# Patient Record
Sex: FEMALE | Race: WHITE | NOT HISPANIC OR LATINO | Employment: OTHER | URBAN - METROPOLITAN AREA
[De-identification: names, ages, dates, MRNs, and addresses within clinical notes are randomized per-mention and may not be internally consistent; named-entity substitution may affect disease eponyms.]

---

## 2017-01-12 DIAGNOSIS — R20.2 PARESTHESIA OF SKIN: ICD-10-CM

## 2017-01-12 DIAGNOSIS — Z12.31 ENCOUNTER FOR SCREENING MAMMOGRAM FOR MALIGNANT NEOPLASM OF BREAST: ICD-10-CM

## 2017-02-07 ENCOUNTER — LAB CONVERSION - ENCOUNTER (OUTPATIENT)
Dept: OTHER | Facility: OTHER | Age: 72
End: 2017-02-07

## 2017-02-07 LAB
HBA1C MFR BLD HPLC: 6.4 % OF TOTAL HGB
HCT VFR BLD AUTO: 40 % (ref 35–45)
HGB BLD-MCNC: 13.4 G/DL (ref 11.7–15.5)

## 2017-02-08 ENCOUNTER — ALLSCRIPTS OFFICE VISIT (OUTPATIENT)
Dept: OTHER | Facility: OTHER | Age: 72
End: 2017-02-08

## 2017-06-13 ENCOUNTER — ALLSCRIPTS OFFICE VISIT (OUTPATIENT)
Dept: OTHER | Facility: OTHER | Age: 72
End: 2017-06-13

## 2017-10-19 ENCOUNTER — APPOINTMENT (OUTPATIENT)
Dept: LAB | Facility: CLINIC | Age: 72
End: 2017-10-19
Payer: COMMERCIAL

## 2017-10-19 ENCOUNTER — TRANSCRIBE ORDERS (OUTPATIENT)
Dept: LAB | Facility: CLINIC | Age: 72
End: 2017-10-19

## 2017-10-19 DIAGNOSIS — D50.9 NORMOCYTIC HYPOCHROMIC ANEMIA: Primary | ICD-10-CM

## 2017-10-19 DIAGNOSIS — D50.9 NORMOCYTIC HYPOCHROMIC ANEMIA: ICD-10-CM

## 2017-10-19 LAB
ANION GAP SERPL CALCULATED.3IONS-SCNC: 8 MMOL/L (ref 4–13)
BUN SERPL-MCNC: 19 MG/DL (ref 5–25)
CALCIUM SERPL-MCNC: 9.6 MG/DL (ref 8.3–10.1)
CHLORIDE SERPL-SCNC: 101 MMOL/L (ref 100–108)
CO2 SERPL-SCNC: 26 MMOL/L (ref 21–32)
CREAT SERPL-MCNC: 0.66 MG/DL (ref 0.6–1.3)
EST. AVERAGE GLUCOSE BLD GHB EST-MCNC: 134 MG/DL
GFR SERPL CREATININE-BSD FRML MDRD: 90 ML/MIN/1.73SQ M
GLUCOSE SERPL-MCNC: 102 MG/DL (ref 65–140)
HBA1C MFR BLD: 6.3 % (ref 4.2–6.3)
HGB BLD-MCNC: 13.1 G/DL (ref 11.5–15.4)
POTASSIUM SERPL-SCNC: 3.8 MMOL/L (ref 3.5–5.3)
SODIUM SERPL-SCNC: 135 MMOL/L (ref 136–145)

## 2017-10-19 PROCEDURE — 85018 HEMOGLOBIN: CPT

## 2017-10-19 PROCEDURE — 83036 HEMOGLOBIN GLYCOSYLATED A1C: CPT

## 2017-10-19 PROCEDURE — 36415 COLL VENOUS BLD VENIPUNCTURE: CPT

## 2017-10-19 PROCEDURE — 80048 BASIC METABOLIC PNL TOTAL CA: CPT

## 2017-10-23 ENCOUNTER — ALLSCRIPTS OFFICE VISIT (OUTPATIENT)
Dept: OTHER | Facility: OTHER | Age: 72
End: 2017-10-23

## 2017-11-10 ENCOUNTER — GENERIC CONVERSION - ENCOUNTER (OUTPATIENT)
Dept: OTHER | Facility: OTHER | Age: 72
End: 2017-11-10

## 2018-01-12 NOTE — RESULT NOTES
Verified Results  (1) TISSUE EXAM [id number] 12:00AM Ej Quinteros     Test Name Result Flag Reference   LAB AP CASE REPORT (Report)     Surgical Pathology Report             Case: [id number]                   Authorizing Provider: Annabel Fuentes,  Collected:      07/26/2016                       DO                                       Pathologist:      Ingrid Mcconnell MD        Received:      07/27/2016 1520        Specimen:  Skin, Other, Right upper thigh   LAB AP FINAL DIAGNOSIS (Report)     A  Skin, Right upper thigh, shave biopsy:  - Basal cell carcinoma, nodular type; see note  Note: On section planes studied, lesional cells are at one lateral tissue   edge  Interpretation performed at [facility], [address] [place] [address]      Electronically signed by Ingrid Mcconnell MD on 8/1/2016 at 7:00 PM   LAB AP SURGICAL ADDITIONAL INFORMATION (Report)     These tests were developed and their performance characteristics   determined by [facility] or [facility]  They may not be cleared or approved by the [place]  Food and   Drug Administration  The FDA has determined that such clearance or   approval is not necessary  These tests are used for clinical purposes  They should not be regarded as investigational or for research  This   laboratory has been approved by [place] 88, designated as a high-complexity   laboratory and is qualified to perform these tests  LAB AP GROSS DESCRIPTION (Report)     A  The specimen is received in formalin, labeled with the patient's name   and hospital number, and is designated right upper thigh lesion shave  The specimen consists of one tan nonhairbearing shave of skin measuring   1 5 by 1 2 by less than 0 1 cm  The surface exhibits a partially   bosselated and keratotic plaque which measures 1 1 by 1 0 x 0 2 cm, and   comes within less than 0 1 cm of nearest peripheral margin   The margin of   resection is inked blue, and the surface tips are inked red  Entirely   submitted  Two cassettes  1: Shaved ends of resection  2: Center serially sectioned and sequentially submitted    Collection time not given; fixation time not greater than 60 5 hours    MAS

## 2018-01-13 VITALS
SYSTOLIC BLOOD PRESSURE: 130 MMHG | WEIGHT: 150 LBS | BODY MASS INDEX: 30.24 KG/M2 | HEIGHT: 59 IN | TEMPERATURE: 98 F | HEART RATE: 60 BPM | DIASTOLIC BLOOD PRESSURE: 84 MMHG

## 2018-01-13 VITALS
SYSTOLIC BLOOD PRESSURE: 120 MMHG | HEART RATE: 76 BPM | BODY MASS INDEX: 29.89 KG/M2 | WEIGHT: 148.25 LBS | RESPIRATION RATE: 16 BRPM | DIASTOLIC BLOOD PRESSURE: 80 MMHG | HEIGHT: 59 IN | TEMPERATURE: 95.8 F

## 2018-01-13 VITALS
BODY MASS INDEX: 29.13 KG/M2 | WEIGHT: 144.5 LBS | RESPIRATION RATE: 14 BRPM | HEIGHT: 59 IN | TEMPERATURE: 96.5 F | DIASTOLIC BLOOD PRESSURE: 80 MMHG | HEART RATE: 70 BPM | SYSTOLIC BLOOD PRESSURE: 132 MMHG

## 2018-02-26 ENCOUNTER — OFFICE VISIT (OUTPATIENT)
Dept: FAMILY MEDICINE CLINIC | Facility: CLINIC | Age: 73
End: 2018-02-26
Payer: COMMERCIAL

## 2018-02-26 VITALS
BODY MASS INDEX: 30.86 KG/M2 | RESPIRATION RATE: 14 BRPM | DIASTOLIC BLOOD PRESSURE: 80 MMHG | WEIGHT: 147 LBS | SYSTOLIC BLOOD PRESSURE: 130 MMHG | HEART RATE: 74 BPM | TEMPERATURE: 97.4 F | HEIGHT: 58 IN

## 2018-02-26 DIAGNOSIS — E13.9 DIABETES 1.5, MANAGED AS TYPE 2 (HCC): Primary | ICD-10-CM

## 2018-02-26 DIAGNOSIS — I10 ESSENTIAL HYPERTENSION: ICD-10-CM

## 2018-02-26 DIAGNOSIS — D50.8 IRON DEFICIENCY ANEMIA SECONDARY TO INADEQUATE DIETARY IRON INTAKE: ICD-10-CM

## 2018-02-26 PROCEDURE — 99213 OFFICE O/P EST LOW 20 MIN: CPT | Performed by: FAMILY MEDICINE

## 2018-02-26 PROCEDURE — 1101F PT FALLS ASSESS-DOCD LE1/YR: CPT | Performed by: FAMILY MEDICINE

## 2018-02-26 PROCEDURE — 3725F SCREEN DEPRESSION PERFORMED: CPT | Performed by: FAMILY MEDICINE

## 2018-02-26 RX ORDER — VALSARTAN AND HYDROCHLOROTHIAZIDE 160; 25 MG/1; MG/1
TABLET ORAL
Refills: 0 | COMMUNITY
Start: 2018-01-20 | End: 2018-11-20 | Stop reason: SDUPTHER

## 2018-02-26 RX ORDER — BESIFLOXACIN 6 MG/ML
SUSPENSION OPHTHALMIC
Refills: 0 | COMMUNITY
Start: 2017-11-29 | End: 2018-04-07

## 2018-02-26 RX ORDER — NEOMYCIN SULFATE, POLYMYXIN B SULFATE, AND DEXAMETHASONE 3.5; 10000; 1 MG/G; [USP'U]/G; MG/G
OINTMENT OPHTHALMIC
Refills: 0 | COMMUNITY
Start: 2017-11-22 | End: 2018-04-07

## 2018-02-26 RX ORDER — BROMFENAC SODIUM 0.7 MG/ML
SOLUTION/ DROPS OPHTHALMIC
Refills: 0 | COMMUNITY
Start: 2017-12-01 | End: 2018-04-07

## 2018-02-26 RX ORDER — ERGOCALCIFEROL (VITAMIN D2) 10 MCG
1 TABLET ORAL DAILY
COMMUNITY
End: 2018-04-07

## 2018-02-26 RX ORDER — TRIPROLIDINE/PSEUDOEPHEDRINE 2.5MG-60MG
TABLET ORAL
Refills: 0 | COMMUNITY
Start: 2017-12-01 | End: 2018-04-07

## 2018-02-26 NOTE — PROGRESS NOTES
Patient ID: Vinnie Apley is a 71 y o  female  HPI: 71 y  o female presents for followup of NIDDM, anemia (iron deficiency) and HTN  All are well controlled at this time  , however pt forgot to get updated labs, has a slip to do so and will go this week  SUBJECTIVE    Family History   Problem Relation Age of Onset    Hypertension Mother     Lung cancer Father     Hypertension Sister     Lymphoma Brother 39    Hypertension Brother     Schizophrenia Brother     Depression Son     Schizophrenia Son     Hypertension Family     Heart disease Other      Social History     Social History    Marital status: /Civil Union     Spouse name: N/A    Number of children: N/A    Years of education: N/A     Occupational History    Not on file       Social History Main Topics    Smoking status: Never Smoker    Smokeless tobacco: Not on file    Alcohol use No    Drug use: Unknown    Sexual activity: Not on file     Other Topics Concern    Not on file     Social History Narrative    Daily coffee consumption (___cups/day)    Daily cola consumption (____cans/day)    Daily tea consumption (____cups/day)    Uses safety equipment-seatbelts     Past Medical History:   Diagnosis Date    Hypertension     Shortness of breath     Skin neoplasm     last assessed: 6/13/2017     Past Surgical History:   Procedure Laterality Date    TONSILLECTOMY       No Known Allergies    Current Outpatient Prescriptions:     Multiple Vitamin (DAILY VALUE MULTIVITAMIN) TABS, Take 1 tablet by mouth daily, Disp: , Rfl:     valsartan-hydrochlorothiazide (DIOVAN-HCT) 160-25 MG per tablet, , Disp: , Rfl: 0    BESIVANCE 0 6 % SUSP, instill 1 drop into right eye twice a day, Disp: , Rfl: 0    DUREZOL 0 05 % EMUL, , Disp: , Rfl: 0    neomycin-polymyxin-dexamethasone (MAXITROL) 0 35%-10,000 units/g-0 1%, apply into both eyes three times a day, Disp: , Rfl: 0    PROLENSA 0 07 % SOLN, , Disp: , Rfl: 0    Review of Systems  Constitutional:     Denies fever, chills ,fatigue ,weakness ,weight loss, weight gain     ENT: Denies earache ,loss of hearing ,nosebleed, nasal discharge,nasal congestion ,sore throat ,hoarseness  Pulmonary: Denies shortness of breath ,cough  ,dyspnea on exertion, orthopnea  ,PND   Cardiovascular:  Denies bradycardia , tachycardia  ,palpations, lower extremity edema leg, claudication  Breast:  Denies new or changing breast lumps ,nipple discharge ,nipple changes  Abdomen:  Denies abdominal pain , anorexia , indigestion, nausea, vomiting, constipation, diarrhea  Musculoskeletal: Denies myalgias, arthralgias, joint swelling, joint stiffness , limb pain, limb swelling  Gu: denies dysuria, polyuria  Skin: Denies skin rash, skin lesion, skin wound, itching, dry skin  Neuro: Denies headache, numbness, tingling, confusion, loss of consciousness, dizziness, vertigo  Psychiatric: Denies feelings of depression, suicidal ideation, anxiety, sleep disturbances    OBJECTIVE    Constitutional:   NAD, well appearing and well nourished      ENT:   Conjunctiva and lids: no injection, edema, or discharge     Pupils and iris: JB bilaterally    External inspection of ears and nose: normal without deformities or discharge  Otoscopic exam: Canals patent without erythema  Nasal mucosa, septum and turbinates: Normal or edema or discharge         Oropharynx:  Moist mucosa, normal tongue and tonsils without lesions  No erythema        Pulmonary:Respiratory effort normal rate and rhythm, no increased work of breathing   Auscultation of lungs:  Clear bilaterally with no adventitious breath sounds       Cardiovascular: regular rate and rhythm, S1 and S2, no murmur, no edema and/or varicosities of LE      Abdomen: Soft and non-distended     Positive bowel sounds      No heptomegaly or splenomegaly      Gu: no suprapubic tenderness or CVA tenderness, no urethral discharge  Lymphatic:  No anterior or posterior cervical lymphadenopathy         Musculoskeletal:  Gait and station: Normal gait      Digits and nails normal without clubbing or cyanosis       Inspection/palpation of joints, bones, and muscles:  No joint tenderness, swelling, full active and passive range of motion       Skin: Normal skin turgor and no rashes      Neuro:    Normal reflexes      Psych:   alert and oriented to person, place and time     normal mood and affect  Patient's shoes and socks removed  Right Foot/Ankle   Right Foot Inspection  Skin Exam: skin normal and skin intact no dry skin, no warmth, no callus, no erythema, no maceration, no abnormal color, no pre-ulcer, no ulcer and no callus                          Toe Exam: ROM and strength within normal limits  Sensory   Vibration: intact  Proprioception: intact   Monofilament testing: intact  Vascular  Capillary refills: < 3 seconds  The right DP pulse is 2+  The right PT pulse is 2+  Left Foot/Ankle  Left Foot Inspection  Skin Exam: skin normal and skin intactno dry skin, no warmth, no erythema, no maceration, normal color, no pre-ulcer, no ulcer and no callus                         Toe Exam: ROM and strength within normal limits                   Sensory   Vibration: intact  Proprioception: intact  Monofilament: intact  Vascular  Capillary refills: < 3 seconds  The left DP pulse is 2+  The left PT pulse is 2+  Assign Risk Category:  No deformity present;  No loss of protective sensation;        Risk: 0      Assessment/Plan:Diagnoses and all orders for this visit:    Diabetes 1 5, managed as type 2 ([facility] [address] )    Essential hypertension    Iron deficiency anemia secondary to inadequate dietary iron intake    Other orders  -     Multiple Vitamin (DAILY VALUE MULTIVITAMIN) TABS; Take 1 tablet by mouth daily  -     BESIVANCE 0 6 % SUSP; instill 1 drop into right eye twice a day  -     PROLENSA 0 07 % SOLN;   -     DUREZOL 0 05 % EMUL;   -     neomycin-polymyxin-dexamethasone (MAXITROL) 0 35%-10,000 units/g-0 1%; apply into both eyes three times a day  -     valsartan-hydrochlorothiazide (DIOVAN-HCT) 160-25 MG per tablet;           I will see patient back in 4 mos or sooner prn

## 2018-04-07 ENCOUNTER — APPOINTMENT (EMERGENCY)
Dept: RADIOLOGY | Facility: HOSPITAL | Age: 73
End: 2018-04-07
Payer: COMMERCIAL

## 2018-04-07 ENCOUNTER — HOSPITAL ENCOUNTER (OUTPATIENT)
Facility: HOSPITAL | Age: 73
Setting detail: OBSERVATION
Discharge: HOME | End: 2018-04-09
Attending: EMERGENCY MEDICINE | Admitting: INTERNAL MEDICINE
Payer: COMMERCIAL

## 2018-04-07 DIAGNOSIS — R55 SYNCOPE AND COLLAPSE: Primary | ICD-10-CM

## 2018-04-07 PROBLEM — I10 HYPERTENSION: Status: ACTIVE | Noted: 2018-04-07

## 2018-04-07 LAB
ABO GROUP BLD: NORMAL
ALBUMIN SERPL BCP-MCNC: 3.2 G/DL (ref 3.5–5)
ALP SERPL-CCNC: 89 U/L (ref 46–116)
ALT SERPL W P-5'-P-CCNC: 17 U/L (ref 12–78)
ANION GAP BLD CALC-SCNC: 18 MMOL/L (ref 4–13)
ANION GAP SERPL CALCULATED.3IONS-SCNC: 7 MMOL/L (ref 4–13)
AST SERPL W P-5'-P-CCNC: 11 U/L (ref 5–45)
ATRIAL RATE: 90 BPM
BASOPHILS # BLD AUTO: 0.03 THOUSANDS/ΜL (ref 0–0.1)
BASOPHILS NFR BLD AUTO: 0 % (ref 0–1)
BILIRUB SERPL-MCNC: 0.29 MG/DL (ref 0.2–1)
BLD GP AB SCN SERPL QL: NEGATIVE
BUN BLD-MCNC: 20 MG/DL (ref 5–25)
BUN SERPL-MCNC: 19 MG/DL (ref 5–25)
CA-I BLD-SCNC: 1.16 MMOL/L (ref 1.12–1.32)
CALCIUM SERPL-MCNC: 8.5 MG/DL (ref 8.3–10.1)
CHLORIDE BLD-SCNC: 100 MMOL/L (ref 100–108)
CHLORIDE SERPL-SCNC: 106 MMOL/L (ref 100–108)
CO2 SERPL-SCNC: 26 MMOL/L (ref 21–32)
CREAT BLD-MCNC: 0.9 MG/DL (ref 0.6–1.3)
CREAT SERPL-MCNC: 0.96 MG/DL (ref 0.6–1.3)
EOSINOPHIL # BLD AUTO: 0.05 THOUSAND/ΜL (ref 0–0.61)
EOSINOPHIL NFR BLD AUTO: 0 % (ref 0–6)
ERYTHROCYTE [DISTWIDTH] IN BLOOD BY AUTOMATED COUNT: 13.7 % (ref 11.6–15.1)
GFR SERPL CREATININE-BSD FRML MDRD: 60 ML/MIN/1.73SQ M
GFR SERPL CREATININE-BSD FRML MDRD: 65 ML/MIN/1.73SQ M
GLUCOSE SERPL-MCNC: 137 MG/DL (ref 65–140)
GLUCOSE SERPL-MCNC: 148 MG/DL (ref 65–140)
HCT VFR BLD AUTO: 39.3 % (ref 34.8–46.1)
HCT VFR BLD CALC: 39 % (ref 34.8–46.1)
HGB BLD-MCNC: 12.8 G/DL (ref 11.5–15.4)
HGB BLDA-MCNC: 13.3 G/DL (ref 11.5–15.4)
LYMPHOCYTES # BLD AUTO: 2.25 THOUSANDS/ΜL (ref 0.6–4.47)
LYMPHOCYTES NFR BLD AUTO: 20 % (ref 14–44)
MCH RBC QN AUTO: 29.4 PG (ref 26.8–34.3)
MCHC RBC AUTO-ENTMCNC: 32.6 G/DL (ref 31.4–37.4)
MCV RBC AUTO: 90 FL (ref 82–98)
MONOCYTES # BLD AUTO: 0.65 THOUSAND/ΜL (ref 0.17–1.22)
MONOCYTES NFR BLD AUTO: 6 % (ref 4–12)
NEUTROPHILS # BLD AUTO: 8.48 THOUSANDS/ΜL (ref 1.85–7.62)
NEUTS SEG NFR BLD AUTO: 74 % (ref 43–75)
NRBC BLD AUTO-RTO: 0 /100 WBCS
P AXIS: 60 DEGREES
PCO2 BLD: 25 MMOL/L (ref 21–32)
PLATELET # BLD AUTO: 402 THOUSANDS/UL (ref 149–390)
PMV BLD AUTO: 8.3 FL (ref 8.9–12.7)
POTASSIUM BLD-SCNC: 3.5 MMOL/L (ref 3.5–5.3)
POTASSIUM SERPL-SCNC: 3.5 MMOL/L (ref 3.5–5.3)
PR INTERVAL: 194 MS
PROT SERPL-MCNC: 7.4 G/DL (ref 6.4–8.2)
QRS AXIS: 21 DEGREES
QRSD INTERVAL: 96 MS
QT INTERVAL: 374 MS
QTC INTERVAL: 457 MS
RBC # BLD AUTO: 4.36 MILLION/UL (ref 3.81–5.12)
RH BLD: POSITIVE
SODIUM BLD-SCNC: 139 MMOL/L (ref 136–145)
SODIUM SERPL-SCNC: 139 MMOL/L (ref 136–145)
SPECIMEN EXPIRATION DATE: NORMAL
SPECIMEN SOURCE: ABNORMAL
T WAVE AXIS: -31 DEGREES
TROPONIN I SERPL-MCNC: <0.02 NG/ML
VENTRICULAR RATE: 90 BPM
WBC # BLD AUTO: 11.5 THOUSAND/UL (ref 4.31–10.16)

## 2018-04-07 PROCEDURE — 86901 BLOOD TYPING SEROLOGIC RH(D): CPT | Performed by: EMERGENCY MEDICINE

## 2018-04-07 PROCEDURE — 84484 ASSAY OF TROPONIN QUANT: CPT | Performed by: EMERGENCY MEDICINE

## 2018-04-07 PROCEDURE — 85014 HEMATOCRIT: CPT

## 2018-04-07 PROCEDURE — 86900 BLOOD TYPING SEROLOGIC ABO: CPT | Performed by: EMERGENCY MEDICINE

## 2018-04-07 PROCEDURE — 80053 COMPREHEN METABOLIC PANEL: CPT | Performed by: EMERGENCY MEDICINE

## 2018-04-07 PROCEDURE — 36415 COLL VENOUS BLD VENIPUNCTURE: CPT | Performed by: EMERGENCY MEDICINE

## 2018-04-07 PROCEDURE — 96360 HYDRATION IV INFUSION INIT: CPT

## 2018-04-07 PROCEDURE — 99220 PR INITIAL OBSERVATION CARE/DAY 70 MINUTES: CPT | Performed by: INTERNAL MEDICINE

## 2018-04-07 PROCEDURE — 93005 ELECTROCARDIOGRAM TRACING: CPT

## 2018-04-07 PROCEDURE — 85025 COMPLETE CBC W/AUTO DIFF WBC: CPT | Performed by: EMERGENCY MEDICINE

## 2018-04-07 PROCEDURE — 86850 RBC ANTIBODY SCREEN: CPT | Performed by: EMERGENCY MEDICINE

## 2018-04-07 PROCEDURE — 80047 BASIC METABLC PNL IONIZED CA: CPT

## 2018-04-07 PROCEDURE — 71046 X-RAY EXAM CHEST 2 VIEWS: CPT

## 2018-04-07 RX ADMIN — SODIUM CHLORIDE 1000 ML: 0.9 INJECTION, SOLUTION INTRAVENOUS at 20:50

## 2018-04-08 ENCOUNTER — APPOINTMENT (OUTPATIENT)
Dept: RADIOLOGY | Facility: HOSPITAL | Age: 73
End: 2018-04-08
Payer: COMMERCIAL

## 2018-04-08 LAB
ANION GAP SERPL CALCULATED.3IONS-SCNC: 5 MMOL/L (ref 4–13)
BUN SERPL-MCNC: 19 MG/DL (ref 5–25)
CALCIUM SERPL-MCNC: 8.2 MG/DL (ref 8.3–10.1)
CHLORIDE SERPL-SCNC: 107 MMOL/L (ref 100–108)
CO2 SERPL-SCNC: 27 MMOL/L (ref 21–32)
CREAT SERPL-MCNC: 0.72 MG/DL (ref 0.6–1.3)
ERYTHROCYTE [DISTWIDTH] IN BLOOD BY AUTOMATED COUNT: 14 % (ref 11.6–15.1)
GFR SERPL CREATININE-BSD FRML MDRD: 85 ML/MIN/1.73SQ M
GLUCOSE SERPL-MCNC: 112 MG/DL (ref 65–140)
HCT VFR BLD AUTO: 33.6 % (ref 34.8–46.1)
HGB BLD-MCNC: 11.4 G/DL (ref 11.5–15.4)
MCH RBC QN AUTO: 30 PG (ref 26.8–34.3)
MCHC RBC AUTO-ENTMCNC: 33.9 G/DL (ref 31.4–37.4)
MCV RBC AUTO: 88 FL (ref 82–98)
PLATELET # BLD AUTO: 375 THOUSANDS/UL (ref 149–390)
PMV BLD AUTO: 8.6 FL (ref 8.9–12.7)
POTASSIUM SERPL-SCNC: 4.1 MMOL/L (ref 3.5–5.3)
RBC # BLD AUTO: 3.8 MILLION/UL (ref 3.81–5.12)
SODIUM SERPL-SCNC: 139 MMOL/L (ref 136–145)
TROPONIN I SERPL-MCNC: <0.02 NG/ML
WBC # BLD AUTO: 12.61 THOUSAND/UL (ref 4.31–10.16)

## 2018-04-08 PROCEDURE — 36415 COLL VENOUS BLD VENIPUNCTURE: CPT | Performed by: INTERNAL MEDICINE

## 2018-04-08 PROCEDURE — 99225 PR SBSQ OBSERVATION CARE/DAY 25 MINUTES: CPT | Performed by: NURSE PRACTITIONER

## 2018-04-08 PROCEDURE — G8978 MOBILITY CURRENT STATUS: HCPCS

## 2018-04-08 PROCEDURE — 97162 PT EVAL MOD COMPLEX 30 MIN: CPT

## 2018-04-08 PROCEDURE — 80048 BASIC METABOLIC PNL TOTAL CA: CPT | Performed by: INTERNAL MEDICINE

## 2018-04-08 PROCEDURE — 70450 CT HEAD/BRAIN W/O DYE: CPT

## 2018-04-08 PROCEDURE — G8980 MOBILITY D/C STATUS: HCPCS

## 2018-04-08 PROCEDURE — 85027 COMPLETE CBC AUTOMATED: CPT | Performed by: INTERNAL MEDICINE

## 2018-04-08 PROCEDURE — 84484 ASSAY OF TROPONIN QUANT: CPT | Performed by: NURSE PRACTITIONER

## 2018-04-08 PROCEDURE — 99285 EMERGENCY DEPT VISIT HI MDM: CPT

## 2018-04-08 PROCEDURE — G8979 MOBILITY GOAL STATUS: HCPCS

## 2018-04-08 RX ORDER — VALSARTAN AND HYDROCHLOROTHIAZIDE 160; 25 MG/1; MG/1
1 TABLET ORAL DAILY
Refills: 0 | Status: CANCELLED
Start: 2018-04-10

## 2018-04-08 RX ORDER — SODIUM CHLORIDE 9 MG/ML
50 INJECTION, SOLUTION INTRAVENOUS CONTINUOUS
Status: DISCONTINUED | OUTPATIENT
Start: 2018-04-08 | End: 2018-04-09 | Stop reason: HOSPADM

## 2018-04-08 RX ADMIN — ENOXAPARIN SODIUM 40 MG: 40 INJECTION SUBCUTANEOUS at 09:19

## 2018-04-08 RX ADMIN — SODIUM CHLORIDE 50 ML/HR: 0.9 INJECTION, SOLUTION INTRAVENOUS at 17:17

## 2018-04-08 NOTE — PLAN OF CARE
Problem: Potential for Falls  Goal: Patient will remain free of falls  INTERVENTIONS:  - Assess patient frequently for physical needs  -  Identify cognitive and physical deficits and behaviors that affect risk of falls  -  [place] fall precautions as indicated by assessment   - Educate patient/family on patient safety including physical limitations  - Instruct patient to call for assistance with activity based on assessment  - Modify environment to reduce risk of injury  - Consider OT/PT consult to assist with strengthening/mobility   Outcome: Progressing      Problem: CARDIOVASCULAR - ADULT  Goal: Maintains optimal cardiac output and hemodynamic stability  INTERVENTIONS:  - Monitor I/O, vital signs and rhythm  - Monitor for S/S and trends of decreased cardiac output i e  bleeding, hypotension  - Administer and titrate ordered vasoactive medications to optimize hemodynamic stability  - Assess quality of pulses, skin color and temperature  - Assess for signs of decreased coronary artery perfusion - ex   Angina  - Instruct patient to report change in severity of symptoms   Outcome: Progressing    Goal: Absence of cardiac dysrhythmias or at baseline rhythm  INTERVENTIONS:  - Continuous cardiac monitoring, monitor vital signs, obtain 12 lead EKG if indicated  - Administer antiarrhythmic and heart rate control medications as ordered  - Monitor electrolytes and administer replacement therapy as ordered   Outcome: Progressing      Problem: METABOLIC, FLUID AND ELECTROLYTES - ADULT  Goal: Electrolytes maintained within normal limits  INTERVENTIONS:  - Monitor labs and assess patient for signs and symptoms of electrolyte imbalances  - Administer electrolyte replacement as ordered  - Monitor response to electrolyte replacements, including repeat lab results as appropriate  - Instruct patient on fluid and nutrition as appropriate   Outcome: Progressing      Problem: HEMATOLOGIC - ADULT  Goal: Maintains hematologic stability  INTERVENTIONS  - Assess for signs and symptoms of bleeding or hemorrhage  - Monitor labs  - Administer supportive blood products/factors as ordered and appropriate   Outcome: Progressing

## 2018-04-08 NOTE — PROGRESS NOTES
Ct head returned with abnormal findings: 1   Left frontal extra-axial mildly hyperdense 2 6 cm mass likely to represent a meningioma   Aneurysm or less likely though not entirely excluded   Recommend MRI of the brain with gadolinium     2   No evidence of acute intracranial hemorrhage or extra-axial collection  3   Left frontal scalp hematoma   No acute calvarial fracture  Discussed findings with pt will start ivf now and have pt have cta of head r/o aneurysm

## 2018-04-08 NOTE — PLAN OF CARE
CARDIOVASCULAR - ADULT     Maintains optimal cardiac output and hemodynamic stability Progressing     Absence of cardiac dysrhythmias or at baseline rhythm Progressing        HEMATOLOGIC - ADULT     Maintains hematologic stability Progressing        METABOLIC, FLUID AND ELECTROLYTES - ADULT     Electrolytes maintained within normal limits Progressing        Potential for Falls     Patient will remain free of falls Progressing

## 2018-04-08 NOTE — PROGRESS NOTES
Cardiology tech aware that pt needs echo pending discharge stated she will come in afternoon to do echo

## 2018-04-08 NOTE — ED ATTENDING ATTESTATION
Radha Driscoll MD, saw and evaluated the patient  All available labs and X-rays were ordered by me or the resident and have been reviewed by myself  I discussed the patient with the resident / non-physician and agree with the resident's / non-physician practitioner's findings and plan as documented in the resident's / non-physician practicitioner's note, except where noted  At this point, I agree with the current assessment done in the ED  Chief Complaint   Patient presents with    Syncope     Pt was at dinner and felt she had to go to the bathroom and felt dizzy and was helped to the ground by   Per EMS initial systolic pressures was in the 60s  This is a 71year old female presenting for evaluation of syncopal episode  Today she was at dinner and all of a sudden felt she had to have a bowel movement  She at the same time felt dizzy [facility]) and couldn't get up  Her  lowered her to the ground  She appeared sweaty/diaphoretic  She was nauseous without vomiting  EMS called  They found that she was diaphoretic, pale at the time  12 lead okay  They checked a pressure, found it was 59/30, 61/30  Started fluids  Patient responded  Upon arrival, much less symptomatic  No f/ch/cp/sob  +urinate/stool on self  No seizures  Awake, alert during the entire time  No numbness/tingling distally  No headache  No belly pain, back pain  PMH:  - HTN  - SOB  - SKin CA  PSH:  - Tonsillectomy  No smoking, drinking, drugs  PE:  Vitals:    04/09/18 0404 04/09/18 0855 04/09/18 1113 04/09/18 1458   BP: 125/64 170/71 141/70 159/96   BP Location: Right arm Left arm Right arm Right arm   Pulse: 68 72 69 78   Resp: 18 20 18 20   Temp: 97 5 °F (36 4 °C) 98 1 °F (36 7 °C) 98 1 °F (36 7 °C) 98 6 °F (37 °C)   TempSrc: Oral Oral Oral Oral   SpO2: 95% 94% 95% 96%   Weight:       Height:       General: VSS, NAD, awake, alert  Well-nourished, well-developed  Appears stated age     Head: Normocephalic, atraumatic, nontender  Eyes: PERRL, EOM-I  No diplopia  No hyphema  No subconjunctival hemorrhages  Symmetrical lids  ENT: canals  Atraumatic external nose and ears  Dry MM  No malocclusion  No stridor  Normal phonation  No drooling  Normal swallowing  Neck: Symmetric, trachea midline  No JVD  CV: RRR  +S1/S2  No murmurs or gallops  Peripheral pulses +2 throughout  No chest wall tenderness  Lungs:   Unlabored No retractions  CTAB, lungs sounds equal bilateral    No crepitus  No tachypnea  No paradoxical motion  Abd: +BS, soft, NT/ND  No pulsatile mass   MSK:   FROM   No lower extremity edema  2+ pulses bilaterally  Back:   No CVAT  Skin: Dry, intact  Neuro: AAOx3, GCS 15, CN II-XII grossly intact  Motor grossly intact  Sensory grossly intact  Psychiatric/Behavioral: Appropriate mood and affect   Exam: deferred  U/S bedside: negative FAST, normal aorta  A:  - Diaphoresis  - LH  - syncope  P:  - Syncope workup  - Admit given age, fails CHESS Criteria  - 13 point ROS was performed and all are normal unless stated in the history above  - Nursing note reviewed  Vitals reviewed  - Orders placed by myself and/or advanced practitioner / resident     - Previous chart was reviewed  - No language barrier    - History obtained from EMS patient  - There are no limitations to the history obtained  - Critical care time: Not applicable for this patient  Final Diagnosis:  1  Syncope and collapse      ED Course as of Apr 13 2250   Sat Apr 07, 2018   2100 WBC: (!) 11 50   2130 Troponin I: <0 02   2130 This EKG was interpreted by me   The EKG demonstrates Normal sinus rhythm, normal intervals and axis, normal QRS, non specific acute ST-T changes  HR 90  No STEMI      Medications   sodium chloride 0 9 % bolus 1,000 mL (0 mL Intravenous Stopped 4/7/18 2150)    EMS REPLENISHMENT MED ( Does not apply Given to EMS 4/7/18 2053)   iohexol (OMNIPAQUE) 350 MG/ML injection (MULTI-DOSE) 85 mL (85 mL Intravenous Given 4/9/18 1305)     CTA head w wo contrast   Final Result      Stable 27 mm anterior left parasagittal frontal meningioma  Avascular mass effect with the several small cortical vessels identified from the SUJATHA  Workstation performed: [id number]         CT head wo contrast   Final Result         1  Left frontal extra-axial mildly hyperdense 2 6 cm mass likely to represent a meningioma  Aneurysm or less likely though not entirely excluded  Recommend MRI of the brain with gadolinium  2   No evidence of acute intracranial hemorrhage or extra-axial collection  3   Left frontal scalp hematoma  No acute calvarial fracture  Workstation performed: [id number]         XR chest 2 views   ED Interpretation   Abnormal   The CXR was ordered by me and interpreted by me independently  On my read, it appears:   - EXTREMELY large hiatal hernia in chest   - no pna      Final Result      No acute cardiopulmonary disease  Workstation performed: [id number]           Orders Placed This Encounter   Procedures    XR chest 2 views    CT head wo contrast    CTA head w wo contrast    CBC and differential    Comprehensive metabolic panel    Troponin I    Basic metabolic panel    CBC (With Platelets)    Troponin I    Discharge Diet    Continuous cardiac monitoring    Nursing communcation Continue IV as ordered      Activity as tolerated    ECG 12 lead    ECG 12 lead    Echo complete with contrast if indicated    Type and screen    Place in Observation (expected length of stay for this patient is less than two midnights)     Labs Reviewed   CBC AND DIFFERENTIAL - Abnormal        Result Value Ref Range Status    WBC 11 50 (*) 4 31 - 10 16 Thousand/uL Final    RBC 4 36  3 81 - 5 12 Million/uL Final    Hemoglobin 12 8  11 5 - 15 4 g/dL Final    Hematocrit 39 3  34 8 - 46 1 % Final    MCV 90  82 - 98 fL Final    MCH 29 4  26 8 - 34 3 pg Final    MCHC 32 6  31 4 - 37 4 g/dL Final RDW 13 7  11 6 - 15 1 % Final    MPV 8 3 (*) 8 9 - 12 7 fL Final    Platelets 564 (*) 649 - 390 Thousands/uL Final    nRBC 0  /100 WBCs Final    Neutrophils Relative 74  43 - 75 % Final    Lymphocytes Relative 20  14 - 44 % Final    Monocytes Relative 6  4 - 12 % Final    Eosinophils Relative 0  0 - 6 % Final    Basophils Relative 0  0 - 1 % Final    Neutrophils Absolute 8 48 (*) 1 85 - 7 62 Thousands/µL Final    Lymphocytes Absolute 2 25  0 60 - 4 47 Thousands/µL Final    Monocytes Absolute 0 65  0 17 - 1 22 Thousand/µL Final    Eosinophils Absolute 0 05  0 00 - 0 61 Thousand/µL Final    Basophils Absolute 0 03  0 00 - 0 10 Thousands/µL Final   COMPREHENSIVE METABOLIC PANEL - Abnormal     Sodium 139  136 - 145 mmol/L Final    Potassium 3 5  3 5 - 5 3 mmol/L Final    Chloride 106  100 - 108 mmol/L Final    CO2 26  21 - 32 mmol/L Final    Anion Gap 7  4 - 13 mmol/L Final    BUN 19  5 - 25 mg/dL Final    Creatinine 0 96  0 60 - 1 30 mg/dL Final    Comment: Standardized to IDMS reference method    Glucose 137  65 - 140 mg/dL Final    Comment:   If the patient is fasting, the ADA then defines impaired fasting glucose as > 100 mg/dL and diabetes as > or equal to 123 mg/dL  Specimen collection should occur prior to Sulfasalazine administration due to the potential for falsely depressed results  Specimen collection should occur prior to Sulfapyridine administration due to the potential for falsely elevated results  Calcium 8 5  8 3 - 10 1 mg/dL Final    AST 11  5 - 45 U/L Final    Comment:   Specimen collection should occur prior to Sulfasalazine administration due to the potential for falsely depressed results  ALT 17  12 - 78 U/L Final    Comment:   Specimen collection should occur prior to Sulfasalazine and/or Sulfapyridine administration due to the potential for falsely depressed results       Alkaline Phosphatase 89  46 - 116 U/L Final    Total Protein 7 4  6 4 - 8 2 g/dL Final    Albumin 3 2 (*) 3 5 - 5 0 g/dL Final    Total Bilirubin 0 29  0 20 - 1 00 mg/dL Final    eGFR 60  ml/min/1 73sq m Final    Narrative:     National Kidney Disease Education Program recommendations are as follows:  GFR calculation is accurate only with a steady state creatinine  Chronic Kidney disease less than 60 ml/min/1 73 sq  meters  Kidney failure less than 15 ml/min/1 73 sq  meters  BASIC METABOLIC PANEL - Abnormal     Sodium 139  136 - 145 mmol/L Final    Potassium 4 1  3 5 - 5 3 mmol/L Final    Chloride 107  100 - 108 mmol/L Final    CO2 27  21 - 32 mmol/L Final    Anion Gap 5  4 - 13 mmol/L Final    BUN 19  5 - 25 mg/dL Final    Creatinine 0 72  0 60 - 1 30 mg/dL Final    Comment: Standardized to IDMS reference method    Glucose 112  65 - 140 mg/dL Final    Comment:   If the patient is fasting, the ADA then defines impaired fasting glucose as > 100 mg/dL and diabetes as > or equal to 123 mg/dL  Specimen collection should occur prior to Sulfasalazine administration due to the potential for falsely depressed results  Specimen collection should occur prior to Sulfapyridine administration due to the potential for falsely elevated results  Calcium 8 2 (*) 8 3 - 10 1 mg/dL Final    eGFR 85  ml/min/1 73sq m Final    Narrative:     National Kidney Disease Education Program recommendations are as follows:  GFR calculation is accurate only with a steady state creatinine  Chronic Kidney disease less than 60 ml/min/1 73 sq  meters  Kidney failure less than 15 ml/min/1 73 sq  meters     CBC AND PLATELET - Abnormal     WBC 12 61 (*) 4 31 - 10 16 Thousand/uL Final    RBC 3 80 (*) 3 81 - 5 12 Million/uL Final    Hemoglobin 11 4 (*) 11 5 - 15 4 g/dL Final    Hematocrit 33 6 (*) 34 8 - 46 1 % Final    MCV 88  82 - 98 fL Final    MCH 30 0  26 8 - 34 3 pg Final    MCHC 33 9  31 4 - 37 4 g/dL Final    RDW 14 0  11 6 - 15 1 % Final    Platelets 075  194 - 390 Thousands/uL Final    MPV 8 6 (*) 8 9 - 12 7 fL Final   POCT CHEM 8+ - Abnormal     SODIUM, I-STAT 139  136 - 145 mmol/l Final    Potassium, i-STAT 3 5  3 5 - 5 3 mmol/L Final    Chloride, istat 100  100 - 108 mmol/L Final    CO2, i-STAT 25  21 - 32 mmol/L Final    Anion Gap, Istat 18 (*) 4 - 13 mmol/L Final    Calcium, Ionized i-STAT 1 16  1 12 - 1 32 mmol/L Final    BUN, I-STAT 20  5 - 25 mg/dl Final    Creatinine, i-STAT 0 9  0 6 - 1 3 mg/dl Final    eGFR 65  ml/min/1 73sq m Final    Glucose, i-STAT 148 (*) 65 - 140 mg/dl Final    Hct, i-STAT 39  34 8 - 46 1 % Final    Hgb, i-STAT 13 3  11 5 - 15 4 g/dl Final    Specimen Type VENOUS   Final   TROPONIN I - Normal    Troponin I <0 02  <=0 04 ng/mL Final    Narrative:     Siemens Chemistry analyzer 99% cutoff is > 0 04 ng/mL in network labs    o cTnI 99% cutoff is useful only when applied to patients in the clinical setting of myocardial ischemia  o cTnI 99% cutoff should be interpreted in the context of clinical history, ECG findings and possibly cardiac imaging to establish correct diagnosis  o cTnI 99% cutoff may be suggestive but clearly not indicative of a coronary event without the clinical setting of myocardial ischemia  TYPE AND SCREEN    ABO Grouping A   Final    Rh Factor Positive   Final    Antibody Screen Negative   Final    Specimen Expiration Date [id number]   Final     Time reflects when diagnosis was documented in both MDM as applicable and the Disposition within this note     Time User Action Codes Description Comment    4/7/2018 10:07 PM Sean Ceron 62 Add [R55] Syncope     4/7/2018 10:07 PM Luis Ceron Remove [R55] Syncope     4/7/2018 10:07 PM Luis Ceron Add [R55] Syncope and collapse       ED Disposition     ED Disposition Condition Comment    Admit  Case was discussed with david and the patient's admission status was agreed to be Admission Status: observation status to the service of Dr Carli Preston           Follow-up Information     Follow up With Specialties Details Why [facility] Follow up call to schedule follow up within the next week  [address] [facility] [address] [facility]      Ivonne Reddy MD Cardiology, Multidisciplinary Follow up please call to schedule follow up within the next week  [address] [facility] [address]  [address] [facility] Neurosurgery Call call for a new patient  [address] [address] [phone]        Discharge Medication List as of 4/9/2018  3:22 PM      CONTINUE these medications which have NOT CHANGED    Details   valsartan-hydrochlorothiazide (DIOVAN-HCT) 160-25 MG per tablet Starting Sat 1/20/2018, Historical Med             Outpatient Discharge Orders  Discharge Diet     Activity as tolerated       Prior to Admission Medications   Prescriptions Last Dose Informant Patient Reported? Taking?   valsartan-hydrochlorothiazide (DIOVAN-HCT) 160-25 MG per tablet  Self Yes Yes      Facility-Administered Medications: None       Portions of the record may have been created with voice recognition software  Occasional wrong word or "sound a like" substitutions may have occurred due to the inherent limitations of voice recognition software  Read the chart carefully and recognize, using context, where substitutions have occurred      Electronically signed by:  Davina Shah

## 2018-04-08 NOTE — ED PROVIDER NOTES
History  Chief Complaint   Patient presents with    Syncope     Pt was at dinner and felt she had to go to the bathroom and felt dizzy and was helped to the ground by   Per EMS initial systolic pressures was in the 60s  HPI  This is a 70 yo F who presents today with syncope  Patient was at a dinner party when she felt dizzy and syncopized  Patient was slowly laid down on the floor  She was out for about 1 minute  She does not recall the event  Afterwards patient was hypotensive and pale  When paramedics got there patient's blood pressure systolic was in the 35K  She received a L of fluid prior to arrival   Patient at that time was complaining of abdominal pain and she had diffuse diarrhea  States prior to this incident she was feeling fine no complaints  She denies any chest pain currently no shortness of breath no abdominal pain or back pain  Denies any headaches  On arrival patient appears in her normal state of health  Click bedside ultrasound was performed with normal aorta fast was negative  Will do syncope workup and admit patient   Prior to Admission Medications   Prescriptions Last Dose Informant Patient Reported? Taking?   valsartan-hydrochlorothiazide (DIOVAN-HCT) 160-25 MG per tablet  Self Yes Yes      Facility-Administered Medications: None       Past Medical History:   Diagnosis Date    Hypertension     Shortness of breath     Skin neoplasm     last assessed: 6/13/2017       Past Surgical History:   Procedure Laterality Date    TONSILLECTOMY         Family History   Problem Relation Age of Onset    Hypertension Mother     Lung cancer Father     Hypertension Sister     Lymphoma Brother 39    Hypertension Brother     Schizophrenia Brother     Depression Son     Schizophrenia Son     Hypertension Family     Heart disease Other      I have reviewed and agree with the history as documented      Social History   Substance Use Topics    Smoking status: Never Smoker    Smokeless tobacco: Never Used    Alcohol use No        Review of Systems   Constitutional: Negative for diaphoresis and fever  HENT: Negative  Respiratory: Negative  Negative for cough, shortness of breath and wheezing  Cardiovascular: Negative  Negative for chest pain, palpitations and leg swelling  Gastrointestinal: Positive for diarrhea  Negative for abdominal distention, abdominal pain, nausea and vomiting  Genitourinary: Negative  Musculoskeletal: Negative  Skin: Negative  Neurological: Positive for syncope and weakness  Psychiatric/Behavioral: Negative  All other systems reviewed and are negative  Physical Exam  ED Triage Vitals   Temperature Pulse Respirations Blood Pressure SpO2   04/07/18 2044 04/07/18 2040 04/07/18 2040 04/07/18 2040 04/07/18 2040   97 7 °F (36 5 °C) 81 18 170/75 96 %      Temp Source Heart Rate Source Patient Position - Orthostatic VS BP Location FiO2 (%)   04/07/18 2044 04/07/18 2040 04/07/18 2040 04/07/18 2040 --   Oral Monitor Lying Right arm       Pain Score       04/07/18 2040       No Pain           Orthostatic Vital Signs  Vitals:    04/08/18 0028 04/08/18 0215 04/08/18 0601 04/08/18 0700   BP: 125/68 144/70 142/76 140/81   Pulse: 82 76 71 79   Patient Position - Orthostatic VS: Lying Lying Lying Lying       Physical Exam   Constitutional: She is oriented to person, place, and time  She appears well-developed  HENT:   Head: Normocephalic and atraumatic  Mouth/Throat: Oropharynx is clear and moist    Eyes: EOM are normal  Pupils are equal, round, and reactive to light  Neck: Normal range of motion  Neck supple  Cardiovascular: Normal rate, regular rhythm and normal heart sounds  No murmur heard  Pulmonary/Chest: Effort normal and breath sounds normal    Abdominal: Soft  Bowel sounds are normal  She exhibits no distension  There is no tenderness  There is no rebound and no guarding  Musculoskeletal: Normal range of motion   She exhibits no edema, tenderness or deformity  Neurological: She is alert and oriented to person, place, and time  Skin: Skin is warm and dry  Psychiatric: She has a normal mood and affect  ED Medications  Medications   enoxaparin (LOVENOX) subcutaneous injection 40 mg (40 mg Subcutaneous Given 4/8/18 0919)   sodium chloride 0 9 % bolus 1,000 mL (0 mL Intravenous Stopped 4/7/18 2150)    EMS REPLENISHMENT MED ( Does not apply Given to EMS 4/7/18 2053)       Diagnostic Studies  Results Reviewed     Procedure Component Value Units Date/Time    CBC (With Platelets) [[id number]]  (Abnormal) Collected:  04/08/18 0457    Lab Status:  Final result Specimen:  Blood from Arm, Right Updated:  04/08/18 0529     WBC 12 61 (H) Thousand/uL      RBC 3 80 (L) Million/uL      Hemoglobin 11 4 (L) g/dL      Hematocrit 33 6 (L) %      MCV 88 fL      MCH 30 0 pg      MCHC 33 9 g/dL      RDW 14 0 %      Platelets 731 Thousands/uL      MPV 8 6 (L) fL     Basic metabolic panel [[id number]]  (Abnormal) Collected:  04/08/18 0457    Lab Status:  Final result Specimen:  Blood from Arm, Right Updated:  04/08/18 0524     Sodium 139 mmol/L      Potassium 4 1 mmol/L      Chloride 107 mmol/L      CO2 27 mmol/L      Anion Gap 5 mmol/L      BUN 19 mg/dL      Creatinine 0 72 mg/dL      Glucose 112 mg/dL      Calcium 8 2 (L) mg/dL      eGFR 85 ml/min/1 73sq m     Narrative:         National Kidney Disease Education Program recommendations are as follows:  GFR calculation is accurate only with a steady state creatinine  Chronic Kidney disease less than 60 ml/min/1 73 sq  meters  Kidney failure less than 15 ml/min/1 73 sq  meters      Comprehensive metabolic panel [[id number]]  (Abnormal) Collected:  04/07/18 2047    Lab Status:  Final result Specimen:  Blood from Arm, Right Updated:  04/07/18 2113     Sodium 139 mmol/L      Potassium 3 5 mmol/L      Chloride 106 mmol/L      CO2 26 mmol/L      Anion Gap 7 mmol/L      BUN 19 mg/dL      Creatinine 0 96 mg/dL Glucose 137 mg/dL      Calcium 8 5 mg/dL      AST 11 U/L      ALT 17 U/L      Alkaline Phosphatase 89 U/L      Total Protein 7 4 g/dL      Albumin 3 2 (L) g/dL      Total Bilirubin 0 29 mg/dL      eGFR 60 ml/min/1 73sq m     Narrative:         National Kidney Disease Education Program recommendations are as follows:  GFR calculation is accurate only with a steady state creatinine  Chronic Kidney disease less than 60 ml/min/1 73 sq  meters  Kidney failure less than 15 ml/min/1 73 sq  meters  Troponin I [[id number]]  (Normal) Collected:  04/07/18 2047    Lab Status:  Final result Specimen:  Blood from Arm, Right Updated:  04/07/18 2113     Troponin I <0 02 ng/mL     Narrative:         [employer] Chemistry analyzer 99% cutoff is > 0 04 ng/mL in network labs    o cTnI 99% cutoff is useful only when applied to patients in the clinical setting of myocardial ischemia  o cTnI 99% cutoff should be interpreted in the context of clinical history, ECG findings and possibly cardiac imaging to establish correct diagnosis  o cTnI 99% cutoff may be suggestive but clearly not indicative of a coronary event without the clinical setting of myocardial ischemia      CBC and differential [[id number]]  (Abnormal) Collected:  04/07/18 2047    Lab Status:  Final result Specimen:  Blood from Arm, Right Updated:  04/07/18 2100     WBC 11 50 (H) Thousand/uL      RBC 4 36 Million/uL      Hemoglobin 12 8 g/dL      Hematocrit 39 3 %      MCV 90 fL      MCH 29 4 pg      MCHC 32 6 g/dL      RDW 13 7 %      MPV 8 3 (L) fL      Platelets 446 (H) Thousands/uL      nRBC 0 /100 WBCs      Neutrophils Relative 74 %      Lymphocytes Relative 20 %      Monocytes Relative 6 %      Eosinophils Relative 0 %      Basophils Relative 0 %      Neutrophils Absolute 8 48 (H) Thousands/µL      Lymphocytes Absolute 2 25 Thousands/µL      Monocytes Absolute 0 65 Thousand/µL      Eosinophils Absolute 0 05 Thousand/µL      Basophils Absolute 0 03 Thousands/µL     POCT Chem 8+ [[id number]]  (Abnormal) Collected:  04/07/18 2047    Lab Status:  Final result Updated:  04/07/18 2051     SODIUM, I-STAT 139 mmol/l      Potassium, i-STAT 3 5 mmol/L      Chloride, istat 100 mmol/L      CO2, i-STAT 25 mmol/L      Anion Gap, Istat 18 (H) mmol/L      Calcium, Ionized i-STAT 1 16 mmol/L      BUN, I-STAT 20 mg/dl      Creatinine, i-STAT 0 9 mg/dl      eGFR 65 ml/min/1 73sq m      Glucose, i-STAT 148 (H) mg/dl      Hct, i-STAT 39 %      Hgb, i-STAT 13 3 g/dl      Specimen Type VENOUS    POCT urinalysis dipstick [[id number]]     Lab Status:  No result Specimen:  Urine                  XR chest 2 views   ED Interpretation by Sharonda Avila MD (04/07 2202)   Abnormal   The CXR was ordered by me and interpreted by me independently  On my read, it appears:   - EXTREMELY large hiatal hernia in chest   - no pna      Final Result by Garrett Mendes MD (04/08 2690)      No acute cardiopulmonary disease  Workstation performed: [id number]         CT head wo contrast    (Results Pending)         Procedures  Procedures      Phone Consults  ED Phone Contact    ED Course  ED Course                                MDM  CritCare Time    Disposition  Final diagnoses:   Syncope and collapse     Time reflects when diagnosis was documented in both MDM as applicable and the Disposition within this note     Time User Action Codes Description Comment    4/7/2018 10:07 PM Sean Laboy 62 Add [R55] Syncope     4/7/2018 10:07 PM Luis Laboy Remove [R55] Syncope     4/7/2018 10:07 PM Luis Laboy Add [R55] Syncope and collapse       ED Disposition     ED Disposition Condition Comment    Admit  Case was discussed with david and the patient's admission status was agreed to be Admission Status: observation status to the service of Dr Celeste Queen           Follow-up Information     Follow up With Specialties Details Why [facility] Follow up call to schedule follow up within the next week  [address]  [phone]          Current Discharge Medication List      CONTINUE these medications which have NOT CHANGED    Details   valsartan-hydrochlorothiazide (DIOVAN-HCT) 160-25 MG per tablet Refills: 0           No discharge procedures on file  ED Provider  Attending physically available and evaluated Glo Brush I managed the patient along with the ED Attending      Electronically Signed by         Gay Monet MD  04/08/18 7020

## 2018-04-08 NOTE — ASSESSMENT & PLAN NOTE
- possible vasovagal vs cardiac   BPs low per EMS on arrival  Low likelihood of seizure  - will get ECHO  - monitor on telemetry  - PT eval  - orthostatics  - s/p 2L NS

## 2018-04-08 NOTE — DISCHARGE SUMMARY
[facility]    Patient Information: Gil Vallecillo 71 y o  female MRN: [id number]  Unit/Bed#: [facility] 342-21 Encounter: [id number]    Discharging Physician / Practitioner: JOSE MANUEL Freeman  PCP: Christy Rich DO  Admission Date: 4/7/2018  Discharge Date: 04/08/18    Disposition:     Home    Reason for Admission: syncope     Discharge Diagnoses:      Principal Problem:    Syncope  Active Problems:    Hypertension  Resolved Problems:    * No resolved hospital problems  *      Consultations During Hospital Stay:  · None     Procedures Performed:     ·   · Chest x-ray with no acute cardiopulmonary disease  · CT head:  · Troponins x2 less than 0 02  · WBC count 12 61 present on admission  · EKG:Normal sinus rhythm  Nonspecific T wave abnormality  Abnormal ECG  No previous ECGs available    Significant Findings / Test Results:     · See above    Incidental Findings:   · None    Test Results Pending at Discharge (will require follow up): · None     Outpatient Tests Requested:  · Follow-up with PCP in 1 week  · Call to schedule follow-up with Cardiology    Complications:  None    Hospital Course:     Gil Vallecillo is a 71 y o  female patient who originally presented to the hospital on 4/7/2018 due to syncope  Patient has a history of hypertension  She reports that she had poor oral intake the whole entire day  She reports that she had 2 cups of coffee in the morning  Later that evening her and her  went to aid in a while awaiting for dinner she ordered 2 "fuzzy navels", she also ate a couple of mm's  Patient reports that she has not had alcohol in many years  She states she was ready to eat dinner when she states her  reported to her that she fell sideways onto her  and then fell to the floor  From the admission summary it was not obvious that patient apparently did hit her head    Today on examination patient does have ecchymotic/small hematoma to the left side of her occipital area  Patient has no focal neurological deficits at this time  However CT of head was obtained with results:   Patient also takes combination blood pressure medication with HCTZ diuretic for which she had taken just prior to going to dinner  EMT arrived and patient was noted to have blood pressure in the 74Q systolic we  She was administered 1 L of normal saline  Patient does not remember the event but just remembers waking up on the floor  Per admitting notation for patient did not have any incontinence or tongue biting episodes  She did not have any witnessed shaking/jerking movements  She did have ever have an incontinent episode of diarrhea with the EMS softer she woke up  Patient reports that since that episode she has had 2 small diarrhea episodes overnight but not any further  Today patient is status post 1 L normal saline in addition to EMS L  she is feeling good, reports no dizziness lightheadedness no visual deficits  She denies any chest pain shortness of breath or palpitations  Pt is noted to have a murmur and would recommend that the patient follow up as an outpt for echo  And follow up with the pcp  Condition at Discharge: fair     Discharge Day Visit / Exam:     Subjective:  Pt is feeling much better  Denies any dizziness lightheadedness  Denies any chest pain shortness of breath or palpitations  Vitals: Blood Pressure: 140/81 (04/08/18 0700)  Pulse: 79 (04/08/18 0700)  Temperature: 97 8 °F (36 6 °C) (04/08/18 0601)  Temp Source: Oral (04/08/18 0601)  Respirations: 18 (04/08/18 0700)  Height: 4' 9" (144 8 cm) (04/08/18 0601)  Weight - Scale: 67 8 kg (149 lb 7 6 oz) (04/08/18 0601)  SpO2: 95 % (04/08/18 0700)  Exam:   Physical Exam   Constitutional: She is oriented to person, place, and time  She appears well-developed and well-nourished  No distress  HENT:   Mouth/Throat: No oropharyngeal exudate     Left occipital head with ecchymotic/slightly raised possible hematoma  No open areas wounds or lacerations  Patient has extreme thinning hair ? Alopecia   Eyes: Conjunctivae are normal  Right eye exhibits no discharge  Left eye exhibits no discharge  No scleral icterus  Neck: Normal range of motion  No JVD present  No tracheal deviation present  No thyromegaly present  Cardiovascular: Normal rate  Exam reveals no gallop and no friction rub  Murmur heard  Pulmonary/Chest: No stridor  No respiratory distress  She has no wheezes  She has no rales  She exhibits no tenderness  Abdominal: She exhibits no distension and no mass  There is no tenderness  There is no rebound and no guarding  Musculoskeletal: She exhibits no edema, tenderness or deformity  Lymphadenopathy:     She has no cervical adenopathy  Neurological: She is oriented to person, place, and time  Skin: No rash noted  She is not diaphoretic  No erythema  No pallor  Psychiatric: She has a normal mood and affect  Discussion with Family: none at bedside     Discharge instructions/Information to patient and family:   See after visit summary for information provided to patient and family  Provisions for Follow-Up Care:  See after visit summary for information related to follow-up care and any pertinent home health orders  Planned Readmission:  No plan     Discharge Statement:  I spent 50 minutes discharging the patient  This time was spent on the day of discharge  I had direct contact with the patient on the day of discharge  Greater than 50% of the total time was spent examining patient, answering all patient questions, arranging and discussing plan of care with patient as well as directly providing post-discharge instructions  Additional time then spent on discharge activities  Discharge Medications:  See after visit summary for reconciled discharge medications provided to patient and family        ** Please Note: This note has been constructed using a voice recognition system **

## 2018-04-08 NOTE — SOCIAL WORK
Pt states that she lives with lives with  Jade Cee in 2 fl house; 2 ELIN  Pt was independent in ADLs, driving not working PTA  Pt does not use DME; no hx VNA or inpatient rehab  Pt states  will drive home @ d/c      CM reviewed d/c planning process including the following: identifying help at home, patient preference for d/c planning needs, Discharge Lounge, [facility] Meds to Bed program, availability of treatment team to discuss questions or concerns patient and/or family may have regarding understanding medications and recognizing signs and symptoms once discharged  CM also encouraged patient to follow up with all recommended appointments after discharge  Patient advised of importance for patient and family to participate in managing patients medical well being

## 2018-04-08 NOTE — PHYSICAL THERAPY NOTE
Physical Therapy Evaluation    Patient's Name: Anahy Melendez    Admitting Diagnosis  Syncope and collapse [R55]  Syncope [R55]    Problem List  Patient Active Problem List   Diagnosis    Syncope    Hypertension       Past Medical History  Past Medical History:   Diagnosis Date    Hypertension     Shortness of breath     Skin neoplasm     last assessed: 6/13/2017       Past Surgical History  Past Surgical History:   Procedure Laterality Date    TONSILLECTOMY            04/08/18 0800   Note Type   Note type Eval only   Pain Assessment   Pain Assessment 0-10   Pain Score No Pain   Home Living   Type of Home House   Additional Comments Resides w/  in home  Fully indep, ambulates w/ out device    Both pt and  drive   Prior Function   Level of [place] Independent with ADLs and functional mobility   Falls in the last 6 months 1 to 4   Restrictions/Precautions   Weight Bearing Precautions Per Order No   General   Family/Caregiver Present No   Cognition   Overall Cognitive Status WFL   Arousal/Participation Alert   Orientation Level Oriented X4   Memory Within functional limits   Following Commands Follows all commands and directions without difficulty   RLE Assessment   RLE Assessment (strength 4/5)   LLE Assessment   LLE Assessment (strength 4/5)   Coordination   Movements are Fluid and Coordinated 1   Bed Mobility   Supine to Sit 7  Independent   Additional items Assist x 1   Transfers   Sit to Stand 5  Supervision   Additional items Assist x 1   Stand to Sit 5  Supervision   Additional items Assist x 1   Ambulation/Elevation   Gait pattern (no LOB, mild sway, wide MICH)   Gait Assistance 5  Supervision   Additional items Assist x 1   Assistive Device None   Distance 150'   Balance   Static Sitting Normal   Dynamic Sitting Good   Static Standing Good   Dynamic Standing Good   Ambulatory Good   Endurance Deficit   Endurance Deficit No   Activity Tolerance   Activity Tolerance Patient tolerated treatment well   Nurse Made Aware yes   Assessment   Prognosis Good   Assessment Pt seen for moderate complexity physical therapy evaluation  Pt is a 72 y/o female w/ history/comorbidities of DM, HTN, iron deficiency anemia, who is now admitted w/ syncopal episode w/ LOC/fall p bout of diarrhea  Undergoing w/u  Due to unclear cause of syncope w/ fall risk, note evolving clinical picture  PT consutled to assess mobility, d/c needs  At present time, despite these issues, note pt to be close to or at mobility baseline  will sign off    May mobilize w/ CW2 nursing while here, and may d/c home when stable   Plan   PT Frequency (d/c PT)   Recommendation   Recommendation D/C PT   PT - OK to Discharge Yes   Modified Haugen Scale   Modified Haugen Scale 1   Barthel Index   Feeding 10   Bathing 5   Grooming Score 5   Dressing Score 5   Bladder Score 10   Bowels Score 10   Toilet Use Score 10   Transfers (Bed/Chair) Score 15   Mobility (Level Surface) Score 10   Stairs Score 5   Barthel Index Score 85         Gilbert Mari PT, DPT, CSRS

## 2018-04-08 NOTE — CASE MANAGEMENT
Initial Clinical Review    Admission: Date/Time/Statement: 04/07/18 @ 2208 -- OBS     Orders Placed This Encounter   Procedures    Place in Observation (expected length of stay for this patient is less than two midnights)     Standing Status:   Standing     Number of Occurrences:   1     Order Specific Question:   Admitting Physician     Answer:   Brooks Mathur [[id number]]     Order Specific Question:   Level of Care     Answer:   Med Surg [16]         ED: Date/Time/Mode of Arrival:   ED Arrival Information     Expected Arrival Acuity Means of Arrival Escorted By Service Admission Type    - 4/7/2018 20:37 [facility] [address] [place] [address] Emergency    Arrival Complaint    Syncope          Chief Complaint:   Chief Complaint   Patient presents with    Syncope     Pt was at dinner and felt she had to go to the bathroom and felt dizzy and was helped to the ground by   Per EMS initial systolic pressures was in the 60s  History of Illness:  71 y o  female with a medical history significant for HTN who presents with syncope  Patient reports that she has had poor PO intake and today went to a dinner party  After finishing dinner and while still seated, she felt like she needed to go to the bathroom for diarrhea and suddenly felt hot and diaphoretic  She subsequently lost consciousness and fell over to her , who says she was out for about 15 mins  No head trauma  She did not have incontinence or tongue biting during this episode  No shaking/jerking movements  EMS arrived and she had BPs of 60s and was given 1L  She did have incontinent diarrhea with EMS after she had woken  No brbpr or melena  She denies having chest pain, sob, abdominal pain, weakness/numbness, fevers       ED Vital Signs:   ED Triage Vitals   Temperature Pulse Respirations Blood Pressure SpO2   04/07/18 2044 04/07/18 2040 04/07/18 2040 04/07/18 2040 04/07/18 2040   97 7 °F (36 5 °C) 81 18 170/75 96 %      Temp Source Heart Rate Source Patient Position - Orthostatic VS BP Location FiO2 (%)   18 --   Oral Monitor Lying Right arm       Pain Score       18       No Pain        Wt Readings from Last 1 Encounters:   18 67 8 kg (149 lb 7 6 oz)       Vital Signs (abnormal): WNL    Abnormal Labs/Diagnostic Test Results: WBC 11 50, , Glucose 148    Imaging:  CXR: RML area with air-fluid level on lateral view, ?stomach herniation into chest cavity  EKbpm, sinus, poor baseline, no acute st changes, no prior for comparison       ED Treatment:   Medication Administration from 2018 to 2018 2958       Date/Time Order Dose Route Action Action by Comments                2018 sodium chloride 0 9 % bolus 1,000 mL 1,000 mL Intravenous Mayo Roberson RN        Past Medical/Surgical History:   Past Medical History:   Diagnosis Date    Hypertension     Shortness of breath     Skin neoplasm        Admitting Diagnosis: Syncope and collapse [R55]  Syncope [R55]    Age/Sex: 71 y o  female    Assessment/Plan:   71 y o  female with a medical history significant for diabetes, HTN, iron deficiency anemia who presents with syncope      # Syncope  - possible vasovagal vs cardiac   BPs low per EMS on arrival  Low likelihood of seizure  - will get ECHO  - monitor on telemetry  - PT eval  - orthostatics  - s/p 2L NS     # HTN - holding valsartan-HCTZ given reported low BPs, presently BP stable     FEN: regular  VTE Prophylaxis: Enoxaparin (Lovenox)   Code: Full, as discussed at bedside on admission      Admission Orders:  M/S/Tele unit  Telem  Serial troponins  Echo  Orthostatic bp's  IS q1h  SCD's  regular diet  PT eval    Scheduled Meds:   Current Facility-Administered Medications:  enoxaparin 40 mg Subcutaneous Daily Regi Calix MD     Continuous Infusions:

## 2018-04-08 NOTE — H&P
H&P- Salima Reeder 1945, 71 y o  female MRN: [id number]    Unit/Bed#: ED 05 Encounter: [id number]    Primary Care Provider: Garcia Rincon DO   Date and time admitted to hospital: 4/7/2018  8:37 PM      Assessment/Plan:  71 y o  female with a medical history significant for diabetes, HTN, iron deficiency anemia who presents with syncope  # Syncope  - possible vasovagal vs cardiac  BPs low per EMS on arrival  Low likelihood of seizure  - will get ECHO  - monitor on telemetry  - PT eval  - orthostatics  - s/p 2L NS    # HTN - holding valsartan-HCTZ given reported low BPs, presently BP stable    FEN: regular  VTE Prophylaxis: Enoxaparin (Lovenox)   Code: Full, as discussed at bedside on admission    I have discussed the plan of care with: patient,     Anticipated Length of Stay:  Patient will be admitted on an Observation basis with an anticipated length of stay of less than 2 midnights  Justification for Hospital Stay: syncope workup    Total Time for Visit, including Counseling / Coordination of Care: 1 hour  Greater than 50% of this total time spent on direct patient counseling and coordination of care  Chief Complaint: loss of consciousness    History of Present Illness:  Salima Reeder is a 71 y o  female with a medical history significant for HTN who presents with syncope  Patient reports that she has had poor PO intake and today went to a dinner party  After finishing dinner and while still seated, she felt like she needed to go to the bathroom for diarrhea and suddenly felt hot and diaphoretic  She subsequently lost consciousness and fell over to her , who says she was out for about 15 mins  No head trauma  She did not have incontinence or tongue biting during this episode  No shaking/jerking movements  EMS arrived and she had BPs of 60s and was given 1L  She did have incontinent diarrhea with EMS after she had woken  No brbpr or melena   She denies having chest pain, sob, abdominal pain, weakness/numbness, fevers  In the ER, she was given 1L NS  Review of Systems:  All other review of systems reviewed and are negative except for as above in HPI  Past Medical History:     Past Medical History:   Diagnosis Date    Hypertension     Shortness of breath     Skin neoplasm     last assessed: 6/13/2017       Past Surgical History:    Past Surgical History:   Procedure Laterality Date    TONSILLECTOMY         Home Medications:    Prior to Admission medications    Medication Sig Start Date End Date Taking? Authorizing Provider   valsartan-hydrochlorothiazide (DIOVAN-HCT) 160-25 MG per tablet 1 tab daily 1/20/18  Yes Historical Provider, MD     Home meds reviewed and reconciled with patient      Allergies:  No Known Allergies    Social History:     Marital Status: /Civil Union   Substance Use History:   History   Alcohol Use No     History   Smoking Status    Never Smoker   Smokeless Tobacco    Never Used     History   Drug Use No       Family History:  Family History   Problem Relation Age of Onset    Hypertension Mother     Lung cancer Father     Hypertension Sister     Lymphoma Brother 39    Hypertension Brother     Schizophrenia Brother     Depression Son     Schizophrenia Son     Hypertension Family     Heart disease Other        Physical Exam:     Vitals:   Blood Pressure: 141/72 (04/07/18 2115)  Pulse: 86 (04/07/18 2115)  Temperature: 97 7 °F (36 5 °C) (04/07/18 2044)  Temp Source: Oral (04/07/18 2044)  Respirations: 18 (04/07/18 2115)  Height: 4' 9" (144 8 cm) (04/07/18 2040)  Weight - Scale: 67 6 kg (149 lb) (04/07/18 2044)  SpO2: 99 % (04/07/18 2115)    General: Awake, alert, no acute distress  HEENT: NC/AT, EOMI, mmm  Neck: supple, no LAD  Lungs: CTA bl, no w/c/r  Heart: regular, nl I4/J0, 2/6 systolic murmur in RUSB  Abdomen: +BS, soft, NT/ND  Back: no spinal tenderness  Ext: no LE edema  Skin: no rash  Neuro: 4/5 strength throughout, nl sensation    Additional Data:     Lab Results: I have personally reviewed pertinent reports  and I have personally reviewed pertinent films in PACS      Results from last 7 days  Lab Units 18  2047   WBC Thousand/uL 11 50*   HEMOGLOBIN g/dL 12 8   I STAT HEMOGLOBIN g/dl 13 3   HEMATOCRIT % 39 3   PLATELETS Thousands/uL 402*   NEUTROS PCT % 74   LYMPHS PCT % 20   MONOS PCT % 6   EOS PCT % 0       Results from last 7 days  Lab Units 18  2047   SODIUM mmol/L 139   POTASSIUM mmol/L 3 5   CHLORIDE mmol/L 106   CO2 mmol/L 26   BUN mg/dL 19   CREATININE mg/dL 0 96   CALCIUM mg/dL 8 5   TOTAL PROTEIN g/dL 7 4   BILIRUBIN TOTAL mg/dL 0 29   ALK PHOS U/L 89   ALT U/L 17   AST U/L 11   GLUCOSE RANDOM mg/dL 137   GLUCOSE, ISTAT mg/dl 148*           Imaging: I have personally reviewed pertinent reports  and I have personally reviewed pertinent films in PACS  CXR: RML area with air-fluid level on lateral view, ?stomach herniation into chest cavity    EKG, Pathology, and Other Studies Reviewed on Admission:   EKbpm, sinus, poor baseline, no acute st changes, no prior for comparison    Allscripts / Epic Records Reviewed:  Yes

## 2018-04-09 ENCOUNTER — APPOINTMENT (OUTPATIENT)
Dept: NON INVASIVE DIAGNOSTICS | Facility: HOSPITAL | Age: 73
End: 2018-04-09
Payer: COMMERCIAL

## 2018-04-09 ENCOUNTER — APPOINTMENT (OUTPATIENT)
Dept: RADIOLOGY | Facility: HOSPITAL | Age: 73
End: 2018-04-09
Payer: COMMERCIAL

## 2018-04-09 VITALS
RESPIRATION RATE: 20 BRPM | HEIGHT: 57 IN | HEART RATE: 78 BPM | OXYGEN SATURATION: 96 % | SYSTOLIC BLOOD PRESSURE: 159 MMHG | TEMPERATURE: 98.6 F | WEIGHT: 149.47 LBS | DIASTOLIC BLOOD PRESSURE: 96 MMHG | BODY MASS INDEX: 32.25 KG/M2

## 2018-04-09 PROCEDURE — 93306 TTE W/DOPPLER COMPLETE: CPT | Performed by: INTERNAL MEDICINE

## 2018-04-09 PROCEDURE — 93306 TTE W/DOPPLER COMPLETE: CPT

## 2018-04-09 PROCEDURE — 99217 PR OBSERVATION CARE DISCHARGE MANAGEMENT: CPT | Performed by: NURSE PRACTITIONER

## 2018-04-09 PROCEDURE — 70496 CT ANGIOGRAPHY HEAD: CPT

## 2018-04-09 RX ORDER — VALSARTAN AND HYDROCHLOROTHIAZIDE 160; 25 MG/1; MG/1
1 TABLET ORAL DAILY
Refills: 0 | Status: CANCELLED
Start: 2018-04-11

## 2018-04-09 RX ADMIN — IOHEXOL 85 ML: 350 INJECTION, SOLUTION INTRAVENOUS at 13:05

## 2018-04-09 RX ADMIN — ENOXAPARIN SODIUM 40 MG: 40 INJECTION SUBCUTANEOUS at 08:46

## 2018-04-09 NOTE — CASE MANAGEMENT
Nneka Perry, RN Registered Nurse Signed   Case Management Date of Service: 4/8/2018 10:04 AM         []Hide copied text  Initial Clinical Review     Admission: Date/Time/Statement: 04/07/18 @ 2208 -- OBS            Orders Placed This Encounter   Procedures    Place in Observation (expected length of stay for this patient is less than two midnights)       Standing Status:   Standing       Number of Occurrences:   1       Order Specific Question:   Admitting Physician       Answer:   Tomás Reynolds       Order Specific Question:   Level of Care       Answer:   Med Surg [16]            ED: Date/Time/Mode of Arrival:             ED Arrival Information      Expected Arrival Acuity Means of Arrival Escorted By Service Admission Type     - 4/7/2018 20:37 [facility] [place] [facility] General Medicine Emergency     Arrival Complaint     Syncope             Chief Complaint:        Chief Complaint   Patient presents with    Syncope       Pt was at dinner and felt she had to go to the bathroom and felt dizzy and was helped to the ground by   Per EMS initial systolic pressures was in the 60s        History of Illness:  77 y  o  female with a medical history significant for HTN who presents with syncope  Patient reports that she has had poor PO intake and today went to a dinner party  After finishing dinner and while still seated, she felt like she needed to go to the bathroom for diarrhea and suddenly felt hot and diaphoretic  She subsequently lost consciousness and fell over to her , who says she was out for about 15 mins  No head trauma  She did not have incontinence or tongue biting during this episode  No shaking/jerking movements  EMS arrived and she had BPs of 60s and was given 1L  She did have incontinent diarrhea with EMS after she had woken  No brbpr or melena   She denies having chest pain, sob, abdominal pain, weakness/numbness, fevers       ED Vital Signs:            ED Triage Vitals   Temperature Pulse Respirations Blood Pressure SpO2   18   97 7 °F (36 5 °C) 81 18 170/75 96 %       Temp Source Heart Rate Source Patient Position - Orthostatic VS BP Location FiO2 (%)   18 --   Oral Monitor Lying Right arm         Pain Score           18           No Pain            Wt Readings from Last 1 Encounters:   18 67 8 kg (149 lb 7 6 oz)         Vital Signs (abnormal): WNL     Abnormal Labs/Diagnostic Test Results: WBC 11 50, , Glucose 148     Imaging:  CXR: RML area with air-fluid level on lateral view, ?stomach herniation into chest cavity  EKbpm, sinus, poor baseline, no acute st changes, no prior for comparison        ED Treatment:              Medication Administration from 2018 to 2018 0557        Date/Time Order Dose Route Action Action by Comments                           2018 sodium chloride 0 9 % bolus 1,000 mL 1,000 mL Intravenous New Bag Patrcie Krueger RN           Past Medical/Surgical History:        Past Medical History:   Diagnosis Date    Hypertension      Shortness of breath      Skin neoplasm           Admitting Diagnosis: Syncope and collapse [R55]  Syncope [R55]     Age/Sex: 71 y o  female     Assessment/Plan:   71 y o  female with a medical history significant for diabetes, HTN, iron deficiency anemia who presents with syncope      # Syncope  - possible vasovagal vs cardiac   BPs low per EMS on arrival  Low likelihood of seizure  - will get ECHO  - monitor on telemetry  - PT eval  - orthostatics  - s/p 2L NS     # HTN - holding valsartan-HCTZ given reported low BPs, presently BP stable     FEN: regular  VTE Prophylaxis: Enoxaparin (Lovenox)   Code: Full, as discussed at bedside on admission        Admission Orders:  M/S/Tele unit  Telem  Serial troponins  Echo  Orthostatic bp's  IS q1h  SCD's  regular diet  PT eval     Scheduled Meds:   Current Facility-Administered Medications:  enoxaparin 40 mg Subcutaneous Daily Qing eDe MD      Continuous Infusions:                   Thank you,  [address] [facility] by Kali Egan for 2017  Network Utilization Review Department  Phone: [phone]; Fax [phone]  ATTENTION: The Network Utilization Review Department is now centralized for our 7 Facilities  Make a note that we have a new phone and fax numbers for our Department  Please call with any questions or concerns to [phone] and carefully follow the prompts so that you are directed to the right person  All voicemails are confidential  Fax any determinations, approvals, denials, and requests for initial or continue stay review clinical to [phone]  Due to HIGH CALL volume, it would be easier if you could please send faxed requests to expedite your requests and in part, help us provide discharge notifications faster

## 2018-04-09 NOTE — DISCHARGE INSTRUCTIONS
Call to schedule your mri at [phone] then make your appointment for after your mri    Syncope   WHAT YOU NEED TO KNOW:   Syncope is also called fainting or passing out  Syncope is a sudden, temporary loss of consciousness, followed by a fall from a standing or sitting position  Syncope ranges from not serious to a sign of a more serious condition that needs to be treated  You can control some health conditions that cause syncope  Your healthcare providers can help you create a plan to manage syncope and prevent episodes  DISCHARGE INSTRUCTIONS:   Seek care immediately if:   · You are bleeding because you hit your head when you fainted  · You suddenly have double vision, difficulty speaking, numbness, and cannot move your arms or legs  · You have chest pain and trouble breathing  · You vomit blood or material that looks like coffee grounds  · You see blood in your bowel movement  Contact your healthcare provider if:   · You have new or worsening symptoms  · You have another syncope episode  · You have a headache, fast heartbeat, or feel too dizzy to stand up  · You have questions or concerns about your condition or care  Follow up with your healthcare provider as directed:  Write down your questions so you remember to ask them during your visits  Manage syncope:   · Keep a record of your syncope episodes  Include your symptoms and your activity before and after the episode  The record can help your healthcare provider find the cause of your syncope and help you manage episodes  · Sit or lie down when needed  This includes when you feel dizzy, your throat is getting tight, and your vision changes  Raise your legs above the level of your heart  · Take slow, deep breaths if you start to breathe faster with anxiety or fear  This can help decrease dizziness and the feeling that you might faint  · Check your blood pressure often    This is important if you take medicine to lower your blood pressure  Check your blood pressure when you are lying down and when you are standing  Ask how often to check during the day  Keep a record of your blood pressure numbers  Your healthcare provider may use the record to help plan your treatment  Prevent a syncope episode:   · Move slowly and let yourself get used to one position before you move to another position  This is very important when you change from a lying or sitting position to a standing position  Take some deep breaths before you stand up from a lying position  Stand up slowly  Sudden movements may cause a fainting spell  Sit on the side of the bed or couch for a few minutes before you stand up  If you are on bedrest, try to be upright for about 2 hours each day, or as directed  Do not lock your legs if you are standing for a long period of time  Move your legs and bend your knees to keep blood flowing  · Follow your healthcare provider's recommendations  Your provider may  recommend that you drink more liquids to prevent dehydration  You may also need to have more salt to keep your blood pressure from dropping too low and causing syncope  Your provider will tell you how much liquid and sodium to have each day  · Watch for signs of low blood sugar  These include hunger, nervousness, sweating, and fast or fluttery heartbeats  Talk with your healthcare provider about ways to keep your blood sugar level steady  · Do not strain if you are constipated  You may faint if you strain to have a bowel movement  Walking is the best way to get your bowels moving  Eat foods high in fiber to make it easier to have a bowel movement  Good examples are high-fiber cereals, beans, vegetables, and whole-grain breads  Prune juice may help make bowel movements softer  · Be careful in hot weather  Heat can cause a syncope episode  Limit activity done outside on hot days  Physical activity in hot weather can lead to dehydration   This can cause an episode  © 2017 [facility] Information is for End User's use only and may not be sold, redistributed or otherwise used for commercial purposes  All illustrations and images included in CareNotes® are the copyrighted property of A D A M , Inc  or [employer]ali [employer]  The above information is an  only  It is not intended as medical advice for individual conditions or treatments  Talk to your doctor, nurse or pharmacist before following any medical regimen to see if it is safe and effective for you  Meningioma   WHAT YOU NEED TO KNOW:   A meningioma is a tumor that starts in the meninges of the brain and spinal cord  The meninges are the tissues that cover the brain and spinal cord  They prevent germs and other substances from entering the brain and spinal cord  Most meningiomas are slow-growing and benign (not cancer)  DISCHARGE INSTRUCTIONS:   Medicines:   · Medicines  may be given to kill the tumor cells and decrease the size of the meningioma  · Take your medicine as directed  Contact your healthcare provider if you think your medicine is not helping or if you have side effects  Tell him or her if you are allergic to any medicine  Keep a list of the medicines, vitamins, and herbs you take  Include the amounts, and when and why you take them  Bring the list or the pill bottles to follow-up visits  Carry your medicine list with you in case of an emergency  Follow up with your healthcare provider or surgeon as directed:  Write down your questions so you remember to ask them during your visits  Self-care:   · Drink liquids as directed  Ask how much liquid to drink each day and which liquids are best for you  If you have nausea or diarrhea from treatment, extra liquids may help decrease your risk for dehydration  · Eat healthy foods  Healthy foods include fruits, vegetables, whole-grain breads, low-fat dairy products, beans, lean meats, and fish   This may help you feel better during treatment and decrease side effects  You may need to change what you eat during treatment  Do not eat foods or drink liquids that cause gas, such as cabbage, beans, onions, or soft drinks  A nutritionist may help to plan the best meals and snacks for you  · Exercise  Ask about the best exercise plan for you  Exercise may improve your energy levels and appetite  Contact your healthcare provider if:   · You have a fever  · You vomit repeatedly, and cannot keep any food or liquids down  · You have a severe headache, or you feel dizzy  · You have questions or concerns about your condition or care  Seek care immediately or call 911 if:   · You have any of the following signs of a stroke:      ¨ Numbness or drooping on one side of your face     ¨ Weakness in an arm or leg    ¨ Confusion or difficulty speaking    ¨ Dizziness, a severe headache, or vision loss    © 2017 [facility] Information is for End User's use only and may not be sold, redistributed or otherwise used for commercial purposes  All illustrations and images included in CareNotes® are the copyrighted property of [employer]  or Kali Egan  The above information is an  only  It is not intended as medical advice for individual conditions or treatments  Talk to your doctor, nurse or pharmacist before following any medical regimen to see if it is safe and effective for you

## 2018-04-09 NOTE — CASE MANAGEMENT
Thank you,  [facility] in the [facility] by Kali Egan for 2017  Network Utilization Review Department  Phone: [phone]; Fax [phone]  ATTENTION: The Network Utilization Review Department is now centralized for our 7 Facilities  Make a note that we have a new phone and fax numbers for our Department  Please call with any questions or concerns to [phone] and carefully follow the prompts so that you are directed to the right person  All voicemails are confidential  Fax any determinations, approvals, denials, and requests for initial or continue stay review clinical to [phone]  Due to HIGH CALL volume, it would be easier if you could please send faxed requests to expedite your requests and in part, help us provide discharge notifications faster  ---------------------------------------------------------------------------------------------------------  04/07/18 2208  Place in Observation (expected length of stay for this patient is less than two midnights) (ED Bridging Orders Panel) Once     Transfer Service: General Medicine       Question Answer Comment   Admitting Physician Andriy Hill    Level of Care Med Surg            Continued Stay Review    Date: 04/09/2018    Vital Signs: /71 (BP Location: Left arm)   Pulse 72   Temp 98 1 °F (36 7 °C) (Oral)   Resp 20   Ht 4' 9" (1 448 m)   Wt 67 8 kg (149 lb 7 6 oz)   SpO2 94%   BMI 32 35 kg/m²     GCS 15    Medications:   Scheduled Meds:   Current Facility-Administered Medications:  enoxaparin 40 mg Subcutaneous Daily    sodium chloride 50 mL/hr Intravenous Continuous Last Rate: 50 mL/hr (04/08/18 1717)     Continuous Infusions:   sodium chloride 50 mL/hr Last Rate: 50 mL/hr (04/08/18 1717)     Abnormal Labs/Diagnostic Results:   CT head: 1   Left frontal extra-axial mildly hyperdense 2 6 cm mass likely to represent a meningioma   Aneurysm or less likely though not entirely excluded   Recommend MRI of the brain with gadolinium     2   No evidence of acute intracranial hemorrhage or extra-axial collection  3   Left frontal scalp hematoma   No acute calvarial fracture      Age/Sex: 71 y o  female     Assessment/Plan: 9121,    Discharge Plan:   04/09/18 1510  Discharge patient Once     Discharge Disposition: Home/Self Care    Expected Discharge Time: Evening    Expected Discharge Date: 04/08/18

## 2018-04-10 ENCOUNTER — TELEPHONE (OUTPATIENT)
Dept: FAMILY MEDICINE CLINIC | Facility: CLINIC | Age: 73
End: 2018-04-10

## 2018-04-10 ENCOUNTER — TRANSITIONAL CARE MANAGEMENT (OUTPATIENT)
Dept: FAMILY MEDICINE CLINIC | Facility: CLINIC | Age: 73
End: 2018-04-10

## 2018-04-10 NOTE — TELEPHONE ENCOUNTER
PATIENT WAS ADMITTED Saturday CT SCAN SHOWED TUMOR ON BRAIN THEY SUGGESTED SENDING HER FOR AN MRI AFTER BEING RELEASED ALSO NEEDS A JOE APPT

## 2018-04-11 NOTE — DISCHARGE SUMMARY
Discharge Summary - [address] [facility] Medicine    Patient Information: Ya Harper 71 y o  female MRN: [id number]  Unit/Bed#: [facility] 238-64 Encounter: [id number]    Discharging Physician / Practitioner: JOSE MANUEL Forbes  PCP: Russel Nunez DO  Admission Date: 4/7/2018  Discharge Date: 04/10/18    Disposition:     Home    Reason for Admission: syncope    Discharge Diagnoses:     Principal Problem:    Syncope  Active Problems:    Hypertension    Brain mass  Resolved Problems:    * No resolved hospital problems  *      Consultations During Hospital Stay:  · None     Procedures Performed:     · Echo with   · Ekg: Normal sinus rhythm  Nonspecific T wave abnormality  Abnormal ECG  · Chest xray: no acute findings   ·     Significant Findings / Test Results:     · See above    Incidental Findings:   · Ct head:1   Left frontal extra-axial mildly hyperdense 2 6 cm mass likely to represent a meningioma   Aneurysm or less likely though not entirely excluded   Recommend MRI of the brain with gadolinium     2   No evidence of acute intracranial hemorrhage or extra-axial collection  3   Left frontal scalp hematoma   No acute calvarial fracture  · CTA head: Stable 27 mm anterior left parasagittal frontal meningioma   Avascular mass effect with the several small cortical vessels identified from the SUJATHA  Test Results Pending at Discharge (will require follow up): · None      Outpatient Tests Requested:  Call neurosurgery to schedule outpt appointment     Complications:  None     Hospital Course:     Ya Harper is a 71 y o  female patient who originally presented to the hospital on 4/7/2018 due to syncope  According to admitting note pt has hx fo htn  Pt reported that she had two cups of coffee and never ate all day when her and her  went to dinner  Pt states that she was sitting there and then had two "fuzzy navels" not normal for her to drink etoh   Pt states she was waiting for her foot and then fell over to her left side onto her  after which she doesn't remember anything  Her  stated some people lowered her down to the ground  He was sure she never fell and did not hit her head  Pt was admitted for evaluaton  Upon my visit with the patient I noted a +/- 8cm light bruise and slightly raised pt reports "it hurts I must have hit my head" pt  feels certain she did not hit her head  However, A head ct was ordered to r/o bleed  Noted were the above findings  These findings prompted need to rule out an aneurysm  cTA was ordered that demonstrated a Stable 27 mm anterior left parasagittal frontal meningioma   Avascular mass effect with the several small cortical vessels identified from the SUJATHA  After review by neurosurgery it was determined pt could follow up as an outpt  Pt should however, obtain an mri for further evaluation  Pt understands she will need to go for her appointment after her mri has been obtained  Pt is medically stable     Condition at Discharge: fair     Discharge Day Visit / Exam:     Subjective:  Pt is alert oriented x 3 , pleasant  Has no complaints today no headache no dizziness or lightheadedness no nausea or vomiting  Vitals: Blood Pressure: 159/96 (04/09/18 1458)  Pulse: 78 (04/09/18 1458)  Temperature: 98 6 °F (37 °C) (04/09/18 1458)  Temp Source: Oral (04/09/18 1458)  Respirations: 20 (04/09/18 1458)  Height: 4' 9" (144 8 cm) (04/08/18 0601)  Weight - Scale: 67 8 kg (149 lb 7 6 oz) (04/08/18 0601)  SpO2: 96 % (04/09/18 1458)  Exam:   Physical Exam   Constitutional: She is oriented to person, place, and time  She appears well-developed and well-nourished  No distress  HENT:   Sparse hair   Left occiptal head with light bruise    Eyes: Conjunctivae are normal  Pupils are equal, round, and reactive to light  Right eye exhibits no discharge  Left eye exhibits no discharge  No scleral icterus  Neck: No JVD present  No tracheal deviation present   No thyromegaly present  Cardiovascular: Normal rate and regular rhythm  Exam reveals no gallop and no friction rub  Murmur heard  Pulmonary/Chest: No stridor  No respiratory distress  She has no wheezes  She has no rales  Abdominal: Soft  Bowel sounds are normal  She exhibits no distension and no mass  There is no tenderness  There is no rebound and no guarding  Musculoskeletal: She exhibits no edema or deformity  Lymphadenopathy:     She has no cervical adenopathy  Neurological: She is oriented to person, place, and time  Skin: Skin is warm  No rash noted  She is not diaphoretic  No erythema  No pallor  Psychiatric: She has a normal mood and affect  Discussion with Family:  at the bedside     Discharge instructions/Information to patient and family:   See after visit summary for information provided to patient and family  Provisions for Follow-Up Care:  See after visit summary for information related to follow-up care and any pertinent home health orders  Planned Readmission: no plan for readmission     Discharge Statement:  I spent 60minutes discharging the patient  This time was spent on the day of discharge  I had direct contact with the patient on the day of discharge  Greater than 50% of the total time was spent examining patient, answering all patient questions, arranging and discussing plan of care with patient as well as directly providing post-discharge instructions  Additional time then spent on discharge activities  Discharge Medications:  See after visit summary for reconciled discharge medications provided to patient and family        ** Please Note: This note has been constructed using a voice recognition system **

## 2018-04-17 ENCOUNTER — OFFICE VISIT (OUTPATIENT)
Dept: FAMILY MEDICINE CLINIC | Facility: CLINIC | Age: 73
End: 2018-04-17
Payer: COMMERCIAL

## 2018-04-17 VITALS
HEART RATE: 76 BPM | SYSTOLIC BLOOD PRESSURE: 122 MMHG | WEIGHT: 145.4 LBS | BODY MASS INDEX: 31.37 KG/M2 | HEIGHT: 57 IN | DIASTOLIC BLOOD PRESSURE: 80 MMHG | TEMPERATURE: 97.2 F

## 2018-04-17 DIAGNOSIS — D32.9 MENINGIOMA (HCC): Primary | ICD-10-CM

## 2018-04-17 DIAGNOSIS — I10 ESSENTIAL HYPERTENSION: ICD-10-CM

## 2018-04-17 DIAGNOSIS — R55 SYNCOPE, UNSPECIFIED SYNCOPE TYPE: ICD-10-CM

## 2018-04-17 DIAGNOSIS — H61.23 BILATERAL HEARING LOSS DUE TO CERUMEN IMPACTION: ICD-10-CM

## 2018-04-17 PROCEDURE — 69210 REMOVE IMPACTED EAR WAX UNI: CPT | Performed by: FAMILY MEDICINE

## 2018-04-17 PROCEDURE — 99495 TRANSJ CARE MGMT MOD F2F 14D: CPT | Performed by: FAMILY MEDICINE

## 2018-04-17 NOTE — PROGRESS NOTES
Patient ID: Joaquim Disla is a 71 y o  female  HPI: 71 y  o female presents for a followup  To a hospitalization for a syncopal episode secondary to acute dehydration  She only had a cup of coffee all day and went to an anniversary celebration and had 2 alcoholic drinks  During the hospital stay, she was found to have a temporal hematoma for which she needs a follow up MRI,  She complains of some intermittent dizziness  SUBJECTIVE    Family History   Problem Relation Age of Onset    Hypertension Mother     Lung cancer Father     Hypertension Sister     Lymphoma Brother 39    Hypertension Brother     Schizophrenia Brother     Depression Son     Schizophrenia Son     Hypertension Family     Heart disease Other      Social History     Social History    Marital status: /Civil Union     Spouse name: N/A    Number of children: N/A    Years of education: N/A     Occupational History    Not on file       Social History Main Topics    Smoking status: Never Smoker    Smokeless tobacco: Never Used    Alcohol use No    Drug use: No    Sexual activity: Not on file     Other Topics Concern    Not on file     Social History Narrative    Daily coffee consumption (___cups/day)    Daily cola consumption (____cans/day)    Daily tea consumption (____cups/day)    Uses safety equipment-seatbelts     Past Medical History:   Diagnosis Date    Hypertension     Shortness of breath     Skin neoplasm     last assessed: 6/13/2017     Past Surgical History:   Procedure Laterality Date    TONSILLECTOMY       No Known Allergies    Current Outpatient Prescriptions:     valsartan-hydrochlorothiazide (DIOVAN-HCT) 160-25 MG per tablet, , Disp: , Rfl: 0    Review of Systems  Constitutional:     Denies fever, chills ,fatigue ,weakness ,weight loss, weight gain     ENT: Denies earache ,loss of hearing ,nosebleed, nasal discharge,nasal congestion ,sore throat ,hoarseness  Pulmonary: Denies shortness of breath ,cough  ,dyspnea on exertion, orthopnea  ,PND   Cardiovascular:  Denies bradycardia , tachycardia  ,palpations, lower extremity edema leg, claudication  Breast:  Denies new or changing breast lumps ,nipple discharge ,nipple changes  Abdomen:  Denies abdominal pain , anorexia , indigestion, nausea, vomiting, constipation, diarrhea  Musculoskeletal: Denies myalgias, arthralgias, joint swelling, joint stiffness , limb pain, limb swelling  Gu: denies dysuria, polyuria  Skin: Denies skin rash, skin lesion, skin wound, itching, dry skin  Neuro: Denies headache, numbness, tingling, confusion, loss of consciousness, +dizziness,   Psychiatric: Denies feelings of depression, suicidal ideation, anxiety, sleep disturbances    OBJECTIVE    Constitutional:   NAD, well appearing and well nourished      ENT:   Conjunctiva and lids: no injection, edema, or discharge     Pupils and iris: JB bilaterally    External inspection of ears and nose: normal without deformities or discharge  Otoscopic exam: Canals patent ; bilateral cerumen impaction removed with a currette bilaterally until tm fully visualized and intact  Nasal mucosa, septum and turbinates: Normal or edema or discharge         Oropharynx:  Moist mucosa, normal tongue and tonsils without lesions  No erythema        Pulmonary:Respiratory effort normal rate and rhythm, no increased work of breathing   Auscultation of lungs:  Clear bilaterally with no adventitious breath sounds       Cardiovascular: regular rate and rhythm, S1 and S2, no murmur, no edema and/or varicosities of LE      Abdomen: Soft and non-distended     Positive bowel sounds      No heptomegaly or splenomegaly      Gu: no suprapubic tenderness or CVA tenderness, no urethral discharge  Lymphatic:  No anterior or posterior cervical lymphadenopathy         Musculoskeletal:  Gait and station: Normal gait      Digits and nails normal without clubbing or cyanosis       Inspection/palpation of joints, bones, and muscles:  No joint tenderness, swelling, full active and passive range of motion       Skin: Normal skin turgor and no rashes      Neuro:    Normal reflexes     Psych:   alert and oriented to person, place and time     normal mood and affect   Ear cerumen removal  Date/Time: 4/17/2018 4:58 PM  Performed by: Tania Fatima by: Lavell Moscoso, [address] [place] [address]     Patient location:  Clinic  Consent:     Consent obtained:  Verbal    Consent given by:  Patient    Risks discussed:  Bleeding and dizziness  Procedure details:     Location:  L ear and R ear    Procedure type: curette      Approach:  External  Post-procedure details:     Complication:  None    Hearing quality:  Normal    Patient tolerance of procedure: Tolerated well, no immediate complications        Assessment/Plan:Diagnoses and all orders for this visit:    Meningioma ([address] )  -     MRI brain wo contrast; Future    Syncope, unspecified syncope type    Essential hypertension    Bilateral hearing loss due to cerumen impaction        Will await MRI resutls and make further recommendations after reviewing findings

## 2018-05-01 ENCOUNTER — HOSPITAL ENCOUNTER (OUTPATIENT)
Dept: RADIOLOGY | Facility: HOSPITAL | Age: 73
Discharge: HOME | End: 2018-05-01
Payer: COMMERCIAL

## 2018-05-01 DIAGNOSIS — D32.9 MENINGIOMA (HCC): ICD-10-CM

## 2018-05-01 PROCEDURE — 70551 MRI BRAIN STEM W/O DYE: CPT

## 2018-05-03 ENCOUNTER — TRANSCRIBE ORDERS (OUTPATIENT)
Dept: NEUROSURGERY | Facility: CLINIC | Age: 73
End: 2018-05-03

## 2018-05-03 DIAGNOSIS — D32.9 MENINGIOMA (HCC): Primary | ICD-10-CM

## 2018-05-07 NOTE — PLAN OF CARE
Problem: PHYSICAL THERAPY ADULT  Goal: Performs mobility at highest level of function for planned discharge setting  See evaluation for individualized goals  See flowsheet documentation for full assessment, interventions and recommendations  Prognosis: Good     Assessment: Pt seen for moderate complexity physical therapy evaluation  Pt is a 70 y/o female w/ history/comorbidities of DM, HTN, iron deficiency anemia, who is now admitted w/ syncopal episode w/ LOC/fall p bout of diarrhea  Undergoing w/u  Due to unclear cause of syncope w/ fall risk, note evolving clinical picture  PT consutled to assess mobility, d/c needs  At present time, despite these issues, note pt to be close to or at mobility baseline  will sign off  May mobilize w/ [facility] nursing while here, and may d/c home when stable        Recommendation: D/C PT     PT - OK to Discharge: Yes    See flowsheet documentation for full assessment  SUBJECTIVE:  Patient presents self to clinic for checkup of ulceration distal left great toe. He is being treated nursing home daily by nurses and they are applying dressings after washing and applying a sulcus pad. Patient is neuropathic.    OBJECTIVE:  Patient presents sitting comfortably in chair no acute distress. He presents with keratotic lesion over ulcer site distal right great toe slightly medially. Medial nail lip is somewhat swollen with signs of dried blood. Nail is markedly and deeply incurvated. It has grown forward into the end of the nail groove and formed an abscess and there is paronychia along medial nail groove itself. Upon reduction there is a 3 x 4 mm ulcer underneath abscess in nail groove. Previous ulcer measures 3 x 4 mm also, however it is almost completely re-epithelialized. The depth is minimal at this point. The sulcus pad may have been holding the great toe up adequately, however the sulcus pad is been applied to tight around second toe causing some damage to proximal end of second toe. No infection or ulcer present but some inflammation.    ASSESSMENT:  Acute paronychia ingrown nail and abscess distal medial border of left great toe. Partial-thickness ulcer distal left great toe with very good percent improvement especially in depth.    PLAN:  Ulcer debrided with scalpel and tissue nippers removing avascular devitalized tissue down to partial thickness. Medial border of nail avulsed with metal spatula back to base and cut sharply back with nail clippers and removed. Incision and drainage of abscess performed and area debrided with tissue nippers. Sterile dressing applied to both paronychia abscess and distal ulcer. It will be reapplied daily and return to see me in 2 months. They will not use sulcus pad again so I apply small metatarsal pad just underneath great toe to elevate it.

## 2018-06-11 ENCOUNTER — OFFICE VISIT (OUTPATIENT)
Dept: NEUROSURGERY | Facility: CLINIC | Age: 73
End: 2018-06-11
Payer: COMMERCIAL

## 2018-06-11 ENCOUNTER — TRANSCRIBE ORDERS (OUTPATIENT)
Dept: NEUROSURGERY | Facility: CLINIC | Age: 73
End: 2018-06-11

## 2018-06-11 VITALS
HEIGHT: 59 IN | HEART RATE: 84 BPM | RESPIRATION RATE: 16 BRPM | SYSTOLIC BLOOD PRESSURE: 126 MMHG | WEIGHT: 145 LBS | TEMPERATURE: 97.5 F | DIASTOLIC BLOOD PRESSURE: 80 MMHG | BODY MASS INDEX: 29.23 KG/M2

## 2018-06-11 DIAGNOSIS — Z01.818 PRE-PROCEDURAL EXAMINATION: Primary | ICD-10-CM

## 2018-06-11 DIAGNOSIS — D32.9 MENINGIOMA (HCC): ICD-10-CM

## 2018-06-11 DIAGNOSIS — D33.0 BENIGN NEOPLASM OF SUPRATENTORIAL REGION OF BRAIN (HCC): ICD-10-CM

## 2018-06-11 DIAGNOSIS — R90.89 ABNORMAL FINDING ON MRI OF BRAIN: Primary | ICD-10-CM

## 2018-06-11 PROCEDURE — 99204 OFFICE O/P NEW MOD 45 MIN: CPT | Performed by: PHYSICIAN ASSISTANT

## 2018-06-11 NOTE — PROGRESS NOTES
Assessment/Plan:    Very pleasant 67-year-old female, presents to review MRI of the brain, newly diagnosed meningioma  She had a hospital admission for a syncopal episode, was seen in the emergency room and as part of the evaluation underwent CT scan of her head  The final diagnosis relative to her syncopal episode was acute dehydration  The study is carefully reviewed in detail by Dr Joanna Che, and the meningioma benefited on the CT scan is also visible on MRI of her brain 5/1/18  This lesion measures 2 4 x 2 7 x 2 57 hers  There is no evidence of edema in the adjacent frontal lobe  And no mass effect is appreciated  Clinically there are no focal neurologic deficits appreciated on examination, there is no pronator drift, tandem gait is normal, Romberg is negative, motor examination of the upper and lower extremities is 5 x 5, there is no gait or balance disturbance, no bowel or bladder incontinence  there is no reported difficulty with memory or mentation  She has no history of prior imaging of her brain  She has a family history, mother with meningioma age 36 and age 79, she has a history of a female sibling with a history of acoustic neuroma  At this point options for management were reviewed with the patient, consider surgical resection versus observation  Observation was suggested, as this lesion has no mass effect, no evidence of edema of the adjacent frontal lobe, and clinically she remains asymptomatic of this lesion  The patient has chosen to observe with imaged in 6 months, and clinical visit with Neurosurgery  Further follow-up is planned in 6 months with MRI brain with and without contrast and clinical visit to follow  These findings, impressions and recommendations are reviewed in great detail with the patient, she expressed understanding and agreement, her questions were answered completely and to her satisfaction  Follow up has been scheduled             Diagnoses and all orders for this visit:    Abnormal finding on MRI of brain  -     MRI brain with and without contrast; Future    Meningioma [facility])  -     Ambulatory referral to Neurosurgery  -     MRI brain with and without contrast; Future    Benign neoplasm of supratentorial region of brain [facility])  -     MRI brain with and without contrast; Future          Return in about 6 months (around 12/11/2018) for MRI brain with without contrast prior to follow-up visit  Subjective:      Patient ID: Leonard Collins is a 71 y o  female  Very pleasant 81-year-old female, presents to review MRI of the brain, newly diagnosed meningioma  She has a history of hypertension for which she takes Diovan HCT  She reports no known medication allergies  Her past surgical history includes tonsil and adenoid resection in childhood  She has known history of cardiac murmur, and has had echocardiogram in the past         The following portions of the patient's history were reviewed and updated as appropriate: allergies, current medications, past family history, past medical history, past social history and past surgical history  Review of Systems   Constitutional: Negative  HENT: Negative  Eyes: Negative  Cardiovascular: Negative  Gastrointestinal: Negative  Endocrine: Negative  Genitourinary: Negative  Musculoskeletal: Negative  Allergic/Immunologic: Negative  Neurological: Negative  Hematological: Negative  Psychiatric/Behavioral: Negative  Objective:    Physical Exam   Constitutional: She is oriented to person, place, and time  She appears well-developed and well-nourished  HENT:   Head: Normocephalic and atraumatic  Eyes: Pupils are equal, round, and reactive to light  Neck: Normal range of motion  Cardiovascular: Normal rate and regular rhythm  Grade 2/6 systolic ejection murmur heard best at the left sternal border     Pulmonary/Chest: Effort normal and breath sounds normal  Musculoskeletal: Normal range of motion  Neurological: She is alert and oriented to person, place, and time  She has a normal Finger-Nose-Finger Test, a normal Heel to [employer], a normal Romberg Test and a normal Tandem Gait Test  Gait normal    Reflex Scores:       Achilles reflexes are 2+ on the right side and 2+ on the left side  Skin: Skin is warm and dry  Psychiatric: She has a normal mood and affect  Neurologic Exam     Mental Status   Oriented to person, place, and time  Level of consciousness: alert    Cranial Nerves   Cranial nerves II through XII intact  CN III, IV, VI   Pupils are equal, round, and reactive to light  Motor Exam   Overall muscle tone: normal  Right arm tone: normal  Right arm pronator drift: absent  Left arm pronator drift: absent    Strength   Right deltoid: 5/5  Left deltoid: 5/5  Right biceps: 5/5  Left biceps: 5/5  Right triceps: 5/5  Left triceps: 5/5  Right interossei: 5/5  Left interossei: 5/5  Right quadriceps: 5/5  Left quadriceps: 5/5    Sensory Exam   Light touch normal      Gait, Coordination, and Reflexes     Gait  Gait: normal    Coordination   Romberg: negative  Finger to nose coordination: normal  Heel to shin coordination: normal  Tandem walking coordination: normal    Reflexes   Right achilles: 2+  Left achilles: 2+     MRI BRAIN WITHOUT CONTRAST   5/1/18     INDICATION:  D32 9: Benign neoplasm of meninges, unspecified     COMPARISON:   CT and CT angiography of the brain obtained approximately 3 weeks ago      TECHNIQUE:  Sagittal T1, axial T2, axial FLAIR, axial T1, axial Gradient and axial diffusion imaging      IMAGE QUALITY:  Diagnostic      FINDINGS:     BRAIN PARENCHYMA:  There is a homogeneous extra-axial mass identified within the anterior left parafalcine region measuring 2 4 x 2 7 x 2 5 cm, series 6 image 16  This displaces the left frontal lobe laterally    There is no adjacent marrow edema   identified      Mild age-appropriate cerebral volume loss and periventricular white matter hyperintensities on T2 and FLAIR imaging scattered within the cerebral hemispheres consistent with chronic microangiopathy  No evidence of acute ischemia    No hemorrhage        VENTRICLES:  Within normal limits given patient's age      SELLA AND PITUITARY GLAND:  Normal      ORBITS:  Normal      PARANASAL SINUSES:  There is a retention cyst within the inferior aspect of the right maxillary sinus      VASCULATURE:  Evaluation of the major intracranial vasculature demonstrates appropriate flow voids      CALVARIUM AND SKULL BASE:  Normal      EXTRACRANIAL SOFT TISSUES:  Normal      IMPRESSION:     Left parafalcine meningioma anteriorly measuring 2 4 x 2 7 x 2 5 cm without edema in the adjacent frontal lobe      Scattered white matter lesions consistent with chronic microangiopathy

## 2018-06-11 NOTE — LETTER
June 11, 2018     [facility], [address]  [address] [facility]    Patient: Daina Parry   YOB: 1945   Date of Visit: 6/11/2018       Dear Dr Swathi Agosto: Thank you for referring Eduardo Jamil to me for evaluation  Below are my notes for this consultation  If you have questions, please do not hesitate to call me  I look forward to following your patient along with you  Sincerely,        Maxine Ingram MD        CC: No Recipients  Luz Elena Baltazar PA-C  6/11/2018  2:59 PM  Sign at close encounter  Assessment/Plan:    Very pleasant 78-year-old female, presents to review MRI of the brain, newly diagnosed meningioma  She had a hospital admission for a syncopal episode, was seen in the emergency room and as part of the evaluation underwent CT scan of her head  The final diagnosis relative to her syncopal episode was acute dehydration  The study is carefully reviewed in detail by Dr Lawrence Gibbs, and the meningioma benefited on the CT scan is also visible on MRI of her brain 5/1/18  This lesion measures 2 4 x 2 7 x 2 57 hers  There is no evidence of edema in the adjacent frontal lobe  And no mass effect is appreciated  Clinically there are no focal neurologic deficits appreciated on examination, there is no pronator drift, tandem gait is normal, Romberg is negative, motor examination of the upper and lower extremities is 5 x 5, there is no gait or balance disturbance, no bowel or bladder incontinence  there is no reported difficulty with memory or mentation  She has no history of prior imaging of her brain  She has a family history, mother with meningioma age 36 and age 79, she has a history of a female sibling with a history of acoustic neuroma  At this point options for management were reviewed with the patient, consider surgical resection versus observation    Observation was suggested, as this lesion has no mass effect, no evidence of edema of the adjacent frontal lobe, and clinically she remains asymptomatic of this lesion  The patient has chosen to observe with imaged in 6 months, and clinical visit with Neurosurgery  Further follow-up is planned in 6 months with MRI brain with and without contrast and clinical visit to follow  These findings, impressions and recommendations are reviewed in great detail with the patient, she expressed understanding and agreement, her questions were answered completely and to her satisfaction  Follow up has been scheduled  Diagnoses and all orders for this visit:    Abnormal finding on MRI of brain  -     MRI brain with and without contrast; Future    Meningioma [facility])  -     Ambulatory referral to Neurosurgery  -     MRI brain with and without contrast; Future    Benign neoplasm of supratentorial region of brain [facility])  -     MRI brain with and without contrast; Future          Return in about 6 months (around 12/11/2018) for MRI brain with without contrast prior to follow-up visit  Subjective:      Patient ID: Joaquim Disla is a 71 y o  female  Very pleasant 67-year-old female, presents to review MRI of the brain, newly diagnosed meningioma  She has a history of hypertension for which she takes Diovan HCT  She reports no known medication allergies  Her past surgical history includes tonsil and adenoid resection in childhood  She has known history of cardiac murmur, and has had echocardiogram in the past         The following portions of the patient's history were reviewed and updated as appropriate: allergies, current medications, past family history, past medical history, past social history and past surgical history  Review of Systems   Constitutional: Negative  HENT: Negative  Eyes: Negative  Cardiovascular: Negative  Gastrointestinal: Negative  Endocrine: Negative  Genitourinary: Negative  Musculoskeletal: Negative      Allergic/Immunologic: Negative  Neurological: Negative  Hematological: Negative  Psychiatric/Behavioral: Negative  Objective:    Physical Exam   Constitutional: She is oriented to person, place, and time  She appears well-developed and well-nourished  HENT:   Head: Normocephalic and atraumatic  Eyes: Pupils are equal, round, and reactive to light  Neck: Normal range of motion  Cardiovascular: Normal rate and regular rhythm  Grade 2/6 systolic ejection murmur heard best at the left sternal border  Pulmonary/Chest: Effort normal and breath sounds normal    Musculoskeletal: Normal range of motion  Neurological: She is alert and oriented to person, place, and time  She has a normal Finger-Nose-Finger Test, a normal Heel to [employer], a normal Romberg Test and a normal Tandem Gait Test  Gait normal    Reflex Scores:       Achilles reflexes are 2+ on the right side and 2+ on the left side  Skin: Skin is warm and dry  Psychiatric: She has a normal mood and affect  Neurologic Exam     Mental Status   Oriented to person, place, and time  Level of consciousness: alert    Cranial Nerves   Cranial nerves II through XII intact  CN III, IV, VI   Pupils are equal, round, and reactive to light      Motor Exam   Overall muscle tone: normal  Right arm tone: normal  Right arm pronator drift: absent  Left arm pronator drift: absent    Strength   Right deltoid: 5/5  Left deltoid: 5/5  Right biceps: 5/5  Left biceps: 5/5  Right triceps: 5/5  Left triceps: 5/5  Right interossei: 5/5  Left interossei: 5/5  Right quadriceps: 5/5  Left quadriceps: 5/5    Sensory Exam   Light touch normal      Gait, Coordination, and Reflexes     Gait  Gait: normal    Coordination   Romberg: negative  Finger to nose coordination: normal  Heel to shin coordination: normal  Tandem walking coordination: normal    Reflexes   Right achilles: 2+  Left achilles: 2+     MRI BRAIN WITHOUT CONTRAST   5/1/18     INDICATION:  D32 9: Benign neoplasm of meninges, unspecified     COMPARISON:   CT and CT angiography of the brain obtained approximately 3 weeks ago      TECHNIQUE:  Sagittal T1, axial T2, axial FLAIR, axial T1, axial Gradient and axial diffusion imaging      IMAGE QUALITY:  Diagnostic      FINDINGS:     BRAIN PARENCHYMA:  There is a homogeneous extra-axial mass identified within the anterior left parafalcine region measuring 2 4 x 2 7 x 2 5 cm, series 6 image 16  This displaces the left frontal lobe laterally  There is no adjacent marrow edema   identified      Mild age-appropriate cerebral volume loss and periventricular white matter hyperintensities on T2 and FLAIR imaging scattered within the cerebral hemispheres consistent with chronic microangiopathy  No evidence of acute ischemia    No hemorrhage        VENTRICLES:  Within normal limits given patient's age      SELLA AND PITUITARY GLAND:  Normal      ORBITS:  Normal      PARANASAL SINUSES:  There is a retention cyst within the inferior aspect of the right maxillary sinus      VASCULATURE:  Evaluation of the major intracranial vasculature demonstrates appropriate flow voids      CALVARIUM AND SKULL BASE:  Normal      EXTRACRANIAL SOFT TISSUES:  Normal      IMPRESSION:     Left parafalcine meningioma anteriorly measuring 2 4 x 2 7 x 2 5 cm without edema in the adjacent frontal lobe      Scattered white matter lesions consistent with chronic microangiopathy

## 2018-06-11 NOTE — PATIENT INSTRUCTIONS
Continue with all usual activities  Follow-up is planned in approximately 6 months with MRI of the brain with and without contrast, and clinical visit, Dr Nichelle Novak  She does understand should she have any new symptoms, seizure, headache, gait or balance disturbance, bowel or bladder incontinence issues or other neurologic changes she is to return sooner for reassessment

## 2018-06-12 ENCOUNTER — TRANSCRIBE ORDERS (OUTPATIENT)
Dept: LAB | Facility: CLINIC | Age: 73
End: 2018-06-12

## 2018-06-12 ENCOUNTER — APPOINTMENT (OUTPATIENT)
Dept: LAB | Facility: CLINIC | Age: 73
End: 2018-06-12
Payer: COMMERCIAL

## 2018-06-12 DIAGNOSIS — R82.90 URINE ABNORMALITY: ICD-10-CM

## 2018-06-12 DIAGNOSIS — I10 ESSENTIAL HYPERTENSION, MALIGNANT: ICD-10-CM

## 2018-06-12 DIAGNOSIS — E11.40 TYPE 2 DIABETES MELLITUS WITH DIABETIC NEUROPATHY, UNSPECIFIED WHETHER LONG TERM INSULIN USE (HCC): Primary | ICD-10-CM

## 2018-06-12 DIAGNOSIS — E11.40 TYPE 2 DIABETES MELLITUS WITH DIABETIC NEUROPATHY, UNSPECIFIED WHETHER LONG TERM INSULIN USE (HCC): ICD-10-CM

## 2018-06-12 LAB
ALBUMIN SERPL BCP-MCNC: 3.5 G/DL (ref 3.5–5)
ALP SERPL-CCNC: 92 U/L (ref 46–116)
ALT SERPL W P-5'-P-CCNC: 18 U/L (ref 12–78)
ANION GAP SERPL CALCULATED.3IONS-SCNC: 6 MMOL/L (ref 4–13)
AST SERPL W P-5'-P-CCNC: 12 U/L (ref 5–45)
BILIRUB SERPL-MCNC: 0.44 MG/DL (ref 0.2–1)
BUN SERPL-MCNC: 19 MG/DL (ref 5–25)
CALCIUM SERPL-MCNC: 8.8 MG/DL (ref 8.3–10.1)
CHLORIDE SERPL-SCNC: 104 MMOL/L (ref 100–108)
CO2 SERPL-SCNC: 27 MMOL/L (ref 21–32)
CREAT SERPL-MCNC: 0.66 MG/DL (ref 0.6–1.3)
EST. AVERAGE GLUCOSE BLD GHB EST-MCNC: 140 MG/DL
GFR SERPL CREATININE-BSD FRML MDRD: 89 ML/MIN/1.73SQ M
GLUCOSE P FAST SERPL-MCNC: 94 MG/DL (ref 65–99)
HBA1C MFR BLD: 6.5 % (ref 4.2–6.3)
HGB BLD-MCNC: 12.8 G/DL (ref 11.5–15.4)
POTASSIUM SERPL-SCNC: 4.5 MMOL/L (ref 3.5–5.3)
PROT SERPL-MCNC: 7.8 G/DL (ref 6.4–8.2)
SODIUM SERPL-SCNC: 137 MMOL/L (ref 136–145)

## 2018-06-12 PROCEDURE — 36415 COLL VENOUS BLD VENIPUNCTURE: CPT

## 2018-06-12 PROCEDURE — 83036 HEMOGLOBIN GLYCOSYLATED A1C: CPT

## 2018-06-12 PROCEDURE — 80053 COMPREHEN METABOLIC PANEL: CPT

## 2018-06-12 PROCEDURE — 85018 HEMOGLOBIN: CPT

## 2018-06-13 ENCOUNTER — APPOINTMENT (OUTPATIENT)
Dept: LAB | Facility: CLINIC | Age: 73
End: 2018-06-13
Payer: COMMERCIAL

## 2018-06-13 LAB
CREAT UR-MCNC: 33.8 MG/DL
MICROALBUMIN UR-MCNC: 14.1 MG/L (ref 0–20)
MICROALBUMIN/CREAT 24H UR: 42 MG/G CREATININE (ref 0–30)

## 2018-06-13 PROCEDURE — 3060F POS MICROALBUMINURIA REV: CPT | Performed by: FAMILY MEDICINE

## 2018-06-13 PROCEDURE — 82043 UR ALBUMIN QUANTITATIVE: CPT

## 2018-06-13 PROCEDURE — 82570 ASSAY OF URINE CREATININE: CPT

## 2018-06-27 ENCOUNTER — OFFICE VISIT (OUTPATIENT)
Dept: FAMILY MEDICINE CLINIC | Facility: CLINIC | Age: 73
End: 2018-06-27
Payer: COMMERCIAL

## 2018-06-27 VITALS
SYSTOLIC BLOOD PRESSURE: 124 MMHG | BODY MASS INDEX: 29.31 KG/M2 | WEIGHT: 145.4 LBS | HEIGHT: 59 IN | HEART RATE: 82 BPM | RESPIRATION RATE: 14 BRPM | TEMPERATURE: 97.3 F | DIASTOLIC BLOOD PRESSURE: 62 MMHG

## 2018-06-27 DIAGNOSIS — E13.9 DIABETES 1.5, MANAGED AS TYPE 2 (HCC): ICD-10-CM

## 2018-06-27 DIAGNOSIS — Z12.11 SCREENING FOR COLON CANCER: ICD-10-CM

## 2018-06-27 DIAGNOSIS — Z12.39 SCREENING FOR BREAST CANCER: Primary | ICD-10-CM

## 2018-06-27 DIAGNOSIS — E78.00 HYPERCHOLESTEROLEMIA: ICD-10-CM

## 2018-06-27 DIAGNOSIS — I10 ESSENTIAL HYPERTENSION: ICD-10-CM

## 2018-06-27 PROCEDURE — 99214 OFFICE O/P EST MOD 30 MIN: CPT | Performed by: FAMILY MEDICINE

## 2018-06-27 NOTE — PROGRESS NOTES
Patient ID: Jorge Devlin is a 71 y o  female  HPI: 71 y  o female presents for followup of diabetes, htn, and hypercholesterolemia  Her anemia is stable at this time  SUBJECTIVE    Family History   Problem Relation Age of Onset    Hypertension Mother     Lung cancer Father     Hypertension Sister     Lymphoma Brother 39    Hypertension Brother     Schizophrenia Brother     Depression Son     Schizophrenia Son     Hypertension Family     Heart disease Other      Social History     Social History    Marital status: /Civil Union     Spouse name: N/A    Number of children: N/A    Years of education: N/A     Occupational History    Not on file       Social History Main Topics    Smoking status: Never Smoker    Smokeless tobacco: Never Used    Alcohol use No    Drug use: No    Sexual activity: Not on file     Other Topics Concern    Not on file     Social History Narrative    Daily coffee consumption (___cups/day)    Daily cola consumption (____cans/day)    Daily tea consumption (____cups/day)    Uses safety equipment-seatbelts     Past Medical History:   Diagnosis Date    Hypertension     Shortness of breath     Skin neoplasm     last assessed: 6/13/2017     Past Surgical History:   Procedure Laterality Date    TONSILLECTOMY       No Known Allergies    Current Outpatient Prescriptions:     valsartan-hydrochlorothiazide (DIOVAN-HCT) 160-25 MG per tablet, , Disp: , Rfl: 0    Review of Systems  Constitutional:     Denies fever, chills ,fatigue ,weakness ,weight loss, weight gain     ENT: Denies earache ,loss of hearing ,nosebleed, nasal discharge,nasal congestion ,sore throat ,hoarseness  Pulmonary: Denies shortness of breath ,cough  ,dyspnea on exertion, orthopnea  ,PND   Cardiovascular:  Denies bradycardia , tachycardia  ,palpations, lower extremity edema leg, claudication  Breast:  Denies new or changing breast lumps ,nipple discharge ,nipple changes  Abdomen:  Denies abdominal pain , anorexia , indigestion, nausea, vomiting, constipation, diarrhea  Musculoskeletal: Denies myalgias, arthralgias, joint swelling, joint stiffness , limb pain, limb swelling  Gu: denies dysuria, polyuria  Skin: Denies skin rash, skin lesion, skin wound, itching, dry skin  Neuro: Denies headache, numbness, tingling, confusion, loss of consciousness, dizziness, vertigo  Psychiatric: Denies feelings of depression, suicidal ideation, anxiety, sleep disturbances    OBJECTIVE  /62   Pulse 82   Temp (!) 97 3 °F (36 3 °C)   Resp 14   Ht 4' 11" (1 499 m)   Wt 66 kg (145 lb 6 4 oz)   BMI 29 37 kg/m²   Constitutional:   NAD, well appearing and well nourished      ENT:   Conjunctiva and lids: no injection, edema, or discharge     Pupils and iris: JB bilaterally    External inspection of ears and nose: normal without deformities or discharge  Otoscopic exam: Canals patent without erythema  Nasal mucosa, septum and turbinates: Normal or edema or discharge         Oropharynx:  Moist mucosa, normal tongue and tonsils without lesions  No erythema        Pulmonary:Respiratory effort normal rate and rhythm, no increased work of breathing   Auscultation of lungs:  Clear bilaterally with no adventitious breath sounds       Cardiovascular: regular rate and rhythm, S1 and S2, no murmur, no edema and/or varicosities of LE      Abdomen: Soft and non-distended     Positive bowel sounds      No heptomegaly or splenomegaly      Gu: no suprapubic tenderness or CVA tenderness, no urethral discharge  Lymphatic:  No anterior or posterior cervical lymphadenopathy         Musculoskeletal:  Gait and station: Normal gait      Digits and nails normal without clubbing or cyanosis       Inspection/palpation of joints, bones, and muscles:  No joint tenderness, swelling, full active and passive range of motion       Skin: Normal skin turgor and no rashes      Neuro:    Normal reflexes     Psych:   alert and oriented to person, place and time     normal mood and affect   Patient's shoes and socks removed  Right Foot/Ankle   Right Foot Inspection  Skin Exam: skin normal and skin intact no dry skin, no warmth, no callus, no erythema, no maceration, no abnormal color, no pre-ulcer, no ulcer and no callus                          Toe Exam: ROM and strength within normal limitsno swelling, no tenderness, erythema and  no right toe deformity  Sensory   Vibration: intact  Proprioception: intact   Monofilament testing: intact  Vascular  Capillary refills: < 3 seconds  The right DP pulse is 2+  The right PT pulse is 2+  Left Foot/Ankle  Left Foot Inspection  Skin Exam: skin normal and skin intactno dry skin, no warmth, no erythema, no maceration, normal color, no pre-ulcer, no ulcer and no callus                         Toe Exam: ROM and strength within normal limitsno swelling, no tenderness, no erythema and no left toe deformity                   Sensory   Vibration: intact  Proprioception: intact  Monofilament: intact  Vascular  Capillary refills: < 3 seconds  The left DP pulse is 2+  The left PT pulse is 2+  Assign Risk Category:  No deformity present; No loss of protective sensation; No weak pulses       Risk: 0      Assessment/Plan:Diagnoses and all orders for this visit:    Screening for breast cancer  -     Mammo screening bilateral w cad; Future    Screening for colon cancer  -     Occult Bloood,Fecal Immunochemical; Future    Diabetes 1 5, managed as type 2 (HCC)  -     HEMOGLOBIN A1C W/ EAG ESTIMATION; Future    Essential hypertension  -     Comprehensive metabolic panel; Future    Hypercholesterolemia  -     Lipid Panel with Direct LDL reflex; Future    Other orders  -     Cancel: Ambulatory referral to Colorectal Surgery; Future    I will see patient back in 4 mos or sooner prn

## 2018-10-29 ENCOUNTER — OFFICE VISIT (OUTPATIENT)
Dept: FAMILY MEDICINE CLINIC | Facility: CLINIC | Age: 73
End: 2018-10-29
Payer: COMMERCIAL

## 2018-10-29 ENCOUNTER — APPOINTMENT (OUTPATIENT)
Dept: LAB | Facility: CLINIC | Age: 73
End: 2018-10-29
Payer: COMMERCIAL

## 2018-10-29 VITALS
HEART RATE: 62 BPM | TEMPERATURE: 97.9 F | HEIGHT: 59 IN | WEIGHT: 143.8 LBS | SYSTOLIC BLOOD PRESSURE: 132 MMHG | BODY MASS INDEX: 28.99 KG/M2 | DIASTOLIC BLOOD PRESSURE: 88 MMHG

## 2018-10-29 DIAGNOSIS — D50.9 IRON DEFICIENCY ANEMIA, UNSPECIFIED IRON DEFICIENCY ANEMIA TYPE: ICD-10-CM

## 2018-10-29 DIAGNOSIS — Z13.820 OSTEOPOROSIS SCREENING: ICD-10-CM

## 2018-10-29 DIAGNOSIS — Z00.00 MEDICARE ANNUAL WELLNESS VISIT, SUBSEQUENT: Primary | ICD-10-CM

## 2018-10-29 DIAGNOSIS — E13.9 DIABETES 1.5, MANAGED AS TYPE 2 (HCC): ICD-10-CM

## 2018-10-29 DIAGNOSIS — I10 ESSENTIAL HYPERTENSION: ICD-10-CM

## 2018-10-29 DIAGNOSIS — E78.00 HYPERCHOLESTEROLEMIA: ICD-10-CM

## 2018-10-29 DIAGNOSIS — H61.22 HEARING LOSS DUE TO CERUMEN IMPACTION, LEFT: ICD-10-CM

## 2018-10-29 DIAGNOSIS — Z23 NEED FOR VACCINATION: ICD-10-CM

## 2018-10-29 DIAGNOSIS — G93.89 BRAIN MASS: ICD-10-CM

## 2018-10-29 DIAGNOSIS — Z01.818 PRE-PROCEDURAL EXAMINATION: ICD-10-CM

## 2018-10-29 LAB
ALBUMIN SERPL BCP-MCNC: 3.7 G/DL (ref 3.5–5)
ALP SERPL-CCNC: 97 U/L (ref 46–116)
ALT SERPL W P-5'-P-CCNC: 18 U/L (ref 12–78)
ANION GAP SERPL CALCULATED.3IONS-SCNC: 7 MMOL/L (ref 4–13)
AST SERPL W P-5'-P-CCNC: 12 U/L (ref 5–45)
BASOPHILS # BLD AUTO: 0.06 THOUSANDS/ΜL (ref 0–0.1)
BASOPHILS NFR BLD AUTO: 1 % (ref 0–1)
BILIRUB SERPL-MCNC: 0.53 MG/DL (ref 0.2–1)
BUN SERPL-MCNC: 23 MG/DL (ref 5–25)
CALCIUM SERPL-MCNC: 9.3 MG/DL (ref 8.3–10.1)
CHLORIDE SERPL-SCNC: 102 MMOL/L (ref 100–108)
CHOLEST SERPL-MCNC: 159 MG/DL (ref 50–200)
CO2 SERPL-SCNC: 27 MMOL/L (ref 21–32)
CREAT SERPL-MCNC: 0.79 MG/DL (ref 0.6–1.3)
EOSINOPHIL # BLD AUTO: 0.03 THOUSAND/ΜL (ref 0–0.61)
EOSINOPHIL NFR BLD AUTO: 0 % (ref 0–6)
ERYTHROCYTE [DISTWIDTH] IN BLOOD BY AUTOMATED COUNT: 13.3 % (ref 11.6–15.1)
EST. AVERAGE GLUCOSE BLD GHB EST-MCNC: 126 MG/DL
FERRITIN SERPL-MCNC: 53 NG/ML (ref 8–388)
GFR SERPL CREATININE-BSD FRML MDRD: 76 ML/MIN/1.73SQ M
GLUCOSE P FAST SERPL-MCNC: 104 MG/DL (ref 65–99)
HBA1C MFR BLD: 6 % (ref 4.2–6.3)
HCT VFR BLD AUTO: 41 % (ref 34.8–46.1)
HDLC SERPL-MCNC: 42 MG/DL (ref 40–60)
HGB BLD-MCNC: 13.3 G/DL (ref 11.5–15.4)
IMM GRANULOCYTES # BLD AUTO: 0.03 THOUSAND/UL (ref 0–0.2)
IMM GRANULOCYTES NFR BLD AUTO: 0 % (ref 0–2)
LDLC SERPL CALC-MCNC: 98 MG/DL (ref 0–100)
LYMPHOCYTES # BLD AUTO: 1.32 THOUSANDS/ΜL (ref 0.6–4.47)
LYMPHOCYTES NFR BLD AUTO: 14 % (ref 14–44)
MCH RBC QN AUTO: 29.8 PG (ref 26.8–34.3)
MCHC RBC AUTO-ENTMCNC: 32.4 G/DL (ref 31.4–37.4)
MCV RBC AUTO: 92 FL (ref 82–98)
MONOCYTES # BLD AUTO: 0.51 THOUSAND/ΜL (ref 0.17–1.22)
MONOCYTES NFR BLD AUTO: 5 % (ref 4–12)
NEUTROPHILS # BLD AUTO: 7.47 THOUSANDS/ΜL (ref 1.85–7.62)
NEUTS SEG NFR BLD AUTO: 80 % (ref 43–75)
NRBC BLD AUTO-RTO: 0 /100 WBCS
PLATELET # BLD AUTO: 498 THOUSANDS/UL (ref 149–390)
PMV BLD AUTO: 9 FL (ref 8.9–12.7)
POTASSIUM SERPL-SCNC: 3.8 MMOL/L (ref 3.5–5.3)
PROT SERPL-MCNC: 8.2 G/DL (ref 6.4–8.2)
RBC # BLD AUTO: 4.46 MILLION/UL (ref 3.81–5.12)
SODIUM SERPL-SCNC: 136 MMOL/L (ref 136–145)
TRIGL SERPL-MCNC: 95 MG/DL
WBC # BLD AUTO: 9.42 THOUSAND/UL (ref 4.31–10.16)

## 2018-10-29 PROCEDURE — 90662 IIV NO PRSV INCREASED AG IM: CPT | Performed by: FAMILY MEDICINE

## 2018-10-29 PROCEDURE — 3079F DIAST BP 80-89 MM HG: CPT | Performed by: FAMILY MEDICINE

## 2018-10-29 PROCEDURE — G0008 ADMIN INFLUENZA VIRUS VAC: HCPCS | Performed by: FAMILY MEDICINE

## 2018-10-29 PROCEDURE — 3075F SYST BP GE 130 - 139MM HG: CPT | Performed by: FAMILY MEDICINE

## 2018-10-29 PROCEDURE — 3008F BODY MASS INDEX DOCD: CPT | Performed by: FAMILY MEDICINE

## 2018-10-29 PROCEDURE — 82728 ASSAY OF FERRITIN: CPT

## 2018-10-29 PROCEDURE — 1125F AMNT PAIN NOTED PAIN PRSNT: CPT | Performed by: FAMILY MEDICINE

## 2018-10-29 PROCEDURE — 83036 HEMOGLOBIN GLYCOSYLATED A1C: CPT

## 2018-10-29 PROCEDURE — 80061 LIPID PANEL: CPT

## 2018-10-29 PROCEDURE — 36415 COLL VENOUS BLD VENIPUNCTURE: CPT

## 2018-10-29 PROCEDURE — 99214 OFFICE O/P EST MOD 30 MIN: CPT | Performed by: FAMILY MEDICINE

## 2018-10-29 PROCEDURE — 1160F RVW MEDS BY RX/DR IN RCRD: CPT | Performed by: FAMILY MEDICINE

## 2018-10-29 PROCEDURE — 85025 COMPLETE CBC W/AUTO DIFF WBC: CPT

## 2018-10-29 PROCEDURE — 80053 COMPREHEN METABOLIC PANEL: CPT

## 2018-10-29 PROCEDURE — 1170F FXNL STATUS ASSESSED: CPT | Performed by: FAMILY MEDICINE

## 2018-10-29 PROCEDURE — G0439 PPPS, SUBSEQ VISIT: HCPCS | Performed by: FAMILY MEDICINE

## 2018-10-30 PROCEDURE — 69210 REMOVE IMPACTED EAR WAX UNI: CPT | Performed by: FAMILY MEDICINE

## 2018-10-30 NOTE — PROGRESS NOTES
Patient ID: Ross Jaquez is a 71 y o  female  HPI: 71 y  o female presents for follow up of hypertension, iron deficiency anemia and complains of problems hearing biaterally  She is following with neurosurgery for a meningioma  She denies any dizziness, memory issue, balance issues, etc     SUBJECTIVE    Family History   Problem Relation Age of Onset    Hypertension Mother     Lung cancer Father     Hypertension Sister     Lymphoma Brother 39    Hypertension Brother     Schizophrenia Brother     Depression Son     Schizophrenia Son     Hypertension Family     Heart disease Other      Social History     Social History    Marital status: /Civil Union     Spouse name: N/A    Number of children: N/A    Years of education: N/A     Occupational History    Not on file       Social History Main Topics    Smoking status: Never Smoker    Smokeless tobacco: Never Used    Alcohol use No    Drug use: No    Sexual activity: Not on file     Other Topics Concern    Not on file     Social History Narrative    Daily coffee consumption (___cups/day)    Daily cola consumption (____cans/day)    Daily tea consumption (____cups/day)    Uses safety equipment-seatbelts     Past Medical History:   Diagnosis Date    Hypertension     Shortness of breath     Skin neoplasm     last assessed: 6/13/2017     Past Surgical History:   Procedure Laterality Date    TONSILLECTOMY       No Known Allergies    Current Outpatient Prescriptions:     valsartan-hydrochlorothiazide (DIOVAN-HCT) 160-25 MG per tablet, , Disp: , Rfl: 0    Review of Systems  Constitutional:     Denies fever, chills ,fatigue ,weakness ,weight loss, weight gain     ENT: Denies earache ,loss of hearing ,nosebleed, nasal discharge,nasal congestion ,sore throat ,hoarseness  Pulmonary: Denies shortness of breath ,cough  ,dyspnea on exertion, orthopnea  ,PND   Cardiovascular:  Denies bradycardia , tachycardia  ,palpations, lower extremity edema leg, claudication  Breast:  Denies new or changing breast lumps ,nipple discharge ,nipple changes  Abdomen:  Denies abdominal pain , anorexia , indigestion, nausea, vomiting, constipation, diarrhea  Musculoskeletal: Denies myalgias, arthralgias, joint swelling, joint stiffness , limb pain, limb swelling  Gu: denies dysuria, polyuria  Skin: Denies skin rash, skin lesion, skin wound, itching, dry skin  Neuro: Denies headache, numbness, tingling, confusion, loss of consciousness, dizziness, vertigo  Psychiatric: Denies feelings of depression, suicidal ideation, anxiety, sleep disturbances    OBJECTIVE  /88   Pulse 62   Temp 97 9 °F (36 6 °C)   Ht 4' 11" (1 499 m)   Wt 65 2 kg (143 lb 12 8 oz)   BMI 29 04 kg/m²   Constitutional:   NAD, well appearing and well nourished      ENT:   Conjunctiva and lids: no injection, edema, or discharge     Pupils and iris: JB bilaterally    External inspection of ears and nose: normal without deformities or discharge  Otoscopic exam: Canals patent without erythema  Nasal mucosa, septum and turbinates: Normal or edema or discharge         Oropharynx:  Moist mucosa, normal tongue and tonsils without lesions  No erythema        Pulmonary:Respiratory effort normal rate and rhythm, no increased work of breathing   Auscultation of lungs:  Clear bilaterally with no adventitious breath sounds       Cardiovascular: regular rate and rhythm, S1 and S2, no murmur, no edema and/or varicosities of LE      Abdomen: Soft and non-distended     Positive bowel sounds      No heptomegaly or splenomegaly      Gu: no suprapubic tenderness or CVA tenderness, no urethral discharge  Lymphatic:  No anterior or posterior cervical lymphadenopathy         Musculoskeletal:  Gait and station: Normal gait      Digits and nails normal without clubbing or cyanosis       Inspection/palpation of joints, bones, and muscles:  No joint tenderness, swelling, full active and passive range of motion Skin: Normal skin turgor and no rashes      Neuro:    Normal  CN 2-12     Normal reflexes     Normal sensation    Psych:   alert and oriented to person, place and time     normal mood and affect   Ear cerumen removal  Date/Time: 10/30/2018 8:40 AM  Performed by: Daylin Abraham  Authorized by: Renu Dixon, [address] [place] [address]     Patient location:  Clinic  Consent:     Consent obtained:  Verbal    Consent given by:  Patient    Risks discussed:  Dizziness and bleeding    Alternatives discussed:  No treatment  Procedure details:     Local anesthetic:  None    Location:  L ear and R ear    Procedure type: curette    Post-procedure details:     Complication:  None    Hearing quality:  Improved    Patient tolerance of procedure: Tolerated well, no immediate complications          Assessment/Plan:Diagnoses and all orders for this visit:    Medicare annual wellness visit, subsequent    Essential hypertension    Iron deficiency anemia, unspecified iron deficiency anemia type  -     CBC and differential; Future  -     Ferritin; Future    Hearing loss due to cerumen impaction, left    Need for vaccination  -     influenza vaccine, 5627-5607, high-dose, PF 0 5 mL, for patients 65 yr+ (FLUZONE HIGH-DOSE)    Osteoporosis screening  -     DXA bone density spine hip and pelvis; Future    Brain mass  Comments:  Pt being followed by neurosurgery for a left front meningioma  Next scan is in 6 mos  Other orders  -     Ear cerumen removal        I will see patient back in 4 mos or sooner prn

## 2018-11-19 DIAGNOSIS — I10 ESSENTIAL HYPERTENSION: Primary | ICD-10-CM

## 2018-11-20 ENCOUNTER — TELEPHONE (OUTPATIENT)
Dept: FAMILY MEDICINE CLINIC | Facility: CLINIC | Age: 73
End: 2018-11-20

## 2018-11-20 RX ORDER — VALSARTAN AND HYDROCHLOROTHIAZIDE 160; 25 MG/1; MG/1
TABLET ORAL
Qty: 90 TABLET | Refills: 2 | Status: CANCELLED | OUTPATIENT
Start: 2018-11-20

## 2018-11-20 RX ORDER — IRBESARTAN AND HYDROCHLOROTHIAZIDE 300; 12.5 MG/1; MG/1
1 TABLET, FILM COATED ORAL DAILY
Qty: 90 TABLET | Refills: 3 | Status: SHIPPED | OUTPATIENT
Start: 2018-11-20 | End: 2019-02-12

## 2018-12-11 ENCOUNTER — HOSPITAL ENCOUNTER (OUTPATIENT)
Dept: RADIOLOGY | Facility: HOSPITAL | Age: 73
Discharge: HOME | End: 2018-12-11
Payer: COMMERCIAL

## 2018-12-11 DIAGNOSIS — R90.89 ABNORMAL FINDING ON MRI OF BRAIN: ICD-10-CM

## 2018-12-11 DIAGNOSIS — D33.0 BENIGN NEOPLASM OF SUPRATENTORIAL REGION OF BRAIN (HCC): ICD-10-CM

## 2018-12-11 DIAGNOSIS — D32.9 MENINGIOMA (HCC): ICD-10-CM

## 2018-12-11 PROCEDURE — 70553 MRI BRAIN STEM W/O & W/DYE: CPT

## 2018-12-11 PROCEDURE — A9585 GADOBUTROL INJECTION: HCPCS | Performed by: PHYSICIAN ASSISTANT

## 2018-12-11 RX ADMIN — GADOBUTROL 6 ML: 604.72 INJECTION INTRAVENOUS at 13:49

## 2018-12-14 ENCOUNTER — OFFICE VISIT (OUTPATIENT)
Dept: NEUROSURGERY | Facility: CLINIC | Age: 73
End: 2018-12-14
Payer: COMMERCIAL

## 2018-12-14 ENCOUNTER — TRANSCRIBE ORDERS (OUTPATIENT)
Dept: NEUROSURGERY | Facility: CLINIC | Age: 73
End: 2018-12-14

## 2018-12-14 VITALS
HEART RATE: 88 BPM | WEIGHT: 142 LBS | DIASTOLIC BLOOD PRESSURE: 80 MMHG | SYSTOLIC BLOOD PRESSURE: 140 MMHG | HEIGHT: 59 IN | TEMPERATURE: 98.7 F | BODY MASS INDEX: 28.63 KG/M2 | RESPIRATION RATE: 18 BRPM

## 2018-12-14 DIAGNOSIS — D32.9 MENINGIOMA (HCC): ICD-10-CM

## 2018-12-14 DIAGNOSIS — Z01.818 PRE-PROCEDURAL EXAMINATION: Primary | ICD-10-CM

## 2018-12-14 DIAGNOSIS — R93.0 ABNORMAL MRI OF HEAD: Primary | ICD-10-CM

## 2018-12-14 DIAGNOSIS — D33.0 BENIGN NEOPLASM OF SUPRATENTORIAL REGION OF BRAIN (HCC): ICD-10-CM

## 2018-12-14 PROCEDURE — 4040F PNEUMOC VAC/ADMIN/RCVD: CPT | Performed by: PHYSICIAN ASSISTANT

## 2018-12-14 PROCEDURE — 99213 OFFICE O/P EST LOW 20 MIN: CPT | Performed by: PHYSICIAN ASSISTANT

## 2018-12-14 NOTE — LETTER
December 14, 2018     Kristopher Jean-Baptiste, [address] [facility]\"" [address] [place] [address]    Patient: Khadra Ruibo   YOB: 1945   Date of Visit: 12/14/2018       Dear Dr Deana Linton: Thank you for referring [place] Francois to me for evaluation  Below are my notes for this consultation  If you have questions, please do not hesitate to call me  I look forward to following your patient along with you  Sincerely,        Laurie Casiano PA-C        CC: No Recipients  Laurie Casiano PA-C  12/14/2018  1:14 PM  Sign at close encounter  Assessment/Plan:    Very pleasant 46-year-old female, returns for six-month follow-up, with updated MRI Brain 12/11/18  She offers no new complaints today, and reports as noted in the past initial study was for an episode of syncope  She reports no interval changes in her medical or surgical history since her last visit  She does continue on antihypertensive medication  She does have history of cataracts with planned extraction at some point  MRI brain 12/11/18 was carefully reviewed in detail by Dr Langley President, and compared with prior study, MRI brain 5/1/18, and CT head 4/8/18 overall the medial left frontal parafalcine meningioma remains unchanged in size when compared to prior studies  Clinically the patient remains asymptomatic of this lesion, there are no focal neurologic deficits appreciated on exam, there is no pronator drift, finger-nose is intact, rapid alternating motions are intact, Romberg is negative, motor examination of the upper and lower extremities is 5 x 5 for power, reflexes are also grossly intact  She denies bowel or bladder incontinence fine motor or sensory difficulties in the upper lower extremities, gait or balance disturbance, difficulty with memory or mentation, difficulty with executive function      Management options are again reviewed with the patient, surgical excision, radiosurgery, or continued serial follow-up  The patient again elects to continue with serial follow-ups and as per NCCN guidelines a repeat study is advised in approximately 6-12 months, and the patient has elected a 6 month interval     MRI brain with without contrast is requested, and clinical visit post imaging is planned, with Dr Constanza Vega  These findings, impressions and recommendations are reviewed in great detail with the patient, she expressed understanding and agreement, her questions were answered completely and to her satisfaction  Follow up has been scheduled  Diagnoses and all orders for this visit:    Abnormal MRI of head  -     MRI brain with and without contrast; Future    Benign neoplasm of supratentorial region of brain [facility])  -     MRI brain with and without contrast; Future    Meningioma ([facility] [address] )  -     MRI brain with and without contrast; Future          Return in about 6 months (around 6/14/2019) for Review MRI brain with without contrast     Subjective:      Patient ID: Simone Man is a 71 y o  female  HPI    The following portions of the patient's history were reviewed and updated as appropriate: allergies, current medications, past family history, past medical history, past social history and past surgical history  Review of Systems   Constitutional: Negative  HENT: Negative  Eyes: Negative  Respiratory: Negative  Cardiovascular: Negative  Gastrointestinal: Negative  Endocrine: Negative  Genitourinary: Negative  Musculoskeletal: Negative  Skin: Negative  Allergic/Immunologic: Negative  Neurological: Negative  Hematological: Negative  Psychiatric/Behavioral: Negative  Objective:    Physical Exam   Constitutional: She is oriented to person, place, and time  She appears well-developed and well-nourished  HENT:   Head: Normocephalic and atraumatic  Eyes: Pupils are equal, round, and reactive to light  EOM are normal    Neck: Normal range of motion  Cardiovascular: Normal rate, regular rhythm and normal heart sounds  Pulmonary/Chest: Effort normal and breath sounds normal    Musculoskeletal: Normal range of motion  Neurological: She is alert and oriented to person, place, and time  She has a normal Finger-Nose-Finger Test, a normal Romberg Test and a normal Tandem Gait Test  Gait normal    Reflex Scores:       Bicep reflexes are 2+ on the right side and 2+ on the left side  Patellar reflexes are 2+ on the right side and 2+ on the left side  Skin: Skin is warm and dry  Psychiatric: She has a normal mood and affect  Neurologic Exam     Mental Status   Oriented to person, place, and time  Level of consciousness: alert    Cranial Nerves   Cranial nerves II through XII intact  CN III, IV, VI   Pupils are equal, round, and reactive to light  Extraocular motions are normal      Motor Exam   Muscle bulk: normal  Overall muscle tone: normal  Right arm pronator drift: absent  Left arm pronator drift: absent    Strength   Right deltoid: 5/5  Left deltoid: 5/5  Right biceps: 5/5  Left biceps: 5/5  Right interossei: 5/5  Left interossei: 5/5  Right quadriceps: 5/5  Left quadriceps: 5/5  Right hamstrin/5  Left hamstrin/5    Sensory Exam   Light touch normal      Gait, Coordination, and Reflexes     Gait  Gait: normal    Coordination   Romberg: negative  Finger to nose coordination: normal  Tandem walking coordination: normal    Reflexes   Right biceps: 2+  Left biceps: 2+  Right patellar: 2+  Left patellar: 2+       MRI BRAIN WITH AND WITHOUT CONTRAST   18     INDICATION: 81-year-old female, follow-up meningioma     COMPARISON:  2018 MRI     TECHNIQUE:  Sagittal T1, axial T2, axial FLAIR, axial T1, axial [place], axial diffusion    Sagittal, axial and coronal T1 postcontrast   Axial BRAVO post contrast        IV Contrast:  6 mL of gadobutrol injection (MULTI-DOSE)      IMAGE QUALITY:   Diagnostic      FINDINGS:     BRAIN PARENCHYMA: Homogeneously enhancing mass consistent with meningioma measuring approximately 2 4 cm x 2 5 cm x 2 7 cm is present within the anterior inferior left parafalcine frontal region  A small nodule of the tumor projects across the midline  There is mild   mass effect on the adjacent left frontal lobe  No evidence of vasogenic edema  Findings are stable      Mild chronic microangiopathic ischemic changes involve supratentorial white matter  No acute ischemic disease  Similar to previous      Brainstem and cerebellum demonstrate normal signal  There is no intracranial hemorrhage    There is no evidence of acute infarction and diffusion imaging is unremarkable        VENTRICLES:  Normal      SELLA AND PITUITARY GLAND:  Normal      ORBITS:  Normal      PARANASAL SINUSES:  Normal      VASCULATURE:  Evaluation of the major intracranial vasculature demonstrates appropriate flow voids      CALVARIUM AND SKULL BASE:  Normal      EXTRACRANIAL SOFT TISSUES:  Normal      IMPRESSION:  Medial left frontal parafalcine meningioma measuring approximately 2 4 cm x 2 5 cm x 2 7 cm     Mild chronic microangiopathic ischemic changes involving supratentorial white matter     No acute ischemic disease     Similar to previous MRI study

## 2018-12-14 NOTE — PATIENT INSTRUCTIONS
Proceed for follow-up MRI brain with without contrast in approximately 6 months      Follow-up with Neurosurgery in about 6 months, Dr Shubham Hernandez)

## 2018-12-14 NOTE — PROGRESS NOTES
Assessment/Plan:    Very pleasant 15-year-old female, returns for six-month follow-up, with updated MRI Brain 12/11/18  She offers no new complaints today, and reports as noted in the past initial study was for an episode of syncope  She reports no interval changes in her medical or surgical history since her last visit  She does continue on antihypertensive medication  She does have history of cataracts with planned extraction at some point  MRI brain 12/11/18 was carefully reviewed in detail by Dr Liss Mann, and compared with prior study, MRI brain 5/1/18, and CT head 4/8/18 overall the medial left frontal parafalcine meningioma remains unchanged in size when compared to prior studies  Clinically the patient remains asymptomatic of this lesion, there are no focal neurologic deficits appreciated on exam, there is no pronator drift, finger-nose is intact, rapid alternating motions are intact, Romberg is negative, motor examination of the upper and lower extremities is 5 x 5 for power, reflexes are also grossly intact  She denies bowel or bladder incontinence fine motor or sensory difficulties in the upper lower extremities, gait or balance disturbance, difficulty with memory or mentation, difficulty with executive function  Management options are again reviewed with the patient, surgical excision, radiosurgery, or continued serial follow-up  The patient again elects to continue with serial follow-ups and as per NCCN guidelines a repeat study is advised in approximately 6-12 months, and the patient has elected a 6 month interval     MRI brain with without contrast is requested, and clinical visit post imaging is planned, with Dr Rose Blevins  These findings, impressions and recommendations are reviewed in great detail with the patient, she expressed understanding and agreement, her questions were answered completely and to her satisfaction  Follow up has been scheduled         Diagnoses and all orders for this visit:    Abnormal MRI of head  -     MRI brain with and without contrast; Future    Benign neoplasm of supratentorial region of brain [facility])  -     MRI brain with and without contrast; Future    Meningioma ([facility] [address] )  -     MRI brain with and without contrast; Future          Return in about 6 months (around 6/14/2019) for Review MRI brain with without contrast     Subjective:      Patient ID: Olga Cesar is a 71 y o  female  HPI    The following portions of the patient's history were reviewed and updated as appropriate: allergies, current medications, past family history, past medical history, past social history and past surgical history  Review of Systems   Constitutional: Negative  HENT: Negative  Eyes: Negative  Respiratory: Negative  Cardiovascular: Negative  Gastrointestinal: Negative  Endocrine: Negative  Genitourinary: Negative  Musculoskeletal: Negative  Skin: Negative  Allergic/Immunologic: Negative  Neurological: Negative  Hematological: Negative  Psychiatric/Behavioral: Negative  Objective:    Physical Exam   Constitutional: She is oriented to person, place, and time  She appears well-developed and well-nourished  HENT:   Head: Normocephalic and atraumatic  Eyes: Pupils are equal, round, and reactive to light  EOM are normal    Neck: Normal range of motion  Cardiovascular: Normal rate, regular rhythm and normal heart sounds  Pulmonary/Chest: Effort normal and breath sounds normal    Musculoskeletal: Normal range of motion  Neurological: She is alert and oriented to person, place, and time  She has a normal Finger-Nose-Finger Test, a normal Romberg Test and a normal Tandem Gait Test  Gait normal    Reflex Scores:       Bicep reflexes are 2+ on the right side and 2+ on the left side  Patellar reflexes are 2+ on the right side and 2+ on the left side  Skin: Skin is warm and dry     Psychiatric: She has a normal mood and affect  Neurologic Exam     Mental Status   Oriented to person, place, and time  Level of consciousness: alert    Cranial Nerves   Cranial nerves II through XII intact  CN III, IV, VI   Pupils are equal, round, and reactive to light  Extraocular motions are normal      Motor Exam   Muscle bulk: normal  Overall muscle tone: normal  Right arm pronator drift: absent  Left arm pronator drift: absent    Strength   Right deltoid: 5/5  Left deltoid: 5/5  Right biceps: 5/5  Left biceps: 5/5  Right interossei: 5/5  Left interossei: 5/5  Right quadriceps: 5/5  Left quadriceps: 5/5  Right hamstrin/5  Left hamstrin/5    Sensory Exam   Light touch normal      Gait, Coordination, and Reflexes     Gait  Gait: normal    Coordination   Romberg: negative  Finger to nose coordination: normal  Tandem walking coordination: normal    Reflexes   Right biceps: 2+  Left biceps: 2+  Right patellar: 2+  Left patellar: 2+       MRI BRAIN WITH AND WITHOUT CONTRAST   18     INDICATION: 50-year-old female, follow-up meningioma     COMPARISON:  2018 MRI     TECHNIQUE:  Sagittal T1, axial T2, axial FLAIR, axial T1, axial [place], axial diffusion  Sagittal, axial and coronal T1 postcontrast   Axial BRAVO post contrast        IV Contrast:  6 mL of gadobutrol injection (MULTI-DOSE)      IMAGE QUALITY:   Diagnostic      FINDINGS:     BRAIN PARENCHYMA:   Homogeneously enhancing mass consistent with meningioma measuring approximately 2 4 cm x 2 5 cm x 2 7 cm is present within the anterior inferior left parafalcine frontal region  A small nodule of the tumor projects across the midline  There is mild   mass effect on the adjacent left frontal lobe  No evidence of vasogenic edema  Findings are stable      Mild chronic microangiopathic ischemic changes involve supratentorial white matter  No acute ischemic disease    Similar to previous      Brainstem and cerebellum demonstrate normal signal  There is no intracranial hemorrhage    There is no evidence of acute infarction and diffusion imaging is unremarkable        VENTRICLES:  Normal      SELLA AND PITUITARY GLAND:  Normal      ORBITS:  Normal      PARANASAL SINUSES:  Normal      VASCULATURE:  Evaluation of the major intracranial vasculature demonstrates appropriate flow voids      CALVARIUM AND SKULL BASE:  Normal      EXTRACRANIAL SOFT TISSUES:  Normal      IMPRESSION:  Medial left frontal parafalcine meningioma measuring approximately 2 4 cm x 2 5 cm x 2 7 cm     Mild chronic microangiopathic ischemic changes involving supratentorial white matter     No acute ischemic disease     Similar to previous MRI study

## 2018-12-19 ENCOUNTER — OFFICE VISIT (OUTPATIENT)
Dept: FAMILY MEDICINE CLINIC | Facility: CLINIC | Age: 73
End: 2018-12-19
Payer: COMMERCIAL

## 2018-12-19 VITALS
SYSTOLIC BLOOD PRESSURE: 140 MMHG | DIASTOLIC BLOOD PRESSURE: 92 MMHG | HEART RATE: 80 BPM | WEIGHT: 142 LBS | BODY MASS INDEX: 28.63 KG/M2 | HEIGHT: 59 IN | TEMPERATURE: 98.2 F

## 2018-12-19 DIAGNOSIS — J01.00 ACUTE NON-RECURRENT MAXILLARY SINUSITIS: Primary | ICD-10-CM

## 2018-12-19 PROCEDURE — 99213 OFFICE O/P EST LOW 20 MIN: CPT | Performed by: NURSE PRACTITIONER

## 2018-12-19 PROCEDURE — 1160F RVW MEDS BY RX/DR IN RCRD: CPT | Performed by: NURSE PRACTITIONER

## 2018-12-19 PROCEDURE — 3008F BODY MASS INDEX DOCD: CPT | Performed by: NURSE PRACTITIONER

## 2018-12-19 RX ORDER — AMOXICILLIN AND CLAVULANATE POTASSIUM 875; 125 MG/1; MG/1
1 TABLET, FILM COATED ORAL EVERY 12 HOURS SCHEDULED
Qty: 20 TABLET | Refills: 0 | Status: SHIPPED | OUTPATIENT
Start: 2018-12-19 | End: 2018-12-29

## 2018-12-19 NOTE — PROGRESS NOTES
Assessment/Plan:     Diagnoses and all orders for this visit:    Acute non-recurrent maxillary sinusitis  -     amoxicillin-clavulanate (AUGMENTIN) 875-125 mg per tablet; Take 1 tablet by mouth every 12 (twelve) hours for 10 days    Discussed with patient plan to treat with Augmentin for 10 days  Patient to call if not better in 72 hours or symptoms worsen    Subjective:      Patient ID: Oh Greco is a 71 y o  female  71year old female presenting with nasal congestion, post nasal drip, sore throat and a non-productive cough for three days  She denies fever, chills, body aches or fatigue being associated with above symptoms  She as not used any type of OTC medications to treat symptoms  Family History   Problem Relation Age of Onset    Hypertension Mother     Lung cancer Father     Hypertension Sister     Lymphoma Brother 39    Hypertension Brother     Schizophrenia Brother     Depression Son     Schizophrenia Son     Hypertension Family     Heart disease Other      Social History     Social History    Marital status: /Civil Union     Spouse name: N/A    Number of children: N/A    Years of education: N/A     Occupational History    Not on file       Social History Main Topics    Smoking status: Never Smoker    Smokeless tobacco: Never Used    Alcohol use No    Drug use: No    Sexual activity: Not on file     Other Topics Concern    Not on file     Social History Narrative    Daily coffee consumption (___cups/day)    Daily cola consumption (____cans/day)    Daily tea consumption (____cups/day)    Uses safety equipment-seatbelts     Past Medical History:   Diagnosis Date    Hypertension     Shortness of breath     Skin neoplasm     last assessed: 6/13/2017     Past Surgical History:   Procedure Laterality Date    TONSILLECTOMY       No Known Allergies    Current Outpatient Prescriptions:     irbesartan-hydrochlorothiazide (AVALIDE) 300-12 5 MG per tablet, Take 1 tablet by mouth daily, Disp: 90 tablet, Rfl: 3    amoxicillin-clavulanate (AUGMENTIN) 875-125 mg per tablet, Take 1 tablet by mouth every 12 (twelve) hours for 10 days, Disp: 20 tablet, Rfl: 0      Review of Systems   Constitutional: Negative  HENT: Positive for congestion, postnasal drip, rhinorrhea and sore throat  Eyes: Negative  Respiratory: Positive for cough  Cardiovascular: Negative  Gastrointestinal: Negative  Musculoskeletal: Negative  Neurological: Negative  Psychiatric/Behavioral: Negative  Objective:    /92   Pulse 80   Temp 98 2 °F (36 8 °C)   Ht 4' 11" (1 499 m)   Wt 64 4 kg (142 lb)   BMI 28 68 kg/m² (Reviewed)     Physical Exam   Constitutional: She is oriented to person, place, and time  Vital signs are normal  She appears well-developed and well-nourished  HENT:   Head: Normocephalic and atraumatic  Right Ear: External ear normal    Left Ear: External ear normal    Nose: Mucosal edema and rhinorrhea present  Right sinus exhibits maxillary sinus tenderness  Mouth/Throat: Uvula is midline and mucous membranes are normal  Oropharyngeal exudate present  Eyes: Pupils are equal, round, and reactive to light  Conjunctivae, EOM and lids are normal    Neck: Trachea normal and normal range of motion  Neck supple  Cardiovascular: Normal rate, regular rhythm and normal heart sounds  Pulmonary/Chest: Effort normal and breath sounds normal    Neurological: She is alert and oriented to person, place, and time  Skin: Skin is warm and dry  Psychiatric: She has a normal mood and affect   Her behavior is normal

## 2019-02-01 ENCOUNTER — TELEPHONE (OUTPATIENT)
Dept: FAMILY MEDICINE CLINIC | Facility: CLINIC | Age: 74
End: 2019-02-01

## 2019-02-01 DIAGNOSIS — I10 ESSENTIAL HYPERTENSION: Primary | ICD-10-CM

## 2019-02-01 RX ORDER — LISINOPRIL AND HYDROCHLOROTHIAZIDE 25; 20 MG/1; MG/1
1 TABLET ORAL DAILY
Qty: 30 TABLET | Refills: 3 | Status: SHIPPED | OUTPATIENT
Start: 2019-02-01 | End: 2019-05-13 | Stop reason: SDUPTHER

## 2019-02-01 NOTE — TELEPHONE ENCOUNTER
She called back   She already has a appoint on 02/28/19   Can she wait until then? Or should she make a nurse visit sooner?

## 2019-02-04 NOTE — TELEPHONE ENCOUNTER
Patient called back stating she read the letter wrong  A lot of patients irbesatan was recalled but hers was not  Could she stay on the irbesatan or should she still switch to lisinopril?

## 2019-02-10 ENCOUNTER — APPOINTMENT (EMERGENCY)
Dept: RADIOLOGY | Facility: HOSPITAL | Age: 74
End: 2019-02-10
Payer: COMMERCIAL

## 2019-02-10 ENCOUNTER — HOSPITAL ENCOUNTER (EMERGENCY)
Facility: HOSPITAL | Age: 74
Discharge: HOME | End: 2019-02-11
Attending: EMERGENCY MEDICINE | Admitting: EMERGENCY MEDICINE
Payer: COMMERCIAL

## 2019-02-10 DIAGNOSIS — R11.0 NAUSEA: ICD-10-CM

## 2019-02-10 DIAGNOSIS — R10.9 ABDOMINAL PAIN: Primary | ICD-10-CM

## 2019-02-10 DIAGNOSIS — N39.0 UTI (URINARY TRACT INFECTION): ICD-10-CM

## 2019-02-10 DIAGNOSIS — N83.209 OVARIAN CYST: ICD-10-CM

## 2019-02-10 DIAGNOSIS — K44.9 HIATAL HERNIA: ICD-10-CM

## 2019-02-10 LAB
ALBUMIN SERPL BCP-MCNC: 3.8 G/DL (ref 3.5–5)
ALP SERPL-CCNC: 103 U/L (ref 46–116)
ALT SERPL W P-5'-P-CCNC: 19 U/L (ref 12–78)
ANION GAP SERPL CALCULATED.3IONS-SCNC: 10 MMOL/L (ref 4–13)
AST SERPL W P-5'-P-CCNC: 14 U/L (ref 5–45)
BACTERIA UR QL AUTO: ABNORMAL /HPF
BASOPHILS # BLD MANUAL: 0 THOUSAND/UL (ref 0–0.1)
BASOPHILS NFR MAR MANUAL: 0 % (ref 0–1)
BILIRUB SERPL-MCNC: 0.47 MG/DL (ref 0.2–1)
BILIRUB UR QL STRIP: NEGATIVE
BUN SERPL-MCNC: 21 MG/DL (ref 5–25)
CALCIUM SERPL-MCNC: 9.6 MG/DL (ref 8.3–10.1)
CHLORIDE SERPL-SCNC: 97 MMOL/L (ref 100–108)
CLARITY UR: CLEAR
CO2 SERPL-SCNC: 25 MMOL/L (ref 21–32)
COLOR UR: YELLOW
COLOR, POC: YELLOW
CREAT SERPL-MCNC: 0.7 MG/DL (ref 0.6–1.3)
EOSINOPHIL # BLD MANUAL: 0 THOUSAND/UL (ref 0–0.4)
EOSINOPHIL NFR BLD MANUAL: 0 % (ref 0–6)
ERYTHROCYTE [DISTWIDTH] IN BLOOD BY AUTOMATED COUNT: 13.4 % (ref 11.6–15.1)
GFR SERPL CREATININE-BSD FRML MDRD: 87 ML/MIN/1.73SQ M
GLUCOSE SERPL-MCNC: 149 MG/DL (ref 65–140)
GLUCOSE UR STRIP-MCNC: NEGATIVE MG/DL
HCT VFR BLD AUTO: 38.4 % (ref 34.8–46.1)
HGB BLD-MCNC: 12.7 G/DL (ref 11.5–15.4)
HGB UR QL STRIP.AUTO: ABNORMAL
HYALINE CASTS #/AREA URNS LPF: ABNORMAL /LPF
KETONES UR STRIP-MCNC: NEGATIVE MG/DL
LEUKOCYTE ESTERASE UR QL STRIP: ABNORMAL
LYMPHOCYTES # BLD AUTO: 0.82 THOUSAND/UL (ref 0.6–4.47)
LYMPHOCYTES # BLD AUTO: 4 % (ref 14–44)
MCH RBC QN AUTO: 29.2 PG (ref 26.8–34.3)
MCHC RBC AUTO-ENTMCNC: 33.1 G/DL (ref 31.4–37.4)
MCV RBC AUTO: 88 FL (ref 82–98)
MONOCYTES # BLD AUTO: 0.82 THOUSAND/UL (ref 0–1.22)
MONOCYTES NFR BLD: 4 % (ref 4–12)
NEUTROPHILS # BLD MANUAL: 18.91 THOUSAND/UL (ref 1.85–7.62)
NEUTS SEG NFR BLD AUTO: 92 % (ref 43–75)
NITRITE UR QL STRIP: NEGATIVE
NON-SQ EPI CELLS URNS QL MICRO: ABNORMAL /HPF
NRBC BLD AUTO-RTO: 0 /100 WBCS
PH UR STRIP.AUTO: 5.5 [PH] (ref 4.5–8)
PLATELET # BLD AUTO: 493 THOUSANDS/UL (ref 149–390)
PLATELET BLD QL SMEAR: ADEQUATE
PMV BLD AUTO: 8.3 FL (ref 8.9–12.7)
POTASSIUM SERPL-SCNC: 3.7 MMOL/L (ref 3.5–5.3)
PROT SERPL-MCNC: 8.4 G/DL (ref 6.4–8.2)
PROT UR STRIP-MCNC: ABNORMAL MG/DL
RBC # BLD AUTO: 4.35 MILLION/UL (ref 3.81–5.12)
RBC #/AREA URNS AUTO: ABNORMAL /HPF
SODIUM SERPL-SCNC: 132 MMOL/L (ref 136–145)
SP GR UR STRIP.AUTO: >=1.03 (ref 1–1.03)
TROPONIN I SERPL-MCNC: <0.02 NG/ML
UROBILINOGEN UR QL STRIP.AUTO: 0.2 E.U./DL
WBC # BLD AUTO: 20.55 THOUSAND/UL (ref 4.31–10.16)
WBC #/AREA URNS AUTO: ABNORMAL /HPF

## 2019-02-10 PROCEDURE — 87186 SC STD MICRODIL/AGAR DIL: CPT

## 2019-02-10 PROCEDURE — 84484 ASSAY OF TROPONIN QUANT: CPT | Performed by: EMERGENCY MEDICINE

## 2019-02-10 PROCEDURE — 74177 CT ABD & PELVIS W/CONTRAST: CPT

## 2019-02-10 PROCEDURE — 85027 COMPLETE CBC AUTOMATED: CPT | Performed by: EMERGENCY MEDICINE

## 2019-02-10 PROCEDURE — 81003 URINALYSIS AUTO W/O SCOPE: CPT

## 2019-02-10 PROCEDURE — 87077 CULTURE AEROBIC IDENTIFY: CPT

## 2019-02-10 PROCEDURE — 87086 URINE CULTURE/COLONY COUNT: CPT

## 2019-02-10 PROCEDURE — 85007 BL SMEAR W/DIFF WBC COUNT: CPT | Performed by: EMERGENCY MEDICINE

## 2019-02-10 PROCEDURE — 81001 URINALYSIS AUTO W/SCOPE: CPT

## 2019-02-10 PROCEDURE — 96365 THER/PROPH/DIAG IV INF INIT: CPT

## 2019-02-10 PROCEDURE — 99284 EMERGENCY DEPT VISIT MOD MDM: CPT

## 2019-02-10 PROCEDURE — 36415 COLL VENOUS BLD VENIPUNCTURE: CPT | Performed by: EMERGENCY MEDICINE

## 2019-02-10 PROCEDURE — 80053 COMPREHEN METABOLIC PANEL: CPT | Performed by: EMERGENCY MEDICINE

## 2019-02-10 RX ADMIN — Medication 1000 MG: at 23:19

## 2019-02-10 RX ADMIN — IOHEXOL 100 ML: 350 INJECTION, SOLUTION INTRAVENOUS at 21:35

## 2019-02-11 VITALS
RESPIRATION RATE: 16 BRPM | WEIGHT: 142 LBS | TEMPERATURE: 97.8 F | BODY MASS INDEX: 28.68 KG/M2 | SYSTOLIC BLOOD PRESSURE: 165 MMHG | HEART RATE: 70 BPM | OXYGEN SATURATION: 97 % | DIASTOLIC BLOOD PRESSURE: 74 MMHG

## 2019-02-11 PROCEDURE — 99283 EMERGENCY DEPT VISIT LOW MDM: CPT | Performed by: SURGERY

## 2019-02-11 RX ORDER — CEPHALEXIN 250 MG/1
500 CAPSULE ORAL 2 TIMES DAILY
Qty: 28 CAPSULE | Refills: 0 | Status: SHIPPED | OUTPATIENT
Start: 2019-02-11 | End: 2019-02-18

## 2019-02-11 NOTE — DISCHARGE INSTRUCTIONS
Your abdominal pain workup found that you of a hiatal hernia, as well as a ovarian cyst   It is imperative that he follow up with the gynecologist regarding the ovarian cyst   He can also call the general surgeons regarding your hiatal hernia  Their phone numbers been provided  Call the gynecologist later today  If anything changes or worsens, please come back to the emergency department

## 2019-02-11 NOTE — CONSULTS
Consultation - General Surgery   Tari Hale 68 y o  female MRN: [id number]  Unit/Bed#: ED 08 Encounter: [id number]    Assessment/Plan     Assessment:  73F with type 4 paraesophageal hernia incidentally found on CT scan; it does not appear obstructed on imaging, she has no acute symptoms of obstruction or strangulation of this hernia at this time, however she has been symptomatic for the past several years with dysphagia, frequent regurgitation, reflux and heartburn  The symptoms are likely due to her hiatal hernia  I have recommended repair, however this may be done on elective basis and will require further workup  This may be performed as an outpatient  Patient initially presented with suprapubic abdominal pain, this apparently has resolved  UA demonstrated acute UTI as well as right ovarian cyst   I believe these pathologies are more likely the cause of her pain and not her hiatal hernia  Plan:  Recommend repair  Follow-up as an outpatient for further workup and evaluation  I have discussed this with the patient as well as risks of incarceration and strangulation, obstruction which may result in needing emergent repair if these develop    History of Present Illness     HPI:  Tari Hale is a 68 y o  female who presents with right lower quadrant and suprapubic abdominal discomfort for 1 day  Patient states that she noted the symptoms earlier today  She denies any acute nausea or vomiting, however states that she has had frequent nausea and regurgitation after eating for the past several years  She has had feelings of postprandial chest fullness as well as dysphagia  She has been passing gas and moving her bowels  She denies fevers  She presented to the ED where she was found to have a type 4 paraesophageal hernia on CT scan  This does not appear obstructed per imaging  She was also noted to have a large right sided ovarian cyst as well as a urinary tract infection    At the time of evaluation, patient denies any abdominal or chest discomfort  She denies any active nausea  She denies any bloating or distention  Consults    Review of Systems  A complete 12 point review of systems was performed, all findings are negative unless otherwise noted by the HPI    Historical Information   Past Medical History:   Diagnosis Date    Hypertension     Shortness of breath     Skin neoplasm     last assessed: 6/13/2017     Past Surgical History:   Procedure Laterality Date    TONSILLECTOMY       Social History   Social History     Substance and Sexual Activity   Alcohol Use No     Social History     Substance and Sexual Activity   Drug Use No     Social History     Tobacco Use   Smoking Status Never Smoker   Smokeless Tobacco Never Used     Family History: non-contributory    Meds/Allergies   all current active meds have been reviewed, current meds:   No current facility-administered medications for this encounter  and PTA meds:   Prior to Admission Medications   Prescriptions Last Dose Informant Patient Reported?  Taking?   irbesartan-hydrochlorothiazide (AVALIDE) 300-12 5 MG per tablet  Self No No   Sig: Take 1 tablet by mouth daily   lisinopril-hydrochlorothiazide (PRINZIDE,ZESTORETIC) 20-25 MG per tablet   No No   Sig: Take 1 tablet by mouth daily for 30 days      Facility-Administered Medications: None     No Known Allergies    Objective   First Vitals:   Blood Pressure: (!) 201/102 (02/10/19 1721)  Pulse: (!) 116 (02/10/19 1721)  Temperature: 97 8 °F (36 6 °C) (02/10/19 1721)  Temp Source: Tympanic (02/10/19 1721)  Respirations: 18 (02/10/19 1721)  Weight - Scale: 64 4 kg (142 lb) (02/10/19 1721)  SpO2: 98 % (02/10/19 1721)    Current Vitals:   Blood Pressure: 165/74 (02/11/19 0030)  Pulse: 70 (02/11/19 0030)  Temperature: 97 8 °F (36 6 °C) (02/10/19 1721)  Temp Source: Tympanic (02/10/19 1721)  Respirations: 16 (02/11/19 0030)  Weight - Scale: 64 4 kg (142 lb) (02/10/19 1721)  SpO2: 97 % (02/11/19 0030)      Intake/Output Summary (Last 24 hours) at 2/11/2019 0129  Last data filed at 2/10/2019 2349  Gross per 24 hour   Intake 50 ml   Output 80 ml   Net -30 ml       Invasive Devices     Peripheral Intravenous Line            Peripheral IV 02/10/19 Left Antecubital less than 1 day                Physical Exam   Constitutional: She is oriented to person, place, and time  She appears well-developed  No distress  HENT:   Head: Normocephalic  Neck: Normal range of motion  Cardiovascular: Normal rate and regular rhythm  Pulmonary/Chest: Effort normal  No respiratory distress  Abdominal: Soft  She exhibits no distension  There is no tenderness  Musculoskeletal: Normal range of motion  Neurological: She is alert and oriented to person, place, and time  Skin: Skin is warm and dry  Lab Results:   I have personally reviewed pertinent lab results  , CBC:   Lab Results   Component Value Date    WBC 20 55 (H) 02/10/2019    HGB 12 7 02/10/2019    HCT 38 4 02/10/2019    MCV 88 02/10/2019     (H) 02/10/2019    MCH 29 2 02/10/2019    MCHC 33 1 02/10/2019    RDW 13 4 02/10/2019    MPV 8 3 (L) 02/10/2019    NRBC 0 02/10/2019   , CMP:   Lab Results   Component Value Date    SODIUM 132 (L) 02/10/2019    K 3 7 02/10/2019    CL 97 (L) 02/10/2019    CO2 25 02/10/2019    BUN 21 02/10/2019    CREATININE 0 70 02/10/2019    CALCIUM 9 6 02/10/2019    AST 14 02/10/2019    ALT 19 02/10/2019    ALKPHOS 103 02/10/2019    EGFR 87 02/10/2019   , Coagulation: No results found for: PT, INR, APTT  Imaging: I have personally reviewed pertinent reports  EKG, Pathology, and Other Studies: I have personally reviewed pertinent reports     and I have personally reviewed pertinent films in PACS

## 2019-02-11 NOTE — ED ATTENDING ATTESTATION
I, Stephen Bernstein DO, saw and evaluated the patient  I have discussed the patient with the resident/non-physician practitioner and agree with the resident's/non-physician practitioner's findings, Plan of Care, and MDM as documented in the resident's/non-physician practitioner's note, except where noted  All available labs and Radiology studies were reviewed  At this point I agree with the current assessment done in the Emergency Department  I have conducted an independent evaluation of this patient a history and physical is as follows:      Critical Care Time  Procedures     73 yr fem to the ED with lower abd pain since this am that is now in the RLQ  No prior hx of the same  + nausea  Denies modifiers  Exm; tender in RLQ wo guarding or rebound  BS normal  ? 4" mass in lower abd  Pln: lab with abd CT

## 2019-02-11 NOTE — CONSULTS
Consult Note - Gynecology Oncology  Ashley Sandoval 68 y o  MRN: [id number]  Unit/Bed#: ED 08 Encounter: [id number]      ASSESSMENT:  68 y o  yo female with RLQ pain, R ovarian cyst, and UTI  PLAN:  R ovarian cyst:    f/u as outpt for:    CA-125    endometrial biopsy    plan for surgical explroation with at least RSO with intraoperative frozen    UTI: per ED          Chief complaint:   Chief Complaint   Patient presents with    Abdominal Pain     Patient states lower right abdominal pain since this am  -n/v  no bowel movement  SUBJECTIVE:    HPI:  HPI:  Ashley Sandoval is a 68 y o  female who presents with sudden onset RLQ pain at 0700 this am  She relates how she has not been to a Gynecologist since the late 1970s but saw a GI doctor in 2003 in [place] who performed a CT scan that demonstrated a (non-specified laterality) ovarian cyst  No follow-up since       Site: diffuse RLQ   Intensity: 1-2/10    Quality: dull    Onset:    Start: 0700    Frequency: continuous    Course: improving   Radiation: denies   Aggravators: denies   Alleviators: denies   Associated sx: constipation   Past episodes: never    RoS:    (general)  Pain: 1-2/10  Tolerating Oral Intake: is tolerating PO liquids and solids  Voiding: yes - spontaneously  Flatus: yes  Bowel Movement: yes - last was 2/9/19  Ambulating: yes  Chest Pain: no  Shortness of Breath: no  Leg Pain/Discomfort: no  Vaginal Bleeding: denies    (UTI sx)   Fever: no   Chills: no   Nausea: no   Vomiting: no   Back pain: no   Dysuria: no   Hematuria: no   Urinary retention: no   Urinary frequency: no   Urinary urgency: no    (ovarian Ca sx)  Gastrointestinal:    Early satiety: yes - for the trailing 2 years   Bloating: yes - for the trailing 2 years   Change in bowel habits: no   Fluid in the abdomen: no   Indigestion: no   Nausea: no   Abdominal fullness: equivocal   Lump in the abdomen: yes  Constitutional:    Fatigue: no   Loss of appetite: no   Weight loss: 8 lbs in the trailing 12 mos      OBJECTIVE    Historical Information     Past Medical History:   Diagnosis Date    Hypertension     Shortness of breath     Skin neoplasm     last assessed: 6/13/2017       Past Surgical History:   Procedure Laterality Date    TONSILLECTOMY         OB History   No data available       Family History   Problem Relation Age of Onset    Hypertension Mother     Lung cancer Father     Hypertension Sister     Lymphoma Brother 39    Hypertension Brother     Schizophrenia Brother     Depression Son     Schizophrenia Son     Hypertension Family     Heart disease Other        Social History     Socioeconomic History    Marital status: /Civil Union     Spouse name: Not on file    Number of children: Not on file    Years of education: Not on file    Highest education level: Not on file   Occupational History    Not on file   Social Needs    Financial resource strain: Not on file    Food insecurity:     Worry: Not on file     Inability: Not on file    Transportation needs:     Medical: Not on file     Non-medical: Not on file   Tobacco Use    Smoking status: Never Smoker    Smokeless tobacco: Never Used   Substance and Sexual Activity    Alcohol use: No    Drug use: No    Sexual activity: Not on file   Lifestyle    Physical activity:     Days per week: Not on file     Minutes per session: Not on file    Stress: Not on file   Relationships    Social connections:     Talks on phone: Not on file     Gets together: Not on file     Attends Judaism service: Not on file     Active member of club or organization: Not on file     Attends meetings of clubs or organizations: Not on file     Relationship status: Not on file    Intimate partner violence:     Fear of current or ex partner: Not on file     Emotionally abused: Not on file     Physically abused: Not on file     Forced sexual activity: Not on file   Other Topics Concern    Not on file   Social History Narrative    Daily coffee consumption (___cups/day)    Daily cola consumption (____cans/day)    Daily tea consumption (____cups/day)    Uses safety equipment-seatbelts       Social History     Substance and Sexual Activity   Alcohol Use No     Social History     Substance and Sexual Activity   Drug Use No     Social History     Tobacco Use   Smoking Status Never Smoker   Smokeless Tobacco Never Used         (Not in a hospital admission)  No current facility-administered medications on file prior to encounter        Current Outpatient Medications on File Prior to Encounter   Medication Sig Dispense Refill    irbesartan-hydrochlorothiazide (AVALIDE) 300-12 5 MG per tablet Take 1 tablet by mouth daily 90 tablet 3    lisinopril-hydrochlorothiazide (PRINZIDE,ZESTORETIC) 20-25 MG per tablet Take 1 tablet by mouth daily for 30 days 30 tablet 3       No Known Allergies    Patient Vitals for the past 24 hrs:   BP Temp Temp src Pulse Resp SpO2 Weight   02/10/19 2300 170/88 -- -- 86 16 95 % --   02/10/19 2207 141/86 -- -- 87 16 97 % --   02/10/19 2012 168/93 -- -- 102 16 97 % --   02/10/19 2011 168/93 -- -- 97 18 95 % --   02/10/19 1721 (!) 201/102 97 8 °F (36 6 °C) Tympanic (!) 116 18 98 % 64 4 kg (142 lb)     Oxygen Therapy  SpO2: 95 %  I/O       02/09 0701 - 02/10 0700 02/10 0701 - 02/11 0700    Urine (mL/kg/hr)  80    Total Output  80    Net  -80                Intake/Output Summary (Last 24 hours) at 2/10/2019 2308  Last data filed at 2/10/2019 2114  Gross per 24 hour   Intake --   Output 80 ml   Net -80 ml       Physical Exam:   Cardiovascular: regular rate, regular rhythm, no murmurs, rubs, or gallops   Lungs: clear to auscultation bilaterally, no wheezing, rhonchi, or rales   Abdomen: non-tender, no rebound, no guarding, bowel sounds: regular, the mass can be felt with gentle palpation of the abdomen   Pelvic exam: deferred   Lower Extremities: negative J Carlos's sign bilaterally   Eyes: Pupils are equal, round, and reactive to light  Extraocular movements are intact  Scleral icterus is absent  Back: no L/R CVA tenderness      Laboratory Studies:    Results from last 7 days   Lab Units 02/10/19  2019   WBC Thousand/uL 20 55*   HEMOGLOBIN g/dL 12 7   MCV fL 88   PLATELETS Thousands/uL 493*     Results from last 7 days   Lab Units 02/10/19  2019   MONO PCT % 4   EOS PCT % 0       Results from last 7 days   Lab Units 02/10/19  2019   POTASSIUM mmol/L 3 7   CHLORIDE mmol/L 97*   CO2 mmol/L 25   BUN mg/dL 21   CREATININE mg/dL 0 70   EGFR ml/min/1 73sq m 87     Results from last 7 days   Lab Units 02/10/19  2019   AST U/L 14   ALT U/L 19   ALK PHOS U/L 103     Results from last 7 days   Lab Units 02/10/19  2019   ALBUMIN g/dL 3 8         Results from last 7 days   Lab Units 02/10/19  2109   CLARITY UA  Clear   SPEC GRAV UA  >=1 030   PH UA  5 5   LEUKOCYTES UA  Moderate*   NITRITE UA  Negative   GLUCOSE UA mg/dl Negative   KETONES UA mg/dl Negative   UROBILINOGEN UA E U /dl 0 2   BILIRUBIN UA  Negative   BLOOD UA  Moderate*     Results from last 7 days   Lab Units 02/10/19  2109   RBC UA /hpf 4-10*   WBC UA /hpf Innumerable*   EPITHELIAL CELLS WET PREP /hpf None Seen   BACTERIA UA /hpf Innumerable*               Results from last 7 days   Lab Units 02/10/19  2019   TROPONIN I ng/mL <0 02             ABO Grouping   Date Value Ref Range Status   04/07/2018 A  Final     Rh Factor   Date Value Ref Range Status   04/07/2018 Positive  Final     No results found for: ANTIBODYSCR  Specimen Expiration Date   Date Value Ref Range Status   04/07/2018 [id number]  Final             Continuous Infusions:       Scheduled Meds:    Current Facility-Administered Medications:  cefTRIAXone 1,000 mg Intravenous Once Molly Vallejo MD       PRN Meds:      Invasive  Devices:   Invasive Devices     Peripheral Intravenous Line            Peripheral IV 02/10/19 Left Antecubital less than 1 day                Additional Vitals:    Temp  Min: 97 8 °F (36 6 °C) Max: 97 8 °F (36 6 °C)    IBW: -92 5 kg          CT Abdomen/Pelvis:   3 9 cm cyst in the left adnexa  There is a complex cystic lesion in the region of the right adnexa which measures approximately 12 2 x 10 7 x 14 0 cm in size  The internal wall is irregular and there are multiple thick septations  There is a small amount of surrounding fluid which tracks along the right paracolic gutter and peritoneal reflection    Thickening and hyperenhancement of the endometrial stripe is noted which measures approximately 1 2 cm in size, above the upper   limits of normal for a postmenopausal female    Invasive/non-invasive ventilation settings:  Respiratory    Lab Data (Last 4 hours)    None         O2/Vent Data (Last 4 hours)    None              Code Status: Prior  Advance Directive and Living Will:      Power of :    POLST:

## 2019-02-11 NOTE — ED PROVIDER NOTES
History  Chief Complaint   Patient presents with    Abdominal Pain     Patient states lower right abdominal pain since this am  -n/v  no bowel movement  HPI  76-year-old woman presents for evaluation of right lower quadrant abdominal pain  Patient states she woke up this morning with right lower quadrant abdominal pain  She felt like she had to defecate but could not have a bowel movement  Denies any history of constipation, she has had previous colonoscopies in the past which she only had polyps removed  Denies any dysuria, vaginal discharge denies any surgeries on her abdomen  Patient denies any fevers chills  Denies chest pain shortness of breath  Endorses nausea without vomiting  Prior to Admission Medications   Prescriptions Last Dose Informant Patient Reported? Taking?   irbesartan-hydrochlorothiazide (AVALIDE) 300-12 5 MG per tablet  Self No No   Sig: Take 1 tablet by mouth daily   lisinopril-hydrochlorothiazide (PRINZIDE,ZESTORETIC) 20-25 MG per tablet   No No   Sig: Take 1 tablet by mouth daily for 30 days      Facility-Administered Medications: None       Past Medical History:   Diagnosis Date    Hypertension     Shortness of breath     Skin neoplasm     last assessed: 6/13/2017       Past Surgical History:   Procedure Laterality Date    TONSILLECTOMY         Family History   Problem Relation Age of Onset    Hypertension Mother     Lung cancer Father     Hypertension Sister     Lymphoma Brother 39    Hypertension Brother     Schizophrenia Brother     Depression Son     Schizophrenia Son     Hypertension Family     Heart disease Other      I have reviewed and agree with the history as documented  Social History     Tobacco Use    Smoking status: Never Smoker    Smokeless tobacco: Never Used   Substance Use Topics    Alcohol use: No    Drug use: No        Review of Systems   Constitutional: Negative  Negative for chills and fever  HENT: Negative    Negative for congestion and sore throat  Eyes: Negative  Negative for discharge and redness  Respiratory: Negative  Negative for chest tightness and shortness of breath  Cardiovascular: Negative  Negative for chest pain and palpitations  Gastrointestinal: Positive for abdominal pain and nausea  Negative for vomiting  Endocrine: Negative  Negative for cold intolerance and polyphagia  Genitourinary: Negative  Negative for difficulty urinating and dysuria  Musculoskeletal: Negative  Negative for arthralgias and back pain  Skin: Negative  Negative for color change and wound  Allergic/Immunologic: Negative  Negative for environmental allergies  Neurological: Negative  Negative for dizziness, weakness and headaches  Hematological: Negative  Psychiatric/Behavioral: Negative  Negative for behavioral problems  The patient is not nervous/anxious  All other systems reviewed and are negative  Physical Exam  ED Triage Vitals [02/10/19 1721]   Temperature Pulse Respirations Blood Pressure SpO2   97 8 °F (36 6 °C) (!) 116 18 (!) 201/102 98 %      Temp Source Heart Rate Source Patient Position - Orthostatic VS BP Location FiO2 (%)   Tympanic Monitor Sitting Right arm --      Pain Score       --           Orthostatic Vital Signs  Vitals:    02/10/19 2300 02/10/19 2330 02/11/19 0000 02/11/19 0030   BP: 170/88 145/84 146/84 165/74   Pulse: 86 88 80 70   Patient Position - Orthostatic VS:           Physical Exam   Constitutional: She is oriented to person, place, and time  She appears well-developed and well-nourished  No distress  HENT:   Head: Normocephalic and atraumatic  Right Ear: External ear normal    Left Ear: External ear normal    Mouth/Throat: Oropharynx is clear and moist    Eyes: Pupils are equal, round, and reactive to light  Conjunctivae and EOM are normal  Right eye exhibits no discharge  Left eye exhibits no discharge  No scleral icterus  Neck: Normal range of motion  Neck supple  No tracheal deviation present  No thyromegaly present  Cardiovascular: Normal rate, regular rhythm and intact distal pulses  Exam reveals no gallop and no friction rub  No murmur heard  Pulmonary/Chest: Effort normal and breath sounds normal  No stridor  No respiratory distress  She has no wheezes  She has no rales  Abdominal: Soft  Bowel sounds are normal  She exhibits mass (Adjacent to umbilicus)  She exhibits no distension  There is no tenderness  There is no rigidity, no rebound, no guarding, no CVA tenderness and negative Multani's sign  Musculoskeletal: Normal range of motion  She exhibits no edema or deformity  Neurological: She is alert and oriented to person, place, and time  No cranial nerve deficit  Skin: Skin is warm and dry  Capillary refill takes less than 2 seconds  No rash noted  She is not diaphoretic  No erythema  Psychiatric: She has a normal mood and affect  Her behavior is normal  Thought content normal    Nursing note and vitals reviewed  ED Medications  Medications   iohexol (OMNIPAQUE) 350 MG/ML injection (MULTI-DOSE) 100 mL (100 mL Intravenous Given 2/10/19 2135)   ceftriaxone (ROCEPHIN) 1 g/50 mL in dextrose IVPB (0 mg Intravenous Stopped 2/10/19 2349)       Diagnostic Studies  Results Reviewed     Procedure Component Value Units Date/Time    Urine Microscopic [[id number]]  (Abnormal) Collected:  02/10/19 2109    Lab Status:  Final result Specimen:  Urine, Clean Catch Updated:  02/10/19 2145     RBC, UA 4-10 /hpf      WBC, UA Innumerable /hpf      Epithelial Cells None Seen /hpf      Bacteria, UA Innumerable /hpf      Hyaline Casts, UA 5-10 /lpf     Urine culture [[id number]] Collected:  02/10/19 2109    Lab Status:   In process Specimen:  Urine, Clean Catch Updated:  02/10/19 2145    POCT urinalysis dipstick [[id number]]  (Normal) Resulted:  02/10/19 2108    Lab Status:  Final result Specimen:  Urine Updated:  02/10/19 2108     Color, UA yellow    ED Urine Macroscopic [[id number]]  (Abnormal) Collected:  02/10/19 2109    Lab Status:  Final result Specimen:  Urine Updated:  02/10/19 2106     Color, UA Yellow     Clarity, UA Clear     pH, UA 5 5     Leukocytes, UA Moderate     Nitrite, UA Negative     Protein,  (2+) mg/dl      Glucose, UA Negative mg/dl      Ketones, UA Negative mg/dl      Urobilinogen, UA 0 2 E U /dl      Bilirubin, UA Negative     Blood, UA Moderate     Specific Gravity, UA >=1 030    Narrative:       CLINITEK RESULT    CBC and differential [[id number]]  (Abnormal) Collected:  02/10/19 2019    Lab Status:  Final result Specimen:  Blood from Arm, Left Updated:  02/10/19 2103     WBC 20 55 Thousand/uL      RBC 4 35 Million/uL      Hemoglobin 12 7 g/dL      Hematocrit 38 4 %      MCV 88 fL      MCH 29 2 pg      MCHC 33 1 g/dL      RDW 13 4 %      MPV 8 3 fL      Platelets 117 Thousands/uL      nRBC 0 /100 WBCs     Narrative: This is an appended report  These results have been appended to a previously verified report  Comprehensive metabolic panel [[id number]]  (Abnormal) Collected:  02/10/19 2019    Lab Status:  Final result Specimen:  Blood from Arm, Left Updated:  02/10/19 2058     Sodium 132 mmol/L      Potassium 3 7 mmol/L      Chloride 97 mmol/L      CO2 25 mmol/L      ANION GAP 10 mmol/L      BUN 21 mg/dL      Creatinine 0 70 mg/dL      Glucose 149 mg/dL      Calcium 9 6 mg/dL      AST 14 U/L      ALT 19 U/L      Alkaline Phosphatase 103 U/L      Total Protein 8 4 g/dL      Albumin 3 8 g/dL      Total Bilirubin 0 47 mg/dL      eGFR 87 ml/min/1 73sq m     Narrative:       National Kidney Disease Education Program recommendations are as follows:  GFR calculation is accurate only with a steady state creatinine  Chronic Kidney disease less than 60 ml/min/1 73 sq  meters  Kidney failure less than 15 ml/min/1 73 sq  meters      Troponin I [[id number]]  (Normal) Collected:  02/10/19 2019    Lab Status:  Final result Specimen:  Blood from Arm, Left Updated: 02/10/19 2058     Troponin I <0 02 ng/mL                  CT abdomen pelvis with contrast   Final Result by Ayaka Glez DO (02/10 2241)      Complex cystic lesion in the region of the right adnexa measuring approximately 12 2 x 10 7 x 14 0 cm in size with irregularity of the internal wall and multiple thick septations  There is a small amount of surrounding fluid which tracks along the right    paracolic gutter and peritoneal reflection  Ovarian neoplasm is the diagnosis of exclusion  Given the size of the lesion, a superimposed ovarian torsion could be considered in the appropriate clinical setting  Thickening and hyperenhancement of the endometrial stripe which measures approximately 1 2 cm in size, above the upper limits of normal for a postmenopausal female  Differential considerations include hyperplasia, polyp, or neoplasm  Partially distended bladder  Mild circumferential bladder wall thickening is noted, probably exaggerated by underdistention  A small bubble of air is seen in the nondependent portion of the bladder, a superimposed cystitis could be considered in the    appropriate clinical setting  Large hernia which contains the entire stomach, the proximal small bowel, and several normal-appearing loops of colon; no evidence of bowel obstruction  Left adnexal cyst and other findings as above  Correlation with the patient's symptoms, laboratory values, and urinalysis recommended  Dedicated pelvic ultrasound could be considered for further evaluation at the discretion of the referring caregiver  Workstation performed: [id number]               Procedures  Procedures      Phone Consults  ED Phone Contact    ED Course  ED Course as of Feb 11 0136   Rosita Morton Feb 10, 2019   2306 OBGYN resident paged and is now bedside      [phone] Senior OBGYN resident spoke with attending gynecology oncologist   Per attending, patient will be evaluated tomorrow    Phone number has been provided discharge paperwork  Both the office will reach out to the patient tomorrow, I will tell the patient to call tomorrow  Please see consult note from OBGYN  I will give her strict return precautions  I will treat her UTI  1 g of ceftriaxone right now and I will discharge her on Keflex  Return precautions were discussed with the patient who verbalized understanding patient was discharged  Mon Feb 11, 2019   9987 Case discussed with general surgeon based on the results of the hiatal hernia  Will have them evaluated by General surgery prior to discharge  [address] came down evaluated the patient  She does need surgery at some point but does not need surgery tonight  She felt as an outpatient  Discharge the patient  I highlighted the numbers to call for the gynecologist oncologist office  And told the patient it was imperative that she call later today  I told her that if she started to have any fevers chills sweats worsening symptoms worsening abdominal pain or any other concerns to come back to the emergency department  The patient and her  verbalized understanding and patient was discharged patient was given a prescription for keflex  Identification of Seniors at Risk      Most Recent Value   (ISAR) Identification of Seniors at Risk   Before the illness or injury that brought you to the Emergency, did you need someone to help you on a regular basis? 0 Filed at: 02/10/2019 1722   In the last 24 hours, have you needed more help than usual?  0 Filed at: 02/10/2019 1722   Have you been hospitalized for one or more nights during the past 6 months? 0 Filed at: 02/10/2019 1722   In general, do you see well?  0 Filed at: 02/10/2019 1722   In general, do you have serious problems with your memory? 0 Filed at: 02/10/2019 1722   Do you take more than three different medications every day?   1 Filed at: 02/10/2019 1722   ISAR Score  1 Filed at: 02/10/2019 1722 MDM  Number of Diagnoses or Management Options  Abdominal pain:   Hiatal hernia:   Nausea:   Ovarian cyst:   UTI (urinary tract infection):   Diagnosis management comments: 71-year-old woman presents with right lower quadrant abdominal pain  Will get ability being workup  Will do CT abdomen pelvis as well  Will get a urine  Disposition will be pending results workup  Disposition  Final diagnoses:   Ovarian cyst   UTI (urinary tract infection)   Abdominal pain   Nausea   Hiatal hernia     Time reflects when diagnosis was documented in both MDM as applicable and the Disposition within this note     Time User Action Codes Description Comment    2/10/2019 10:52 PM Zohreh Abdi [T78 538] Ovarian cyst     2/11/2019 12:11 AM Eddye Hurry Add [N39 0] UTI (urinary tract infection)     2/11/2019 12:11 AM Eddye Hurry Add [R10 9] Abdominal pain     2/11/2019 12:11 AM Eddye Hurry Add [R11 0] Nausea     2/11/2019 12:11 AM Eddye Hurry Modify [X20 001] Ovarian cyst     2/11/2019 12:11 AM Eddye Hurry Modify [R10 9] Abdominal pain     2/11/2019 12:45 AM Eddye Hurry Add [K44 9] Hiatal hernia       ED Disposition     ED Disposition Condition Date/Time Comment    Discharge Stable Mon Feb 11, 2019  1:30 AM Ashley Sandoval discharge to home/self care  Follow-up Information     Follow up With Specialties Details Why Contact Info Additional Information    Andres Higgins MD Gynecologic Oncology Schedule an appointment as soon as possible for a visit in 1 day  [address], [address] [place] [address] Schedule an appointment as soon as possible for a visit in 1 week Follow-up being seen in the emergency department [address] [facility]    [address] [place] [address],First Floor Emergency Department Emergency Medicine Go to  As needed, If symptoms worsen [address] [facility] [address]  [phone]  ED, [address] [place] [address], [phone]    Clark Lainez MD General Surgery Call in 3 days To follow up being seen in the emergency department [facility] [address] [phone]             Patient's Medications   Discharge Prescriptions    CEPHALEXIN (KEFLEX) 250 MG CAPSULE    Take 2 capsules (500 mg total) by mouth 2 (two) times a day for 7 days       Start Date: 2/11/2019 End Date: 2/18/2019       Order Dose: 500 mg       Quantity: 28 capsule    Refills: 0     No discharge procedures on file  ED Provider  Attending physically available and evaluated Sancho Moreland I managed the patient along with the ED Attending      Electronically Signed by         Gracy Montes MD  02/11/19 8639

## 2019-02-12 ENCOUNTER — APPOINTMENT (EMERGENCY)
Dept: RADIOLOGY | Facility: HOSPITAL | Age: 74
End: 2019-02-12
Payer: COMMERCIAL

## 2019-02-12 ENCOUNTER — HOSPITAL ENCOUNTER (OUTPATIENT)
Facility: HOSPITAL | Age: 74
Setting detail: OBSERVATION
Discharge: HOME | End: 2019-02-15
Attending: EMERGENCY MEDICINE | Admitting: OBSTETRICS & GYNECOLOGY
Payer: COMMERCIAL

## 2019-02-12 DIAGNOSIS — Z90.722 S/P TOTAL HYSTERECTOMY AND BILATERAL SALPINGO-OOPHORECTOMY: Primary | ICD-10-CM

## 2019-02-12 DIAGNOSIS — I10 ESSENTIAL HYPERTENSION: ICD-10-CM

## 2019-02-12 DIAGNOSIS — Z90.79 S/P TOTAL HYSTERECTOMY AND BILATERAL SALPINGO-OOPHORECTOMY: Primary | ICD-10-CM

## 2019-02-12 DIAGNOSIS — Z90.710 S/P TOTAL HYSTERECTOMY AND BILATERAL SALPINGO-OOPHORECTOMY: Primary | ICD-10-CM

## 2019-02-12 DIAGNOSIS — N83.8 OVARIAN MASS: ICD-10-CM

## 2019-02-12 DIAGNOSIS — R01.1 SYSTOLIC MURMUR: ICD-10-CM

## 2019-02-12 DIAGNOSIS — N83.209 OVARIAN CYST: ICD-10-CM

## 2019-02-12 DIAGNOSIS — K44.9 HIATAL HERNIA: ICD-10-CM

## 2019-02-12 DIAGNOSIS — R10.9 ABDOMINAL PAIN, UNSPECIFIED ABDOMINAL LOCATION: ICD-10-CM

## 2019-02-12 LAB
ALBUMIN SERPL BCP-MCNC: 3.3 G/DL (ref 3.5–5)
ALP SERPL-CCNC: 96 U/L (ref 46–116)
ALT SERPL W P-5'-P-CCNC: 13 U/L (ref 12–78)
ANION GAP SERPL CALCULATED.3IONS-SCNC: 7 MMOL/L (ref 4–13)
AST SERPL W P-5'-P-CCNC: 8 U/L (ref 5–45)
ATRIAL RATE: 99 BPM
BASOPHILS # BLD AUTO: 0.03 THOUSANDS/ΜL (ref 0–0.1)
BASOPHILS NFR BLD AUTO: 0 % (ref 0–1)
BILIRUB SERPL-MCNC: 0.83 MG/DL (ref 0.2–1)
BUN SERPL-MCNC: 23 MG/DL (ref 5–25)
CALCIUM SERPL-MCNC: 9 MG/DL (ref 8.3–10.1)
CHLORIDE SERPL-SCNC: 98 MMOL/L (ref 100–108)
CO2 SERPL-SCNC: 27 MMOL/L (ref 21–32)
CREAT SERPL-MCNC: 0.89 MG/DL (ref 0.6–1.3)
EOSINOPHIL # BLD AUTO: 0.01 THOUSAND/ΜL (ref 0–0.61)
EOSINOPHIL NFR BLD AUTO: 0 % (ref 0–6)
ERYTHROCYTE [DISTWIDTH] IN BLOOD BY AUTOMATED COUNT: 13.9 % (ref 11.6–15.1)
GFR SERPL CREATININE-BSD FRML MDRD: 65 ML/MIN/1.73SQ M
GLUCOSE SERPL-MCNC: 149 MG/DL (ref 65–140)
HCT VFR BLD AUTO: 34.3 % (ref 34.8–46.1)
HGB BLD-MCNC: 11.3 G/DL (ref 11.5–15.4)
IMM GRANULOCYTES # BLD AUTO: 0.16 THOUSAND/UL (ref 0–0.2)
IMM GRANULOCYTES NFR BLD AUTO: 1 % (ref 0–2)
LIPASE SERPL-CCNC: 122 U/L (ref 73–393)
LYMPHOCYTES # BLD AUTO: 0.61 THOUSANDS/ΜL (ref 0.6–4.47)
LYMPHOCYTES NFR BLD AUTO: 3 % (ref 14–44)
MCH RBC QN AUTO: 29.5 PG (ref 26.8–34.3)
MCHC RBC AUTO-ENTMCNC: 32.9 G/DL (ref 31.4–37.4)
MCV RBC AUTO: 90 FL (ref 82–98)
MONOCYTES # BLD AUTO: 1.13 THOUSAND/ΜL (ref 0.17–1.22)
MONOCYTES NFR BLD AUTO: 6 % (ref 4–12)
NEUTROPHILS # BLD AUTO: 17.87 THOUSANDS/ΜL (ref 1.85–7.62)
NEUTS SEG NFR BLD AUTO: 90 % (ref 43–75)
NRBC BLD AUTO-RTO: 0 /100 WBCS
P AXIS: 49 DEGREES
PLATELET # BLD AUTO: 401 THOUSANDS/UL (ref 149–390)
PMV BLD AUTO: 8.6 FL (ref 8.9–12.7)
POTASSIUM SERPL-SCNC: 3.4 MMOL/L (ref 3.5–5.3)
PR INTERVAL: 158 MS
PROT SERPL-MCNC: 7.8 G/DL (ref 6.4–8.2)
QRS AXIS: 1 DEGREES
QRSD INTERVAL: 92 MS
QT INTERVAL: 336 MS
QTC INTERVAL: 431 MS
RBC # BLD AUTO: 3.83 MILLION/UL (ref 3.81–5.12)
SODIUM SERPL-SCNC: 132 MMOL/L (ref 136–145)
T WAVE AXIS: 64 DEGREES
VENTRICULAR RATE: 99 BPM
WBC # BLD AUTO: 19.81 THOUSAND/UL (ref 4.31–10.16)

## 2019-02-12 PROCEDURE — 86304 IMMUNOASSAY TUMOR CA 125: CPT | Performed by: OBSTETRICS & GYNECOLOGY

## 2019-02-12 PROCEDURE — 85025 COMPLETE CBC W/AUTO DIFF WBC: CPT | Performed by: EMERGENCY MEDICINE

## 2019-02-12 PROCEDURE — 36415 COLL VENOUS BLD VENIPUNCTURE: CPT | Performed by: EMERGENCY MEDICINE

## 2019-02-12 PROCEDURE — 99218 PR INITIAL OBSERVATION CARE/DAY 30 MINUTES: CPT | Performed by: OBSTETRICS & GYNECOLOGY

## 2019-02-12 PROCEDURE — 93005 ELECTROCARDIOGRAM TRACING: CPT

## 2019-02-12 PROCEDURE — 80053 COMPREHEN METABOLIC PANEL: CPT | Performed by: EMERGENCY MEDICINE

## 2019-02-12 PROCEDURE — 83690 ASSAY OF LIPASE: CPT | Performed by: EMERGENCY MEDICINE

## 2019-02-12 PROCEDURE — 93010 ELECTROCARDIOGRAM REPORT: CPT | Performed by: INTERNAL MEDICINE

## 2019-02-12 PROCEDURE — 96360 HYDRATION IV INFUSION INIT: CPT

## 2019-02-12 PROCEDURE — 74177 CT ABD & PELVIS W/CONTRAST: CPT

## 2019-02-12 PROCEDURE — 99285 EMERGENCY DEPT VISIT HI MDM: CPT

## 2019-02-12 PROCEDURE — 71045 X-RAY EXAM CHEST 1 VIEW: CPT

## 2019-02-12 RX ORDER — HYDROMORPHONE HCL/PF 1 MG/ML
0.5 SYRINGE (ML) INJECTION
Status: DISCONTINUED | OUTPATIENT
Start: 2019-02-12 | End: 2019-02-14

## 2019-02-12 RX ORDER — ONDANSETRON 2 MG/ML
4 INJECTION INTRAMUSCULAR; INTRAVENOUS EVERY 6 HOURS PRN
Status: DISCONTINUED | OUTPATIENT
Start: 2019-02-12 | End: 2019-02-14

## 2019-02-12 RX ORDER — OXYCODONE HYDROCHLORIDE AND ACETAMINOPHEN 5; 325 MG/1; MG/1
1 TABLET ORAL EVERY 4 HOURS PRN
Status: DISCONTINUED | OUTPATIENT
Start: 2019-02-12 | End: 2019-02-14

## 2019-02-12 RX ORDER — DOCUSATE SODIUM 100 MG/1
100 CAPSULE, LIQUID FILLED ORAL 2 TIMES DAILY
Status: DISCONTINUED | OUTPATIENT
Start: 2019-02-12 | End: 2019-02-14

## 2019-02-12 RX ORDER — CEPHALEXIN 500 MG/1
500 CAPSULE ORAL 2 TIMES DAILY
Status: DISCONTINUED | OUTPATIENT
Start: 2019-02-12 | End: 2019-02-14

## 2019-02-12 RX ORDER — IBUPROFEN 600 MG/1
600 TABLET ORAL EVERY 6 HOURS PRN
Status: DISCONTINUED | OUTPATIENT
Start: 2019-02-12 | End: 2019-02-14

## 2019-02-12 RX ORDER — OXYCODONE HYDROCHLORIDE 10 MG/1
10 TABLET ORAL EVERY 4 HOURS PRN
Status: DISCONTINUED | OUTPATIENT
Start: 2019-02-12 | End: 2019-02-14

## 2019-02-12 RX ADMIN — IOHEXOL 70 ML: 350 INJECTION, SOLUTION INTRAVENOUS at 19:47

## 2019-02-12 RX ADMIN — DOCUSATE SODIUM 100 MG: 100 CAPSULE, LIQUID FILLED ORAL at 22:21

## 2019-02-12 RX ADMIN — SODIUM CHLORIDE 500 ML: 0.9 INJECTION, SOLUTION INTRAVENOUS at 19:14

## 2019-02-12 RX ADMIN — CEPHALEXIN 500 MG: 500 CAPSULE ORAL at 21:53

## 2019-02-12 NOTE — ED ATTENDING ATTESTATION
Huy Luu DO, saw and evaluated the patient  I have discussed the patient with the resident/non-physician practitioner and agree with the resident's/non-physician practitioner's findings, Plan of Care, and MDM as documented in the resident's/non-physician practitioner's note, except where noted  All available labs and Radiology studies were reviewed  At this point I agree with the current assessment done in the Emergency Department  I have conducted an independent evaluation of this patient a history and physical is as follows:    68 yof with lower abd pain, ongoing for two days and was evaluated here two days ago with discovery of UTI, large hiatal hernia, ovarian cyst suspicious for malignancy  Pain is now diffuse  Past Medical History:   Diagnosis Date    Hypertension     Shortness of breath     Skin neoplasm     last assessed: 6/13/2017       /71 (BP Location: Right arm)   Pulse (!) 112   Temp (!) 97 3 °F (36 3 °C) (Oral)   Resp 20   Ht 4' 11" (1 499 m)   Wt 64 4 kg (141 lb 15 6 oz)   SpO2 96%   BMI 28 68 kg/m²   NAD, uncomfortable  CTA  RRR  Ab soft, diffuse TTP and guarding  Ext w/o edema    Repeat abd eval: labs, CXR, CT AP, admit vs surgical consult           Critical Care Time  Procedures

## 2019-02-13 ENCOUNTER — APPOINTMENT (OUTPATIENT)
Dept: NON INVASIVE DIAGNOSTICS | Facility: HOSPITAL | Age: 74
End: 2019-02-13
Payer: COMMERCIAL

## 2019-02-13 LAB
ABO GROUP BLD: NORMAL
BACTERIA UR CULT: ABNORMAL
BLD GP AB SCN SERPL QL: NEGATIVE
CANCER AG125 SERPL-ACNC: 23.6 U/ML (ref 0–30)
EST. AVERAGE GLUCOSE BLD GHB EST-MCNC: 134 MG/DL
HBA1C MFR BLD: 6.3 % (ref 4.2–6.3)
RH BLD: POSITIVE
SPECIMEN EXPIRATION DATE: NORMAL

## 2019-02-13 PROCEDURE — 93306 TTE W/DOPPLER COMPLETE: CPT | Performed by: INTERNAL MEDICINE

## 2019-02-13 PROCEDURE — 93306 TTE W/DOPPLER COMPLETE: CPT

## 2019-02-13 PROCEDURE — 86900 BLOOD TYPING SEROLOGIC ABO: CPT | Performed by: OBSTETRICS & GYNECOLOGY

## 2019-02-13 PROCEDURE — 86850 RBC ANTIBODY SCREEN: CPT | Performed by: OBSTETRICS & GYNECOLOGY

## 2019-02-13 PROCEDURE — 83036 HEMOGLOBIN GLYCOSYLATED A1C: CPT | Performed by: OBSTETRICS & GYNECOLOGY

## 2019-02-13 PROCEDURE — 99205 OFFICE O/P NEW HI 60 MIN: CPT | Performed by: INTERNAL MEDICINE

## 2019-02-13 PROCEDURE — 86901 BLOOD TYPING SEROLOGIC RH(D): CPT | Performed by: OBSTETRICS & GYNECOLOGY

## 2019-02-13 RX ORDER — POTASSIUM CHLORIDE 20 MEQ/1
20 TABLET, EXTENDED RELEASE ORAL ONCE
Status: CANCELLED | OUTPATIENT
Start: 2019-02-13

## 2019-02-13 RX ORDER — POTASSIUM CHLORIDE 20 MEQ/1
20 TABLET, EXTENDED RELEASE ORAL ONCE
Status: COMPLETED | OUTPATIENT
Start: 2019-02-13 | End: 2019-02-13

## 2019-02-13 RX ORDER — POLYETHYLENE GLYCOL 3350 17 G/17G
17 POWDER, FOR SOLUTION ORAL DAILY
Status: DISCONTINUED | OUTPATIENT
Start: 2019-02-13 | End: 2019-02-14

## 2019-02-13 RX ADMIN — POTASSIUM CHLORIDE 20 MEQ: 1500 TABLET, EXTENDED RELEASE ORAL at 17:43

## 2019-02-13 RX ADMIN — OXYCODONE HYDROCHLORIDE AND ACETAMINOPHEN 1 TABLET: 5; 325 TABLET ORAL at 06:48

## 2019-02-13 RX ADMIN — DOCUSATE SODIUM 100 MG: 100 CAPSULE, LIQUID FILLED ORAL at 10:05

## 2019-02-13 RX ADMIN — CEPHALEXIN 500 MG: 500 CAPSULE ORAL at 10:05

## 2019-02-13 RX ADMIN — CEPHALEXIN 500 MG: 500 CAPSULE ORAL at 17:44

## 2019-02-13 RX ADMIN — ENOXAPARIN SODIUM 40 MG: 40 INJECTION SUBCUTANEOUS at 17:44

## 2019-02-13 RX ADMIN — DOCUSATE SODIUM 100 MG: 100 CAPSULE, LIQUID FILLED ORAL at 17:43

## 2019-02-13 RX ADMIN — POLYETHYLENE GLYCOL 3350 17 G: 17 POWDER, FOR SOLUTION ORAL at 17:43

## 2019-02-13 RX ADMIN — OXYCODONE HYDROCHLORIDE 10 MG: 10 TABLET ORAL at 20:36

## 2019-02-13 RX ADMIN — LISINOPRIL: 20 TABLET ORAL at 10:05

## 2019-02-13 NOTE — PLAN OF CARE
Problem: Potential for Falls  Goal: Patient will remain free of falls  Description  INTERVENTIONS:  - Assess patient frequently for physical needs  -  Identify cognitive and physical deficits and behaviors that affect risk of falls  -  [place] fall precautions as indicated by assessment   - Educate patient/family on patient safety including physical limitations  - Instruct patient to call for assistance with activity based on assessment  - Modify environment to reduce risk of injury  - Consider OT/PT consult to assist with strengthening/mobility  Outcome: Progressing     Problem: Nutrition/Hydration-ADULT  Goal: Nutrient/Hydration intake appropriate for improving, restoring or maintaining nutritional needs  Description  Monitor and assess patient's nutrition/hydration status for malnutrition (ex- brittle hair, bruises, dry skin, pale skin and conjunctiva, muscle wasting, smooth red tongue, and disorientation)  Collaborate with interdisciplinary team and initiate plan and interventions as ordered  Monitor patient's weight and dietary intake as ordered or per policy  Utilize nutrition screening tool and intervene per policy  Determine patient's food preferences and provide high-protein, high-caloric foods as appropriate       INTERVENTIONS:  - Monitor oral intake, urinary output, labs, and treatment plans  - Assess nutrition and hydration status and recommend course of action  - Evaluate amount of meals eaten  - Assist patient with eating if necessary   - Allow adequate time for meals  - Recommend/ encourage appropriate diets, oral nutritional supplements, and vitamin/mineral supplements  - Order, calculate, and assess calorie counts as needed  - Recommend, monitor, and adjust tube feedings and TPN/PPN based on assessed needs  - Assess need for intravenous fluids  - Provide specific nutrition/hydration education as appropriate  - Include patient/family/caregiver in decisions related to nutrition  Outcome: Progressing     Problem: PAIN - ADULT  Goal: Verbalizes/displays adequate comfort level or baseline comfort level  Description  Interventions:  - Encourage patient to monitor pain and request assistance  - Assess pain using appropriate pain scale  - Administer analgesics based on type and severity of pain and evaluate response  - Implement non-pharmacological measures as appropriate and evaluate response  - Consider cultural and social influences on pain and pain management  - Notify physician/advanced practitioner if interventions unsuccessful or patient reports new pain  Outcome: Progressing     Problem: INFECTION - ADULT  Goal: Absence or prevention of progression during hospitalization  Description  INTERVENTIONS:  - Assess and monitor for signs and symptoms of infection  - Monitor lab/diagnostic results  - Monitor all insertion sites, i e  indwelling lines, tubes, and drains  - Monitor endotracheal (as able) and nasal secretions for changes in amount and color  - [place] appropriate cooling/warming therapies per order  - Administer medications as ordered  - Instruct and encourage patient and family to use good hand hygiene technique  - Identify and instruct in appropriate isolation precautions for identified infection/condition  Outcome: Progressing  Goal: Absence of fever/infection during neutropenic period  Description  INTERVENTIONS:  - Monitor WBC  - Implement neutropenic guidelines  Outcome: Progressing     Problem: SAFETY ADULT  Goal: Maintain or return to baseline ADL function  Description  INTERVENTIONS:  -  Assess patient's ability to carry out ADLs; assess patient's baseline for ADL function and identify physical deficits which impact ability to perform ADLs (bathing, care of mouth/teeth, toileting, grooming, dressing, etc )  - Assess/evaluate cause of self-care deficits   - Assess range of motion  - Assess patient's mobility; develop plan if impaired  - Assess patient's need for assistive devices and provide as appropriate  - Encourage maximum independence but intervene and supervise when necessary  ¯ Involve family in performance of ADLs  ¯ Assess for home care needs following discharge   ¯ Request OT consult to assist with ADL evaluation and planning for discharge  ¯ Provide patient education as appropriate  Outcome: Progressing  Goal: Maintain or return mobility status to optimal level  Description  INTERVENTIONS:  - Assess patient's baseline mobility status (ambulation, transfers, stairs, etc )    - Identify cognitive and physical deficits and behaviors that affect mobility  - Identify mobility aids required to assist with transfers and/or ambulation (gait belt, sit-to-stand, lift, walker, cane, etc )  - [place] fall precautions as indicated by assessment  - Record patient progress and toleration of activity level on Mobility SBAR; progress patient to next Phase/Stage  - Instruct patient to call for assistance with activity based on assessment  - Request Rehabilitation consult to assist with strengthening/weightbearing, etc   Outcome: Progressing     Problem: DISCHARGE PLANNING  Goal: Discharge to home or other facility with appropriate resources  Description  INTERVENTIONS:  - Identify barriers to discharge w/patient and caregiver  - Arrange for needed discharge resources and transportation as appropriate  - Identify discharge learning needs (meds, wound care, etc )  - Arrange for interpretive services to assist at discharge as needed  - Refer to Case Management Department for coordinating discharge planning if the patient needs post-hospital services based on physician/advanced practitioner order or complex needs related to functional status, cognitive ability, or social support system  Outcome: Progressing     Problem: Knowledge Deficit  Goal: Patient/family/caregiver demonstrates understanding of disease process, treatment plan, medications, and discharge instructions  Description  Complete learning assessment and assess knowledge base    Interventions:  - Provide teaching at level of understanding  - Provide teaching via preferred learning methods  Outcome: Progressing

## 2019-02-13 NOTE — CONSULTS
Consultation - Cardiology Team One  Aldair Alicea 68 y o  female MRN: [id number]  Unit/Bed#: 2 217-06 Encounter: [id number]    Inpatient consult to Cardiology  Consult performed by: JOSE MANUEL Cook  Consult ordered by: Preethi Lopez MD      Physician Requesting Consult: Maud Carrel, MD  Reason for Consult / Principal Problem: Pre-operative clearance, systolic murmur    Assessment/ Plan    Pre-operative risk: Pt is an acceptable risk for proposed diagnostic laparoscopy with resection of pelvic mass, hysterectomy, and b/l salping-oophorectomy  She denies chest pain, SOB, palpitations, and syncope  She is at her baseline functional status and is able to walk at least 2 blocks without dyspnea, chest pain, or other symptoms  Systolic murmur  No significant valvular disease on prior echocardiogram 04/2018 except mild tricuspid regurgitation  Repeat echocardiogram being done during exam   Pt denies symptoms of SOB, chest pain, pre-syncope/syncope   Euvolemic on exam    HTN- stable, average since admission 131/68  Continue home regimen- lisinopril-hctz 20-25 mg daily    Bilateral complex ovarian lesions  Concerning for ovarian neoplasm, gyn onc following  Surgical management planned for this hospital stay  History of Present Illness   HPI: Aldair Alicea is a 68y o  year old female who has a history of hypertension, iron deficiency anemia, and meningioma being monitored every 6 months by neurosurgery  She was recently in the ED (2/10) with complaints of abdominal pain where it was found that she had a large hiatal hernia, a UTI being treated with cephalexin, and bilateral ovarian cysts  She was discharged with outpatient OB-GYN follow up but had return of abdominal pain and presented to the ED today for further evaluation  Gynecology oncology is planning for surgical intervention  of ovarian cysts during this hospitalzation as they are concerning for neoplasm      A systolic murmur was heard on exam and cardiology has been consulted for pre-operative evaluation/clearance  EKG reviewed personally: NSR    Telemetry reviewed personally: Patient not on telemetry monitor  Echo 4/9/18: LVEF 58%, no RWMA, mild concentric hypertrophy, mildly dilated LA, mild TR with estimated peak PA pressure 40 mmHg  Review of Systems   Constitution: Negative for malaise/fatigue and weight gain  Cardiovascular: Negative for chest pain, dyspnea on exertion, irregular heartbeat, leg swelling, orthopnea, palpitations, paroxysmal nocturnal dyspnea and syncope  Respiratory: Negative for cough, shortness of breath and sputum production  Gastrointestinal: Positive for bloating and abdominal pain  Negative for nausea and vomiting  Genitourinary: Negative  Neurological: Negative for dizziness, light-headedness and weakness  All other systems reviewed and are negative      Historical Information   Past Medical History:   Diagnosis Date    Anemia     Cataracts, bilateral     Heart murmur 04/2018    History of transfusion 2003    had 4 units    Hypertension     [facility] [address] ) 04/2018    MRI every 6months    Ovarian cyst 2006    Shortness of breath     Skin neoplasm     last assessed: 6/13/2017     Past Surgical History:   Procedure Laterality Date    TONSILLECTOMY       Social History     Substance and Sexual Activity   Alcohol Use Never    Frequency: Never     Social History     Substance and Sexual Activity   Drug Use No     Social History     Tobacco Use   Smoking Status Never Smoker   Smokeless Tobacco Never Used     Family History:   Family History   Problem Relation Age of Onset    Hypertension Mother     Lung cancer Father     Hypertension Sister     Lymphoma Brother 39    Hypertension Brother     Schizophrenia Brother     Depression Son     Schizophrenia Son     Hypertension Family     Heart disease Other        Meds/Allergies   all current active meds have been reviewed and current meds:   Current Facility-Administered Medications   Medication Dose Route Frequency    cephalexin (KEFLEX) capsule 500 mg  500 mg Oral BID    docusate sodium (COLACE) capsule 100 mg  100 mg Oral BID    enoxaparin (LOVENOX) subcutaneous injection 40 mg  40 mg Subcutaneous Q24H JOSTIN    HYDROmorphone (DILAUDID) injection 0 5 mg  0 5 mg Intravenous Q3H PRN    ibuprofen (MOTRIN) tablet 600 mg  600 mg Oral Q6H PRN    lisinopril-hydrochlorothiazide (PRINZIDE /) combo dose   Oral Daily    ondansetron (ZOFRAN) injection 4 mg  4 mg Intravenous Q6H PRN    oxyCODONE (ROXICODONE) immediate release tablet 10 mg  10 mg Oral Q4H PRN    oxyCODONE-acetaminophen (PERCOCET) 5-325 mg per tablet 1 tablet  1 tablet Oral Q4H PRN    polyethylene glycol (MIRALAX) packet 17 g  17 g Oral Daily    potassium chloride (K-DUR,KLOR-CON) CR tablet 20 mEq  20 mEq Oral Once        No Known Allergies    Objective   Vitals: Blood pressure 114/63, pulse 80, temperature 98 °F (36 7 °C), temperature source Oral, resp  rate 18, height 4' 11" (1 499 m), weight 64 kg (141 lb), SpO2 95 %  ,     Body mass index is 28 48 kg/m²  ,     Systolic (19KSF), AOZ:813 , Min:114 , KDH:773     Diastolic (89AEI), WQ, Min:57, Max:78    Intake/Output Summary (Last 24 hours) at 2019 1407  Last data filed at 2019 1335  Gross per 24 hour   Intake 650 ml   Output --   Net 650 ml     Weight (last 2 days)     Date/Time   Weight    19 0620   64 (141)    19 1757   64 4 (141 98)            Invasive Devices     Peripheral Intravenous Line            Peripheral IV 19 Right Antecubital less than 1 day              Physical Exam   Constitutional: She is oriented to person, place, and time  She appears well-developed  No distress  Neck: Neck supple  No JVD present  Cardiovascular: Normal rate, regular rhythm and intact distal pulses  Exam reveals no gallop and no friction rub  Murmur heard    2/6 systolic murmur   Pulmonary/Chest: Effort normal  No respiratory distress  She has no rales  Abdominal: Soft  Bowel sounds are normal  She exhibits distension  There is tenderness  Musculoskeletal: She exhibits no edema  Neurological: She is alert and oriented to person, place, and time  Skin: Skin is warm and dry  Psychiatric: She has a normal mood and affect       LABORATORY RESULTS:  Results from last 7 days   Lab Units 02/10/19  2019   TROPONIN I ng/mL <0 02     CBC with diff:   Results from last 7 days   Lab Units 02/12/19  1852 02/10/19  2019   WBC Thousand/uL 19 81* 20 55*   HEMOGLOBIN g/dL 11 3* 12 7   HEMATOCRIT % 34 3* 38 4   MCV fL 90 88   PLATELETS Thousands/uL 401* 493*   MCH pg 29 5 29 2   MCHC g/dL 32 9 33 1   RDW % 13 9 13 4   MPV fL 8 6* 8 3*   NRBC AUTO /100 WBCs 0 0     CMP:  Results from last 7 days   Lab Units 02/12/19  1852 02/10/19  2019   POTASSIUM mmol/L 3 4* 3 7   CHLORIDE mmol/L 98* 97*   CO2 mmol/L 27 25   BUN mg/dL 23 21   CREATININE mg/dL 0 89 0 70   CALCIUM mg/dL 9 0 9 6   AST U/L 8 14   ALT U/L 13 19   ALK PHOS U/L 96 103   EGFR ml/min/1 73sq m 65 87     BMP:  Results from last 7 days   Lab Units 02/12/19  1852 02/10/19  2019   POTASSIUM mmol/L 3 4* 3 7   CHLORIDE mmol/L 98* 97*   CO2 mmol/L 27 25   BUN mg/dL 23 21   CREATININE mg/dL 0 89 0 70   CALCIUM mg/dL 9 0 9 6         Lipid Profile:   Lab Results   Component Value Date    CHOL 159 02/06/2017     Lab Results   Component Value Date    HDL 42 10/29/2018    HDL 45 (L) 02/06/2017     Lab Results   Component Value Date    LDLCALC 98 10/29/2018     Lab Results   Component Value Date    TRIG 95 10/29/2018    TRIG 116 02/06/2017     Cardiac testing:   Results for orders placed during the hospital encounter of 04/07/18   Echo complete with contrast if indicated    Hamzah Combs  [address]  [phone]    Transthoracic Echocardiogram  2D, M-mode, Doppler, and Color Doppler    Study date: 2018    Patient: Carmela Stubbs  MR number: [id number]  Account number: [de-identified]  : 1945  Age: 71 years  Gender: Female  Status: Outpatient  Location: Bedside  Height: 57 in  Weight: 148 9 lb  BP: 125/ 64 mmHg    Indications: Syncope    Diagnoses: R55  - Syncope and collapse    Sonographer:  AGAPITO Ortiz  Primary Physician:  Cristian Schrader DO  Group:  [id number]  Cardiology Fellow:  Tone Botello MD  Interpreting Physician:  Snehal Patterson MD    SUMMARY    LEFT VENTRICLE:  Systolic function was normal  Ejection fraction was estimated to be 58 %  There were no regional wall motion abnormalities  There was mild concentric hypertrophy  LEFT ATRIUM:  The atrium was mildly dilated  TRICUSPID VALVE:  There was mild regurgitation  Pulmonary artery systolic pressure was mildly increased  Estimated peak PA pressure was 40 mmHg  HISTORY: PRIOR HISTORY: SOB, Hypertension, Syncope    PROCEDURE: The procedure was performed at the bedside  This was a routine study  The transthoracic approach was used  The study included complete 2D imaging, M-mode, complete spectral Doppler, and color Doppler  The heart rate was 69 bpm,  at the start of the study  Images were obtained from the parasternal, apical, subcostal, and suprasternal notch acoustic windows  Image quality was adequate  LEFT VENTRICLE: Size was normal  Systolic function was normal  Ejection fraction was estimated to be 58 %  There were no regional wall motion abnormalities  Wall thickness was mildly increased  There was mild concentric hypertrophy  DOPPLER: Left ventricular diastolic function parameters were normal for the patient's age  RIGHT VENTRICLE: The size was normal  Systolic function was normal  Wall thickness was normal     LEFT ATRIUM: The atrium was mildly dilated  RIGHT ATRIUM: Size was normal     MITRAL VALVE: Valve structure was normal  There was normal leaflet separation  DOPPLER: The transmitral velocity was within the normal range  There was no evidence for stenosis  There was no regurgitation  AORTIC VALVE: The valve was trileaflet  Leaflets exhibited normal thickness and normal cuspal separation  DOPPLER: Transaortic velocity was within the normal range  There was no evidence for stenosis  There was no regurgitation  TRICUSPID VALVE: The valve structure was normal  There was normal leaflet separation  DOPPLER: The transtricuspid velocity was within the normal range  There was no evidence for stenosis  There was mild regurgitation  Pulmonary artery  systolic pressure was mildly increased  Estimated peak PA pressure was 40 mmHg  PULMONIC VALVE: Leaflets exhibited normal thickness, no calcification, and normal cuspal separation  DOPPLER: The transpulmonic velocity was within the normal range  There was trace regurgitation  PERICARDIUM: There was no pericardial effusion  The pericardium was normal in appearance  AORTA: The root exhibited normal size  SYSTEMIC VEINS: IVC: The inferior vena cava was normal in size and course  Respirophasic changes were normal     SYSTEM MEASUREMENT TABLES    2D mode  AV Diam: 2 8 cm  AoR Diam; Mean (2D): 2 8 cm  LA Diam (2D): 2 3 cm  LA Dimension; Mean (2D): 2 3 cm  LA/Ao (2D): 0 82  EDV (2D-Cubed): 47 cm3  EF (2D-Cubed): 67 4 %  ESV (2D-Cubed): 15 3 cm3  FS (2D-Cubed): 31 3 %  FS (2D-Teich): 31 3 %  IVS/LVPW (2D): 1 22  IVSd (2D): 1 53 cm  IVSd; Mean chosen (2D): 1 53 cm  LVIDd (2D): 3 61 cm  LVIDd; Mean (2D): 3 61 cm  LVIDs (2D): 2 48 cm  LVIDs; Mean (2D): 2 48 cm  LVPWd (2D): 1 25 cm  LVPWd; Mean (2D): 1 25 cm  Left Ventricular Ejection Fraction; Teichholz; 2D mode;: 60 %  Left Ventricular End Diastolic Volume; Teichholz; 2D mode;: 54 8 cm3  Left Ventricular End Systolic Volume; Teichholz; 2D mode;: 21 9 cm3  SV (2D-Cubed): 31 7 cm3  Stroke Volume;  Teichholz; 2D mode;: 32 9 cm3  RVIDd (2D): 3 21 cm  RVIDd; Mean (2D): 3 21 cm  Right and Left Ventricular End Diastolic Diameter Ratio; 2D mode;: 0 89    Apical four chamber  LV MOD Diam; Recent value; End Diastole (A4C): 1 54 cm  LV MOD Diam; Recent value; End Diastole (A4C): 2 39 cm  LV MOD Diam; Recent value; End Diastole (A4C): 2 08 cm  LV MOD Diam; Recent value; End Diastole (A4C): 2 61 cm  LV MOD Diam; Recent value; End Diastole (A4C): 3 1 cm  LV MOD Diam; Recent value; End Diastole (A4C): 3 35 cm  LV MOD Diam; Recent value; End Diastole (A4C): 3 3 cm  LV MOD Diam; Recent value; End Diastole (A4C): 2 67 cm  LV MOD Diam; Recent value; End Diastole (A4C): 3 93 cm  LV MOD Diam; Recent value; End Diastole (A4C): 4 81 cm  LV MOD Diam; Recent value; End Diastole (A4C): 4 86 cm  LV MOD Diam; Recent value; End Diastole (A4C): 4 81 cm  LV MOD Diam; Recent value; End Diastole (A4C): 4 67 cm  LV MOD Diam; Recent value; End Diastole (A4C): 4 35 cm  LV MOD Diam; Recent value; End Diastole (A4C): 3 79 cm  LV MOD Diam; Recent value; End Diastole (A4C): 3 43 cm  LV MOD Diam; Recent value; End Diastole (A4C): 3 35 cm  LV MOD Diam; Recent value; End Diastole (A4C): 3 32 cm  LV MOD Diam; Recent value; End Diastole (A4C): 3 3 cm  LV MOD Diam; Recent value; End Diastole (A4C): 2 84 cm  LV MOD Diam; Recent value; End Systole (A4C): 0 76 cm  LV MOD Diam; Recent value; End Systole (A4C): 1 18 cm  LV MOD Diam; Recent value; End Systole (A4C): 1 62 cm  LV MOD Diam; Recent value; End Systole (A4C): 2 28 cm  LV MOD Diam; Recent value; End Systole (A4C): 2 77 cm  LV MOD Diam; Recent value; End Systole (A4C): 3 27 cm  LV MOD Diam; Recent value; End Systole (A4C): 3 16 cm  LV MOD Diam; Recent value; End Systole (A4C): 2 87 cm  LV MOD Diam; Recent value; End Systole (A4C): 2 69 cm  LV MOD Diam; Recent value; End Systole (A4C): 2 61 cm  LV MOD Diam; Recent value; End Systole (A4C): 2 61 cm  LV MOD Diam; Recent value; End Systole (A4C): 2 67 cm  LV MOD Diam; Recent value;  End Systole (A4C): 2 74 cm  LV MOD Diam; Recent value; End Systole (A4C): 2 88 cm  LV MOD Diam; Recent value; End Systole (A4C): 2 04 cm  LV MOD Diam; Recent value; End Systole (A4C): 3 55 cm  LV MOD Diam; Recent value; End Systole (A4C): 3 4 cm  LV MOD Diam; Recent value; End Systole (A4C): 2 59 cm  LV MOD Diam; Recent value; End Systole (A4C): 1 94 cm  LV MOD Diam; Recent value; End Systole (A4C): 1 41 cm  LVEF MOD A4C: 57 5 %  Left Ventricle diastolic major axis; Most recent value chosen; Method of Disks, Single Plane; 2D mode; Apical four chamber;: 6 79 cm  Left Ventricle systolic major axis; Most recent value chosen; Method of Disks, Single Plane; 2D mode; Apical four chamber;: 5 54 cm  Left Ventricular Diastolic Area; Most recent value chosen; Method of Disks, Single Plane; 2D mode; Apical four chamber;: 2390 mm2  Left Ventricular End Diastolic Volume; Most recent value chosen; Method of Disks, Single Plane; 2D mode; Apical four chamber;: 67 cm3  Left Ventricular End Systolic Volume; Most recent value chosen; Method of Disks, Single Plane; 2D mode; Apical four chamber;: 28 5 cm3  Left Ventricular Systolic Area; Most recent value chosen; Method of Disks, Single Plane; 2D mode; Apical four chamber;: 1360 mm2  SV MOD A4C: 38 6 cm3    M mode  Tricuspid Annular Plane Systolic Excursion; Mean; Mean value chosen; Tricuspid Annulus; M mode;: 2 4 cm  Tricuspid Annular Plane Systolic Excursion; Tricuspid Annulus; M mode;: 2 4 cm    Tissue Doppler Imaging  LV Peak Early Cosme Tissue Harvey; Medial MA (TDI): 71 8 mm/s  Left Ventricular Peak Early Diastolic Tissue Velocity; Mean; Mean value chosen; Medial Mitral Annulus; Tissue Doppler Imaging;: 71 8 mm/s    Unspecified Scan Mode  MV Peak Harvey/LV Peak Tissue Harvey E-Wave; Medial MA: 14 6  DT; Antegrade Flow: 187 ms  DT; Mean; Antegrade Flow: 187 ms  MV A Harvey: 975 mm/s  MV E Harvey: 1050 mm/s  MV E/A Ratio: 1 1  MV Peak A Harvey: 975 mm/s  MV Peak E Harvey; Mean;  Antegrade Flow: 1050 mm/s  Peak Grad; Mean; Regurgitant Flow: 33 mm[Hg]  Vmax; Mean; Regurgitant Flow: 2860 mm/s  Vmax; Regurgitant Flow: 2780 mm/s  Vmax; Regurgitant Flow: 2850 mm/s  Vmax; Regurgitant Flow: 2960 mm/s    [employer] Accredited Echocardiography Laboratory    Prepared and electronically signed by    Tobias Barnett MD  Signed 2018 11:09:14       Imaging: I have personally reviewed pertinent reports  Xr Chest 1 View Portable    Result Date: 2019  Narrative: CHEST INDICATION:   hernia  COMPARISON:  Chest x-ray dated 2018  EXAM PERFORMED/VIEWS:  XR CHEST PORTABLE FINDINGS: Cardiomediastinal silhouette appears unremarkable  There is a large hiatal hernia  The lungs are clear  No pneumothorax or pleural effusion  Osseous structures appear within normal limits for patient age  Impression: No acute cardiopulmonary disease  Large hiatal hernia  Workstation performed: [id number]     Ct Abdomen Pelvis With Contrast    Result Date: 2019  Narrative: CT ABDOMEN AND PELVIS WITH IV CONTRAST INDICATION: Worsening abdominal pain  COMPARISON:  CT abdomen pelvis 2/10/2019  TECHNIQUE:  CT examination of the abdomen and pelvis was performed  Axial, sagittal, and coronal 2D reformatted images were created from the source data and submitted for interpretation  Radiation dose length product (DLP) for this visit:  341 99 mGy-cm   This examination, like all CT scans performed in the [facility], was performed utilizing techniques to minimize radiation dose exposure, including the use of iterative  reconstruction and automated exposure control  IV Contrast:  70 mL of iohexol (OMNIPAQUE) Enteric Contrast:  Enteric contrast was not administered  FINDINGS: ABDOMEN LOWER CHEST:  Some mild atelectasis is seen in the visualized lungs which otherwise appear grossly clear  Hiatal hernia containing the entire stomach and portions of the transverse colon  LIVER/BILIARY TREE:  Unremarkable  GALLBLADDER:  Collapsed SPLEEN:  Unremarkable   PANCREAS: Unremarkable  ADRENAL GLANDS:  Unremarkable  KIDNEYS/URETERS:  Unremarkable  No hydronephrosis  STOMACH AND BOWEL:  Stable colonic diverticulosis  No evidence of acute diverticulitis  No evidence of bowel obstruction  APPENDIX:  No findings to suggest appendicitis  ABDOMINOPELVIC CAVITY:  No ascites or free intraperitoneal air  No lymphadenopathy  VESSELS:  Mild atherosclerosis; no aortic aneurysm PELVIS REPRODUCTIVE ORGANS:  Stable bilateral ovarian cystic masses  The right-sided mass is now more toward the midline contains thick septations  Thickening and hyperenhancement of the endometrial stripe is again noted URINARY BLADDER:  Unremarkable  ABDOMINAL WALL/INGUINAL REGIONS:  Unremarkable  OSSEOUS STRUCTURES:  No acute fracture or destructive osseous lesion  Multilevel degenerative changes are noted  There is bilateral L5 spondylolysis with intervertebral disc space narrowing, vacuum disc phenomenon, and approximately 9 mm of anterolisthesis of L5 on S1  Impression: Stable bilateral complex ovarian lesions concerning for ovarian neoplasms  No inflammatory changes or surrounding free fluid to suggest torsion  Thickening and hyperenhancement of the endometrial stripe unchanged  Large hiatal hernia containing the entire stomach and a portion of the transverse colon  No obstruction  Stable colonic diverticulosis  Workstation performed: [id number]     Ct Abdomen Pelvis With Contrast    Result Date: 2/10/2019  Narrative: CT ABDOMEN AND PELVIS WITH IV CONTRAST INDICATION:   abdominal pain in RLQ  COMPARISON:  None  TECHNIQUE:  CT examination of the abdomen and pelvis was performed  Axial, sagittal, and coronal 2D reformatted images were created from the source data and submitted for interpretation  Radiation dose length product (DLP) for this visit:  288 mGy-cm     This examination, like all CT scans performed in the [place], was performed utilizing techniques to minimize radiation dose exposure, including the use of iterative reconstruction and automated exposure control  IV Contrast:  100 mL of iohexol (OMNIPAQUE) Enteric Contrast:  Enteric contrast was not administered  FINDINGS: ABDOMEN LOWER CHEST:  Some mild atelectasis is seen in the visualized lungs which otherwise appear grossly clear  The entire stomach as well as some proximal small bowel and several normal-appearing loops of colon have herniated into the lower chest  LIVER/BILIARY TREE:  Unremarkable  GALLBLADDER:  Collapsed SPLEEN:  Unremarkable  PANCREAS:  Unremarkable  ADRENAL GLANDS:  Unremarkable  KIDNEYS/URETERS:  Unremarkable  No hydronephrosis  STOMACH AND BOWEL:  Scattered colonic diverticulosis is noted, most prominent in the sigmoid colon  No evidence of acute diverticulitis  No evidence of bowel obstruction  APPENDIX:  No findings to suggest appendicitis  ABDOMINOPELVIC CAVITY:  No ascites or free intraperitoneal air  No lymphadenopathy  VESSELS:  Mild atherosclerosis; no aortic aneurysm PELVIS REPRODUCTIVE ORGANS:  3 9 cm cyst in the left adnexa  There is a complex cystic lesion in the region of the right adnexa which measures approximately 12 2 x 10 7 x 14 0 cm in size  The internal wall is irregular and there are multiple thick septations  There is a small amount of surrounding fluid which tracks along the right paracolic gutter and peritoneal reflection  Thickening and hyperenhancement of the endometrial stripe is noted which measures approximately 1 2 cm in size, above the upper limits of normal for a postmenopausal female  URINARY BLADDER:  Bladder is partially distended  Some mild circumferential bladder wall thickening is noted, probably exaggerated by underdistention  A small bubble of air is seen in the nondependent portion of the bladder  ABDOMINAL WALL/INGUINAL REGIONS:  Unremarkable  OSSEOUS STRUCTURES:  No acute fracture or destructive osseous lesion  Multilevel degenerative changes are noted  There is bilateral L5 spondylolysis with intervertebral disc space narrowing, vacuum disc phenomenon, and approximately 9 mm of spondylolisthesis at L5/S1  Impression: Complex cystic lesion in the region of the right adnexa measuring approximately 12 2 x 10 7 x 14 0 cm in size with irregularity of the internal wall and multiple thick septations  There is a small amount of surrounding fluid which tracks along the right  paracolic gutter and peritoneal reflection  Ovarian neoplasm is the diagnosis of exclusion  Given the size of the lesion, a superimposed ovarian torsion could be considered in the appropriate clinical setting  Thickening and hyperenhancement of the endometrial stripe which measures approximately 1 2 cm in size, above the upper limits of normal for a postmenopausal female  Differential considerations include hyperplasia, polyp, or neoplasm  Partially distended bladder  Mild circumferential bladder wall thickening is noted, probably exaggerated by underdistention  A small bubble of air is seen in the nondependent portion of the bladder, a superimposed cystitis could be considered in the appropriate clinical setting  Large hernia which contains the entire stomach, the proximal small bowel, and several normal-appearing loops of colon; no evidence of bowel obstruction  Left adnexal cyst and other findings as above  Correlation with the patient's symptoms, laboratory values, and urinalysis recommended  Dedicated pelvic ultrasound could be considered for further evaluation at the discretion of the referring caregiver  Workstation performed: [id number]     Assessment  Principal Problem:    Ovarian mass  Active Problems:    Abdominal pain    Thank you for allowing us to participate in this patient's care  Counseling / Coordination of Care  Total floor / unit time spent today 45 minutes    Greater than 50% of total time was spent with the patient and / or family counseling and / or coordination of care  A description of the counseling / coordination of care: Review of history, current assessment, development of a plan  Code Status: Prior    ** Please Note: Dragon 360 Dictation voice to text software may have been used in the creation of this document   **

## 2019-02-13 NOTE — QUICK NOTE
Plan for surgery tomorrow at noon with Dr Jesica Olvera  Cleared by Cardiology  EF 70% on ECHO  Type & Screen ordered  CLD after midnight, NPO at 7AM    Discussed plan of care with patient and        RN aware

## 2019-02-13 NOTE — CONSULTS
Consultation - General Surgery   Queen Ramses 68 y o  female MRN: [id number]  Unit/Bed#: ED 02 Encounter: [id number]    Assessment/Plan     Assessment:  67 y/o F w/ Type IV paraesophageal hernia (containing stomach and transverse colon), large R ovarian cyst, evidence of UTI on urine cx  --Sx likely d/t mass effect of R ovarian cyst, no evidence of peritonitis or bowel obstruction    Plan:  --OB/GYN will admit pt to their service  --Pt can follow-up with General Surgery outpatient for her paraesophageal hernia  --Recommend PPI  --Will sign off, General Surgery available for any further questions or concerns    History of Present Illness     HPI:  Queen Ramses is a 68 y o  female w/ hx of HTN, Type IV paraesophageal hernia, who presents with diffuse abdominal pain  Pt presented to the ED yesterday with suprapubic and RLQ pain, and was found to have incidental Type IV paraesophageal hernia containing stomach and portion of transverse colon, as well as 12 2 x 10 7 x 14 0 cm R ovarian cyst, suspicious for ovarian neoplasm, on CTAP  Pt also had positive urine culture of >100K E  Coli  Acute Care Surgery evaluated pt yesterday and recommended outpatient follow-up for the paraesophageal hernia and to return to the ED if her sx worsened  Gynecology also evaluated pt and recommended outpatient follow-up for eventual R salpingo-oopherectomy  Today, pt describes abdominal pain as much more widespread and had slight nausea this morning  Denies any episodes of vomiting  Has lost "a few" pounds in the past month  Last bowel movement was 2 days ago  Denies any urinary sx  Inpatient consult to Acute Care Surgery  Consult performed by: Adriana Alejandra MD  Consult ordered by: Marah Hernández DO        Review of Systems   Constitutional: Negative for activity change, appetite change, chills, diaphoresis, fatigue, fever and unexpected weight change     HENT: Negative for congestion, ear discharge, ear pain, facial swelling, hearing loss, nosebleeds, postnasal drip, rhinorrhea, sinus pressure, sinus pain, sneezing, sore throat, tinnitus, trouble swallowing and voice change  Eyes: Negative for photophobia, pain, discharge, redness, itching and visual disturbance  Respiratory: Negative for apnea, cough, choking, chest tightness, shortness of breath, wheezing and stridor  Cardiovascular: Negative for chest pain, palpitations and leg swelling  Gastrointestinal: Positive for abdominal pain and nausea  Negative for abdominal distention, anal bleeding, blood in stool, constipation, diarrhea, rectal pain and vomiting  Endocrine: Negative for cold intolerance, heat intolerance, polydipsia, polyphagia and polyuria  Genitourinary: Negative for decreased urine volume, difficulty urinating, dysuria, enuresis, flank pain, frequency, genital sores, hematuria and urgency  Musculoskeletal: Negative for arthralgias, back pain, gait problem, joint swelling, myalgias, neck pain and neck stiffness  Skin: Negative for color change, pallor, rash and wound  Allergic/Immunologic: Negative for environmental allergies, food allergies and immunocompromised state  Neurological: Negative for dizziness, tremors, seizures, syncope, facial asymmetry, speech difficulty, weakness, light-headedness, numbness and headaches  Hematological: Negative for adenopathy  Does not bruise/bleed easily  Psychiatric/Behavioral: Negative for agitation, behavioral problems, confusion, decreased concentration, dysphoric mood, hallucinations, self-injury, sleep disturbance and suicidal ideas  The patient is not nervous/anxious and is not hyperactive          Historical Information   Past Medical History:   Diagnosis Date    Hypertension     Shortness of breath     Skin neoplasm     last assessed: 6/13/2017     Past Surgical History:   Procedure Laterality Date    TONSILLECTOMY       Social History   Social History     Substance and Sexual Activity Alcohol Use No     Social History     Substance and Sexual Activity   Drug Use No     Social History     Tobacco Use   Smoking Status Never Smoker   Smokeless Tobacco Never Used     Family History:   Family History   Problem Relation Age of Onset    Hypertension Mother    Paul Huerta Lung cancer Father     Hypertension Sister     Lymphoma Brother 39    Hypertension Brother     Schizophrenia Brother     Depression Son     Schizophrenia Son     Hypertension Family     Heart disease Other        Meds/Allergies   current meds:   No current facility-administered medications for this encounter  and PTA meds:   Prior to Admission Medications   Prescriptions Last Dose Informant Patient Reported? Taking?    cephalexin (KEFLEX) 250 mg capsule   No No   Sig: Take 2 capsules (500 mg total) by mouth 2 (two) times a day for 7 days   lisinopril-hydrochlorothiazide (PRINZIDE,ZESTORETIC) 20-25 MG per tablet   No No   Sig: Take 1 tablet by mouth daily for 30 days      Facility-Administered Medications: None     No Known Allergies    Objective   First Vitals:   Blood Pressure: 130/76 (02/12/19 1757)  Pulse: (!) 123 (02/12/19 1757)  Temperature: (!) 97 3 °F (36 3 °C) (02/12/19 1757)  Temp Source: Oral (02/12/19 1757)  Respirations: 22 (02/12/19 1757)  Height: 4' 11" (149 9 cm) (02/12/19 1757)  Weight - Scale: 64 4 kg (141 lb 15 6 oz) (02/12/19 1757)  SpO2: 95 % (02/12/19 1757)    Current Vitals:   Blood Pressure: 139/66 (02/12/19 1954)  Pulse: 100 (02/12/19 1954)  Temperature: (!) 97 3 °F (36 3 °C) (02/12/19 1757)  Temp Source: Oral (02/12/19 1757)  Respirations: 18 (02/12/19 1954)  Height: 4' 11" (149 9 cm) (02/12/19 1757)  Weight - Scale: 64 4 kg (141 lb 15 6 oz) (02/12/19 1757)  SpO2: 99 % (02/12/19 1954)      Intake/Output Summary (Last 24 hours) at 2/12/2019 2047  Last data filed at 2/12/2019 2018  Gross per 24 hour   Intake 500 ml   Output --   Net 500 ml       Invasive Devices     Peripheral Intravenous Line Peripheral IV 02/12/19 Right Antecubital less than 1 day                Physical Exam   Constitutional: She is oriented to person, place, and time  She appears well-developed and well-nourished  No distress  HENT:   Head: Normocephalic and atraumatic  Eyes: EOM are normal    Neck: Normal range of motion  Neck supple  Cardiovascular: Normal rate and regular rhythm  Pulmonary/Chest: Effort normal    Abdominal: Soft  She exhibits no distension and no mass  There is tenderness  There is no rebound and no guarding  A hernia is present  +diffuse abdominal tenderness  +small 6 8RZ umbilical hernia   Musculoskeletal: Normal range of motion  Neurological: She is alert and oriented to person, place, and time  Skin: Skin is warm  She is not diaphoretic  Psychiatric: She has a normal mood and affect  Her behavior is normal    Vitals reviewed      Lab Results:   CBC:   Lab Results   Component Value Date    WBC 19 81 (H) 02/12/2019    HGB 11 3 (L) 02/12/2019    HCT 34 3 (L) 02/12/2019    MCV 90 02/12/2019     (H) 02/12/2019    MCH 29 5 02/12/2019    MCHC 32 9 02/12/2019    RDW 13 9 02/12/2019    MPV 8 6 (L) 02/12/2019    NRBC 0 02/12/2019   , CMP:   Lab Results   Component Value Date    SODIUM 132 (L) 02/12/2019    K 3 4 (L) 02/12/2019    CL 98 (L) 02/12/2019    CO2 27 02/12/2019    BUN 23 02/12/2019    CREATININE 0 89 02/12/2019    CALCIUM 9 0 02/12/2019    AST 8 02/12/2019    ALT 13 02/12/2019    ALKPHOS 96 02/12/2019    EGFR 65 02/12/2019   , Coagulation: No results found for: PT, INR, APTT, Urinalysis: No results found for: Verta Peterson, SPECGRAV, PHUR, LEUKOCYTESUR, NITRITE, PROTEINUA, GLUCOSEU, KETONESU, BILIRUBINUR, BLOODU, Amylase: No results found for: AMYLASE, Lipase:   Lab Results   Component Value Date    LIPASE 122 02/12/2019     Imaging:     CT abdomen pelvis with contrast   Final Result by Abiola Archer MD (02/12 2008)      Stable bilateral complex ovarian lesions concerning for ovarian neoplasms  No inflammatory changes or surrounding free fluid to suggest torsion  Thickening and hyperenhancement of the endometrial stripe unchanged  Large hiatal hernia containing the entire stomach and a portion of the transverse colon  No obstruction  Stable colonic diverticulosis  Workstation performed: [id number]         XR chest 1 view portable   Final Result by Bruno Connor MD (02/12 2013)      No acute cardiopulmonary disease  Large hiatal hernia        Workstation performed: [id number]

## 2019-02-13 NOTE — ED PROVIDER NOTES
History  Chief Complaint   Patient presents with    Abdominal Pain     Pt states "I was here Sunday and they said I have a cyst on my ovary, a UTI and a hernia  I have an appointment with the OBGYN tomorrow but I have pain today and they said I should come back if I have pain", denies n/v/d     52-year-old female presenting to the emergency department for re-evaluation of abdominal pain  Patient was seen here 48 hours ago for lower abdominal pain had a CT scan which revealed an ovarian cyst concerning for possible carcinoma, large hiatal hernia, and a urinary tract infection  Patient was started on Keflex and discharged home since that time she had been feeling well until last night when she started having worsening abdominal pain states that it is in her upper abdomen mostly however her abdomen is diffusely tender  She has still been able to tolerate p o  She has been constipated since her last emergency department visit with no stool produced she has no urinary symptoms she has no chest pain or shortness of breath no fevers or chills at home and she feels otherwise fine  Remaining ROS is negative  History provided by:  Patient   used: No    Abdominal Pain   Pain location:  Generalized  Pain quality: sharp    Pain radiates to:  Does not radiate  Pain severity:  Moderate  Onset quality:  Sudden  Timing:  Constant  Progression:  Unchanged  Chronicity:  New  Relieved by:  Nothing  Worsened by: Movement  Ineffective treatments:  None tried  Associated symptoms: nausea    Associated symptoms: no chest pain, no chills, no constipation, no diarrhea, no dysuria, no fever, no hematuria, no shortness of breath, no sore throat and no vomiting    Risk factors: being elderly and obesity        Prior to Admission Medications   Prescriptions Last Dose Informant Patient Reported? Taking?    cephalexin (KEFLEX) 250 mg capsule   No No   Sig: Take 2 capsules (500 mg total) by mouth 2 (two) times a day for 7 days   lisinopril-hydrochlorothiazide (PRINZIDE,ZESTORETIC) 20-25 MG per tablet   No No   Sig: Take 1 tablet by mouth daily for 30 days      Facility-Administered Medications: None       Past Medical History:   Diagnosis Date    Hypertension     Shortness of breath     Skin neoplasm     last assessed: 6/13/2017       Past Surgical History:   Procedure Laterality Date    TONSILLECTOMY         Family History   Problem Relation Age of Onset    Hypertension Mother     Lung cancer Father     Hypertension Sister     Lymphoma Brother 39    Hypertension Brother     Schizophrenia Brother     Depression Son     Schizophrenia Son     Hypertension Family     Heart disease Other      I have reviewed and agree with the history as documented  Social History     Tobacco Use    Smoking status: Never Smoker    Smokeless tobacco: Never Used   Substance Use Topics    Alcohol use: No    Drug use: No        Review of Systems   Constitutional: Negative for chills and fever  HENT: Negative for sore throat  Eyes: Negative for visual disturbance  Respiratory: Negative for chest tightness, shortness of breath and wheezing  Cardiovascular: Negative for chest pain  Gastrointestinal: Positive for abdominal pain and nausea  Negative for constipation, diarrhea and vomiting  Genitourinary: Negative for dysuria and hematuria  Musculoskeletal: Negative for arthralgias and myalgias  Skin: Negative for color change  Neurological: Negative for light-headedness  Hematological: Negative for adenopathy  Psychiatric/Behavioral: Negative for agitation and behavioral problems  All other systems reviewed and are negative        Physical Exam  ED Triage Vitals [02/12/19 1757]   Temperature Pulse Respirations Blood Pressure SpO2   (!) 97 3 °F (36 3 °C) (!) 123 22 130/76 95 %      Temp Source Heart Rate Source Patient Position - Orthostatic VS BP Location FiO2 (%)   Oral Monitor Sitting Right arm --      Pain Score       5           Orthostatic Vital Signs  Vitals:    02/12/19 1815 02/12/19 1900 02/12/19 1954 02/12/19 2107   BP: 125/71 121/73 139/66 152/71   Pulse: (!) 112 100 100 90   Patient Position - Orthostatic VS: Lying Lying Lying Lying       Physical Exam   Constitutional: She is oriented to person, place, and time  She appears well-developed and well-nourished  No distress  HENT:   Head: Normocephalic and atraumatic  Eyes: Conjunctivae and EOM are normal  No scleral icterus  Neck: Normal range of motion  Neck supple  Cardiovascular: Normal rate, regular rhythm and normal heart sounds  No murmur heard  Pulmonary/Chest: Effort normal and breath sounds normal  No respiratory distress  Abdominal: Soft  Bowel sounds are normal  There is generalized tenderness  There is no CVA tenderness  Musculoskeletal: Normal range of motion  Neurological: She is alert and oriented to person, place, and time  Skin: Skin is warm and dry  Psychiatric: She has a normal mood and affect  Her behavior is normal    Nursing note and vitals reviewed        ED Medications  Medications   cephalexin (KEFLEX) capsule 500 mg (500 mg Oral Given 2/12/19 2153)   lisinopril-hydrochlorothiazide (PRINZIDE 20/25) combo dose (has no administration in time range)   docusate sodium (COLACE) capsule 100 mg (has no administration in time range)   ondansetron (ZOFRAN) injection 4 mg (has no administration in time range)   ibuprofen (MOTRIN) tablet 600 mg (has no administration in time range)   oxyCODONE-acetaminophen (PERCOCET) 5-325 mg per tablet 1 tablet (has no administration in time range)   oxyCODONE (ROXICODONE) immediate release tablet 10 mg (has no administration in time range)   HYDROmorphone (DILAUDID) injection 0 5 mg (has no administration in time range)   sodium chloride 0 9 % bolus 500 mL (0 mL Intravenous Stopped 2/12/19 2018)   iohexol (OMNIPAQUE) 350 MG/ML injection (MULTI-DOSE) 70 mL (70 mL Intravenous Given 2/12/19 1947)       Diagnostic Studies  Results Reviewed     Procedure Component Value Units Date/Time     [[id number]] Collected:  02/12/19 2107    Lab Status: In process Specimen:  Blood from Arm, Right Updated:  02/12/19 2116    CBC and differential [[id number]]  (Abnormal) Collected:  02/12/19 1852    Lab Status:  Final result Specimen:  Blood from Arm, Right Updated:  02/12/19 1929     WBC 19 81 Thousand/uL      RBC 3 83 Million/uL      Hemoglobin 11 3 g/dL      Hematocrit 34 3 %      MCV 90 fL      MCH 29 5 pg      MCHC 32 9 g/dL      RDW 13 9 %      MPV 8 6 fL      Platelets 699 Thousands/uL      nRBC 0 /100 WBCs      Neutrophils Relative 90 %      Immat GRANS % 1 %      Lymphocytes Relative 3 %      Monocytes Relative 6 %      Eosinophils Relative 0 %      Basophils Relative 0 %      Neutrophils Absolute 17 87 Thousands/µL      Immature Grans Absolute 0 16 Thousand/uL      Lymphocytes Absolute 0 61 Thousands/µL      Monocytes Absolute 1 13 Thousand/µL      Eosinophils Absolute 0 01 Thousand/µL      Basophils Absolute 0 03 Thousands/µL     Narrative: This is an appended report  These results have been appended to a previously verified report      Comprehensive metabolic panel [[id number]]  (Abnormal) Collected:  02/12/19 1852    Lab Status:  Final result Specimen:  Blood from Arm, Right Updated:  02/12/19 1920     Sodium 132 mmol/L      Potassium 3 4 mmol/L      Chloride 98 mmol/L      CO2 27 mmol/L      ANION GAP 7 mmol/L      BUN 23 mg/dL      Creatinine 0 89 mg/dL      Glucose 149 mg/dL      Calcium 9 0 mg/dL      AST 8 U/L      ALT 13 U/L      Alkaline Phosphatase 96 U/L      Total Protein 7 8 g/dL      Albumin 3 3 g/dL      Total Bilirubin 0 83 mg/dL      eGFR 65 ml/min/1 73sq m     Narrative:       National Kidney Disease Education Program recommendations are as follows:  GFR calculation is accurate only with a steady state creatinine  Chronic Kidney disease less than 60 ml/min/1 73 sq  meters  Kidney failure less than 15 ml/min/1 73 sq  meters  Lipase [[id number]]  (Normal) Collected:  02/12/19 1852    Lab Status:  Final result Specimen:  Blood from Arm, Right Updated:  02/12/19 1917     Lipase 122 u/L                  CT abdomen pelvis with contrast   Final Result by Jeremy Derw MD (02/12 2008)      Stable bilateral complex ovarian lesions concerning for ovarian neoplasms  No inflammatory changes or surrounding free fluid to suggest torsion  Thickening and hyperenhancement of the endometrial stripe unchanged  Large hiatal hernia containing the entire stomach and a portion of the transverse colon  No obstruction  Stable colonic diverticulosis  Workstation performed: [id number]         XR chest 1 view portable   Final Result by Adeola Huff MD (02/12 2013)      No acute cardiopulmonary disease  Large hiatal hernia  Workstation performed: [id number]               Procedures  Procedures      Phone Consults  ED Phone Contact    ED Course  ED Course as of Feb 12 2202   Tue Feb 12, 2019   1904 WBC(!): 19 81   1904 Hemoglobin(!): 11 3   1934 Creatinine: 0 89           Identification of Seniors at Risk      Most Recent Value   (ISAR) Identification of Seniors at Risk   Before the illness or injury that brought you to the Emergency, did you need someone to help you on a regular basis? 0 Filed at: 02/12/2019 1758   In the last 24 hours, have you needed more help than usual?  0 Filed at: 02/12/2019 1758   Have you been hospitalized for one or more nights during the past 6 months? 0 Filed at: 02/12/2019 1758   In general, do you see well? 1 Filed at: 02/12/2019 1758   In general, do you have serious problems with your memory? 0 Filed at: 02/12/2019 1758   Do you take more than three different medications every day?   1 Filed at: 02/12/2019 1758   ISAR Score  2 Filed at: 02/12/2019 1758                          MDM  Number of Diagnoses or Management Options  Hiatal hernia: established and worsening  Ovarian cyst: established and worsening  Diagnosis management comments: 73-year-old female presenting to the emergency department with complaint of severe generalized abdominal pain which is worse than 2 days ago, repeat CT scan was unremarkable for changes discussed patient with General surgery and with OBGYN who accepted the patient to their service for further workup of her cyst and pain management  Amount and/or Complexity of Data Reviewed  Clinical lab tests: ordered and reviewed  Tests in the radiology section of CPT®: ordered and reviewed  Tests in the medicine section of CPT®: ordered and reviewed  Review and summarize past medical records: yes  Discuss the patient with other providers: yes  Independent visualization of images, tracings, or specimens: yes        Disposition  Final diagnoses:   Hiatal hernia   Ovarian cyst     Time reflects when diagnosis was documented in both MDM as applicable and the Disposition within this note     Time User Action Codes Description Comment    2/12/2019  8:40 PM Charlene Simple Add [K44 9] Hiatal hernia     2/12/2019  8:40 PM Anjelica Burk, [address] [[id number]] Ovarian cyst       ED Disposition     ED Disposition Condition Date/Time Comment    Admit Stable Tu Feb 12, 2019  9:54 PM Case was discussed with GynOnc and the patient's admission status was agreed to be Admission Status: observation status to the service of Dr Munoz Section          Follow-up Information    None         Patient's Medications   Discharge Prescriptions    No medications on file     No discharge procedures on file  ED Provider  Attending physically available and evaluated Birdena Epley I managed the patient along with the ED Attending      Electronically Signed by         Santosh Carrion MD  02/12/19 5085

## 2019-02-13 NOTE — CONSULTS
Consult - Gynecology Oncology  Mahesh Velásquez 68 y o  MRN: [id number]  Unit/Bed#: ED 02 Encounter: [id number]      ASSESSMENT:  68 y o  yo female with diffuse abdominal pain, bilateral ovarian cysts  PLAN:  Diffuse abdominal pain: motrin, percocet, percocet, dilaudid  Bilateral ovarian cysts: f/u CA-125, surgical consult 2/13  HTN: home meds  UTI: cont home Keflex  FEN: regular, GI: Colace, DVT ppx: SCDs  Dispo: 23 hr Obs      Chief complaint:   Chief Complaint   Patient presents with    Abdominal Pain     Pt states "I was here Sunday and they said I have a cyst on my ovary, a UTI and a hernia  I have an appointment with the OBGYN tomorrow but I have pain today and they said I should come back if I have pain", denies n/v/d       SUBJECTIVE:    History of Present Illness    HPI:    HPI:  Mahesh Velásquez is a 68 y o  female who presents with worsening diffuse abdominal pain  She was seen in the ED on 2/10/19 for same  Pt reports c/w keflex for UTI        RoS (minimum 10):  Pain: 10/10  Tolerating Oral Intake: is tolerating PO liquids and solids  Voiding: yes - spontaneously  Flatus: yes  Bowel Movement: last was Sat 2/9/19  Ambulating: yes  Chest Pain: no  Shortness of Breath: no  Leg Pain/Discomfort: no  Vaginal Bleeding: denies        OBJECTIVE    Historical Information     Past Medical History:   Diagnosis Date    Hypertension     Shortness of breath     Skin neoplasm     last assessed: 6/13/2017       Past Surgical History:   Procedure Laterality Date    TONSILLECTOMY         OB History   No data available       Family History   Problem Relation Age of Onset    Hypertension Mother     Lung cancer Father     Hypertension Sister     Lymphoma Brother 39    Hypertension Brother     Schizophrenia Brother     Depression Son     Schizophrenia Son     Hypertension Family     Heart disease Other        Social History     Socioeconomic History    Marital status: /Civil Union     Spouse name: Not on file    Number of children: Not on file    Years of education: Not on file    Highest education level: Not on file   Occupational History    Not on file   Social Needs    Financial resource strain: Not on file    Food insecurity:     Worry: Not on file     Inability: Not on file    Transportation needs:     Medical: Not on file     Non-medical: Not on file   Tobacco Use    Smoking status: Never Smoker    Smokeless tobacco: Never Used   Substance and Sexual Activity    Alcohol use: No    Drug use: No    Sexual activity: Not on file   Lifestyle    Physical activity:     Days per week: Not on file     Minutes per session: Not on file    Stress: Not on file   Relationships    Social connections:     Talks on phone: Not on file     Gets together: Not on file     Attends Yarsanism service: Not on file     Active member of club or organization: Not on file     Attends meetings of clubs or organizations: Not on file     Relationship status: Not on file    Intimate partner violence:     Fear of current or ex partner: Not on file     Emotionally abused: Not on file     Physically abused: Not on file     Forced sexual activity: Not on file   Other Topics Concern    Not on file   Social History Narrative    Daily coffee consumption (___cups/day)    Daily cola consumption (____cans/day)    Daily tea consumption (____cups/day)    Uses safety equipment-seatbelts       Social History     Substance and Sexual Activity   Alcohol Use No     Social History     Substance and Sexual Activity   Drug Use No     Social History     Tobacco Use   Smoking Status Never Smoker   Smokeless Tobacco Never Used         (Not in a hospital admission)  No current facility-administered medications on file prior to encounter        Current Outpatient Medications on File Prior to Encounter   Medication Sig Dispense Refill    cephalexin (KEFLEX) 250 mg capsule Take 2 capsules (500 mg total) by mouth 2 (two) times a day for 7 days 28 capsule 0    lisinopril-hydrochlorothiazide (PRINZIDE,ZESTORETIC) 20-25 MG per tablet Take 1 tablet by mouth daily for 30 days 30 tablet 3    [DISCONTINUED] irbesartan-hydrochlorothiazide (AVALIDE) 300-12 5 MG per tablet Take 1 tablet by mouth daily 90 tablet 3       No Known Allergies    Patient Vitals for the past 24 hrs:   BP Temp Temp src Pulse Resp SpO2 Height Weight   02/12/19 1954 139/66 -- -- 100 18 99 % -- --   02/12/19 1900 121/73 -- -- 100 20 95 % -- --   02/12/19 1815 125/71 -- -- (!) 112 20 96 % -- --   02/12/19 1757 130/76 (!) 97 3 °F (36 3 °C) Oral (!) 123 22 95 % 4' 11" (1 499 m) 64 4 kg (141 lb 15 6 oz)     Oxygen Therapy  SpO2: 99 %  I/O       02/11 0701 - 02/12 0700 02/12 0701 - 02/13 0700    IV Piggyback  500    Total Intake(mL/kg)  500 (7 8)    Net  +500                Intake/Output Summary (Last 24 hours) at 2/12/2019 2058  Last data filed at 2/12/2019 2018  Gross per 24 hour   Intake 500 ml   Output --   Net 500 ml       Physical Exam:   Cardiovascular: regular rate, regular rhythm, rubs, gallops, systolic murmur present   Lungs: clear to auscultation bilaterally, no wheezing, rhonchi, or rales   Abdomen: diffuse tenderness to palpation, no rebound, no guarding   Lower Extremities: negative J Carlos's sign bilaterally   Eyes: Pupils are equal, round, and reactive to light  Extraocular movements are intact  Scleral icterus is absent     Back: no L/R CVA tenderness    Laboratory Studies:    Results from last 7 days   Lab Units 02/12/19  1852 02/10/19  2019   WBC Thousand/uL 19 81* 20 55*   HEMOGLOBIN g/dL 11 3* 12 7   MCV fL 90 88   PLATELETS Thousands/uL 401* 493*     Results from last 7 days   Lab Units 02/12/19  1852 02/10/19  2019   NEUTROS PCT % 90*  --    MONOS PCT % 6  --    MONO PCT %  --  4   EOS PCT % 0 0       Results from last 7 days   Lab Units 02/12/19 1852 02/10/19  2019   POTASSIUM mmol/L 3 4* 3 7   CHLORIDE mmol/L 98* 97*   CO2 mmol/L 27 25   BUN mg/dL 23 21   CREATININE mg/dL 0  89 0 70   EGFR ml/min/1 73sq m 65 87     Results from last 7 days   Lab Units 02/12/19  1852 02/10/19  2019   AST U/L 8 14   ALT U/L 13 19   ALK PHOS U/L 96 103     Results from last 7 days   Lab Units 02/12/19  1852 02/10/19  2019   ALBUMIN g/dL 3 3* 3 8       Results from last 7 days   Lab Units 02/12/19 1852   LIPASE u/L 122         Results from last 7 days   Lab Units 02/12/19  1852 02/10/19  2019   PLATELETS Thousands/uL 401* 493*       Results from last 7 days   Lab Units 02/10/19  2109   CLARITY UA  Clear   SPEC GRAV UA  >=1 030   PH UA  5 5   LEUKOCYTES UA  Moderate*   NITRITE UA  Negative   GLUCOSE UA mg/dl Negative   KETONES UA mg/dl Negative   UROBILINOGEN UA E U /dl 0 2   BILIRUBIN UA  Negative   BLOOD UA  Moderate*     Results from last 7 days   Lab Units 02/10/19  2109   RBC UA /hpf 4-10*   WBC UA /hpf Innumerable*   EPITHELIAL CELLS WET PREP /hpf None Seen   BACTERIA UA /hpf Innumerable*           No results found for: BLOODCX  Lab Results   Component Value Date    URINECX (A) 02/10/2019     >100,000 cfu/ml Gram Negative Ryan resembling Escherichia coli     Results from last 7 days   Lab Units 02/10/19  2019   TROPONIN I ng/mL <0 02       ABO Grouping   Date Value Ref Range Status   04/07/2018 A  Final     Rh Factor   Date Value Ref Range Status   04/07/2018 Positive  Final     No results found for: ANTIBODYSCR  Specimen Expiration Date   Date Value Ref Range Status   04/07/2018 [id number]  Final           Medications:  Medication Administration - last 24 hours from 02/11/2019 2058 to 02/12/2019 2058       Date/Time Order Dose Route Action Action by     02/12/2019 2018 sodium chloride 0 9 % bolus 500 mL 0 mL Intravenous Stopped Cristiano Richardson RN     02/12/2019 1914 sodium chloride 0 9 % bolus 500 mL 500 mL Intravenous New Bag Cristiano Richardson RN     02/12/2019 1947 iohexol (OMNIPAQUE) 350 MG/ML injection (MULTI-DOSE) 70 mL 70 mL Intravenous Given Elsa Fox Infusions:    No current facility-administered medications for this encounter  Scheduled Meds:      PRN Meds:      Invasive  Devices:   Invasive Devices     Peripheral Intravenous Line            Peripheral IV 02/12/19 Right Antecubital less than 1 day                Additional Vitals:    Temp  Min: 97 3 °F (36 3 °C)  Max: 97 3 °F (36 3 °C)    IBW: 43 2 kg            Invasive/non-invasive ventilation settings:  Respiratory    Lab Data (Last 4 hours)    None         O2/Vent Data (Last 4 hours)    None                Code Status: Prior  Advance Directive and Living Will:      Power of :    POLST:      Counseling / Coordination of Care

## 2019-02-13 NOTE — UTILIZATION REVIEW
Initial Clinical Review    Admission: Date/Time/Statement: Observation 2/12 @ 2101    Orders Placed This Encounter   Procedures    Place in Observation     Standing Status:   Standing     Number of Occurrences:   1     Order Specific Question:   Admitting Physician     Answer:   Marcial Lock [[id number]]     Order Specific Question:   Level of Care     Answer:   Med Surg [16]     ED: Date/Time/Mode of Arrival:   ED Arrival Information     Expected Arrival Acuity Means of Arrival Escorted By Service Admission Type    - 2/12/2019 17:52 Urgent Walk-In Family Member OB/GYN Urgent    Arrival Complaint    abd pain         Chief Complaint:   Chief Complaint   Patient presents with    Abdominal Pain     Pt states "I was here Sunday and they said I have a cyst on my ovary, a UTI and a hernia  I have an appointment with the OBGYN tomorrow but I have pain today and they said I should come back if I have pain", denies n/v/d     History of Illness: 71-year-old female presenting to the emergency department for re-evaluation of abdominal pain  ED Vital Signs:   ED Triage Vitals [02/12/19 1757]   Temperature Pulse Respirations Blood Pressure SpO2   (!) 97 3 °F (36 3 °C) (!) 123 22 130/76 95 %      Temp Source Heart Rate Source Patient Position - Orthostatic VS BP Location FiO2 (%)   Oral Monitor Sitting Right arm --      Pain Score       5        Wt Readings from Last 1 Encounters:   02/13/19 64 kg (141 lb)     Vital Signs (abnormal): HR = 112, 100    Pertinent Labs/Diagnostic Test Results:   PCXR - No acute cardiopulmonary disease  Large hiatal hernia  CT Abd/ Pelvis - Stable bilateral complex ovarian lesions concerning for ovarian neoplasms  No inflammatory changes or surrounding free fluid to suggest torsion  Thickening and hyperenhancement of the endometrial stripe unchanged  Large hiatal hernia containing the entire stomach and a portion of the transverse colon  No obstruction      Na = 132  Wbc = 20 55  H/H = 11 3/ 34 3    ED Treatment:   Medication Administration from 02/12/2019 1752 to 02/13/2019 0612       Date/Time Order Dose Route Action     02/12/2019 1914 sodium chloride 0 9 % bolus 500 mL 500 mL Intravenous New Bag     02/12/2019 1947 iohexol (OMNIPAQUE) 350 MG/ML injection (MULTI-DOSE) 70 mL 70 mL Intravenous Given     02/12/2019 2153 cephalexin (KEFLEX) capsule 500 mg 500 mg Oral Given     02/12/2019 2221 docusate sodium (COLACE) capsule 100 mg 100 mg Oral Given        Past Medical/Surgical History: Active Ambulatory Problems     Diagnosis Date Noted    Syncope 04/07/2018    Hypertension 04/07/2018    Brain mass     Alopecia 10/04/2012    Anemia 09/05/2012    Basal cell carcinoma 08/02/2016    Iron deficiency anemia 09/05/2012    Type 2 diabetes mellitus ([facility] [address] ) 12/17/2014     Resolved Ambulatory Problems     Diagnosis Date Noted    No Resolved Ambulatory Problems     Past Medical History:   Diagnosis Date    Anemia     Cataracts, bilateral     Heart murmur 04/2018    History of transfusion 2003    Hypertension     Meningioma ([facility] [address] ) 04/2018    Ovarian cyst 2006    Shortness of breath     Skin neoplasm      Admitting Diagnosis: Hiatal hernia [K44 9]  Ovarian cyst [N83 209]  Abdominal pain [R10 9]     Age/Sex: 68 y o  female     Assessment/Plan:   80y Female with worsening diffuse abdominal pain  She as seen in the ED on 2/10 for same  Pt reports c/w keflex for UTI  Pt is being admitted with abd pain, b/l ovarian cysts    Diffuse abdominal pain: motrin, percocet, percocet, dilaudid  Bilateral ovarian cysts: f/u CA-125, surgical consult 2/13    Admission Orders:  General Surgery cons    Scheduled Meds:   Current Facility-Administered Medications:  cephalexin 500 mg Oral BID   docusate sodium 100 mg Oral BID   lisinopril-hydrochlorothiazide (PRINZIDE 20/25) combo dose  Oral Daily     Continuous Infusions:    PRN Meds: HYDROmorphone    ibuprofen    ondansetron    oxyCODONE   oxyCODONE-acetaminophen

## 2019-02-13 NOTE — PROGRESS NOTES
Gyn Oncology Progress note   Jason Leyden 68 y o  female MRN: [id number]  Unit/Bed#: CW2 217-06 Encounter: [id number]      A/P: 68 y o  admitted with pain in the setting of  complex adnexal cysts, largest of which measuring 12 2x10 7x14 0, with irregular internal wall and thick septations;  EMS also thickened at 1 2cm  1) Bilateral ovarian cysts: Follow up CA-125  Surgical consultation today for diagnostic laparoscopy, robotic assisted TLH vs GREGORIA, BSO, possible staging, all other indicated procedures  Inpatient Vs outpatient to be determined  Will obtain cardiac clearance given systolic mumur  2) Abdominal Pain:  Likely secondary to #1  Mild tenderness on this morning's exam, no acute abdomen present  Continue Motrin, Percocet & Dilaudid PRN  Requesting dose of Percocet this morning on evaluation    3) UTI  Continue home Keflex    4) Hypertension  Continue home Lisinopril-HCTZ    5) Constipation  Colace, Miralax    6) Hypokalemia  K 3 4, will replete with 20mEq    7) FEN  Currently Regular  Consider NPO after midnight pending surgical plan     8) DVT ppx   SCDs  Lovenox 40mg daily    9) Disposition  Observation      Jason Leyden reports that pain is well controlled when she is resting, increased when she moves  Patient is currently voiding  She is ambulating  Patient is currently passing flatus  Last bowel movement on Sunday  She has been tolerating PO, and denies nausea or vomiting, last PO intake last evening prior to ED arrival except sips with meds  Patient denies fever, chills, chest pain, shortness of breath, or calf tenderness  Reports "few pound" weight loss in the past 2 months, not more than 10lb per patient  She denies night sweats  1 episode of vaginal bleeding 5 years ago, none since, no GYN follow-up       /61 (BP Location: Left arm)   Pulse 86   Temp (!) 97 3 °F (36 3 °C) (Oral)   Resp 18   Ht 4' 11" (1 499 m)   Wt 64 4 kg (141 lb 15 6 oz)   SpO2 94%   BMI 28 68 kg/m²     No intake/output data recorded  I/O this shift: In: 500 [IV Piggyback:500]  Out: -     Lab Results   Component Value Date    WBC 19 81 (H) 02/12/2019    HGB 11 3 (L) 02/12/2019    HCT 34 3 (L) 02/12/2019    MCV 90 02/12/2019     (H) 02/12/2019       Lab Results   Component Value Date    GLUCOSE 148 (H) 04/07/2018    CALCIUM 9 0 02/12/2019     02/06/2017    K 3 4 (L) 02/12/2019    CO2 27 02/12/2019    CL 98 (L) 02/12/2019    BUN 23 02/12/2019    CREATININE 0 89 02/12/2019       No results found for: POCGLU    Physical Exam  Gen: AAOx3, NAD, resting in bed  CVS: Z9N5+, RRR, systolic murmur present  Lungs: CTA b/l normal respiratory effort and rate  Abdomen: soft, mild tenderness to deep palpation, no rebound or guarding   Mass palpable 2cm below the umbilicus  Extremities: symmetric, nontender    Meenakshi Rumpf, MD  OBGYN PGY3  2/13/2019  6:17 AM

## 2019-02-13 NOTE — QUICK NOTE
Spoke with Cardiology regarding preoperative cardiac clearance given HTN and holosystolic murmur  Instructed to order ECHO and they will be by to see her     Patient's RN aware as well

## 2019-02-13 NOTE — SOCIAL WORK
CM introduced self to pt and explained role at the bedside  Pt lives with her  Diamond Grider [phone] who is her emergency contact; no POA available in  [place] half of a double with 10 julio and a full flight up to bedroom and full bathroom with grab bar in shower  PTA pt was independent with ADL's and ambulation; no DME needed; has access to a cane if needed  Pt is retired and able to drive herself  PCP is Dr [facility] and preferred pharmacy is [facility] in [place]  No hx of etoh/drug abuse or tx and no mental health dx  No hx VNA/HHC or IP rehab  Pt's  will provide transportation when medically stable for d/c     CM reviewed d/c planning process including the following: identifying help at home, patient preference for d/c planning needs, Discharge Lounge, [facility] Meds to Bed program, availability of treatment team to discuss questions or concerns patient and/or family may have regarding understanding medications and recognizing signs and symptoms once discharged  CM also encouraged patient to follow up with all recommended appointments after discharge  Patient advised of importance for patient and family to participate in managing patients medical well being        Marie Portillo,  [phone]

## 2019-02-13 NOTE — H&P
H&P Note - Gynecology Oncology  Markel Jon 68 y o  MRN: [id number]  Unit/Bed#: ED 02 Encounter: [id number]      ASSESSMENT:  67 yo p/w diffuse abd pain, b/l ovarian cysts  PLAN:  Diffuse abdominal pain: motrin, percocet, percocet, dilaudid  Bilateral ovarian cysts: f/u CA-125, surgical consult 2/13  HTN: home meds  UTI: cont home Keflex  FEN: regular, GI: Colace, DVT ppx: SCDs  Dispo: 23 hr Obs      Chief complaint:   Chief Complaint   Patient presents with    Abdominal Pain     Pt states "I was here Sunday and they said I have a cyst on my ovary, a UTI and a hernia  I have an appointment with the OBGYN tomorrow but I have pain today and they said I should come back if I have pain", denies n/v/d       SUBJECTIVE:    History of Present Illness    HPI:  Markel Jon is a 68 y o  female who presents with worsening diffuse abdominal pain  She was seen in the ED on 2/10/19 for same  Pt reports c/w keflex for UTI            RoS:  Pain: 10/10  Tolerating Oral Intake: is tolerating PO liquids and solids  Voiding: yes - spontaneously  Flatus: yes  Bowel Movement: no  Ambulating: yes  Chest Pain: no  Shortness of Breath: no  Leg Pain/Discomfort: no  Vaginal Bleeding: denies        OBJECTIVE    Historical Information     Past Medical History:   Diagnosis Date    Hypertension     Shortness of breath     Skin neoplasm     last assessed: 6/13/2017       Past Surgical History:   Procedure Laterality Date    TONSILLECTOMY         OB History   No data available       Family History   Problem Relation Age of Onset    Hypertension Mother     Lung cancer Father     Hypertension Sister     Lymphoma Brother 39    Hypertension Brother     Schizophrenia Brother     Depression Son     Schizophrenia Son     Hypertension Family     Heart disease Other        Social History     Socioeconomic History    Marital status: /Civil Union     Spouse name: Not on file    Number of children: Not on file    Years of education: Not on file    Highest education level: Not on file   Occupational History    Not on file   Social Needs    Financial resource strain: Not on file    Food insecurity:     Worry: Not on file     Inability: Not on file    Transportation needs:     Medical: Not on file     Non-medical: Not on file   Tobacco Use    Smoking status: Never Smoker    Smokeless tobacco: Never Used   Substance and Sexual Activity    Alcohol use: No    Drug use: No    Sexual activity: Not on file   Lifestyle    Physical activity:     Days per week: Not on file     Minutes per session: Not on file    Stress: Not on file   Relationships    Social connections:     Talks on phone: Not on file     Gets together: Not on file     Attends Protestant service: Not on file     Active member of club or organization: Not on file     Attends meetings of clubs or organizations: Not on file     Relationship status: Not on file    Intimate partner violence:     Fear of current or ex partner: Not on file     Emotionally abused: Not on file     Physically abused: Not on file     Forced sexual activity: Not on file   Other Topics Concern    Not on file   Social History Narrative    Daily coffee consumption (___cups/day)    Daily cola consumption (____cans/day)    Daily tea consumption (____cups/day)    Uses safety equipment-seatbelts       Social History     Substance and Sexual Activity   Alcohol Use No     Social History     Substance and Sexual Activity   Drug Use No     Social History     Tobacco Use   Smoking Status Never Smoker   Smokeless Tobacco Never Used         (Not in a hospital admission)  No current facility-administered medications on file prior to encounter        Current Outpatient Medications on File Prior to Encounter   Medication Sig Dispense Refill    cephalexin (KEFLEX) 250 mg capsule Take 2 capsules (500 mg total) by mouth 2 (two) times a day for 7 days 28 capsule 0    lisinopril-hydrochlorothiazide (PRINZIDE,ZESTORETIC) 20-25 MG per tablet Take 1 tablet by mouth daily for 30 days 30 tablet 3    [DISCONTINUED] irbesartan-hydrochlorothiazide (AVALIDE) 300-12 5 MG per tablet Take 1 tablet by mouth daily 90 tablet 3       No Known Allergies    Patient Vitals for the past 24 hrs:   BP Temp Temp src Pulse Resp SpO2 Height Weight   02/12/19 2107 152/71 -- -- 90 16 96 % -- --   02/12/19 1954 139/66 -- -- 100 18 99 % -- --   02/12/19 1900 121/73 -- -- 100 20 95 % -- --   02/12/19 1815 125/71 -- -- (!) 112 20 96 % -- --   02/12/19 1757 130/76 (!) 97 3 °F (36 3 °C) Oral (!) 123 22 95 % 4' 11" (1 499 m) 64 4 kg (141 lb 15 6 oz)     Oxygen Therapy  SpO2: 96 %  I/O       02/11 0701 - 02/12 0700 02/12 0701 - 02/13 0700    IV Piggyback  500    Total Intake(mL/kg)  500 (7 8)    Net  +500                Intake/Output Summary (Last 24 hours) at 2/12/2019 2108  Last data filed at 2/12/2019 2018  Gross per 24 hour   Intake 500 ml   Output --   Net 500 ml       Physical Exam:              Cardiovascular: regular rate, regular rhythm, rubs, gallops, systolic murmur present              Lungs: clear to auscultation bilaterally, no wheezing, rhonchi, or rales              Abdomen: diffuse tenderness to palpation, no rebound, no guarding              Lower Extremities: negative J Carlos's sign bilaterally              Eyes: Pupils are equal, round, and reactive to light  Extraocular movements are intact  Scleral icterus is absent                Back: no L/R CVA tenderness          Laboratory Studies:    Results from last 7 days   Lab Units 02/12/19  1852 02/10/19  2019   WBC Thousand/uL 19 81* 20 55*   HEMOGLOBIN g/dL 11 3* 12 7   MCV fL 90 88   PLATELETS Thousands/uL 401* 493*     Results from last 7 days   Lab Units 02/12/19  1852 02/10/19  2019   NEUTROS PCT % 90*  --    MONOS PCT % 6  --    MONO PCT %  --  4   EOS PCT % 0 0       Results from last 7 days   Lab Units 02/12/19  1852 02/10/19  2019   POTASSIUM mmol/L 3 4* 3 7   CHLORIDE mmol/L 98* 97*   CO2 mmol/L 27 25   BUN mg/dL 23 21   CREATININE mg/dL 0 89 0 70   EGFR ml/min/1 73sq m 65 87     Results from last 7 days   Lab Units 02/12/19  1852 02/10/19  2019   AST U/L 8 14   ALT U/L 13 19   ALK PHOS U/L 96 103     Results from last 7 days   Lab Units 02/12/19  1852 02/10/19  2019   ALBUMIN g/dL 3 3* 3 8       Results from last 7 days   Lab Units 02/12/19 1852   LIPASE u/L 122                 No results found for: Jose Messenger    Results from last 7 days   Lab Units 02/12/19  1852 02/10/19  2019   PLATELETS Thousands/uL 401* 493*       Results from last 7 days   Lab Units 02/10/19  2109   CLARITY UA  Clear   SPEC GRAV UA  >=1 030   PH UA  5 5   LEUKOCYTES UA  Moderate*   NITRITE UA  Negative   GLUCOSE UA mg/dl Negative   KETONES UA mg/dl Negative   UROBILINOGEN UA E U /dl 0 2   BILIRUBIN UA  Negative   BLOOD UA  Moderate*     Results from last 7 days   Lab Units 02/10/19  2109   RBC UA /hpf 4-10*   WBC UA /hpf Innumerable*   EPITHELIAL CELLS WET PREP /hpf None Seen   BACTERIA UA /hpf Innumerable*         No results found for: BLOODCX  Lab Results   Component Value Date    URINECX (A) 02/10/2019     >100,000 cfu/ml Gram Negative Ryan resembling Escherichia coli                 Results from last 7 days   Lab Units 02/10/19  2019   TROPONIN I ng/mL <0 02             ABO Grouping   Date Value Ref Range Status   04/07/2018 A  Final     Rh Factor   Date Value Ref Range Status   04/07/2018 Positive  Final     No results found for: ANTIBODYSCR  Specimen Expiration Date   Date Value Ref Range Status   04/07/2018 [id number]  Final         Medications:  Medication Administration - last 24 hours from 02/11/2019 2108 to 02/12/2019 2108       Date/Time Order Dose Route Action Action by     02/12/2019 2018 sodium chloride 0 9 % bolus 500 mL 0 mL Intravenous Stopped Cristiano Richardson RN     02/12/2019 1914 sodium chloride 0 9 % bolus 500 mL 500 mL Intravenous New Bag Cristiano Richardson RN     02/12/2019 1947 iohexol (OMNIPAQUE) 350 MG/ML injection (MULTI-DOSE) 70 mL 70 mL Intravenous Given Elsa Donovan          Continuous Infusions:    No current facility-administered medications for this encounter  Scheduled Meds:      PRN Meds:      Invasive  Devices:   Invasive Devices     Peripheral Intravenous Line            Peripheral IV 02/12/19 Right Antecubital less than 1 day                Additional Vitals:    Temp  Min: 97 3 °F (36 3 °C)  Max: 97 3 °F (36 3 °C)    IBW: 43 2 kg            Invasive/non-invasive ventilation settings:  Respiratory    Lab Data (Last 4 hours)    None         O2/Vent Data (Last 4 hours)    None              Code Status: Prior

## 2019-02-13 NOTE — ED NOTES
Patient care handoff occurred at this time  Report given by Sahra Harding RN  Patient resting comfortably with  at the bedside, awaiting IP bed assignment        Robby Mckenzie RN  02/13/19 6839

## 2019-02-13 NOTE — ED NOTES
Extremely overgrown, thick toenails present on both lower extremities     Eugene Vargas RN  02/12/19 3420

## 2019-02-13 NOTE — ED NOTES
Attempted to give floor 10 minute warning x 2 with no answer  Patient transported to floor with EDT and  at this time        Mina Devlin RN  02/13/19 4433

## 2019-02-14 ENCOUNTER — ANESTHESIA EVENT (OUTPATIENT)
Dept: PERIOP | Facility: HOSPITAL | Age: 74
End: 2019-02-14
Payer: COMMERCIAL

## 2019-02-14 ENCOUNTER — ANESTHESIA (OUTPATIENT)
Dept: PERIOP | Facility: HOSPITAL | Age: 74
End: 2019-02-14
Payer: COMMERCIAL

## 2019-02-14 LAB
ANION GAP SERPL CALCULATED.3IONS-SCNC: 8 MMOL/L (ref 4–13)
BASOPHILS # BLD AUTO: 0.04 THOUSANDS/ΜL (ref 0–0.1)
BASOPHILS NFR BLD AUTO: 0 % (ref 0–1)
BUN SERPL-MCNC: 27 MG/DL (ref 5–25)
CALCIUM SERPL-MCNC: 9.1 MG/DL (ref 8.3–10.1)
CHLORIDE SERPL-SCNC: 97 MMOL/L (ref 100–108)
CO2 SERPL-SCNC: 28 MMOL/L (ref 21–32)
CREAT SERPL-MCNC: 0.78 MG/DL (ref 0.6–1.3)
EOSINOPHIL # BLD AUTO: 0.04 THOUSAND/ΜL (ref 0–0.61)
EOSINOPHIL NFR BLD AUTO: 0 % (ref 0–6)
ERYTHROCYTE [DISTWIDTH] IN BLOOD BY AUTOMATED COUNT: 14.1 % (ref 11.6–15.1)
GFR SERPL CREATININE-BSD FRML MDRD: 76 ML/MIN/1.73SQ M
GLUCOSE P FAST SERPL-MCNC: 101 MG/DL (ref 65–99)
GLUCOSE SERPL-MCNC: 101 MG/DL (ref 65–140)
GLUCOSE SERPL-MCNC: 128 MG/DL (ref 65–140)
HCT VFR BLD AUTO: 32.4 % (ref 34.8–46.1)
HGB BLD-MCNC: 10.5 G/DL (ref 11.5–15.4)
IMM GRANULOCYTES # BLD AUTO: 0.13 THOUSAND/UL (ref 0–0.2)
IMM GRANULOCYTES NFR BLD AUTO: 1 % (ref 0–2)
LYMPHOCYTES # BLD AUTO: 0.78 THOUSANDS/ΜL (ref 0.6–4.47)
LYMPHOCYTES NFR BLD AUTO: 4 % (ref 14–44)
MCH RBC QN AUTO: 29.7 PG (ref 26.8–34.3)
MCHC RBC AUTO-ENTMCNC: 32.4 G/DL (ref 31.4–37.4)
MCV RBC AUTO: 92 FL (ref 82–98)
MONOCYTES # BLD AUTO: 1.15 THOUSAND/ΜL (ref 0.17–1.22)
MONOCYTES NFR BLD AUTO: 6 % (ref 4–12)
NEUTROPHILS # BLD AUTO: 15.97 THOUSANDS/ΜL (ref 1.85–7.62)
NEUTS SEG NFR BLD AUTO: 89 % (ref 43–75)
NRBC BLD AUTO-RTO: 0 /100 WBCS
PLATELET # BLD AUTO: 366 THOUSANDS/UL (ref 149–390)
PMV BLD AUTO: 8.9 FL (ref 8.9–12.7)
POTASSIUM SERPL-SCNC: 3.7 MMOL/L (ref 3.5–5.3)
RBC # BLD AUTO: 3.53 MILLION/UL (ref 3.81–5.12)
SODIUM SERPL-SCNC: 133 MMOL/L (ref 136–145)
WBC # BLD AUTO: 18.11 THOUSAND/UL (ref 4.31–10.16)

## 2019-02-14 PROCEDURE — 88112 CYTOPATH CELL ENHANCE TECH: CPT | Performed by: PATHOLOGY

## 2019-02-14 PROCEDURE — 88307 TISSUE EXAM BY PATHOLOGIST: CPT | Performed by: PATHOLOGY

## 2019-02-14 PROCEDURE — 85025 COMPLETE CBC W/AUTO DIFF WBC: CPT | Performed by: OBSTETRICS & GYNECOLOGY

## 2019-02-14 PROCEDURE — 58571 TLH W/T/O 250 G OR LESS: CPT | Performed by: OBSTETRICS & GYNECOLOGY

## 2019-02-14 PROCEDURE — 80048 BASIC METABOLIC PNL TOTAL CA: CPT | Performed by: OBSTETRICS & GYNECOLOGY

## 2019-02-14 PROCEDURE — 88305 TISSUE EXAM BY PATHOLOGIST: CPT | Performed by: PATHOLOGY

## 2019-02-14 PROCEDURE — 88332 PATH CONSLTJ SURG EA ADD BLK: CPT | Performed by: PATHOLOGY

## 2019-02-14 PROCEDURE — 82948 REAGENT STRIP/BLOOD GLUCOSE: CPT

## 2019-02-14 PROCEDURE — 88331 PATH CONSLTJ SURG 1 BLK 1SPC: CPT | Performed by: PATHOLOGY

## 2019-02-14 RX ORDER — SODIUM CHLORIDE, SODIUM LACTATE, POTASSIUM CHLORIDE, CALCIUM CHLORIDE 600; 310; 30; 20 MG/100ML; MG/100ML; MG/100ML; MG/100ML
50 INJECTION, SOLUTION INTRAVENOUS CONTINUOUS
Status: DISCONTINUED | OUTPATIENT
Start: 2019-02-14 | End: 2019-02-15

## 2019-02-14 RX ORDER — IBUPROFEN 600 MG/1
600 TABLET ORAL EVERY 6 HOURS PRN
Status: DISCONTINUED | OUTPATIENT
Start: 2019-02-14 | End: 2019-02-15 | Stop reason: HOSPADM

## 2019-02-14 RX ORDER — HYDROMORPHONE HCL/PF 1 MG/ML
SYRINGE (ML) INJECTION AS NEEDED
Status: DISCONTINUED | OUTPATIENT
Start: 2019-02-14 | End: 2019-02-14 | Stop reason: SURG

## 2019-02-14 RX ORDER — HYDROMORPHONE HCL/PF 1 MG/ML
0.5 SYRINGE (ML) INJECTION EVERY 4 HOURS PRN
Status: DISCONTINUED | OUTPATIENT
Start: 2019-02-14 | End: 2019-02-15 | Stop reason: HOSPADM

## 2019-02-14 RX ORDER — PROPOFOL 10 MG/ML
INJECTION, EMULSION INTRAVENOUS AS NEEDED
Status: DISCONTINUED | OUTPATIENT
Start: 2019-02-14 | End: 2019-02-14 | Stop reason: SURG

## 2019-02-14 RX ORDER — HYDROMORPHONE HCL/PF 1 MG/ML
0.2 SYRINGE (ML) INJECTION
Status: DISCONTINUED | OUTPATIENT
Start: 2019-02-14 | End: 2019-02-14 | Stop reason: HOSPADM

## 2019-02-14 RX ORDER — ONDANSETRON 2 MG/ML
4 INJECTION INTRAMUSCULAR; INTRAVENOUS ONCE AS NEEDED
Status: COMPLETED | OUTPATIENT
Start: 2019-02-14 | End: 2019-02-14

## 2019-02-14 RX ORDER — BUPIVACAINE HYDROCHLORIDE 5 MG/ML
INJECTION, SOLUTION EPIDURAL; INTRACAUDAL AS NEEDED
Status: DISCONTINUED | OUTPATIENT
Start: 2019-02-14 | End: 2019-02-14 | Stop reason: HOSPADM

## 2019-02-14 RX ORDER — ROCURONIUM BROMIDE 10 MG/ML
INJECTION, SOLUTION INTRAVENOUS AS NEEDED
Status: DISCONTINUED | OUTPATIENT
Start: 2019-02-14 | End: 2019-02-14 | Stop reason: SURG

## 2019-02-14 RX ORDER — SODIUM CHLORIDE, SODIUM LACTATE, POTASSIUM CHLORIDE, CALCIUM CHLORIDE 600; 310; 30; 20 MG/100ML; MG/100ML; MG/100ML; MG/100ML
INJECTION, SOLUTION INTRAVENOUS CONTINUOUS PRN
Status: DISCONTINUED | OUTPATIENT
Start: 2019-02-14 | End: 2019-02-14 | Stop reason: SURG

## 2019-02-14 RX ORDER — SODIUM CHLORIDE 9 MG/ML
125 INJECTION, SOLUTION INTRAVENOUS CONTINUOUS
Status: DISCONTINUED | OUTPATIENT
Start: 2019-02-14 | End: 2019-02-15

## 2019-02-14 RX ORDER — SODIUM CHLORIDE 9 MG/ML
INJECTION, SOLUTION INTRAVENOUS CONTINUOUS PRN
Status: DISCONTINUED | OUTPATIENT
Start: 2019-02-14 | End: 2019-02-14 | Stop reason: SURG

## 2019-02-14 RX ORDER — ONDANSETRON 2 MG/ML
4 INJECTION INTRAMUSCULAR; INTRAVENOUS EVERY 6 HOURS PRN
Status: DISCONTINUED | OUTPATIENT
Start: 2019-02-14 | End: 2019-02-15 | Stop reason: HOSPADM

## 2019-02-14 RX ORDER — MAGNESIUM HYDROXIDE 1200 MG/15ML
LIQUID ORAL AS NEEDED
Status: DISCONTINUED | OUTPATIENT
Start: 2019-02-14 | End: 2019-02-14 | Stop reason: HOSPADM

## 2019-02-14 RX ORDER — ACETAMINOPHEN 325 MG/1
975 TABLET ORAL EVERY 8 HOURS SCHEDULED
Status: DISCONTINUED | OUTPATIENT
Start: 2019-02-14 | End: 2019-02-15 | Stop reason: HOSPADM

## 2019-02-14 RX ORDER — ONDANSETRON 2 MG/ML
INJECTION INTRAMUSCULAR; INTRAVENOUS AS NEEDED
Status: DISCONTINUED | OUTPATIENT
Start: 2019-02-14 | End: 2019-02-14 | Stop reason: SURG

## 2019-02-14 RX ORDER — CALCIUM CARBONATE 200(500)MG
1000 TABLET,CHEWABLE ORAL DAILY PRN
Status: DISCONTINUED | OUTPATIENT
Start: 2019-02-14 | End: 2019-02-15 | Stop reason: HOSPADM

## 2019-02-14 RX ORDER — CEPHALEXIN 500 MG/1
500 CAPSULE ORAL 2 TIMES DAILY
Status: DISCONTINUED | OUTPATIENT
Start: 2019-02-14 | End: 2019-02-15 | Stop reason: HOSPADM

## 2019-02-14 RX ORDER — FENTANYL CITRATE 50 UG/ML
INJECTION, SOLUTION INTRAMUSCULAR; INTRAVENOUS AS NEEDED
Status: DISCONTINUED | OUTPATIENT
Start: 2019-02-14 | End: 2019-02-14 | Stop reason: SURG

## 2019-02-14 RX ORDER — MIDAZOLAM HYDROCHLORIDE 1 MG/ML
INJECTION INTRAMUSCULAR; INTRAVENOUS AS NEEDED
Status: DISCONTINUED | OUTPATIENT
Start: 2019-02-14 | End: 2019-02-14 | Stop reason: SURG

## 2019-02-14 RX ORDER — FENTANYL CITRATE/PF 50 MCG/ML
25 SYRINGE (ML) INJECTION
Status: DISCONTINUED | OUTPATIENT
Start: 2019-02-14 | End: 2019-02-14 | Stop reason: HOSPADM

## 2019-02-14 RX ORDER — SODIUM CHLORIDE, SODIUM LACTATE, POTASSIUM CHLORIDE, CALCIUM CHLORIDE 600; 310; 30; 20 MG/100ML; MG/100ML; MG/100ML; MG/100ML
20 INJECTION, SOLUTION INTRAVENOUS CONTINUOUS
Status: DISCONTINUED | OUTPATIENT
Start: 2019-02-14 | End: 2019-02-15

## 2019-02-14 RX ORDER — OXYCODONE HYDROCHLORIDE 10 MG/1
10 TABLET ORAL EVERY 4 HOURS PRN
Status: DISCONTINUED | OUTPATIENT
Start: 2019-02-14 | End: 2019-02-15 | Stop reason: HOSPADM

## 2019-02-14 RX ORDER — ONDANSETRON 2 MG/ML
4 INJECTION INTRAMUSCULAR; INTRAVENOUS ONCE AS NEEDED
Status: DISCONTINUED | OUTPATIENT
Start: 2019-02-14 | End: 2019-02-14 | Stop reason: HOSPADM

## 2019-02-14 RX ORDER — OXYCODONE HYDROCHLORIDE 5 MG/1
5 TABLET ORAL EVERY 4 HOURS PRN
Status: DISCONTINUED | OUTPATIENT
Start: 2019-02-14 | End: 2019-02-15 | Stop reason: HOSPADM

## 2019-02-14 RX ORDER — CEFAZOLIN SODIUM 1 G/3ML
INJECTION, POWDER, FOR SOLUTION INTRAMUSCULAR; INTRAVENOUS AS NEEDED
Status: DISCONTINUED | OUTPATIENT
Start: 2019-02-14 | End: 2019-02-14 | Stop reason: SURG

## 2019-02-14 RX ADMIN — FENTANYL CITRATE 50 MCG: 50 INJECTION, SOLUTION INTRAMUSCULAR; INTRAVENOUS at 17:53

## 2019-02-14 RX ADMIN — SODIUM CHLORIDE, SODIUM LACTATE, POTASSIUM CHLORIDE, AND CALCIUM CHLORIDE: .6; .31; .03; .02 INJECTION, SOLUTION INTRAVENOUS at 16:29

## 2019-02-14 RX ADMIN — HYDROMORPHONE HYDROCHLORIDE 1 MG: 1 INJECTION, SOLUTION INTRAMUSCULAR; INTRAVENOUS; SUBCUTANEOUS at 19:00

## 2019-02-14 RX ADMIN — MIDAZOLAM 2 MG: 1 INJECTION INTRAMUSCULAR; INTRAVENOUS at 16:30

## 2019-02-14 RX ADMIN — ROCURONIUM BROMIDE 50 MG: 10 INJECTION INTRAVENOUS at 16:35

## 2019-02-14 RX ADMIN — SODIUM CHLORIDE: 0.9 INJECTION, SOLUTION INTRAVENOUS at 18:39

## 2019-02-14 RX ADMIN — SUGAMMADEX 128 MG: 100 INJECTION, SOLUTION INTRAVENOUS at 19:24

## 2019-02-14 RX ADMIN — CEFAZOLIN 1000 MG: 1 INJECTION, POWDER, FOR SOLUTION INTRAVENOUS at 16:51

## 2019-02-14 RX ADMIN — PHENYLEPHRINE HYDROCHLORIDE 20 MCG/MIN: 10 INJECTION INTRAVENOUS at 17:59

## 2019-02-14 RX ADMIN — FENTANYL CITRATE 100 MCG: 50 INJECTION, SOLUTION INTRAMUSCULAR; INTRAVENOUS at 16:35

## 2019-02-14 RX ADMIN — ONDANSETRON 4 MG: 2 INJECTION INTRAMUSCULAR; INTRAVENOUS at 20:41

## 2019-02-14 RX ADMIN — CEPHALEXIN 500 MG: 500 CAPSULE ORAL at 21:47

## 2019-02-14 RX ADMIN — DEXAMETHASONE SODIUM PHOSPHATE 5 MG: 10 INJECTION INTRAMUSCULAR; INTRAVENOUS at 16:49

## 2019-02-14 RX ADMIN — ROCURONIUM BROMIDE 20 MG: 10 INJECTION INTRAVENOUS at 17:24

## 2019-02-14 RX ADMIN — ONDANSETRON 4 MG: 2 INJECTION INTRAMUSCULAR; INTRAVENOUS at 18:57

## 2019-02-14 RX ADMIN — PROPOFOL 130 MG: 10 INJECTION, EMULSION INTRAVENOUS at 16:35

## 2019-02-14 RX ADMIN — SODIUM CHLORIDE: 0.9 INJECTION, SOLUTION INTRAVENOUS at 16:43

## 2019-02-14 RX ADMIN — SODIUM CHLORIDE 125 ML/HR: 0.9 INJECTION, SOLUTION INTRAVENOUS at 21:17

## 2019-02-14 RX ADMIN — ACETAMINOPHEN 975 MG: 325 TABLET, FILM COATED ORAL at 21:47

## 2019-02-14 RX ADMIN — ENOXAPARIN SODIUM 40 MG: 40 INJECTION SUBCUTANEOUS at 10:00

## 2019-02-14 RX ADMIN — FENTANYL CITRATE 50 MCG: 50 INJECTION, SOLUTION INTRAMUSCULAR; INTRAVENOUS at 18:14

## 2019-02-14 RX ADMIN — ROCURONIUM BROMIDE 10 MG: 10 INJECTION INTRAVENOUS at 18:30

## 2019-02-14 RX ADMIN — LIDOCAINE HYDROCHLORIDE 50 MG: 20 INJECTION, SOLUTION INTRAVENOUS at 16:35

## 2019-02-14 NOTE — PROGRESS NOTES
Gyn Oncology Progress note   Pepe Boogie 68 y o  female MRN: [id number]  Unit/Bed#: CW2 217-06 Encounter: [id number]      A/P: 68 y o  admitted with pain in the setting of  complex adnexal cysts, largest of which measuring 12 2x10 7x14 0, with irregular internal wall and thick septations;  EMS also thickened at 1 2cm  1) Bilateral ovarian cysts:  CA-125 WNL: 23 6  Pt was cleared by Cardiology for surgery  ECHO demonstrated EF 70%  Pt scheduled for surgery today with Dr Malachi Golden: Diagnostic laparoscopy, robotic assisted TLH vs GREGORIA, BSO, possible staging, all other indicated procedures  2) Abdominal Pain:  Likely secondary to #1  Mild tenderness on this morning's exam, no acute abdomen present    3) UTI  home Keflex on hold until after surgery  Ends 2/18/19    4) Hypertension  home Lisinopril-HCTZ on hold until after surgery    5) Constipation  Possibly related to mass effect  Pt has not had a BM despite Miralax added to regimen yesterday  Will resume bowel regimen post-operatively  Currently on CLD, will be NPO at 0700 for surgery  6) Hypokalemia  K 3 4-->20mEq K-Dur-->f/u AM BMP    7) FEN  CLD-->NPO at 0700 for surgery this afternoon    8) DVT ppx   SCDs  Lovenox 40mg daily    9) Disposition  Possible discharge home after surgery today, depending on route performed      Pepe Boogie reports that pain is between 1-5/10  She has not had a BM despite the miralax added yesterday  Patient is currently voiding  She is ambulating  Patient is currently passing flatus  She denies nausea or vomiting  She has been on CLD since midnight  Patient denies fever, chills, chest pain, shortness of breath, or calf tenderness  /59 (BP Location: Left arm)   Pulse 77   Temp 97 7 °F (36 5 °C) (Oral)   Resp 18   Ht 4' 11" (1 499 m)   Wt 64 kg (141 lb)   SpO2 93%   BMI 28 48 kg/m²     I/O last 3 completed shifts: In: 650 [P O :150; IV Piggyback:500]  Out: -   I/O this shift:   In: 480 [P O :480]  Out: -     Lab Results   Component Value Date    WBC 19 81 (H) 02/12/2019    HGB 11 3 (L) 02/12/2019    HCT 34 3 (L) 02/12/2019    MCV 90 02/12/2019     (H) 02/12/2019       Lab Results   Component Value Date    GLUCOSE 148 (H) 04/07/2018    CALCIUM 9 0 02/12/2019     02/06/2017    K 3 4 (L) 02/12/2019    CO2 27 02/12/2019    CL 98 (L) 02/12/2019    BUN 23 02/12/2019    CREATININE 0 89 02/12/2019       No results found for: POCGLU    Physical Exam  Gen: AAOx3, NAD, resting in bed  CVS: N6I3+, RRR, systolic murmur present  Lungs: CTA b/l normal respiratory effort and rate  Abdomen: soft, mild tenderness to deep palpation, no rebound or guarding   Mass palpable 2cm below the umbilicus  Extremities: symmetric, nontender    Dwain Gomez MD  OBGYN, PGY-2  2/14/2019 6:12 AM

## 2019-02-14 NOTE — INTERVAL H&P NOTE
H&P reviewed  After examining the patient I find no changes in the patients condition since the H&P had been written  I have reviewed all appropriate bloodwork imaging consultations and admission history and physical  Briefly the patient has acute abdominal pain from the large 12 cm adnexal mass  She has a history of a cardiac murmur and has been cleared for surgery  We plan to move ahead with robotically sister edmar valenzuela copy hysterectomy bilateral so can go direct me possible staging procedureIf malignancy is identified  Open laparotomy may be required  I have personally re-consent of the patient for this procedure she is aware of all risks including bleeding quite a transfusion infection of them to local structures  We will move ahead with plan surgery later today

## 2019-02-14 NOTE — ANESTHESIA PREPROCEDURE EVALUATION
Review of Systems/Medical History  Patient summary reviewed        Cardiovascular  Hypertension ,   Comment: Transthoracic Echocardiogram  2D, M-mode, Doppler, and Color Doppler     Study date:  2018     Patient: Madonna Méndez  MR number: [id number]  Account number: [de-identified]  : 1945  Age: 71 years  Gender: Female  Status: Outpatient  Location: Bedside  Height: 57 in  Weight: 148 9 lb  BP: 125/ 64 mmHg     Indications: Syncope     Diagnoses: R55  - Syncope and collapse     Sonographer:  AGAPITO Gomez  Primary Physician:  Piter Patel DO  Group:  [facility]  Cardiology Fellow:  Dominique Storey MD  Interpreting Physician:  Derrek Slaughter MD     SUMMARY     LEFT VENTRICLE:  Systolic function was normal  Ejection fraction was estimated to be 58 %  There were no regional wall motion abnormalities  There was mild concentric hypertrophy      LEFT ATRIUM:  The atrium was mildly dilated      TRICUSPID VALVE:  There was mild regurgitation  Pulmonary artery systolic pressure was mildly increased  Estimated peak PA pressure was 40 mmHg      HISTORY: PRIOR HISTORY: SOB, Hypertension, Syncope     PROCEDURE: The procedure was performed at the bedside  This was a routine study  The transthoracic approach was used  The study included complete 2D imaging, M-mode, complete spectral Doppler, and color Doppler  The heart rate was 69 bpm,  at the start of the study  Images were obtained from the parasternal, apical, subcostal, and suprasternal notch acoustic windows  Image quality was adequate      LEFT VENTRICLE: Size was normal  Systolic function was normal  Ejection fraction was estimated to be 58 %  There were no regional wall motion abnormalities  Wall thickness was mildly increased  There was mild concentric hypertrophy    DOPPLER: Left ventricular diastolic function parameters were normal for the patient's age      RIGHT VENTRICLE: The size was normal  Systolic function was normal  Wall thickness was normal      LEFT ATRIUM: The atrium was mildly dilated      RIGHT ATRIUM: Size was normal      MITRAL VALVE: Valve structure was normal  There was normal leaflet separation  DOPPLER: The transmitral velocity was within the normal range  There was no evidence for stenosis  There was no regurgitation      AORTIC VALVE: The valve was trileaflet  Leaflets exhibited normal thickness and normal cuspal separation  DOPPLER: Transaortic velocity was within the normal range  There was no evidence for stenosis  There was no regurgitation      TRICUSPID VALVE: The valve structure was normal  There was normal leaflet separation  DOPPLER: The transtricuspid velocity was within the normal range  There was no evidence for stenosis  There was mild regurgitation  Pulmonary artery  systolic pressure was mildly increased  Estimated peak PA pressure was 40 mmHg      PULMONIC VALVE: Leaflets exhibited normal thickness, no calcification, and normal cuspal separation  DOPPLER: The transpulmonic velocity was within the normal range  There was trace regurgitation      PERICARDIUM: There was no pericardial effusion  The pericardium was normal in appearance      AORTA: The root exhibited normal size ,  Pulmonary  Shortness of breath,        GI/Hepatic  Negative GI/hepatic ROS          Negative  ROS        Endo/Other  Diabetes type 2 ,      GYN      Comment: Ovarian cyst       Hematology  Anemia ,     Musculoskeletal  Negative musculoskeletal ROS        Neurology    CNS neoplasm (meningioma) ,    Psychology           Physical Exam    Airway    Mallampati score: III  TM Distance: >3 FB  Neck ROM: full     Dental   Comment: Missing, crooked, discolored,     Cardiovascular      Pulmonary      Other Findings        Anesthesia Plan  ASA Score- 2     Anesthesia Type- general with ASA Monitors  Additional Monitors:   Airway Plan: ETT  Plan Factors-    Induction- intravenous      Postoperative Plan- Informed Consent- Anesthetic plan and risks discussed with patient  I personally reviewed this patient with the CRNA  Discussed and agreed on the Anesthesia Plan with the RANDALL Recinos

## 2019-02-14 NOTE — UTILIZATION REVIEW
Continued Stay Review    Date: 2/14    Vital Signs: /69 (BP Location: Right arm)   Pulse 85   Temp 97 9 °F (36 6 °C) (Oral)   Resp 18   Ht 4' 11" (1 499 m)   Wt 64 kg (141 lb)   SpO2 94%   BMI 28 48 kg/m²      Assessment/Plan:   p/w diffuse abd pain, b/l ovarian cysts  Plan:   NPO  OR Today - HYSTERECTOMY LAPAROSCOPIC TOTAL ([address]) W/ ROBOTICS (N/A Abdomen)  Pain control        Medications:   Scheduled Meds:   Current Facility-Administered Medications:  enoxaparin 40 mg Subcutaneous Q24H [facility] Kamini Larsen MD     Continuous Infusions:   PRN Meds:    Pertinent Labs/Diagnostic Results:   Wbc = 18 11  H/H = 10 5/32 4    Age/Sex: 68 y o  female     Discharge Plan: TBD

## 2019-02-15 VITALS
HEART RATE: 81 BPM | TEMPERATURE: 97.7 F | DIASTOLIC BLOOD PRESSURE: 56 MMHG | BODY MASS INDEX: 28.43 KG/M2 | OXYGEN SATURATION: 94 % | RESPIRATION RATE: 18 BRPM | SYSTOLIC BLOOD PRESSURE: 113 MMHG | HEIGHT: 59 IN | WEIGHT: 141 LBS

## 2019-02-15 DIAGNOSIS — R10.9 ABDOMINAL PAIN, UNSPECIFIED ABDOMINAL LOCATION: ICD-10-CM

## 2019-02-15 PROBLEM — Z90.79 S/P TOTAL HYSTERECTOMY AND BILATERAL SALPINGO-OOPHORECTOMY: Status: ACTIVE | Noted: 2019-02-15

## 2019-02-15 PROBLEM — Z90.722 S/P TOTAL HYSTERECTOMY AND BILATERAL SALPINGO-OOPHORECTOMY: Status: ACTIVE | Noted: 2019-02-15

## 2019-02-15 PROBLEM — Z90.710 S/P TOTAL HYSTERECTOMY AND BILATERAL SALPINGO-OOPHORECTOMY: Status: ACTIVE | Noted: 2019-02-15

## 2019-02-15 LAB
ANISOCYTOSIS BLD QL SMEAR: PRESENT
BASOPHILS # BLD MANUAL: 0 THOUSAND/UL (ref 0–0.1)
BASOPHILS NFR MAR MANUAL: 0 % (ref 0–1)
EOSINOPHIL # BLD MANUAL: 0 THOUSAND/UL (ref 0–0.4)
EOSINOPHIL NFR BLD MANUAL: 0 % (ref 0–6)
ERYTHROCYTE [DISTWIDTH] IN BLOOD BY AUTOMATED COUNT: 14 % (ref 11.6–15.1)
GLUCOSE SERPL-MCNC: 118 MG/DL (ref 65–140)
HCT VFR BLD AUTO: 30.9 % (ref 34.8–46.1)
HGB BLD-MCNC: 10 G/DL (ref 11.5–15.4)
LYMPHOCYTES # BLD AUTO: 0.82 THOUSAND/UL (ref 0.6–4.47)
LYMPHOCYTES # BLD AUTO: 5 % (ref 14–44)
MCH RBC QN AUTO: 29.9 PG (ref 26.8–34.3)
MCHC RBC AUTO-ENTMCNC: 32.4 G/DL (ref 31.4–37.4)
MCV RBC AUTO: 93 FL (ref 82–98)
MONOCYTES # BLD AUTO: 0.33 THOUSAND/UL (ref 0–1.22)
MONOCYTES NFR BLD: 2 % (ref 4–12)
NEUTROPHILS # BLD MANUAL: 15.25 THOUSAND/UL (ref 1.85–7.62)
NEUTS SEG NFR BLD AUTO: 93 % (ref 43–75)
NRBC BLD AUTO-RTO: 0 /100 WBCS
PLATELET # BLD AUTO: 366 THOUSANDS/UL (ref 149–390)
PLATELET BLD QL SMEAR: ADEQUATE
PMV BLD AUTO: 8.8 FL (ref 8.9–12.7)
POLYCHROMASIA BLD QL SMEAR: PRESENT
RBC # BLD AUTO: 3.34 MILLION/UL (ref 3.81–5.12)
RBC MORPH BLD: PRESENT
WBC # BLD AUTO: 16.4 THOUSAND/UL (ref 4.31–10.16)

## 2019-02-15 PROCEDURE — 85007 BL SMEAR W/DIFF WBC COUNT: CPT | Performed by: OBSTETRICS & GYNECOLOGY

## 2019-02-15 PROCEDURE — 82948 REAGENT STRIP/BLOOD GLUCOSE: CPT

## 2019-02-15 PROCEDURE — 85027 COMPLETE CBC AUTOMATED: CPT | Performed by: OBSTETRICS & GYNECOLOGY

## 2019-02-15 PROCEDURE — 99024 POSTOP FOLLOW-UP VISIT: CPT | Performed by: OBSTETRICS & GYNECOLOGY

## 2019-02-15 RX ORDER — OXYCODONE HYDROCHLORIDE 5 MG/1
5-10 TABLET ORAL EVERY 4 HOURS PRN
Qty: 15 TABLET | Refills: 0
Start: 2019-02-15 | End: 2019-02-15

## 2019-02-15 RX ORDER — CEPHALEXIN 500 MG/1
500 CAPSULE ORAL EVERY 12 HOURS SCHEDULED
Status: DISCONTINUED | OUTPATIENT
Start: 2019-02-15 | End: 2019-02-15

## 2019-02-15 RX ORDER — ACETAMINOPHEN 325 MG/1
650 TABLET ORAL EVERY 6 HOURS PRN
Qty: 30 TABLET | Refills: 0
Start: 2019-02-15 | End: 2019-07-18 | Stop reason: ALTCHOICE

## 2019-02-15 RX ORDER — OXYCODONE HYDROCHLORIDE 5 MG/1
5 TABLET ORAL EVERY 4 HOURS PRN
Qty: 15 TABLET | Refills: 0
Start: 2019-02-15 | End: 2019-02-15 | Stop reason: HOSPADM

## 2019-02-15 RX ORDER — OXYCODONE HYDROCHLORIDE 5 MG/1
5 TABLET ORAL EVERY 4 HOURS PRN
Qty: 15 TABLET | Refills: 0
Start: 2019-02-15 | End: 2019-02-15 | Stop reason: SDUPTHER

## 2019-02-15 RX ORDER — IBUPROFEN 600 MG/1
600 TABLET ORAL EVERY 6 HOURS PRN
Qty: 30 TABLET | Refills: 0
Start: 2019-02-15 | End: 2019-07-18 | Stop reason: ALTCHOICE

## 2019-02-15 RX ORDER — OXYCODONE HYDROCHLORIDE 5 MG/1
5-10 TABLET ORAL EVERY 4 HOURS PRN
Qty: 15 TABLET | Refills: 0 | Status: SHIPPED | OUTPATIENT
Start: 2019-02-15 | End: 2019-02-25

## 2019-02-15 RX ADMIN — ACETAMINOPHEN 975 MG: 325 TABLET, FILM COATED ORAL at 14:42

## 2019-02-15 RX ADMIN — CEPHALEXIN 500 MG: 500 CAPSULE ORAL at 09:18

## 2019-02-15 RX ADMIN — ACETAMINOPHEN 975 MG: 325 TABLET, FILM COATED ORAL at 06:36

## 2019-02-15 RX ADMIN — ACETAMINOPHEN 975 MG: 325 TABLET, FILM COATED ORAL at 21:03

## 2019-02-15 RX ADMIN — CEPHALEXIN 500 MG: 500 CAPSULE ORAL at 21:03

## 2019-02-15 NOTE — PROGRESS NOTES
Patient voided only 75cc, straight cathed for >200cc, sanchez left in for the indication of urinary retention  Patient to go home with sanchez x 1 week  Leg bag teaching to be completed, RN aware  Spoke to CM regarding setting up VNA  Made patient an appointment with Dr Daniel Jacobs next Thursday 2/21/19 at 1015AM for postop appointment and sanchez removal as patient lives in [place]  Discussed w/ patient and   She declines VNA at this time, CM aware and will cancel VNA request    Post surgical discharge precautions reviewed  Indwelling sanchez catheter precautions reviewed  Patient encouraged to call with any questions or concerns  Rx for Oxycodone on chart  Agrees to appointment next week       Dr Bertha Atkinson aware

## 2019-02-15 NOTE — DISCHARGE SUMMARY
Discharge Summary - GYN Oncology   Tari Ill 68 y o  female MRN: [id number]  Unit/Bed#: CW2 217-06 Encounter: [id number]    Admission Date:   Admission Orders (From admission, onward)    Ordered        02/12/19 2101  Place in Observation  Once     Order ID Start Status   [id number] 02/12/19 2101 Completed                Admitting Diagnosis:  Ovarian cyst [N83 209]  Abdominal pain [R10 9]  Hiatal hernia [K44 9]    HPI: Roderick Montejo is a 68year old female who initially presented to the Emergency Room on Lex 2/10/19 for abdominal pain, CT remarkable for bilateral anexal cysts, largest of which on the right measuring 12 2x10 7x14 8cm with internal wall irregularity and multiple thick septations  Plan was for outpatient follow up with GYN Oncology  She re-presented to the ED on Tuesday 2/12/19 with pain  CT at that time unchanged from prior  She was admitted for pain control and surgical resection  Procedures Performed:   Robotic assisted total laparoscopic hysterectomy  Bilateral salpingo-oophorectomy   Cytoscopy    Hospital Course:     #1 Bilateral Ovarian Cysts  CT as mentioned above,  23 6  Status post surgical resection as mentioned above  Frozen section negative for malignancy, follow up final pathology as outpatient    #2 Postoperative Management  Unremarkable postoperative course  On day of discharge she was tolerating a regular diet, ambulating at baseline, passing flatus, and pain was controlled with PO medications  Postoperative urinary retention was present, see A/P below  #3 Postoperative urinary retention  Patient failed voiding trial on POD#1  Sanchez catheter re-inserted  Will go home with sanchez x 1 week  Leg bag teaching provided  Patient declined VNA  Will follow up in 1 week in office for postoperative check and sanchez catheter removal, appointment made prior to discharge      #4 Systolic heart murmur, chronic  She received Cardiac clearance given her history of HTN and a systolic heart murmur  ECHO was completed which showed an EF of 70% with no valvular abnormalities  # 5Hypertension, chronic  Blood pressures well controlled while inpatient  Patient will resume home medication Lisinopril-HCTZ upon discharge  #6 UTI  Patient was continued on home Keflex, complete through 2/18/19  #7 Hypokalemia  Repleted, repeat normal    Significant Findings, Care, Treatment and Services Provided: N/A    Lab Results: I have personally reviewed pertinent lab results  Complications: None    Discharge Diagnosis:  Same as above, status post surgery as mentioned above    Resolved Problems  Date Reviewed: 2/14/2019    None          Condition at Discharge: stable     Discharge instructions/Information to patient and family:   See after visit summary for information provided to patient and family  Provisions for Follow-Up Care:  See after visit summary for information related to follow-up care and any pertinent home health orders  Disposition: Home    Planned Readmission: No    Discharge Medications:  See after visit summary for reconciled discharge medications provided to patient and family

## 2019-02-15 NOTE — SOCIAL WORK
Discussed pt during care coordination rounds  S/w gyn-onc resident whom stated pt should be cleared for discharge today after voiding trial & has no CM needs

## 2019-02-15 NOTE — SOCIAL WORK
TC from Gamal Guerra from gyn onc stating pt now going home with sanchez & will need vna  Received a second TC from Gamal Guerra with updates stating pt does not want vna now & will go home with no needs  They set up arrangements for f/u in doctor office for sanchez care & teaching tbd with nsg staff here before pt leave   Cancelled VNA referral

## 2019-02-15 NOTE — ANESTHESIA POSTPROCEDURE EVALUATION
Post-Op Assessment Note    CV Status:  Stable  Pain Score: 0    Pain management: adequate     Mental Status:  Alert and awake   Hydration Status:  Euvolemic   PONV Controlled:  Controlled   Airway Patency:  Patent   Post Op Vitals Reviewed: Yes      Staff: Anesthesiologist, CRNA           BP (P) 119/67 (02/14/19 2000)    Temp (P) 98 9 °F (37 2 °C) (02/14/19 2000)    Pulse (P) 94 (02/14/19 2000)   Resp (!) (P) 26 (02/14/19 2000)    SpO2   100

## 2019-02-15 NOTE — QUICK NOTE
James Mcmillan is a 67 yo s/p RA-TLH, BSO and cytso for a 14cm complex mass  Patient resting comfortable  Per nursing no issues  Vasquez remains in place with adequate output       Vitals:    02/14/19 2331   BP: 109/57   Pulse: 80   Resp: (!) 11   Temp: 97 5 °F (36 4 °C)   SpO2: 98%       Intake/Output Summary (Last 24 hours) at 2/15/2019 0127  Last data filed at 2/14/2019 2117  Gross per 24 hour   Intake 3700 ml   Output 1150 ml   Net 2550 ml     Clau Villagomez MD, MPH  OB/GYN, PGY3  2/15/2019, 1:28 AM

## 2019-02-15 NOTE — UTILIZATION REVIEW
Continued Stay Review    Date: 2/15    Vital Signs: /56 (BP Location: Left arm)   Pulse 81   Temp 97 7 °F (36 5 °C) (Oral)   Resp 18   Ht 4' 11" (1 499 m)   Wt 64 kg (141 lb)   SpO2 94%   BMI 28 48 kg/m²      Assessment/Plan:   POD#1 s/p Robotic TLH and BSO, cysto for bilateral ovarian cysts/ UTI  Frozen section of specimen noted to be benign  Will follow-up final pathology outpatient  CA-125 WNL: 23 6  Continue routine post-op care  Sanchez catheter in place: adequate UOP overnight with 500cc/9hrs (0 9cc/kg/hr)  Will discontinue sanchez this AM and follow-up voiding trial  Pre-Op Hgb 10 5-->10 0  Analgesia: Tylenol 975mg q8hrs  Motrin and Roxicodone PRN  Pt has not required anything other than Tylenol for pain control  Continue Keflex 500mg PO BID  Ends 2/18/19    Failed void trial - will d/c home with sanchez cath    Medications:   Scheduled Meds:   Current Facility-Administered Medications:  acetaminophen 975 mg Oral Q8H [address]   cephalexin 500 mg Oral BID     Continuous Infusions:    PRN Meds: calcium carbonate    HYDROmorphone    ibuprofen    ondansetron    oxyCODONE    Pertinent Labs/Diagnostic Results:   02/15/19 0517     WBC 4 31 - 10 16 Thousand/uL 16  40High     RBC 3 81 - 5 12 Million/uL 3 34Low     Hemoglobin 11 5 - 15 4 g/dL 10 0Low     Hematocrit 34 8 - 46 1 % 30 9Low       Age/Sex: 68 y o  female     Discharge Plan: Home with sanchez cath   Pt declined [facility] services

## 2019-02-15 NOTE — QUICK NOTE
Patient seen and evaluated at bedside  She is feeling well without complaints  Pain is well controlled  She is tolerating regular diet, just finished lunch, without nausea or vomiting  Vasquez removed ~630 this morning  Patient reports no urge to void but agrees to get up and try  Discussed w/ RN to bladder scan, get up to the restroom and then repeat bladder scan after  Awaiting call back after trial to determine further urinary interventions

## 2019-02-15 NOTE — OP NOTE
OPERATIVE REPORT  PATIENT NAME: Tobias Aponte    :  1945  MRN: [id number]  Pt Location: [facility] OR ROOM 14    SURGERY DATE: 2019    Surgeon(s) and Role:     * Alden Pereyra MD - Primary     * Brando Milan PA-C - Assisting     * Alexandra Peterson MD - Assisting     * Serafin Rocha MD - Assisting    Preop Diagnosis:  Ovarian mass [N83 9]    Post-Op Diagnosis Codes:     * Ovarian mass [N83 9]    Procedure(s) (LRB):  HYSTERECTOMY LAPAROSCOPIC TOTAL ([address]) W/ ROBOTICS bilateral salpingooophorectomy with great difficulty due to mass of 12 cm and taking 2 5 hours (N/A)  CYSTOSCOPY (N/A)    Specimen(s):  ID Type Source Tests Collected by Time Destination   1 :  Washing Pelvic Washing NON-GYNECOLOGIC CYTOLOGY Alden Pereyra MD 2019 1734    2 : Left Tube and Left Ovary Tissue Ovary, Left TISSUE EXAM Alden Pereyra MD 2019 1809    3 : Right ovary and Right Tube Tissue Ovary, Right TISSUE EXAM Alden Pereyra MD 2019 1832        Estimated Blood Loss:   50 mL    Drains:  Urethral Catheter Non-latex 16 Fr  (Active)   Number of days: 0       Anesthesia Type:   General    Operative Indications:  Ovarian mass [N83 9]  Abdominal pain    Operative Findings:  Upon opening the abdomen there was a large purple ovary on the right measuring approximately 12 x 15 cm  It was densely adherent to the sigmoid colon and appendix and pelvic structures  It was visibly torsed on its vascular pedicle twice  The left ovary had a small 3 cm meter cyst on it  The uterus was unremarkable the remainder of the abdomen was unremarkable with a exception of the dense adhesions in the pelvis which were dissected free from between the mass and the bowel  Frozen section diagnosis indicated a benign serous cystadenoma on the left  The right ovary was torus and difficult to identify any epithelial structures  No overt malignancy existed  Upon completion of the procedure cystoscopy was performed    The bilateral ureteral or feces were noted to have functioning ureteral jets  Complications:   None    Procedure and Technique:  The patient was identified as her cells and and appropriate time-out procedure was performed  The patient was placed in dorsal lithotomy position and prepped and draped in the usual sterile fashion including a 3 times vaginal Betadine prep  Attention was turned to the vagina where of Vasquez catheter was placed without difficulty  An EEA sizer was then placed into the vagina without difficulty  Attention was then turned to the abdomen where planned incision sites were marked and infiltrated with 0 5% Marcaine  The periumbilical incision was created with a knife  The Veresss needle was placed without difficulty  The abdomen was insufflated with sterile gas  The remainder of the incisions were created with a knife  The 8 mm trocar was placed into the eliud umbilical incision and a camera was placed without difficulty  Under direct visualization the 3 other DaVinci 8 mm ports were placed without difficulty  A 5 mm assistant port was placed in the right lower quadrant without difficulty  The patient was placed in steep Trendelenburg position  The robot was brought in and side docked without difficulty  Electrocautery sheers were placed in the number 1 arm, a Vessel Sealer was placed in the 3  Arm, and a Prograsp was placed in the 4  Arm  I broke scrub an approached the console  The right round ligament was taken down with the Vessel Sealer  The right pelvic sidewall was opened with cautery yolanda  The perivesical and pararectal spaces were open  The infundibular pelvic ligament and ureter on the right were  in this retroperitoneal space  The path of the ureter was identified  The infundibular pelvic ligament was then taken down with the Vessel Sealer in multiple bites  The posterior broad ligament was taken down with electrocautery yolanda    The utero ovarian pedicle which was taken down with the Vessel sealer  As the entire markedly enlarged right adnexa was now removed from its vascular pedicles sharp and blunt dissection was used to dissect the ovarian mass free from the appendix the bowel and the pelvic sidewall  The mass was then placed in the upper abdomen for continuation of the procedure  The bladder flap was begun with theEelectrocautery Alba and taken down to the upper vagina  The uterine artery pedicle was skeletonized and taken down with the Vessel Sealer  The cardinal ligament was taken down with sequential bites with the Vessel Sealer  The uterus sacral ligament was taken down with the Vessel Sealer  The same procedure was then performed on the patient's left-hand side again taking care to identify the ureter prior to taking down the infundibular pelvic ligament  No torsion was noted on this side  The vaginal cuff was then taken down with Electrocautery Alba  The specimen was retrieved through the vagina with a single tooth tenaculum  The uterus cervix left tube and ovary were brought out 1st   The right tube and ovary were too large to fit in a bag and was ruptured and fluid was suctioned from this with the suction device  The remainder of the cystic ovary was placed into a 15 mm endo catch bag which was brought through the vagina  The bag was then removed without difficulty  The vaginal cuff was closed with a running suture of 2 0 Stratafix  The abdomen was explored and no further bleeding sites were noted  The pneumoperitoneum was allowed to escape and no bleeding was noted  All instruments were removed  The trocars were removed  The incision sites were closed with 4 0 Monocryl without difficulty  It should be noted that this was a difficult procedure due to the large 12-15 cm ovarian mass which was torus 10 densely adherent to local structures  The overall procedure took between 2 and happen 3 hours      The patient tolerated procedure without complications  The sponge and instrument counts were correct prior to closure  Hemostasis was assured prior to closure  The patient was brought to the recovery room in stable condition      Willie Smith MD  Director of Cancer Survivorship  Division of [address] [place] [address] was present for the entire procedure    Patient Disposition:  PACU     SIGNATURE: Willie Smith MD  DATE: February 14, 2019  TIME: 8:01 PM

## 2019-02-15 NOTE — PROGRESS NOTES
Gyn Oncology Progress note   Vinnie Apley 68 y o  female MRN: [id number]  Unit/Bed#: CW2 217-06 Encounter: [id number]      A/P: 68 y o  POD#1 s/p Robotic TLH and BSO, cysto for bilateral ovarian cysts     1) : POD#1 s/p Robotic TLH and BSO, cysto for bilateral ovarian cysts  Frozen section of specimen noted to be benign  Will follow-up final pathology outpatient  CA-125 WNL: 23 6  Continue routine post-op care  Sanchez catheter in place: adequate UOP overnight with 500cc/9hrs (0 9cc/kg/hr)  Will discontinue sanchez this AM and follow-up voiding trial  Pre-Op Hgb 10 5-->10 0  Analgesia: Tylenol 975mg q8hrs  Motrin and Roxicodone PRN  Pt has not required anything other than Tylenol for pain control       2) UTI  Continue Keflex 500mg PO BID  Ends 2/18/19     3) Hypertension  Continue Home Losartan-HCTZ     4) Hypokalemia, resolved  K 3 4-->20mEq K-Dur-->3 7 on 2/14/19     5) FEN  Regular diet  IVFs discontinued this AM     6) DVT ppx   SCDs    7) Dispo: Stable, anticipate discharge home later today pending voiding trial    Vinnie Apley has no current complaints  She states she is feeling great after surgery with essentially "no pain at al " She has not had anything to eat given late transfer to room after surgery  She states she has been drinking water  Otherwise denies nausea and vomiting  She has not passed flatus or had a BM yet  She has not been OOB  She is voiding via sanchez catheter  Patient denies fever, chills, chest pain, shortness of breath, or calf tenderness  /57 (BP Location: Left arm)   Pulse 80   Temp 97 5 °F (36 4 °C) (Oral)   Resp (!) 11   Ht 4' 11" (1 499 m)   Wt 64 kg (141 lb)   SpO2 98%   BMI 28 48 kg/m²     I/O last 3 completed shifts: In: 1630 [P O :630; I V :1000]  Out: -   I/O this shift:  In: 2700 [I V :2700]  Out: 1300 [Urine:850;  Other:400; Blood:50]    Lab Results   Component Value Date    WBC 16 40 (H) 02/15/2019    HGB 10 0 (L) 02/15/2019    HCT 30 9 (L) 02/15/2019    MCV 93 02/15/2019     02/15/2019       Lab Results   Component Value Date    GLUCOSE 148 (H) 04/07/2018    CALCIUM 9 1 02/14/2019     02/06/2017    K 3 7 02/14/2019    CO2 28 02/14/2019    CL 97 (L) 02/14/2019    BUN 27 (H) 02/14/2019    CREATININE 0 78 02/14/2019       Lab Results   Component Value Date/Time    POCGLU 128 02/14/2019 09:13 PM       Physical Exam   Constitutional: She is oriented to person, place, and time  She appears well-developed and well-nourished  No distress  HENT:   Head: Normocephalic and atraumatic  Cardiovascular: Normal rate and regular rhythm  Exam reveals no gallop and no friction rub  Murmur (holosystolic) heard  Pulmonary/Chest: Effort normal and breath sounds normal  No stridor  No respiratory distress  She has no wheezes  She has no rales  Abdominal: Soft  She exhibits no distension and no mass  There is no tenderness  There is no rebound and no guarding  Hypoactive bowel sounds  Incisions are C/D/I with histoacryl  No erythema, drainage or fluctuance    Genitourinary:   Genitourinary Comments: Vasquez catheter in place   Musculoskeletal: Normal range of motion  She exhibits no edema, tenderness or deformity  SCDs on and on   Neurological: She is alert and oriented to person, place, and time  Skin: She is not diaphoretic  Psychiatric: She has a normal mood and affect   Her behavior is normal  Judgment and thought content normal      Sienna Fisher MD  OBGYN, PGY-2  2/15/2019 6:26 AM

## 2019-02-15 NOTE — PHYSICIAN ADVISOR
Current patient class: Observation  The patient is currently on Hospital Day: 3 at [address] [place] [address]        The patient was admitted to the hospital  on N/A at N/A for the following diagnosis:  Hiatal hernia [K44 9]  Ovarian cyst [N83 209]  Abdominal pain [R10 9]     After review of the relevant documentation, labs, vital signs and test results, the patient is most appropriate for OBSERVATION STATUS  Rationale is as follows: The patient is a 68 yrs   Female who presented to the ED at 2/12/2019  6:03 PM with a chief complaint of Abdominal Pain (Pt states "I was here Sunday and they said I have a cyst on my ovary, a UTI and a hernia  I have an appointment with the OBGYN tomorrow but I have pain today and they said I should come back if I have pain", denies n/v/d)     The patient was admitted on 2/12, the patient required clearance by cardiology prior to the surgical procedure  Medications reviewed  The patient to undergo surgical intervention on 2/14  The patient is appropriate for OBSERVATION status       The patients vitals on arrival were ED Triage Vitals [02/12/19 1757]   Temperature Pulse Respirations Blood Pressure SpO2   (!) 97 3 °F (36 3 °C) (!) 123 22 130/76 95 %      Temp Source Heart Rate Source Patient Position - Orthostatic VS BP Location FiO2 (%)   Oral Monitor Sitting Right arm --      Pain Score       5           Past Medical History:   Diagnosis Date    Anemia     Cataracts, bilateral     Heart murmur 04/2018    History of transfusion 2003    had 4 units    Hypertension     Meningioma ([address] ) 04/2018    MRI every 6months    Ovarian cyst 2006    Shortness of breath     Skin neoplasm     last assessed: 6/13/2017     Past Surgical History:   Procedure Laterality Date    TONSILLECTOMY             Consults have been placed to:   IP CONSULT TO ACUTE CARE SURGERY  IP CONSULT TO OB GYN  IP CONSULT TO CARDIOLOGY    Vitals:    02/13/19 1543 02/13/19 2317 02/14/19 0700 02/14/19 1522   BP: 117/59 100/59 123/60 125/69   BP Location: Right arm Left arm Right arm Right arm   Pulse: 76 77 74 85   Resp: 18 18 18 18   Temp: 97 5 °F (36 4 °C) 97 7 °F (36 5 °C) 97 6 °F (36 4 °C) 97 9 °F (36 6 °C)   TempSrc: Oral Oral Oral Oral   SpO2: 96% 93% 95% 94%   Weight:       Height:           Most recent labs:    Recent Labs     02/12/19  1852 02/14/19  0646   WBC 19 81* 18 11*   HGB 11 3* 10 5*   HCT 34 3* 32 4*   * 366   K 3 4* 3 7   CALCIUM 9 0 9 1   BUN 23 27*   CREATININE 0 89 0 78   LIPASE 122  --    AST 8  --    ALT 13  --    ALKPHOS 96  --        Scheduled Meds:  Current Facility-Administered Medications:  [MAR Hold] enoxaparin 40 mg Subcutaneous Q24H [facility] Kamini Hillman MD   [MAR Hold] HYDROmorphone 0 5 mg Intravenous Q3H PRN Christean Kocher, MD   [facility] Hold] ibuprofen 600 mg Oral Q6H PRN Christean Kocher, MD   [facility] Hold] ondansetron 4 mg Intravenous Q6H PRN Christean Kocher, MD   [facility] Hold] oxyCODONE 10 mg Oral Q4H PRN Christean Kocher, MD   [facility] Hold] oxyCODONE-acetaminophen 1 tablet Oral Q4H PRN Christean Kocher, MD     Facility-Administered Medications Ordered in Other Encounters:  ceFAZolin  Intravenous PRN Marisel Ohara CRNA    dexamethasone  Intravenous PRN Marisel Ohara CRNA    fentanyl citrate (PF)  Intravenous PRN Marisel Ohara CRNA    HYDROmorphone  Intravenous PRN Marilee Renteria CRNA    lactated ringers   Continuous PRN Marisel Ohara CRNA    lidocaine (cardiac)  Intravenous PRN Marisel Ohara CRNA    midazolam  Intravenous PRN Marisel Ohara CRNA    ondansetron  Intravenous PRN Marilee Renteria CRNA    phenlyephrine  Intravenous PRN Berneta Oahra, CRNA    phenylephrine (DARVIN-SYNEPHRINE) 50 mg (STANDARD CONCENTRATION) in sodium chloride 0 9% 250 mL   Continuous PRN Marilee Renteria CRNA Last Rate: 20 mcg/min (02/14/19 1759)   propofol  Intravenous PRN Marisel Ohara CRNA    rocuronium  Intravenous PRN Marilee Renteria, RANDALL    sodium chloride   Continuous PRN Marisel Ohara, CRNA Last Rate: 0 mL/hr at 02/14/19 9858     Continuous Infusions:   PRN Meds:  [MAR Hold] HYDROmorphone    [MAR Hold] ibuprofen    [MAR Hold] ondansetron    [MAR Hold] oxyCODONE    [MAR Hold] oxyCODONE-acetaminophen    Surgical procedures (if appropriate):  Procedure(s):   HYSTERECTOMY LAPAROSCOPIC TOTAL ([address]) W/ ROBOTICS

## 2019-02-15 NOTE — DISCHARGE INSTRUCTIONS
[facility]  Santos Wheeler, Cheryl 1979, and Nubia Ro  [phone]    Hysterectomy Discharge Instructions    WHAT YOU NEED TO KNOW:   A hysterectomy is surgery to remove your uterus  Your ovaries, fallopian tubes, cervix, or part of your vagina may also need to be removed  The organs and tissue that will be removed depends on your medical condition  After a hysterectomy, you will not be able to become pregnant  If your ovaries are removed, you will go through menopause if you have not already  DISCHARGE INSTRUCTIONS:   Contact your doctor at the number above if:   · You have a fever over 101o  · You have nausea or are vomiting that does not improve after a light meal    · Your pain is getting worse, even after you take medicine  · You feel pain or burning when you urinate, or you have trouble urinating  · You have pus or a foul-smelling odor coming from your vagina  · Your wound is red, swollen, or draining pus  · You see new or an increased amount of bright red blood coming from your vagina or your incisions  · You have questions or concerns about your condition or care  Seek care immediately:   · Your arm or leg feels warm, tender, and painful  It may look swollen and red  · You have increasing abdominal or pelvic pain  · You have heavy vaginal bleeding that fills 1 or more sanitary pads in 1 hour  Call 911 for any of the following:   · You feel lightheaded, short of breath, and have chest pain  · You cough up blood  Medicines: You may need any of the following:  · Prescription medicine may be given  You may receive a prescription for pain medication or be advised to use over the counter (OTC) pain medication such as acetaminophen (Tylenol) or ibuprofen (Advil, Motrin)  Ask your healthcare provider how to take this medicine safely  · NSAIDs , such as ibuprofen, help decrease swelling, pain, and fever   NSAIDs can cause stomach bleeding or kidney problems in certain people  If you take blood thinner medicine, always ask your healthcare provider if NSAIDs are safe for you  Always read the medicine label and follow directions  · Stool softeners help treat or prevent constipation  · Take your medicine as directed  Contact your healthcare provider if you think your medicine is not helping or if you have side effects  Tell him or her if you are allergic to any medicine  Keep a list of the medicines, vitamins, and herbs you take  Include the amounts, and when and why you take them  Bring the list or the pill bottles to follow-up visits  Carry your medicine list with you in case of an emergency  Activity:   · Rest as needed  Get up and move around as directed to help prevent blood clots  Start with short walks and slowly increase the distance every day  Limit the number of times you climb stairs to 2 times each day for the first week  Plan most of your daily activities on one level of your home  · Do not lift objects heavier than 10 pounds for 6 weeks  Avoid strenuous activity for 2 weeks  · Do not strain during bowel movements  High-fiber foods and extra liquids can help you prevent constipation  Examples of high-fiber foods are fruit and bran  Prune juice and water are good liquids to drink  · Do not have sex, use tampons, or douche for up to 8 weeks  You may shower as soon as the day after surgery  Tub baths can be taken starting 2 weeks after surgery  Do not go into pools or hot tubs until cleared by your doctor  · Ask when it is safe for you to drive  It is generally safe to drive after 2 weeks and when no longer taking prescription pain medication  · Ask when you may return to work and to other regular activities  Wound care: Care for your abdominal incisions as directed  Carefully wash around the wound with soap and water   If you have Hibiclens or medicated soap that you were instructed to use before surgery, you may use that to wash with for up to 2 days after surgery  If not, any mild non-scented, non-abrasive soap is safe  It is okay to let the soap and water run over your incision  Do not scrub your incision  Dry the area and put on new, clean bandages as directed  Change your bandages when they get wet or dirty  If you have strips of medical tape, let them fall off on their own  It may take 7 to 14 days for them to fall off  Check your incision every day for redness, swelling, or pus  Deep breathing: Take deep breaths and cough 10 times each hour  This will decrease your risk for a lung infection  Take a deep breath and hold it for as long as you can  Let the air out and then cough strongly  Deep breaths help open your airway  You may be given an incentive spirometer to help you take deep breaths  Put the plastic piece in your mouth and take a slow, deep breath, then let the air out and cough  Repeat these steps 10 times every hour  Get support: This surgery may be life-changing for you and your family  You will no longer be able to get pregnant  Sudden changes in the levels of your hormones may occur and cause mood swings and depression  You may feel angry, sad, or frightened, or cry frequently and unexpectedly  These feelings are normal  Talk to your healthcare provider about where you can get support  You can also ask if hormone replacement medicine is right for you  Follow up with your healthcare provider or gynecologist as directed: You may need to return to have stitches removed, and for other tests  Write down your questions so you remember to ask them during your visits  Vasquez Catheter Placement and Care   WHAT YOU NEED TO KNOW:   A Vasquez catheter is a sterile tube that is inserted into your bladder to drain urine  It is also called an indwelling urinary catheter  The tip of the catheter has a small balloon filled with solution that holds the catheter inside your bladder  DISCHARGE INSTRUCTIONS:   Return to the emergency department if:   · Your catheter comes out  · You suddenly have material that looks like sand in the tubing or drainage bag  · No urine is draining into the bag and you have checked the system  · You have pain in your hip, back, pelvis, or lower abdomen  · You are confused or cannot think clearly  Contact your healthcare provider if:   · You have a fever  · You have bladder spasms for more than 1 day after the catheter is placed  · You see blood in the tubing or drainage bag  · You have a rash or itching where the catheter tube is secured to your skin  · Urine leaks from or around the catheter, tubing, or drainage bag  · The closed drainage system has accidently come open or apart  · You see a layer of crystals inside the tubing  · You have questions or concerns about your condition or care  Care for your Vasquez catheter:   · Clean your genital area 2 times every day  Clean your catheter and the area around where it was inserted  Use soap and water  Clean your anal opening and catheter area after every bowel movement  · Secure the catheter tube  so you do not pull or move the catheter  This helps prevent pain and bladder spasms  Healthcare providers will show you how to use medical tape or a strap to secure the catheter tube to your body  · Keep a closed drainage system  Your Vasquez catheter should always be attached to the drainage bag to form a closed system  Do not disconnect any part of the closed system unless you need to change the bag  Care for your drainage bag:   · Ask if a leg bag is right for you  A leg bag can be worn under your clothes  Ask your healthcare provider for more information about a leg bag  · Keep the drainage bag below the level of your waist   This helps stop urine from moving back up the tubing and into your bladder  Do not loop or kink the tubing   This can cause urine to back up and collect in your bladder  Do not let the drainage bag touch or lie on the floor  · Empty the drainage bag when needed  The weight of a full drainage bag can be painful  Empty the drainage bag every 3 to 6 hours or when it is ? full  · Clean and change the drainage bag as directed  Ask your healthcare provider how often you should change the drainage bag and what cleaning solution to use  Wear disposable gloves when you change the bag  Do not allow the end of the catheter or tubing to touch anything  Clean the ends with an alcohol pad before you reconnect them  What to do if problems develop:   · No urine is draining into the bag:      ¨ Check for kinks in the tubing and straighten them out  ¨ Check the tape or strap used to secure the catheter tube to your skin  Make sure it is not blocking the tube  ¨ Make sure you are not sitting or lying on the tubing  ¨ Make sure the urine bag is hanging below the level of your waist     · Urine leaks from or around the catheter, tubing, or drainage bag:  Check if the closed drainage system has accidently come open or apart  Clean the catheter and tubing ends with a new alcohol pad and reconnect them  Prevent an infection:   · Wash your hands often  Wash before and after you touch your catheter, tubing, or drainage bag  Use soap and water  Wear clean disposable gloves when you care for your catheter or disconnect the drainage bag  Wash your hands before you prepare or eat food  · Drink liquids as directed  Ask your healthcare provider how much liquid to drink each day and which liquids are best for you  Liquids will help flush your kidneys and bladder to help prevent infection  Follow up with your healthcare provider as directed:  Write down your questions so you remember to ask them during your visits     © 2017 [employer] Information is for End User's use only and may not be sold, redistributed or otherwise used for commercial purposes  All illustrations and images included in CareNotes® are the copyrighted property of A D A M , Inc  or Kali Egan  The above information is an  only  It is not intended as medical advice for individual conditions or treatments  Talk to your doctor, nurse or pharmacist before following any medical regimen to see if it is safe and effective for you

## 2019-02-18 ENCOUNTER — TRANSITIONAL CARE MANAGEMENT (OUTPATIENT)
Dept: FAMILY MEDICINE CLINIC | Facility: CLINIC | Age: 74
End: 2019-02-18

## 2019-02-21 ENCOUNTER — OFFICE VISIT (OUTPATIENT)
Dept: GYNECOLOGIC ONCOLOGY | Facility: CLINIC | Age: 74
End: 2019-02-21
Payer: COMMERCIAL

## 2019-02-21 VITALS
WEIGHT: 138 LBS | RESPIRATION RATE: 18 BRPM | HEART RATE: 86 BPM | BODY MASS INDEX: 27.82 KG/M2 | HEIGHT: 59 IN | SYSTOLIC BLOOD PRESSURE: 130 MMHG | TEMPERATURE: 98.2 F | DIASTOLIC BLOOD PRESSURE: 82 MMHG

## 2019-02-21 DIAGNOSIS — Z90.722 S/P TOTAL HYSTERECTOMY AND BILATERAL SALPINGO-OOPHORECTOMY: ICD-10-CM

## 2019-02-21 DIAGNOSIS — N39.8 DYSFUNCTIONAL VOIDING OF URINE: Primary | ICD-10-CM

## 2019-02-21 DIAGNOSIS — Z90.710 S/P TOTAL HYSTERECTOMY AND BILATERAL SALPINGO-OOPHORECTOMY: ICD-10-CM

## 2019-02-21 DIAGNOSIS — Z90.79 S/P TOTAL HYSTERECTOMY AND BILATERAL SALPINGO-OOPHORECTOMY: ICD-10-CM

## 2019-02-21 PROBLEM — N83.8 OVARIAN MASS: Status: RESOLVED | Noted: 2019-02-12 | Resolved: 2019-02-21

## 2019-02-21 PROCEDURE — 1111F DSCHRG MED/CURRENT MED MERGE: CPT | Performed by: OBSTETRICS & GYNECOLOGY

## 2019-02-21 PROCEDURE — 99212 OFFICE O/P EST SF 10 MIN: CPT | Performed by: OBSTETRICS & GYNECOLOGY

## 2019-02-21 NOTE — ASSESSMENT & PLAN NOTE
Patient is recovering well from surgery  Final pathology discussed  No evidence of malignancy or atypia  She will return to the office in 3-4 weeks for vaginal cuff check    Reinforced importance of ongoing pelvic rest

## 2019-02-21 NOTE — ASSESSMENT & PLAN NOTE
After surgery patient was found to have large postvoid residual in excess of 200 mL  Today, we performed an office CMG  Patient was noted to have S1 at 170 mL  Smax at around 250 mL  Postvoid residual 0  Vasquez removed  Patient reassured  Voiding dysfunction was likely the result of acute pelvic pathology/mass  No further follow-up needed

## 2019-02-21 NOTE — PATIENT INSTRUCTIONS
Continue with strict pelvic rest   Keep follow-up appointment  Contact us if you have any new symptoms

## 2019-02-21 NOTE — LETTER
February 21, 2019     Angelfrankyevelyn Wilson [facility], [address] [place] [address]  [address] [facility]    Patient: Queen Ramses   YOB: 1945   Date of Visit: 2/21/2019       Dear Dr Cyrus Luciano: Thank you for referring Elie Joshi to me for evaluation  Below are my notes for this consultation  If you have questions, please do not hesitate to call me  I look forward to following your patient along with you  Sincerely,        Fabiola Lockett MD        CC: No Recipients  Fabiola Lockett MD  2/21/2019 11:03 AM  Sign at close encounter  Assessment/Plan:    Problem List Items Addressed This Visit        Genitourinary    Dysfunctional voiding of urine - Primary     After surgery patient was found to have large postvoid residual in excess of 200 mL  Today, we performed an office CMG  Patient was noted to have S1 at 170 mL  Smax at around 250 mL  Postvoid residual 0  Vasquez removed  Patient reassured  Voiding dysfunction was likely the result of acute pelvic pathology/mass  No further follow-up needed  Other    S/P total hysterectomy and bilateral salpingo-oophorectomy     Patient is recovering well from surgery  Final pathology discussed  No evidence of malignancy or atypia  She will return to the office in 3-4 weeks for vaginal cuff check  Reinforced importance of ongoing pelvic rest                  CHIEF COMPLAINT:     Postoperative follow-up, evaluation of postoperative urinary retention / voiding dysfunction  Patient ID: Queen Ramses is a 68 y o  female  HPI  Patient recently evaluated at the emergency department of [address] [place] [address] due to acute pelvic pain  She was found to have a complex pelvic mass  After 2 emergency room visits decision was made to proceed with surgery emergently  On February 14, 2019 she underwent robotic assisted total laparoscopic hysterectomy, bilateral salpingo-oophorectomy and cystoscopy    She was found to have an approximately 12 cm necrotic torsed adnexal mass  Frozen section now final pathology failed to demonstrate borderline elements, atypia or malignancy  Contralateral ovary with benign findings  Uterus had evidence of adenomyosis and polyp  After surgery, patient had some voiding dysfunction and noted to have postvoid residuals in excess of 200 mL  She was sent home with Vasquez catheter in place  Presents today for evaluation  She is now mostly asymptomatic  Pelvic symptoms have resolved  The following portions of the patient's history were reviewed and updated as appropriate: allergies, current medications, past family history, past medical history, past social history, past surgical history and problem list     Review of Systems  As above  Otherwise 12 point review of systems is unremarkable  Current Outpatient Medications:     acetaminophen (TYLENOL) 325 mg tablet, Take 2 tablets (650 mg total) by mouth every 6 (six) hours as needed for mild pain, headaches or fever, Disp: 30 tablet, Rfl: 0    ibuprofen (MOTRIN) 600 mg tablet, Take 1 tablet (600 mg total) by mouth every 6 (six) hours as needed for mild pain, Disp: 30 tablet, Rfl: 0    lisinopril-hydrochlorothiazide (PRINZIDE,ZESTORETIC) 20-25 MG per tablet, Take 1 tablet by mouth daily for 30 days, Disp: 30 tablet, Rfl: 3    oxyCODONE (ROXICODONE) 5 mg immediate release tablet, Take 1-2 tablets (5-10 mg total) by mouth every 4 (four) hours as needed for moderate pain or severe pain for up to 10 daysMax Daily Amount: 60 mg (Patient not taking: Reported on 2/21/2019), Disp: 15 tablet, Rfl: 0    Objective:    Blood pressure 130/82, pulse 86, temperature 98 2 °F (36 8 °C), temperature source Oral, resp  rate 18, height 4' 11" (1 499 m), weight 62 6 kg (138 lb)  Body mass index is 27 87 kg/m²  Body surface area is 1 58 meters squared  Physical Exam  Abdomen: Incisions, healing well    Clinical cystometrogram:  Through existing Vasquez catheter and using a 60 mL  syringe the bladder was retrograde filled with a total of 250 mL of sterile water  S1 at 170 mL  S max at 250 mL  Patient was able to void spontaneously without difficulties and found to have no postvoid residual (voided volume 250 mL)  Vasquez catheter removed  Case Report   Surgical Pathology Report                         Case: [id number]                                    Authorizing Provider: Mellissa Lange MD       Collected:           02/14/2019 1809               Ordering Location:     [facility]      Received:            02/14/2019 1817                                      Hospital Operating Room                                                       Pathologist:           Albaro Ramirez MD                                                         Intraop:               Albaro Tyler MD                                                         Specimens:   A) - Uterus w/Bilateral Ovaries and Fallopian Tubes, uterus, cervix, Left Tube and                   Left Ovary                                                                                           B) - Ovary, Right, Right ovary and Right Tube                                              Final Diagnosis   A  Uterus, Cervix, Left Tube and Ovary, Total Abdominal Hysterectomy and Left Salpingo-Oophorectomy:  - Inactive endometrium   - Endometrial polyp   - Adenomyosis  - Leiomyomata, partially calcified   - Cervix with endocervical polyp  - Left ovary with serous cystadenoma  - Left fallopian tube is unremarkable      B  Right Ovary and Right Tube, Salpingo-Oophorectomy:  - Benign cystic neoplasm  No viable cyst lining identified   - Ovary with extensive hemorrhagic infarction consistent with torsion   - Fallopian tube with hemorrhagic infarction consistent with torsion  Electronically signed by eF Golden MD on 2/18/2019 at  2:13 PM   Comments: This is an appended report   These results have been appended to a previously preliminary verified report  Additional Information  [facility]   All controls performed with the immunohistochemical stains reported above reacted appropriately  These tests were developed and their performance characteristics determined by [facility] or [facility]  They may not be cleared or approved by the [place]  Food and Drug Administration  The FDA has determined that such clearance or approval is not necessary  These tests are used for clinical purposes  They should not be regarded as investigational or for research  This laboratory has been approved by CLIA 88, designated as a high-complexity laboratory and is qualified to perform these tests      - Interpretation performed at [address] [place] [address] Rd, [facility], [address] [place] [address]: This is an appended report  These results have been appended to a previously preliminary verified report  Intraoperative Consultation  [facility]   AF1  Ovary, Left:  - Cystic neoplasm favor serous cyst adenoma     BF1  Ovary, Right:  - Cystic neoplasm, with extensive hemorrhage and edema, consistent with torsion   - Favor benign     Reports communicated to Dr Umer Dorsey on 02/14/2019 at 6 35 and 7 07 pm         Gross Description  [facility]   A  The specimen is received fresh for frozen section, labeled with the patient's name and hospital number, and is designated " left tube and left ovary  The specimen consists of a 62 g uterus with cervix, left ovary and fallopian tube, and without right ovary and fallopian tube (please note the specimen is received in the gross room with the ovary previously opened and the uterus inked and opened)  The posterior is inked blue and the anterior is inked black  The serosa exhibits a nodular area on the posterior portion measuring 0 9 cm in greatest dimension  The cervical os measures 0 5 cm in greatest dimension and is patent    The posterior cervix exhibits a tan purple polypoid tissue fragment measuring 2 0 cm in greatest dimension which appears limited to the cervix  The anterior endometrium exhibits a tan well-circumscribed nodule measuring 1 0 cm in greatest dimension which is located 7 0 cm from the cervical resection margin and on cut section exhibits a tan partially cystic cut surface which appears limited to the endometrium  Also identified is a tan somewhat polypoid structure measuring 0 7 cm in greatest dimension which is 6 0 cm from the cervical resection margin and appears limited to the endometrium  The posterior endometrium exhibits a tan well-circumscribed nodule measuring 1 4 cm in greatest dimension which is 3 9 cm from the cervical resection margin and on cut section exhibits a cystic cut surface and appears limited to the endometrium  Also identified is a tan polypoid fragment measuring 0 4 cm in greatest dimension which is located 4 3 cm from the cervical resection margin and appears limited to the endometrium  The endometrium measures 0 4 cm in thickness  Sectioning through the myometrium reveals 3 well-circumscribed nodules, 2 intramural and 1 previously mentioned subserosal ranging from 0 5 to 1 0 cm in greatest dimension  On cut section these nodules exhibit a tan-white homogeneous, whorled bulging cut surface with focal partially calcified areas and without grossly identifiable hemorrhage or necrosis      The attached left fallopian tube with fimbria measures 5 0 cm in length and up to 0 5 cm in diameter  It is grossly unremarkable  The intact cystic ovary has a smooth outer surface measuring 6 0 x 4 0 x 3 0 cm  The inner surface appears smooth, no papillary excrescences are identified grossly  Representative sections of the ovary are submitted for frozen section  2 photographs are taken    Representative sections are submitted in 22 cassettes      1-2:  Frozen section of left ovary  3:  Anterior cervix  4-5:  Posterior cervix with the entire polyp in cassette 5  6-8:  Anterior endomyometrium, full-thickness section with entire cystic nodule  9-10:  Anterior endomyometrium, full-thickness section with entire polyp  11:  Posterior endomyometrium, full-thickness section  12-13:  Posterior endomyometrium, full-thickness section with entire polyp  14-16:  Posterior endomyometrium with entire cystic nodule and full-thickness sections in cassettes 14-15  17-18: Intramural and submucosal nodules with cassettes 17 following a brief period of decalcification in decal 2  19: Fallopian tube, left  20-22:  Ovary, left     B  The specimen is received fresh for frozen section, labeled with the patient's name and hospital number, and is designated " right ovary and right tube  Specimen consists of a collapsed cystic , partially disrupted ovary measuring 15 0 x 12 0 x 3 0 cm which contains hemorrhagic contents  No papillary excrescences are identified grossly  A representative section is submitted for frozen section  The attached dilated and enlarged fallopian tube with fimbria measures 8 5 cm in length and up to 1 5 cm in diameter  The outer surface appears hemorrhagic  The lumen also appears hemorrhagic  Representative sections are submitted in 15 cassettes      1: Frozen section of ovary  2-4:  Fallopian tube  5-15:  Ovary with 2 sections in cassettes 14 and 15     Note: The estimated total formalin fixation time based upon information provided by the submitting clinician and the standard processing schedule is under 72 hours    Bárbara Leon MD, [address] [place] [address], [facility]  2/21/2019  11:03 AM

## 2019-02-21 NOTE — PROGRESS NOTES
Assessment/Plan:    Problem List Items Addressed This Visit        Genitourinary    Dysfunctional voiding of urine - Primary     After surgery patient was found to have large postvoid residual in excess of 200 mL  Today, we performed an office CMG  Patient was noted to have S1 at 170 mL  Smax at around 250 mL  Postvoid residual 0  Vasquez removed  Patient reassured  Voiding dysfunction was likely the result of acute pelvic pathology/mass  No further follow-up needed  Other    S/P total hysterectomy and bilateral salpingo-oophorectomy     Patient is recovering well from surgery  Final pathology discussed  No evidence of malignancy or atypia  She will return to the office in 3-4 weeks for vaginal cuff check  Reinforced importance of ongoing pelvic rest                  CHIEF COMPLAINT:     Postoperative follow-up, evaluation of postoperative urinary retention / voiding dysfunction  Patient ID: Isha Lopez is a 68 y o  female  HPI  Patient recently evaluated at the emergency department of [address] [place] [address] due to acute pelvic pain  She was found to have a complex pelvic mass  After 2 emergency room visits decision was made to proceed with surgery emergently  On February 14, 2019 she underwent robotic assisted total laparoscopic hysterectomy, bilateral salpingo-oophorectomy and cystoscopy  She was found to have an approximately 12 cm necrotic torsed adnexal mass  Frozen section now final pathology failed to demonstrate borderline elements, atypia or malignancy  Contralateral ovary with benign findings  Uterus had evidence of adenomyosis and polyp  After surgery, patient had some voiding dysfunction and noted to have postvoid residuals in excess of 200 mL  She was sent home with Vasquez catheter in place  Presents today for evaluation  She is now mostly asymptomatic  Pelvic symptoms have resolved    The following portions of the patient's history were reviewed and updated as appropriate: allergies, current medications, past family history, past medical history, past social history, past surgical history and problem list     Review of Systems  As above  Otherwise 12 point review of systems is unremarkable  Current Outpatient Medications:     acetaminophen (TYLENOL) 325 mg tablet, Take 2 tablets (650 mg total) by mouth every 6 (six) hours as needed for mild pain, headaches or fever, Disp: 30 tablet, Rfl: 0    ibuprofen (MOTRIN) 600 mg tablet, Take 1 tablet (600 mg total) by mouth every 6 (six) hours as needed for mild pain, Disp: 30 tablet, Rfl: 0    lisinopril-hydrochlorothiazide (PRINZIDE,ZESTORETIC) 20-25 MG per tablet, Take 1 tablet by mouth daily for 30 days, Disp: 30 tablet, Rfl: 3    oxyCODONE (ROXICODONE) 5 mg immediate release tablet, Take 1-2 tablets (5-10 mg total) by mouth every 4 (four) hours as needed for moderate pain or severe pain for up to 10 daysMax Daily Amount: 60 mg (Patient not taking: Reported on 2/21/2019), Disp: 15 tablet, Rfl: 0    Objective:    Blood pressure 130/82, pulse 86, temperature 98 2 °F (36 8 °C), temperature source Oral, resp  rate 18, height 4' 11" (1 499 m), weight 62 6 kg (138 lb)  Body mass index is 27 87 kg/m²  Body surface area is 1 58 meters squared  Physical Exam  Abdomen: Incisions, healing well  Clinical cystometrogram:  Through existing Vasquez catheter and using a 60 mL  syringe the bladder was retrograde filled with a total of 250 mL of sterile water  S1 at 170 mL  S max at 250 mL  Patient was able to void spontaneously without difficulties and found to have no postvoid residual (voided volume 250 mL)  Vasquez catheter removed      Case Report   Surgical Pathology Report                         Case: [id number]                                    Authorizing Provider: Jaylen Lewis MD       Collected:           02/14/2019 [id number]               Ordering Location:     [facility]      Received:            02/14/2019 [address] [place] [address] Operating Room                                                       Pathologist:           Larisa Montes MD                                                         Intraop:               Albaro Tyler MD                                                         Specimens:   A) - Uterus w/Bilateral Ovaries and Fallopian Tubes, uterus, cervix, Left Tube and                   Left Ovary                                                                                           B) - Ovary, Right, Right ovary and Right Tube                                              Final Diagnosis   A  Uterus, Cervix, Left Tube and Ovary, Total Abdominal Hysterectomy and Left Salpingo-Oophorectomy:  - Inactive endometrium   - Endometrial polyp   - Adenomyosis  - Leiomyomata, partially calcified   - Cervix with endocervical polyp  - Left ovary with serous cystadenoma  - Left fallopian tube is unremarkable      B  Right Ovary and Right Tube, Salpingo-Oophorectomy:  - Benign cystic neoplasm  No viable cyst lining identified   - Ovary with extensive hemorrhagic infarction consistent with torsion   - Fallopian tube with hemorrhagic infarction consistent with torsion  Electronically signed by Larisa Montes MD on 2/18/2019 at  2:13 PM   Comments: This is an appended report  These results have been appended to a previously preliminary verified report  Additional Information  [facility]   All controls performed with the immunohistochemical stains reported above reacted appropriately  These tests were developed and their performance characteristics determined by [facility] or [facility]  They may not be cleared or approved by the [place]  Food and Drug Administration  The FDA has determined that such clearance or approval is not necessary  These tests are used for clinical purposes   They should not be regarded as investigational or for research  This laboratory has been approved by CLIA 88, designated as a high-complexity laboratory and is qualified to perform these tests      - Interpretation performed at [address] [place] [address] Rd, [facility], [address]: This is an appended report  These results have been appended to a previously preliminary verified report  Intraoperative Consultation  [facility]   AF1  Ovary, Left:  - Cystic neoplasm favor serous cyst adenoma     BF1  Ovary, Right:  - Cystic neoplasm, with extensive hemorrhage and edema, consistent with torsion   - Favor benign     Reports communicated to Dr Josie Bustos on 02/14/2019 at 6 35 and 7 07 pm         Gross Description  [facility]   A  The specimen is received fresh for frozen section, labeled with the patient's name and hospital number, and is designated " left tube and left ovary  The specimen consists of a 62 g uterus with cervix, left ovary and fallopian tube, and without right ovary and fallopian tube (please note the specimen is received in the gross room with the ovary previously opened and the uterus inked and opened)  The posterior is inked blue and the anterior is inked black  The serosa exhibits a nodular area on the posterior portion measuring 0 9 cm in greatest dimension  The cervical os measures 0 5 cm in greatest dimension and is patent  The posterior cervix exhibits a tan purple polypoid tissue fragment measuring 2 0 cm in greatest dimension which appears limited to the cervix  The anterior endometrium exhibits a tan well-circumscribed nodule measuring 1 0 cm in greatest dimension which is located 7 0 cm from the cervical resection margin and on cut section exhibits a tan partially cystic cut surface which appears limited to the endometrium  Also identified is a tan somewhat polypoid structure measuring 0 7 cm in greatest dimension which is 6 0 cm from the cervical resection margin and appears limited to the endometrium  The posterior endometrium exhibits a tan well-circumscribed nodule measuring 1 4 cm in greatest dimension which is 3 9 cm from the cervical resection margin and on cut section exhibits a cystic cut surface and appears limited to the endometrium  Also identified is a tan polypoid fragment measuring 0 4 cm in greatest dimension which is located 4 3 cm from the cervical resection margin and appears limited to the endometrium  The endometrium measures 0 4 cm in thickness  Sectioning through the myometrium reveals 3 well-circumscribed nodules, 2 intramural and 1 previously mentioned subserosal ranging from 0 5 to 1 0 cm in greatest dimension  On cut section these nodules exhibit a tan-white homogeneous, whorled bulging cut surface with focal partially calcified areas and without grossly identifiable hemorrhage or necrosis      The attached left fallopian tube with fimbria measures 5 0 cm in length and up to 0 5 cm in diameter  It is grossly unremarkable  The intact cystic ovary has a smooth outer surface measuring 6 0 x 4 0 x 3 0 cm  The inner surface appears smooth, no papillary excrescences are identified grossly  Representative sections of the ovary are submitted for frozen section  2 photographs are taken  Representative sections are submitted in 22 cassettes      1-2:  Frozen section of left ovary  3:  Anterior cervix  4-5:  Posterior cervix with the entire polyp in cassette 5  6-8:  Anterior endomyometrium, full-thickness section with entire cystic nodule  9-10:  Anterior endomyometrium, full-thickness section with entire polyp  11:  Posterior endomyometrium, full-thickness section  12-13:  Posterior endomyometrium, full-thickness section with entire polyp  14-16:  Posterior endomyometrium with entire cystic nodule and full-thickness sections in cassettes 14-15  17-18: Intramural and submucosal nodules with cassettes 17 following a brief period of decalcification in decal 2  19:   Fallopian tube, left  20-22:  Ovary, left     B  The specimen is received fresh for frozen section, labeled with the patient's name and hospital number, and is designated " right ovary and right tube  Specimen consists of a collapsed cystic , partially disrupted ovary measuring 15 0 x 12 0 x 3 0 cm which contains hemorrhagic contents  No papillary excrescences are identified grossly  A representative section is submitted for frozen section  The attached dilated and enlarged fallopian tube with fimbria measures 8 5 cm in length and up to 1 5 cm in diameter  The outer surface appears hemorrhagic  The lumen also appears hemorrhagic  Representative sections are submitted in 15 cassettes      1: Frozen section of ovary  2-4:  Fallopian tube  5-15:  Ovary with 2 sections in cassettes 14 and 15     Note: The estimated total formalin fixation time based upon information provided by the submitting clinician and the standard processing schedule is under 72 hours    Davida Lea MD, [place], FACS  2/21/2019  11:03 AM

## 2019-03-07 PROBLEM — Z09 POSTOP CHECK: Status: ACTIVE | Noted: 2019-03-07

## 2019-03-08 ENCOUNTER — OFFICE VISIT (OUTPATIENT)
Dept: FAMILY MEDICINE CLINIC | Facility: CLINIC | Age: 74
End: 2019-03-08
Payer: COMMERCIAL

## 2019-03-08 VITALS
BODY MASS INDEX: 26.97 KG/M2 | HEIGHT: 59 IN | HEART RATE: 86 BPM | TEMPERATURE: 98.5 F | SYSTOLIC BLOOD PRESSURE: 118 MMHG | WEIGHT: 133.8 LBS | DIASTOLIC BLOOD PRESSURE: 80 MMHG

## 2019-03-08 DIAGNOSIS — D50.9 IRON DEFICIENCY ANEMIA, UNSPECIFIED IRON DEFICIENCY ANEMIA TYPE: Primary | ICD-10-CM

## 2019-03-08 DIAGNOSIS — Z90.710 S/P TOTAL HYSTERECTOMY AND BILATERAL SALPINGO-OOPHORECTOMY: ICD-10-CM

## 2019-03-08 DIAGNOSIS — Z90.79 S/P TOTAL HYSTERECTOMY AND BILATERAL SALPINGO-OOPHORECTOMY: ICD-10-CM

## 2019-03-08 DIAGNOSIS — Z90.722 S/P TOTAL HYSTERECTOMY AND BILATERAL SALPINGO-OOPHORECTOMY: ICD-10-CM

## 2019-03-08 PROCEDURE — 3008F BODY MASS INDEX DOCD: CPT | Performed by: FAMILY MEDICINE

## 2019-03-08 PROCEDURE — 1036F TOBACCO NON-USER: CPT | Performed by: FAMILY MEDICINE

## 2019-03-08 PROCEDURE — 99213 OFFICE O/P EST LOW 20 MIN: CPT | Performed by: FAMILY MEDICINE

## 2019-03-08 PROCEDURE — 1160F RVW MEDS BY RX/DR IN RCRD: CPT | Performed by: FAMILY MEDICINE

## 2019-03-08 NOTE — PROGRESS NOTES
Patient ID: Markel Jon is a 68 y o  female  HPI: 68 y  o female presents for a TOV visit s/p hospitalization for hysterectomy and bilateral salpingo oophorectomy with resection of a large mass on  Right ovary which was benign  She denies any pain, constipation or difficulty sleeping  She will need a follow up CBC in the future  TCM Call (since 2/5/2019)     Date and time call was made  2/18/2019  3:49 PM    Patient was hospitialized at  [facility]    Date of Admission  02/12/19    Date of discharge  02/15/19    Diagnosis  S/P total hysterectomy and bilateral salpingo-oophorectomy     Disposition  Home    Were the patients medications reviewed and updated  No    Current Symptoms  None      TCM Call (since 2/5/2019)     Post hospital issues  None    Scheduled for follow up? Yes    Patients specialists  Other (comment)    Other specialists names  Dr Magnus Noble  ( [address] [place] /Oncology    Did you obtain your prescribed medications  Yes    Do you need help managing your prescriptions or medications  No    Is transportation to your appointment needed  No    I have advised the patient to call PCP with any new or worsening symptoms  NIESHA Luther LPN    Living Arrangements  Spouse or Significiant other    Are you recieving any outpatient services  No    Are you recieving home care services  No    Are you using any community resources  No    Interperter language line needed  No        SUBJECTIVE    Family History   Problem Relation Age of Onset    Hypertension Mother     Lung cancer Father     Hypertension Sister     Lymphoma Brother 39    Hypertension Brother     Schizophrenia Brother     Depression Son     Schizophrenia Son     Hypertension Family     Heart disease Other      Social History     Socioeconomic History    Marital status: /Civil Union     Spouse name: Not on file    Number of children: Not on file    Years of education: Not on file    Highest education level: Not on file   Occupational History    Not on file   Social Needs    Financial resource strain: Not on file    Food insecurity:     Worry: Not on file     Inability: Not on file    Transportation needs:     Medical: Not on file     Non-medical: Not on file   Tobacco Use    Smoking status: Never Smoker    Smokeless tobacco: Never Used   Substance and Sexual Activity    Alcohol use: Never     Frequency: Never    Drug use: No    Sexual activity: Not Currently   Lifestyle    Physical activity:     Days per week: Not on file     Minutes per session: Not on file    Stress: Not on file   Relationships    Social connections:     Talks on phone: Not on file     Gets together: Not on file     Attends Yazdanism service: Not on file     Active member of club or organization: Not on file     Attends meetings of clubs or organizations: Not on file     Relationship status: Not on file    Intimate partner violence:     Fear of current or ex partner: Not on file     Emotionally abused: Not on file     Physically abused: Not on file     Forced sexual activity: Not on file   Other Topics Concern    Not on file   Social History Narrative    Daily coffee consumption (___cups/day)    Daily cola consumption (____cans/day)    Daily tea consumption (____cups/day)    Uses safety equipment-seatbelts     Past Medical History:   Diagnosis Date    Anemia     Cataracts, bilateral     Heart murmur 04/2018    History of transfusion 2003    had 4 units    Hypertension     Meningioma ([facility] [address] ) 04/2018    MRI every 6months    Ovarian cyst 2006    Shortness of breath     Skin neoplasm     last assessed: 6/13/2017     Past Surgical History:   Procedure Laterality Date    CYSTOSCOPY N/A 2/14/2019    Procedure: CYSTOSCOPY;  Surgeon: Kennedy Lim MD;  [facility];  Service: Gynecology Oncology    HYSTERECTOMY N/A 2/14/2019    Procedure: HYSTERECTOMY LAPAROSCOPIC TOTAL ([address]) W/ ROBOTICS bilateral salpingooophorectomy with great difficulty due to mass of 12 cm and taking 2 5 hours;   Surgeon: Apolinar Núñez MD;  [facility];  Service: Gynecology Oncology    TONSILLECTOMY       No Known Allergies    Current Outpatient Medications:     acetaminophen (TYLENOL) 325 mg tablet, Take 2 tablets (650 mg total) by mouth every 6 (six) hours as needed for mild pain, headaches or fever, Disp: 30 tablet, Rfl: 0    ibuprofen (MOTRIN) 600 mg tablet, Take 1 tablet (600 mg total) by mouth every 6 (six) hours as needed for mild pain, Disp: 30 tablet, Rfl: 0    lisinopril-hydrochlorothiazide (PRINZIDE,ZESTORETIC) 20-25 MG per tablet, Take 1 tablet by mouth daily for 30 days, Disp: 30 tablet, Rfl: 3    Review of Systems  Constitutional:     Denies fever, chills ,fatigue ,weakness ,weight loss, weight gain     ENT: Denies earache ,loss of hearing ,nosebleed, nasal discharge,nasal congestion ,sore throat ,hoarseness  Pulmonary: Denies shortness of breath ,cough  ,dyspnea on exertion, orthopnea  ,PND   Cardiovascular:  Denies bradycardia , tachycardia  ,palpations, lower extremity edema leg, claudication  Breast:  Denies new or changing breast lumps ,nipple discharge ,nipple changes  Abdomen:  Denies abdominal pain , anorexia , indigestion, nausea, vomiting, constipation, diarrhea  Musculoskeletal: Denies myalgias, arthralgias, joint swelling, joint stiffness , limb pain, limb swelling  Gu: denies dysuria, polyuria  Skin: Denies skin rash, skin lesion, skin wound, itching, dry skin  Neuro: Denies headache, numbness, tingling, confusion, loss of consciousness, dizziness, vertigo  Psychiatric: Denies feelings of depression, suicidal ideation, anxiety, sleep disturbances    OBJECTIVE  /80 (BP Location: Right arm, Patient Position: Sitting, Cuff Size: Standard)   Pulse 86   Temp 98 5 °F (36 9 °C)   Ht 4' 11" (1 499 m)   Wt 60 7 kg (133 lb 12 8 oz)   BMI 27 02 kg/m²   Constitutional:   NAD, well appearing and well nourished ENT:   Conjunctiva and lids: no injection, edema, or discharge     Pupils and iris: JB bilaterally    External inspection of ears and nose: normal without deformities or discharge  Otoscopic exam: Canals patent without erythema  Nasal mucosa, septum and turbinates: Normal or edema or discharge         Oropharynx:  Moist mucosa, normal tongue and tonsils without lesions  No erythema      Pulmonary:Respiratory effort normal rate and rhythm, no increased work of breathing  Auscultation of lungs:  Clear bilaterally with no adventitious breath sounds       Cardiovascular: regular rate and rhythm, S1 and S2, no murmur, no edema and/or varicosities of LE      Abdomen: Soft and non-distended     Positive bowel sounds      No heptomegaly or splenomegaly      Gu: no suprapubic tenderness or CVA tenderness, no urethral discharge  Lymphatic:  No anterior or posterior cervical lymphadenopathy         Musculoskeletal:  Gait and station: Normal gait      Digits and nails normal without clubbing or cyanosis       Inspection/palpation of joints, bones, and muscles:  No joint tenderness, swelling, full active and passive range of motion       Skin: Normal skin turgor and no rashes      Neuro:    Normal reflexes     Psych:   alert and oriented to person, place and time     normal mood and affect       Assessment/Plan:Diagnoses and all orders for this visit:    Iron deficiency anemia, unspecified iron deficiency anemia type  -     CBC and differential; Future    S/P total hysterectomy and bilateral salpingo-oophorectomy        Reviewed with patient plan to treat with above plan      Patient instructed to call in 72 hours if not feeling better or if symptoms worsen

## 2019-03-21 ENCOUNTER — OFFICE VISIT (OUTPATIENT)
Dept: GYNECOLOGIC ONCOLOGY | Facility: CLINIC | Age: 74
End: 2019-03-21

## 2019-03-21 VITALS
HEART RATE: 78 BPM | BODY MASS INDEX: 27.21 KG/M2 | SYSTOLIC BLOOD PRESSURE: 144 MMHG | DIASTOLIC BLOOD PRESSURE: 80 MMHG | WEIGHT: 135 LBS | HEIGHT: 59 IN | RESPIRATION RATE: 16 BRPM

## 2019-03-21 DIAGNOSIS — Z90.79 S/P TOTAL HYSTERECTOMY AND BILATERAL SALPINGO-OOPHORECTOMY: ICD-10-CM

## 2019-03-21 DIAGNOSIS — Z90.710 S/P TOTAL HYSTERECTOMY AND BILATERAL SALPINGO-OOPHORECTOMY: ICD-10-CM

## 2019-03-21 DIAGNOSIS — Z09 POSTOP CHECK: Primary | ICD-10-CM

## 2019-03-21 DIAGNOSIS — Z90.722 S/P TOTAL HYSTERECTOMY AND BILATERAL SALPINGO-OOPHORECTOMY: ICD-10-CM

## 2019-03-21 PROCEDURE — 99024 POSTOP FOLLOW-UP VISIT: CPT | Performed by: NURSE PRACTITIONER

## 2019-03-21 NOTE — PROGRESS NOTES
Assessment/Plan:    Problem List Items Addressed This Visit        Other    S/P total hysterectomy and bilateral salpingo-oophorectomy    Postop check - Primary     78-year-old who is status-post robotic assisted total laparoscopic hysterectomy, bilateral salpingo-oophorectomy, and cystoscopy on February 14, 2019  She was found to have an approximately 12cm necrotic torsed adnexal mass  Final pathology was without evidence of malignancy  On exam, her vaginal cuff is well-healed  Sutures still palpable on bimanual exam  Her performance status is 0  The patient has been cleared to resume normal activity  She will follow-up in 1 year with a primary gynecologist  Contact information provided for Dr Loi Khan  CHIEF COMPLAINT: Postoperative evaluation       Problem:  Cancer Staging  No matching staging information was found for the patient  Previous therapy:  1  Robotic assisted total laparoscopic hysterectomy, bilateral salpingo-oophorectomy, and cystoscopy on February 14, 2019  She was found to have an approximately 12cm necrotic torsed adnexal mass  Final pathology was without evidence of malignancy  Patient ID: Olga Cesar is a 68 y o  female     This is a 68 y o  female who presents to the office for post-operative evaluation  She is recovering uneventfully from surgery and is without acute complaints  She has been afebrile  She denies nausea or vomiting  Her appetite is appropriate  She reports normal bowel and bladder function  She denies vaginal bleeding or discharge  She is without abdominal or pelvic pain  She is ambulatory          The following portions of the patient's history were reviewed and updated as appropriate: allergies, current medications, past family history, past medical history, past social history, past surgical history and problem list     Review of Systems      Current Outpatient Medications:     acetaminophen (TYLENOL) 325 mg tablet, Take 2 tablets (650 mg total) by mouth every 6 (six) hours as needed for mild pain, headaches or fever, Disp: 30 tablet, Rfl: 0    ibuprofen (MOTRIN) 600 mg tablet, Take 1 tablet (600 mg total) by mouth every 6 (six) hours as needed for mild pain, Disp: 30 tablet, Rfl: 0    lisinopril-hydrochlorothiazide (PRINZIDE,ZESTORETIC) 20-25 MG per tablet, Take 1 tablet by mouth daily for 30 days, Disp: 30 tablet, Rfl: 3    Objective:    Blood pressure 144/80, pulse 78, resp  rate 16, height 4' 11" (1 499 m), weight 61 2 kg (135 lb)  Body mass index is 27 27 kg/m²  Body surface area is 1 56 meters squared  Physical Exam   Constitutional: She is oriented to person, place, and time  She appears well-developed and well-nourished  HENT:   Head: Normocephalic and atraumatic  Pulmonary/Chest: Effort normal  No respiratory distress  Abdominal: Soft  She exhibits no distension and no mass  There is no tenderness  There is no guarding  Incisions well-healed  Genitourinary: Vagina normal  No vaginal discharge found  Genitourinary Comments: External genitalia without abnormality  The Barth[place]'s and [place]'s glands are normal  Normal midline urethral meatus  There is no blood, discharge, lesion, or mass visualized in the vagina  The vaginal cuff is well-healed  Uterus, adnexa, and cervix are surgically absent  No masses or fullness on bimanual exam  Bladder without tenderness  Anus without fissure or lesion  Musculoskeletal: Normal range of motion  Neurological: She is alert and oriented to person, place, and time  She has normal reflexes  Skin: Skin is warm and dry  No rash noted  No erythema  No pallor  Psychiatric: She has a normal mood and affect   Her behavior is normal  Judgment and thought content normal

## 2019-03-21 NOTE — PATIENT INSTRUCTIONS
1  Resume care with primary gynecologist in 8 months to one year  2  Call the office with any new complaints or concerns

## 2019-03-21 NOTE — ASSESSMENT & PLAN NOTE
71-year-old who is status-post robotic assisted total laparoscopic hysterectomy, bilateral salpingo-oophorectomy, and cystoscopy on February 14, 2019  She was found to have an approximately 12cm necrotic torsed adnexal mass  Final pathology was without evidence of malignancy  On exam, her vaginal cuff is well-healed  Sutures still palpable on bimanual exam  Her performance status is 0  The patient has been cleared to resume normal activity  She will follow-up in 1 year with a primary gynecologist  Contact information provided for Dr Isabella Whitaker

## 2019-05-01 ENCOUNTER — APPOINTMENT (OUTPATIENT)
Dept: LAB | Facility: CLINIC | Age: 74
End: 2019-05-01
Payer: COMMERCIAL

## 2019-05-01 DIAGNOSIS — D50.9 IRON DEFICIENCY ANEMIA, UNSPECIFIED IRON DEFICIENCY ANEMIA TYPE: ICD-10-CM

## 2019-05-01 LAB
BASOPHILS # BLD AUTO: 0.06 THOUSANDS/ΜL (ref 0–0.1)
BASOPHILS NFR BLD AUTO: 1 % (ref 0–1)
EOSINOPHIL # BLD AUTO: 0.07 THOUSAND/ΜL (ref 0–0.61)
EOSINOPHIL NFR BLD AUTO: 1 % (ref 0–6)
ERYTHROCYTE [DISTWIDTH] IN BLOOD BY AUTOMATED COUNT: 13.6 % (ref 11.6–15.1)
HCT VFR BLD AUTO: 38.5 % (ref 34.8–46.1)
HGB BLD-MCNC: 12.4 G/DL (ref 11.5–15.4)
IMM GRANULOCYTES # BLD AUTO: 0.04 THOUSAND/UL (ref 0–0.2)
IMM GRANULOCYTES NFR BLD AUTO: 0 % (ref 0–2)
LYMPHOCYTES # BLD AUTO: 1.68 THOUSANDS/ΜL (ref 0.6–4.47)
LYMPHOCYTES NFR BLD AUTO: 18 % (ref 14–44)
MCH RBC QN AUTO: 30.4 PG (ref 26.8–34.3)
MCHC RBC AUTO-ENTMCNC: 32.2 G/DL (ref 31.4–37.4)
MCV RBC AUTO: 94 FL (ref 82–98)
MONOCYTES # BLD AUTO: 0.51 THOUSAND/ΜL (ref 0.17–1.22)
MONOCYTES NFR BLD AUTO: 6 % (ref 4–12)
NEUTROPHILS # BLD AUTO: 6.84 THOUSANDS/ΜL (ref 1.85–7.62)
NEUTS SEG NFR BLD AUTO: 74 % (ref 43–75)
NRBC BLD AUTO-RTO: 0 /100 WBCS
PLATELET # BLD AUTO: 404 THOUSANDS/UL (ref 149–390)
PMV BLD AUTO: 9.2 FL (ref 8.9–12.7)
RBC # BLD AUTO: 4.08 MILLION/UL (ref 3.81–5.12)
WBC # BLD AUTO: 9.2 THOUSAND/UL (ref 4.31–10.16)

## 2019-05-01 PROCEDURE — 36415 COLL VENOUS BLD VENIPUNCTURE: CPT

## 2019-05-01 PROCEDURE — 85025 COMPLETE CBC W/AUTO DIFF WBC: CPT

## 2019-05-13 DIAGNOSIS — I10 ESSENTIAL HYPERTENSION: ICD-10-CM

## 2019-05-13 RX ORDER — LISINOPRIL AND HYDROCHLOROTHIAZIDE 25; 20 MG/1; MG/1
1 TABLET ORAL DAILY
Qty: 90 TABLET | Refills: 3 | Status: SHIPPED | OUTPATIENT
Start: 2019-05-13 | End: 2020-09-10

## 2019-06-11 ENCOUNTER — HOSPITAL ENCOUNTER (OUTPATIENT)
Dept: RADIOLOGY | Facility: HOSPITAL | Age: 74
Discharge: HOME | End: 2019-06-11
Payer: COMMERCIAL

## 2019-06-11 DIAGNOSIS — D32.9 MENINGIOMA (HCC): ICD-10-CM

## 2019-06-11 DIAGNOSIS — D33.0 BENIGN NEOPLASM OF SUPRATENTORIAL REGION OF BRAIN (HCC): ICD-10-CM

## 2019-06-11 DIAGNOSIS — R93.0 ABNORMAL MRI OF HEAD: ICD-10-CM

## 2019-06-11 PROCEDURE — 70553 MRI BRAIN STEM W/O & W/DYE: CPT

## 2019-06-11 PROCEDURE — A9585 GADOBUTROL INJECTION: HCPCS | Performed by: PHYSICIAN ASSISTANT

## 2019-06-11 RX ADMIN — GADOBUTROL 6 ML: 604.72 INJECTION INTRAVENOUS at 13:58

## 2019-06-14 ENCOUNTER — OFFICE VISIT (OUTPATIENT)
Dept: NEUROSURGERY | Facility: CLINIC | Age: 74
End: 2019-06-14
Payer: COMMERCIAL

## 2019-06-14 VITALS
TEMPERATURE: 98.3 F | DIASTOLIC BLOOD PRESSURE: 85 MMHG | SYSTOLIC BLOOD PRESSURE: 140 MMHG | HEIGHT: 59 IN | WEIGHT: 133 LBS | HEART RATE: 76 BPM | BODY MASS INDEX: 26.81 KG/M2

## 2019-06-14 DIAGNOSIS — G93.89 BRAIN MASS: Primary | ICD-10-CM

## 2019-06-14 PROCEDURE — 99214 OFFICE O/P EST MOD 30 MIN: CPT | Performed by: PHYSICIAN ASSISTANT

## 2019-07-18 ENCOUNTER — OFFICE VISIT (OUTPATIENT)
Dept: FAMILY MEDICINE CLINIC | Facility: CLINIC | Age: 74
End: 2019-07-18
Payer: COMMERCIAL

## 2019-07-18 VITALS
SYSTOLIC BLOOD PRESSURE: 122 MMHG | HEIGHT: 59 IN | DIASTOLIC BLOOD PRESSURE: 78 MMHG | HEART RATE: 74 BPM | WEIGHT: 137 LBS | TEMPERATURE: 98.5 F | BODY MASS INDEX: 27.62 KG/M2

## 2019-07-18 DIAGNOSIS — E78.00 HYPERCHOLESTEROLEMIA: ICD-10-CM

## 2019-07-18 DIAGNOSIS — Z11.59 ENCOUNTER FOR HEPATITIS C SCREENING TEST FOR LOW RISK PATIENT: ICD-10-CM

## 2019-07-18 DIAGNOSIS — Z12.11 COLON CANCER SCREENING: Primary | ICD-10-CM

## 2019-07-18 DIAGNOSIS — Z12.39 BREAST CANCER SCREENING: ICD-10-CM

## 2019-07-18 DIAGNOSIS — Z23 NEED FOR VACCINATION: ICD-10-CM

## 2019-07-18 DIAGNOSIS — E11.42 TYPE 2 DIABETES MELLITUS WITH DIABETIC POLYNEUROPATHY, WITHOUT LONG-TERM CURRENT USE OF INSULIN (HCC): ICD-10-CM

## 2019-07-18 DIAGNOSIS — E55.9 VITAMIN D DEFICIENCY: ICD-10-CM

## 2019-07-18 DIAGNOSIS — I10 ESSENTIAL HYPERTENSION: ICD-10-CM

## 2019-07-18 DIAGNOSIS — D64.9 ANEMIA, UNSPECIFIED TYPE: ICD-10-CM

## 2019-07-18 LAB
CREAT UR-MCNC: 134 MG/DL
MICROALBUMIN UR-MCNC: 29.3 MG/L (ref 0–20)
MICROALBUMIN/CREAT 24H UR: 22 MG/G CREATININE (ref 0–30)
SL AMB POCT HEMOGLOBIN AIC: 6.2 (ref ?–6.5)

## 2019-07-18 PROCEDURE — 82570 ASSAY OF URINE CREATININE: CPT | Performed by: FAMILY MEDICINE

## 2019-07-18 PROCEDURE — 3074F SYST BP LT 130 MM HG: CPT | Performed by: FAMILY MEDICINE

## 2019-07-18 PROCEDURE — 1101F PT FALLS ASSESS-DOCD LE1/YR: CPT | Performed by: FAMILY MEDICINE

## 2019-07-18 PROCEDURE — 3061F NEG MICROALBUMINURIA REV: CPT | Performed by: FAMILY MEDICINE

## 2019-07-18 PROCEDURE — 82043 UR ALBUMIN QUANTITATIVE: CPT | Performed by: FAMILY MEDICINE

## 2019-07-18 PROCEDURE — 3725F SCREEN DEPRESSION PERFORMED: CPT | Performed by: FAMILY MEDICINE

## 2019-07-18 PROCEDURE — 1160F RVW MEDS BY RX/DR IN RCRD: CPT | Performed by: FAMILY MEDICINE

## 2019-07-18 PROCEDURE — 90714 TD VACC NO PRESV 7 YRS+ IM: CPT | Performed by: FAMILY MEDICINE

## 2019-07-18 PROCEDURE — 90471 IMMUNIZATION ADMIN: CPT | Performed by: FAMILY MEDICINE

## 2019-07-18 PROCEDURE — 83036 HEMOGLOBIN GLYCOSYLATED A1C: CPT | Performed by: FAMILY MEDICINE

## 2019-07-18 PROCEDURE — 99214 OFFICE O/P EST MOD 30 MIN: CPT | Performed by: FAMILY MEDICINE

## 2019-07-18 PROCEDURE — 3008F BODY MASS INDEX DOCD: CPT | Performed by: FAMILY MEDICINE

## 2019-07-18 PROCEDURE — 1036F TOBACCO NON-USER: CPT | Performed by: FAMILY MEDICINE

## 2019-07-18 PROCEDURE — 3044F HG A1C LEVEL LT 7.0%: CPT | Performed by: FAMILY MEDICINE

## 2019-07-18 NOTE — PROGRESS NOTES
BMI Counseling: Body mass index is 27 67 kg/m²  Discussed the patient's BMI with her  The BMI is above average  BMI counseling and education was provided to the patient  Exercise recommendations include exercising 3-5 times per week  Patient ID: Aida Corona is a 68 y o  female  HPI: 68 y  o female presents for follow up of niddm (hgbaic is good at 6 2), hypertension, anemia and hypercholesterolemia  She is due for a mammogram, colon screening and will be seeing eye doctor in Sept   She is due for a td booster as well        SUBJECTIVE    Family History   Problem Relation Age of Onset    Hypertension Mother     Lung cancer Father     Hypertension Sister     Lymphoma Brother 39    Hypertension Brother     Schizophrenia Brother     Depression Son     Schizophrenia Son     Hypertension Family     Heart disease Other      Social History     Socioeconomic History    Marital status: /Civil Union     Spouse name: Not on file    Number of children: Not on file    Years of education: Not on file    Highest education level: Not on file   Occupational History    Not on file   Social Needs    Financial resource strain: Not on file    Food insecurity:     Worry: Not on file     Inability: Not on file    Transportation needs:     Medical: Not on file     Non-medical: Not on file   Tobacco Use    Smoking status: Never Smoker    Smokeless tobacco: Never Used   Substance and Sexual Activity    Alcohol use: Never     Frequency: Never    Drug use: No    Sexual activity: Not Currently   Lifestyle    Physical activity:     Days per week: Not on file     Minutes per session: Not on file    Stress: Not on file   Relationships    Social connections:     Talks on phone: Not on file     Gets together: Not on file     Attends Cheondoism service: Not on file     Active member of club or organization: Not on file     Attends meetings of clubs or organizations: Not on file     Relationship status: Not on file    Intimate partner violence:     Fear of current or ex partner: Not on file     Emotionally abused: Not on file     Physically abused: Not on file     Forced sexual activity: Not on file   Other Topics Concern    Not on file   Social History Narrative    Daily coffee consumption (___cups/day)    Daily cola consumption (____cans/day)    Daily tea consumption (____cups/day)    Uses safety equipment-seatbelts     Past Medical History:   Diagnosis Date    Anemia     Cataracts, bilateral     Heart murmur 04/2018    History of transfusion 2003    had 4 units    Hypertension     Meningioma ([address] ) 04/2018    MRI every 6months    Ovarian cyst 2006    Shortness of breath     Skin neoplasm     last assessed: 6/13/2017     Past Surgical History:   Procedure Laterality Date    CYSTOSCOPY N/A 2/14/2019    Procedure: CYSTOSCOPY;  Surgeon: Maggie Raya MD;  [facility];  Service: Gynecology Oncology    HYSTERECTOMY N/A 2/14/2019    Procedure: HYSTERECTOMY LAPAROSCOPIC TOTAL ([address]) W/ ROBOTICS bilateral salpingooophorectomy with great difficulty due to mass of 12 cm and taking 2 5 hours;   Surgeon: Maggie Raya MD;  [facility];  Service: Gynecology Oncology    TONSILLECTOMY       No Known Allergies    Current Outpatient Medications:     lisinopril-hydrochlorothiazide (PRINZIDE,ZESTORETIC) 20-25 MG per tablet, Take 1 tablet by mouth daily for 48 days, Disp: 90 tablet, Rfl: 3    Review of Systems  Constitutional:     Denies fever, chills ,fatigue ,weakness ,weight loss, weight gain     ENT: Denies earache ,loss of hearing ,nosebleed, nasal discharge,nasal congestion ,sore throat ,hoarseness  Pulmonary: Denies shortness of breath ,cough  ,dyspnea on exertion, orthopnea  ,PND   Cardiovascular:  Denies bradycardia , tachycardia  ,palpations, lower extremity edema leg, claudication  Breast:  Denies new or changing breast lumps ,nipple discharge ,nipple changes  Abdomen:  Denies abdominal pain , anorexia , indigestion, nausea, vomiting, constipation, diarrhea  Musculoskeletal: Denies myalgias, arthralgias, joint swelling, joint stiffness , limb pain, limb swelling  Gu: denies dysuria, polyuria  Skin: Denies skin rash, skin lesion, skin wound, itching, dry skin  Neuro: Denies headache, numbness, tingling, confusion, loss of consciousness, dizziness, vertigo  Psychiatric: Denies feelings of depression, suicidal ideation, anxiety, sleep disturbances    OBJECTIVE  /78   Pulse 74   Temp 98 5 °F (36 9 °C)   Ht 4' 11" (1 499 m)   Wt 62 1 kg (137 lb)   BMI 27 67 kg/m²   Constitutional:   NAD, well appearing and well nourished      ENT:   Conjunctiva and lids: no injection, edema, or discharge     Pupils and iris: JB bilaterally    External inspection of ears and nose: normal without deformities or discharge  Otoscopic exam: Canals patent without erythema  Nasal mucosa, septum and turbinates: Normal or edema or discharge         Oropharynx:  Moist mucosa, normal tongue and tonsils without lesions  No erythema        Pulmonary:Respiratory effort normal rate and rhythm, no increased work of breathing   Auscultation of lungs:  Clear bilaterally with no adventitious breath sounds     Cardiovascular: regular rate and rhythm, S1 and S2, no murmur, no edema and/or varicosities of LE      Abdomen: Soft and non-distended     Positive bowel sounds      No heptomegaly or splenomegaly      Gu: no suprapubic tenderness or CVA tenderness, no urethral discharge  Lymphatic:  No anterior or posterior cervical lymphadenopathy         Musculoskeletal:  Gait and station: Normal gait      Digits and nails normal without clubbing or cyanosis       Inspection/palpation of joints, bones, and muscles:  No joint tenderness, swelling, full active and passive range of motion       Skin: Normal skin turgor and no rashes      Neuro:    Normal reflexes     Psych:   alert and oriented to person, place and time     normal mood and affect   Patient's shoes and socks removed  Right Foot/Ankle   Right Foot Inspection  Skin Exam: skin normal and skin intact no dry skin, no warmth, no callus, no erythema, no maceration, no abnormal color, no pre-ulcer, no ulcer and no callus                          Toe Exam: ROM and strength within normal limitsno swelling, no tenderness, erythema and  no right toe deformity  Sensory   Vibration: diminished  Proprioception: diminished   Monofilament testing: diminished  Vascular  Capillary refills: < 3 seconds  The right DP pulse is 2+  The right PT pulse is 2+  Left Foot/Ankle  Left Foot Inspection  Skin Exam: skin normal and skin intactno dry skin, no warmth, no erythema, no maceration, normal color, no pre-ulcer, no ulcer and no callus                         Toe Exam: ROM and strength within normal limitsno swelling, no tenderness, no erythema and no left toe deformity                   Sensory   Vibration: diminished  Proprioception: diminished  Monofilament: diminished  Vascular  Capillary refills: < 3 seconds  The left DP pulse is 2+  The left PT pulse is 2+  Assign Risk Category:  No deformity present; Loss of protective sensation; No weak pulses       Risk: 2      Assessment/Plan:Diagnoses and all orders for this visit:    Colon cancer screening  -     Cologuard; Future    Breast cancer screening  -     Mammo screening bilateral w cad; Future    Diabetes 1 5, managed as type 2 ([facility] )  -     POCT hemoglobin A1c  -     Microalbumin / creatinine urine ratio    Anemia, unspecified type  -     CBC and Platelet; Future    Hypercholesterolemia  -     Lipid Panel with Direct LDL reflex; Future    Essential hypertension  -     Comprehensive metabolic panel; Future    Need for vaccination  -     TD VACCINE GREATER THAN OR EQUAL TO 6YO PRESERVATIVE FREE IM    Encounter for hepatitis C screening test for low risk patient  -     Hepatitis C antibody;  Future    Vitamin D deficiency  -     Vitamin D 25 hydroxy; Future        Reviewed with patient plan to treat with above plan  I will see her back in 4 mos time

## 2019-09-30 LAB
LEFT EYE DIABETIC RETINOPATHY: NORMAL
RIGHT EYE DIABETIC RETINOPATHY: NORMAL
SEVERITY (EYE EXAM): NORMAL

## 2019-11-15 ENCOUNTER — OFFICE VISIT (OUTPATIENT)
Dept: FAMILY MEDICINE CLINIC | Facility: CLINIC | Age: 74
End: 2019-11-15
Payer: COMMERCIAL

## 2019-11-15 ENCOUNTER — APPOINTMENT (OUTPATIENT)
Dept: LAB | Facility: CLINIC | Age: 74
End: 2019-11-15
Payer: COMMERCIAL

## 2019-11-15 VITALS
TEMPERATURE: 98.1 F | BODY MASS INDEX: 27.38 KG/M2 | WEIGHT: 135.8 LBS | HEIGHT: 59 IN | HEART RATE: 72 BPM | SYSTOLIC BLOOD PRESSURE: 122 MMHG | DIASTOLIC BLOOD PRESSURE: 72 MMHG

## 2019-11-15 DIAGNOSIS — Z11.59 ENCOUNTER FOR HEPATITIS C SCREENING TEST FOR LOW RISK PATIENT: ICD-10-CM

## 2019-11-15 DIAGNOSIS — Z01.818 PRE-OP EXAMINATION: Primary | ICD-10-CM

## 2019-11-15 DIAGNOSIS — H25.011 CORTICAL AGE-RELATED CATARACT OF RIGHT EYE: ICD-10-CM

## 2019-11-15 DIAGNOSIS — E11.9 TYPE 2 DIABETES MELLITUS WITHOUT COMPLICATION, WITHOUT LONG-TERM CURRENT USE OF INSULIN (HCC): ICD-10-CM

## 2019-11-15 DIAGNOSIS — E78.00 HYPERCHOLESTEROLEMIA: ICD-10-CM

## 2019-11-15 DIAGNOSIS — E55.9 VITAMIN D DEFICIENCY: ICD-10-CM

## 2019-11-15 DIAGNOSIS — D64.9 ANEMIA, UNSPECIFIED TYPE: ICD-10-CM

## 2019-11-15 DIAGNOSIS — I10 ESSENTIAL HYPERTENSION: ICD-10-CM

## 2019-11-15 LAB
25(OH)D3 SERPL-MCNC: 29.3 NG/ML (ref 30–100)
ALBUMIN SERPL BCP-MCNC: 3.7 G/DL (ref 3.5–5)
ALP SERPL-CCNC: 106 U/L (ref 46–116)
ALT SERPL W P-5'-P-CCNC: 15 U/L (ref 12–78)
ANION GAP SERPL CALCULATED.3IONS-SCNC: 8 MMOL/L (ref 4–13)
AST SERPL W P-5'-P-CCNC: 11 U/L (ref 5–45)
BILIRUB SERPL-MCNC: 0.46 MG/DL (ref 0.2–1)
BUN SERPL-MCNC: 16 MG/DL (ref 5–25)
CALCIUM SERPL-MCNC: 9.5 MG/DL (ref 8.3–10.1)
CHLORIDE SERPL-SCNC: 103 MMOL/L (ref 100–108)
CHOLEST SERPL-MCNC: 169 MG/DL (ref 50–200)
CO2 SERPL-SCNC: 27 MMOL/L (ref 21–32)
CREAT SERPL-MCNC: 0.72 MG/DL (ref 0.6–1.3)
ERYTHROCYTE [DISTWIDTH] IN BLOOD BY AUTOMATED COUNT: 13.1 % (ref 11.6–15.1)
GFR SERPL CREATININE-BSD FRML MDRD: 84 ML/MIN/1.73SQ M
GLUCOSE P FAST SERPL-MCNC: 105 MG/DL (ref 65–99)
HCT VFR BLD AUTO: 40.8 % (ref 34.8–46.1)
HCV AB SER QL: NORMAL
HDLC SERPL-MCNC: 47 MG/DL
HGB BLD-MCNC: 13.4 G/DL (ref 11.5–15.4)
LDLC SERPL CALC-MCNC: 100 MG/DL (ref 0–100)
MCH RBC QN AUTO: 30.5 PG (ref 26.8–34.3)
MCHC RBC AUTO-ENTMCNC: 32.8 G/DL (ref 31.4–37.4)
MCV RBC AUTO: 93 FL (ref 82–98)
PLATELET # BLD AUTO: 441 THOUSANDS/UL (ref 149–390)
PMV BLD AUTO: 8.9 FL (ref 8.9–12.7)
POTASSIUM SERPL-SCNC: 4.4 MMOL/L (ref 3.5–5.3)
PROT SERPL-MCNC: 8 G/DL (ref 6.4–8.2)
RBC # BLD AUTO: 4.4 MILLION/UL (ref 3.81–5.12)
SL AMB POCT HEMOGLOBIN AIC: 6 (ref ?–6.5)
SODIUM SERPL-SCNC: 138 MMOL/L (ref 136–145)
TRIGL SERPL-MCNC: 109 MG/DL
WBC # BLD AUTO: 8.07 THOUSAND/UL (ref 4.31–10.16)

## 2019-11-15 PROCEDURE — 3044F HG A1C LEVEL LT 7.0%: CPT | Performed by: NURSE PRACTITIONER

## 2019-11-15 PROCEDURE — 85027 COMPLETE CBC AUTOMATED: CPT

## 2019-11-15 PROCEDURE — 83036 HEMOGLOBIN GLYCOSYLATED A1C: CPT | Performed by: NURSE PRACTITIONER

## 2019-11-15 PROCEDURE — 1036F TOBACCO NON-USER: CPT | Performed by: NURSE PRACTITIONER

## 2019-11-15 PROCEDURE — 80061 LIPID PANEL: CPT

## 2019-11-15 PROCEDURE — 82306 VITAMIN D 25 HYDROXY: CPT

## 2019-11-15 PROCEDURE — 86803 HEPATITIS C AB TEST: CPT

## 2019-11-15 PROCEDURE — 80053 COMPREHEN METABOLIC PANEL: CPT

## 2019-11-15 PROCEDURE — 99213 OFFICE O/P EST LOW 20 MIN: CPT | Performed by: NURSE PRACTITIONER

## 2019-11-15 PROCEDURE — 1160F RVW MEDS BY RX/DR IN RCRD: CPT | Performed by: NURSE PRACTITIONER

## 2019-11-15 PROCEDURE — 36415 COLL VENOUS BLD VENIPUNCTURE: CPT

## 2019-11-15 NOTE — PROGRESS NOTES
Subjective:      Patient ID: Giselle Marsh is a 68 y o  female  HPI:  Giselle Marsh is a 68 y o  female who presents to the office today for a preoperative consultation at the request of Dr Sean Aragon for right cataract excsion on 11/25/19 at [facility]      Current anti-platelet/anti-coagulation medications that the patient is prescribed include: None    Assessment of Cardiac Risk:  · Denies unstable or severe angina or MI in the last 6 weeks or history of stent placement in the last year  · Denies decompensated heart failure (e g  New onset failure, NYHA functional class IV heart failure, or worsening existing heart failure)  · Denies significant arrhythmia such as high grade AV Block, symptomatic ventricular arrhythmia, newly recognized ventricular tachycardia, supraventricular tachycardia with resting heart rate > 100 or symptomatic bradycardia  · Denies severe heart valve disease including aortic stenosis or symptomatic mitral stenosis  Prior anesthesia:     Patient as had multiple surgeries in the past with no adverse reaction to anesthesia  No report family history of adverse reactions to anesthesia, including malignant hyperthermia and pseudocholinesterase deficiency      Exercise Capacity:     Able to walk 4 blocks without symptoms? Able to climb 2 flights of stairs without symptoms? Lifestyle Factors: Tobacco Use:  Lifelong non-smoker  Alcohol Use:  Denies  Illicit Drug Use:  Denies  No Mormonism or cultural beliefs that would alteration to care        GOPI Risk Factors: no identifiable risk factors      Personal history of venous thromboembolic disease:  NO  History of Steroid use for >2 weeks within last year?    NO      Family History   Problem Relation Age of Onset    Hypertension Mother    Julieann Frankel Lung cancer Father     Hypertension Sister     Lymphoma Brother 39    Hypertension Brother     Schizophrenia Brother     Depression Son    Julieann Frankel Schizophrenia Son    Julieann Frankel Hypertension Family     Heart disease Other      Social History     Socioeconomic History    Marital status: /Civil Union     Spouse name: Not on file    Number of children: Not on file    Years of education: Not on file    Highest education level: Not on file   Occupational History    Not on file   Social Needs    Financial resource strain: Not on file    Food insecurity:     Worry: Not on file     Inability: Not on file    Transportation needs:     Medical: Not on file     Non-medical: Not on file   Tobacco Use    Smoking status: Never Smoker    Smokeless tobacco: Never Used   Substance and Sexual Activity    Alcohol use: Never     Frequency: Never    Drug use: No    Sexual activity: Not Currently   Lifestyle    Physical activity:     Days per week: Not on file     Minutes per session: Not on file    Stress: Not on file   Relationships    Social connections:     Talks on phone: Not on file     Gets together: Not on file     Attends Yarsanism service: Not on file     Active member of club or organization: Not on file     Attends meetings of clubs or organizations: Not on file     Relationship status: Not on file    Intimate partner violence:     Fear of current or ex partner: Not on file     Emotionally abused: Not on file     Physically abused: Not on file     Forced sexual activity: Not on file   Other Topics Concern    Not on file   Social History Narrative    Daily coffee consumption (___cups/day)    Daily cola consumption (____cans/day)    Daily tea consumption (____cups/day)    Uses safety equipment-seatbelts     Past Medical History:   Diagnosis Date    Anemia     Cataracts, bilateral     Heart murmur 04/2018    History of transfusion 2003    had 4 units    Hypertension     M[facility] ) 04/2018    MRI every 6months    Ovarian cyst 2006    Shortness of breath     Skin neoplasm     last assessed: 6/13/2017     Past Surgical History:   Procedure Laterality Date    CYSTOSCOPY N/A 2/14/2019    Procedure: CYSTOSCOPY;  Surgeon: Keyon Peralta MD;  [facility];  Service: Gynecology Oncology    HYSTERECTOMY N/A 2/14/2019    Procedure: HYSTERECTOMY LAPAROSCOPIC TOTAL ([address]) W/ ROBOTICS bilateral salpingooophorectomy with great difficulty due to mass of 12 cm and taking 2 5 hours; Surgeon: Keyon Peralta MD;  [facility];  Service: Gynecology Oncology    TONSILLECTOMY       No Known Allergies    Current Outpatient Medications:     lisinopril-hydrochlorothiazide (PRINZIDE,ZESTORETIC) 20-25 MG per tablet, Take 1 tablet by mouth daily for 48 days, Disp: 90 tablet, Rfl: 3      Review of Systems   Constitutional: Negative for activity change, appetite change, fatigue and unexpected weight change  HENT: Negative for congestion, postnasal drip, sinus pressure, sore throat and trouble swallowing  Eyes: Positive for visual disturbance  Respiratory: Negative for cough, chest tightness, shortness of breath and wheezing  Cardiovascular: Negative for chest pain, palpitations and leg swelling  Gastrointestinal: Negative for abdominal pain, constipation, diarrhea and nausea  Endocrine: Negative for polydipsia and polyuria  Neurological: Negative for dizziness, weakness, light-headedness, numbness and headaches  Hematological: Does not bruise/bleed easily  Objective:    /72   Pulse 72   Temp 98 1 °F (36 7 °C)   Ht 4' 11" (1 499 m)   Wt 61 6 kg (135 lb 12 8 oz)   BMI 27 43 kg/m² (Reviewed)     Physical Exam   Constitutional: She is oriented to person, place, and time  Vital signs are normal  She appears well-developed and well-nourished  No distress  HENT:   Head: Normocephalic and atraumatic  Mouth/Throat: Uvula is midline, oropharynx is clear and moist and mucous membranes are normal    Eyes: Pupils are equal, round, and reactive to light   Conjunctivae, EOM and lids are normal    Neck: Trachea normal and full passive range of motion without pain  Neck supple  Carotid bruit is not present  Cardiovascular: Normal rate, regular rhythm and intact distal pulses  Murmur heard  Systolic murmur is present with a grade of 1/6  No Le edema   Pulmonary/Chest: Effort normal and breath sounds normal    Abdominal: Soft  Bowel sounds are normal  There is no tenderness  Lymphadenopathy:     She has no cervical adenopathy  Neurological: She is alert and oriented to person, place, and time  She has normal strength  No cranial nerve deficit  Skin: Skin is warm and dry  Capillary refill takes less than 2 seconds  No cyanosis  Nails show no clubbing  Psychiatric: She has a normal mood and affect  Her speech is normal and behavior is normal  Cognition and memory are normal          DATA:  Laboratory Results: I personally reviewed pertinent laboratory results or reports  Lab Results   Component Value Date    ALT 15 11/15/2019    AST 11 11/15/2019    BUN 16 11/15/2019    CALCIUM 9 5 11/15/2019     11/15/2019    CO2 27 11/15/2019    CREATININE 0 72 11/15/2019    HCT 40 8 11/15/2019    HGB 13 4 11/15/2019    HGBA1C 6 2 07/18/2019     11/15//2019    K 4 4 11/15/2019     11/15/2019    WBC 8 07 11/15/2019     ECG: Last ECG on 02/13/19 exhibited a normal sinus rhythm     Pre-Op Evaluation Assessment:  Cardiac Risk Estimation: per the Revised Cardiac Risk Index (Circ  [id number], 1999): the patient's risk factors for cardiac complications include: hypertension, type diabetes mellitus and iron deficiency anemai, putting her in : RCI Risk Class I (3 9% 30-day risk of death, MI or cardiac death)     Danilo Urena is undergoing an elective low risk surgery  She is at low risk for major adverse cardiac event (MACE)  She may proceed with surgery as planned      Pre-op Evaluation Plan  1  Further preoperative workup as follows:  -No further preoperative work-up required     2   Medication Management/Recommendations:  -Patient has been instructed to avoid herbs or non-directed vitamins the week prior to surgery to ensure no drug interactions with perioperative surgical and anesthetic medications   -Patient has been instructed to avoid aspirin containing medications or non-steroidal anti-inflammatory drugs for the week preceding surgery          Assessment/Plan:      Diagnoses and all orders for this visit:    Pre-op examination    Cortical age-related cataract of right eye    Patient as appointment scheduled for 12/05/2019 with her PCP for her Medicare annual wellness vist

## 2019-12-05 ENCOUNTER — OFFICE VISIT (OUTPATIENT)
Dept: FAMILY MEDICINE CLINIC | Facility: CLINIC | Age: 74
End: 2019-12-05
Payer: COMMERCIAL

## 2019-12-05 VITALS
BODY MASS INDEX: 27.62 KG/M2 | DIASTOLIC BLOOD PRESSURE: 80 MMHG | WEIGHT: 137 LBS | SYSTOLIC BLOOD PRESSURE: 122 MMHG | TEMPERATURE: 98.1 F | HEIGHT: 59 IN | HEART RATE: 74 BPM

## 2019-12-05 DIAGNOSIS — Z12.31 BREAST CANCER SCREENING BY MAMMOGRAM: ICD-10-CM

## 2019-12-05 DIAGNOSIS — E11.9 TYPE 2 DIABETES MELLITUS WITHOUT COMPLICATION, WITHOUT LONG-TERM CURRENT USE OF INSULIN (HCC): ICD-10-CM

## 2019-12-05 DIAGNOSIS — I10 ESSENTIAL HYPERTENSION: ICD-10-CM

## 2019-12-05 DIAGNOSIS — Z00.00 MEDICARE ANNUAL WELLNESS VISIT, SUBSEQUENT: Primary | ICD-10-CM

## 2019-12-05 DIAGNOSIS — E55.9 VITAMIN D DEFICIENCY: ICD-10-CM

## 2019-12-05 DIAGNOSIS — E78.00 HYPERCHOLESTEROLEMIA: ICD-10-CM

## 2019-12-05 PROCEDURE — 1125F AMNT PAIN NOTED PAIN PRSNT: CPT | Performed by: FAMILY MEDICINE

## 2019-12-05 PROCEDURE — G0439 PPPS, SUBSEQ VISIT: HCPCS | Performed by: FAMILY MEDICINE

## 2019-12-05 PROCEDURE — 99214 OFFICE O/P EST MOD 30 MIN: CPT | Performed by: FAMILY MEDICINE

## 2019-12-05 PROCEDURE — 1160F RVW MEDS BY RX/DR IN RCRD: CPT | Performed by: FAMILY MEDICINE

## 2019-12-05 PROCEDURE — 3725F SCREEN DEPRESSION PERFORMED: CPT | Performed by: FAMILY MEDICINE

## 2019-12-05 PROCEDURE — 3074F SYST BP LT 130 MM HG: CPT | Performed by: FAMILY MEDICINE

## 2019-12-05 PROCEDURE — 1170F FXNL STATUS ASSESSED: CPT | Performed by: FAMILY MEDICINE

## 2019-12-05 PROCEDURE — 3008F BODY MASS INDEX DOCD: CPT | Performed by: FAMILY MEDICINE

## 2019-12-05 PROCEDURE — 3079F DIAST BP 80-89 MM HG: CPT | Performed by: FAMILY MEDICINE

## 2019-12-05 RX ORDER — SIMVASTATIN 10 MG
10 TABLET ORAL
Qty: 30 TABLET | Refills: 5 | Status: SHIPPED | OUTPATIENT
Start: 2019-12-05

## 2019-12-05 NOTE — PROGRESS NOTES
Assessment and Plan:     Problem List Items Addressed This Visit     None           Preventive health issues were discussed with patient, and age appropriate screening tests were ordered as noted in patient's After Visit Summary  Personalized health advice and appropriate referrals for health education or preventive services given if needed, as noted in patient's After Visit Summary  History of Present Illness:     Patient presents for Medicare Annual Wellness visit  She is due for a mammogram and dexa  Patient Care Team:  Sheryl Randolph DO as PCP - General     Problem List:     Patient Active Problem List   Diagnosis    Syncope    Hypertension    Brain mass    Alopecia    Anemia    Basal cell carcinoma    Iron deficiency anemia    Type 2 diabetes mellitus ([facility] )    Abdominal pain    S/P total hysterectomy and bilateral salpingo-oophorectomy    Dysfunctional voiding of urine    Postop check      Past Medical and Surgical History:     Past Medical History:   Diagnosis Date    Anemia     Cataracts, bilateral     Heart murmur 04/2018    History of transfusion 2003    had 4 units    Hypertension     Meningioma ([facility] ) 04/2018    MRI every 6months    Ovarian cyst 2006    Shortness of breath     Skin neoplasm     last assessed: 6/13/2017     Past Surgical History:   Procedure Laterality Date    CYSTOSCOPY N/A 2/14/2019    Procedure: CYSTOSCOPY;  Surgeon: Charisma Brown MD;  [facility];  Service: Gynecology Oncology    HYSTERECTOMY N/A 2/14/2019    Procedure: HYSTERECTOMY LAPAROSCOPIC TOTAL ([address] [facility] [address]) W/ ROBOTICS bilateral salpingooophorectomy with great difficulty due to mass of 12 cm and taking 2 5 hours;   Surgeon: Charisma Brown MD;  [facility];  Service: Gynecology Oncology    TONSILLECTOMY        Family History:     Family History   Problem Relation Age of Onset    Hypertension Mother     Lung cancer Father     Hypertension Sister     Lymphoma Brother 39    Hypertension Brother     Schizophrenia Brother     Depression Son     Schizophrenia Son     Hypertension Family     Heart disease Other       Social History:     Social History     Socioeconomic History    Marital status: /Civil Union     Spouse name: None    Number of children: None    Years of education: None    Highest education level: None   Occupational History    None   Social Needs    Financial resource strain: None    Food insecurity:     Worry: None     Inability: None    Transportation needs:     Medical: None     Non-medical: None   Tobacco Use    Smoking status: Never Smoker    Smokeless tobacco: Never Used   Substance and Sexual Activity    Alcohol use: Never     Frequency: Never    Drug use: No    Sexual activity: Not Currently   Lifestyle    Physical activity:     Days per week: None     Minutes per session: None    Stress: None   Relationships    Social connections:     Talks on phone: None     Gets together: None     Attends Jainism service: None     Active member of club or organization: None     Attends meetings of clubs or organizations: None     Relationship status: None    Intimate partner violence:     Fear of current or ex partner: None     Emotionally abused: None     Physically abused: None     Forced sexual activity: None   Other Topics Concern    None   Social History Narrative    Daily coffee consumption (___cups/day)    Daily cola consumption (____cans/day)    Daily tea consumption (____cups/day)    Uses safety equipment-seatbelts       Medications and Allergies:     Current Outpatient Medications   Medication Sig Dispense Refill    lisinopril-hydrochlorothiazide (PRINZIDE,ZESTORETIC) 20-25 MG per tablet Take 1 tablet by mouth daily for 48 days 90 tablet 3     No current facility-administered medications for this visit        No Known Allergies   Immunizations:     Immunization History   Administered Date(s) Administered    INFLUENZA 09/30/2014, 09/27/2015, 02/08/2017, 10/23/2017    Influenza Split High Dose Preservative Free IM 10/15/2013, 02/08/2017, 10/23/2017    Influenza TIV (IM) 12/11/2007, 09/30/2014, 09/27/2015    Influenza, high dose seasonal 0 5 mL 10/29/2018    Pneumococcal Conjugate 13-Valent 02/08/2017, 05/01/2019    Pneumococcal Polysaccharide PPV23 12/17/2014    TD (adult) Preservative Free 07/18/2019    Tdap 04/04/2000    Zoster 09/30/2014    influenza, trivalent, adjuvanted 09/04/2019      Health Maintenance:         Topic Date Due    MAMMOGRAM  1945    DXA SCAN  1945    CRC Screening: Colonoscopy  1945    Hepatitis C Screening  Completed     There are no preventive care reminders to display for this patient  Medicare Health Risk Assessment:     /80   Pulse 74   Temp 98 1 °F (36 7 °C)   Ht 4' 11" (1 499 m)   Wt 62 1 kg (137 lb)   BMI 27 67 kg/m²      Quita Rowe is here for her Subsequent Wellness visit  Last Medicare Wellness visit information reviewed, patient interviewed, no change since last AWV  Health Risk Assessment:   Patient rates overall health as excellent  Patient feels that their physical health rating is same  Eyesight was rated as same  Hearing was rated as same  Patient feels that their emotional and mental health rating is same  Pain experienced in the last 7 days has been none  Patient states that she has experienced no weight loss or gain in last 6 months  Depression Screening:   PHQ-2 Score: 0      Fall Risk Screening: In the past year, patient has experienced: no history of falling in past year      Urinary Incontinence Screening:   Patient has not leaked urine accidently in the last six months  Home Safety:  Patient does not have trouble with stairs inside or outside of their home  Patient has working smoke alarms and has working carbon monoxide detector  Home safety hazards include: none  Nutrition:   Current diet is Regular and Limited junk food  Medications:   Patient is not currently taking any over-the-counter supplements  Patient is able to manage medications  Activities of Daily Living (ADLs)/Instrumental Activities of Daily Living (IADLs):   Walk and transfer into and out of bed and chair?: Yes  Dress and groom yourself?: Yes    Bathe or shower yourself?: Yes    Feed yourself?  Yes  Do your laundry/housekeeping?: Yes  Manage your money, pay your bills and track your expenses?: Yes  Make your own meals?: Yes    Do your own shopping?: Yes    Previous Hospitalizations:   Any hospitalizations or ED visits within the last 12 months?: Yes    How many hospitalizations have you had in the last year?: 1-2    Advance Care Planning:   Living will: No    Durable POA for healthcare: No      PREVENTIVE SCREENINGS      Cardiovascular Screening:    General: Screening Not Indicated and History Lipid Disorder      Diabetes Screening:     General: Screening Not Indicated and History Diabetes      Colorectal Cancer Screening:     General: Screening Not Indicated      Breast Cancer Screening:     General: Risks and Benefits Discussed    Due for: Mammogram        Cervical Cancer Screening:    General: Screening Not Indicated      Osteoporosis Screening:    General: Risks and Benefits Discussed    Due for: DXA Appendicular      Abdominal Aortic Aneurysm (AAA) Screening:        General: Screening Not Indicated      Lung Cancer Screening:     General: Screening Not Indicated      Hepatitis C Screening:    General: Screening Current      Nemesio Blas, DO

## 2019-12-05 NOTE — PATIENT INSTRUCTIONS
Medicare Preventive Visit Patient Instructions  Thank you for completing your Welcome to Medicare Visit or Medicare Annual Wellness Visit today  Your next wellness visit will be due in one year (12/5/2020)  The screening/preventive services that you may require over the next 5-10 years are detailed below  Some tests may not apply to you based off risk factors and/or age  Screening tests ordered at today's visit but not completed yet may show as past due  Also, please note that scanned in results may not display below  Preventive Screenings:  Service Recommendations Previous Testing/Comments   Colorectal Cancer Screening  * Colonoscopy    * Fecal Occult Blood Test (FOBT)/Fecal Immunochemical Test (FIT)  * Fecal DNA/Cologuard Test  * Flexible Sigmoidoscopy Age: 54-65 years old   Colonoscopy: every 10 years (may be performed more frequently if at higher risk)  OR  FOBT/FIT: every 1 year  OR  Cologuard: every 3 years  OR  Sigmoidoscopy: every 5 years  Screening may be recommended earlier than age 48 if at higher risk for colorectal cancer  Also, an individualized decision between you and your healthcare provider will decide whether screening between the ages of 74-80 would be appropriate  Colonoscopy: Not on file  FOBT/FIT: Not on file  Cologuard: Not on file  Sigmoidoscopy: Not on file         Breast Cancer Screening Age: 36 years old  Frequency: every 1-2 years  Not required if history of left and right mastectomy Mammogram: Not on file       Cervical Cancer Screening Between the ages of 21-29, pap smear recommended once every 3 years  Between the ages of 33-67, can perform pap smear with HPV co-testing every 5 years     Recommendations may differ for women with a history of total hysterectomy, cervical cancer, or abnormal pap smears in past  Pap Smear: Not on file    Screening Not Indicated   Hepatitis C Screening Once for adults born between [facility]  More frequently in patients at high risk for Hepatitis C Hep C Antibody: 11/15/2019    Screening Current   Diabetes Screening 1-2 times per year if you're at risk for diabetes or have pre-diabetes Fasting glucose: 105 mg/dL   A1C: 6 0    Screening Not Indicated  History Diabetes   Cholesterol Screening Once every 5 years if you don't have a lipid disorder  May order more often based on risk factors  Lipid panel: 11/15/2019    Screening Not Indicated  History Lipid Disorder     Other Preventive Screenings Covered by Medicare:  1  Abdominal Aortic Aneurysm (AAA) Screening: covered once if your at risk  You're considered to be at risk if you have a family history of AAA  2  Lung Cancer Screening: covers low dose CT scan once per year if you meet all of the following conditions: (1) Age 50-69; (2) No signs or symptoms of lung cancer; (3) Current smoker or have quit smoking within the last 15 years; (4) You have a tobacco smoking history of at least 30 pack years (packs per day multiplied by number of years you smoked); (5) You get a written order from a healthcare provider  3  Glaucoma Screening: covered annually if you're considered high risk: (1) You have diabetes OR (2) Family history of glaucoma OR (3)  aged 48 and older OR (3)  American aged 72 and older  3  Osteoporosis Screening: covered every 2 years if you meet one of the following conditions: (1) You're estrogen deficient and at risk for osteoporosis based off medical history and other findings; (2) Have a vertebral abnormality; (3) On glucocorticoid therapy for more than 3 months; (4) Have primary hyperparathyroidism; (5) On osteoporosis medications and need to assess response to drug therapy  · Last bone density test (DXA Scan): Not on file  5  HIV Screening: covered annually if you're between the age of 12-76  Also covered annually if you are younger than 13 and older than 72 with risk factors for HIV infection   For pregnant patients, it is covered up to 3 times per pregnancy  Immunizations:  Immunization Recommendations   Influenza Vaccine Annual influenza vaccination during flu season is recommended for all persons aged >= 6 months who do not have contraindications   Pneumococcal Vaccine (Prevnar and Pneumovax)  * Prevnar = PCV13  * Pneumovax = PPSV23   Adults 25-60 years old: 1-3 doses may be recommended based on certain risk factors  Adults 72 years old: Prevnar (PCV13) vaccine recommended followed by Pneumovax (PPSV23) vaccine  If already received PPSV23 since turning 65, then PCV13 recommended at least one year after PPSV23 dose  Hepatitis B Vaccine 3 dose series if at intermediate or high risk (ex: diabetes, end stage renal disease, liver disease)   Tetanus (Td) Vaccine - COST NOT COVERED BY MEDICARE PART B Following completion of primary series, a booster dose should be given every 10 years to maintain immunity against tetanus  Td may also be given as tetanus wound prophylaxis  Tdap Vaccine - COST NOT COVERED BY MEDICARE PART B Recommended at least once for all adults  For pregnant patients, recommended with each pregnancy  Shingles Vaccine (Shingrix) - COST NOT COVERED BY MEDICARE PART B  2 shot series recommended in those aged 48 and above     Health Maintenance Due:      Topic Date Due    MAMMOGRAM  1945    DXA SCAN  1945    CRC Screening: Colonoscopy  1945    Hepatitis C Screening  Completed     Immunizations Due:  There are no preventive care reminders to display for this patient  Advance Directives   What are advance directives? Advance directives are legal documents that state your wishes and plans for medical care  These plans are made ahead of time in case you lose your ability to make decisions for yourself  Advance directives can apply to any medical decision, such as the treatments you want, and if you want to donate organs  What are the types of advance directives?   There are many types of advance directives, and each state has rules about how to use them  You may choose a combination of any of the following:  · Living will: This is a written record of the treatment you want  You can also choose which treatments you do not want, which to limit, and which to stop at a certain time  This includes surgery, medicine, IV fluid, and tube feedings  · Durable power of  for healthcare [facility]): This is a written record that states who you want to make healthcare choices for you when you are unable to make them for yourself  This person, called a proxy, is usually a family member or a friend  You may choose more than 1 proxy  · Do not resuscitate (DNR) order:  A DNR order is used in case your heart stops beating or you stop breathing  It is a request not to have certain forms of treatment, such as CPR  A DNR order may be included in other types of advance directives  · Medical directive: This covers the care that you want if you are in a coma, near death, or unable to make decisions for yourself  You can list the treatments you want for each condition  Treatment may include pain medicine, surgery, blood transfusions, dialysis, IV or tube feedings, and a ventilator (breathing machine)  · Values history: This document has questions about your views, beliefs, and how you feel and think about life  This information can help others choose the care that you would choose  Why are advance directives important? An advance directive helps you control your care  Although spoken wishes may be used, it is better to have your wishes written down  Spoken wishes can be misunderstood, or not followed  Treatments may be given even if you do not want them  An advance directive may make it easier for your family to make difficult choices about your care     Weight Management   Why it is important to manage your weight:  Being overweight increases your risk of health conditions such as heart disease, high blood pressure, type 2 diabetes, and certain types of cancer  It can also increase your risk for osteoarthritis, sleep apnea, and other respiratory problems  Aim for a slow, steady weight loss  Even a small amount of weight loss can lower your risk of health problems  How to lose weight safely:  A safe and healthy way to lose weight is to eat fewer calories and get regular exercise  You can lose up about 1 pound a week by decreasing the number of calories you eat by 500 calories each day  Healthy meal plan for weight management:  A healthy meal plan includes a variety of foods, contains fewer calories, and helps you stay healthy  A healthy meal plan includes the following:  · Eat whole-grain foods more often  A healthy meal plan should contain fiber  Fiber is the part of grains, fruits, and vegetables that is not broken down by your body  Whole-grain foods are healthy and provide extra fiber in your diet  Some examples of whole-grain foods are whole-wheat breads and pastas, oatmeal, brown rice, and bulgur  · Eat a variety of vegetables every day  Include dark, leafy greens such as spinach, kale, stephania greens, and mustard greens  Eat yellow and orange vegetables such as carrots, sweet potatoes, and winter squash  · Eat a variety of fruits every day  Choose fresh or canned fruit (canned in its own juice or light syrup) instead of juice  Fruit juice has very little or no fiber  · Eat low-fat dairy foods  Drink fat-free (skim) milk or 1% milk  Eat fat-free yogurt and low-fat cottage cheese  Try low-fat cheeses such as mozzarella and other reduced-fat cheeses  · Choose meat and other protein foods that are low in fat  Choose beans or other legumes such as split peas or lentils  Choose fish, skinless poultry (chicken or turkey), or lean cuts of red meat (beef or pork)  Before you cook meat or poultry, cut off any visible fat  · Use less fat and oil  Try baking foods instead of frying them   Add less fat, such as margarine, sour cream, regular salad dressing and mayonnaise to foods  Eat fewer high-fat foods  Some examples of high-fat foods include french fries, doughnuts, ice cream, and cakes  · Eat fewer sweets  Limit foods and drinks that are high in sugar  This includes candy, cookies, regular soda, and sweetened drinks  Exercise:  Exercise at least 30 minutes per day on most days of the week  Some examples of exercise include walking, biking, dancing, and swimming  You can also fit in more physical activity by taking the stairs instead of the elevator or parking farther away from stores  Ask your healthcare provider about the best exercise plan for you  © Copyright [employer] 2018 Information is for End User's use only and may not be sold, redistributed or otherwise used for commercial purposes   All illustrations and images included in CareNotes® are the copyrighted property of A D A M , Inc  or [address] [place] [address]

## 2019-12-06 NOTE — PROGRESS NOTES
Patient ID: Urbano Flannery is a 68 y o  female  HPI: 68 y  o female presenting with a chief complaint of niddm, hypertension, hypercholesterolemia and vit D deficiency;hgba1c is good at 6 0       SUBJECTIVE    Family History   Problem Relation Age of Onset    Hypertension Mother     Lung cancer Father     Hypertension Sister     Lymphoma Brother 39    Hypertension Brother     Schizophrenia Brother     Depression Son     Schizophrenia Son     Hypertension Family     Heart disease Other      Social History     Socioeconomic History    Marital status: /Civil Union     Spouse name: Not on file    Number of children: Not on file    Years of education: Not on file    Highest education level: Not on file   Occupational History    Not on file   Social Needs    Financial resource strain: Not on file    Food insecurity:     Worry: Not on file     Inability: Not on file    Transportation needs:     Medical: Not on file     Non-medical: Not on file   Tobacco Use    Smoking status: Never Smoker    Smokeless tobacco: Never Used   Substance and Sexual Activity    Alcohol use: Never     Frequency: Never    Drug use: No    Sexual activity: Not Currently   Lifestyle    Physical activity:     Days per week: Not on file     Minutes per session: Not on file    Stress: Not on file   Relationships    Social connections:     Talks on phone: Not on file     Gets together: Not on file     Attends Zoroastrianism service: Not on file     Active member of club or organization: Not on file     Attends meetings of clubs or organizations: Not on file     Relationship status: Not on file    Intimate partner violence:     Fear of current or ex partner: Not on file     Emotionally abused: Not on file     Physically abused: Not on file     Forced sexual activity: Not on file   Other Topics Concern    Not on file   Social History Narrative    Daily coffee consumption (___cups/day)    Daily cola consumption (____cans/day)    Daily tea consumption (____cups/day)    Uses safety equipment-seatbelts     Past Medical History:   Diagnosis Date    Anemia     Cataracts, bilateral     Heart murmur 04/2018    History of transfusion 2003    had 4 units    Hypertension     Meningioma ([address] ) 04/2018    MRI every 6months    Ovarian cyst 2006    Shortness of breath     Skin neoplasm     last assessed: 6/13/2017     Past Surgical History:   Procedure Laterality Date    CYSTOSCOPY N/A 2/14/2019    Procedure: [place];  Surgeon: Zi Kang MD;  [facility];  Service: Gynecology Oncology    HYSTERECTOMY N/A 2/14/2019    Procedure: HYSTERECTOMY LAPAROSCOPIC TOTAL ([address]) W/ ROBOTICS bilateral salpingooophorectomy with great difficulty due to mass of 12 cm and taking 2 5 hours;   Surgeon: Zi Kang MD;  [facility];  Service: Gynecology Oncology    TONSILLECTOMY       No Known Allergies    Current Outpatient Medications:     lisinopril-hydrochlorothiazide (PRINZIDE,ZESTORETIC) 20-25 MG per tablet, Take 1 tablet by mouth daily for 48 days, Disp: 90 tablet, Rfl: 3    simvastatin (ZOCOR) 10 mg tablet, Take 1 tablet (10 mg total) by mouth daily at bedtime, Disp: 30 tablet, Rfl: 5    Review of Systems  Constitutional:     Denies fever, chills ,fatigue ,weakness ,weight loss, weight gain     ENT: Denies earache ,loss of hearing ,nosebleed, nasal discharge,nasal congestion ,sore throat ,hoarseness  Pulmonary: Denies shortness of breath ,cough  ,dyspnea on exertion, orthopnea  ,PND   Cardiovascular:  Denies bradycardia , tachycardia  ,palpations, lower extremity edema leg, claudication  Breast:  Denies new or changing breast lumps ,nipple discharge ,nipple changes  Abdomen:  Denies abdominal pain , anorexia , indigestion, nausea, vomiting, constipation, diarrhea  Musculoskeletal: Denies myalgias, arthralgias, joint swelling, joint stiffness , limb pain, limb swelling  Gu: denies dysuria, polyuria  Skin: Denies skin rash, skin lesion, skin wound, itching, dry skin  Neuro: Denies headache, numbness, tingling, confusion, loss of consciousness, dizziness, vertigo  Psychiatric: Denies feelings of depression, suicidal ideation, anxiety, sleep disturbances    OBJECTIVE  /80   Pulse 74   Temp 98 1 °F (36 7 °C)   Ht 4' 11" (1 499 m)   Wt 62 1 kg (137 lb)   BMI 27 67 kg/m²   Constitutional:   NAD, well appearing and well nourished      ENT:   Conjunctiva and lids: no injection, edema, or discharge     Pupils and iris: JB bilaterally    External inspection of ears and nose: normal without deformities or discharge  Otoscopic exam: Canals patent without erythema  Nasal mucosa, septum and turbinates: Normal or edema or discharge         Oropharynx:  Moist mucosa, normal tongue and tonsils without lesions  No erythema        Pulmonary:Respiratory effort normal rate and rhythm, no increased work of breathing   Auscultation of lungs:  Clear bilaterally with no adventitious breath sounds       Cardiovascular: regular rate and rhythm, S1 and S2, no murmur, no edema and/or varicosities of LE      Abdomen: Soft and non-distended     Positive bowel sounds      No heptomegaly or splenomegaly      Gu: no suprapubic tenderness or CVA tenderness, no urethral discharge  Lymphatic:  No anterior or posterior cervical lymphadenopathy         Musculoskeletal:  Gait and station: Normal gait      Digits and nails normal without clubbing or cyanosis       Inspection/palpation of joints, bones, and muscles:  No joint tenderness, swelling, full active and passive range of motion       Skin: Normal skin turgor and no rashes      Neuro:   Normal reflexes     Psych:   alert and oriented to person, place and time     normal mood and affect       Assessment/Plan:Diagnoses and all orders for this visit:    Medicare annual wellness visit, subsequent    Type 2 diabetes mellitus without complication, without long-term current use of insulin (HCC)  -     HEMOGLOBIN A1C W/ EAG ESTIMATION; Future    Essential hypertension  -     Basic metabolic panel; Future    Hypercholesterolemia  -     simvastatin (ZOCOR) 10 mg tablet; Take 1 tablet (10 mg total) by mouth daily at bedtime  -     Lipid Panel with Direct LDL reflex; Future    Vitamin D deficiency  -     DXA bone density spine hip and pelvis; Future    Breast cancer screening by mammogram  -     Mammo screening bilateral w cad; Future    I will see patient back in 4 mos or sooner prn

## 2020-01-30 ENCOUNTER — OFFICE VISIT (OUTPATIENT)
Dept: FAMILY MEDICINE CLINIC | Facility: CLINIC | Age: 75
End: 2020-01-30
Payer: COMMERCIAL

## 2020-01-30 VITALS
BODY MASS INDEX: 27.69 KG/M2 | WEIGHT: 137.38 LBS | SYSTOLIC BLOOD PRESSURE: 142 MMHG | HEIGHT: 59 IN | OXYGEN SATURATION: 94 % | DIASTOLIC BLOOD PRESSURE: 78 MMHG | HEART RATE: 67 BPM | TEMPERATURE: 97.7 F

## 2020-01-30 DIAGNOSIS — Z01.818 PRE-OPERATIVE EXAMINATION: Primary | ICD-10-CM

## 2020-01-30 DIAGNOSIS — H25.013 CORTICAL AGE-RELATED CATARACT OF BOTH EYES: ICD-10-CM

## 2020-01-30 PROCEDURE — 99214 OFFICE O/P EST MOD 30 MIN: CPT | Performed by: NURSE PRACTITIONER

## 2020-01-30 PROCEDURE — 1036F TOBACCO NON-USER: CPT | Performed by: NURSE PRACTITIONER

## 2020-01-30 PROCEDURE — 3008F BODY MASS INDEX DOCD: CPT | Performed by: NURSE PRACTITIONER

## 2020-01-30 PROCEDURE — 1160F RVW MEDS BY RX/DR IN RCRD: CPT | Performed by: NURSE PRACTITIONER

## 2020-01-30 NOTE — PROGRESS NOTES
Subjective:      Patient ID: Tao Mejia is a 69 y o  female  HPI:  Tao Mejia is a 69 y o  female who presents to the office today for a preoperative consultation at the request of Dr Lara Liao for left cataract excison on 02/03/2020 at [facility]      Current anti-platelet/anti-coagulation medications that the patient is prescribed include: None    Assessment of Cardiac Risk:  · Denies unstable or severe angina or MI in the last 6 weeks or history of stent placement in the last year  · Denies decompensated heart failure (e g  New onset failure, NYHA functional class IV heart failure, or worsening existing heart failure)  · Denies significant arrhythmia such as high grade AV Block, symptomatic ventricular arrhythmia, newly recognized ventricular tachycardia, supraventricular tachycardia with resting heart rate > 100 or symptomatic bradycardia  · Denies severe heart valve disease including aortic stenosis or symptomatic mitral stenosis  Prior anesthesia:     Patient as had multiple surgeries in the past with no adverse reaction to anesthesia  No report family history of adverse reactions to anesthesia, including malignant hyperthermia and pseudocholinesterase deficiency    Exercise Capacity:     Able to walk 4 blocks without symptoms  Able to climb 2 flights of stairs without symptoms    Lifestyle Factors: Tobacco Use:  Lifelong non-smoker  Alcohol Use:  Denies use  Illicit Drug Use:  Denies use  No Congregational or cultural beliefs that would alteration to care       GOPI Risk Factors: No identifiable risk factors    Personal history of venous thromboembolic disease:  No  History of Steroid use for >2 weeks within last year?    No      Family History   Problem Relation Age of Onset    Hypertension Mother    [facility] Lung cancer Father     Hypertension Sister     Lymphoma Brother 39    Hypertension Brother     Schizophrenia Brother     Depression Son    [facility] Schizophrenia Son    [facility] Hypertension Family     Heart disease Other      Social History     Socioeconomic History    Marital status: /Civil Union     Spouse name: Not on file    Number of children: Not on file    Years of education: Not on file    Highest education level: Not on file   Occupational History    Not on file   Social Needs    Financial resource strain: Not on file    Food insecurity:     Worry: Not on file     Inability: Not on file    Transportation needs:     Medical: Not on file     Non-medical: Not on file   Tobacco Use    Smoking status: Never Smoker    Smokeless tobacco: Never Used   Substance and Sexual Activity    Alcohol use: Never     Frequency: Never    Drug use: No    Sexual activity: Not Currently   Lifestyle    Physical activity:     Days per week: Not on file     Minutes per session: Not on file    Stress: Not on file   Relationships    Social connections:     Talks on phone: Not on file     Gets together: Not on file     Attends Mormonism service: Not on file     Active member of club or organization: Not on file     Attends meetings of clubs or organizations: Not on file     Relationship status: Not on file    Intimate partner violence:     Fear of current or ex partner: Not on file     Emotionally abused: Not on file     Physically abused: Not on file     Forced sexual activity: Not on file   Other Topics Concern    Not on file   Social History Narrative    Daily coffee consumption (___cups/day)    Daily cola consumption (____cans/day)    Daily tea consumption (____cups/day)    Uses safety equipment-seatbelts     Past Medical History:   Diagnosis Date    Anemia     Cataracts, bilateral     Heart murmur 04/2018    History of transfusion 2003    had 4 units    Hypertension     Meningioma ([facility] [address] ) 04/2018    MRI every 6months    Ovarian cyst 2006    Shortness of breath     Skin neoplasm     last assessed: 6/13/2017     Past Surgical History:   Procedure Laterality Date    CYSTOSCOPY N/A 2/14/2019    Procedure: CYSTOSCOPY;  Surgeon: Toma Verdugo MD;  [facility];  Service: Gynecology Oncology    HYSTERECTOMY N/A 2/14/2019    Procedure: HYSTERECTOMY LAPAROSCOPIC TOTAL ([address]) W/ ROBOTICS bilateral salpingooophorectomy with great difficulty due to mass of 12 cm and taking 2 5 hours; Surgeon: Toma Verdugo MD;  [facility];  Service: Gynecology Oncology    TONSILLECTOMY       No Known Allergies    Current Outpatient Medications:     lisinopril-hydrochlorothiazide (PRINZIDE,ZESTORETIC) 20-25 MG per tablet, Take 1 tablet by mouth daily for 48 days, Disp: 90 tablet, Rfl: 3    simvastatin (ZOCOR) 10 mg tablet, Take 1 tablet (10 mg total) by mouth daily at bedtime, Disp: 30 tablet, Rfl: 5    Review of Systems   Constitutional: Negative for activity change, appetite change, diaphoresis and unexpected weight change  HENT: Negative for dental problem, nosebleeds, postnasal drip, tinnitus and trouble swallowing  Eyes: Positive for visual disturbance  Respiratory: Negative for cough, chest tightness, shortness of breath and wheezing  Cardiovascular: Negative for chest pain, palpitations and leg swelling  Gastrointestinal: Negative for abdominal distention, abdominal pain, constipation, diarrhea and nausea  Endocrine: Negative for polydipsia and polyuria  Musculoskeletal: Negative for arthralgias, back pain, gait problem, myalgias, neck pain and neck stiffness  Skin: Negative for color change and wound  Allergic/Immunologic: Negative for environmental allergies  Neurological: Negative for dizziness, weakness, light-headedness and headaches  Hematological: Negative  Psychiatric/Behavioral: Negative for dysphoric mood and sleep disturbance  The patient is not nervous/anxious          Objective:    /78 (BP Location: Left arm, Patient Position: Sitting, Cuff Size: Large)   Pulse 67   Temp 97 7 °F (36 5 °C) (Tympanic)   Ht 4' 11" (1 499 m) Wt 62 3 kg (137 lb 6 oz)   SpO2 94%   BMI 27 75 kg/m² (Reviewed)     Physical Exam   Constitutional: She is oriented to person, place, and time  Vital signs are normal  She appears well-developed and well-nourished  No distress  HENT:   Head: Normocephalic and atraumatic  Mouth/Throat: Uvula is midline, oropharynx is clear and moist and mucous membranes are normal    Mallampati Score: II   Eyes: Pupils are equal, round, and reactive to light  Conjunctivae, EOM and lids are normal    Neck: Trachea normal  Neck supple  Cardiovascular: Normal rate, regular rhythm and normal pulses  Murmur heard  Systolic murmur is present with a grade of 1/6  Pulses:       Dorsalis pedis pulses are 2+ on the right side, and 2+ on the left side  Posterior tibial pulses are 2+ on the right side, and 2+ on the left side  Pulmonary/Chest: Effort normal and breath sounds normal    Abdominal: Soft  Bowel sounds are normal  She exhibits no distension  There is no tenderness  Lymphadenopathy:     She has no cervical adenopathy  Neurological: She is alert and oriented to person, place, and time  She has normal strength  No cranial nerve deficit  Skin: Skin is warm and dry  Capillary refill takes less than 2 seconds  Psychiatric: She has a normal mood and affect  Her behavior is normal    Vitals reviewed          DATA:  Laboratory Results: Personally reviewed all pertinent laboratory results and reports  Lab Results   Component Value Date    ALT 15 11/15/2019    AST 11 11/15/2019    BUN 16 11/15/2019    CALCIUM 9 5 11/15/2019     11/15/2019    CHOL 169 11/15/2019    CO2 27 11/15/2019    CREATININE 0 72 11/15/2019    HDL 47 11/15/2019    HCT 40 8 11/15/2019    HGB 13 4 11/15/2019    HGBA1C 6 0 11/15/2019     (H) 11/15/2019    K 4 4 11/15/2019     11/15/2019    TRIG 109 11/15/2019    WBC 8 07 11/15/2019     ECG: Normal Sinus Rhythm (performed on 04/28/2019)     Pre-Op Evaluation Assessment:  Cardiac Risk Estimation: per the Revised Cardiac Risk Index (Circ  100:1043, 1999): the patient's risk factors for cardiac complications include: hypertension, type 2 diabetes mellitus and iron deficiency anemia, putting her in : RCI Risk Class I (3 9% 30-day risk of death, MI or cardiac death)     Maeve Gamez is undergoing an elective low risk surgery  She is at low risk for major adverse cardiac event (MACE)  She may proceed with surgery as planned      Pre-op Evaluation Plan  1  Further preoperative workup as follows:  -No further preoperative work-up required     2  Medication Management/Recommendations:  -Patient has been instructed to avoid herbs or non-directed vitamins the week prior to surgery to ensure no drug interactions with perioperative surgical and anesthetic medications   -Patient has been instructed to avoid aspirin containing medications or non-steroidal anti-inflammatory drugs for the week preceding surgery          Assessment/Plan:    Diagnoses and all orders for this visit:     Diagnoses and all orders for this visit:    Pre-operative examination    Cortical age-related cataract of both eyes    Requested paperwork and note to be faxed to surgeon's office

## 2020-02-17 ENCOUNTER — TELEPHONE (OUTPATIENT)
Dept: NEUROSURGERY | Facility: CLINIC | Age: 75
End: 2020-02-17

## 2020-02-20 ENCOUNTER — TELEPHONE (OUTPATIENT)
Dept: NEUROSURGERY | Facility: CLINIC | Age: 75
End: 2020-02-20

## 2020-02-20 NOTE — TELEPHONE ENCOUNTER
Pt reports she has rescheduled to 6/22 but questions if she can keep her MRI on 6/10   No need to change MRI and pt notified via phone message

## 2020-02-20 NOTE — TELEPHONE ENCOUNTER
I returned patient call and l/m stating that all she needs is too r/s her appt for 6/12/20 for another date when HDM is available    Do not transfer call, Please reschedule

## 2020-04-30 ENCOUNTER — TELEPHONE (OUTPATIENT)
Dept: FAMILY MEDICINE CLINIC | Facility: CLINIC | Age: 75
End: 2020-04-30

## 2020-06-09 ENCOUNTER — HOSPITAL ENCOUNTER (OUTPATIENT)
Dept: RADIOLOGY | Facility: HOSPITAL | Age: 75
Discharge: HOME | End: 2020-06-09
Payer: COMMERCIAL

## 2020-06-09 DIAGNOSIS — G93.89 BRAIN MASS: ICD-10-CM

## 2020-06-09 PROCEDURE — 70553 MRI BRAIN STEM W/O & W/DYE: CPT

## 2020-06-09 PROCEDURE — A9585 GADOBUTROL INJECTION: HCPCS | Performed by: PHYSICIAN ASSISTANT

## 2020-06-09 RX ADMIN — GADOBUTROL 5 ML: 604.72 INJECTION INTRAVENOUS at 12:42

## 2020-06-22 ENCOUNTER — OFFICE VISIT (OUTPATIENT)
Dept: NEUROSURGERY | Facility: CLINIC | Age: 75
End: 2020-06-22
Payer: COMMERCIAL

## 2020-06-22 VITALS
WEIGHT: 140 LBS | DIASTOLIC BLOOD PRESSURE: 88 MMHG | RESPIRATION RATE: 16 BRPM | SYSTOLIC BLOOD PRESSURE: 126 MMHG | BODY MASS INDEX: 28.22 KG/M2 | TEMPERATURE: 98.3 F | HEIGHT: 59 IN | HEART RATE: 82 BPM

## 2020-06-22 DIAGNOSIS — D32.9 MENINGIOMA (HCC): Primary | ICD-10-CM

## 2020-06-22 PROCEDURE — 2022F DILAT RTA XM EVC RTNOPTHY: CPT | Performed by: PHYSICIAN ASSISTANT

## 2020-06-22 PROCEDURE — 4040F PNEUMOC VAC/ADMIN/RCVD: CPT | Performed by: PHYSICIAN ASSISTANT

## 2020-06-22 PROCEDURE — 3074F SYST BP LT 130 MM HG: CPT | Performed by: PHYSICIAN ASSISTANT

## 2020-06-22 PROCEDURE — 1036F TOBACCO NON-USER: CPT | Performed by: PHYSICIAN ASSISTANT

## 2020-06-22 PROCEDURE — 3079F DIAST BP 80-89 MM HG: CPT | Performed by: PHYSICIAN ASSISTANT

## 2020-06-22 PROCEDURE — 1160F RVW MEDS BY RX/DR IN RCRD: CPT | Performed by: PHYSICIAN ASSISTANT

## 2020-06-22 PROCEDURE — 3008F BODY MASS INDEX DOCD: CPT | Performed by: PHYSICIAN ASSISTANT

## 2020-06-22 PROCEDURE — 99214 OFFICE O/P EST MOD 30 MIN: CPT | Performed by: PHYSICIAN ASSISTANT

## 2020-08-14 ENCOUNTER — APPOINTMENT (OUTPATIENT)
Dept: LAB | Facility: CLINIC | Age: 75
End: 2020-08-14
Payer: COMMERCIAL

## 2020-08-14 DIAGNOSIS — E11.9 TYPE 2 DIABETES MELLITUS WITHOUT COMPLICATION, WITHOUT LONG-TERM CURRENT USE OF INSULIN (HCC): ICD-10-CM

## 2020-08-14 DIAGNOSIS — I10 ESSENTIAL HYPERTENSION: ICD-10-CM

## 2020-08-14 DIAGNOSIS — E78.00 HYPERCHOLESTEROLEMIA: ICD-10-CM

## 2020-08-14 LAB
ANION GAP SERPL CALCULATED.3IONS-SCNC: 6 MMOL/L (ref 4–13)
BUN SERPL-MCNC: 20 MG/DL (ref 5–25)
CALCIUM SERPL-MCNC: 9.1 MG/DL (ref 8.3–10.1)
CHLORIDE SERPL-SCNC: 103 MMOL/L (ref 100–108)
CHOLEST SERPL-MCNC: 160 MG/DL (ref 50–200)
CO2 SERPL-SCNC: 28 MMOL/L (ref 21–32)
CREAT SERPL-MCNC: 0.78 MG/DL (ref 0.6–1.3)
EST. AVERAGE GLUCOSE BLD GHB EST-MCNC: 128 MG/DL
GFR SERPL CREATININE-BSD FRML MDRD: 76 ML/MIN/1.73SQ M
GLUCOSE SERPL-MCNC: 104 MG/DL (ref 65–140)
HBA1C MFR BLD: 6.1 %
HDLC SERPL-MCNC: 42 MG/DL
LDLC SERPL CALC-MCNC: 94 MG/DL (ref 0–100)
POTASSIUM SERPL-SCNC: 3.9 MMOL/L (ref 3.5–5.3)
SODIUM SERPL-SCNC: 137 MMOL/L (ref 136–145)
TRIGL SERPL-MCNC: 118 MG/DL

## 2020-08-14 PROCEDURE — 80061 LIPID PANEL: CPT

## 2020-08-14 PROCEDURE — 36415 COLL VENOUS BLD VENIPUNCTURE: CPT

## 2020-08-14 PROCEDURE — 83036 HEMOGLOBIN GLYCOSYLATED A1C: CPT

## 2020-08-14 PROCEDURE — 80048 BASIC METABOLIC PNL TOTAL CA: CPT

## 2020-08-14 PROCEDURE — 3044F HG A1C LEVEL LT 7.0%: CPT | Performed by: FAMILY MEDICINE

## 2020-08-25 ENCOUNTER — OFFICE VISIT (OUTPATIENT)
Dept: FAMILY MEDICINE CLINIC | Facility: CLINIC | Age: 75
End: 2020-08-25
Payer: COMMERCIAL

## 2020-08-25 VITALS
HEIGHT: 59 IN | BODY MASS INDEX: 27.62 KG/M2 | TEMPERATURE: 99.2 F | SYSTOLIC BLOOD PRESSURE: 122 MMHG | HEART RATE: 74 BPM | DIASTOLIC BLOOD PRESSURE: 80 MMHG | WEIGHT: 137 LBS

## 2020-08-25 DIAGNOSIS — E11.9 TYPE 2 DIABETES MELLITUS WITHOUT COMPLICATION, WITHOUT LONG-TERM CURRENT USE OF INSULIN (HCC): ICD-10-CM

## 2020-08-25 DIAGNOSIS — Z12.11 COLON CANCER SCREENING: ICD-10-CM

## 2020-08-25 DIAGNOSIS — D64.9 ANEMIA, UNSPECIFIED TYPE: ICD-10-CM

## 2020-08-25 DIAGNOSIS — E78.00 HYPERCHOLESTEROLEMIA: ICD-10-CM

## 2020-08-25 DIAGNOSIS — E11.42 TYPE 2 DIABETES MELLITUS WITH DIABETIC POLYNEUROPATHY, WITHOUT LONG-TERM CURRENT USE OF INSULIN (HCC): Primary | ICD-10-CM

## 2020-08-25 DIAGNOSIS — I10 ESSENTIAL HYPERTENSION: ICD-10-CM

## 2020-08-25 PROCEDURE — 4040F PNEUMOC VAC/ADMIN/RCVD: CPT | Performed by: FAMILY MEDICINE

## 2020-08-25 PROCEDURE — 3079F DIAST BP 80-89 MM HG: CPT | Performed by: FAMILY MEDICINE

## 2020-08-25 PROCEDURE — 3008F BODY MASS INDEX DOCD: CPT | Performed by: FAMILY MEDICINE

## 2020-08-25 PROCEDURE — 2022F DILAT RTA XM EVC RTNOPTHY: CPT | Performed by: FAMILY MEDICINE

## 2020-08-25 PROCEDURE — 3044F HG A1C LEVEL LT 7.0%: CPT | Performed by: FAMILY MEDICINE

## 2020-08-25 PROCEDURE — 3074F SYST BP LT 130 MM HG: CPT | Performed by: FAMILY MEDICINE

## 2020-08-25 PROCEDURE — 99214 OFFICE O/P EST MOD 30 MIN: CPT | Performed by: FAMILY MEDICINE

## 2020-08-25 PROCEDURE — 1160F RVW MEDS BY RX/DR IN RCRD: CPT | Performed by: FAMILY MEDICINE

## 2020-08-25 PROCEDURE — 1036F TOBACCO NON-USER: CPT | Performed by: FAMILY MEDICINE

## 2020-08-25 PROCEDURE — 3725F SCREEN DEPRESSION PERFORMED: CPT | Performed by: FAMILY MEDICINE

## 2020-08-25 NOTE — PROGRESS NOTES
BMI Counseling: Body mass index is 27 67 kg/m²  The BMI is above normal  Nutrition recommendations include reducing portion sizes and decreasing overall calorie intake  Patient ID: Kalpesh Esquivel is a 69 y o  female  HPI: 69 y  o female presents for follow up of niddm, hypertension and hypercholesterolemia  Hgba1c is  6 1 and patient's issues are well controlled  Patient deneis any dyspnea, chest pain or palpitations    Her anemia has resolved, but she will continue with iron supplementation      SUBJECTIVE    Family History   Problem Relation Age of Onset    Hypertension Mother     Lung cancer Father     Hypertension Sister     Lymphoma Brother 39    Hypertension Brother     Schizophrenia Brother     Depression Son     Schizophrenia Son     Hypertension Family     Heart disease Other      Social History     Socioeconomic History    Marital status: /Civil Union     Spouse name: Not on file    Number of children: Not on file    Years of education: Not on file    Highest education level: Not on file   Occupational History    Not on file   Social Needs    Financial resource strain: Not on file    Food insecurity     Worry: Not on file     Inability: Not on file    Transportation needs     Medical: Not on file     Non-medical: Not on file   Tobacco Use    Smoking status: Never Smoker    Smokeless tobacco: Never Used   Substance and Sexual Activity    Alcohol use: Never     Frequency: Never    Drug use: No    Sexual activity: Not Currently   Lifestyle    Physical activity     Days per week: Not on file     Minutes per session: Not on file    Stress: Not on file   Relationships    Social connections     Talks on phone: Not on file     Gets together: Not on file     Attends Hinduism service: Not on file     Active member of club or organization: Not on file     Attends meetings of clubs or organizations: Not on file     Relationship status: Not on file    Intimate partner violence Fear of current or ex partner: Not on file     Emotionally abused: Not on file     Physically abused: Not on file     Forced sexual activity: Not on file   Other Topics Concern    Not on file   Social History Narrative    Daily coffee consumption (___cups/day)    Daily cola consumption (____cans/day)    Daily tea consumption (____cups/day)    Uses safety equipment-seatbelts     Past Medical History:   Diagnosis Date    Anemia     Cataracts, bilateral     Heart murmur 04/2018    History of transfusion 2003    had 4 units    Hypertension     Meningioma ([address] ) 04/2018    MRI every 6months    Ovarian cyst 2006    Shortness of breath     Skin neoplasm     last assessed: 6/13/2017     Past Surgical History:   Procedure Laterality Date    CYSTOSCOPY N/A 2/14/2019    Procedure: [facility];  Surgeon: Ania Saravia MD;  [facility];  Service: Gynecology Oncology    HYSTERECTOMY N/A 2/14/2019    Procedure: HYSTERECTOMY LAPAROSCOPIC TOTAL ([address] [facility] [address]) W/ ROBOTICS bilateral salpingooophorectomy with great difficulty due to mass of 12 cm and taking 2 5 hours;   Surgeon: Ania Saravia MD;  [facility];  Service: Gynecology Oncology    TONSILLECTOMY       No Known Allergies    Current Outpatient Medications:     lisinopril-hydrochlorothiazide (PRINZIDE,ZESTORETIC) 20-25 MG per tablet, Take 1 tablet by mouth daily for 48 days, Disp: 90 tablet, Rfl: 3    simvastatin (ZOCOR) 10 mg tablet, Take 1 tablet (10 mg total) by mouth daily at bedtime, Disp: 30 tablet, Rfl: 5    Review of Systems  Constitutional:     Denies fever, chills ,fatigue ,weakness ,weight loss, weight gain     ENT: Denies earache ,loss of hearing ,nosebleed, nasal discharge,nasal congestion ,sore throat ,hoarseness  Pulmonary: Denies shortness of breath ,cough  ,dyspnea on exertion, orthopnea  ,PND   Cardiovascular:  Denies bradycardia , tachycardia  ,palpations, lower extremity edema leg, claudication  Breast:  Denies new or changing breast lumps ,nipple discharge ,nipple changes  Abdomen:  Denies abdominal pain , anorexia , indigestion, nausea, vomiting, constipation, diarrhea  Musculoskeletal: Denies myalgias, arthralgias, joint swelling, joint stiffness , limb pain, limb swelling  Gu: denies dysuria, polyuria  Skin: Denies skin rash, skin lesion, skin wound, itching, dry skin  Neuro: Denies headache, numbness, tingling, confusion, loss of consciousness, dizziness, vertigo  Psychiatric: Denies feelings of depression, suicidal ideation, anxiety, sleep disturbances    OBJECTIVE  /80   Pulse 74   Temp 99 2 °F (37 3 °C)   Ht 4' 11" (1 499 m)   Wt 62 1 kg (137 lb)   BMI 27 67 kg/m²   Constitutional:   NAD, well appearing and well nourished      ENT:   Conjunctiva and lids: no injection, edema, or discharge     Pupils and iris: JB bilaterally    External inspection of ears and nose: normal without deformities or discharge  Otoscopic exam: Canals patent without erythema  Nasal mucosa, septum and turbinates: Normal or edema or discharge         Oropharynx:  Moist mucosa, normal tongue and tonsils without lesions  No erythema        Pulmonary:Respiratory effort normal rate and rhythm, no increased work of breathing   Auscultation of lungs:  Clear bilaterally with no adventitious breath sounds       Cardiovascular: regular rate and rhythm, S1 and S2, no murmur, no edema and/or varicosities of LE      Abdomen: Soft and non-distended     Positive bowel sounds      No heptomegaly or splenomegaly      Gu: no suprapubic tenderness or CVA tenderness, no urethral discharge  Lymphatic:  No anterior or posterior cervical lymphadenopathy         Musculoskeletal:  Gait and station: Normal gait      Digits and nails normal without clubbing or cyanosis       Inspection/palpation of joints, bones, and muscles:  No joint tenderness, swelling, full active and passive range of motion       Skin: Normal skin turgor and no rashes      Neuro: Normal reflexes     Psych:   alert and oriented to person, place and time     normal mood and affect   Patient's shoes and socks removed  Right Foot/Ankle   Right Foot Inspection  Skin Exam: skin normal and skin intact no dry skin, no warmth, no callus, no erythema, no maceration, no abnormal color, no pre-ulcer, no ulcer and no callus                          Toe Exam: ROM and strength within normal limitsno swelling, no tenderness, erythema and  no right toe deformity  Sensory   Vibration: diminished  Proprioception: diminished   Monofilament testing: diminished  Vascular  Capillary refills: < 3 seconds  The right DP pulse is 2+  The right PT pulse is 2+  Left Foot/Ankle  Left Foot Inspection  Skin Exam: skin normal and skin intactno dry skin, no warmth, no erythema, no maceration, normal color, no pre-ulcer, no ulcer and no callus                         Toe Exam: ROM and strength within normal limitsno swelling, no tenderness, no erythema and no left toe deformity                   Sensory   Vibration: diminished  Proprioception: diminished  Monofilament: diminished  Vascular  Capillary refills: < 3 seconds  The left DP pulse is 2+  The left PT pulse is 2+  Assign Risk Category:  No deformity present; Loss of protective sensation;        Risk: 2      Assessment/Plan:Diagnoses and all orders for this visit:    Type 2 diabetes mellitus with diabetic polyneuropathy, without long-term current use of insulin ([facility])  -     HEMOGLOBIN A1C W/ EAG ESTIMATION; Future    Type 2 diabetes mellitus without complication, without long-term current use of [facility] )  -     Ambulatory referral to Podiatry; Future    Essential hypertension  -     Comprehensive metabolic panel; Future    Anemia, unspecified type  -     CBC and Platelet; Future    Hypercholesterolemia  -     Lipid Panel with Direct LDL reflex; Future    Colon cancer screening  -     Cologuard; Future        I will see patient back in 4 mos or sooner prn

## 2020-09-10 DIAGNOSIS — I10 ESSENTIAL HYPERTENSION: ICD-10-CM

## 2020-09-10 RX ORDER — LISINOPRIL AND HYDROCHLOROTHIAZIDE 25; 20 MG/1; MG/1
TABLET ORAL
Qty: 90 TABLET | Refills: 3 | Status: SHIPPED | OUTPATIENT
Start: 2020-09-10 | End: 2021-10-18

## 2020-12-16 ENCOUNTER — VBI (OUTPATIENT)
Dept: ADMINISTRATIVE | Facility: OTHER | Age: 75
End: 2020-12-16

## 2020-12-23 ENCOUNTER — VBI (OUTPATIENT)
Dept: ADMINISTRATIVE | Facility: OTHER | Age: 75
End: 2020-12-23

## 2021-04-09 ENCOUNTER — VBI (OUTPATIENT)
Dept: ADMINISTRATIVE | Facility: OTHER | Age: 76
End: 2021-04-09

## 2021-04-25 ENCOUNTER — RA CDI HCC (OUTPATIENT)
Dept: OTHER | Facility: HOSPITAL | Age: 76
End: 2021-04-25

## 2021-04-25 NOTE — PROGRESS NOTES
[facility]  coding opportunities             Chart reviewed, (number of) suggestions sent to provider: 2           Patients insurance company: [employer] ([employer] and [employer])     Visit status: Patient arrived for their scheduled appointment     Provider never responded to [facility] [employer]  coding request     [facility] [employer]  coding opportunities             Chart reviewed, (number of) suggestions sent to provider: 2   DX: [phone] Benign neoplasm of meninges, unspecified  DX: [id number] [phone] Type 2 diabetes mellitus with diabetic polyneuropathy          Patients insurance company: [employer] (Medic[employer]vantage and [employer])

## 2021-04-30 ENCOUNTER — OFFICE VISIT (OUTPATIENT)
Dept: FAMILY MEDICINE CLINIC | Facility: CLINIC | Age: 76
End: 2021-04-30
Payer: COMMERCIAL

## 2021-04-30 ENCOUNTER — APPOINTMENT (OUTPATIENT)
Dept: LAB | Facility: CLINIC | Age: 76
End: 2021-04-30
Payer: COMMERCIAL

## 2021-04-30 VITALS
BODY MASS INDEX: 26.69 KG/M2 | DIASTOLIC BLOOD PRESSURE: 82 MMHG | TEMPERATURE: 97.7 F | OXYGEN SATURATION: 95 % | WEIGHT: 132.4 LBS | HEIGHT: 59 IN | HEART RATE: 81 BPM | SYSTOLIC BLOOD PRESSURE: 112 MMHG

## 2021-04-30 DIAGNOSIS — E78.2 MIXED HYPERCHOLESTEROLEMIA AND HYPERTRIGLYCERIDEMIA: ICD-10-CM

## 2021-04-30 DIAGNOSIS — Z78.0 ASYMPTOMATIC POSTMENOPAUSAL ESTROGEN DEFICIENCY: ICD-10-CM

## 2021-04-30 DIAGNOSIS — Z12.39 ENCOUNTER FOR SCREENING FOR MALIGNANT NEOPLASM OF BREAST, UNSPECIFIED SCREENING MODALITY: ICD-10-CM

## 2021-04-30 DIAGNOSIS — Z00.00 MEDICARE ANNUAL WELLNESS VISIT, SUBSEQUENT: Primary | ICD-10-CM

## 2021-04-30 DIAGNOSIS — I10 ESSENTIAL HYPERTENSION: ICD-10-CM

## 2021-04-30 DIAGNOSIS — E11.42 TYPE 2 DIABETES MELLITUS WITH DIABETIC POLYNEUROPATHY, WITHOUT LONG-TERM CURRENT USE OF INSULIN (HCC): ICD-10-CM

## 2021-04-30 DIAGNOSIS — E11.9 TYPE 2 DIABETES MELLITUS WITHOUT COMPLICATION, WITHOUT LONG-TERM CURRENT USE OF INSULIN (HCC): ICD-10-CM

## 2021-04-30 DIAGNOSIS — D64.9 ANEMIA, UNSPECIFIED TYPE: ICD-10-CM

## 2021-04-30 DIAGNOSIS — E78.00 HYPERCHOLESTEROLEMIA: ICD-10-CM

## 2021-04-30 LAB
ALBUMIN SERPL BCP-MCNC: 3.7 G/DL (ref 3.5–5)
ALP SERPL-CCNC: 125 U/L (ref 46–116)
ALT SERPL W P-5'-P-CCNC: 24 U/L (ref 12–78)
ANION GAP SERPL CALCULATED.3IONS-SCNC: 5 MMOL/L (ref 4–13)
AST SERPL W P-5'-P-CCNC: 12 U/L (ref 5–45)
BILIRUB SERPL-MCNC: 0.55 MG/DL (ref 0.2–1)
BUN SERPL-MCNC: 20 MG/DL (ref 5–25)
CALCIUM SERPL-MCNC: 9.7 MG/DL (ref 8.3–10.1)
CHLORIDE SERPL-SCNC: 106 MMOL/L (ref 100–108)
CHOLEST SERPL-MCNC: 162 MG/DL (ref 50–200)
CO2 SERPL-SCNC: 27 MMOL/L (ref 21–32)
CREAT SERPL-MCNC: 0.77 MG/DL (ref 0.6–1.3)
ERYTHROCYTE [DISTWIDTH] IN BLOOD BY AUTOMATED COUNT: 13.2 % (ref 11.6–15.1)
EST. AVERAGE GLUCOSE BLD GHB EST-MCNC: 131 MG/DL
GFR SERPL CREATININE-BSD FRML MDRD: 76 ML/MIN/1.73SQ M
GLUCOSE P FAST SERPL-MCNC: 99 MG/DL (ref 65–99)
HBA1C MFR BLD: 6.2 %
HCT VFR BLD AUTO: 42.2 % (ref 34.8–46.1)
HDLC SERPL-MCNC: 54 MG/DL
HGB BLD-MCNC: 13.8 G/DL (ref 11.5–15.4)
LDLC SERPL CALC-MCNC: 90 MG/DL (ref 0–100)
MCH RBC QN AUTO: 30.5 PG (ref 26.8–34.3)
MCHC RBC AUTO-ENTMCNC: 32.7 G/DL (ref 31.4–37.4)
MCV RBC AUTO: 93 FL (ref 82–98)
PLATELET # BLD AUTO: 456 THOUSANDS/UL (ref 149–390)
PMV BLD AUTO: 8.8 FL (ref 8.9–12.7)
POTASSIUM SERPL-SCNC: 4.2 MMOL/L (ref 3.5–5.3)
PROT SERPL-MCNC: 8.1 G/DL (ref 6.4–8.2)
RBC # BLD AUTO: 4.52 MILLION/UL (ref 3.81–5.12)
SODIUM SERPL-SCNC: 138 MMOL/L (ref 136–145)
TRIGL SERPL-MCNC: 88 MG/DL
WBC # BLD AUTO: 11.12 THOUSAND/UL (ref 4.31–10.16)

## 2021-04-30 PROCEDURE — 99214 OFFICE O/P EST MOD 30 MIN: CPT | Performed by: FAMILY MEDICINE

## 2021-04-30 PROCEDURE — 36415 COLL VENOUS BLD VENIPUNCTURE: CPT

## 2021-04-30 PROCEDURE — 83036 HEMOGLOBIN GLYCOSYLATED A1C: CPT

## 2021-04-30 PROCEDURE — 80061 LIPID PANEL: CPT

## 2021-04-30 PROCEDURE — 80053 COMPREHEN METABOLIC PANEL: CPT

## 2021-04-30 PROCEDURE — 3288F FALL RISK ASSESSMENT DOCD: CPT | Performed by: FAMILY MEDICINE

## 2021-04-30 PROCEDURE — 85027 COMPLETE CBC AUTOMATED: CPT

## 2021-04-30 PROCEDURE — G0439 PPPS, SUBSEQ VISIT: HCPCS | Performed by: FAMILY MEDICINE

## 2021-04-30 NOTE — PATIENT INSTRUCTIONS
Medicare Preventive Visit Patient Instructions  Thank you for completing your Welcome to Medicare Visit or Medicare Annual Wellness Visit today  Your next wellness visit will be due in one year (5/1/2022)  The screening/preventive services that you may require over the next 5-10 years are detailed below  Some tests may not apply to you based off risk factors and/or age  Screening tests ordered at today's visit but not completed yet may show as past due  Also, please note that scanned in results may not display below  Preventive Screenings:  Service Recommendations Previous Testing/Comments   Colorectal Cancer Screening  * Colonoscopy    * Fecal Occult Blood Test (FOBT)/Fecal Immunochemical Test (FIT)  * Fecal DNA/Cologuard Test  * Flexible Sigmoidoscopy Age: 54-65 years old   Colonoscopy: every 10 years (may be performed more frequently if at higher risk)  OR  FOBT/FIT: every 1 year  OR  Cologuard: every 3 years  OR  Sigmoidoscopy: every 5 years  Screening may be recommended earlier than age 48 if at higher risk for colorectal cancer  Also, an individualized decision between you and your healthcare provider will decide whether screening between the ages of 74-80 would be appropriate  Colonoscopy: Not on file  FOBT/FIT: Not on file  Cologuard: Not on file  Sigmoidoscopy: Not on file          Breast Cancer Screening Age: 36 years old  Frequency: every 1-2 years  Not required if history of left and right mastectomy Mammogram: Not on file        Cervical Cancer Screening Between the ages of 21-29, pap smear recommended once every 3 years  Between the ages of 33-67, can perform pap smear with HPV co-testing every 5 years     Recommendations may differ for women with a history of total hysterectomy, cervical cancer, or abnormal pap smears in past  Pap Smear: Not on file    Screening Not Indicated   Hepatitis C Screening Once for adults born between [facility]  More frequently in patients at high risk for Hepatitis C Hep C Antibody: 11/15/2019    Screening Current   Diabetes Screening 1-2 times per year if you're at risk for diabetes or have pre-diabetes Fasting glucose: 105 mg/dL   A1C: 6 1 %    Screening Not Indicated  History Diabetes   Cholesterol Screening Once every 5 years if you don't have a lipid disorder  May order more often based on risk factors  Lipid panel: 04/30/2021    Screening Not Indicated  History Lipid Disorder     Other Preventive Screenings Covered by Medicare:  1  Abdominal Aortic Aneurysm (AAA) Screening: covered once if your at risk  You're considered to be at risk if you have a family history of AAA  2  Lung Cancer Screening: covers low dose CT scan once per year if you meet all of the following conditions: (1) Age 50-69; (2) No signs or symptoms of lung cancer; (3) Current smoker or have quit smoking within the last 15 years; (4) You have a tobacco smoking history of at least 30 pack years (packs per day multiplied by number of years you smoked); (5) You get a written order from a healthcare provider  3  Glaucoma Screening: covered annually if you're considered high risk: (1) You have diabetes OR (2) Family history of glaucoma OR (3)  aged 48 and older OR (3)  American aged 72 and older  3  Osteoporosis Screening: covered every 2 years if you meet one of the following conditions: (1) You're estrogen deficient and at risk for osteoporosis based off medical history and other findings; (2) Have a vertebral abnormality; (3) On glucocorticoid therapy for more than 3 months; (4) Have primary hyperparathyroidism; (5) On osteoporosis medications and need to assess response to drug therapy  · Last bone density test (DXA Scan): Not on file  5  HIV Screening: covered annually if you're between the age of 12-76  Also covered annually if you are younger than 13 and older than 72 with risk factors for HIV infection   For pregnant patients, it is covered up to 3 times per pregnancy  Immunizations:  Immunization Recommendations   Influenza Vaccine Annual influenza vaccination during flu season is recommended for all persons aged >= 6 months who do not have contraindications   Pneumococcal Vaccine (Prevnar and Pneumovax)  * Prevnar = PCV13  * Pneumovax = PPSV23   Adults 25-60 years old: 1-3 doses may be recommended based on certain risk factors  Adults 72 years old: Prevnar (PCV13) vaccine recommended followed by Pneumovax (PPSV23) vaccine  If already received PPSV23 since turning 65, then PCV13 recommended at least one year after PPSV23 dose  Hepatitis B Vaccine 3 dose series if at intermediate or high risk (ex: diabetes, end stage renal disease, liver disease)   Tetanus (Td) Vaccine - COST NOT COVERED BY MEDICARE PART B Following completion of primary series, a booster dose should be given every 10 years to maintain immunity against tetanus  Td may also be given as tetanus wound prophylaxis  Tdap Vaccine - COST NOT COVERED BY MEDICARE PART B Recommended at least once for all adults  For pregnant patients, recommended with each pregnancy  Shingles Vaccine (Shingrix) - COST NOT COVERED BY MEDICARE PART B  2 shot series recommended in those aged 48 and above     Health Maintenance Due:      Topic Date Due    MAMMOGRAM  Never done    DXA SCAN  Never done    Colorectal Cancer Screening  Never done    Hepatitis C Screening  Completed     Immunizations Due:      Topic Date Due    COVID-19 Vaccine (1) Never done     Advance Directives   What are advance directives? Advance directives are legal documents that state your wishes and plans for medical care  These plans are made ahead of time in case you lose your ability to make decisions for yourself  Advance directives can apply to any medical decision, such as the treatments you want, and if you want to donate organs  What are the types of advance directives?   There are many types of advance directives, and each state has rules about how to use them  You may choose a combination of any of the following:  · Living will: This is a written record of the treatment you want  You can also choose which treatments you do not want, which to limit, and which to stop at a certain time  This includes surgery, medicine, IV fluid, and tube feedings  · Durable power of  for healthcare [facility]): This is a written record that states who you want to make healthcare choices for you when you are unable to make them for yourself  This person, called a proxy, is usually a family member or a friend  You may choose more than 1 proxy  · Do not resuscitate (DNR) order:  A DNR order is used in case your heart stops beating or you stop breathing  It is a request not to have certain forms of treatment, such as CPR  A DNR order may be included in other types of advance directives  · Medical directive: This covers the care that you want if you are in a coma, near death, or unable to make decisions for yourself  You can list the treatments you want for each condition  Treatment may include pain medicine, surgery, blood transfusions, dialysis, IV or tube feedings, and a ventilator (breathing machine)  · Values history: This document has questions about your views, beliefs, and how you feel and think about life  This information can help others choose the care that you would choose  Why are advance directives important? An advance directive helps you control your care  Although spoken wishes may be used, it is better to have your wishes written down  Spoken wishes can be misunderstood, or not followed  Treatments may be given even if you do not want them  An advance directive may make it easier for your family to make difficult choices about your care     Weight Management   Why it is important to manage your weight:  Being overweight increases your risk of health conditions such as heart disease, high blood pressure, type 2 diabetes, and certain types of cancer  It can also increase your risk for osteoarthritis, sleep apnea, and other respiratory problems  Aim for a slow, steady weight loss  Even a small amount of weight loss can lower your risk of health problems  How to lose weight safely:  A safe and healthy way to lose weight is to eat fewer calories and get regular exercise  You can lose up about 1 pound a week by decreasing the number of calories you eat by 500 calories each day  Healthy meal plan for weight management:  A healthy meal plan includes a variety of foods, contains fewer calories, and helps you stay healthy  A healthy meal plan includes the following:  · Eat whole-grain foods more often  A healthy meal plan should contain fiber  Fiber is the part of grains, fruits, and vegetables that is not broken down by your body  Whole-grain foods are healthy and provide extra fiber in your diet  Some examples of whole-grain foods are whole-wheat breads and pastas, oatmeal, brown rice, and bulgur  · Eat a variety of vegetables every day  Include dark, leafy greens such as spinach, kale, stephania greens, and mustard greens  Eat yellow and orange vegetables such as carrots, sweet potatoes, and winter squash  · Eat a variety of fruits every day  Choose fresh or canned fruit (canned in its own juice or light syrup) instead of juice  Fruit juice has very little or no fiber  · Eat low-fat dairy foods  Drink fat-free (skim) milk or 1% milk  Eat fat-free yogurt and low-fat cottage cheese  Try low-fat cheeses such as mozzarella and other reduced-fat cheeses  · Choose meat and other protein foods that are low in fat  Choose beans or other legumes such as split peas or lentils  Choose fish, skinless poultry (chicken or turkey), or lean cuts of red meat (beef or pork)  Before you cook meat or poultry, cut off any visible fat  · Use less fat and oil  Try baking foods instead of frying them   Add less fat, such as margarine, sour cream, regular salad dressing and mayonnaise to foods  Eat fewer high-fat foods  Some examples of high-fat foods include french fries, doughnuts, ice cream, and cakes  · Eat fewer sweets  Limit foods and drinks that are high in sugar  This includes candy, cookies, regular soda, and sweetened drinks  Exercise:  Exercise at least 30 minutes per day on most days of the week  Some examples of exercise include walking, biking, dancing, and swimming  You can also fit in more physical activity by taking the stairs instead of the elevator or parking farther away from stores  Ask your healthcare provider about the best exercise plan for you  © Copyright [employer] 2018 Information is for End User's use only and may not be sold, redistributed or otherwise used for commercial purposes   All illustrations and images included in CareNotes® are the copyrighted property of A D [employer] M , [employer]  or [address] [place] [address] [facility]

## 2021-04-30 NOTE — PROGRESS NOTES
Assessment and Plan:     Problem List Items Addressed This Visit     None           Preventive health issues were discussed with patient, and age appropriate screening tests were ordered as noted in patient's After Visit Summary  Personalized health advice and appropriate referrals for health education or preventive services given if needed, as noted in patient's After Visit Summary  History of Present Illness:     Patient presents for Medicare Annual Wellness visit  She is due for a dexa and mammogram       Patient Care Team:  Jaleesa Hicks DO as PCP - General     Problem List:     Patient Active Problem List   Diagnosis    Syncope    Hypertension    Brain mass    Alopecia    Anemia    Basal cell carcinoma    Iron deficiency anemia    Type 2 diabetes mellitus ([facility] [address] )    Abdominal pain    S/P total hysterectomy and bilateral salpingo-oophorectomy    Dysfunctional voiding of urine    Postop check      Past Medical and Surgical History:     Past Medical History:   Diagnosis Date    Anemia     Cataracts, bilateral     Heart murmur 04/2018    History of transfusion 2003    had 4 units    Hypertension     Meningioma ([facility] [address] ) 04/2018    MRI every 6months    Ovarian cyst 2006    Shortness of breath     Skin neoplasm     last assessed: 6/13/2017     Past Surgical History:   Procedure Laterality Date    CYSTOSCOPY N/A 2/14/2019    Procedure: CYSTOSCOPY;  Surgeon: Lisbeth Cesar MD;  [facility];  Service: Gynecology Oncology    HYSTERECTOMY N/A 2/14/2019    Procedure: HYSTERECTOMY LAPAROSCOPIC TOTAL ([address]) W/ ROBOTICS bilateral salpingooophorectomy with great difficulty due to mass of 12 cm and taking 2 5 hours;   Surgeon: Lisbeth Cesar MD;  [facility];  Service: Gynecology Oncology    TONSILLECTOMY        Family History:     Family History   Problem Relation Age of Onset    Hypertension Mother     Lung cancer Father     Hypertension Sister     Lymphoma Brother 39    Hypertension Brother     Schizophrenia Brother     Depression Son     Schizophrenia Son     Hypertension Family     Heart disease Other       Social History:        Social History     Socioeconomic History    Marital status: /Civil Union     Spouse name: None    Number of children: None    Years of education: None    Highest education level: None   Occupational History    None   Social Needs    Financial resource strain: None    Food insecurity     Worry: None     Inability: None    Transportation needs     Medical: None     Non-medical: None   Tobacco Use    Smoking status: Never Smoker    Smokeless tobacco: Never Used   Substance and Sexual Activity    Alcohol use: Never     Frequency: Never    Drug use: No    Sexual activity: Not Currently   Lifestyle    Physical activity     Days per week: None     Minutes per session: None    Stress: None   Relationships    Social connections     Talks on phone: None     Gets together: None     Attends Druze service: None     Active member of club or organization: None     Attends meetings of clubs or organizations: None     Relationship status: None    Intimate partner violence     Fear of current or ex partner: None     Emotionally abused: None     Physically abused: None     Forced sexual activity: None   Other Topics Concern    None   Social History Narrative    Daily coffee consumption (___cups/day)    Daily cola consumption (____cans/day)    Daily tea consumption (____cups/day)    Uses safety equipment-seatbelts      Medications and Allergies:     Current Outpatient Medications   Medication Sig Dispense Refill    lisinopril-hydrochlorothiazide (PRINZIDE,ZESTORETIC) 20-25 MG per tablet take 1 tablet by mouth once daily 90 tablet 3    simvastatin (ZOCOR) 10 mg tablet Take 1 tablet (10 mg total) by mouth daily at bedtime 30 tablet 5     No current facility-administered medications for this visit        No Known Allergies Immunizations:     Immunization History   Administered Date(s) Administered    INFLUENZA 09/30/2014, 09/27/2015, 02/08/2017, 10/23/2017, 08/25/2020    Influenza Split High Dose Preservative Free IM 10/15/2013, 02/08/2017, 10/23/2017    Influenza, high dose seasonal 0 7 mL 10/29/2018    Influenza, seasonal, injectable 12/11/2007, 09/30/2014, 09/27/2015    Pneumococcal Conjugate 13-Valent 02/08/2017, 05/01/2019    Pneumococcal Polysaccharide PPV23 12/17/2014    TD (adult) Preservative Free 07/18/2019    Tdap 04/04/2000    Zoster 09/30/2014    influenza, trivalent, adjuvanted 09/04/2019      Health Maintenance:         Topic Date Due    MAMMOGRAM  Never done    DXA SCAN  Never done    Colorectal Cancer Screening  Never done    Hepatitis C Screening  Completed         Topic Date Due    COVID-19 Vaccine (1) Never done    Influenza Vaccine (1) 09/01/2020      Medicare Health Risk Assessment:     Ht 4' 11" (1 499 m)   BMI 27 67 kg/m²      Juliette Shepherd is here for her Subsequent Wellness visit  Last Medicare Wellness visit information reviewed, patient interviewed, no change since last AWV  Health Risk Assessment:   Patient rates overall health as good  Patient feels that their physical health rating is same  Patient is satisfied with their life  Eyesight was rated as same  Hearing was rated as same  Patient feels that their emotional and mental health rating is same  Patients states they are never, rarely angry  Patient states they are never, rarely unusually tired/fatigued  Pain experienced in the last 7 days has been none  Patient states that she has experienced no weight loss or gain in last 6 months  Fall Risk Screening: In the past year, patient has experienced: no history of falling in past year      Urinary Incontinence Screening:   Patient has not leaked urine accidently in the last six months  Home Safety:  Patient does not have trouble with stairs inside or outside of their home   Patient has working smoke alarms and has working carbon monoxide detector  Home safety hazards include: none  Nutrition:   Current diet is Regular  Medications:   Patient is currently taking over-the-counter supplements  OTC medications include: see medication list  Patient is able to manage medications  Activities of Daily Living (ADLs)/Instrumental Activities of Daily Living (IADLs):   Walk and transfer into and out of bed and chair?: Yes  Dress and groom yourself?: Yes    Bathe or shower yourself?: Yes    Feed yourself?  Yes  Do your laundry/housekeeping?: Yes  Manage your money, pay your bills and track your expenses?: Yes  Make your own meals?: Yes    Do your own shopping?: Yes    Previous Hospitalizations:   Any hospitalizations or ED visits within the last 12 months?: No      Advance Care Planning:   Living will: No    Durable POA for healthcare: No    Advanced directive: Yes    Advanced directive counseling given: Yes    Five wishes given: Yes    Patient declined ACP directive: No    End of Life Decisions reviewed with patient: Yes    Provider agrees with end of life decisions: Yes      Cognitive Screening:   Provider or family/friend/caregiver concerned regarding cognition?: No    PREVENTIVE SCREENINGS      Cardiovascular Screening:    General: Screening Not Indicated and History Lipid Disorder      Diabetes Screening:     General: Screening Not Indicated and History Diabetes      Colorectal Cancer Screening:     General: Screening Current      Breast Cancer Screening:     General: Risks and Benefits Discussed    Due for: Mammogram        Cervical Cancer Screening:    General: Screening Not Indicated      Osteoporosis Screening:    General: Risks and Benefits Discussed    Due for: DXA Axial      Abdominal Aortic Aneurysm (AAA) Screening:        General: Screening Current and Screening Not Indicated      Lung Cancer Screening:     General: Screening Not Indicated      Hepatitis C Screening:    General: Screening Current    Screening, Brief Intervention, and Referral to Treatment (SBIRT)    Screening  Typical number of drinks in a day: 0  Typical number of drinks in a week: 0  Interpretation: Low risk drinking behavior      Single Item Drug Screening:  How often have you used an illegal drug (including marijuana) or a prescription medication for non-medical reasons in the past year? never    Single Item Drug Screen Score: 0  Interpretation: Negative screen for possible drug use disorder      Felicie Job, DO

## 2021-05-03 ENCOUNTER — IMMUNIZATIONS (OUTPATIENT)
Dept: FAMILY MEDICINE CLINIC | Facility: HOSPITAL | Age: 76
End: 2021-05-03

## 2021-05-03 DIAGNOSIS — Z23 ENCOUNTER FOR IMMUNIZATION: Primary | ICD-10-CM

## 2021-05-03 PROCEDURE — 0001A SARS-COV-2 / COVID-19 MRNA VACCINE (PFIZER-BIONTECH) 30 MCG: CPT

## 2021-05-03 PROCEDURE — 91300 SARS-COV-2 / COVID-19 MRNA VACCINE (PFIZER-BIONTECH) 30 MCG: CPT

## 2021-05-04 PROBLEM — E78.2 MIXED HYPERCHOLESTEROLEMIA AND HYPERTRIGLYCERIDEMIA: Status: ACTIVE | Noted: 2021-05-04

## 2021-05-04 NOTE — PROGRESS NOTES
Patient ID: Stefanie Menard is a 77 y o  female  HPI: 77 y  o female presents for follow up of niddm, hypertension and hypercholesterolemia  Hgba1c is  6 2  and patient's issues are well controlled  Patient deneis any dyspnea, chest pain or palpitations        SUBJECTIVE    Family History   Problem Relation Age of Onset    Hypertension Mother    Wen Campos Lung cancer Father     Hypertension Sister     Lymphoma Brother 39    Hypertension Brother     Schizophrenia Brother     Depression Son     Schizophrenia Son     Hypertension Family     Heart disease Other      Social History     Socioeconomic History    Marital status: /Civil Union     Spouse name: Not on file    Number of children: Not on file    Years of education: Not on file    Highest education level: Not on file   Occupational History    Not on file   Social Needs    Financial resource strain: Not on file    Food insecurity     Worry: Not on file     Inability: Not on file   [employer] needs     Medical: Not on file     Non-medical: Not on file   Tobacco Use    Smoking status: Never Smoker    Smokeless tobacco: Never Used   Substance and Sexual Activity    Alcohol use: Never     Frequency: Never    Drug use: No    Sexual activity: Not Currently   Lifestyle    Physical activity     Days per week: Not on file     Minutes per session: Not on file    Stress: Not on file   Relationships    Social connections     Talks on phone: Not on file     Gets together: Not on file     Attends Church service: Not on file     Active member of club or organization: Not on file     Attends meetings of clubs or organizations: Not on file     Relationship status: Not on file    Intimate partner violence     Fear of current or ex partner: Not on file     Emotionally abused: Not on file     Physically abused: Not on file     Forced sexual activity: Not on file   Other Topics Concern    Not on file   Social History Narrative    Daily coffee consumption (___cups/day)    Daily cola consumption (____cans/day)    Daily tea consumption (____cups/day)    Uses safety equipment-seatbelts     Past Medical History:   Diagnosis Date    Anemia     Cataracts, bilateral     Heart murmur 04/2018    History of transfusion 2003    had 4 units    Hypertension     Meningioma ([facility] [address] ) 04/2018    MRI every 6months    Ovarian cyst 2006    Shortness of breath     Skin neoplasm     last assessed: 6/13/2017     Past Surgical History:   Procedure Laterality Date    CYSTOSCOPY N/A 2/14/2019    Procedure: Safia Moscoso;  Surgeon: Pierce Castrejon MD;  [facility];  Service: Gynecology Oncology    HYSTERECTOMY N/A 2/14/2019    Procedure: HYSTERECTOMY LAPAROSCOPIC TOTAL ([address]) W/ ROBOTICS bilateral salpingooophorectomy with great difficulty due to mass of 12 cm and taking 2 5 hours;   Surgeon: Pierce Castrejon MD;  [facility];  Service: Gynecology Oncology    TONSILLECTOMY       No Known Allergies    Current Outpatient Medications:     lisinopril-hydrochlorothiazide (PRINZIDE,ZESTORETIC) 20-25 MG per tablet, take 1 tablet by mouth once daily, Disp: 90 tablet, Rfl: 3    simvastatin (ZOCOR) 10 mg tablet, Take 1 tablet (10 mg total) by mouth daily at bedtime, Disp: 30 tablet, Rfl: 5    Review of Systems  Constitutional:     Denies fever, chills ,fatigue ,weakness ,weight loss, weight gain     ENT: Denies earache ,loss of hearing ,nosebleed, nasal discharge,nasal congestion ,sore throat ,hoarseness  Pulmonary: Denies shortness of breath ,cough  ,dyspnea on exertion, orthopnea  ,PND   Cardiovascular:  Denies bradycardia , tachycardia  ,palpations, lower extremity edema leg, claudication  Breast:  Denies new or changing breast lumps ,nipple discharge ,nipple changes  Abdomen:  Denies abdominal pain , anorexia , indigestion, nausea, vomiting, constipation, diarrhea  Musculoskeletal: Denies myalgias, arthralgias, joint swelling, joint stiffness , limb pain, limb swelling  Gu: denies dysuria, polyuria  Skin: Denies skin rash, skin lesion, skin wound, itching, dry skin  Neuro: Denies headache, numbness, tingling, confusion, loss of consciousness, dizziness, vertigo  Psychiatric: Denies feelings of depression, suicidal ideation, anxiety, sleep disturbances    OBJECTIVE  /82   Pulse 81   Temp 97 7 °F (36 5 °C)   Ht 4' 11" (1 499 m)   Wt 60 1 kg (132 lb 6 4 oz)   SpO2 95%   BMI 26 74 kg/m²   Constitutional:   NAD, well appearing and well nourished      ENT:   Conjunctiva and lids: no injection, edema, or discharge     Pupils and iris: JB bilaterally    External inspection of ears and nose: normal without deformities or discharge  Otoscopic exam: Canals patent without erythema  Nasal mucosa, septum and turbinates: Normal or edema or discharge         Oropharynx:  Moist mucosa, normal tongue and tonsils without lesions  No erythema        Pulmonary:Respiratory effort normal rate and rhythm, no increased work of breathing   Auscultation of lungs:  Clear bilaterally with no adventitious breath sounds       Cardiovascular: regular rate and rhythm, S1 and S2, no murmur, no edema and/or varicosities of LE      Abdomen: Soft and non-distended     Positive bowel sounds      No heptomegaly or splenomegaly      Gu: no suprapubic tenderness or CVA tenderness, no urethral discharge  Lymphatic:  No anterior or posterior cervical lymphadenopathy         Musculoskeletal:  Gait and station: Normal gait      Digits and nails normal without clubbing or cyanosis       Inspection/palpation of joints, bones, and muscles:  No joint tenderness, swelling, full active and passive range of motion       Skin: Normal skin turgor and no rashes      Neuro:    Normal reflexes     Psych:   alert and oriented to person, place and time   normal mood and affect       Assessment/Plan:Diagnoses and all orders for this visit:    Medicare annual wellness visit, subsequent    Type 2 diabetes mellitus without complication, without long-term current use of insulin (HCC)  Comments:  Continue diet control  Essential hypertension  Comments:  Stable on ACE therpay  Mixed hypercholesterolemia and hypertriglyceridemia  Comments:  Stable on statin therapy  Asymptomatic postmenopausal estrogen deficiency  -     DXA bone density spine hip and pelvis; Future    Encounter for screening for malignant neoplasm of breast, unspecified screening modality  -     Mammo screening bilateral w cad; Future        Reviewed with patient plan to treat with above plan      Patient instructed to call in 72 hours if not feeling better or if symptoms worsen

## 2021-05-23 ENCOUNTER — IMMUNIZATIONS (OUTPATIENT)
Dept: FAMILY MEDICINE CLINIC | Facility: HOSPITAL | Age: 76
End: 2021-05-23

## 2021-05-23 DIAGNOSIS — Z23 ENCOUNTER FOR IMMUNIZATION: Primary | ICD-10-CM

## 2021-05-23 PROCEDURE — 91300 SARS-COV-2 / COVID-19 MRNA VACCINE (PFIZER-BIONTECH) 30 MCG: CPT

## 2021-05-23 PROCEDURE — 0002A SARS-COV-2 / COVID-19 MRNA VACCINE (PFIZER-BIONTECH) 30 MCG: CPT

## 2021-06-21 ENCOUNTER — APPOINTMENT (OUTPATIENT)
Dept: LAB | Facility: CLINIC | Age: 76
End: 2021-06-21
Payer: COMMERCIAL

## 2021-06-21 DIAGNOSIS — D32.9 MENINGIOMA (HCC): ICD-10-CM

## 2021-06-21 LAB
BUN SERPL-MCNC: 15 MG/DL (ref 5–25)
CREAT SERPL-MCNC: 0.61 MG/DL (ref 0.6–1.3)
GFR SERPL CREATININE-BSD FRML MDRD: 90 ML/MIN/1.73SQ M

## 2021-06-21 PROCEDURE — 36415 COLL VENOUS BLD VENIPUNCTURE: CPT

## 2021-06-21 PROCEDURE — 82565 ASSAY OF CREATININE: CPT

## 2021-06-21 PROCEDURE — 84520 ASSAY OF UREA NITROGEN: CPT

## 2021-06-25 ENCOUNTER — HOSPITAL ENCOUNTER (OUTPATIENT)
Dept: RADIOLOGY | Facility: HOSPITAL | Age: 76
Discharge: HOME | End: 2021-06-25
Payer: COMMERCIAL

## 2021-06-25 DIAGNOSIS — D32.9 MENINGIOMA (HCC): ICD-10-CM

## 2021-06-25 PROCEDURE — A9585 GADOBUTROL INJECTION: HCPCS | Performed by: PHYSICIAN ASSISTANT

## 2021-06-25 PROCEDURE — 70553 MRI BRAIN STEM W/O & W/DYE: CPT

## 2021-06-25 RX ADMIN — GADOBUTROL 6 ML: 604.72 INJECTION INTRAVENOUS at 10:27

## 2021-07-02 ENCOUNTER — OFFICE VISIT (OUTPATIENT)
Dept: NEUROSURGERY | Facility: CLINIC | Age: 76
End: 2021-07-02
Payer: COMMERCIAL

## 2021-07-02 VITALS
SYSTOLIC BLOOD PRESSURE: 130 MMHG | HEIGHT: 59 IN | TEMPERATURE: 97.5 F | BODY MASS INDEX: 26.37 KG/M2 | DIASTOLIC BLOOD PRESSURE: 80 MMHG | WEIGHT: 130.8 LBS

## 2021-07-02 DIAGNOSIS — G93.89 BRAIN MASS: Primary | ICD-10-CM

## 2021-07-02 PROCEDURE — 1160F RVW MEDS BY RX/DR IN RCRD: CPT | Performed by: NURSE PRACTITIONER

## 2021-07-02 PROCEDURE — 3008F BODY MASS INDEX DOCD: CPT | Performed by: NURSE PRACTITIONER

## 2021-07-02 PROCEDURE — 99214 OFFICE O/P EST MOD 30 MIN: CPT | Performed by: NURSE PRACTITIONER

## 2021-07-02 PROCEDURE — 3079F DIAST BP 80-89 MM HG: CPT | Performed by: NURSE PRACTITIONER

## 2021-07-02 PROCEDURE — 1036F TOBACCO NON-USER: CPT | Performed by: NURSE PRACTITIONER

## 2021-07-02 PROCEDURE — 3075F SYST BP GE 130 - 139MM HG: CPT | Performed by: NURSE PRACTITIONER

## 2021-07-02 NOTE — ASSESSMENT & PLAN NOTE
Patient presents to the outpatient office today for 1 year follow-up for surveillance of anterior left frontal falx meningioma  · Exam non-focal   · This was an incidental finding found on CT head when patient presented to the ED in 2018 after having a syncopal episode  Imaging:    MRI brain w/wo 06/25/2021:No acute intracranial abnormality  Stable frontal parafalcine meningioma with buckling of the left more than right frontal cortex  Stable cerebral chronic microangiopathic changes  Plan:  · At this time patient remains asymptomatic from meningioma  · Reviewed imaging in detail with patient  · Reviewed the natural history of meningiomas and options for treatment of meningiomas again with patient such as radiation, surgical resection, or surveillance with serial follow-up and regular MRIs  · Recommend watchful waiting, with follow-up in 1 year with repeat MRI with and without contrast   · Per NCCN guidelines, follow up with repeat MRI brain to be completed in 1 year  · Patient is advised to call the office or go straight to the emergency department should she develop symptoms such as facial drooping, slurred speech, altered mental status, personality changes, confusion, or numbness/tingling/weakness in arms and legs  · Patient made aware to contact Neurosurgery with any further questions or concerns

## 2021-07-02 NOTE — PROGRESS NOTES
Neurosurgery Office Note  Lula Anderson 77 y o  female MRN: [id number]       Assessment/Plan     Brain mass  Patient presents to the outpatient office today for 1 year follow-up for surveillance of anterior left frontal falx meningioma  · Exam non-focal   · This was an incidental finding found on CT head when patient presented to the ED in 2018 after having a syncopal episode  Imaging:    MRI brain w/wo 06/25/2021:No acute intracranial abnormality  Stable frontal parafalcine meningioma with buckling of the left more than right frontal cortex  Stable cerebral chronic microangiopathic changes  Plan:  · At this time patient remains asymptomatic from meningioma  · Reviewed imaging in detail with patient  · Reviewed the natural history of meningiomas and options for treatment of meningiomas again with patient such as radiation, surgical resection, or surveillance with serial follow-up and regular MRIs  · Recommend watchful waiting, with follow-up in 1 year with repeat MRI with and without contrast   · Per NCCN guidelines, follow up with repeat MRI brain to be completed in 1 year  · Patient is advised to call the office or go straight to the emergency department should she develop symptoms such as facial drooping, slurred speech, altered mental status, personality changes, confusion, or numbness/tingling/weakness in arms and legs  · Patient made aware to contact Neurosurgery with any further questions or concerns  Diagnoses and all orders for this visit:    Brain mass  -     Creatinine, serum; Future  -     BUN; Future  -     MRI brain with and without contrast; Future            CHIEF COMPLAINT    Chief Complaint   Patient presents with    Follow-up     meningioma       HISTORY    History of Present Illness     77y o  year old female with past medical history significant for type 2 diabetes, hypertension, alopecia, basal cell carcinoma, anemia and status post total hysterectomy    Patient presents to outpatient office today for 1 year follow-up for surveillance of meningioma  Patient does report some neck stiffness at times in the morning which gets better throughout the day  Patient believe she needs a new pillow  She also reports ongoing left finger numbness which has been ongoing for many years  She denies any headaches, dizziness, blurry vision, chest pain, shortness of breath, abdominal pain, nausea, vomiting, diarrhea, no problems with bowel or bladder, no new weakness or numbness/tingling  She denies any recent falls or traumas or difficulty with her balance  Patient's first MRI was in May 2018 for evaluation of her meningioma making this 3 years since diagnosis  Reviewed imaging with patient  Patient will follow-up in 1 year with MRI brain or sooner symptoms worsen  Patient made aware to contact Neurosurgery with any further questions or concerns  HPI    See Discussion    REVIEW OF SYSTEMS    Review of Systems   Constitutional: Negative  HENT: Negative  Eyes: Negative  Respiratory: Negative  Cardiovascular: Negative  Gastrointestinal: Negative  Endocrine: Negative  Genitourinary: Negative  Musculoskeletal: Positive for neck stiffness  Skin: Negative  Allergic/Immunologic: Negative  Neurological: Positive for numbness (fingers)  Hematological: Negative  Psychiatric/Behavioral: Negative  ROS reviewed with patient and agree and changes were made as needed  Meds/Allergies     Current Outpatient Medications   Medication Sig Dispense Refill    lisinopril-hydrochlorothiazide (PRINZIDE,ZESTORETIC) 20-25 MG per tablet take 1 tablet by mouth once daily 90 tablet 3    simvastatin (ZOCOR) 10 mg tablet Take 1 tablet (10 mg total) by mouth daily at bedtime 30 tablet 5     No current facility-administered medications for this visit         No Known Allergies    PAST HISTORY    Past Medical History:   Diagnosis Date    Anemia     Cataracts, bilateral  Heart murmur 04/2018    History of transfusion 2003    had 4 units    Hypertension     Meningioma ([address] ) 04/2018    MRI every 6months    Ovarian cyst 2006    Shortness of breath     Skin neoplasm     last assessed: 6/13/2017       Past Surgical History:   Procedure Laterality Date    CYSTOSCOPY N/A 2/14/2019    Procedure: [place];  Surgeon: Hope uW MD;  [facility];  Service: Gynecology Oncology    HYSTERECTOMY N/A 2/14/2019    Procedure: HYSTERECTOMY [address]AROSCOPIC TOTAL ([address]) W/ ROBOTICS bilateral salpingooophorectomy with great difficulty due to mass of 12 cm and taking 2 5 hours; Surgeon: Hope Wu MD;  [facility];  Service: Gynecology Oncology    TONSILLECTOMY         Social History     Tobacco Use    Smoking status: Never Smoker    Smokeless tobacco: Never Used   Substance Use Topics    Alcohol use: Never    Drug use: No       Family History   Problem Relation Age of Onset    Hypertension Mother     Lung cancer Father     Hypertension Sister     Lymphoma Brother 39    Hypertension Brother     Schizophrenia Brother     Depression Son     Schizophrenia Son     Hypertension Family     Heart disease Other          Above history personally reviewed  EXAM    Vitals:Blood pressure 130/80, temperature 97 5 °F (36 4 °C), temperature source Temporal, height 4' 11" (1 499 m), weight 59 3 kg (130 lb 12 8 oz)  ,Body mass index is 26 42 kg/m²  Physical Exam  Vitals reviewed  Constitutional:       General: She is awake  She is not in acute distress  Appearance: Normal appearance  She is not ill-appearing  HENT:      Head: Normocephalic and atraumatic  Eyes:      Extraocular Movements: Extraocular movements intact and EOM normal       Conjunctiva/sclera: Conjunctivae normal       Pupils: Pupils are equal, round, and reactive to light  Cardiovascular:      Rate and Rhythm: Normal rate     Pulmonary:      Effort: Pulmonary effort is normal  No respiratory distress  Chest:      Chest wall: No tenderness  Abdominal:      General: There is no distension  Palpations: Abdomen is soft  Tenderness: There is no abdominal tenderness  Musculoskeletal:         General: Normal range of motion  Cervical back: Normal range of motion and neck supple  Skin:     General: Skin is warm and dry  Neurological:      Mental Status: She is alert and oriented to person, place, and time  Coordination: Finger-Nose-Finger Test normal       Gait: Gait is intact  Deep Tendon Reflexes: Strength normal       Reflex Scores:       Tricep reflexes are 2+ on the right side and 2+ on the left side  Bicep reflexes are 2+ on the right side and 2+ on the left side  Brachioradialis reflexes are 2+ on the right side and 2+ on the left side  Patellar reflexes are 2+ on the right side and 2+ on the left side  Psychiatric:         Attention and Perception: Attention and perception normal          Mood and Affect: Mood and affect normal          Speech: Speech normal          Behavior: Behavior normal  Behavior is cooperative  Thought Content: Thought content normal          Cognition and Memory: Cognition and memory normal          Judgment: Judgment normal          Neurologic Exam     Mental Status   Oriented to person, place, and time  Follows 2 step commands  Attention: normal  Concentration: normal    Speech: speech is normal   Level of consciousness: alert  Knowledge: good  Able to perform simple calculations  Able to name object  Able to repeat  Normal comprehension  Cranial Nerves     CN III, IV, VI   Pupils are equal, round, and reactive to light  Extraocular motions are normal    CN III: no CN III palsy  CN VI: no CN VI palsy  Nystagmus: none   Diplopia: none  Conjugate gaze: present    CN V   Facial sensation intact  CN VII   Facial expression full, symmetric       CN VIII   CN VIII normal    Hearing: intact    CN IX, X   CN IX normal      CN XI   CN XI normal      CN XII   CN XII normal      Motor Exam   Muscle bulk: normal  Overall muscle tone: normal  Right arm pronator drift: absent  Left arm pronator drift: absent    Strength   Strength 5/5 throughout  Sensory Exam   Light touch normal    Proprioception normal    JPS and DST intact    Subjective numbness in left fingers     Gait, Coordination, and Reflexes     Gait  Gait: normal    Coordination   Finger to nose coordination: normal    Tremor   Resting tremor: absent  Intention tremor: absent  Action tremor: absent    Reflexes   Right brachioradialis: 2+  Left brachioradialis: 2+  Right biceps: 2+  Left biceps: 2+  Right triceps: 2+  Left triceps: 2+  Right patellar: 2+  Left patellar: 2+  Right Beal: absent  Left Beal: absent  Right ankle clonus: absent  Left ankle clonus: absent        MEDICAL DECISION MAKING    Imaging Studies:     MRI brain with and without contrast    Result Date: 6/30/2021  Narrative: MRI BRAIN WITH AND WITHOUT CONTRAST INDICATION: Follow-up of meningioma  COMPARISON:  MRI of the brain from June 9, 2020, MRI of the brain from May 1, 2018  TECHNIQUE: Sagittal T1, axial T2, axial FLAIR, axial T1, axial T2*gradient, axial diffusion  Sagittal, axial T1 postcontrast   Axial bravo postcontrast with coronal reconstructions  Imaging performed on 1 5T MRI  IV Contrast:  6 mL of Gadobutrol injection (SINGLE-DOSE)  IMAGE QUALITY:   Diagnostic  FINDINGS: BRAIN PARENCHYMA:  Redemonstrated 2 9 x 2 6 x 2 6 cm (TV, CC, AP) left frontal parafalcine meningioma with a small component extending into the right parafalcine space  The lesion is stable in size and exerts stable mass effect and buckling on the left more than right parasagittal frontal cortex  There is no associated vasogenic edema  Scattered periventricular, subcortical and deep cerebral white matter FLAIR hyperintense signal suggestive of chronic microangiopathy    Minimal chronic microangiopathic changes noted in the left jocelyn  VENTRICLES:  Normal for the patient's age  SELLA AND PITUITARY GLAND:  Normal  ORBITS:  Bilateral axial myopia  PARANASAL SINUSES:  Small retention cyst in the right maxillary sinus  Trace fluid signal in the left mastoid air cells  VASCULATURE:  Evaluation of the major intracranial vasculature demonstrates appropriate flow voids  CALVARIUM AND SKULL BASE:  Normal  EXTRACRANIAL SOFT TISSUES:  Normal      Impression: No acute intracranial abnormality  Stable frontal parafalcine meningioma with buckling of the left more than right frontal cortex  Stable cerebral chronic microangiopathic changes  Workstation performed: [id number]       I have personally reviewed pertinent reports     and I have personally reviewed pertinent films in PACS

## 2021-08-25 ENCOUNTER — RA CDI HCC (OUTPATIENT)
Dept: OTHER | Facility: HOSPITAL | Age: 76
End: 2021-08-25

## 2021-08-25 NOTE — PROGRESS NOTES
[place] [address]  coding opportunities             Chart Reviewed * (Number of) Inbasket suggestions sent to Provider: 2            Number of suggestions used: 1      Number of suggestions NOT actually used: 1     Patients insurance company: [address]  (Medicare [employer]vant[employer] and [employer])     Visit status: Patient arrived for their scheduled appointment     Provider never responded to [place] [address]  coding request     [place] [address]  coding opportunities             Chart Reviewed * (Number of) Inbasket suggestions sent to Provider: 2     D32 9 Benign neoplasm of meninges, unspecified ([place] [address] )     E11 42 Type 2 diabetes mellitus with diabetic polyneuropathy ([place] [address] )     If this is correct, please document and assess at your next visit 8/31/21               Patients insurance company: [address]  (Medicare [employer]vantage and [employer])

## 2021-09-01 ENCOUNTER — OFFICE VISIT (OUTPATIENT)
Dept: FAMILY MEDICINE CLINIC | Facility: CLINIC | Age: 76
End: 2021-09-01
Payer: COMMERCIAL

## 2021-09-01 ENCOUNTER — APPOINTMENT (OUTPATIENT)
Dept: LAB | Facility: CLINIC | Age: 76
End: 2021-09-01
Payer: COMMERCIAL

## 2021-09-01 VITALS
BODY MASS INDEX: 26.41 KG/M2 | OXYGEN SATURATION: 98 % | SYSTOLIC BLOOD PRESSURE: 122 MMHG | DIASTOLIC BLOOD PRESSURE: 82 MMHG | WEIGHT: 131 LBS | HEIGHT: 59 IN | TEMPERATURE: 98.6 F | HEART RATE: 79 BPM

## 2021-09-01 DIAGNOSIS — E11.42 TYPE 2 DIABETES MELLITUS WITH DIABETIC POLYNEUROPATHY, WITHOUT LONG-TERM CURRENT USE OF INSULIN (HCC): ICD-10-CM

## 2021-09-01 DIAGNOSIS — E78.2 MIXED HYPERCHOLESTEROLEMIA AND HYPERTRIGLYCERIDEMIA: Chronic | ICD-10-CM

## 2021-09-01 DIAGNOSIS — I10 ESSENTIAL HYPERTENSION: ICD-10-CM

## 2021-09-01 DIAGNOSIS — E78.2 MIXED HYPERCHOLESTEROLEMIA AND HYPERTRIGLYCERIDEMIA: ICD-10-CM

## 2021-09-01 DIAGNOSIS — H61.23 BILATERAL IMPACTED CERUMEN: ICD-10-CM

## 2021-09-01 DIAGNOSIS — E11.9 TYPE 2 DIABETES MELLITUS WITHOUT COMPLICATION, WITHOUT LONG-TERM CURRENT USE OF INSULIN (HCC): Primary | ICD-10-CM

## 2021-09-01 LAB
ALBUMIN SERPL BCP-MCNC: 3.5 G/DL (ref 3.5–5)
ALP SERPL-CCNC: 108 U/L (ref 46–116)
ALT SERPL W P-5'-P-CCNC: 23 U/L (ref 12–78)
ANION GAP SERPL CALCULATED.3IONS-SCNC: 3 MMOL/L (ref 4–13)
AST SERPL W P-5'-P-CCNC: 12 U/L (ref 5–45)
BILIRUB SERPL-MCNC: 0.51 MG/DL (ref 0.2–1)
BUN SERPL-MCNC: 16 MG/DL (ref 5–25)
CALCIUM SERPL-MCNC: 9.3 MG/DL (ref 8.3–10.1)
CHLORIDE SERPL-SCNC: 104 MMOL/L (ref 100–108)
CHOLEST SERPL-MCNC: 181 MG/DL (ref 50–200)
CO2 SERPL-SCNC: 30 MMOL/L (ref 21–32)
CREAT SERPL-MCNC: 0.6 MG/DL (ref 0.6–1.3)
GFR SERPL CREATININE-BSD FRML MDRD: 90 ML/MIN/1.73SQ M
GLUCOSE P FAST SERPL-MCNC: 102 MG/DL (ref 65–99)
HDLC SERPL-MCNC: 54 MG/DL
LDLC SERPL CALC-MCNC: 105 MG/DL (ref 0–100)
POTASSIUM SERPL-SCNC: 4 MMOL/L (ref 3.5–5.3)
PROT SERPL-MCNC: 8 G/DL (ref 6.4–8.2)
SL AMB POCT HEMOGLOBIN AIC: 5.9 (ref ?–6.5)
SODIUM SERPL-SCNC: 137 MMOL/L (ref 136–145)
TRIGL SERPL-MCNC: 112 MG/DL

## 2021-09-01 PROCEDURE — 69210 REMOVE IMPACTED EAR WAX UNI: CPT | Performed by: FAMILY MEDICINE

## 2021-09-01 PROCEDURE — 83036 HEMOGLOBIN GLYCOSYLATED A1C: CPT | Performed by: FAMILY MEDICINE

## 2021-09-01 PROCEDURE — 3044F HG A1C LEVEL LT 7.0%: CPT | Performed by: FAMILY MEDICINE

## 2021-09-01 PROCEDURE — 80061 LIPID PANEL: CPT

## 2021-09-01 PROCEDURE — 99214 OFFICE O/P EST MOD 30 MIN: CPT | Performed by: FAMILY MEDICINE

## 2021-09-01 PROCEDURE — 36415 COLL VENOUS BLD VENIPUNCTURE: CPT

## 2021-09-01 PROCEDURE — 3725F SCREEN DEPRESSION PERFORMED: CPT | Performed by: FAMILY MEDICINE

## 2021-09-01 PROCEDURE — 3079F DIAST BP 80-89 MM HG: CPT | Performed by: FAMILY MEDICINE

## 2021-09-01 PROCEDURE — 80053 COMPREHEN METABOLIC PANEL: CPT

## 2021-09-01 PROCEDURE — 3008F BODY MASS INDEX DOCD: CPT | Performed by: FAMILY MEDICINE

## 2021-09-01 PROCEDURE — 1160F RVW MEDS BY RX/DR IN RCRD: CPT | Performed by: FAMILY MEDICINE

## 2021-09-01 PROCEDURE — 3074F SYST BP LT 130 MM HG: CPT | Performed by: FAMILY MEDICINE

## 2021-09-01 PROCEDURE — 1036F TOBACCO NON-USER: CPT | Performed by: FAMILY MEDICINE

## 2021-09-01 RX ORDER — TIMOLOL MALEATE 5 MG/ML
SOLUTION/ DROPS OPHTHALMIC
COMMUNITY
Start: 2021-07-31

## 2021-09-02 PROCEDURE — 69210 REMOVE IMPACTED EAR WAX UNI: CPT | Performed by: FAMILY MEDICINE

## 2021-09-02 NOTE — PROGRESS NOTES
Patient ID: Rebecca Schneider is a 77 y o  female  HPI: 77 y  o female presents for follow up of niddm, hypertension and hypercholesterolemia  Hgba1c is   5 9  and patient's issues are well controlled  Patient deneis any dyspnea, chest pain or palpitations  SUBJECTIVE    Family History   Problem Relation Age of Onset    Hypertension Mother    Ethan Ball Lung cancer Father     Hypertension Sister     Lymphoma Brother 39    Hypertension Brother     Schizophrenia Brother     Depression Son     Schizophrenia Son     Hypertension Family     Heart disease Other      Social History     Socioeconomic History    Marital status: /Civil Union     Spouse name: Not on file    Number of children: Not on file    Years of education: Not on file    Highest education level: Not on file   Occupational History    Not on file   Tobacco Use    Smoking status: Never Smoker    Smokeless tobacco: Never Used   Substance and Sexual Activity    Alcohol use: Never    Drug use: No    Sexual activity: Not Currently   Other Topics Concern    Not on file   Social History Narrative    Daily coffee consumption (___cups/day)    Daily cola consumption (____cans/day)    Daily tea consumption (____cups/day)    Uses safety equipment-seatbelts     Social Determinants of Health     Financial Resource Strain:     Difficulty of Paying Living Expenses:    Food Insecurity:     Worried About Running Out of Food in the Last Year:     Ran Out of Food in the Last Year:    Transportation Needs:     Lack of Transportation (Medical):      Lack of Transportation (Non-Medical):    Physical Activity:     Days of Exercise per Week:     Minutes of Exercise per Session:    Stress:     Feeling of Stress :    Social Connections:     Frequency of Communication with Friends and Family:     Frequency of Social Gatherings with Friends and Family:     Attends Gnosticism Services:     Active Member of Clubs or Organizations:     Attends Club or Organization Meetings:     Marital Status:    Intimate Partner Violence:     Fear of Current or Ex-Partner:     Emotionally Abused:     Physically Abused:     Sexually Abused:      Past Medical History:   Diagnosis Date    Anemia     Cataracts, bilateral     Heart murmur 04/2018    History of transfusion 2003    had 4 units    Hypertension     Meningioma ([facility] [address] ) 04/2018    MRI every 6months    Ovarian cyst 2006    Shortness of breath     Skin neoplasm     last assessed: 6/13/2017     Past Surgical History:   Procedure Laterality Date    CYSTOSCOPY N/A 2/14/2019    Procedure: CYSTOSCOPY;  Surgeon: Agustin Lozano MD;  [facility];  Service: Gynecology Oncology    HYSTERECTOMY N/A 2/14/2019    Procedure: HYSTERECTOMY LAPAROSCOPIC TOTAL ([address]) W/ ROBOTICS bilateral salpingooophorectomy with great difficulty due to mass of 12 cm and taking 2 5 hours;   Surgeon: Agustin Lozano MD;  [facility];  Service: Gynecology Oncology    TONSILLECTOMY       No Known Allergies    Current Outpatient Medications:     lisinopril-hydrochlorothiazide (PRINZIDE,ZESTORETIC) 20-25 MG per tablet, take 1 tablet by mouth once daily, Disp: 90 tablet, Rfl: 3    simvastatin (ZOCOR) 10 mg tablet, Take 1 tablet (10 mg total) by mouth daily at bedtime, Disp: 30 tablet, Rfl: 5    timolol (TIMOPTIC) 0 5 % ophthalmic solution, instill 1 drop into both eyes twice a day, Disp: , Rfl:     Review of Systems  Constitutional:     Denies fever, chills ,fatigue ,weakness ,weight loss, weight gain     ENT: Denies earache ,loss of hearing ,nosebleed, nasal discharge,nasal congestion ,sore throat ,hoarseness  Pulmonary: Denies shortness of breath ,cough  ,dyspnea on exertion, orthopnea  ,PND   Cardiovascular:  Denies bradycardia , tachycardia  ,palpations, lower extremity edema leg, claudication  Breast:  Denies new or changing breast lumps ,nipple discharge ,nipple changes  Abdomen:  Denies abdominal pain , anorexia , indigestion, nausea, vomiting, constipation, diarrhea  Musculoskeletal: Denies myalgias, arthralgias, joint swelling, joint stiffness , limb pain, limb swelling  Gu: denies dysuria, polyuria  Skin: Denies skin rash, skin lesion, skin wound, itching, dry skin  Neuro: Denies headache, numbness, tingling, confusion, loss of consciousness, dizziness, vertigo  Psychiatric: Denies feelings of depression, suicidal ideation, anxiety, sleep disturbances    OBJECTIVE  /82   Pulse 79   Temp 98 6 °F (37 °C)   Ht 4' 11" (1 499 m)   Wt 59 4 kg (131 lb)   SpO2 98%   BMI 26 46 kg/m²   Constitutional:   NAD, well appearing and well nourished      ENT:   Conjunctiva and lids: no injection, edema, or discharge     Pupils and iris: JB bilaterally    External inspection of ears and nose: normal without deformities or discharge  Otoscopic exam: Canals obscured by cerumen and removed with a loop until tm fully visualized and intact  Nasal mucosa, septum and turbinates: Normal or edema or discharge         Oropharynx:  Moist mucosa, normal tongue and tonsils without lesions  No erythema        Pulmonary:Respiratory effort normal rate and rhythm, no increased work of breathing   Auscultation of lungs:  Clear bilaterally with no adventitious breath sounds       Cardiovascular: regular rate and rhythm, S1 and S2, no murmur, no edema and/or varicosities of LE      Abdomen: Soft and non-distended     Positive bowel sounds      No heptomegaly or splenomegaly      Gu: no suprapubic tenderness or CVA tenderness, no urethral discharge  Lymphatic:  No anterior or posterior cervical lymphadenopathy         Musculoskeletal:  Gait and station: Normal gait      Digits and nails normal without clubbing or cyanosis       Inspection/palpation of joints, bones, and muscles:  No joint tenderness, swelling, full active and passive range of motion       Skin: Normal skin turgor and no rashes      Neuro:    Normal reflexes     Psych: alert and oriented to person, place and time     normal mood and affect   Patient's shoes and socks removed  Right Foot/Ankle   Right Foot Inspection  Skin Exam: skin normal and skin intact no dry skin, no warmth, no callus, no erythema, no maceration, no abnormal color, no pre-ulcer, no ulcer and no callus                          Toe Exam: ROM and strength within normal limitsno swelling, no tenderness, erythema and  no right toe deformity  Sensory   Vibration: diminished  Proprioception: diminished   Monofilament testing: diminished  Vascular  Capillary refills: < 3 seconds  The right DP pulse is 2+  The right PT pulse is 2+  Left Foot/Ankle  Left Foot Inspection  Skin Exam: skin normal and skin intactno dry skin, no warmth, no erythema, no maceration, normal color, no pre-ulcer, no ulcer and no callus                         Toe Exam: ROM and strength within normal limitsno swelling, no tenderness, no erythema and no left toe deformity                   Sensory   Vibration: diminished  Proprioception: diminished  Monofilament: diminished  Vascular  Capillary refills: < 3 seconds  The left DP pulse is 2+  The left PT pulse is 2+  Assign Risk Category:  No deformity present; Loss of protective sensation;        Risk: 2  Ear cerumen removal    Date/Time: 9/2/2021 7:10 AM  Performed by: Romina Copeland DO  Authorized by: Romina Copeland DO   Universal Protocol:  Consent: Verbal consent obtained  Patient understanding: patient states understanding of the procedure being performed      Patient location:  Clinic  Procedure details:     Local anesthetic:  None    Location:  L ear and R ear    Procedure type: curette      Approach:  External  Post-procedure details:     Complication:  None    Hearing quality:  Normal    Patient tolerance of procedure:   Tolerated well, no immediate complications        Assessment/Plan:Diagnoses and all orders for this visit:    Type 2 diabetes mellitus without complication, without long-term current use of insulin (HCC)  -     POCT hemoglobin A1c    Type 2 diabetes mellitus with diabetic polyneuropathy, without long-term current use of insulin ([facility])    Mixed hypercholesterolemia and hypertriglyceridemia  Comments:  Continue statin therpay   Orders:  -     Lipid Panel with Direct LDL reflex; Future    Essential hypertension  Comments:  Continue ACE thearpy   Orders:  -     Comprehensive metabolic panel; Future    Bilateral impacted cerumen    Other orders  -     timolol (TIMOPTIC) 0 5 % ophthalmic solution; instill 1 drop into both eyes twice a day  -     Ear cerumen removal      I will see patient back in 4 mos or sooner prn

## 2021-09-08 ENCOUNTER — VBI (OUTPATIENT)
Dept: ADMINISTRATIVE | Facility: OTHER | Age: 76
End: 2021-09-08

## 2021-09-22 ENCOUNTER — VBI (OUTPATIENT)
Dept: ADMINISTRATIVE | Facility: OTHER | Age: 76
End: 2021-09-22

## 2021-10-16 DIAGNOSIS — I10 ESSENTIAL HYPERTENSION: ICD-10-CM

## 2021-10-18 RX ORDER — LISINOPRIL AND HYDROCHLOROTHIAZIDE 25; 20 MG/1; MG/1
TABLET ORAL
Qty: 90 TABLET | Refills: 3 | Status: SHIPPED | OUTPATIENT
Start: 2021-10-18

## 2021-12-02 ENCOUNTER — VBI (OUTPATIENT)
Dept: ADMINISTRATIVE | Facility: OTHER | Age: 76
End: 2021-12-02

## 2022-06-21 ENCOUNTER — TELEPHONE (OUTPATIENT)
Dept: NEUROSURGERY | Facility: CLINIC | Age: 77
End: 2022-06-21

## 2022-06-21 DIAGNOSIS — Z01.812 ENCOUNTER FOR PREPROCEDURAL LABORATORY EXAMINATION: ICD-10-CM

## 2022-06-21 DIAGNOSIS — D32.9 MENINGIOMA (HCC): Primary | ICD-10-CM

## 2022-06-21 NOTE — TELEPHONE ENCOUNTER
Called patient to notify her of the blood work  Patient did not answer  Left a detailed vm requesting for patient to obtain blood work within the next 1-2 days  Provided the office's phone number if she has any additional questions or concerns

## 2022-06-21 NOTE — TELEPHONE ENCOUNTER
----- Message from [address] sent at 6/20/2022  3:09 PM EDT -----  Please remind patient to a blood work done prior to MRI

## 2022-06-29 ENCOUNTER — APPOINTMENT (OUTPATIENT)
Dept: LAB | Facility: CLINIC | Age: 77
End: 2022-06-29
Payer: COMMERCIAL

## 2022-06-29 DIAGNOSIS — G93.89 BRAIN MASS: ICD-10-CM

## 2022-06-29 LAB
BUN SERPL-MCNC: 17 MG/DL (ref 5–25)
CREAT SERPL-MCNC: 0.7 MG/DL (ref 0.6–1.3)
GFR SERPL CREATININE-BSD FRML MDRD: 85 ML/MIN/1.73SQ M

## 2022-06-29 PROCEDURE — 82565 ASSAY OF CREATININE: CPT

## 2022-06-29 PROCEDURE — 36415 COLL VENOUS BLD VENIPUNCTURE: CPT

## 2022-06-29 PROCEDURE — 84520 ASSAY OF UREA NITROGEN: CPT

## 2022-07-01 ENCOUNTER — HOSPITAL ENCOUNTER (OUTPATIENT)
Dept: RADIOLOGY | Facility: HOSPITAL | Age: 77
Discharge: HOME | End: 2022-07-01
Payer: COMMERCIAL

## 2022-07-01 DIAGNOSIS — G93.89 BRAIN MASS: ICD-10-CM

## 2022-07-01 PROCEDURE — A9585 GADOBUTROL INJECTION: HCPCS | Performed by: NURSE PRACTITIONER

## 2022-07-01 PROCEDURE — 70553 MRI BRAIN STEM W/O & W/DYE: CPT

## 2022-07-01 PROCEDURE — G1004 CDSM NDSC: HCPCS

## 2022-07-01 RX ADMIN — GADOBUTROL 5 ML: 604.72 INJECTION INTRAVENOUS at 14:20

## 2022-07-08 ENCOUNTER — TELEPHONE (OUTPATIENT)
Dept: NEUROSURGERY | Facility: CLINIC | Age: 77
End: 2022-07-08

## 2022-07-11 ENCOUNTER — OFFICE VISIT (OUTPATIENT)
Dept: NEUROSURGERY | Facility: CLINIC | Age: 77
End: 2022-07-11
Payer: COMMERCIAL

## 2022-07-11 VITALS
OXYGEN SATURATION: 96 % | BODY MASS INDEX: 26.21 KG/M2 | DIASTOLIC BLOOD PRESSURE: 88 MMHG | HEART RATE: 73 BPM | HEIGHT: 59 IN | WEIGHT: 130 LBS | SYSTOLIC BLOOD PRESSURE: 156 MMHG | TEMPERATURE: 98.3 F

## 2022-07-11 DIAGNOSIS — D32.9 MENINGIOMA (HCC): Primary | ICD-10-CM

## 2022-07-11 PROCEDURE — 1160F RVW MEDS BY RX/DR IN RCRD: CPT | Performed by: PHYSICIAN ASSISTANT

## 2022-07-11 PROCEDURE — 99213 OFFICE O/P EST LOW 20 MIN: CPT | Performed by: PHYSICIAN ASSISTANT

## 2022-07-11 NOTE — PROGRESS NOTES
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   · This was an incidental finding found on CT head when patient presented to the ED in 2018 after having a syncopal episode  · CPA meningioma was noted on most recent scan but retrospectively seen on scan as early as 2018      Imaging:  · MRI brain w wo contrast 7/1/2022:  Stable 2 8 cm anterior left frontal falx meningioma with related mass effect  Small right anterior inferior CPA mass consistent with meningioma, only slightly larger than on initial 6 MR 12/11/2018  Plan:  · Exam:  Nonfocal  · Reviewed imaging in detail with patient  Left frontal meningioma remains stable when compared to imaging as far back as 2018  CPA meningoma also noted and appears slightly larger over a 4 year span which is not abnormal for a meningioma  She remains asx from these findings  · Reviewed the natural history of meningiomas - benign slow growing tumors  · Recommend watchful waiting at this time, with follow-up in 1 year with repeat MRI with and without contrast per NCCN guidelines  · She is to return to the office sooner should she develop worsening symptoms or red flag signs  · If imaging is fairly stable at next appointment, can push out follow up to every 2-3 years at that time         Diagnoses and all orders for this visit:    Meningioma ([address] )  -     MRI brain w wo contrast; Future          I spent 15 minutes with the patient today in which >50% of the time was spent counseling/coordination of care regarding diagnosis, imaging review, symptoms and treatment plan       CHIEF COMPLAINT    Chief Complaint   Patient presents with    Follow-up     Brain mass       HISTORY    History of Present Illness       77y o  year old female with past medical history significant for type 2 diabetes, hypertension, alopecia, basal cell carcinoma, anemia and status post total hysterectomy  Patient presents to outpatient office today for 1 year follow-up for surveillance of meningiomas  This was found incidentally in 2018 after syncopal episode  Today she states that she is doing well  She offers no complaints  She is wondering how much longer she will have to follow meningiomas on a yearly basis  She has no seizure or stroke like activity, numbness/tingling/weakness, bowel or bladder issues, saddle anesthesia, personality changes or altered mental status  States her mother had meningiomas as well as had difficulty walking after surgery on one of them so she is wary about procedures if they are not warranted for her meningiomas  See Discussion    REVIEW OF SYSTEMS    Review of Systems   Constitutional: Negative  HENT: Negative  Eyes: Negative  Respiratory: Negative  Cardiovascular: Negative  Gastrointestinal: Negative  Endocrine: Negative  Genitourinary: Negative  Musculoskeletal: Negative  Skin: Negative  Allergic/Immunologic: Negative  Neurological: Negative  Hematological: Negative  Psychiatric/Behavioral: Negative  All other systems reviewed and are negative  Meds/Allergies     Current Outpatient Medications   Medication Sig Dispense Refill    lisinopril-hydrochlorothiazide (PRINZIDE,ZESTORETIC) 20-25 MG per tablet take 1 tablet by mouth once daily 90 tablet 3    simvastatin (ZOCOR) 10 mg tablet Take 1 tablet (10 mg total) by mouth daily at bedtime 30 tablet 5    timolol (TIMOPTIC) 0 5 % ophthalmic solution instill 1 drop into both eyes twice a day       No current facility-administered medications for this visit         No Known Allergies    PAST HISTORY    Past Medical History:   Diagnosis Date    Anemia     Cataracts, bilateral     Heart murmur 04/2018    History of transfusion 2003    had 4 units    Hypertension     Meningioma ([facility] [address] ) 04/2018    MRI every 6months    Ovarian cyst 2006    Shortness of breath     Skin neoplasm     last assessed: 6/13/2017       Past Surgical History:   Procedure Laterality Date    CYSTOSCOPY N/A 2/14/2019    Procedure: Susa Ovens;  Surgeon: Mila Kendall MD;  [facility];  Service: Gynecology Oncology    HYSTERECTOMY N/A 2/14/2019    Procedure: HYSTERECTOMY LAPAROSCOPIC TOTAL ([address]) W/ ROBOTICS bilateral salpingooophorectomy with great difficulty due to mass of 12 cm and taking 2 5 hours; Surgeon: Mila Kendall MD;  [facility];  Service: Gynecology Oncology    TONSILLECTOMY         Social History     Tobacco Use    Smoking status: Never Smoker    Smokeless tobacco: Never Used   Substance Use Topics    Alcohol use: Never    Drug use: No       Family History   Problem Relation Age of Onset    Hypertension Mother     Lung cancer Father     Hypertension Sister     Lymphoma Brother 39    Hypertension Brother     Schizophrenia Brother     Depression Son     Schizophrenia Son     Hypertension Family     Heart disease Other          Above history personally reviewed  EXAM    Vitals:Blood pressure 156/88, pulse 73, temperature 98 3 °F (36 8 °C), temperature source Temporal, height 4' 11" (1 499 m), weight 59 kg (130 lb), SpO2 96 %  ,Body mass index is 26 26 kg/m²  Physical Exam  Constitutional:       General: She is awake  Appearance: Normal appearance  HENT:      Head: Normocephalic and atraumatic  Eyes:      Extraocular Movements: Extraocular movements intact and EOM normal       Conjunctiva/sclera: Conjunctivae normal    Cardiovascular:      Rate and Rhythm: Normal rate  Pulmonary:      Effort: Pulmonary effort is normal  No respiratory distress  Skin:     General: Skin is warm and dry  Neurological:      Mental Status: She is alert and oriented to person, place, and time        Coordination: Finger-Nose-Finger Test normal       Gait: Gait is intact  Deep Tendon Reflexes: Strength normal    Psychiatric:         Attention and Perception: Attention and perception normal          Mood and Affect: Mood and affect normal          Speech: Speech normal          Behavior: Behavior normal  Behavior is cooperative  Thought Content: Thought content normal          Cognition and Memory: Cognition and memory normal          Judgment: Judgment normal          Neurologic Exam     Mental Status   Oriented to person, place, and time  Follows 1 step commands  Attention: normal  Concentration: normal    Speech: speech is normal   Level of consciousness: alert  Knowledge: good  Able to perform simple calculations  Able to repeat  Normal comprehension  Cranial Nerves     CN III, IV, VI   Extraocular motions are normal    CN III: no CN III palsy  CN VI: no CN VI palsy  Nystagmus: none   Diplopia: none  Ophthalmoparesis: none  Upgaze: normal  Downgaze: normal  Conjugate gaze: present    CN V   Right facial sensation deficit: none  Left facial sensation deficit: none    CN VII   Right facial weakness: none  Left facial weakness: none    CN VIII   Hearing: intact    CN IX, X   CN IX normal    CN X normal      CN XI   Right trapezius strength: normal  Left trapezius strength: normal    CN XII   CN XII normal      Motor Exam   Muscle bulk: normal  Overall muscle tone: normal  Right arm pronator drift: absent  Left arm pronator drift: absent    Strength   Strength 5/5 throughout       Sensory Exam   Light touch normal      Gait, Coordination, and Reflexes     Gait  Gait: normal    Coordination   Finger to nose coordination: normal    Tremor   Resting tremor: absent  Intention tremor: absent  Action tremor: absent    Reflexes   Right : 2+  Left : 2+  Right Beal: absent  Left Beal: absent  Right ankle clonus: absent  Left ankle clonus: absent        MEDICAL DECISION MAKING    Imaging Studies:     MRI brain with and without contrast    Result Date: 7/5/2022  Narrative: MRI BRAIN WITH AND WITHOUT CONTRAST INDICATION: G93 89: Other specified disorders of brain  COMPARISON:  MR 6/25/2021, CTA 4/9/2018 TECHNIQUE: Sagittal T1, axial T2, axial FLAIR, axial T1, axial [place], axial diffusion  Sagittal, axial T1 postcontrast   Axial bravo postcontrast with coronal reconstructions  IV Contrast:  5 mL of Gadobutrol injection (SINGLE-DOSE)  IMAGE QUALITY:   Diagnostic  FINDINGS: BRAIN PARENCHYMA:  Allowing for difference in slice selection, no substantive change in size of left parasagittal frontal meningioma  This approximately 2 7 x 2 8 x 2 7 cm in greatest TV, AP and CC dimension  Tumor extends asymmetrically into the left frontal fossa with thinning and minor high signal likely gliosis within the displaced parenchyma  A small component of the tumor crosses the falx extending into the right frontal fossa without significant mass effect  Parenchymal changes consistent with chronic small vessel disease are stable  No pathologic hemosiderin deposition  VENTRICLES:  Normal for the patient's age  SELLA AND PITUITARY GLAND:  Normal  ORBITS:  Bilateral lens implants  PARANASAL SINUSES:  Normal  VASCULATURE:  Evaluation of the major intracranial vasculature demonstrates appropriate flow voids  CALVARIUM AND SKULL BASE:  En plaque intra-axial enhancing focus approximately 1 3 x 1 3 x 1 6 cm adjacent to the right cerebellar flocculus and cochlear aqueduct,  13:41, 11:17 consistent with meningioma which in retrospect appears only slightly more prominent than 12/11/2018, without mass effect  EXTRACRANIAL SOFT TISSUES:  Normal      Impression: Stable 2 8 cm anterior left frontal falx meningioma with related mass effect  Small right anterior inferior CPA mass consistent with meningioma, only slightly larger than on initial 6 MR 12/11/2018  Workstation performed: [id number]       I have personally reviewed pertinent reports     and I have personally reviewed pertinent films in PACS

## 2022-07-11 NOTE — PATIENT INSTRUCTIONS
Return to the office in 1 year with MRI brain with and without contrast for ongoing monitoring of meningiomas or sooner should he develop any worsening symptoms or red flag signs

## 2022-07-11 NOTE — ASSESSMENT & PLAN NOTE
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   · This was an incidental finding found on CT head when patient presented to the ED in 2018 after having a syncopal episode  · CPA meningioma was noted on most recent scan but retrospectively seen on scan as early as 2018      Imaging:  · MRI brain w wo contrast 7/1/2022:  Stable 2 8 cm anterior left frontal falx meningioma with related mass effect  Small right anterior inferior CPA mass consistent with meningioma, only slightly larger than on initial 6 MR 12/11/2018  Plan:  · Exam:  Nonfocal  · Reviewed imaging in detail with patient  Left frontal meningioma remains stable when compared to imaging as far back as 2018  CPA meningoma also noted and appears slightly larger over a 4 year span which is not abnormal for a meningioma  She remains asx from these findings    · Reviewed the natural history of meningiomas - benign slow growing tumors  · Recommend watchful waiting at this time, with follow-up in 1 year with repeat MRI with and without contrast per NCCN guidelines  · She is to return to the office sooner should she develop worsening symptoms or red flag signs  · If imaging is fairly stable at next appointment, can push out follow up to every 2-3 years at that time

## 2022-07-18 ENCOUNTER — TELEPHONE (OUTPATIENT)
Dept: FAMILY MEDICINE CLINIC | Facility: CLINIC | Age: 77
End: 2022-07-18

## 2022-09-21 NOTE — PLAN OF CARE
Problem: Prexisting or High Potential for Compromised Skin Integrity  Goal: Skin integrity is maintained or improved  Description  INTERVENTIONS:  - Identify patients at risk for skin breakdown  - Assess and monitor skin integrity  - Assess and monitor nutrition and hydration status  - Monitor labs (i e  albumin)  - Assess for incontinence   - Turn and reposition patient  - Assist with mobility/ambulation  - Relieve pressure over bony prominences  - Avoid friction and shearing  - Provide appropriate hygiene as needed including keeping skin clean and dry  - Evaluate need for skin moisturizer/barrier cream  - Collaborate with interdisciplinary team (i e  Nutrition, Rehabilitation, etc )   - Patient/family teaching  Outcome: Progressing Helical Rim Advancement Flap Text: The defect edges were debeveled with a #15 blade scalpel.  Given the location of the defect and the proximity to free margins (helical rim) a double helical rim advancement flap was deemed most appropriate.  Using a sterile surgical marker, the appropriate advancement flaps were drawn incorporating the defect and placing the expected incisions between the helical rim and antihelix where possible.  The area thus outlined was incised through and through with a #15 scalpel blade.  With a skin hook and iris scissors, the flaps were gently and sharply undermined and freed up.

## 2022-10-18 ENCOUNTER — VBI (OUTPATIENT)
Dept: ADMINISTRATIVE | Facility: OTHER | Age: 77
End: 2022-10-18

## 2022-10-18 LAB
LEFT EYE DIABETIC RETINOPATHY: NORMAL
RIGHT EYE DIABETIC RETINOPATHY: NORMAL

## 2022-10-19 NOTE — TELEPHONE ENCOUNTER
Upon review of the received faxes folder, we    were able to locate, review, and update the patient chart as requested for Diabetic Eye Exam     Any additional questions or concerns should be emailed to the Practice Liaisons via the appropriate education email address, please do not reply via In Basket      Thank you  Lyric Torres

## 2022-11-02 ENCOUNTER — TELEPHONE (OUTPATIENT)
Dept: FAMILY MEDICINE CLINIC | Facility: CLINIC | Age: 77
End: 2022-11-02

## 2022-11-02 NOTE — TELEPHONE ENCOUNTER
Pt called stating she has an appt with you next week and is asking if you want her to get blood work done before then

## 2022-11-03 DIAGNOSIS — D50.9 IRON DEFICIENCY ANEMIA, UNSPECIFIED IRON DEFICIENCY ANEMIA TYPE: Primary | ICD-10-CM

## 2022-11-03 DIAGNOSIS — I10 ESSENTIAL HYPERTENSION: ICD-10-CM

## 2022-11-03 DIAGNOSIS — E78.00 HYPERCHOLESTEROLEMIA: ICD-10-CM

## 2022-11-03 DIAGNOSIS — R53.83 OTHER FATIGUE: ICD-10-CM

## 2022-11-04 ENCOUNTER — APPOINTMENT (OUTPATIENT)
Dept: LAB | Facility: CLINIC | Age: 77
End: 2022-11-04

## 2022-11-04 DIAGNOSIS — I10 ESSENTIAL HYPERTENSION: ICD-10-CM

## 2022-11-04 DIAGNOSIS — E78.00 HYPERCHOLESTEROLEMIA: ICD-10-CM

## 2022-11-04 DIAGNOSIS — D50.9 IRON DEFICIENCY ANEMIA, UNSPECIFIED IRON DEFICIENCY ANEMIA TYPE: ICD-10-CM

## 2022-11-04 DIAGNOSIS — R53.83 OTHER FATIGUE: ICD-10-CM

## 2022-11-04 LAB
ALBUMIN SERPL BCP-MCNC: 3.4 G/DL (ref 3.5–5)
ALP SERPL-CCNC: 118 U/L (ref 46–116)
ALT SERPL W P-5'-P-CCNC: 29 U/L (ref 12–78)
ANION GAP SERPL CALCULATED.3IONS-SCNC: 2 MMOL/L (ref 4–13)
AST SERPL W P-5'-P-CCNC: 14 U/L (ref 5–45)
BILIRUB SERPL-MCNC: 0.55 MG/DL (ref 0.2–1)
BUN SERPL-MCNC: 17 MG/DL (ref 5–25)
CALCIUM ALBUM COR SERPL-MCNC: 10.2 MG/DL (ref 8.3–10.1)
CALCIUM SERPL-MCNC: 9.7 MG/DL (ref 8.3–10.1)
CHLORIDE SERPL-SCNC: 102 MMOL/L (ref 96–108)
CHOLEST SERPL-MCNC: 161 MG/DL
CO2 SERPL-SCNC: 30 MMOL/L (ref 21–32)
CREAT SERPL-MCNC: 0.64 MG/DL (ref 0.6–1.3)
ERYTHROCYTE [DISTWIDTH] IN BLOOD BY AUTOMATED COUNT: 13.2 % (ref 11.6–15.1)
FERRITIN SERPL-MCNC: 46 NG/ML (ref 8–388)
GFR SERPL CREATININE-BSD FRML MDRD: 87 ML/MIN/1.73SQ M
GLUCOSE P FAST SERPL-MCNC: 108 MG/DL (ref 65–99)
HCT VFR BLD AUTO: 39.5 % (ref 34.8–46.1)
HDLC SERPL-MCNC: 49 MG/DL
HGB BLD-MCNC: 13.3 G/DL (ref 11.5–15.4)
LDLC SERPL CALC-MCNC: 89 MG/DL (ref 0–100)
MCH RBC QN AUTO: 31.9 PG (ref 26.8–34.3)
MCHC RBC AUTO-ENTMCNC: 33.7 G/DL (ref 31.4–37.4)
MCV RBC AUTO: 95 FL (ref 82–98)
PLATELET # BLD AUTO: 488 THOUSANDS/UL (ref 149–390)
PMV BLD AUTO: 9 FL (ref 8.9–12.7)
POTASSIUM SERPL-SCNC: 4.5 MMOL/L (ref 3.5–5.3)
PROT SERPL-MCNC: 7.6 G/DL (ref 6.4–8.4)
RBC # BLD AUTO: 4.17 MILLION/UL (ref 3.81–5.12)
SODIUM SERPL-SCNC: 134 MMOL/L (ref 135–147)
TRIGL SERPL-MCNC: 115 MG/DL
TSH SERPL DL<=0.05 MIU/L-ACNC: 1.51 UIU/ML (ref 0.45–4.5)
WBC # BLD AUTO: 12.04 THOUSAND/UL (ref 4.31–10.16)

## 2022-11-07 ENCOUNTER — OFFICE VISIT (OUTPATIENT)
Dept: FAMILY MEDICINE CLINIC | Facility: CLINIC | Age: 77
End: 2022-11-07

## 2022-11-07 VITALS
OXYGEN SATURATION: 95 % | WEIGHT: 129 LBS | HEART RATE: 90 BPM | HEIGHT: 59 IN | DIASTOLIC BLOOD PRESSURE: 82 MMHG | TEMPERATURE: 98.3 F | SYSTOLIC BLOOD PRESSURE: 122 MMHG | BODY MASS INDEX: 26 KG/M2

## 2022-11-07 DIAGNOSIS — Z78.0 ASYMPTOMATIC POSTMENOPAUSAL ESTROGEN DEFICIENCY: ICD-10-CM

## 2022-11-07 DIAGNOSIS — E11.9 TYPE 2 DIABETES MELLITUS WITHOUT COMPLICATION, WITHOUT LONG-TERM CURRENT USE OF INSULIN (HCC): ICD-10-CM

## 2022-11-07 DIAGNOSIS — Z23 NEED FOR VACCINATION: ICD-10-CM

## 2022-11-07 DIAGNOSIS — I10 PRIMARY HYPERTENSION: ICD-10-CM

## 2022-11-07 DIAGNOSIS — D50.9 IRON DEFICIENCY ANEMIA, UNSPECIFIED IRON DEFICIENCY ANEMIA TYPE: ICD-10-CM

## 2022-11-07 DIAGNOSIS — Z12.31 BREAST CANCER SCREENING BY MAMMOGRAM: ICD-10-CM

## 2022-11-07 DIAGNOSIS — Z00.00 MEDICARE ANNUAL WELLNESS VISIT, SUBSEQUENT: Primary | ICD-10-CM

## 2022-11-07 DIAGNOSIS — Z12.11 COLON CANCER SCREENING: ICD-10-CM

## 2022-11-07 DIAGNOSIS — E78.2 MIXED HYPERCHOLESTEROLEMIA AND HYPERTRIGLYCERIDEMIA: ICD-10-CM

## 2022-11-07 NOTE — PATIENT INSTRUCTIONS
Medicare Preventive Visit Patient Instructions  Thank you for completing your Welcome to Medicare Visit or Medicare Annual Wellness Visit today  Your next wellness visit will be due in one year (11/8/2023)  The screening/preventive services that you may require over the next 5-10 years are detailed below  Some tests may not apply to you based off risk factors and/or age  Screening tests ordered at today's visit but not completed yet may show as past due  Also, please note that scanned in results may not display below  Preventive Screenings:  Service Recommendations Previous Testing/Comments   Colorectal Cancer Screening  * Colonoscopy    * Fecal Occult Blood Test (FOBT)/Fecal Immunochemical Test (FIT)  * Fecal DNA/Cologuard Test  * Flexible Sigmoidoscopy Age: 39-70 years old   Colonoscopy: every 10 years (may be performed more frequently if at higher risk)  OR  FOBT/FIT: every 1 year  OR  Cologuard: every 3 years  OR  Sigmoidoscopy: every 5 years  Screening may be recommended earlier than age 39 if at higher risk for colorectal cancer  Also, an individualized decision between you and your healthcare provider will decide whether screening between the ages of 74-80 would be appropriate  Colonoscopy: Not on file  FOBT/FIT: Not on file  Cologuard: Not on file  Sigmoidoscopy: Not on file          Breast Cancer Screening Age: 36 years old  Frequency: every 1-2 years  Not required if history of left and right mastectomy Mammogram: Not on file        Cervical Cancer Screening Between the ages of 21-29, pap smear recommended once every 3 years  Between the ages of 33-67, can perform pap smear with HPV co-testing every 5 years     Recommendations may differ for women with a history of total hysterectomy, cervical cancer, or abnormal pap smears in past  Pap Smear: Not on file    Screening Not Indicated   Hepatitis C Screening Once for adults born between [facility]  More frequently in patients at high risk for Hepatitis C Hep C Antibody: 11/15/2019    Screening Current   Diabetes Screening 1-2 times per year if you're at risk for diabetes or have pre-diabetes Fasting glucose: 108 mg/dL (11/4/2022)  A1C: 5 9 (9/1/2021)  Screening Not Indicated  History Diabetes   Cholesterol Screening Once every 5 years if you don't have a lipid disorder  May order more often based on risk factors  Lipid panel: 11/04/2022    Screening Not Indicated  History Lipid Disorder     Other Preventive Screenings Covered by Medicare:  1  Abdominal Aortic Aneurysm (AAA) Screening: covered once if your at risk  You're considered to be at risk if you have a family history of AAA  2  Lung Cancer Screening: covers low dose CT scan once per year if you meet all of the following conditions: (1) Age 50-69; (2) No signs or symptoms of lung cancer; (3) Current smoker or have quit smoking within the last 15 years; (4) You have a tobacco smoking history of at least 20 pack years (packs per day multiplied by number of years you smoked); (5) You get a written order from a healthcare provider  3  Glaucoma Screening: covered annually if you're considered high risk: (1) You have diabetes OR (2) Family history of glaucoma OR (3)  aged 48 and older OR (3)  American aged 72 and older  3  Osteoporosis Screening: covered every 2 years if you meet one of the following conditions: (1) You're estrogen deficient and at risk for osteoporosis based off medical history and other findings; (2) Have a vertebral abnormality; (3) On glucocorticoid therapy for more than 3 months; (4) Have primary hyperparathyroidism; (5) On osteoporosis medications and need to assess response to drug therapy  · Last bone density test (DXA Scan): Not on file  5  HIV Screening: covered annually if you're between the age of 12-76  Also covered annually if you are younger than 13 and older than 72 with risk factors for HIV infection   For pregnant patients, it is covered up to 3 times per pregnancy  Immunizations:  Immunization Recommendations   Influenza Vaccine Annual influenza vaccination during flu season is recommended for all persons aged >= 6 months who do not have contraindications   Pneumococcal Vaccine   * Pneumococcal conjugate vaccine = PCV13 (Prevnar 13), PCV15 (Vaxneuvance), PCV20 (Prevnar 20)  * Pneumococcal polysaccharide vaccine = PPSV23 (Pneumovax) Adults 25-60 years old: 1-3 doses may be recommended based on certain risk factors  Adults 72 years old: 1-2 doses may be recommended based off what pneumonia vaccine you previously received   Hepatitis B Vaccine 3 dose series if at intermediate or high risk (ex: diabetes, end stage renal disease, liver disease)   Tetanus (Td) Vaccine - COST NOT COVERED BY MEDICARE PART B Following completion of primary series, a booster dose should be given every 10 years to maintain immunity against tetanus  Td may also be given as tetanus wound prophylaxis  Tdap Vaccine - COST NOT COVERED BY MEDICARE PART B Recommended at least once for all adults  For pregnant patients, recommended with each pregnancy  Shingles Vaccine (Shingrix) - COST NOT COVERED BY MEDICARE PART B  2 shot series recommended in those aged 48 and above     Health Maintenance Due:      Topic Date Due   • DXA SCAN  Never done   • Breast Cancer Screening: Mammogram  Never done   • Colorectal Cancer Screening  Never done   • Hepatitis C Screening  Completed     Immunizations Due:      Topic Date Due   • COVID-19 Vaccine (3 - Booster for Perdomo Peter series) 10/23/2021     Advance Directives   What are advance directives? Advance directives are legal documents that state your wishes and plans for medical care  These plans are made ahead of time in case you lose your ability to make decisions for yourself  Advance directives can apply to any medical decision, such as the treatments you want, and if you want to donate organs  What are the types of advance directives? There are many types of advance directives, and each state has rules about how to use them  You may choose a combination of any of the following:  · Living will: This is a written record of the treatment you want  You can also choose which treatments you do not want, which to limit, and which to stop at a certain time  This includes surgery, medicine, IV fluid, and tube feedings  · Durable power of  for healthcare [facility]): This is a written record that states who you want to make healthcare choices for you when you are unable to make them for yourself  This person, called a proxy, is usually a family member or a friend  You may choose more than 1 proxy  · Do not resuscitate (DNR) order:  A DNR order is used in case your heart stops beating or you stop breathing  It is a request not to have certain forms of treatment, such as CPR  A DNR order may be included in other types of advance directives  · Medical directive: This covers the care that you want if you are in a coma, near death, or unable to make decisions for yourself  You can list the treatments you want for each condition  Treatment may include pain medicine, surgery, blood transfusions, dialysis, IV or tube feedings, and a ventilator (breathing machine)  · Values history: This document has questions about your views, beliefs, and how you feel and think about life  This information can help others choose the care that you would choose  Why are advance directives important? An advance directive helps you control your care  Although spoken wishes may be used, it is better to have your wishes written down  Spoken wishes can be misunderstood, or not followed  Treatments may be given even if you do not want them  An advance directive may make it easier for your family to make difficult choices about your care     Weight Management   Why it is important to manage your weight:  Being overweight increases your risk of health conditions such as heart disease, high blood pressure, type 2 diabetes, and certain types of cancer  It can also increase your risk for osteoarthritis, sleep apnea, and other respiratory problems  Aim for a slow, steady weight loss  Even a small amount of weight loss can lower your risk of health problems  How to lose weight safely:  A safe and healthy way to lose weight is to eat fewer calories and get regular exercise  You can lose up about 1 pound a week by decreasing the number of calories you eat by 500 calories each day  Healthy meal plan for weight management:  A healthy meal plan includes a variety of foods, contains fewer calories, and helps you stay healthy  A healthy meal plan includes the following:  · Eat whole-grain foods more often  A healthy meal plan should contain fiber  Fiber is the part of grains, fruits, and vegetables that is not broken down by your body  Whole-grain foods are healthy and provide extra fiber in your diet  Some examples of whole-grain foods are whole-wheat breads and pastas, oatmeal, brown rice, and bulgur  · Eat a variety of vegetables every day  Include dark, leafy greens such as spinach, kale, stephania greens, and mustard greens  Eat yellow and orange vegetables such as carrots, sweet potatoes, and winter squash  · Eat a variety of fruits every day  Choose fresh or canned fruit (canned in its own juice or light syrup) instead of juice  Fruit juice has very little or no fiber  · Eat low-fat dairy foods  Drink fat-free (skim) milk or 1% milk  Eat fat-free yogurt and low-fat cottage cheese  Try low-fat cheeses such as mozzarella and other reduced-fat cheeses  · Choose meat and other protein foods that are low in fat  Choose beans or other legumes such as split peas or lentils  Choose fish, skinless poultry (chicken or turkey), or lean cuts of red meat (beef or pork)  Before you cook meat or poultry, cut off any visible fat  · Use less fat and oil  Try baking foods instead of frying them  Add less fat, such as margarine, sour cream, regular salad dressing and mayonnaise to foods  Eat fewer high-fat foods  Some examples of high-fat foods include french fries, doughnuts, ice cream, and cakes  · Eat fewer sweets  Limit foods and drinks that are high in sugar  This includes candy, cookies, regular soda, and sweetened drinks  Exercise:  Exercise at least 30 minutes per day on most days of the week  Some examples of exercise include walking, biking, dancing, and swimming  You can also fit in more physical activity by taking the stairs instead of the elevator or parking farther away from stores  Ask your healthcare provider about the best exercise plan for you  © Copyright [employer] 2018 Information is for End User's use only and may not be sold, redistributed or otherwise used for commercial purposes  All illustrations and images included in CareNotes® are the copyrighted property of [employer] M [employer] Inc  or [facility] [employer]     What to Do if Your Blood Sugar is Low   AMBULATORY CARE:   Low blood sugar levels  (hypoglycemia) can happen with Type 1 and Type 2 diabetes  Low levels are more likely to happen if you use insulin  Hypoglycemia can cause you to have falls, accidents, and injuries  A blood sugar level that gets too low can lead to seizures, coma, and death  Learn to recognize the symptoms early so you can get treatment quickly  When your blood sugar is low you may feel:  · Sweaty    · Nervous or shaky    · Anxious or irritable    · Confused    · A fast, pounding heartbeat    · Extremely hungry    Have someone call your local emergency number (911 in the [address] [facility]) if:   · You cannot be woken  · You have a seizure  Call your doctor if:   · You have symptoms of a low blood sugar level, such as trouble thinking, sweating, or a pounding heartbeat  · Your blood sugar level is lower than normal and it does not improve with treatment       · You often have lower blood sugar levels than your target goals  · You have trouble coping with your illness, or you feel anxious or depressed  · You have questions or concerns about your condition or care  What to do if you have symptoms of low blood sugar:   · Check your blood sugar level, if possible  Your blood sugar level is too low if it is at or below 70 mg/dL  · Eat or drink 15 grams of fast-acting carbohydrate  Fast-acting carbohydrates will raise your blood sugar level quickly  Examples of 15 grams of fast-acting carbohydrates:     ? 4 ounces (½ cup) of fruit juice     ? 4 ounces of regular soda    ? 2 tablespoons of raisins     ? 1 tube of glucose gel or 3 to 4 glucose tablets       · Check your blood sugar level 15 minutes later  If the level is still low (less than 100 mg/dL), eat another 15 grams of carbohydrate  When the level returns to 100 mg/dL, eat a snack or meal that contains carbohydrates  This will help prevent another drop in blood sugar  · Teach people close to you how to use your glucagon kit  Your blood sugar may be too low for you to be awake  People need to know when and how to use your kit  Prevent low blood sugar levels:  Prevent low blood sugar by knowing what increases your risk  Ask your healthcare provider for ways to prevent low blood sugar levels  Any of the following can increase your risk of low blood sugar:  · Fasting for tests or procedures    · During or after intense exercise    · Late or postponed meals    · Sleeping (you may need a bedtime snack)     · Drinking alcohol if you use insulin or insulin releasing pills    Follow up with your doctor as directed:  Write down your questions so you remember to ask them during your visits  © Copyright [employer] 2022 Information is for End User's use only and may not be sold, redistributed or otherwise used for commercial purposes   All illustrations and images included in CareNotes® are the copyrighted property of [employer]  or [facility]  The above information is an  only  It is not intended as medical advice for individual conditions or treatments  Talk to your doctor, nurse or pharmacist before following any medical regimen to see if it is safe and effective for you

## 2022-11-07 NOTE — PROGRESS NOTES
Assessment and Plan:     Problem List Items Addressed This Visit    None          Preventive health issues were discussed with patient, and age appropriate screening tests were ordered as noted in patient's After Visit Summary  Personalized health advice and appropriate referrals for health education or preventive services given if needed, as noted in patient's After Visit Summary  History of Present Illness:     Patient presents for a Medicare Wellness Visit  She is due for a mammogram, dexa scan and cologuard  She just had normal blood work done  Her hgba1c is 5 9  HPI   Patient Care Team:  Emilie Barrientos DO as PCP - General     Review of Systems:     Review of Systems   Constitutional: Negative for appetite change, chills and fever  HENT: Negative for ear pain, facial swelling, rhinorrhea, sinus pain, sore throat and trouble swallowing  Eyes: Negative for discharge and redness  Respiratory: Negative for chest tightness, shortness of breath and wheezing  Cardiovascular: Negative for chest pain and palpitations  Gastrointestinal: Negative for abdominal pain, diarrhea, nausea and vomiting  Endocrine: Negative for polyuria  Genitourinary: Negative for dysuria and urgency  Musculoskeletal: Negative for arthralgias and back pain  Skin: Negative for rash  Neurological: Negative for dizziness, weakness and headaches  Hematological: Negative for adenopathy  Psychiatric/Behavioral: Negative for behavioral problems, confusion and sleep disturbance  All other systems reviewed and are negative         Problem List:     Patient Active Problem List   Diagnosis   • Syncope   • Hypertension   • Brain mass   • Alopecia   • Anemia   • Basal cell carcinoma   • Iron deficiency anemia   • Type 2 diabetes mellitus without complication, without long-term current use of insulin (HCC)   • Abdominal pain   • S/P total hysterectomy and bilateral salpingo-oophorectomy   • Dysfunctional voiding of urine   • Postop check   • Mixed hypercholesterolemia and hypertriglyceridemia   • Meningioma [facility])      Past Medical and Surgical History:     Past Medical History:   Diagnosis Date   • Anemia    • Cataracts, bilateral    • Heart murmur 04/2018   • History of transfusion 2003    had 4 units   • Hypertension    • Meningioma ([address] ) 04/2018    MRI every 6months   • Ovarian cyst 2006   • Shortness of breath    • Skin neoplasm     last assessed: 6/13/2017     Past Surgical History:   Procedure Laterality Date   • CYSTOSCOPY N/A 2/14/2019    Procedure: CYSTOSCOPY;  Surgeon: Yessica Washburn MD;  [facility];  Service: Gynecology Oncology   • HYSTERECTOMY N/A 2/14/2019    Procedure: HYSTERECTOMY LAPAROSCOPIC TOTAL ([address]) W/ ROBOTICS bilateral salpingooophorectomy with great difficulty due to mass of 12 cm and taking 2 5 hours;   Surgeon: Yessica Washburn MD;  [facility];  Service: Gynecology Oncology   • TONSILLECTOMY        Family History:     Family History   Problem Relation Age of Onset   • Hypertension Mother    • Lung cancer Father    • Hypertension Sister    • Lymphoma Brother 39   • Hypertension Brother    • Schizophrenia Brother    • Depression Son    • Schizophrenia Son    • Hypertension Family    • Heart disease Other       Social History:     Social History     Socioeconomic History   • Marital status: /Civil Union     Spouse name: None   • Number of children: None   • Years of education: None   • Highest education level: None   Occupational History   • None   Tobacco Use   • Smoking status: Never Smoker   • Smokeless tobacco: Never Used   Substance and Sexual Activity   • Alcohol use: Never   • Drug use: No   • Sexual activity: Not Currently   Other Topics Concern   • None   Social History Narrative    Daily coffee consumption (___cups/day)    Daily cola consumption (____cans/day)    Daily tea consumption (____cups/day)    Uses safety equipment-seatbelts     Social Determinants of Health     Financial Resource Strain: Not on file   Food Insecurity: Not on file   Transportation Needs: Not on file   Physical Activity: Not on file   Stress: Not on file   Social Connections: Not on file   Intimate Partner Violence: Not on file   Housing Stability: Not on file      Medications and Allergies:     Current Outpatient Medications   Medication Sig Dispense Refill   • lisinopril-hydrochlorothiazide (PRINZIDE,ZESTORETIC) 20-25 MG per tablet take 1 tablet by mouth once daily 90 tablet 3   • simvastatin (ZOCOR) 10 mg tablet Take 1 tablet (10 mg total) by mouth daily at bedtime 30 tablet 5   • timolol (TIMOPTIC) 0 5 % ophthalmic solution instill 1 drop into both eyes twice a day       No current facility-administered medications for this visit  No Known Allergies   Immunizations:     Immunization History   Administered Date(s) Administered   • COVID-19 PFIZER VACCINE 0 3 ML IM 05/03/2021, 05/23/2021   • INFLUENZA 09/30/2014, 09/27/2015, 02/08/2017, 10/23/2017, 08/25/2020   • Influenza Split High Dose Preservative Free IM 10/15/2013, 02/08/2017, 10/23/2017   • Influenza, high dose seasonal 0 7 mL 10/29/2018   • Influenza, seasonal, injectable 12/11/2007, 09/30/2014, 09/27/2015   • Pneumococcal Conjugate 13-Valent 02/08/2017, 05/01/2019   • Pneumococcal Polysaccharide PPV23 12/17/2014   • TD (adult) Preservative Free 07/18/2019   • Tdap 04/04/2000   • Zoster 09/30/2014   • influenza, trivalent, adjuvanted 09/04/2019      Health Maintenance:         Topic Date Due   • DXA SCAN  Never done   • Breast Cancer Screening: Mammogram  Never done   • Colorectal Cancer Screening  Never done   • Hepatitis C Screening  Completed         Topic Date Due   • COVID-19 Vaccine (3 - Booster for Perdomo Peter series) 10/23/2021      Medicare Screening Tests and Risk Assessments:     Mariya Singleton is here for her Subsequent Wellness visit   Last Medicare Wellness visit information reviewed, patient interviewed, no change since last AWV  Health Risk Assessment:   Patient rates overall health as excellent  Patient feels that their physical health rating is same  Patient is satisfied with their life  Eyesight was rated as same  Hearing was rated as same  Patient feels that their emotional and mental health rating is same  Patients states they are never, rarely angry  Patient states they are never, rarely unusually tired/fatigued  Pain experienced in the last 7 days has been none  Patient states that she has experienced no weight loss or gain in last 6 months  Depression Screening:   PHQ-2 Score: 0      Fall Risk Screening: In the past year, patient has experienced: no history of falling in past year      Urinary Incontinence Screening:   Patient has not leaked urine accidently in the last six months  Home Safety:  Patient does not have trouble with stairs inside or outside of their home  Patient has working smoke alarms and has working carbon monoxide detector  Home safety hazards include: none  Nutrition:   Current diet is Regular  Medications:   Patient is not currently taking any over-the-counter supplements  Patient is able to manage medications  Activities of Daily Living (ADLs)/Instrumental Activities of Daily Living (IADLs):   Walk and transfer into and out of bed and chair?: Yes  Dress and groom yourself?: Yes    Bathe or shower yourself?: Yes    Feed yourself? Yes  Do your laundry/housekeeping?: Yes  Manage your money, pay your bills and track your expenses?: Yes  Make your own meals?: Yes    Do your own shopping?: Yes    Previous Hospitalizations:   Any hospitalizations or ED visits within the last 12 months?: No      Advance Care Planning:   Living will: No    Durable POA for healthcare:  Yes    Advanced directive: Yes    Advanced directive counseling given: Yes    Five wishes given: Yes    Patient declined ACP directive: No    End of Life Decisions reviewed with patient: Yes    Provider agrees with end of life decisions: Yes      Cognitive Screening:   Provider or family/friend/caregiver concerned regarding cognition?: No    PREVENTIVE SCREENINGS      Cardiovascular Screening:    General: Screening Not Indicated and History Lipid Disorder      Diabetes Screening:     General: Screening Not Indicated and History Diabetes      Colorectal Cancer Screening:     General: Risks and Benefits Discussed    Due for: Cologuard      Breast Cancer Screening:     General: Risks and Benefits Discussed    Due for: Mammogram        Cervical Cancer Screening:    General: Screening Not Indicated      Osteoporosis Screening:    General: Risks and Benefits Discussed    Due for: DXA Axial      Abdominal Aortic Aneurysm (AAA) Screening:        General: Screening Current and Screening Not Indicated      Lung Cancer Screening:     General: Screening Not Indicated      Hepatitis C Screening:    General: Screening Current    Screening, Brief Intervention, and Referral to Treatment (SBIRT)    Screening    Typical number of drinks in a week: 0    Single Item Drug Screening:  How often have you used an illegal drug (including marijuana) or a prescription medication for non-medical reasons in the past year? never    Single Item Drug Screen Score: 0  Interpretation: Negative screen for possible drug use disorder    No exam data present     Physical Exam:     /82   Pulse 90   Temp 98 3 °F (36 8 °C)   Ht 4' 11" (1 499 m)   Wt 58 5 kg (129 lb)   SpO2 95%   BMI 26 05 kg/m²   BMI Counseling: Body mass index is 26 05 kg/m²  The BMI is above normal  Nutrition recommendations include reducing portion sizes, decreasing overall calorie intake and moderation in carbohydrate intake  Physical Exam  Vitals and nursing note reviewed  Constitutional:       General: She is not in acute distress  Appearance: Normal appearance  She is not ill-appearing or diaphoretic  HENT:      Head: Normocephalic and atraumatic        Right Ear: Tympanic membrane, ear canal and external ear normal       Left Ear: Tympanic membrane, ear canal and external ear normal       Nose: Nose normal  No congestion or rhinorrhea  Mouth/Throat:      Mouth: Mucous membranes are moist       Pharynx: Oropharynx is clear  No posterior oropharyngeal erythema  Eyes:      General:         Right eye: No discharge  Left eye: No discharge  Extraocular Movements: Extraocular movements intact  Conjunctiva/sclera: Conjunctivae normal       Pupils: Pupils are equal, round, and reactive to light  Neck:      Vascular: No carotid bruit  Cardiovascular:      Rate and Rhythm: Normal rate and regular rhythm  Pulses: Normal pulses  no weak pulses          Dorsalis pedis pulses are 2+ on the right side and 2+ on the left side  Posterior tibial pulses are 2+ on the right side and 2+ on the left side  Heart sounds: Normal heart sounds  No murmur heard  Pulmonary:      Effort: Pulmonary effort is normal  No respiratory distress  Breath sounds: Normal breath sounds  No wheezing or rhonchi  Abdominal:      General: Abdomen is flat  Bowel sounds are normal  There is no distension  Palpations: There is no mass  Tenderness: There is no abdominal tenderness  Musculoskeletal:         General: No swelling or deformity  Normal range of motion  Cervical back: Normal range of motion and neck supple  No rigidity  Right lower leg: No edema  Left lower leg: No edema  Feet:      Right foot:      Skin integrity: No ulcer, skin breakdown, erythema, warmth, callus or dry skin  Left foot:      Skin integrity: No ulcer, skin breakdown, erythema, warmth, callus or dry skin  Lymphadenopathy:      Cervical: No cervical adenopathy  Skin:     General: Skin is warm and dry  Capillary Refill: Capillary refill takes less than 2 seconds  Coloration: Skin is not jaundiced  Findings: No bruising, erythema or rash  Neurological:      General: No focal deficit present  Mental Status: She is alert and oriented to person, place, and time  Cranial Nerves: No cranial nerve deficit  Sensory: No sensory deficit  Gait: Gait normal       Deep Tendon Reflexes: Reflexes normal    Psychiatric:         Mood and Affect: Mood normal          Behavior: Behavior normal          Thought Content: Thought content normal          Judgment: Judgment normal      Patient's shoes and socks removed  Right Foot/Ankle   Right Foot Inspection  Skin Exam: skin normal and skin intact  No dry skin, no warmth, no callus, no erythema, no maceration, no abnormal color, no pre-ulcer, no ulcer and no callus  Toe Exam: ROM and strength within normal limits  No swelling, no tenderness and erythema    Sensory   Vibration: intact  Proprioception: intact  Monofilament testing: intact    Vascular  Capillary refills: < 3 seconds  The right DP pulse is 2+  The right PT pulse is 2+  Left Foot/Ankle  Left Foot Inspection  Skin Exam: skin normal and skin intact  No dry skin, no warmth, no erythema, no maceration, normal color, no pre-ulcer, no ulcer and no callus  Toe Exam: ROM and strength within normal limits  No swelling, no tenderness, no erythema and no left toe deformity  Sensory   Vibration: intact  Proprioception: intact  Monofilament testing: intact    Vascular  Capillary refills: < 3 seconds  The left DP pulse is 2+  The left PT pulse is 2+       Assign Risk Category  No deformity present  No loss of protective sensation  No weak pulses  Risk: 0      Gayla Mathur DO

## 2023-01-06 DIAGNOSIS — I10 ESSENTIAL HYPERTENSION: ICD-10-CM

## 2023-01-06 RX ORDER — LISINOPRIL AND HYDROCHLOROTHIAZIDE 25; 20 MG/1; MG/1
TABLET ORAL
Qty: 90 TABLET | Refills: 3 | Status: SHIPPED | OUTPATIENT
Start: 2023-01-06

## 2023-05-09 ENCOUNTER — OFFICE VISIT (OUTPATIENT)
Dept: FAMILY MEDICINE CLINIC | Facility: CLINIC | Age: 78
End: 2023-05-09

## 2023-05-09 VITALS
DIASTOLIC BLOOD PRESSURE: 74 MMHG | WEIGHT: 135.8 LBS | SYSTOLIC BLOOD PRESSURE: 122 MMHG | OXYGEN SATURATION: 94 % | HEART RATE: 79 BPM | TEMPERATURE: 98.2 F | BODY MASS INDEX: 27.38 KG/M2 | HEIGHT: 59 IN

## 2023-05-09 DIAGNOSIS — D32.9 MENINGIOMA (HCC): ICD-10-CM

## 2023-05-09 DIAGNOSIS — I10 PRIMARY HYPERTENSION: ICD-10-CM

## 2023-05-09 DIAGNOSIS — E11.9 TYPE 2 DIABETES MELLITUS WITHOUT COMPLICATION, WITHOUT LONG-TERM CURRENT USE OF INSULIN (HCC): Primary | ICD-10-CM

## 2023-05-09 DIAGNOSIS — E11.42 TYPE 2 DIABETES MELLITUS WITH DIABETIC POLYNEUROPATHY, WITHOUT LONG-TERM CURRENT USE OF INSULIN (HCC): ICD-10-CM

## 2023-05-09 DIAGNOSIS — D50.9 IRON DEFICIENCY ANEMIA, UNSPECIFIED IRON DEFICIENCY ANEMIA TYPE: ICD-10-CM

## 2023-05-09 DIAGNOSIS — E78.2 MIXED HYPERCHOLESTEROLEMIA AND HYPERTRIGLYCERIDEMIA: ICD-10-CM

## 2023-05-09 LAB — SL AMB POCT HEMOGLOBIN AIC: 6.2 (ref ?–6.5)

## 2023-05-09 RX ORDER — DIPHENOXYLATE HYDROCHLORIDE AND ATROPINE SULFATE 2.5; .025 MG/1; MG/1
1 TABLET ORAL DAILY
COMMUNITY

## 2023-05-11 LAB
CREAT UR-MCNC: 131 MG/DL
MICROALBUMIN UR-MCNC: 20.3 MG/L (ref 0–20)
MICROALBUMIN/CREAT 24H UR: 15 MG/G CREATININE (ref 0–30)

## 2023-05-17 NOTE — PROGRESS NOTES
Patient ID: Roman Courtney is a 69 y o  female  HPI: 69 y  o female presents for follow up of niddm, hypertension, iron deficiency anemia, history of meningioma, and hypercholesterolemia  Hgba1c is 6 2        and patient's issues are well controlled  Patient deneis any dyspnea, chest pain or palpitations  SUBJECTIVE    Family History   Problem Relation Age of Onset   • Hypertension Mother    • Lung cancer Father    • Hypertension Sister    • Lymphoma Brother 39   • Hypertension Brother    • Schizophrenia Brother    • Depression Son    • Schizophrenia Son    • Hypertension Family    • Heart disease Other      Social History     Socioeconomic History   • Marital status: /Civil Union     Spouse name: Not on file   • Number of children: Not on file   • Years of education: Not on file   • Highest education level: Not on file   Occupational History   • Not on file   Tobacco Use   • Smoking status: Never     Passive exposure: Past   • Smokeless tobacco: Never   Substance and Sexual Activity   • Alcohol use: Never   • Drug use: No   • Sexual activity: Not Currently   Other Topics Concern   • Not on file   Social History Narrative    Daily coffee consumption (___cups/day)    Daily cola consumption (____cans/day)    Daily tea consumption (____cups/day)    Uses safety equipment-seatbelts     Social Determinants of Health     Financial Resource Strain: Low Risk    • Difficulty of Paying Living Expenses: Not hard at all   Food Insecurity: Not on file   Transportation Needs: No Transportation Needs   • Lack of Transportation (Medical): No   • Lack of Transportation (Non-Medical):  No   Physical Activity: Not on file   Stress: Not on file   Social Connections: Not on file   Intimate Partner Violence: Not on file   Housing Stability: Not on file     Past Medical History:   Diagnosis Date   • Anemia    • Cataracts, bilateral    • Heart murmur 04/2018   • History of transfusion 2003    had 4 units   • Hypertension    • Meningi[address] ([facility] [address] ) 04/2018    MRI every 6months   • Ovarian cyst 2006   • Shortness of breath    • Skin neoplasm     last assessed: 6/13/2017     Past Surgical History:   Procedure Laterality Date   • CYSTOSCOPY N/A 2/14/2019    Procedure: Helayne [place];  Surgeon: Anna Mejias MD;  [facility];  Service: Gynecology Oncology   • HYSTERECTOMY N/A 2/14/2019    Procedure: HYSTERECTOMY LAPAROSCOPIC TOTAL ([address]) W/ ROBOTICS bilateral salpingooophorectomy with great difficulty due to mass of 12 cm and taking 2 5 hours;   Surgeon: Anna Mejias MD;  [facility];  Service: Gynecology Oncology   • TONSILLECTOMY       No Known Allergies    Current Outpatient Medications:   •  Glycerin-Polysorbate 80 (REFRESH DRY EYE THERAPY OP), Apply to eye, Disp: , Rfl:   •  lisinopril-hydrochlorothiazide (PRINZIDE,ZESTORETIC) 20-25 MG per tablet, take 1 tablet by mouth once daily, Disp: 90 tablet, Rfl: 3  •  multivitamin (THERAGRAN) TABS, Take 1 tablet by mouth daily, Disp: , Rfl:   •  timolol (TIMOPTIC) 0 5 % ophthalmic solution, instill 1 drop into both eyes twice a day, Disp: , Rfl:     Review of Systems  Constitutional:     Denies fever, chills ,fatigue ,weakness ,weight loss, weight gain     ENT: Denies earache ,loss of hearing ,nosebleed, nasal discharge,nasal congestion ,sore throat ,hoarseness  Pulmonary: Denies shortness of breath ,cough  ,dyspnea on exertion, orthopnea  ,PND   Cardiovascular:  Denies bradycardia , tachycardia  ,palpations, lower extremity edema leg, claudication  Breast:  Denies new or changing breast lumps ,nipple discharge ,nipple changes  Abdomen:  Denies abdominal pain , anorexia , indigestion, nausea, vomiting, constipation, diarrhea  Musculoskeletal: Denies myalgias, arthralgias, joint swelling, joint stiffness , limb pain, limb swelling  Gu: denies dysuria, polyuria  Skin: Denies skin rash, skin lesion, skin wound, itching, dry skin  Neuro: Denies headache, numbness, tingling, confusion, "loss of consciousness, dizziness, vertigo  Psychiatric: Denies feelings of depression, suicidal ideation, anxiety, sleep disturbances    OBJECTIVE  /74   Pulse 79   Temp 98 2 °F (36 8 °C)   Ht 4' 11\" (1 499 m)   Wt 61 6 kg (135 lb 12 8 oz)   SpO2 94%   BMI 27 43 kg/m²   Constitutional:   NAD, well appearing and well nourished      ENT:   Conjunctiva and lids: no injection, edema, or discharge     Pupils and iris: JB bilaterally    External inspection of ears and nose: normal without deformities or discharge  Otoscopic exam: Canals patent without erythema  Nasal mucosa, septum and turbinates: Normal or edema or discharge         Oropharynx:  Moist mucosa, normal tongue and tonsils without lesions  No erythema        Pulmonary:Respiratory effort normal rate and rhythm, no increased work of breathing   Auscultation of lungs:  Clear bilaterally with no adventitious breath sounds       Cardiovascular: regular rate and rhythm, S1 and S2, no murmur, no edema and/or varicosities of LE      Abdomen: Soft and non-distended     Positive bowel sounds      No heptomegaly or splenomegaly      Gu: no suprapubic tenderness or CVA tenderness, no urethral discharge  Lymphatic:  No anterior or posterior cervical lymphadenopathy         Musculoskeletal:  Gait and station: Normal gait      Digits and nails normal without clubbing or cyanosis       Inspection/palpation of joints, bones, and muscles:  No joint tenderness, swelling, full active and passive range of motion       Skin: Normal skin turgor and no rashes      Neuro:    Normal reflexes     Psych:   alert and oriented to person, place and time     normal mood and affect       Assessment/Plan:Diagnoses and all orders for this visit:    Type 2 diabetes mellitus without complication, without long-term current use of insulin [facility] )  Comments:  Well-controlled with diet only  Orders:  -     POCT hemoglobin A1c  -     Cancel: Microalbumin, Random Urine " (W/Creatinine) ([facility] ONLY)  -     Cancel: Albumin / creatinine urine ratio  -     Albumin / creatinine urine ratio    Primary hypertension  Comments:  Stable on ACE therapy  Orders:  -     Comprehensive metabolic panel; Future    Mixed hypercholesterolemia and hypertriglyceridemia  Comments:  Patient controlling with low-fat diet  Orders:  -     Lipid Panel with Direct LDL reflex; Future    Iron deficiency anemia, unspecified iron deficiency anemia type  Comments:  Continue with iron surveillance and follow-up with hematology  Orders:  -     CBC and Platelet; Future  -     Iron Panel (Includes Ferritin, Iron Sat%, Iron, and TIBC); Future    Meningioma [facility])  Comments:  Continue with regular CTs of the head and follow-up with neurosurgery  Type 2 diabetes mellitus with diabetic polyneuropathy, without long-term current use of insulin (HCC)    Other orders  -     multivitamin (THERAGRAN) TABS; Take 1 tablet by mouth daily  -     Glycerin-Polysorbate 80 (REFRESH DRY EYE THERAPY OP); Apply to eye        Reviewed with patient plan to treat with above plan      Patient instructed to call in 72 hours if not feeling better or if symptoms worsen

## 2023-07-05 ENCOUNTER — TELEPHONE (OUTPATIENT)
Dept: NEUROSURGERY | Facility: CLINIC | Age: 78
End: 2023-07-05

## 2023-07-05 DIAGNOSIS — Z01.812 ENCOUNTER FOR PREPROCEDURAL LABORATORY EXAMINATION: Primary | ICD-10-CM

## 2023-07-05 NOTE — TELEPHONE ENCOUNTER
Reached out to Shawn De Jesus to advise of need for labs to be completed prior to her MRI. Placed orders for this and left a detailed message encouraging a call back with questions.

## 2023-07-06 ENCOUNTER — APPOINTMENT (OUTPATIENT)
Dept: LAB | Facility: CLINIC | Age: 78
End: 2023-07-06
Payer: COMMERCIAL

## 2023-07-06 DIAGNOSIS — Z01.812 ENCOUNTER FOR PREPROCEDURAL LABORATORY EXAMINATION: ICD-10-CM

## 2023-07-06 DIAGNOSIS — I10 PRIMARY HYPERTENSION: ICD-10-CM

## 2023-07-06 DIAGNOSIS — D50.9 IRON DEFICIENCY ANEMIA, UNSPECIFIED IRON DEFICIENCY ANEMIA TYPE: ICD-10-CM

## 2023-07-06 DIAGNOSIS — E78.2 MIXED HYPERCHOLESTEROLEMIA AND HYPERTRIGLYCERIDEMIA: ICD-10-CM

## 2023-07-06 LAB
ALBUMIN SERPL BCP-MCNC: 3.5 G/DL (ref 3.5–5)
ALP SERPL-CCNC: 113 U/L (ref 46–116)
ALT SERPL W P-5'-P-CCNC: 19 U/L (ref 12–78)
ANION GAP SERPL CALCULATED.3IONS-SCNC: 2 MMOL/L
AST SERPL W P-5'-P-CCNC: 14 U/L (ref 5–45)
BILIRUB SERPL-MCNC: 0.63 MG/DL (ref 0.2–1)
BUN SERPL-MCNC: 20 MG/DL (ref 5–25)
CALCIUM SERPL-MCNC: 9.8 MG/DL (ref 8.3–10.1)
CHLORIDE SERPL-SCNC: 108 MMOL/L (ref 96–108)
CHOLEST SERPL-MCNC: 173 MG/DL
CO2 SERPL-SCNC: 28 MMOL/L (ref 21–32)
CREAT SERPL-MCNC: 0.94 MG/DL (ref 0.6–1.3)
ERYTHROCYTE [DISTWIDTH] IN BLOOD BY AUTOMATED COUNT: 13.5 % (ref 11.6–15.1)
GFR SERPL CREATININE-BSD FRML MDRD: 59 ML/MIN/1.73SQ M
GLUCOSE P FAST SERPL-MCNC: 105 MG/DL (ref 65–99)
HCT VFR BLD AUTO: 40.2 % (ref 34.8–46.1)
HDLC SERPL-MCNC: 49 MG/DL
HGB BLD-MCNC: 13.3 G/DL (ref 11.5–15.4)
IRON SATN MFR SERPL: 49 % (ref 15–50)
IRON SERPL-MCNC: 122 UG/DL (ref 50–170)
LDLC SERPL CALC-MCNC: 98 MG/DL (ref 0–100)
MCH RBC QN AUTO: 30.6 PG (ref 26.8–34.3)
MCHC RBC AUTO-ENTMCNC: 33.1 G/DL (ref 31.4–37.4)
MCV RBC AUTO: 93 FL (ref 82–98)
PLATELET # BLD AUTO: 522 THOUSANDS/UL (ref 149–390)
PMV BLD AUTO: 9 FL (ref 8.9–12.7)
POTASSIUM SERPL-SCNC: 3.8 MMOL/L (ref 3.5–5.3)
PROT SERPL-MCNC: 7.7 G/DL (ref 6.4–8.4)
RBC # BLD AUTO: 4.34 MILLION/UL (ref 3.81–5.12)
SODIUM SERPL-SCNC: 138 MMOL/L (ref 135–147)
TIBC SERPL-MCNC: 250 UG/DL (ref 250–450)
TRIGL SERPL-MCNC: 131 MG/DL
WBC # BLD AUTO: 9.76 THOUSAND/UL (ref 4.31–10.16)

## 2023-07-06 PROCEDURE — 83550 IRON BINDING TEST: CPT

## 2023-07-06 PROCEDURE — 82728 ASSAY OF FERRITIN: CPT

## 2023-07-06 PROCEDURE — 36415 COLL VENOUS BLD VENIPUNCTURE: CPT

## 2023-07-06 PROCEDURE — 83540 ASSAY OF IRON: CPT

## 2023-07-06 PROCEDURE — 85027 COMPLETE CBC AUTOMATED: CPT

## 2023-07-06 PROCEDURE — 80061 LIPID PANEL: CPT

## 2023-07-06 PROCEDURE — 80053 COMPREHEN METABOLIC PANEL: CPT

## 2023-07-07 ENCOUNTER — HOSPITAL ENCOUNTER (OUTPATIENT)
Dept: RADIOLOGY | Facility: HOSPITAL | Age: 78
Discharge: HOME | End: 2023-07-07
Payer: COMMERCIAL

## 2023-07-07 DIAGNOSIS — D32.9 MENINGIOMA (HCC): ICD-10-CM

## 2023-07-07 LAB — FERRITIN SERPL-MCNC: 45 NG/ML (ref 11–307)

## 2023-07-07 PROCEDURE — G1004 CDSM NDSC: HCPCS

## 2023-07-07 PROCEDURE — 70553 MRI BRAIN STEM W/O & W/DYE: CPT

## 2023-07-07 PROCEDURE — A9585 GADOBUTROL INJECTION: HCPCS | Performed by: PHYSICIAN ASSISTANT

## 2023-07-07 RX ADMIN — GADOBUTROL 5 ML: 604.72 INJECTION INTRAVENOUS at 12:45

## 2023-07-14 ENCOUNTER — OFFICE VISIT (OUTPATIENT)
Dept: NEUROSURGERY | Facility: CLINIC | Age: 78
End: 2023-07-14
Payer: COMMERCIAL

## 2023-07-14 VITALS
WEIGHT: 130 LBS | OXYGEN SATURATION: 98 % | BODY MASS INDEX: 26.21 KG/M2 | HEART RATE: 71 BPM | TEMPERATURE: 97.3 F | SYSTOLIC BLOOD PRESSURE: 136 MMHG | DIASTOLIC BLOOD PRESSURE: 80 MMHG | HEIGHT: 59 IN | RESPIRATION RATE: 16 BRPM

## 2023-07-14 DIAGNOSIS — D32.9 MENINGIOMA (HCC): Primary | ICD-10-CM

## 2023-07-14 PROCEDURE — 99214 OFFICE O/P EST MOD 30 MIN: CPT | Performed by: PHYSICIAN ASSISTANT

## 2023-07-14 NOTE — ASSESSMENT & PLAN NOTE
Patient presents to the outpatient office today for 1 year follow-up for surveillance of multiple meningiomas (anterior left frontal falx, left temporal and right CPA meningioma)  · This was an incidental finding found on CT head when patient presented to the ED in 2018 after having a syncopal episode. Imaging:  · MRI brain w wo contrast 7/14/2023: Stable 2.8 cm anterior left parafalcine meningioma with small component extending to the right of midline. Mild localized mass effect. No edema. Stable 1.3 cm meningioma in the right posterior fossa. Mild localized mass effect on the cerebellum. No edema. Stable 6 mm left temporal meningioma without significant mass effect or edema. No acute intracranial pathology. Chronic microangiopathy. Plan:  · Exam:  Nonfocal  · Reviewed imaging in detail with patient and compared to initial MRI completed in 2018:  · Left frontal meningioma grossly unchanged in size. Local mass effect noted, stable mild edema. · Right CP angle meningioma has grown from initial scan (1.0 cm to now 1.3 cm). Local mass effect noted but no significant edema. · Left temporal meningioma retrospectively was there in 2018 but very small. Has grown to 6mm now but again this is very tiny without significant mass effect or edema. · Recommend watchful waiting at this time, no plans for resection of meningiomas as this time. She is 5 years out from her initial scan.   Reasonable to follow-up in 2 years with repeat MRI with and without contrast per NCCN guidelines  · She is to return to the office sooner should she develop worsening symptoms or red flag signs

## 2023-07-14 NOTE — PROGRESS NOTES
Neurosurgery Office Note  Yumi Silverman 69 y.o. female MRN: [id number]      Assessment/Plan     Meningioma [facility])  Patient presents to the outpatient office today for 1 year follow-up for surveillance of multiple meningiomas (anterior left frontal falx, left temporal and right CPA meningioma)  · This was an incidental finding found on CT head when patient presented to the ED in 2018 after having a syncopal episode. Imaging:  · MRI brain w wo contrast 7/14/2023: Stable 2.8 cm anterior left parafalcine meningioma with small component extending to the right of midline. Mild localized mass effect. No edema. Stable 1.3 cm meningioma in the right posterior fossa. Mild localized mass effect on the cerebellum. No edema. Stable 6 mm left temporal meningioma without significant mass effect or edema. No acute intracranial pathology. Chronic microangiopathy. Plan:  · Exam:  Nonfocal  · Reviewed imaging in detail with patient and compared to initial MRI completed in 2018:  · Left frontal meningioma grossly unchanged in size. Local mass effect noted, stable mild edema. · Right CP angle meningioma has grown from initial scan (1.0 cm to now 1.3 cm). Local mass effect noted but no significant edema. · Left temporal meningioma retrospectively was there in 2018 but very small. Has grown to 6mm now but again this is very tiny without significant mass effect or edema. · Recommend watchful waiting at this time, no plans for resection of meningiomas as this time. She is 5 years out from her initial scan.   Reasonable to follow-up in 2 years with repeat MRI with and without contrast per NCCN guidelines  · She is to return to the office sooner should she develop worsening symptoms or red flag signs         Diagnoses and all orders for this visit:    Meningioma ([address])  -     MRI brain w wo contrast; Future          I have spent a total time of 30 minutes on 07/14/23 in caring for this patient including Diagnostic results, Risks and benefits of tx options, Instructions for management, Patient and family education, Impressions, Counseling / Coordination of care, Documenting in the medical record, Reviewing / ordering tests, medicine, procedures   and Obtaining or reviewing history  . CHIEF COMPLAINT    Chief Complaint   Patient presents with   • Follow-up       HISTORY    History of Present Illness     69y.o. year old female with past medical history significant for type 2 diabetes, hypertension, alopecia, basal cell carcinoma, anemia and status post total hysterectomy. Patient presents to outpatient office today for 1 year follow-up for surveillance of meningiomas. This was found incidentally in 2018 after syncopal episode. Today she states that she is doing well. She offers no complaints. She has no seizure or stroke like activity, numbness/tingling/weakness, bowel or bladder issues, saddle anesthesia, personality changes or altered mental status. States her mother had meningiomas as well as had difficulty walking after surgery on one of them so she is wary about procedures if they are not warranted for her meningiomas. See Discussion    REVIEW OF SYSTEMS    Review of Systems   Constitutional: Negative. HENT: Negative. Eyes: Negative. Respiratory: Negative. Cardiovascular: Negative. Gastrointestinal: Negative. Endocrine: Negative. Genitourinary: Negative. Musculoskeletal: Negative. Skin: Negative. Allergic/Immunologic: Negative. Neurological: Negative. Hematological: Negative. Psychiatric/Behavioral: Negative. ROS obtained by MA. Reviewed. See HPI.      Meds/Allergies     Current Outpatient Medications   Medication Sig Dispense Refill   • Glycerin-Polysorbate 80 (REFRESH DRY EYE THERAPY OP) Apply to eye     • lisinopril-hydrochlorothiazide (PRINZIDE,ZESTORETIC) 20-25 MG per tablet take 1 tablet by mouth once daily 90 tablet 3   • multivitamin (THERAGRAN) TABS Take 1 tablet by mouth daily     • timolol (TIMOPTIC) 0.5 % ophthalmic solution instill 1 drop into both eyes twice a day       No current facility-administered medications for this visit. No Known Allergies    PAST HISTORY    Past Medical History:   Diagnosis Date   • Anemia    • Cataracts, bilateral    • Heart murmur 04/2018   • History of transfusion 2003    had 4 units   • Hypertension    • Meningioma ([address]) 04/2018    MRI every 6months   • Ovarian cyst 2006   • Shortness of breath    • Skin neoplasm     last assessed: 6/13/2017       Past Surgical History:   Procedure Laterality Date   • CYSTOSCOPY N/A 2/14/2019    Procedure: CYSTOSCOPY;  Surgeon: Kerry Kim MD;  Location: [facility];  Service: Gynecology Oncology   • HYSTERECTOMY N/A 2/14/2019    Procedure: HYSTERECTOMY LAPAROSCOPIC TOTAL ([facility]) W/ ROBOTICS bilateral salpingooophorectomy with great difficulty due to mass of 12 cm and taking 2.5 hours; Surgeon: Kerry Kim MD;  Location: [facility];  Service: Gynecology Oncology   • TONSILLECTOMY         Social History     Tobacco Use   • Smoking status: Never     Passive exposure: Past   • Smokeless tobacco: Never   Substance Use Topics   • Alcohol use: Never   • Drug use: No       Family History   Problem Relation Age of Onset   • Hypertension Mother    • Lung cancer Father    • Hypertension Sister    • Lymphoma Brother 39   • Hypertension Brother    • Schizophrenia Brother    • Depression Son    • Schizophrenia Son    • Hypertension Family    • Heart disease Other          Above history personally reviewed. EXAM    Vitals:Blood pressure 136/80, pulse 71, temperature (!) 97.3 °F (36.3 °C), temperature source Temporal, resp. rate 16, height 4' 11" (1.499 m), weight 59 kg (130 lb), SpO2 98 %. ,Body mass index is 26.26 kg/m². Physical Exam  Constitutional:       General: She is awake. Appearance: Normal appearance. HENT:      Head: Normocephalic and atraumatic.    Eyes:      Extraocular Movements: Extraocular movements intact and EOM normal.      Conjunctiva/sclera: Conjunctivae normal.   Cardiovascular:      Rate and Rhythm: Normal rate. Pulmonary:      Effort: Pulmonary effort is normal. No respiratory distress. Skin:     General: Skin is warm and dry. Neurological:      Mental Status: She is alert and oriented to person, place, and time. Coordination: Finger-Nose-Finger Test normal.      Gait: Gait is intact. Psychiatric:         Attention and Perception: Attention and perception normal.         Mood and Affect: Mood and affect normal.         Speech: Speech normal.         Behavior: Behavior normal. Behavior is cooperative. Thought Content: Thought content normal.         Cognition and Memory: Cognition and memory normal.         Judgment: Judgment normal.         Neurologic Exam     Mental Status   Oriented to person, place, and time. Follows 1 step commands. Attention: normal. Concentration: normal.   Speech: speech is normal   Level of consciousness: alert  Knowledge: good. Normal comprehension.      Cranial Nerves     CN III, IV, VI   Extraocular motions are normal.   CN III: no CN III palsy  CN VI: no CN VI palsy  Nystagmus: none   Diplopia: none  Ophthalmoparesis: none  Upgaze: normal  Downgaze: normal  Conjugate gaze: present    CN V   Right facial sensation deficit: none  Left facial sensation deficit: none    CN VII   Right facial weakness: none  Left facial weakness: none    CN VIII   Hearing: intact    CN IX, X   CN IX normal.   CN X normal.     CN XI   Right trapezius strength: normal  Left trapezius strength: normal    CN XII   CN XII normal.     Motor Exam   Muscle bulk: normal  Overall muscle tone: normal  Right arm pronator drift: absent  Left arm pronator drift: absent  4+/5 throughout     Sensory Exam   Light touch normal.     Gait, Coordination, and Reflexes     Gait  Gait: normal    Coordination   Finger to nose coordination: normal    Tremor Resting tremor: absent  Intention tremor: absent  Action tremor: absent    Reflexes   Right : 2+  Left : 2+  Right Beal: absent  Left Beal: absent  Right ankle clonus: absent  Left ankle clonus: absent        MEDICAL DECISION MAKING    Imaging Studies:     MRI brain w wo contrast    Result Date: 7/11/2023  Narrative: MRI BRAIN WITH AND WITHOUT CONTRAST INDICATION: D32.9: Benign neoplasm of meninges, unspecified. COMPARISON: MRI dated 7/1/2022. TECHNIQUE: Multiplanar, multisequence imaging of the brain was performed before and after gadolinium administration. IV Contrast:  5 mL of Gadobutrol injection (SINGLE-DOSE) IMAGE QUALITY:   Diagnostic. FINDINGS: BRAIN PARENCHYMA: Stable left frontal parafalcine meningioma measuring 2.7 x 2.8 x 2.7 cm (image 68 series 12) with small component to the right of midline. Stable localized mass effect. No significant edema. There is a stable 6 mm meningioma lateral to the posterior left temporal lobe (image 56 of series 12). There is also a stable meningioma in the right lateral cerebellomedullary cistern extending to the inferior CP angle cistern measuring 1.3 x 1.3 cm (image 31 series 12) with mild mass effect on the anterior right cerebellum. No edema. No shift or herniation. Stable T2/FLAIR hyperintensities in the periventricular and subcortical white matter likely due to mild chronic microangiopathy. No restricted diffusion to indicate acute infarct. No acute hemorrhage. No new mass or abnormal enhancement VENTRICLES: Age-appropriate volume loss. SELLA AND PITUITARY GLAND:  Normal. ORBITS: Bilateral lens replacement. Elongated globes suggesting staphylomas. PARANASAL SINUSES:  Normal. VASCULATURE:  Evaluation of the major intracranial vasculature demonstrates appropriate flow voids.  CALVARIUM AND SKULL BASE:  Normal. EXTRACRANIAL SOFT TISSUES:  Normal.     Impression: Stable 2.8 cm anterior left parafalcine meningioma with small component extending to the right of midline. Mild localized mass effect. No edema. . Stable 1.3 cm meningioma in the right posterior fossa. Mild localized mass effect on the cerebellum. No edema. Stable 6 mm left temporal meningioma without significant mass effect or edema. No acute intracranial pathology. Chronic microangiopathy. Workstation performed: [id number]       I have personally reviewed pertinent reports.    and I have personally reviewed pertinent films in PACS

## 2023-07-14 NOTE — PATIENT INSTRUCTIONS
Return to the office in 2 years time with repeat MRI brain with and without contrast for ongoing monitoring of meningiomas. Return to the office or hospital sooner should he develop any worsening symptoms or red flag signs.

## 2023-11-03 ENCOUNTER — RA CDI HCC (OUTPATIENT)
Dept: OTHER | Facility: HOSPITAL | Age: 78
End: 2023-11-03

## 2023-11-03 NOTE — PROGRESS NOTES
E11.22  [facility] coding opportunities          Chart Reviewed number of suggestions sent to Provider: 1     Patients Insurance     Medicare Insurance: [id number]

## 2024-03-04 ENCOUNTER — RA CDI HCC (OUTPATIENT)
Dept: OTHER | Facility: HOSPITAL | Age: 79
End: 2024-03-04

## 2024-03-04 PROBLEM — Z09 POSTOP CHECK: Status: RESOLVED | Noted: 2019-03-07 | Resolved: 2024-03-04

## 2024-03-04 NOTE — PROGRESS NOTES
HCC coding opportunities          Chart Reviewed number of suggestions sent to Provider: 1     Patients Insurance     Medicare Insurance: [employer]        E11.22

## 2024-05-02 ENCOUNTER — OFFICE VISIT (OUTPATIENT)
Dept: FAMILY MEDICINE CLINIC | Facility: CLINIC | Age: 79
End: 2024-05-02
Payer: COMMERCIAL

## 2024-05-02 VITALS
HEIGHT: 59 IN | HEART RATE: 76 BPM | DIASTOLIC BLOOD PRESSURE: 60 MMHG | SYSTOLIC BLOOD PRESSURE: 110 MMHG | BODY MASS INDEX: 27.86 KG/M2 | TEMPERATURE: 97.3 F | OXYGEN SATURATION: 96 % | WEIGHT: 138.2 LBS

## 2024-05-02 DIAGNOSIS — D64.9 ANEMIA, UNSPECIFIED TYPE: ICD-10-CM

## 2024-05-02 DIAGNOSIS — E11.9 TYPE 2 DIABETES MELLITUS WITHOUT COMPLICATION, WITHOUT LONG-TERM CURRENT USE OF INSULIN (HCC): ICD-10-CM

## 2024-05-02 DIAGNOSIS — E78.2 MIXED HYPERCHOLESTEROLEMIA AND HYPERTRIGLYCERIDEMIA: ICD-10-CM

## 2024-05-02 DIAGNOSIS — E11.42 TYPE 2 DIABETES MELLITUS WITH DIABETIC POLYNEUROPATHY, WITHOUT LONG-TERM CURRENT USE OF INSULIN (HCC): ICD-10-CM

## 2024-05-02 DIAGNOSIS — Z00.00 MEDICARE ANNUAL WELLNESS VISIT, SUBSEQUENT: Primary | ICD-10-CM

## 2024-05-02 DIAGNOSIS — E55.9 VITAMIN D DEFICIENCY: ICD-10-CM

## 2024-05-02 DIAGNOSIS — I10 PRIMARY HYPERTENSION: ICD-10-CM

## 2024-05-02 DIAGNOSIS — R53.83 OTHER FATIGUE: ICD-10-CM

## 2024-05-02 DIAGNOSIS — Z78.0 ASYMPTOMATIC POSTMENOPAUSAL ESTROGEN DEFICIENCY: ICD-10-CM

## 2024-05-02 DIAGNOSIS — Z12.31 BREAST CANCER SCREENING BY MAMMOGRAM: ICD-10-CM

## 2024-05-02 DIAGNOSIS — R60.9 EDEMA, UNSPECIFIED TYPE: ICD-10-CM

## 2024-05-02 LAB — SL AMB POCT HEMOGLOBIN AIC: 6 (ref ?–6.5)

## 2024-05-02 PROCEDURE — G0439 PPPS, SUBSEQ VISIT: HCPCS | Performed by: FAMILY MEDICINE

## 2024-05-02 PROCEDURE — 99214 OFFICE O/P EST MOD 30 MIN: CPT | Performed by: FAMILY MEDICINE

## 2024-05-02 PROCEDURE — 83036 HEMOGLOBIN GLYCOSYLATED A1C: CPT | Performed by: FAMILY MEDICINE

## 2024-05-02 RX ORDER — FUROSEMIDE 20 MG/1
20 TABLET ORAL DAILY
Qty: 30 TABLET | Refills: 5 | Status: SHIPPED | OUTPATIENT
Start: 2024-05-02

## 2024-05-02 RX ORDER — POTASSIUM CHLORIDE 20 MEQ/1
20 TABLET, EXTENDED RELEASE ORAL DAILY
Qty: 30 TABLET | Refills: 5 | Status: SHIPPED | OUTPATIENT
Start: 2024-05-02

## 2024-05-02 NOTE — PATIENT INSTRUCTIONS
Medicare Preventive Visit Patient Instructions  Thank you for completing your Welcome to Medicare Visit or Medicare Annual Wellness Visit today. Your next wellness visit will be due in one year (5/3/2025).  The screening/preventive services that you may require over the next 5-10 years are detailed below. Some tests may not apply to you based off risk factors and/or age. Screening tests ordered at today's visit but not completed yet may show as past due. Also, please note that scanned in results may not display below.  Preventive Screenings:  Service Recommendations Previous Testing/Comments   Colorectal Cancer Screening  * Colonoscopy    * Fecal Occult Blood Test (FOBT)/Fecal Immunochemical Test (FIT)  * Fecal DNA/Cologuard Test  * Flexible Sigmoidoscopy Age: 45-75 years old   Colonoscopy: every 10 years (may be performed more frequently if at higher risk)  OR  FOBT/FIT: every 1 year  OR  Cologuard: every 3 years  OR  Sigmoidoscopy: every 5 years  Screening may be recommended earlier than age 45 if at higher risk for colorectal cancer. Also, an individualized decision between you and your healthcare provider will decide whether screening between the ages of 76-85 would be appropriate. Colonoscopy: Not on file  FOBT/FIT: Not on file  Cologuard: Not on file  Sigmoidoscopy: Not on file          Breast Cancer Screening Age: 40+ years old  Frequency: every 1-2 years  Not required if history of left and right mastectomy Mammogram: Not on file        Cervical Cancer Screening Between the ages of 21-29, pap smear recommended once every 3 years.   Between the ages of 30-65, can perform pap smear with HPV co-testing every 5 years.   Recommendations may differ for women with a history of total hysterectomy, cervical cancer, or abnormal pap smears in past. Pap Smear: Not on file    Screening Not Indicated   Hepatitis C Screening Once for adults born between 1945 and 1965  More frequently in patients at high risk for Hepatitis  C Hep C Antibody: 11/15/2019    Screening Current   Diabetes Screening 1-2 times per year if you're at risk for diabetes or have pre-diabetes Fasting glucose: 105 mg/dL (7/6/2023)  A1C: 6.2 (5/9/2023)  Screening Not Indicated  History Diabetes   Cholesterol Screening Once every 5 years if you don't have a lipid disorder. May order more often based on risk factors. Lipid panel: 07/06/2023    Screening Not Indicated  History Lipid Disorder     Other Preventive Screenings Covered by Medicare:  Abdominal Aortic Aneurysm (AAA) Screening: covered once if your at risk. You're considered to be at risk if you have a family history of AAA.  Lung Cancer Screening: covers low dose CT scan once per year if you meet all of the following conditions: (1) Age 55-77; (2) No signs or symptoms of lung cancer; (3) Current smoker or have quit smoking within the last 15 years; (4) You have a tobacco smoking history of at least 20 pack years (packs per day multiplied by number of years you smoked); (5) You get a written order from a healthcare provider.  Glaucoma Screening: covered annually if you're considered high risk: (1) You have diabetes OR (2) Family history of glaucoma OR (3)  aged 50 and older OR (4)  American aged 65 and older  Osteoporosis Screening: covered every 2 years if you meet one of the following conditions: (1) You're estrogen deficient and at risk for osteoporosis based off medical history and other findings; (2) Have a vertebral abnormality; (3) On glucocorticoid therapy for more than 3 months; (4) Have primary hyperparathyroidism; (5) On osteoporosis medications and need to assess response to drug therapy.   Last bone density test (DXA Scan): Not on file.  HIV Screening: covered annually if you're between the age of 15-65. Also covered annually if you are younger than 15 and older than 65 with risk factors for HIV infection. For pregnant patients, it is covered up to 3 times per  pregnancy.    Immunizations:  Immunization Recommendations   Influenza Vaccine Annual influenza vaccination during flu season is recommended for all persons aged >= 6 months who do not have contraindications   Pneumococcal Vaccine   * Pneumococcal conjugate vaccine = PCV13 (Prevnar 13), PCV15 (Vaxneuvance), PCV20 (Prevnar 20)  * Pneumococcal polysaccharide vaccine = PPSV23 (Pneumovax) Adults 19-63 yo with certain risk factors or if 65+ yo  If never received any pneumonia vaccine: recommend Prevnar 20 (PCV20)  Give PCV20 if previously received 1 dose of PCV13 or PPSV23   Hepatitis B Vaccine 3 dose series if at intermediate or high risk (ex: diabetes, end stage renal disease, liver disease)   Respiratory syncytial virus (RSV) Vaccine - COVERED BY MEDICARE PART D  * RSVPreF3 (Arexvy) CDC recommends that adults 60 years of age and older may receive a single dose of RSV vaccine using shared clinical decision-making (SCDM)   Tetanus (Td) Vaccine - COST NOT COVERED BY MEDICARE PART B Following completion of primary series, a booster dose should be given every 10 years to maintain immunity against tetanus. Td may also be given as tetanus wound prophylaxis.   Tdap Vaccine - COST NOT COVERED BY MEDICARE PART B Recommended at least once for all adults. For pregnant patients, recommended with each pregnancy.   Shingles Vaccine (Shingrix) - COST NOT COVERED BY MEDICARE PART B  2 shot series recommended in those 19 years and older who have or will have weakened immune systems or those 50 years and older     Health Maintenance Due:      Topic Date Due   • DXA SCAN  Never done   • Breast Cancer Screening: Mammogram  Never done   • Hepatitis C Screening  Completed     Immunizations Due:      Topic Date Due   • COVID-19 Vaccine (4 - 2023-24 season) 09/01/2023     Advance Directives   What are advance directives?  Advance directives are legal documents that state your wishes and plans for medical care. These plans are made ahead of  time in case you lose your ability to make decisions for yourself. Advance directives can apply to any medical decision, such as the treatments you want, and if you want to donate organs.   What are the types of advance directives?  There are many types of advance directives, and each state has rules about how to use them. You may choose a combination of any of the following:  Living will:  This is a written record of the treatment you want. You can also choose which treatments you do not want, which to limit, and which to stop at a certain time. This includes surgery, medicine, IV fluid, and tube feedings.   Durable power of  for healthcare (DPAHC):  This is a written record that states who you want to make healthcare choices for you when you are unable to make them for yourself. This person, called a proxy, is usually a family member or a friend. You may choose more than 1 proxy.  Do not resuscitate (DNR) order:  A DNR order is used in case your heart stops beating or you stop breathing. It is a request not to have certain forms of treatment, such as CPR. A DNR order may be included in other types of advance directives.  Medical directive:  This covers the care that you want if you are in a coma, near death, or unable to make decisions for yourself. You can list the treatments you want for each condition. Treatment may include pain medicine, surgery, blood transfusions, dialysis, IV or tube feedings, and a ventilator (breathing machine).  Values history:  This document has questions about your views, beliefs, and how you feel and think about life. This information can help others choose the care that you would choose.  Why are advance directives important?  An advance directive helps you control your care. Although spoken wishes may be used, it is better to have your wishes written down. Spoken wishes can be misunderstood, or not followed. Treatments may be given even if you do not want them. An advance  directive may make it easier for your family to make difficult choices about your care.   Weight Management   Why it is important to manage your weight:  Being overweight increases your risk of health conditions such as heart disease, high blood pressure, type 2 diabetes, and certain types of cancer. It can also increase your risk for osteoarthritis, sleep apnea, and other respiratory problems. Aim for a slow, steady weight loss. Even a small amount of weight loss can lower your risk of health problems.  How to lose weight safely:  A safe and healthy way to lose weight is to eat fewer calories and get regular exercise. You can lose up about 1 pound a week by decreasing the number of calories you eat by 500 calories each day.   Healthy meal plan for weight management:  A healthy meal plan includes a variety of foods, contains fewer calories, and helps you stay healthy. A healthy meal plan includes the following:  Eat whole-grain foods more often.  A healthy meal plan should contain fiber. Fiber is the part of grains, fruits, and vegetables that is not broken down by your body. Whole-grain foods are healthy and provide extra fiber in your diet. Some examples of whole-grain foods are whole-wheat breads and pastas, oatmeal, brown rice, and bulgur.  Eat a variety of vegetables every day.  Include dark, leafy greens such as spinach, kale, stephania greens, and mustard greens. Eat yellow and orange vegetables such as carrots, sweet potatoes, and winter squash.   Eat a variety of fruits every day.  Choose fresh or canned fruit (canned in its own juice or light syrup) instead of juice. Fruit juice has very little or no fiber.  Eat low-fat dairy foods.  Drink fat-free (skim) milk or 1% milk. Eat fat-free yogurt and low-fat cottage cheese. Try low-fat cheeses such as mozzarella and other reduced-fat cheeses.  Choose meat and other protein foods that are low in fat.  Choose beans or other legumes such as split peas or lentils.  Choose fish, skinless poultry (chicken or turkey), or lean cuts of red meat (beef or pork). Before you cook meat or poultry, cut off any visible fat.   Use less fat and oil.  Try baking foods instead of frying them. Add less fat, such as margarine, sour cream, regular salad dressing and mayonnaise to foods. Eat fewer high-fat foods. Some examples of high-fat foods include french fries, doughnuts, ice cream, and cakes.  Eat fewer sweets.  Limit foods and drinks that are high in sugar. This includes candy, cookies, regular soda, and sweetened drinks.  Exercise:  Exercise at least 30 minutes per day on most days of the week. Some examples of exercise include walking, biking, dancing, and swimming. You can also fit in more physical activity by taking the stairs instead of the elevator or parking farther away from stores. Ask your healthcare provider about the best exercise plan for you.      © Copyright [employer] 2018 Information is for End User's use only and may not be sold, redistributed or otherwise used for commercial purposes. All illustrations and images included in CareNotes® are the copyrighted property of [employer] or [employer]

## 2024-05-02 NOTE — PROGRESS NOTES
Assessment and Plan:     Problem List Items Addressed This Visit          Cardiovascular and Mediastinum    Hypertension    Relevant Medications    furosemide (LASIX) 20 mg tablet    Other Relevant Orders    Comprehensive metabolic panel       Endocrine    Type 2 diabetes mellitus without complication, without long-term current use of insulin (HCC)    Relevant Orders    POCT hemoglobin A1c (Completed)       Blood    Anemia    Relevant Orders    CBC and differential       Other    Mixed hypercholesterolemia and hypertriglyceridemia    Relevant Orders    Lipid panel     Other Visit Diagnoses       Medicare annual wellness visit, subsequent    -  Primary    Type 2 diabetes mellitus with diabetic polyneuropathy, without long-term current use of insulin (HCC)        Edema, unspecified type        Patient to weigh herself daily, stay on a low-sodium diet, and call in 4 days time with an update regarding her response to diuretics.    Relevant Medications    furosemide (LASIX) 20 mg tablet    potassium chloride (Klor-Con M20) 20 mEq tablet    Other Relevant Orders    B-Type Natriuretic Peptide(BNP)    Vitamin D deficiency        Continue vitamin D supplementation    Relevant Orders    Vitamin D 25 hydroxy    Other fatigue        Will check labs.    Relevant Orders    TSH, 3rd generation    Breast cancer screening by mammogram        Relevant Orders    Mammo screening bilateral w 3d & cad    Asymptomatic postmenopausal estrogen deficiency        Relevant Orders    DXA bone density spine hip and pelvis             Preventive health issues were discussed with patient, and age appropriate screening tests were ordered as noted in patient's After Visit Summary.  Personalized health advice and appropriate referrals for health education or preventive services given if needed, as noted in patient's After Visit Summary.     History of Present Illness:     Patient presents for a Medicare Wellness Visit.  Her diabetes, hypertension,  hypercholesterolemia,anemia, and vitamin D deficiency are all well-controlled at this time.  She complains of peripheral edema that has not resolved with going to sleep or propping up her legs during the afternoon.  She is trying to monitor her salt intake but has not been meticulous with this.  She denies any orthopnea or wheezing.  She is due for follow-up mammogram as well as a DEXA scan.    HPI   Patient Care Team:  Tiffanie Kamara DO as PCP - General     Review of Systems:     Review of Systems   Constitutional:  Negative for appetite change, chills and fever.   HENT:  Negative for ear pain, facial swelling, rhinorrhea, sinus pain, sore throat and trouble swallowing.    Eyes:  Negative for discharge and redness.   Respiratory:  Negative for chest tightness, shortness of breath and wheezing.    Cardiovascular:  Positive for leg swelling. Negative for chest pain and palpitations.   Gastrointestinal:  Negative for abdominal pain, diarrhea, nausea and vomiting.   Endocrine: Negative for polyuria.   Genitourinary:  Negative for dysuria and urgency.   Musculoskeletal:  Negative for arthralgias and back pain.   Skin:  Negative for rash.   Neurological:  Negative for dizziness, weakness and headaches.   Hematological:  Negative for adenopathy.   Psychiatric/Behavioral:  Negative for behavioral problems, confusion and sleep disturbance.    All other systems reviewed and are negative.       Problem List:     Patient Active Problem List   Diagnosis    Syncope    Hypertension    Brain mass    Alopecia    Anemia    Basal cell carcinoma    Iron deficiency anemia    Type 2 diabetes mellitus without complication, without long-term current use of insulin (HCC)    Abdominal pain    S/P total hysterectomy and bilateral salpingo-oophorectomy    Dysfunctional voiding of urine    Mixed hypercholesterolemia and hypertriglyceridemia    Meningioma (HCC)      Past Medical and Surgical History:     Past Medical History:    Diagnosis Date    Anemia     Cataracts, bilateral     Heart murmur 04/2018    History of transfusion 2003    had 4 units    Hypertension     Meningioma (HCC) 04/2018    MRI every 6months    Ovarian cyst 2006    Shortness of breath     Skin neoplasm     last assessed: 6/13/2017     Past Surgical History:   Procedure Laterality Date    CYSTOSCOPY N/A 2/14/2019    Procedure: CYSTOSCOPY;  Surgeon: Geronimo Tsai MD;  Location: [facility];  Service: Gynecology Oncology    HYSTERECTOMY N/A 2/14/2019    Procedure: HYSTERECTOMY LAPAROSCOPIC TOTAL (LTH) W/ ROBOTICS bilateral salpingooophorectomy with great difficulty due to mass of 12 cm and taking 2.5 hours;  Surgeon: Geronimo Tsai MD;  Location: [facility];  Service: Gynecology Oncology    TONSILLECTOMY        Family History:     Family History   Problem Relation Age of Onset    Hypertension Mother     Lung cancer Father     Hypertension Sister     Lymphoma Brother 45    Hypertension Brother     Schizophrenia Brother     Depression Son     Schizophrenia Son     Hypertension Family     Heart disease Other       Social History:     Social History     Socioeconomic History    Marital status:      Spouse name: None    Number of children: None    Years of education: None    Highest education level: None   Occupational History    None   Tobacco Use    Smoking status: Never     Passive exposure: Past    Smokeless tobacco: Never   Substance and Sexual Activity    Alcohol use: Never    Drug use: No    Sexual activity: Not Currently   Other Topics Concern    None   Social History Narrative    Daily coffee consumption (___cups/day)    Daily cola consumption (____cans/day)    Daily tea consumption (____cups/day)    Uses safety equipment-seatbelts     Social Determinants of Health     Financial Resource Strain: Low Risk  (11/7/2022)    Overall Financial Resource Strain (CARDIA)     Difficulty of Paying Living Expenses: Not hard at all   Food Insecurity: No Food  Insecurity (5/2/2024)    Hunger Vital Sign     Worried About Running Out of Food in the Last Year: Never true     Ran Out of Food in the Last Year: Never true   Transportation Needs: No Transportation Needs (5/2/2024)    PRAPARE - Transportation     Lack of Transportation (Medical): No     Lack of Transportation (Non-Medical): No   Physical Activity: Not on file   Stress: Not on file   Social Connections: Not on file   Intimate Partner Violence: Not on file   Housing Stability: Low Risk  (5/2/2024)    Housing Stability Vital Sign     Unable to Pay for Housing in the Last Year: No     Number of Places Lived in the Last Year: 1     Unstable Housing in the Last Year: No      Medications and Allergies:     Current Outpatient Medications   Medication Sig Dispense Refill    furosemide (LASIX) 20 mg tablet Take 1 tablet (20 mg total) by mouth daily 30 tablet 5    Glycerin-Polysorbate 80 (REFRESH DRY EYE THERAPY OP) Apply to eye      lisinopril-hydrochlorothiazide (PRINZIDE,ZESTORETIC) 20-25 MG per tablet take 1 tablet by mouth once daily 90 tablet 3    multivitamin (THERAGRAN) TABS Take 1 tablet by mouth daily      potassium chloride (Klor-Con M20) 20 mEq tablet Take 1 tablet (20 mEq total) by mouth daily 30 tablet 5    timolol (TIMOPTIC) 0.5 % ophthalmic solution instill 1 drop into both eyes twice a day       No current facility-administered medications for this visit.     No Known Allergies   Immunizations:     Immunization History   Administered Date(s) Administered    COVID-19 PFIZER VACCINE 0.3 ML IM 05/03/2021, 05/23/2021    COVID-19 Pfizer Vac BIVALENT Travis-sucrose 12 Yr+ IM 10/02/2022    INFLUENZA 09/30/2014, 09/27/2015, 02/08/2017, 10/23/2017, 08/25/2020, 09/04/2022    Influenza Split High Dose Preservative Free IM 10/15/2013, 02/08/2017, 10/23/2017    Influenza, high dose seasonal 0.7 mL 10/29/2018    Influenza, seasonal, injectable 12/11/2007, 09/30/2014, 09/27/2015    Pneumococcal Conjugate 13-Valent  02/08/2017, 05/01/2019    Pneumococcal Conjugate Vaccine 20-valent (Pcv20), Polysace 11/08/2022    Pneumococcal Polysaccharide PPV23 12/17/2014    TD (adult) Preservative Free 07/18/2019    Tdap 04/04/2000    Zoster 09/30/2014    influenza, trivalent, adjuvanted 09/04/2019      Health Maintenance:         Topic Date Due    DXA SCAN  Never done    Breast Cancer Screening: Mammogram  Never done    Hepatitis C Screening  Completed         Topic Date Due    COVID-19 Vaccine (4 - 2023-24 season) 09/01/2023      Medicare Screening Tests and Risk Assessments:     Darlin is here for her Subsequent Wellness visit. Last Medicare Wellness visit information reviewed, patient interviewed, no change since last AWV.     Health Risk Assessment:   Patient rates overall health as very good. Patient feels that their physical health rating is same. Patient is very satisfied with their life. Eyesight was rated as same. Hearing was rated as same. Patient feels that their emotional and mental health rating is same. Patients states they are sometimes angry. Patient states they are sometimes unusually tired/fatigued. Pain experienced in the last 7 days has been none. Patient states that she has experienced no weight loss or gain in last 6 months.     Depression Screening:   PHQ-2 Score: 0      Fall Risk Screening:   In the past year, patient has experienced: no history of falling in past year      Urinary Incontinence Screening:   Patient has not leaked urine accidently in the last six months.     Home Safety:  Patient does not have trouble with stairs inside or outside of their home. Patient has working smoke alarms and has working carbon monoxide detector. Home safety hazards include: none.     Nutrition:   Current diet is Regular.     Medications:   Patient is currently taking over-the-counter supplements. OTC medications include: see medication list. Patient is able to manage medications.     Activities of Daily Living  (ADLs)/Instrumental Activities of Daily Living (IADLs):   Walk and transfer into and out of bed and chair?: Yes  Dress and groom yourself?: Yes    Bathe or shower yourself?: Yes    Feed yourself? Yes  Do your laundry/housekeeping?: Yes  Manage your money, pay your bills and track your expenses?: Yes  Make your own meals?: Yes    Do your own shopping?: Yes    Previous Hospitalizations:   Any hospitalizations or ED visits within the last 12 months?: No      Advance Care Planning:   Living will: No    Durable POA for healthcare: No    Advanced directive: No    Advanced directive counseling given: Yes    ACP document given: Yes    Patient declined ACP directive: No    End of Life Decisions reviewed with patient: Yes    Provider agrees with end of life decisions: Yes      Cognitive Screening:   Provider or family/friend/caregiver concerned regarding cognition?: No    PREVENTIVE SCREENINGS      Cardiovascular Screening:    General: Screening Not Indicated and History Lipid Disorder      Diabetes Screening:     General: Screening Not Indicated and History Diabetes      Colorectal Cancer Screening:     General: Screening Current      Breast Cancer Screening:     General: Screening Current    Due for: Mammogram        Cervical Cancer Screening:    General: Screening Not Indicated      Osteoporosis Screening:    General: Risks and Benefits Discussed    Due for: DXA Axial      Abdominal Aortic Aneurysm (AAA) Screening:        General: Screening Current and Screening Not Indicated      Lung Cancer Screening:     General: Screening Not Indicated      Hepatitis C Screening:    General: Screening Current    Screening, Brief Intervention, and Referral to Treatment (SBIRT)    Screening  Typical number of drinks in a day: 0  Typical number of drinks in a week: 0  Interpretation: Low risk drinking behavior.    Single Item Drug Screening:  How often have you used an illegal drug (including marijuana) or a prescription medication for  "non-medical reasons in the past year? never    Single Item Drug Screen Score: 0  Interpretation: Negative screen for possible drug use disorder    No results found.     Physical Exam:     /60   Pulse 76   Temp (!) 97.3 °F (36.3 °C)   Ht 4' 11\" (1.499 m)   Wt 62.7 kg (138 lb 3.2 oz)   SpO2 96%   BMI 27.91 kg/m²     Physical Exam  Vitals and nursing note reviewed.   Constitutional:       General: She is not in acute distress.     Appearance: Normal appearance. She is not ill-appearing or diaphoretic.   HENT:      Head: Normocephalic and atraumatic.      Right Ear: Tympanic membrane, ear canal and external ear normal.      Left Ear: Tympanic membrane, ear canal and external ear normal.      Nose: Nose normal. No congestion or rhinorrhea.      Mouth/Throat:      Mouth: Mucous membranes are moist.      Pharynx: Oropharynx is clear. No posterior oropharyngeal erythema.   Eyes:      General:         Right eye: No discharge.         Left eye: No discharge.      Extraocular Movements: Extraocular movements intact.      Conjunctiva/sclera: Conjunctivae normal.      Pupils: Pupils are equal, round, and reactive to light.   Neck:      Vascular: No carotid bruit.   Cardiovascular:      Rate and Rhythm: Normal rate and regular rhythm.      Pulses: Normal pulses.      Heart sounds: Normal heart sounds. No murmur heard.  Pulmonary:      Effort: Pulmonary effort is normal. No respiratory distress.      Breath sounds: Normal breath sounds. No wheezing or rhonchi.   Abdominal:      General: Abdomen is flat. Bowel sounds are normal. There is no distension.      Palpations: There is no mass.      Tenderness: There is no abdominal tenderness.   Musculoskeletal:         General: Swelling present. No deformity. Normal range of motion.      Cervical back: Normal range of motion and neck supple. No rigidity.      Right lower leg: No edema.      Left lower leg: No edema.      Comments: +2 pitting edema lower extremities " bilaterally without erythema or vesicular eruption   Lymphadenopathy:      Cervical: No cervical adenopathy.   Skin:     General: Skin is warm and dry.      Capillary Refill: Capillary refill takes less than 2 seconds.      Coloration: Skin is not jaundiced.      Findings: No bruising, erythema or rash.   Neurological:      General: No focal deficit present.      Mental Status: She is alert and oriented to person, place, and time.      Cranial Nerves: No cranial nerve deficit.      Sensory: No sensory deficit.      Gait: Gait normal.      Deep Tendon Reflexes: Reflexes normal.   Psychiatric:         Mood and Affect: Mood normal.         Behavior: Behavior normal.         Thought Content: Thought content normal.         Judgment: Judgment normal.          Tiffanie Kamara,

## 2024-05-07 ENCOUNTER — APPOINTMENT (OUTPATIENT)
Dept: LAB | Facility: CLINIC | Age: 79
End: 2024-05-07
Payer: COMMERCIAL

## 2024-05-07 ENCOUNTER — OFFICE VISIT (OUTPATIENT)
Dept: FAMILY MEDICINE CLINIC | Facility: CLINIC | Age: 79
End: 2024-05-07
Payer: COMMERCIAL

## 2024-05-07 VITALS
OXYGEN SATURATION: 98 % | WEIGHT: 137.6 LBS | BODY MASS INDEX: 27.74 KG/M2 | DIASTOLIC BLOOD PRESSURE: 74 MMHG | TEMPERATURE: 97.8 F | HEART RATE: 72 BPM | HEIGHT: 59 IN | SYSTOLIC BLOOD PRESSURE: 122 MMHG

## 2024-05-07 DIAGNOSIS — R53.83 OTHER FATIGUE: ICD-10-CM

## 2024-05-07 DIAGNOSIS — R60.9 EDEMA, UNSPECIFIED TYPE: ICD-10-CM

## 2024-05-07 DIAGNOSIS — I10 PRIMARY HYPERTENSION: ICD-10-CM

## 2024-05-07 DIAGNOSIS — D64.9 ANEMIA, UNSPECIFIED TYPE: ICD-10-CM

## 2024-05-07 DIAGNOSIS — E78.2 MIXED HYPERCHOLESTEROLEMIA AND HYPERTRIGLYCERIDEMIA: ICD-10-CM

## 2024-05-07 DIAGNOSIS — E55.9 VITAMIN D DEFICIENCY: ICD-10-CM

## 2024-05-07 DIAGNOSIS — R60.0 LOCALIZED EDEMA: ICD-10-CM

## 2024-05-07 DIAGNOSIS — E11.9 TYPE 2 DIABETES MELLITUS WITHOUT COMPLICATION, WITHOUT LONG-TERM CURRENT USE OF INSULIN (HCC): Primary | ICD-10-CM

## 2024-05-07 LAB
25(OH)D3 SERPL-MCNC: 29.1 NG/ML (ref 30–100)
ALBUMIN SERPL BCP-MCNC: 4 G/DL (ref 3.5–5)
ALP SERPL-CCNC: 92 U/L (ref 34–104)
ALT SERPL W P-5'-P-CCNC: 12 U/L (ref 7–52)
ANION GAP SERPL CALCULATED.3IONS-SCNC: 10 MMOL/L (ref 4–13)
AST SERPL W P-5'-P-CCNC: 13 U/L (ref 13–39)
BASOPHILS # BLD AUTO: 0.07 THOUSANDS/ÂΜL (ref 0–0.1)
BASOPHILS NFR BLD AUTO: 1 % (ref 0–1)
BILIRUB SERPL-MCNC: 0.54 MG/DL (ref 0.2–1)
BNP SERPL-MCNC: 7 PG/ML (ref 0–100)
BUN SERPL-MCNC: 27 MG/DL (ref 5–25)
CALCIUM SERPL-MCNC: 9.4 MG/DL (ref 8.4–10.2)
CHLORIDE SERPL-SCNC: 96 MMOL/L (ref 96–108)
CHOLEST SERPL-MCNC: 166 MG/DL
CO2 SERPL-SCNC: 29 MMOL/L (ref 21–32)
CREAT SERPL-MCNC: 0.83 MG/DL (ref 0.6–1.3)
EOSINOPHIL # BLD AUTO: 0.1 THOUSAND/ÂΜL (ref 0–0.61)
EOSINOPHIL NFR BLD AUTO: 1 % (ref 0–6)
ERYTHROCYTE [DISTWIDTH] IN BLOOD BY AUTOMATED COUNT: 14 % (ref 11.6–15.1)
GFR SERPL CREATININE-BSD FRML MDRD: 68 ML/MIN/1.73SQ M
GLUCOSE P FAST SERPL-MCNC: 110 MG/DL (ref 65–99)
HCT VFR BLD AUTO: 40.1 % (ref 34.8–46.1)
HDLC SERPL-MCNC: 49 MG/DL
HGB BLD-MCNC: 13 G/DL (ref 11.5–15.4)
IMM GRANULOCYTES # BLD AUTO: 0.04 THOUSAND/UL (ref 0–0.2)
IMM GRANULOCYTES NFR BLD AUTO: 0 % (ref 0–2)
LDLC SERPL CALC-MCNC: 95 MG/DL (ref 0–100)
LYMPHOCYTES # BLD AUTO: 1.91 THOUSANDS/ÂΜL (ref 0.6–4.47)
LYMPHOCYTES NFR BLD AUTO: 17 % (ref 14–44)
MCH RBC QN AUTO: 30.1 PG (ref 26.8–34.3)
MCHC RBC AUTO-ENTMCNC: 32.4 G/DL (ref 31.4–37.4)
MCV RBC AUTO: 93 FL (ref 82–98)
MONOCYTES # BLD AUTO: 0.64 THOUSAND/ÂΜL (ref 0.17–1.22)
MONOCYTES NFR BLD AUTO: 6 % (ref 4–12)
NEUTROPHILS # BLD AUTO: 8.78 THOUSANDS/ÂΜL (ref 1.85–7.62)
NEUTS SEG NFR BLD AUTO: 75 % (ref 43–75)
NONHDLC SERPL-MCNC: 117 MG/DL
NRBC BLD AUTO-RTO: 0 /100 WBCS
PLATELET # BLD AUTO: 473 THOUSANDS/UL (ref 149–390)
PMV BLD AUTO: 9.4 FL (ref 8.9–12.7)
POTASSIUM SERPL-SCNC: 4.4 MMOL/L (ref 3.5–5.3)
PROT SERPL-MCNC: 7.6 G/DL (ref 6.4–8.4)
RBC # BLD AUTO: 4.32 MILLION/UL (ref 3.81–5.12)
SODIUM SERPL-SCNC: 135 MMOL/L (ref 135–147)
TRIGL SERPL-MCNC: 108 MG/DL
TSH SERPL DL<=0.05 MIU/L-ACNC: 1.56 UIU/ML (ref 0.45–4.5)
WBC # BLD AUTO: 11.54 THOUSAND/UL (ref 4.31–10.16)

## 2024-05-07 PROCEDURE — 82306 VITAMIN D 25 HYDROXY: CPT

## 2024-05-07 PROCEDURE — 85025 COMPLETE CBC W/AUTO DIFF WBC: CPT

## 2024-05-07 PROCEDURE — 80053 COMPREHEN METABOLIC PANEL: CPT

## 2024-05-07 PROCEDURE — 99214 OFFICE O/P EST MOD 30 MIN: CPT | Performed by: FAMILY MEDICINE

## 2024-05-07 PROCEDURE — G2211 COMPLEX E/M VISIT ADD ON: HCPCS | Performed by: FAMILY MEDICINE

## 2024-05-07 PROCEDURE — 80061 LIPID PANEL: CPT

## 2024-05-07 PROCEDURE — 36415 COLL VENOUS BLD VENIPUNCTURE: CPT

## 2024-05-07 PROCEDURE — 83880 ASSAY OF NATRIURETIC PEPTIDE: CPT

## 2024-05-07 PROCEDURE — 84443 ASSAY THYROID STIM HORMONE: CPT

## 2024-05-08 ENCOUNTER — TELEPHONE (OUTPATIENT)
Dept: ADMINISTRATIVE | Facility: OTHER | Age: 79
End: 2024-05-08

## 2024-05-08 NOTE — LETTER
Diabetic Eye Exam Form    Date Requested: 24  Patient: Darlin Zamora      2nd Request  Patient : 1945      Please complete form  Referring Provider: Tiffanie Kamara,       DIABETIC Eye Exam Date _______________________________      Type of Exam MUST be documented for Diabetic Eye Exams. Please CHECK ONE.     Retinal Exam       Dilated Retinal Exam       OCT       Optomap-Iris Exam      Fundus Photography       Left Eye - Please check Retinopathy or No Retinopathy        Exam did show retinopathy    Exam did not show retinopathy       Right Eye - Please check Retinopathy or No Retinopathy       Exam did show retinopathy    Exam did not show retinopathy       Comments __________________________________________________________    Practice Providing Exam ______________________________________________    Exam Performed By (print name) _______________________________________      Provider Signature ___________________________________________________      These reports are needed for  compliance.  Please fax this completed form and a copy of the Diabetic Eye Exam report to our office located at [address] [place] [address] as soon as possible via Fax [phone] attention Dilshad: Phone [phone]  We thank you for your assistance in treating our mutual patient.

## 2024-05-08 NOTE — LETTER
Diabetic Eye Exam Form    Date Requested: 24  Patient: Darlin Zamora     Please complete form  Patient : 1945   Referring Provider: Tiffanie Kamara,       DIABETIC Eye Exam Date _______________________________      Type of Exam MUST be documented for Diabetic Eye Exams. Please CHECK ONE.     Retinal Exam       Dilated Retinal Exam       OCT       Optomap-Iris Exam      Fundus Photography       Left Eye - Please check Retinopathy or No Retinopathy        Exam did show retinopathy    Exam did not show retinopathy       Right Eye - Please check Retinopathy or No Retinopathy       Exam did show retinopathy    Exam did not show retinopathy       Comments __________________________________________________________    Practice Providing Exam ______________________________________________    Exam Performed By (print name) _______________________________________      Provider Signature ___________________________________________________      These reports are needed for  compliance.  Please fax this completed form and a copy of the Diabetic Eye Exam report to our office located at [address] [place] [address] as soon as possible via Fax [phone] attention Dilshad: Phone [phone]  We thank you for your assistance in treating our mutual patient.

## 2024-05-08 NOTE — TELEPHONE ENCOUNTER
----- Message from Ariela Templeton sent at 5/7/2024  9:53 AM EDT -----  Regarding: care gap request  05/07/24 9:53 AM    Hello, our patient attached above has had Diabetic Eye Exam completed/performed. Please assist in updating the patient chart by making an External outreach to [facility] located in [facility]. The date of service is 04/2024.    Thank you,  Ariela Templeton  [facility]

## 2024-05-08 NOTE — TELEPHONE ENCOUNTER
Upon review of the In Basket request and the patient's chart, initial outreach has been made via fax to facility. Please see Contacts section for details.     Thank you  Dilshad Hyman MA

## 2024-05-13 PROBLEM — R60.0 LOCALIZED EDEMA: Status: ACTIVE | Noted: 2024-05-13

## 2024-05-13 NOTE — TELEPHONE ENCOUNTER
As a follow-up, a second attempt has been made for outreach via fax to facility. Please see Contacts section for details.    Thank you  Dilshad Hyman MA

## 2024-05-13 NOTE — PROGRESS NOTES
Patient ID: Darlin Zamora is a 78 y.o. female.    HPI: 78 y.o.female presents for follow up of niddm, hypertension and hypercholesterolemia.  Hgba1c is 6.0  and patient's issues are well controlled. Patient deneis any dyspnea, chest pain or palpitations.      SUBJECTIVE    Family History   Problem Relation Age of Onset    Hypertension Mother     Lung cancer Father     Hypertension Sister     Lymphoma Brother 45    Hypertension Brother     Schizophrenia Brother     Depression Son     Schizophrenia Son     Hypertension Family     Heart disease Other      Social History     Socioeconomic History    Marital status:      Spouse name: Not on file    Number of children: Not on file    Years of education: Not on file    Highest education level: Not on file   Occupational History    Not on file   Tobacco Use    Smoking status: Never     Passive exposure: Past    Smokeless tobacco: Never   Vaping Use    Vaping status: Never Used   Substance and Sexual Activity    Alcohol use: Never    Drug use: No    Sexual activity: Not Currently   Other Topics Concern    Not on file   Social History Narrative    Daily coffee consumption (___cups/day)    Daily cola consumption (____cans/day)    Daily tea consumption (____cups/day)    Uses safety equipment-seatbelts     Social Determinants of Health     Financial Resource Strain: Low Risk  (11/7/2022)    Overall Financial Resource Strain (CARDIA)     Difficulty of Paying Living Expenses: Not hard at all   Food Insecurity: No Food Insecurity (5/2/2024)    Hunger Vital Sign     Worried About Running Out of Food in the Last Year: Never true     Ran Out of Food in the Last Year: Never true   Transportation Needs: No Transportation Needs (5/2/2024)    PRAPARE - Transportation     Lack of Transportation (Medical): No     Lack of Transportation (Non-Medical): No   Physical Activity: Not on file   Stress: Not on file   Social Connections: Not on file   Intimate Partner Violence: Not on file    Housing Stability: Low Risk  (5/2/2024)    Housing Stability Vital Sign     Unable to Pay for Housing in the Last Year: No     Number of Places Lived in the Last Year: 1     Unstable Housing in the Last Year: No     Past Medical History:   Diagnosis Date    Anemia     Cataracts, bilateral     Heart murmur 04/2018    History of transfusion 2003    had 4 units    Hypertension     Meningioma (HCC) 04/2018    MRI every 6months    Ovarian cyst 2006    Shortness of breath     Skin neoplasm     last assessed: 6/13/2017     Past Surgical History:   Procedure Laterality Date    CYSTOSCOPY N/A 2/14/2019    Procedure: CYSTOSCOPY;  Surgeon: Geronimo Tsai MD;  [facility];  Service: Gynecology Oncology    HYSTERECTOMY N/A 2/14/2019    Procedure: HYSTERECTOMY LAPAROSCOPIC TOTAL (LTH) W/ ROBOTICS bilateral salpingooophorectomy with great difficulty due to mass of 12 cm and taking 2.5 hours;  Surgeon: Geronimo Tsai MD;  [facility];  Service: Gynecology Oncology    TONSILLECTOMY       No Known Allergies    Current Outpatient Medications:     furosemide (LASIX) 20 mg tablet, Take 1 tablet (20 mg total) by mouth daily, Disp: 30 tablet, Rfl: 5    Glycerin-Polysorbate 80 (REFRESH DRY EYE THERAPY OP), Apply to eye, Disp: , Rfl:     lisinopril-hydrochlorothiazide (PRINZIDE,ZESTORETIC) 20-25 MG per tablet, take 1 tablet by mouth once daily, Disp: 90 tablet, Rfl: 3    multivitamin (THERAGRAN) TABS, Take 1 tablet by mouth daily, Disp: , Rfl:     potassium chloride (Klor-Con M20) 20 mEq tablet, Take 1 tablet (20 mEq total) by mouth daily, Disp: 30 tablet, Rfl: 5    timolol (TIMOPTIC) 0.5 % ophthalmic solution, instill 1 drop into both eyes twice a day, Disp: , Rfl:     Review of Systems  Constitutional:     Denies fever, chills ,fatigue ,weakness ,weight loss, weight gain     ENT: Denies earache ,loss of hearing ,nosebleed, nasal discharge,nasal congestion ,sore throat ,hoarseness  Pulmonary: Denies shortness of  "breath ,cough  ,dyspnea on exertion, orthopnea  ,PND   Cardiovascular:  Denies bradycardia , tachycardia  ,palpations, lower extremity edema leg, claudication  Breast:  Denies new or changing breast lumps ,nipple discharge ,nipple changes  Abdomen:  Denies abdominal pain , anorexia , indigestion, nausea, vomiting, constipation, diarrhea  Musculoskeletal: Denies myalgias, arthralgias, joint swelling, joint stiffness , limb pain, limb swelling  Gu: denies dysuria, polyuria  Skin: Denies skin rash, skin lesion, skin wound, itching, dry skin  Neuro: Denies headache, numbness, tingling, confusion, loss of consciousness, dizziness, vertigo  Psychiatric: Denies feelings of depression, suicidal ideation, anxiety, sleep disturbances    OBJECTIVE  /74 (BP Location: Left arm, Patient Position: Sitting, Cuff Size: Standard)   Pulse 72   Temp 97.8 °F (36.6 °C)   Ht 4' 11\" (1.499 m)   Wt 62.4 kg (137 lb 9.6 oz)   SpO2 98%   BMI 27.79 kg/m²   Constitutional:   NAD, well appearing and well nourished      ENT:   Conjunctiva and lids: no injection, edema, or discharge     Pupils and iris: JB bilaterally    External inspection of ears and nose: normal without deformities or discharge.      Otoscopic exam: Canals patent without erythema.       Nasal mucosa, septum and turbinates: Normal or edema or discharge         Oropharynx:  Moist mucosa, normal tongue and tonsils without lesions. No erythema        Pulmonary:Respiratory effort normal rate and rhythm, no increased work of breathing. Auscultation of lungs:  Clear bilaterally with no adventitious breath sounds       Cardiovascular: regular rate and rhythm, S1 and S2, no murmur, no edema and/or varicosities of LE      Abdomen: Soft and non-distended     Positive bowel sounds      No heptomegaly or splenomegaly      Gu: no suprapubic tenderness or CVA tenderness, no urethral discharge  Lymphatic:  No anterior or posterior cervical lymphadenopathy         Musculoskeletal: "  Gait and station: Normal gait      Digits and nails normal without clubbing or cyanosis       Inspection/palpation of joints, bones, and muscles:  No joint tenderness, swelling, full active and passive range of motion       Skin: Normal skin turgor and no rashes      Neuro:    Normal reflexes     Psych:   alert and oriented to person, place and time     normal mood and affect       Assessment/Plan:Diagnoses and all orders for this visit:    Type 2 diabetes mellitus without complication, without long-term current use of insulin ([facility])  Comments:  Continue with dietary control.    Primary hypertension  Comments:  Continue ACE therapy.    Mixed hypercholesterolemia and hypertriglyceridemia  Comments:  Continue low-fat therapy.    Localized edema  Comments:  Continue diuretic therapy.        Reviewed with patient plan to treat with above plan.    Patient instructed to call in 72 hours if not feeling better or if symptoms worsen

## 2024-05-15 ENCOUNTER — TELEPHONE (OUTPATIENT)
Age: 79
End: 2024-05-15

## 2024-05-15 DIAGNOSIS — I10 ESSENTIAL HYPERTENSION: ICD-10-CM

## 2024-05-15 NOTE — TELEPHONE ENCOUNTER
Spoke with patient, asking how many potassium she should be taking?      Spoke with Dr. Kamara, states id she taking 1 furosemide, she should take 1 potassium.

## 2024-05-15 NOTE — TELEPHONE ENCOUNTER
Patient wanted doctor to know she noticed her legs are a lot less swollen this morning than they were yesterday. There is a slight indentation on the sides of both her legs. Asked if she should go back to the regular dosage of furosemide and potassium or keep it doubled up.

## 2024-05-16 RX ORDER — LISINOPRIL AND HYDROCHLOROTHIAZIDE 25; 20 MG/1; MG/1
TABLET ORAL
Qty: 90 TABLET | Refills: 1 | Status: SHIPPED | OUTPATIENT
Start: 2024-05-16

## 2024-05-16 NOTE — TELEPHONE ENCOUNTER
As a final attempt, a third outreach has been made via telephone call to facility. Please see Contacts section for details. This encounter will be closed and completed by end of day. Should we receive the requested information because of previous outreach attempts, the requested patient's chart will be updated appropriately.     Thank you  Dilshad Hyman MA

## 2024-06-22 ENCOUNTER — TELEPHONE (OUTPATIENT)
Dept: OTHER | Facility: OTHER | Age: 79
End: 2024-06-22

## 2024-06-22 ENCOUNTER — HOSPITAL ENCOUNTER (EMERGENCY)
Facility: HOSPITAL | Age: 79
Discharge: HOME | End: 2024-06-22
Attending: EMERGENCY MEDICINE
Payer: COMMERCIAL

## 2024-06-22 ENCOUNTER — NURSE TRIAGE (OUTPATIENT)
Dept: OTHER | Facility: OTHER | Age: 79
End: 2024-06-22

## 2024-06-22 VITALS
HEART RATE: 74 BPM | SYSTOLIC BLOOD PRESSURE: 167 MMHG | TEMPERATURE: 97 F | OXYGEN SATURATION: 96 % | RESPIRATION RATE: 16 BRPM | DIASTOLIC BLOOD PRESSURE: 99 MMHG

## 2024-06-22 DIAGNOSIS — L03.116 CELLULITIS OF LEFT LOWER LEG: Primary | ICD-10-CM

## 2024-06-22 DIAGNOSIS — R60.0 PERIPHERAL EDEMA: ICD-10-CM

## 2024-06-22 PROCEDURE — 76882 US LMTD JT/FCL EVL NVASC XTR: CPT | Performed by: EMERGENCY MEDICINE

## 2024-06-22 PROCEDURE — 99284 EMERGENCY DEPT VISIT MOD MDM: CPT

## 2024-06-22 PROCEDURE — 99284 EMERGENCY DEPT VISIT MOD MDM: CPT | Performed by: EMERGENCY MEDICINE

## 2024-06-22 RX ORDER — CEPHALEXIN 500 MG/1
500 CAPSULE ORAL EVERY 6 HOURS SCHEDULED
Qty: 28 CAPSULE | Refills: 0 | Status: SHIPPED | OUTPATIENT
Start: 2024-06-22 | End: 2024-06-29

## 2024-06-22 RX ORDER — CEPHALEXIN 500 MG/1
500 CAPSULE ORAL ONCE
Status: COMPLETED | OUTPATIENT
Start: 2024-06-22 | End: 2024-06-22

## 2024-06-22 RX ADMIN — CEPHALEXIN 500 MG: 500 CAPSULE ORAL at 14:51

## 2024-06-22 NOTE — TELEPHONE ENCOUNTER
"Reason for Disposition  • Patient sounds very sick or weak to the triager    Answer Assessment - Initial Assessment Questions  1. ONSET: \"When did the swelling start?\" (e.g., minutes, hours, days)      A few days ago; unsure what day    2. LOCATION: \"What part of the leg is swollen?\"  \"Are both legs swollen or just one leg?\"      Lower part of left leg    3. SEVERITY: \"How bad is the swelling?\" (e.g., localized; mild, moderate, severe)   - Localized - small area of swelling localized to one leg   - MILD pedal edema - swelling limited to foot and ankle, pitting edema < 1/4 inch (6 mm) deep, rest and elevation eliminate most or all swelling   - MODERATE edema - swelling of lower leg to knee, pitting edema > 1/4 inch (6 mm) deep, rest and elevation only partially reduce swelling   - SEVERE edema - swelling extends above knee, facial or hand swelling present       Mild-moderate; occasionally appears to be leaking clear fluid per patient    4. REDNESS: \"Does the swelling look red or infected?\"      Yes, looks slightly red    5. PAIN: \"Is the swelling painful to touch?\" If Yes, ask: \"How painful is it?\"   (Scale 1-10; mild, moderate or severe)      0/10 - no pain at all    6. FEVER: \"Do you have a fever?\" If Yes, ask: \"What is it, how was it measured, and when did it start?\"       Denies    7. CAUSE: \"What do you think is causing the leg swelling?\"      History    8. MEDICAL HISTORY: \"Do you have a history of heart failure, kidney disease, liver failure, or cancer?\"      See chart    9. RECURRENT SYMPTOM: \"Have you had leg swelling before?\" If Yes, ask: \"When was the last time?\" \"What happened that time?\"      Yes. Last time, instructed to double lasix dose to 40 mg and then instructed to go back to 20 mg/daily once swelling improved    10. OTHER SYMPTOMS: \"Do you have any other symptoms?\" (e.g., chest pain, difficulty breathing)        No other symptoms at all    Protocols used: Leg Swelling and Edema-ADULT-AH    "

## 2024-06-22 NOTE — ED PROVIDER NOTES
"History  Chief Complaint   Patient presents with    Leg Swelling     Past few days noticed b/l leg swelling and redness. Denies chest pain, SOB, dizziness. Pt is taking lasix, potassium, HCTZ as prescribed. No hx of DVT     HPI  ED Course as of 06/22/24 1452   Sat Jun 22, 2024   1422 HPI:   Patient is a 78 y.o. female with PMHx NIDDM, hypertension on Lasix, who was sent to the ED by PCP for evaluation of left lower leg swelling with redness and \"leaking\" with concern for possible cellulitis.  Patient reports over the past month she has had intermittent worsening lower extremity edema, was doubled up on her diuretic with improvement.  States today she noticed this some slight worsening redness now with leaking to her left lower extremity.  Patient states she has no pain.  States this is not happened before.  Patient called her PCPs office and was instructed to come to the ED due to concern for cellulitis.  Patient denies any chest pain, shortness of breath, palpitations, lightheadedness, headache, changes in vision or hearing, abdominal pain, nausea, vomiting, diarrhea, or any other complaints or concerns at this time.  Patient is asked if her lower extremity is worsening she says she believes it is slightly worse, but nowhere near the swelling that required to double up on Lasix.   1427 ROS:   All other systems reviewed and negative unless otherwise stated in HPI above.    PHYSICAL EXAM:  General: NAD, awake, alert.   Head: Normocephalic, atraumatic.  Eyes: EOM-I. No diplopia. PERRL.  ENT: Atraumatic external nose and ears. No stridor. Normal phonation.   Neck: Symmetric, trachea midline. No JVD.   CV: RRR. No murmurs or gallops. Peripheral pulses +2 throughout.   Lungs: Unlabored. No retractions. No tachypnea. CTA, lungs sounds equal bilateral. No rales.  Abd: Flat, nondistended. +BS, soft, nontender.   MSK: FROM, no deformity/injury.  Skin: Warm with LLE slightly warmer than RLE. Erythema to anterior and posterior " left lower extremity with areas of blistering and desquamation. No other injuries.  Neuro: AAOx3, CN II-XII grossly intact. Motor grossly intact.   1429 ASSESSMENT: Patient is a 78 y.o. female who presents with LLE redness, weeping, and warmth.   DDX includes but not limited to: cellulitis, peripheral edema, doubt fluid overload.   PLAN: POC US. Treated with Keflex. Advised on outpatient follow up on Monday with PCP.   1440 POC US performed, no evidence of abscess. Cobblestoning noted, possible cellulitis but also possible secondary to known peripheral edema.   1445 Discussed evaluation with findings and plan with patient. Advised on need for outpatient follow up, given information. Given return precautions verbally and in discharge instructions, confirmed with teach back method. Patient given Rx for keflex and advised on proper use. All questions answered prior to discharge. Patient expressed verbal understanding and is agreeable with plan for discharge with outpatient follow up.       Prior to Admission Medications   Prescriptions Last Dose Informant Patient Reported? Taking?   Glycerin-Polysorbate 80 (REFRESH DRY EYE THERAPY OP)  Self Yes No   Sig: Apply to eye   furosemide (LASIX) 20 mg tablet   No No   Sig: Take 1 tablet (20 mg total) by mouth daily   lisinopril-hydrochlorothiazide (PRINZIDE,ZESTORETIC) 20-25 MG per tablet   No No   Sig: take 1 tablet by mouth once daily   multivitamin (THERAGRAN) TABS  Self Yes No   Sig: Take 1 tablet by mouth daily   potassium chloride (Klor-Con M20) 20 mEq tablet   No No   Sig: Take 1 tablet (20 mEq total) by mouth daily   timolol (TIMOPTIC) 0.5 % ophthalmic solution  Self Yes No   Sig: instill 1 drop into both eyes twice a day      Facility-Administered Medications: None       Past Medical History:   Diagnosis Date    Anemia     Cataracts, bilateral     Heart murmur 04/2018    History of transfusion 2003    had 4 units    Hypertension     Meningioma (HCC) 04/2018    MRI  every 6months    Ovarian cyst 2006    Shortness of breath     Skin neoplasm     last assessed: 6/13/2017       Past Surgical History:   Procedure Laterality Date    CYSTOSCOPY N/A 2/14/2019    Procedure: CYSTOSCOPY;  Surgeon: Geronimo Tsai MD;  [facility];  Service: Gynecology Oncology    HYSTERECTOMY N/A 2/14/2019    Procedure: HYSTERECTOMY LAPAROSCOPIC TOTAL (LTH) W/ ROBOTICS bilateral salpingooophorectomy with great difficulty due to mass of 12 cm and taking 2.5 hours;  Surgeon: Geronimo Tsai MD;  [facility];  Service: Gynecology Oncology    TONSILLECTOMY         Family History   Problem Relation Age of Onset    Hypertension Mother     Lung cancer Father     Hypertension Sister     Lymphoma Brother 45    Hypertension Brother     Schizophrenia Brother     Depression Son     Schizophrenia Son     Hypertension Family     Heart disease Other      I have reviewed and agree with the history as documented.    E-Cigarette/Vaping    E-Cigarette Use Never User      E-Cigarette/Vaping Substances    Nicotine No     THC No     CBD No     Flavoring No     Other No     Unknown No      Social History     Tobacco Use    Smoking status: Never     Passive exposure: Past    Smokeless tobacco: Never   Vaping Use    Vaping status: Never Used   Substance Use Topics    Alcohol use: Never    Drug use: No        Review of Systems    Physical Exam  ED Triage Vitals [06/22/24 1400]   Temperature Pulse Respirations Blood Pressure SpO2   (!) 97 °F (36.1 °C) 74 16 167/99 96 %      Temp Source Heart Rate Source Patient Position - Orthostatic VS BP Location FiO2 (%)   Temporal Monitor -- Left arm --      Pain Score       No Pain             Orthostatic Vital Signs  Vitals:    06/22/24 1400   BP: 167/99   Pulse: 74       Physical Exam    ED Medications  Medications   cephalexin (KEFLEX) capsule 500 mg (500 mg Oral Given 6/22/24 1451)       Diagnostic Studies  Results Reviewed       None                   No orders to  "display         Procedures  POC MSK/Soft Tissue US    Date/Time: 6/22/2024 2:41 PM    Performed by: Nellie Darling DO  Authorized by: Nellie Darling DO    Patient location:  ED  Performed by:  Resident  Procedure:     Performed: soft tissue ultrasound    Procedure details:     Exam Type:  Diagnostic    Longitudinal view:  Obtained    Transverse view:  Obtained    Image quality: diagnostic      Image availability:  Images available in PACS  Soft tissue ultrasound:     Soft tissue indications: swelling, erythema and suspected abscess      Anatomic location:  Lower extremity    Soft tissue findings: cobblestoning    Interpretation:     Soft tissue impressions comment:  Cellulitis vs Edema, NO evidence of abscess        ED Course  ED Course as of 06/22/24 1452   Sat Jun 22, 2024   1422 HPI:   Patient is a 78 y.o. female with PMHx NIDDM, hypertension on Lasix, who was sent to the ED by PCP for evaluation of left lower leg swelling with redness and \"leaking\" with concern for possible cellulitis.  Patient reports over the past month she has had intermittent worsening lower extremity edema, was doubled up on her diuretic with improvement.  States today she noticed this some slight worsening redness now with leaking to her left lower extremity.  Patient states she has no pain.  States this is not happened before.  Patient called her PCPs office and was instructed to come to the ED due to concern for cellulitis.  Patient denies any chest pain, shortness of breath, palpitations, lightheadedness, headache, changes in vision or hearing, abdominal pain, nausea, vomiting, diarrhea, or any other complaints or concerns at this time.  Patient is asked if her lower extremity is worsening she says she believes it is slightly worse, but nowhere near the swelling that required to double up on Lasix.   1427 ROS:   All other systems reviewed and negative unless otherwise stated in HPI above.    PHYSICAL EXAM:  General: " NAD, awake, alert.   Head: Normocephalic, atraumatic.  Eyes: EOM-I. No diplopia. PERRL.  ENT: Atraumatic external nose and ears. No stridor. Normal phonation.   Neck: Symmetric, trachea midline. No JVD.   CV: RRR. No murmurs or gallops. Peripheral pulses +2 throughout.   Lungs: Unlabored. No retractions. No tachypnea. CTA, lungs sounds equal bilateral. No rales.  Abd: Flat, nondistended. +BS, soft, nontender.   MSK: FROM, no deformity/injury.  Skin: Warm with LLE slightly warmer than RLE. Erythema to anterior and posterior left lower extremity with areas of blistering and desquamation. No other injuries.  Neuro: AAOx3, CN II-XII grossly intact. Motor grossly intact.   1429 ASSESSMENT: Patient is a 78 y.o. female who presents with LLE redness, weeping, and warmth.   DDX includes but not limited to: cellulitis, peripheral edema, doubt fluid overload.   PLAN: POC US. Treated with Keflex. Advised on outpatient follow up on Monday with PCP.   1440 POC US performed, no evidence of abscess. Cobblestoning noted, possible cellulitis but also possible secondary to known peripheral edema.   1445 Discussed evaluation with findings and plan with patient. Advised on need for outpatient follow up, given information. Given return precautions verbally and in discharge instructions, confirmed with teach back method. Patient given Rx for keflex and advised on proper use. All questions answered prior to discharge. Patient expressed verbal understanding and is agreeable with plan for discharge with outpatient follow up.               Identification of Seniors at Risk      Flowsheet Row Most Recent Value   (ISAR) Identification of Seniors at Risk    Before the illness or injury that brought you to the Emergency, did you need someone to help you on a regular basis? 0 Filed at: 06/22/2024 1401   In the last 24 hours, have you needed more help than usual? 0 Filed at: 06/22/2024 1401   Have you been hospitalized for one or more nights during  the past 6 months? 0 Filed at: 06/22/2024 1401   In general, do you see well? 0 Filed at: 06/22/2024 1401   In general, do you have serious problems with your memory? 0 Filed at: 06/22/2024 1401   Do you take more than three different medications every day? 1 Filed at: 06/22/2024 1401   ISAR Score 1 Filed at: 06/22/2024 1401                      SBIRT 22yo+      Flowsheet Row Most Recent Value   Initial Alcohol Screen: US AUDIT-C     1. How often do you have a drink containing alcohol? 0 Filed at: 06/22/2024 1402   2. How many drinks containing alcohol do you have on a typical day you are drinking?  0 Filed at: 06/22/2024 1402   3a. Male UNDER 65: How often do you have five or more drinks on one occasion? 0 Filed at: 06/22/2024 1402   3b. FEMALE Any Age, or MALE 65+: How often do you have 4 or more drinks on one occassion? 0 Filed at: 06/22/2024 1402   Audit-C Score 0 Filed at: 06/22/2024 1402   LOWELL: How many times in the past year have you...    Used an illegal drug or used a prescription medication for non-medical reasons? Never Filed at: 06/22/2024 1402                  Medical Decision Making  See ED course for additional details.    Problems Addressed:  Cellulitis of left lower leg: acute illness or injury  Peripheral edema: chronic illness or injury    Risk  Prescription drug management.          Disposition  Final diagnoses:   Cellulitis of left lower leg   Peripheral edema     Time reflects when diagnosis was documented in both MDM as applicable and the Disposition within this note       Time User Action Codes Description Comment    6/22/2024  2:42 PM Nellie Winters [L03.116] Cellulitis of left lower leg     6/22/2024  2:45 PM Nellie Winters [R60.0] Peripheral edema           ED Disposition       ED Disposition   Discharge    Condition   Stable    Date/Time   Sat Jun 22, 2024 1441    Comment   Darlin Zamora discharge to home/self care.                   Follow-up Information       Follow up  With Specialties Details Why Contact Info Additional Information    Tiffanie Kamara DO Family Medicine Schedule an appointment as soon as possible for a visit in 2 days For wound/symptom re-check [address]  [address]  [place] [address] [phone]       [facility] Emergency Medicine Go to  If symptoms worsen [address] [facility] [address] [phone] [facility], [address] [place], [place], [address] [phone]            Patient's Medications   Discharge Prescriptions    CEPHALEXIN (KEFLEX) 500 MG CAPSULE    Take 1 capsule (500 mg total) by mouth every 6 (six) hours for 7 days       Start Date: 6/22/2024 End Date: 6/29/2024       Order Dose: 500 mg       Quantity: 28 capsule    Refills: 0     No discharge procedures on file.    PDMP Review       None             ED Provider  Attending physically available and evaluated Darlin Zamora. I managed the patient along with the ED Attending.    Electronically Signed by           Nellie Darling DO  06/22/24 1792

## 2024-06-22 NOTE — TELEPHONE ENCOUNTER
Pt went to the ED, and they recommended a follow up with PCP for Monday.     Please give her a call back.

## 2024-06-22 NOTE — TELEPHONE ENCOUNTER
"Regarding: Medication Question  ----- Message from Ania PRICE sent at 6/22/2024  1:17 PM EDT -----  \" My legs are swelling up again should I double my medication dose\"    "

## 2024-06-22 NOTE — DISCHARGE INSTRUCTIONS
Please follow up with your PCP as soon as possible, on Monday if possible. You have been prescribed Keflex, an antibiotic. Take this four times a day as close to six hours apart as possible. Do not skip doses, take the full course.    Please return to the Emergency Department if you experience worsening of your current symptoms, fevers, worsening swelling, difficulty breathing, chest pain, or any new/other concerning symptoms.

## 2024-06-23 NOTE — ED ATTENDING ATTESTATION
6/22/2024  I, Mike Kingston MD, saw and evaluated the patient. I have discussed the patient with the resident/non-physician practitioner and agree with the resident's/non-physician practitioner's findings, Plan of Care, and MDM as documented in the resident's/non-physician practitioner's note, except where noted. All available labs and Radiology studies were reviewed.  I was present for key portions of any procedure(s) performed by the resident/non-physician practitioner and I was immediately available to provide assistance.       At this point I agree with the current assessment done in the Emergency Department.  I have conducted an independent evaluation of this patient a history and physical is as follows:    ED Course     78-year-old female, non-insulin-dependent diabetes, hypertension, on Lasix, and developed increasing bilateral lower extremity edema 2 weeks ago, had her dose of Lasix increased with resolution of edema.  Patient states over the past several days she has had a slight recurrence of the edema, however not to the extent that she previously experienced.  Patient was instructed to come to the emergency department by a triage nurse when she called her primary care physician's office complaining of redness and leaking of fluid from her left leg.  No reported fever.          Patient with 1+ bilateral lower extremity edema.  Pictures of the left leg anterior and posterior are included above.  The patient is noted to have a superficial blistering lesion of the left pretibial surface as well as some ruptured blisters in the left posterior calf area.  Around both of these are some increased erythema.  This could represent localized venous stasis dermatitis, but with the skin breakdown there is some concern for focal cellulitis.  Patient appears clinically well, afebrile, stable vital signs, so will be treated with oral antibiotics with instructions to follow-up with her primary care  physician.    Critical Care Time  Procedures

## 2024-06-24 ENCOUNTER — OFFICE VISIT (OUTPATIENT)
Dept: FAMILY MEDICINE CLINIC | Facility: CLINIC | Age: 79
End: 2024-06-24
Payer: COMMERCIAL

## 2024-06-24 VITALS
SYSTOLIC BLOOD PRESSURE: 130 MMHG | HEART RATE: 65 BPM | HEIGHT: 59 IN | TEMPERATURE: 97.2 F | DIASTOLIC BLOOD PRESSURE: 78 MMHG | WEIGHT: 142 LBS | OXYGEN SATURATION: 95 % | BODY MASS INDEX: 28.63 KG/M2

## 2024-06-24 DIAGNOSIS — L97.821 VENOUS STASIS ULCER OF OTHER PART OF LEFT LOWER LEG LIMITED TO BREAKDOWN OF SKIN WITHOUT VARICOSE VEINS (HCC): ICD-10-CM

## 2024-06-24 DIAGNOSIS — I87.2 VENOUS STASIS ULCER OF OTHER PART OF LEFT LOWER LEG LIMITED TO BREAKDOWN OF SKIN WITHOUT VARICOSE VEINS (HCC): ICD-10-CM

## 2024-06-24 DIAGNOSIS — R60.0 BILATERAL EDEMA OF LOWER EXTREMITY: Primary | ICD-10-CM

## 2024-06-24 PROCEDURE — 29580 STRAPPING UNNA BOOT: CPT | Performed by: FAMILY MEDICINE

## 2024-06-24 PROCEDURE — A6449 LT COMPRES BAND >=3" <5"/YD: HCPCS | Performed by: FAMILY MEDICINE

## 2024-06-24 PROCEDURE — 99214 OFFICE O/P EST MOD 30 MIN: CPT | Performed by: FAMILY MEDICINE

## 2024-06-24 NOTE — TELEPHONE ENCOUNTER
Called patient LM to call back to the office to schedule.  Please put through to the office.  Cellulitis ED follow up.  Where do you want to put her.  You havd 9:30, 10 and 12:15

## 2024-06-25 ENCOUNTER — RA CDI HCC (OUTPATIENT)
Dept: OTHER | Facility: HOSPITAL | Age: 79
End: 2024-06-25

## 2024-06-26 NOTE — PROGRESS NOTES
Patient ID: Darlin Zamora is a 78 y.o. female.    HPI: 78 y.o.female presents for evaluation of lower extremity edema and cellulitis of the left lower extremity.  The patient went to the hospital over the weekend and was given Keflex for the cellulitis but diuretics were not adjusted.  Yesterday, the patient developed blisters on the left lower extremity which opened up and now she has small ulcer on the front and back of the left lower extremity.  The patient denies any orthopnea, wheezing or dyspnea with exertion    SUBJECTIVE    Family History   Problem Relation Age of Onset    Hypertension Mother     Lung cancer Father     Hypertension Sister     Lymphoma Brother 45    Hypertension Brother     Schizophrenia Brother     Depression Son     Schizophrenia Son     Hypertension Family     Heart disease Other      Social History     Socioeconomic History    Marital status:      Spouse name: Not on file    Number of children: Not on file    Years of education: Not on file    Highest education level: Not on file   Occupational History    Not on file   Tobacco Use    Smoking status: Never     Passive exposure: Past    Smokeless tobacco: Never   Vaping Use    Vaping status: Never Used   Substance and Sexual Activity    Alcohol use: Never    Drug use: No    Sexual activity: Not Currently   Other Topics Concern    Not on file   Social History Narrative    Daily coffee consumption (___cups/day)    Daily cola consumption (____cans/day)    Daily tea consumption (____cups/day)    Uses safety equipment-seatbelts     Social Determinants of Health     Financial Resource Strain: Low Risk  (11/7/2022)    Overall Financial Resource Strain (CARDIA)     Difficulty of Paying Living Expenses: Not hard at all   Food Insecurity: No Food Insecurity (5/2/2024)    Hunger Vital Sign     Worried About Running Out of Food in the Last Year: Never true     Ran Out of Food in the Last Year: Never true   Transportation Needs: No  Transportation Needs (5/2/2024)    PRAPARE - Transportation     Lack of Transportation (Medical): No     Lack of Transportation (Non-Medical): No   Physical Activity: Not on file   Stress: Not on file   Social Connections: Not on file   Intimate Partner Violence: Not on file   Housing Stability: Low Risk  (5/2/2024)    Housing Stability Vital Sign     Unable to Pay for Housing in the Last Year: No     Number of Times Moved in the Last Year: 1     Homeless in the Last Year: No     Past Medical History:   Diagnosis Date    Anemia     Cataracts, bilateral     Heart murmur 04/2018    History of transfusion 2003    had 4 units    Hypertension     Meningioma (HCC) 04/2018    MRI every 6months    Ovarian cyst 2006    Shortness of breath     Skin neoplasm     last assessed: 6/13/2017     Past Surgical History:   Procedure Laterality Date    CYSTOSCOPY N/A 2/14/2019    Procedure: CYSTOSCOPY;  Surgeon: Geronimo Tsai MD;  [facility];  Service: Gynecology Oncology    HYSTERECTOMY N/A 2/14/2019    Procedure: HYSTERECTOMY LAPAROSCOPIC TOTAL (LTH) W/ ROBOTICS bilateral salpingooophorectomy with great difficulty due to mass of 12 cm and taking 2.5 hours;  Surgeon: Geronimo Tsai MD;  [facility];  Service: Gynecology Oncology    TONSILLECTOMY       No Known Allergies    Current Outpatient Medications:     cephalexin (KEFLEX) 500 mg capsule, Take 1 capsule (500 mg total) by mouth every 6 (six) hours for 7 days, Disp: 28 capsule, Rfl: 0    furosemide (LASIX) 20 mg tablet, Take 1 tablet (20 mg total) by mouth daily, Disp: 30 tablet, Rfl: 5    Glycerin-Polysorbate 80 (REFRESH DRY EYE THERAPY OP), Apply to eye, Disp: , Rfl:     lisinopril-hydrochlorothiazide (PRINZIDE,ZESTORETIC) 20-25 MG per tablet, take 1 tablet by mouth once daily, Disp: 90 tablet, Rfl: 1    multivitamin (THERAGRAN) TABS, Take 1 tablet by mouth daily, Disp: , Rfl:     potassium chloride (Klor-Con M20) 20 mEq tablet, Take 1 tablet (20 mEq  "total) by mouth daily, Disp: 30 tablet, Rfl: 5    timolol (TIMOPTIC) 0.5 % ophthalmic solution, instill 1 drop into both eyes twice a day, Disp: , Rfl:     Review of Systems  Constitutional:     Denies fever, chills ,fatigue ,weakness ,weight loss, weight gain     ENT: Denies earache ,loss of hearing ,nosebleed, nasal discharge,nasal congestion ,sore throat ,hoarseness  Pulmonary: Denies shortness of breath ,cough  ,dyspnea on exertion, orthopnea  ,PND   Cardiovascular:  Denies bradycardia , tachycardia  ,palpations,+ lower extremity edema leg,no  claudication  Breast:  Denies new or changing breast lumps ,nipple discharge ,nipple changes  Abdomen:  Denies abdominal pain , anorexia , indigestion, nausea, vomiting, constipation, diarrhea  Musculoskeletal: Denies myalgias, arthralgias, joint swelling, joint stiffness , limb pain, limb swelling  Gu: denies dysuria, polyuria  Skin: Denies skin rash, + ulcers on left anterior lower leg and posterior calf   Neuro: Denies headache, numbness, tingling, confusion, loss of consciousness, dizziness, vertigo  Psychiatric: Denies feelings of depression, suicidal ideation, anxiety, sleep disturbances    OBJECTIVE  /78   Pulse 65   Temp (!) 97.2 °F (36.2 °C)   Ht 4' 11\" (1.499 m)   Wt 64.4 kg (142 lb)   SpO2 95%   BMI 28.68 kg/m²   Constitutional:   NAD, well appearing and well nourished      ENT:   Conjunctiva and lids: no injection, edema, or discharge     Pupils and iris: JB bilaterally    External inspection of ears and nose: normal without deformities or discharge.      Otoscopic exam: Canals patent without erythema.       Nasal mucosa, septum and turbinates: Normal or edema or discharge         Oropharynx:  Moist mucosa, normal tongue and tonsils without lesions. No erythema        Pulmonary:Respiratory effort normal rate and rhythm, no increased work of breathing. Auscultation of lungs:  Clear bilaterally with no adventitious breath sounds     "   Cardiovascular: regular rate and rhythm, S1 and S2, no murmur, +2 pitting edema of LE      Abdomen: Soft and non-distended     Positive bowel sounds      No heptomegaly or splenomegaly      Gu: no suprapubic tenderness or CVA tenderness, no urethral discharge  Lymphatic:  No anterior or posterior cervical lymphadenopathy         Musculoskeletal:  Gait and station: Normal gait      Digits and nails normal without clubbing or cyanosis       Inspection/palpation of joints, bones, and muscles:  No joint tenderness, swelling, full active and passive range of motion       Skin: Positive left lower extremity erythema and stage II ulcer on the front and back of the left lower extremity      Neuro:     Normal reflexes     Psych:   alert and oriented to person, place and time     normal mood and affect       Assessment/Plan:Diagnoses and all orders for this visit:    Bilateral edema of lower extremity  Comments:  Patient instructed for the next 4 days to double her diuretic & potassium therapy.  Ace wraps were applied bilat and the pt is to  elevate legs    Venous stasis ulcer of other part of left lower leg limited to breakdown of skin without varicose veins (HCC)  Comments:  Unna boot was applied to the left lower extremity followed by an Ace wrap and an Ace wrap for the right lower extremity to aid in controlling edema        Reviewed with patient plan to treat with above plan.  Patient instructed to call in 72 hours if not feeling better or if symptoms worsen

## 2024-06-28 ENCOUNTER — TELEPHONE (OUTPATIENT)
Dept: FAMILY MEDICINE CLINIC | Facility: CLINIC | Age: 79
End: 2024-06-28

## 2024-06-28 NOTE — TELEPHONE ENCOUNTER
Left message for patient to return call. Please transfer to office so we may speak to her directly

## 2024-06-28 NOTE — TELEPHONE ENCOUNTER
Patient called back to say she is feeling good and her legs are doing well also.  Do you want her rescheduled?

## 2024-07-24 ENCOUNTER — RA CDI HCC (OUTPATIENT)
Dept: OTHER | Facility: HOSPITAL | Age: 79
End: 2024-07-24

## 2024-07-31 ENCOUNTER — OFFICE VISIT (OUTPATIENT)
Dept: FAMILY MEDICINE CLINIC | Facility: CLINIC | Age: 79
End: 2024-07-31
Payer: COMMERCIAL

## 2024-07-31 VITALS
HEART RATE: 77 BPM | BODY MASS INDEX: 26.49 KG/M2 | TEMPERATURE: 97.2 F | HEIGHT: 59 IN | OXYGEN SATURATION: 97 % | WEIGHT: 131.4 LBS | SYSTOLIC BLOOD PRESSURE: 110 MMHG | DIASTOLIC BLOOD PRESSURE: 64 MMHG

## 2024-07-31 DIAGNOSIS — L03.115 CELLULITIS OF BOTH LOWER EXTREMITIES: ICD-10-CM

## 2024-07-31 DIAGNOSIS — E74.39 GLUCOSE INTOLERANCE: ICD-10-CM

## 2024-07-31 DIAGNOSIS — L03.116 CELLULITIS OF BOTH LOWER EXTREMITIES: ICD-10-CM

## 2024-07-31 DIAGNOSIS — R60.9 EDEMA, UNSPECIFIED TYPE: Primary | ICD-10-CM

## 2024-07-31 LAB
CREAT UR-MCNC: 108.5 MG/DL
MICROALBUMIN UR-MCNC: 26.8 MG/L
MICROALBUMIN/CREAT 24H UR: 25 MG/G CREATININE (ref 0–30)

## 2024-07-31 PROCEDURE — G2211 COMPLEX E/M VISIT ADD ON: HCPCS | Performed by: FAMILY MEDICINE

## 2024-07-31 PROCEDURE — 82043 UR ALBUMIN QUANTITATIVE: CPT | Performed by: FAMILY MEDICINE

## 2024-07-31 PROCEDURE — 99213 OFFICE O/P EST LOW 20 MIN: CPT | Performed by: FAMILY MEDICINE

## 2024-07-31 PROCEDURE — 82570 ASSAY OF URINE CREATININE: CPT | Performed by: FAMILY MEDICINE

## 2024-07-31 RX ORDER — FUROSEMIDE 40 MG/1
40 TABLET ORAL DAILY
Qty: 90 TABLET | Refills: 3 | Status: SHIPPED | OUTPATIENT
Start: 2024-07-31 | End: 2024-10-29

## 2024-08-01 ENCOUNTER — TELEPHONE (OUTPATIENT)
Dept: ADMINISTRATIVE | Facility: OTHER | Age: 79
End: 2024-08-01

## 2024-08-01 NOTE — LETTER
Diabetic Eye Exam Form    Date Requested: 24  Patient: Darlin Zamora  Patient : 1945   Referring Provider: Tiffanie Kamara DO      DIABETIC Eye Exam Date _______________________________      Type of Exam MUST be documented for Diabetic Eye Exams. Please CHECK ONE.     Retinal Exam       Dilated Retinal Exam       OCT       Optomap-Iris Exam      Fundus Photography       Left Eye - Please check Retinopathy or No Retinopathy        Exam did show retinopathy    Exam did not show retinopathy       Right Eye - Please check Retinopathy or No Retinopathy       Exam did show retinopathy    Exam did not show retinopathy       Comments __________________________________________________________    Practice Providing Exam ______________________________________________    Exam Performed By (print name) _______________________________________      Provider Signature ___________________________________________________      These reports are needed for  compliance.  Please fax this completed form and a copy of the Diabetic Eye Exam report to our office located at [address] [place] [address] as soon as possible via Fax [phone] attention [place]: Phone [phone]  We thank you for your assistance in treating our mutual patient.   Dr Wang - [phone] F [phone]

## 2024-08-01 NOTE — TELEPHONE ENCOUNTER
----- Message from Leslie SCHERER sent at 7/31/2024  9:58 AM EDT -----  07/31/24 9:58 AM    Hello, our patient Darlin Zamora. has had Diabetic Eye Exam completed/performed. Please assist in updating the patient chart by making an External outreach to Dr. Desai facility located in [place], [place]. The date of service is within this past year.    Thank you,  Leslie Garces MA  [facility]

## 2024-08-01 NOTE — LETTER
Diabetic Eye Exam Form    Date Requested: 24  Patient: Darlin Zamora  Patient : 1945   Referring Provider: Tiffanie Kamara DO      DIABETIC Eye Exam Date _______________________________      Type of Exam MUST be documented for Diabetic Eye Exams. Please CHECK ONE.     Retinal Exam       Dilated Retinal Exam       OCT       Optomap-Iris Exam      Fundus Photography       Left Eye - Please check Retinopathy or No Retinopathy        Exam did show retinopathy    Exam did not show retinopathy       Right Eye - Please check Retinopathy or No Retinopathy       Exam did show retinopathy    Exam did not show retinopathy       Comments __________________________________________________________    Practice Providing Exam ______________________________________________    Exam Performed By (print name) _______________________________________      Provider Signature ___________________________________________________      These reports are needed for  compliance.  Please fax this completed form and a copy of the Diabetic Eye Exam report to our office located at [address] [place] [address] as soon as possible via Fax [phone] attention [place]: Phone [phone]  We thank you for your assistance in treating our mutual patient.    Dr Wang - [phone] F [phone]

## 2024-08-02 NOTE — TELEPHONE ENCOUNTER
Upon review of the In Basket request and the patient's chart, initial outreach has been made via fax to facility. Please see Contacts section for details.     Thank you  Rebeca Castellon MA

## 2024-08-06 NOTE — PROGRESS NOTES
Patient ID: Darlin Zamora is a 78 y.o. female.    HPI: 78 y.o.female presents for evaluation of edema in the lower extremities and recheck of cellulitis to lower extremities.  She has finished antibiotics states there is no redness warmth or fever.  Edema has improved as well with increased dose of diuretics and she denies any dyspnea or orthopnea.  She is due for blood work to check on her glucose intolerance.    SUBJECTIVE    Family History   Problem Relation Age of Onset    Hypertension Mother     Lung cancer Father     Hypertension Sister     Lymphoma Brother 45    Hypertension Brother     Schizophrenia Brother     Depression Son     Schizophrenia Son     Hypertension Family     Heart disease Other      Social History     Socioeconomic History    Marital status:      Spouse name: Not on file    Number of children: Not on file    Years of education: Not on file    Highest education level: Not on file   Occupational History    Not on file   Tobacco Use    Smoking status: Never     Passive exposure: Past    Smokeless tobacco: Never   Vaping Use    Vaping status: Never Used   Substance and Sexual Activity    Alcohol use: Never    Drug use: No    Sexual activity: Not Currently   Other Topics Concern    Not on file   Social History Narrative    Daily coffee consumption (___cups/day)    Daily cola consumption (____cans/day)    Daily tea consumption (____cups/day)    Uses safety equipment-seatbelts     Social Determinants of Health     Financial Resource Strain: Low Risk  (11/7/2022)    Overall Financial Resource Strain (CARDIA)     Difficulty of Paying Living Expenses: Not hard at all   Food Insecurity: No Food Insecurity (5/2/2024)    Hunger Vital Sign     Worried About Running Out of Food in the Last Year: Never true     Ran Out of Food in the Last Year: Never true   Transportation Needs: No Transportation Needs (5/2/2024)    PRAPARE - Transportation     Lack of Transportation (Medical): No     Lack of  Transportation (Non-Medical): No   Physical Activity: Not on file   Stress: Not on file   Social Connections: Not on file   Intimate Partner Violence: Not on file   Housing Stability: Low Risk  (5/2/2024)    Housing Stability Vital Sign     Unable to Pay for Housing in the Last Year: No     Number of Times Moved in the Last Year: 1     Homeless in the Last Year: No     Past Medical History:   Diagnosis Date    Anemia     Cataracts, bilateral     Heart murmur 04/2018    History of transfusion 2003    had 4 units    Hypertension     Meningioma (HCC) 04/2018    MRI every 6months    Ovarian cyst 2006    Shortness of breath     Skin neoplasm     last assessed: 6/13/2017     Past Surgical History:   Procedure Laterality Date    CYSTOSCOPY N/A 2/14/2019    Procedure: CYSTOSCOPY;  Surgeon: Geronimo Tsai MD;  [facility];  Service: Gynecology Oncology    HYSTERECTOMY N/A 2/14/2019    Procedure: HYSTERECTOMY LAPAROSCOPIC TOTAL (LTH) W/ ROBOTICS bilateral salpingooophorectomy with great difficulty due to mass of 12 cm and taking 2.5 hours;  Surgeon: Geronimo Tsai MD;  [facility];  Service: Gynecology Oncology    TONSILLECTOMY       No Known Allergies    Current Outpatient Medications:     furosemide (LASIX) 40 mg tablet, Take 1 tablet (40 mg total) by mouth daily, Disp: 90 tablet, Rfl: 3    Glycerin-Polysorbate 80 (REFRESH DRY EYE THERAPY OP), Apply to eye, Disp: , Rfl:     lisinopril-hydrochlorothiazide (PRINZIDE,ZESTORETIC) 20-25 MG per tablet, take 1 tablet by mouth once daily, Disp: 90 tablet, Rfl: 1    multivitamin (THERAGRAN) TABS, Take 1 tablet by mouth daily, Disp: , Rfl:     potassium chloride (Klor-Con M20) 20 mEq tablet, Take 1 tablet (20 mEq total) by mouth daily, Disp: 30 tablet, Rfl: 5    timolol (TIMOPTIC) 0.5 % ophthalmic solution, instill 1 drop into both eyes twice a day, Disp: , Rfl:     Review of Systems  Constitutional:     Denies fever, chills ,fatigue ,weakness ,weight loss,  "weight gain     ENT: Denies earache ,loss of hearing ,nosebleed, nasal discharge,nasal congestion ,sore throat ,hoarseness  Pulmonary: Denies shortness of breath ,cough  ,dyspnea on exertion, orthopnea  ,PND   Cardiovascular:  Denies bradycardia , tachycardia  ,palpations, resolved lower extremity edema legs, no claudication  Breast:  Denies new or changing breast lumps ,nipple discharge ,nipple changes  Abdomen:  Denies abdominal pain , anorexia , indigestion, nausea, vomiting, constipation, diarrhea  Musculoskeletal: Denies myalgias, arthralgias, joint swelling, joint stiffness , limb pain, limb swelling  Gu: denies dysuria, polyuria  Skin: Denies skin rash, skin lesion, skin wound, itching, dry skin  Neuro: Denies headache, numbness, tingling, confusion, loss of consciousness, dizziness, vertigo  Psychiatric: Denies feelings of depression, suicidal ideation, anxiety, sleep disturbances    OBJECTIVE  /64   Pulse 77   Temp (!) 97.2 °F (36.2 °C)   Ht 4' 11\" (1.499 m)   Wt 59.6 kg (131 lb 6.4 oz)   SpO2 97%   BMI 26.54 kg/m²   Constitutional:   NAD, well appearing and well nourished      ENT:   Conjunctiva and lids: no injection, edema, or discharge     Pupils and iris: JB bilaterally    External inspection of ears and nose: normal without deformities or discharge.      Otoscopic exam: Canals patent without erythema.       Nasal mucosa, septum and turbinates: Normal or edema or discharge         Oropharynx:  Moist mucosa, normal tongue and tonsils without lesions. No erythema        Pulmonary:Respiratory effort normal rate and rhythm, no increased work of breathing. Auscultation of lungs:  Clear bilaterally with no adventitious breath sounds       Cardiovascular: regular rate and rhythm, S1 and S2, no murmur, no edema and/or varicosities of LE      Abdomen: Soft and non-distended     Positive bowel sounds      No heptomegaly or splenomegaly      Gu: no suprapubic tenderness or CVA tenderness, no " urethral discharge  Lymphatic:  No anterior or posterior cervical lymphadenopathy         Musculoskeletal:  Gait and station: Normal gait      Digits and nails normal without clubbing or cyanosis       Inspection/palpation of joints, bones, and muscles:  No joint tenderness, swelling, full active and passive range of motion       Skin: Normal skin turgor and no rashes      Neuro:    Normal reflexes     Psych:   alert and oriented to person, place and time     normal mood and affect       Assessment/Plan:Diagnoses and all orders for this visit:    Edema, unspecified type  Comments:  Continue with diuretic therapy.  Orders:  -     furosemide (LASIX) 40 mg tablet; Take 1 tablet (40 mg total) by mouth daily    Cellulitis of both lower extremities  Comments:  Resolved with antibiotic therapy    Glucose intolerance  Comments:  Continue low carbohydrate, low concentrated sweets diet.  Orders:  -     Albumin / creatinine urine ratio        Reviewed with patient plan to treat with above plan.  Patient instructed to call in 72 hours if not feeling better or if symptoms worsen

## 2024-08-08 NOTE — TELEPHONE ENCOUNTER
As a follow-up, a second attempt has been made for outreach via fax to facility. Please see Contacts section for details.    Thank you  Rebeca Castellon MA

## 2024-08-15 NOTE — TELEPHONE ENCOUNTER
As a final attempt, a third outreach has been made via telephone call to facility. Please see Contacts section for details. This encounter will be closed and completed by end of day. Should we receive the requested information because of previous outreach attempts, the requested patient's chart will be updated appropriately.     Thank you  Rebeca aCstellon MA

## 2024-09-17 ENCOUNTER — TELEPHONE (OUTPATIENT)
Age: 79
End: 2024-09-17

## 2024-09-17 ENCOUNTER — OFFICE VISIT (OUTPATIENT)
Dept: FAMILY MEDICINE CLINIC | Facility: CLINIC | Age: 79
End: 2024-09-17
Payer: COMMERCIAL

## 2024-09-17 VITALS
WEIGHT: 128.2 LBS | BODY MASS INDEX: 25.84 KG/M2 | DIASTOLIC BLOOD PRESSURE: 80 MMHG | SYSTOLIC BLOOD PRESSURE: 130 MMHG | TEMPERATURE: 97.8 F | HEART RATE: 53 BPM | HEIGHT: 59 IN | OXYGEN SATURATION: 97 %

## 2024-09-17 DIAGNOSIS — R60.9 EDEMA, UNSPECIFIED TYPE: ICD-10-CM

## 2024-09-17 DIAGNOSIS — D32.9 MENINGIOMA (HCC): ICD-10-CM

## 2024-09-17 DIAGNOSIS — D49.89 NEOPLASM OF FACE: ICD-10-CM

## 2024-09-17 DIAGNOSIS — D49.89 NEOPLASM OF FACE: Primary | ICD-10-CM

## 2024-09-17 DIAGNOSIS — E74.39 GLUCOSE INTOLERANCE: ICD-10-CM

## 2024-09-17 DIAGNOSIS — E55.9 VITAMIN D DEFICIENCY: ICD-10-CM

## 2024-09-17 DIAGNOSIS — D50.9 IRON DEFICIENCY ANEMIA, UNSPECIFIED IRON DEFICIENCY ANEMIA TYPE: ICD-10-CM

## 2024-09-17 DIAGNOSIS — E78.00 HYPERCHOLESTEROLEMIA: ICD-10-CM

## 2024-09-17 DIAGNOSIS — I10 PRIMARY HYPERTENSION: Primary | ICD-10-CM

## 2024-09-17 DIAGNOSIS — R73.01 IMPAIRED FASTING GLUCOSE: ICD-10-CM

## 2024-09-17 PROCEDURE — G2211 COMPLEX E/M VISIT ADD ON: HCPCS | Performed by: FAMILY MEDICINE

## 2024-09-17 PROCEDURE — 99214 OFFICE O/P EST MOD 30 MIN: CPT | Performed by: FAMILY MEDICINE

## 2024-09-17 RX ORDER — POTASSIUM CHLORIDE 1500 MG/1
20 TABLET, EXTENDED RELEASE ORAL DAILY
Qty: 30 TABLET | Refills: 5 | Status: SHIPPED | OUTPATIENT
Start: 2024-09-17

## 2024-09-17 NOTE — TELEPHONE ENCOUNTER
Patient called for a spot of concern/had cancer before  Referral in for derms and plastics to be seen soon/plastics not seeing soc anymore and derms first available too far  Will call her pcp

## 2024-09-17 NOTE — TELEPHONE ENCOUNTER
Darlin is calling because she is having trouble connecting to the plastic surgeon that she was referred to today - Dr. Sharp. Attempt made to call the phone number listed for him but it is not in service. Internet search pulls a obituary for this provider in 2021.    Please advise and return call to patient she is looking for an alternate recommendation and contact.     Thank you

## 2024-09-17 NOTE — TELEPHONE ENCOUNTER
Pt called in stating plastic surgery and dermatology aren't taking any new patients at this time. She's requesting a call back.

## 2024-09-19 ENCOUNTER — TELEPHONE (OUTPATIENT)
Dept: FAMILY MEDICINE CLINIC | Facility: CLINIC | Age: 79
End: 2024-09-19

## 2024-09-19 NOTE — PROGRESS NOTES
Patient ID: Darlin Zamora is a 78 y.o. female.    HPI: 78 y.o.female presents for follow up hypertension , hyperglycemia, vit d deficiency, history of meningioma,hypercholesterolemia. Pt denies any dizziness,  chest pain, palpitations, or dypsnea with exertion.  Her edema is well controlled.      SUBJECTIVE    Family History   Problem Relation Age of Onset    Hypertension Mother     Lung cancer Father     Hypertension Sister     Lymphoma Brother 45    Hypertension Brother     Schizophrenia Brother     Depression Son     Schizophrenia Son     Hypertension Family     Heart disease Other      Social History     Socioeconomic History    Marital status:      Spouse name: Not on file    Number of children: Not on file    Years of education: Not on file    Highest education level: Not on file   Occupational History    Not on file   Tobacco Use    Smoking status: Never     Passive exposure: Past    Smokeless tobacco: Never   Vaping Use    Vaping status: Never Used   Substance and Sexual Activity    Alcohol use: Never    Drug use: No    Sexual activity: Not Currently   Other Topics Concern    Not on file   Social History Narrative    Daily coffee consumption (___cups/day)    Daily cola consumption (____cans/day)    Daily tea consumption (____cups/day)    Uses safety equipment-seatbelts     Social Determinants of Health     Financial Resource Strain: Low Risk  (11/7/2022)    Overall Financial Resource Strain (CARDIA)     Difficulty of Paying Living Expenses: Not hard at all   Food Insecurity: No Food Insecurity (5/2/2024)    Hunger Vital Sign     Worried About Running Out of Food in the Last Year: Never true     Ran Out of Food in the Last Year: Never true   Transportation Needs: No Transportation Needs (5/2/2024)    PRAPARE - Transportation     Lack of Transportation (Medical): No     Lack of Transportation (Non-Medical): No   Physical Activity: Not on file   Stress: Not on file   Social Connections: Not on file    Intimate Partner Violence: Not on file   Housing Stability: Low Risk  (5/2/2024)    Housing Stability Vital Sign     Unable to Pay for Housing in the Last Year: No     Number of Times Moved in the Last Year: 1     Homeless in the Last Year: No     Past Medical History:   Diagnosis Date    Anemia     Cataracts, bilateral     Heart murmur 04/2018    History of transfusion 2003    had 4 units    Hypertension     Meningioma (HCC) 04/2018    MRI every 6months    Ovarian cyst 2006    Shortness of breath     Skin neoplasm     last assessed: 6/13/2017     Past Surgical History:   Procedure Laterality Date    CYSTOSCOPY N/A 2/14/2019    Procedure: CYSTOSCOPY;  Surgeon: Geronimo Tsai MD;  [facility];  Service: Gynecology Oncology    HYSTERECTOMY N/A 2/14/2019    Procedure: HYSTERECTOMY LAPAROSCOPIC TOTAL (LTH) W/ ROBOTICS bilateral salpingooophorectomy with great difficulty due to mass of 12 cm and taking 2.5 hours;  Surgeon: Geronimo Tsai MD;  Location[facility];  Service: Gynecology Oncology    TONSILLECTOMY       No Known Allergies    Current Outpatient Medications:     Cholecalciferol (VITAMIN D3) 1,000 units tablet, Take 1,000 Units by mouth daily, Disp: , Rfl:     Glycerin-Polysorbate 80 (REFRESH DRY EYE THERAPY OP), Apply to eye, Disp: , Rfl:     lisinopril-hydrochlorothiazide (PRINZIDE,ZESTORETIC) 20-25 MG per tablet, take 1 tablet by mouth once daily, Disp: 90 tablet, Rfl: 1    multivitamin (THERAGRAN) TABS, Take 1 tablet by mouth daily, Disp: , Rfl:     potassium chloride (Klor-Con M20) 20 mEq tablet, Take 1 tablet (20 mEq total) by mouth daily, Disp: 30 tablet, Rfl: 5    timolol (TIMOPTIC) 0.5 % ophthalmic solution, instill 1 drop into both eyes twice a day, Disp: , Rfl:     furosemide (LASIX) 40 mg tablet, Take 1 tablet (40 mg total) by mouth daily (Patient not taking: Reported on 9/17/2024), Disp: 90 tablet, Rfl: 3    Review of Systems  Constitutional:     Denies fever, chills ,fatigue  ",weakness ,weight loss, weight gain     ENT: Denies earache ,loss of hearing ,nosebleed, nasal discharge,nasal congestion ,sore throat ,hoarseness  Pulmonary: Denies shortness of breath ,cough  ,dyspnea on exertion, orthopnea  ,PND   Cardiovascular:  Denies bradycardia , tachycardia  ,palpations, lower extremity edema leg, claudication  Breast:  Denies new or changing breast lumps ,nipple discharge ,nipple changes  Abdomen:  Denies abdominal pain , anorexia , indigestion, nausea, vomiting, constipation, diarrhea  Musculoskeletal: Denies myalgias, arthralgias, joint swelling, joint stiffness , limb pain, limb swelling  Gu: denies dysuria, polyuria  Skin: Denies skin rash, skin lesion, skin wound, itching, dry skin  Neuro: Denies headache, numbness, tingling, confusion, loss of consciousness, dizziness, vertigo  Psychiatric: Denies feelings of depression, suicidal ideation, anxiety, sleep disturbances    OBJECTIVE  /80   Pulse (!) 53   Temp 97.8 °F (36.6 °C)   Ht 4' 11\" (1.499 m)   Wt 58.2 kg (128 lb 3.2 oz)   SpO2 97%   BMI 25.89 kg/m²   Constitutional:   NAD, well appearing and well nourished      ENT:   Conjunctiva and lids: no injection, edema, or discharge     Pupils and iris: JB bilaterally    External inspection of ears and nose: normal without deformities or discharge.      Otoscopic exam: Canals patent without erythema.       Nasal mucosa, septum and turbinates: Normal or edema or discharge         Oropharynx:  Moist mucosa, normal tongue and tonsils without lesions. No erythema        Pulmonary:Respiratory effort normal rate and rhythm, no increased work of breathing. Auscultation of lungs:  Clear bilaterally with no adventitious breath sounds       Cardiovascular: regular rate and rhythm, S1 and S2, no murmur, no edema and/or varicosities of LE      Abdomen: Soft and non-distended     Positive bowel sounds      No heptomegaly or splenomegaly      Gu: no suprapubic tenderness or CVA " tenderness, no urethral discharge  Lymphatic:  No anterior or posterior cervical lymphadenopathy         Musculoskeletal:  Gait and station: Normal gait      Digits and nails normal without clubbing or cyanosis       Inspection/palpation of joints, bones, and muscles:  No joint tenderness, swelling, full active and passive range of motion       Skin: Normal skin turgor and no rashes      Neuro:     Normal reflexes     Psych:   alert and oriented to person, place and time     normal mood and affect       Assessment/Plan:Diagnoses and all orders for this visit:    Primary hypertension  Comments:  Stable on ACE therapy.  Orders:  -     Comprehensive metabolic panel; Future    Neoplasm of face  Comments:  Await plastic surgery evaluation.  Patient has had basal cell cancers removed in the past  Orders:  -     Cancel: Ambulatory Referral to Plastic Surgery; Future    Edema, unspecified type  Comments:  Continue prn lasix therapy  Orders:  -     potassium chloride (Klor-Con M20) 20 mEq tablet; Take 1 tablet (20 mEq total) by mouth daily    Iron deficiency anemia, unspecified iron deficiency anemia type  -     CBC and differential; Future    Vitamin D deficiency  -     Vitamin D 25 hydroxy; Future    Glucose intolerance  -     Hemoglobin A1C; Future    Hypercholesterolemia  -     Lipid panel; Future    Meningioma (HCC)  Comments:  Pt has regular surveillance by neurosurgery    Impaired fasting glucose  -     Hemoglobin A1C; Future    Other orders  -     Cholecalciferol (VITAMIN D3) 1,000 units tablet; Take 1,000 Units by mouth daily        Reviewed with patient plan to treat with above plan.    Patient instructed to call in 72 hours if not feeling better or if symptoms worse

## 2024-09-20 NOTE — TELEPHONE ENCOUNTER
Rec'd call from patient requesting to cancel 6/2025 with Dr. Horn due to she has secured appointment sooner with outside provider.    Appt cancelled.

## 2024-09-23 ENCOUNTER — TELEPHONE (OUTPATIENT)
Dept: PLASTIC SURGERY | Facility: CLINIC | Age: 79
End: 2024-09-23

## 2024-09-23 DIAGNOSIS — D49.89 NEOPLASM OF FACE: Primary | ICD-10-CM

## 2024-09-23 NOTE — TELEPHONE ENCOUNTER
Called and LVM for patient letting her know that we received a referral from her provider and that we are not seeing these types of conditions at our office at this time. That I will be changing her referral to Dermatology and that she can reach out to their office to schedule an appointment.

## 2024-09-24 NOTE — TELEPHONE ENCOUNTER
Patient called stating that she received a call from someone in plastics asking her to call derms for an apt. Advised patient that she called 9/20 and canceled her scheduled apt 6/2025    She stated she's scheduled with someone tomorrow and was just confused when she got the call from plastics asking her to call derms.

## 2025-01-01 ENCOUNTER — ANESTHESIA (INPATIENT)
Dept: PERIOP | Facility: HOSPITAL | Age: 80
End: 2025-01-01
Payer: COMMERCIAL

## 2025-01-01 ENCOUNTER — APPOINTMENT (INPATIENT)
Dept: RADIOLOGY | Facility: HOSPITAL | Age: 80
DRG: 853 | End: 2025-01-01
Payer: COMMERCIAL

## 2025-01-01 ENCOUNTER — APPOINTMENT (INPATIENT)
Dept: NON INVASIVE DIAGNOSTICS | Facility: HOSPITAL | Age: 80
DRG: 853 | End: 2025-01-01
Payer: COMMERCIAL

## 2025-01-01 ENCOUNTER — ANESTHESIA EVENT (OUTPATIENT)
Dept: ANESTHESIOLOGY | Facility: HOSPITAL | Age: 80
End: 2025-01-01

## 2025-01-01 ENCOUNTER — APPOINTMENT (INPATIENT)
Dept: NEUROLOGY | Facility: CLINIC | Age: 80
DRG: 853 | End: 2025-01-01
Payer: COMMERCIAL

## 2025-01-01 ENCOUNTER — APPOINTMENT (EMERGENCY)
Dept: RADIOLOGY | Facility: HOSPITAL | Age: 80
DRG: 853 | End: 2025-01-01
Payer: COMMERCIAL

## 2025-01-01 ENCOUNTER — ANESTHESIA EVENT (INPATIENT)
Dept: PERIOP | Facility: HOSPITAL | Age: 80
End: 2025-01-01
Payer: COMMERCIAL

## 2025-01-01 ENCOUNTER — ANESTHESIA (OUTPATIENT)
Dept: ANESTHESIOLOGY | Facility: HOSPITAL | Age: 80
End: 2025-01-01

## 2025-01-01 ENCOUNTER — APPOINTMENT (INPATIENT)
Dept: GASTROENTEROLOGY | Facility: HOSPITAL | Age: 80
DRG: 853 | End: 2025-01-01
Payer: COMMERCIAL

## 2025-01-01 ENCOUNTER — HOSPITAL ENCOUNTER (INPATIENT)
Facility: HOSPITAL | Age: 80
LOS: 14 days | DRG: 853 | End: 2025-02-09
Attending: EMERGENCY MEDICINE | Admitting: EMERGENCY MEDICINE
Payer: COMMERCIAL

## 2025-01-01 VITALS
RESPIRATION RATE: 24 BRPM | TEMPERATURE: 98.9 F | WEIGHT: 144.4 LBS | BODY MASS INDEX: 29.11 KG/M2 | OXYGEN SATURATION: 68 % | DIASTOLIC BLOOD PRESSURE: 77 MMHG | HEART RATE: 110 BPM | HEIGHT: 59 IN | SYSTOLIC BLOOD PRESSURE: 168 MMHG

## 2025-01-01 DIAGNOSIS — K44.9 HIATAL HERNIA: ICD-10-CM

## 2025-01-01 DIAGNOSIS — I95.9 HYPOTENSION, UNSPECIFIED HYPOTENSION TYPE: ICD-10-CM

## 2025-01-01 DIAGNOSIS — J96.01 ACUTE HYPOXIC RESPIRATORY FAILURE (HCC): ICD-10-CM

## 2025-01-01 DIAGNOSIS — R41.82 ALTERED MENTAL STATUS: ICD-10-CM

## 2025-01-01 DIAGNOSIS — A41.9 SEPTIC SHOCK (HCC): ICD-10-CM

## 2025-01-01 DIAGNOSIS — R57.9 SHOCK (HCC): Primary | ICD-10-CM

## 2025-01-01 DIAGNOSIS — R65.21 SEPTIC SHOCK (HCC): ICD-10-CM

## 2025-01-01 DIAGNOSIS — R00.1 BRADYCARDIA: ICD-10-CM

## 2025-01-01 DIAGNOSIS — T17.908A ASPIRATION INTO AIRWAY: ICD-10-CM

## 2025-01-01 DIAGNOSIS — T68.XXXA HYPOTHERMIA, INITIAL ENCOUNTER: ICD-10-CM

## 2025-01-01 DIAGNOSIS — B35.1 ONYCHOMYCOSIS: ICD-10-CM

## 2025-01-01 LAB
2HR DELTA HS TROPONIN: -5 NG/L
2HR DELTA HS TROPONIN: 1 NG/L
4HR DELTA HS TROPONIN: -6 NG/L
4HR DELTA HS TROPONIN: 5 NG/L
ABO GROUP BLD BPU: NORMAL
ABO GROUP BLD: NORMAL
ALBUMIN SERPL BCG-MCNC: 2.8 G/DL (ref 3.5–5)
ALBUMIN SERPL BCG-MCNC: 2.9 G/DL (ref 3.5–5)
ALBUMIN SERPL BCG-MCNC: 3.1 G/DL (ref 3.5–5)
ALBUMIN SERPL BCG-MCNC: 3.1 G/DL (ref 3.5–5)
ALBUMIN SERPL BCG-MCNC: 3.9 G/DL (ref 3.5–5)
ALP SERPL-CCNC: 150 U/L (ref 34–104)
ALP SERPL-CCNC: 157 U/L (ref 34–104)
ALP SERPL-CCNC: 222 U/L (ref 34–104)
ALP SERPL-CCNC: 261 U/L (ref 34–104)
ALP SERPL-CCNC: 294 U/L (ref 34–104)
ALT SERPL W P-5'-P-CCNC: 107 U/L (ref 7–52)
ALT SERPL W P-5'-P-CCNC: 119 U/L (ref 7–52)
ALT SERPL W P-5'-P-CCNC: 153 U/L (ref 7–52)
ALT SERPL W P-5'-P-CCNC: 46 U/L (ref 7–52)
ALT SERPL W P-5'-P-CCNC: 83 U/L (ref 7–52)
AMMONIA PLAS-SCNC: 15 UMOL/L (ref 18–72)
AMPAR1 IGG CSF QL IF: NEGATIVE
AMPAR2 IGG CSF QL IF: NEGATIVE
AMPHIPHYSIN AB CSF QL IF: NEGATIVE
AMYLASE SERPL-CCNC: 41 IU/L (ref 29–103)
ANION GAP SERPL CALCULATED.3IONS-SCNC: 10 MMOL/L (ref 4–13)
ANION GAP SERPL CALCULATED.3IONS-SCNC: 11 MMOL/L (ref 4–13)
ANION GAP SERPL CALCULATED.3IONS-SCNC: 12 MMOL/L (ref 4–13)
ANION GAP SERPL CALCULATED.3IONS-SCNC: 2 MMOL/L (ref 4–13)
ANION GAP SERPL CALCULATED.3IONS-SCNC: 5 MMOL/L (ref 4–13)
ANION GAP SERPL CALCULATED.3IONS-SCNC: 6 MMOL/L (ref 4–13)
ANION GAP SERPL CALCULATED.3IONS-SCNC: 6 MMOL/L (ref 4–13)
ANION GAP SERPL CALCULATED.3IONS-SCNC: 7 MMOL/L (ref 4–13)
ANION GAP SERPL CALCULATED.3IONS-SCNC: 8 MMOL/L (ref 4–13)
ANION GAP SERPL CALCULATED.3IONS-SCNC: 9 MMOL/L (ref 4–13)
ANISOCYTOSIS BLD QL SMEAR: PRESENT
ANISOCYTOSIS BLD QL SMEAR: PRESENT
AORTIC ROOT: 2.8 CM
APAP SERPL-MCNC: <2 UG/ML (ref 10–20)
APPEARANCE CSF: CLEAR
AQP4 H2O CHANNEL AB CSF QL IF: NEGATIVE
ARTERIAL PATENCY WRIST A: YES
ASCENDING AORTA: 3 CM
AST SERPL W P-5'-P-CCNC: 107 U/L (ref 13–39)
AST SERPL W P-5'-P-CCNC: 155 U/L (ref 13–39)
AST SERPL W P-5'-P-CCNC: 24 U/L (ref 13–39)
AST SERPL W P-5'-P-CCNC: 69 U/L (ref 13–39)
AST SERPL W P-5'-P-CCNC: 97 U/L (ref 13–39)
ATRIAL RATE: 45 BPM
ATRIAL RATE: 64 BPM
ATRIAL RATE: 69 BPM
ATRIAL RATE: 70 BPM
ATRIAL RATE: 72 BPM
ATRIAL RATE: 86 BPM
ATRIAL RATE: 87 BPM
B BURGDOR IGG SERPL QL IA: POSITIVE
B BURGDOR IGG+IGM SER QL IA: POSITIVE
B BURGDOR IGM SERPL QL IA: NEGATIVE
BACTERIA BLD CULT: NORMAL
BACTERIA BLD CULT: NORMAL
BACTERIA BRONCH AEROBE CULT: ABNORMAL
BACTERIA BRONCH AEROBE CULT: ABNORMAL
BACTERIA CSF CULT: NO GROWTH
BACTERIA SPT RESP CULT: ABNORMAL
BACTERIA SPT RESP CULT: ABNORMAL
BASE EX.OXY STD BLDV CALC-SCNC: 72.8 % (ref 60–80)
BASE EX.OXY STD BLDV CALC-SCNC: 87.7 % (ref 60–80)
BASE EX.OXY STD BLDV CALC-SCNC: 92.2 % (ref 60–80)
BASE EXCESS BLDA CALC-SCNC: -0.6 MMOL/L
BASE EXCESS BLDA CALC-SCNC: -1.8 MMOL/L
BASE EXCESS BLDA CALC-SCNC: -2 MMOL/L (ref -2–3)
BASE EXCESS BLDA CALC-SCNC: -2 MMOL/L (ref -2–3)
BASE EXCESS BLDA CALC-SCNC: -3.6 MMOL/L
BASE EXCESS BLDA CALC-SCNC: -5 MMOL/L (ref -2–3)
BASE EXCESS BLDA CALC-SCNC: 0 MMOL/L (ref -2–3)
BASE EXCESS BLDA CALC-SCNC: 0.2 MMOL/L
BASE EXCESS BLDA CALC-SCNC: 1.5 MMOL/L
BASE EXCESS BLDV CALC-SCNC: -0.9 MMOL/L
BASE EXCESS BLDV CALC-SCNC: 2.3 MMOL/L
BASE EXCESS BLDV CALC-SCNC: 3.3 MMOL/L
BASOPHILS # BLD AUTO: 0 THOUSANDS/ΜL (ref 0–0.1)
BASOPHILS # BLD AUTO: 0.01 THOUSANDS/ΜL (ref 0–0.1)
BASOPHILS # BLD AUTO: 0.01 THOUSANDS/ΜL (ref 0–0.1)
BASOPHILS # BLD AUTO: 0.02 THOUSANDS/ΜL (ref 0–0.1)
BASOPHILS # BLD MANUAL: 0 THOUSAND/UL (ref 0–0.1)
BASOPHILS NFR BLD AUTO: 0 % (ref 0–1)
BASOPHILS NFR MAR MANUAL: 0 % (ref 0–1)
BILIRUB SERPL-MCNC: 0.4 MG/DL (ref 0.2–1)
BILIRUB SERPL-MCNC: 0.49 MG/DL (ref 0.2–1)
BILIRUB SERPL-MCNC: 0.95 MG/DL (ref 0.2–1)
BILIRUB SERPL-MCNC: 2 MG/DL (ref 0.2–1)
BILIRUB SERPL-MCNC: 2.88 MG/DL (ref 0.2–1)
BILIRUB UR QL STRIP: NEGATIVE
BLD GP AB SCN SERPL QL: NEGATIVE
BNP SERPL-MCNC: 85 PG/ML (ref 0–100)
BODY TEMPERATURE: 99.1 DEGREES FEHRENHEIT
BPU ID: NORMAL
BSA FOR ECHO PROCEDURE: 1.61 M2
BUN SERPL-MCNC: 18 MG/DL (ref 5–25)
BUN SERPL-MCNC: 19 MG/DL (ref 5–25)
BUN SERPL-MCNC: 21 MG/DL (ref 5–25)
BUN SERPL-MCNC: 22 MG/DL (ref 5–25)
BUN SERPL-MCNC: 23 MG/DL (ref 5–25)
BUN SERPL-MCNC: 23 MG/DL (ref 5–25)
BUN SERPL-MCNC: 24 MG/DL (ref 5–25)
BUN SERPL-MCNC: 26 MG/DL (ref 5–25)
BUN SERPL-MCNC: 27 MG/DL (ref 5–25)
BUN SERPL-MCNC: 27 MG/DL (ref 5–25)
BUN SERPL-MCNC: 31 MG/DL (ref 5–25)
BUN SERPL-MCNC: 37 MG/DL (ref 5–25)
BUN SERPL-MCNC: 41 MG/DL (ref 5–25)
BUN SERPL-MCNC: 46 MG/DL (ref 5–25)
BUN SERPL-MCNC: 48 MG/DL (ref 5–25)
BUN SERPL-MCNC: 51 MG/DL (ref 5–25)
BUN SERPL-MCNC: 52 MG/DL (ref 5–25)
BUN SERPL-MCNC: 53 MG/DL (ref 5–25)
BURR CELLS BLD QL SMEAR: PRESENT
C GATTII+NEOFOR DNA CSF QL NAA+NON-PROBE: NOT DETECTED
CA-I BLD-SCNC: 1.04 MMOL/L (ref 1.12–1.32)
CA-I BLD-SCNC: 1.05 MMOL/L (ref 1.12–1.32)
CA-I BLD-SCNC: 1.08 MMOL/L (ref 1.12–1.32)
CA-I BLD-SCNC: 1.1 MMOL/L (ref 1.12–1.32)
CA-I BLD-SCNC: 1.11 MMOL/L (ref 1.12–1.32)
CA-I BLD-SCNC: 1.12 MMOL/L (ref 1.12–1.32)
CA-I BLD-SCNC: 1.16 MMOL/L (ref 1.12–1.32)
CA-I BLD-SCNC: 1.18 MMOL/L (ref 1.12–1.32)
CA-I BLD-SCNC: 1.23 MMOL/L (ref 1.12–1.32)
CALCIUM ALBUM COR SERPL-MCNC: 8.9 MG/DL (ref 8.3–10.1)
CALCIUM ALBUM COR SERPL-MCNC: 9.1 MG/DL (ref 8.3–10.1)
CALCIUM ALBUM COR SERPL-MCNC: 9.1 MG/DL (ref 8.3–10.1)
CALCIUM ALBUM COR SERPL-MCNC: 9.4 MG/DL (ref 8.3–10.1)
CALCIUM SERPL-MCNC: 7.4 MG/DL (ref 8.4–10.2)
CALCIUM SERPL-MCNC: 7.7 MG/DL (ref 8.4–10.2)
CALCIUM SERPL-MCNC: 7.8 MG/DL (ref 8.4–10.2)
CALCIUM SERPL-MCNC: 8 MG/DL (ref 8.4–10.2)
CALCIUM SERPL-MCNC: 8 MG/DL (ref 8.4–10.2)
CALCIUM SERPL-MCNC: 8.1 MG/DL (ref 8.4–10.2)
CALCIUM SERPL-MCNC: 8.1 MG/DL (ref 8.4–10.2)
CALCIUM SERPL-MCNC: 8.2 MG/DL (ref 8.4–10.2)
CALCIUM SERPL-MCNC: 8.3 MG/DL (ref 8.4–10.2)
CALCIUM SERPL-MCNC: 8.3 MG/DL (ref 8.4–10.2)
CALCIUM SERPL-MCNC: 8.4 MG/DL (ref 8.4–10.2)
CALCIUM SERPL-MCNC: 8.4 MG/DL (ref 8.4–10.2)
CALCIUM SERPL-MCNC: 8.7 MG/DL (ref 8.4–10.2)
CALCIUM SERPL-MCNC: 9.8 MG/DL (ref 8.4–10.2)
CARDIAC TROPONIN I PNL SERPL HS: 11 NG/L (ref ?–50)
CARDIAC TROPONIN I PNL SERPL HS: 45 NG/L (ref ?–50)
CARDIAC TROPONIN I PNL SERPL HS: 46 NG/L (ref ?–50)
CARDIAC TROPONIN I PNL SERPL HS: 51 NG/L (ref ?–50)
CARDIAC TROPONIN I PNL SERPL HS: 6 NG/L (ref ?–50)
CARDIAC TROPONIN I PNL SERPL HS: 7 NG/L (ref ?–50)
CASPR2 AB CSF QL CBA IFA: NEGATIVE
CHLORIDE SERPL-SCNC: 103 MMOL/L (ref 96–108)
CHLORIDE SERPL-SCNC: 104 MMOL/L (ref 96–108)
CHLORIDE SERPL-SCNC: 105 MMOL/L (ref 96–108)
CHLORIDE SERPL-SCNC: 105 MMOL/L (ref 96–108)
CHLORIDE SERPL-SCNC: 107 MMOL/L (ref 96–108)
CHLORIDE SERPL-SCNC: 108 MMOL/L (ref 96–108)
CHLORIDE SERPL-SCNC: 109 MMOL/L (ref 96–108)
CHLORIDE SERPL-SCNC: 110 MMOL/L (ref 96–108)
CHLORIDE SERPL-SCNC: 110 MMOL/L (ref 96–108)
CHLORIDE SERPL-SCNC: 111 MMOL/L (ref 96–108)
CHLORIDE SERPL-SCNC: 112 MMOL/L (ref 96–108)
CHLORIDE SERPL-SCNC: 112 MMOL/L (ref 96–108)
CHLORIDE SERPL-SCNC: 113 MMOL/L (ref 96–108)
CHLORIDE SERPL-SCNC: 114 MMOL/L (ref 96–108)
CHLORIDE SERPL-SCNC: 114 MMOL/L (ref 96–108)
CHLORIDE SERPL-SCNC: 115 MMOL/L (ref 96–108)
CHLORIDE SERPL-SCNC: 99 MMOL/L (ref 96–108)
CK SERPL-CCNC: 254 U/L (ref 26–192)
CK SERPL-CCNC: 350 U/L (ref 26–192)
CK SERPL-CCNC: 46 U/L (ref 26–192)
CLARITY UR: CLEAR
CMV DNA CSF QL NAA+NON-PROBE: NOT DETECTED
CO2 SERPL-SCNC: 23 MMOL/L (ref 21–32)
CO2 SERPL-SCNC: 24 MMOL/L (ref 21–32)
CO2 SERPL-SCNC: 24 MMOL/L (ref 21–32)
CO2 SERPL-SCNC: 25 MMOL/L (ref 21–32)
CO2 SERPL-SCNC: 25 MMOL/L (ref 21–32)
CO2 SERPL-SCNC: 26 MMOL/L (ref 21–32)
CO2 SERPL-SCNC: 27 MMOL/L (ref 21–32)
CO2 SERPL-SCNC: 27 MMOL/L (ref 21–32)
CO2 SERPL-SCNC: 28 MMOL/L (ref 21–32)
CO2 SERPL-SCNC: 29 MMOL/L (ref 21–32)
CO2 SERPL-SCNC: 30 MMOL/L (ref 21–32)
CO2 SERPL-SCNC: 33 MMOL/L (ref 21–32)
CO2 SERPL-SCNC: 36 MMOL/L (ref 21–32)
COLOR UR: YELLOW
CORTIS AM PEAK SERPL-MCNC: 52.7 UG/DL (ref 6.7–22.6)
CREAT SERPL-MCNC: 0.66 MG/DL (ref 0.6–1.3)
CREAT SERPL-MCNC: 0.68 MG/DL (ref 0.6–1.3)
CREAT SERPL-MCNC: 0.77 MG/DL (ref 0.6–1.3)
CREAT SERPL-MCNC: 0.78 MG/DL (ref 0.6–1.3)
CREAT SERPL-MCNC: 0.78 MG/DL (ref 0.6–1.3)
CREAT SERPL-MCNC: 0.81 MG/DL (ref 0.6–1.3)
CREAT SERPL-MCNC: 0.81 MG/DL (ref 0.6–1.3)
CREAT SERPL-MCNC: 0.85 MG/DL (ref 0.6–1.3)
CREAT SERPL-MCNC: 0.87 MG/DL (ref 0.6–1.3)
CREAT SERPL-MCNC: 0.87 MG/DL (ref 0.6–1.3)
CREAT SERPL-MCNC: 0.88 MG/DL (ref 0.6–1.3)
CREAT SERPL-MCNC: 0.9 MG/DL (ref 0.6–1.3)
CREAT SERPL-MCNC: 1 MG/DL (ref 0.6–1.3)
CREAT SERPL-MCNC: 1.02 MG/DL (ref 0.6–1.3)
CREAT SERPL-MCNC: 1.09 MG/DL (ref 0.6–1.3)
CREAT SERPL-MCNC: 1.23 MG/DL (ref 0.6–1.3)
CREAT SERPL-MCNC: 1.3 MG/DL (ref 0.6–1.3)
CREAT SERPL-MCNC: 1.36 MG/DL (ref 0.6–1.3)
CROSSMATCH: NORMAL
CRP SERPL QL: 23 MG/L
CV2 AB CSF QL IF: NEGATIVE
DPPX IGG CSF QL IF: NEGATIVE
E COLI K1 DNA CSF QL NAA+NON-PROBE: NOT DETECTED
E WAVE DECELERATION TIME: 172 MS
E/A RATIO: 1.02
EOSINOPHIL # BLD AUTO: 0.01 THOUSAND/ΜL (ref 0–0.61)
EOSINOPHIL # BLD AUTO: 0.01 THOUSAND/ΜL (ref 0–0.61)
EOSINOPHIL # BLD AUTO: 0.03 THOUSAND/ΜL (ref 0–0.61)
EOSINOPHIL # BLD AUTO: 0.12 THOUSAND/ΜL (ref 0–0.61)
EOSINOPHIL # BLD AUTO: 0.24 THOUSAND/ΜL (ref 0–0.61)
EOSINOPHIL # BLD AUTO: 0.27 THOUSAND/ΜL (ref 0–0.61)
EOSINOPHIL # BLD MANUAL: 0 THOUSAND/UL (ref 0–0.4)
EOSINOPHIL NFR BLD AUTO: 0 % (ref 0–6)
EOSINOPHIL NFR BLD AUTO: 1 % (ref 0–6)
EOSINOPHIL NFR BLD AUTO: 1 % (ref 0–6)
EOSINOPHIL NFR BLD AUTO: 2 % (ref 0–6)
EOSINOPHIL NFR BLD MANUAL: 0 % (ref 0–6)
ERYTHROCYTE [DISTWIDTH] IN BLOOD BY AUTOMATED COUNT: 14.5 % (ref 11.6–15.1)
ERYTHROCYTE [DISTWIDTH] IN BLOOD BY AUTOMATED COUNT: 14.6 % (ref 11.6–15.1)
ERYTHROCYTE [DISTWIDTH] IN BLOOD BY AUTOMATED COUNT: 15 % (ref 11.6–15.1)
ERYTHROCYTE [DISTWIDTH] IN BLOOD BY AUTOMATED COUNT: 15.2 % (ref 11.6–15.1)
ERYTHROCYTE [DISTWIDTH] IN BLOOD BY AUTOMATED COUNT: 15.3 % (ref 11.6–15.1)
ERYTHROCYTE [DISTWIDTH] IN BLOOD BY AUTOMATED COUNT: 15.3 % (ref 11.6–15.1)
ERYTHROCYTE [DISTWIDTH] IN BLOOD BY AUTOMATED COUNT: 15.4 % (ref 11.6–15.1)
ERYTHROCYTE [DISTWIDTH] IN BLOOD BY AUTOMATED COUNT: 15.5 % (ref 11.6–15.1)
ERYTHROCYTE [DISTWIDTH] IN BLOOD BY AUTOMATED COUNT: 15.6 % (ref 11.6–15.1)
ERYTHROCYTE [DISTWIDTH] IN BLOOD BY AUTOMATED COUNT: 15.6 % (ref 11.6–15.1)
ERYTHROCYTE [DISTWIDTH] IN BLOOD BY AUTOMATED COUNT: 15.9 % (ref 11.6–15.1)
EST. AVERAGE GLUCOSE BLD GHB EST-MCNC: 143 MG/DL
EV RNA CSF QL NAA+NON-PROBE: NOT DETECTED
FLUAV RNA RESP QL NAA+PROBE: NEGATIVE
FLUBV RNA RESP QL NAA+PROBE: NEGATIVE
FRACTIONAL SHORTENING: 27 (ref 28–44)
GABABR AB CSF QL CBA IFA: NEGATIVE
GAD65 AB CSF IA-SCNC: NEGATIVE NMOL/L
GFR SERPL CREATININE-BSD FRML MDRD: 37 ML/MIN/1.73SQ M
GFR SERPL CREATININE-BSD FRML MDRD: 39 ML/MIN/1.73SQ M
GFR SERPL CREATININE-BSD FRML MDRD: 42 ML/MIN/1.73SQ M
GFR SERPL CREATININE-BSD FRML MDRD: 48 ML/MIN/1.73SQ M
GFR SERPL CREATININE-BSD FRML MDRD: 52 ML/MIN/1.73SQ M
GFR SERPL CREATININE-BSD FRML MDRD: 54 ML/MIN/1.73SQ M
GFR SERPL CREATININE-BSD FRML MDRD: 61 ML/MIN/1.73SQ M
GFR SERPL CREATININE-BSD FRML MDRD: 63 ML/MIN/1.73SQ M
GFR SERPL CREATININE-BSD FRML MDRD: 64 ML/MIN/1.73SQ M
GFR SERPL CREATININE-BSD FRML MDRD: 64 ML/MIN/1.73SQ M
GFR SERPL CREATININE-BSD FRML MDRD: 65 ML/MIN/1.73SQ M
GFR SERPL CREATININE-BSD FRML MDRD: 69 ML/MIN/1.73SQ M
GFR SERPL CREATININE-BSD FRML MDRD: 69 ML/MIN/1.73SQ M
GFR SERPL CREATININE-BSD FRML MDRD: 73 ML/MIN/1.73SQ M
GFR SERPL CREATININE-BSD FRML MDRD: 73 ML/MIN/1.73SQ M
GFR SERPL CREATININE-BSD FRML MDRD: 74 ML/MIN/1.73SQ M
GFR SERPL CREATININE-BSD FRML MDRD: 84 ML/MIN/1.73SQ M
GFR SERPL CREATININE-BSD FRML MDRD: 84 ML/MIN/1.73SQ M
GIANT PLATELETS BLD QL SMEAR: PRESENT
GLIAL NUC TYPE 1 AB CSF QL IF: NEGATIVE
GLUCOSE CSF-MCNC: 63 MG/DL (ref 40–70)
GLUCOSE SERPL-MCNC: 104 MG/DL (ref 65–140)
GLUCOSE SERPL-MCNC: 104 MG/DL (ref 65–140)
GLUCOSE SERPL-MCNC: 107 MG/DL (ref 65–140)
GLUCOSE SERPL-MCNC: 107 MG/DL (ref 65–140)
GLUCOSE SERPL-MCNC: 108 MG/DL (ref 65–140)
GLUCOSE SERPL-MCNC: 109 MG/DL (ref 65–140)
GLUCOSE SERPL-MCNC: 112 MG/DL (ref 65–140)
GLUCOSE SERPL-MCNC: 113 MG/DL (ref 65–140)
GLUCOSE SERPL-MCNC: 114 MG/DL (ref 65–140)
GLUCOSE SERPL-MCNC: 116 MG/DL (ref 65–140)
GLUCOSE SERPL-MCNC: 116 MG/DL (ref 65–140)
GLUCOSE SERPL-MCNC: 119 MG/DL (ref 65–140)
GLUCOSE SERPL-MCNC: 122 MG/DL (ref 65–140)
GLUCOSE SERPL-MCNC: 130 MG/DL (ref 65–140)
GLUCOSE SERPL-MCNC: 132 MG/DL (ref 65–140)
GLUCOSE SERPL-MCNC: 134 MG/DL (ref 65–140)
GLUCOSE SERPL-MCNC: 137 MG/DL (ref 65–140)
GLUCOSE SERPL-MCNC: 147 MG/DL (ref 65–140)
GLUCOSE SERPL-MCNC: 155 MG/DL (ref 65–140)
GLUCOSE SERPL-MCNC: 157 MG/DL (ref 65–140)
GLUCOSE SERPL-MCNC: 158 MG/DL (ref 65–140)
GLUCOSE SERPL-MCNC: 162 MG/DL (ref 65–140)
GLUCOSE SERPL-MCNC: 163 MG/DL (ref 65–140)
GLUCOSE SERPL-MCNC: 167 MG/DL (ref 65–140)
GLUCOSE SERPL-MCNC: 171 MG/DL (ref 65–140)
GLUCOSE SERPL-MCNC: 177 MG/DL (ref 65–140)
GLUCOSE SERPL-MCNC: 184 MG/DL (ref 65–140)
GLUCOSE SERPL-MCNC: 184 MG/DL (ref 65–140)
GLUCOSE SERPL-MCNC: 193 MG/DL (ref 65–140)
GLUCOSE SERPL-MCNC: 196 MG/DL (ref 65–140)
GLUCOSE SERPL-MCNC: 213 MG/DL (ref 65–140)
GLUCOSE SERPL-MCNC: 217 MG/DL (ref 65–140)
GLUCOSE SERPL-MCNC: 247 MG/DL (ref 65–140)
GLUCOSE SERPL-MCNC: 278 MG/DL (ref 65–140)
GLUCOSE SERPL-MCNC: 285 MG/DL (ref 65–140)
GLUCOSE SERPL-MCNC: 304 MG/DL (ref 65–140)
GLUCOSE SERPL-MCNC: 350 MG/DL (ref 65–140)
GLUCOSE SERPL-MCNC: 82 MG/DL (ref 65–140)
GLUCOSE SERPL-MCNC: 84 MG/DL (ref 65–140)
GLUCOSE UR STRIP-MCNC: NEGATIVE MG/DL
GP B STREP DNA CSF QL NAA+NON-PROBE: NOT DETECTED
GRAM STN SPEC: ABNORMAL
HAEM INFLU DNA CSF QL NAA+NON-PROBE: NOT DETECTED
HAV IGM SER QL: NORMAL
HBA1C MFR BLD: 6.6 %
HBV CORE IGM SER QL: NORMAL
HBV SURFACE AG SER QL: NORMAL
HCO3 BLDA-SCNC: 21.9 MMOL/L (ref 22–28)
HCO3 BLDA-SCNC: 23.3 MMOL/L (ref 22–28)
HCO3 BLDA-SCNC: 24.1 MMOL/L (ref 22–28)
HCO3 BLDA-SCNC: 24.3 MMOL/L (ref 22–28)
HCO3 BLDA-SCNC: 25.1 MMOL/L (ref 22–28)
HCO3 BLDA-SCNC: 25.3 MMOL/L (ref 22–28)
HCO3 BLDA-SCNC: 25.4 MMOL/L (ref 22–28)
HCO3 BLDA-SCNC: 25.8 MMOL/L (ref 22–28)
HCO3 BLDA-SCNC: 26.2 MMOL/L (ref 22–28)
HCO3 BLDV-SCNC: 26.7 MMOL/L (ref 24–30)
HCO3 BLDV-SCNC: 26.9 MMOL/L (ref 24–30)
HCO3 BLDV-SCNC: 27.3 MMOL/L (ref 24–30)
HCT VFR BLD AUTO: 23.3 % (ref 34.8–46.1)
HCT VFR BLD AUTO: 23.4 % (ref 34.8–46.1)
HCT VFR BLD AUTO: 24.6 % (ref 34.8–46.1)
HCT VFR BLD AUTO: 26.7 % (ref 34.8–46.1)
HCT VFR BLD AUTO: 27.7 % (ref 34.8–46.1)
HCT VFR BLD AUTO: 28.2 % (ref 34.8–46.1)
HCT VFR BLD AUTO: 28.2 % (ref 34.8–46.1)
HCT VFR BLD AUTO: 29 % (ref 34.8–46.1)
HCT VFR BLD AUTO: 29.5 % (ref 34.8–46.1)
HCT VFR BLD AUTO: 30.1 % (ref 34.8–46.1)
HCT VFR BLD AUTO: 30.4 % (ref 34.8–46.1)
HCT VFR BLD AUTO: 30.5 % (ref 34.8–46.1)
HCT VFR BLD AUTO: 31.1 % (ref 34.8–46.1)
HCT VFR BLD AUTO: 31.1 % (ref 34.8–46.1)
HCT VFR BLD AUTO: 35.2 % (ref 34.8–46.1)
HCT VFR BLD AUTO: 40.1 % (ref 34.8–46.1)
HCT VFR BLD CALC: 24 % (ref 34.8–46.1)
HCT VFR BLD CALC: 24 % (ref 34.8–46.1)
HCT VFR BLD CALC: 25 % (ref 34.8–46.1)
HCT VFR BLD CALC: 34 % (ref 34.8–46.1)
HCV AB SER QL: NORMAL
HGB BLD-MCNC: 11.3 G/DL (ref 11.5–15.4)
HGB BLD-MCNC: 13.2 G/DL (ref 11.5–15.4)
HGB BLD-MCNC: 7.1 G/DL (ref 11.5–15.4)
HGB BLD-MCNC: 7.1 G/DL (ref 11.5–15.4)
HGB BLD-MCNC: 7.8 G/DL (ref 11.5–15.4)
HGB BLD-MCNC: 8.4 G/DL (ref 11.5–15.4)
HGB BLD-MCNC: 8.8 G/DL (ref 11.5–15.4)
HGB BLD-MCNC: 8.8 G/DL (ref 11.5–15.4)
HGB BLD-MCNC: 9 G/DL (ref 11.5–15.4)
HGB BLD-MCNC: 9.1 G/DL (ref 11.5–15.4)
HGB BLD-MCNC: 9.3 G/DL (ref 11.5–15.4)
HGB BLD-MCNC: 9.4 G/DL (ref 11.5–15.4)
HGB BLD-MCNC: 9.5 G/DL (ref 11.5–15.4)
HGB BLD-MCNC: 9.6 G/DL (ref 11.5–15.4)
HGB BLD-MCNC: 9.6 G/DL (ref 11.5–15.4)
HGB BLD-MCNC: 9.9 G/DL (ref 11.5–15.4)
HGB BLDA-MCNC: 11.6 G/DL (ref 11.5–15.4)
HGB BLDA-MCNC: 8.2 G/DL (ref 11.5–15.4)
HGB BLDA-MCNC: 8.2 G/DL (ref 11.5–15.4)
HGB BLDA-MCNC: 8.5 G/DL (ref 11.5–15.4)
HGB UR QL STRIP.AUTO: NEGATIVE
HHV6 DNA CSF QL NAA+NON-PROBE: NOT DETECTED
HOROWITZ INDEX BLDA+IHG-RTO: 100 MM[HG]
HOROWITZ INDEX BLDA+IHG-RTO: 40 MM[HG]
HSV1 DNA CSF QL NAA+NON-PROBE: NOT DETECTED
HSV2 DNA CSF QL NAA+NON-PROBE: NOT DETECTED
HU1 AB CSF QL IF: NEGATIVE
HU2 AB CSF QL IF: NEGATIVE
HU3 AB CSF QL IF: NEGATIVE
IGLON5 IGG CSF QL CBA IFA: NEGATIVE
IMM GRANULOCYTES # BLD AUTO: 0.08 THOUSAND/UL (ref 0–0.2)
IMM GRANULOCYTES # BLD AUTO: 0.12 THOUSAND/UL (ref 0–0.2)
IMM GRANULOCYTES # BLD AUTO: 0.12 THOUSAND/UL (ref 0–0.2)
IMM GRANULOCYTES # BLD AUTO: 0.2 THOUSAND/UL (ref 0–0.2)
IMM GRANULOCYTES # BLD AUTO: 0.21 THOUSAND/UL (ref 0–0.2)
IMM GRANULOCYTES # BLD AUTO: 0.42 THOUSAND/UL (ref 0–0.2)
IMM GRANULOCYTES NFR BLD AUTO: 1 % (ref 0–2)
IMM GRANULOCYTES NFR BLD AUTO: 2 % (ref 0–2)
IMP & REVIEW OF LAB RESULTS: NEGATIVE
INR PPP: 1.34 (ref 0.85–1.19)
INTERVENTRICULAR SEPTUM IN DIASTOLE (PARASTERNAL SHORT AXIS VIEW): 1.5 CM
INTERVENTRICULAR SEPTUM: 1.5 CM (ref 0.6–1.1)
IP3R 1 IGG CSF QL IF: NEGATIVE
KETONES UR STRIP-MCNC: NEGATIVE MG/DL
L MONOCYTOG DNA CSF QL NAA+NON-PROBE: NOT DETECTED
LAAS-AP2: 19.8 CM2
LAAS-AP4: 25.6 CM2
LACTATE SERPL-SCNC: 0.8 MMOL/L (ref 0.5–2)
LACTATE SERPL-SCNC: 1.3 MMOL/L (ref 0.5–2)
LACTATE SERPL-SCNC: 1.4 MMOL/L (ref 0.5–2)
LEFT ATRIUM SIZE: 3.5 CM
LEFT ATRIUM VOLUME (MOD BIPLANE): 82 ML
LEFT ATRIUM VOLUME INDEX (MOD BIPLANE): 50.9 ML/M2
LEFT INTERNAL DIMENSION IN SYSTOLE: 2.7 CM (ref 2.1–4)
LEFT VENTRICULAR INTERNAL DIMENSION IN DIASTOLE: 3.7 CM (ref 3.5–6)
LEFT VENTRICULAR POSTERIOR WALL IN END DIASTOLE: 0.9 CM
LEFT VENTRICULAR STROKE VOLUME: 31 ML
LEUKOCYTE ESTERASE UR QL STRIP: NEGATIVE
LGI1 AB CSF QL CBA IFA: NEGATIVE
LIPASE SERPL-CCNC: 61 U/L (ref 11–82)
LV EF US.2D.A4C+ESTIMATED: 62 %
LVSV (TEICH): 31 ML
LYMPHOCYTES # BLD AUTO: 0 % (ref 14–44)
LYMPHOCYTES # BLD AUTO: 0 THOUSAND/UL (ref 0.6–4.47)
LYMPHOCYTES # BLD AUTO: 0.24 THOUSAND/UL (ref 0.6–4.47)
LYMPHOCYTES # BLD AUTO: 0.44 THOUSANDS/ΜL (ref 0.6–4.47)
LYMPHOCYTES # BLD AUTO: 0.55 THOUSAND/UL (ref 0.6–4.47)
LYMPHOCYTES # BLD AUTO: 0.69 THOUSANDS/ΜL (ref 0.6–4.47)
LYMPHOCYTES # BLD AUTO: 0.69 THOUSANDS/ΜL (ref 0.6–4.47)
LYMPHOCYTES # BLD AUTO: 0.77 THOUSANDS/ΜL (ref 0.6–4.47)
LYMPHOCYTES # BLD AUTO: 0.78 THOUSAND/UL (ref 0.6–4.47)
LYMPHOCYTES # BLD AUTO: 0.83 THOUSANDS/ΜL (ref 0.6–4.47)
LYMPHOCYTES # BLD AUTO: 1.31 THOUSANDS/ΜL (ref 0.6–4.47)
LYMPHOCYTES # BLD AUTO: 2 % (ref 14–44)
LYMPHOCYTES # BLD AUTO: 2 % (ref 14–44)
LYMPHOCYTES # BLD AUTO: 4 %
LYMPHOCYTES NFR BLD AUTO: 4 % (ref 14–44)
LYMPHOCYTES NFR BLD AUTO: 5 % (ref 14–44)
LYMPHOCYTES NFR BLD AUTO: 5 % (ref 14–44)
LYMPHOCYTES NFR BLD AUTO: 7 % (ref 14–44)
LYMPHOCYTES NFR BLD AUTO: 8 % (ref 14–44)
LYMPHOCYTES NFR BLD AUTO: 9 % (ref 14–44)
LYMPHOCYTES NFR CSF MANUAL: 20 %
MA+TA AB CSF QL IF: NEGATIVE
MACROCYTES BLD QL AUTO: PRESENT
MACROCYTES BLD QL AUTO: PRESENT
MAGNESIUM SERPL-MCNC: 1.9 MG/DL (ref 1.9–2.7)
MAGNESIUM SERPL-MCNC: 1.9 MG/DL (ref 1.9–2.7)
MAGNESIUM SERPL-MCNC: 2 MG/DL (ref 1.9–2.7)
MAGNESIUM SERPL-MCNC: 2.1 MG/DL (ref 1.9–2.7)
MAGNESIUM SERPL-MCNC: 2.2 MG/DL (ref 1.9–2.7)
MAGNESIUM SERPL-MCNC: 2.3 MG/DL (ref 1.9–2.7)
MAGNESIUM SERPL-MCNC: 2.4 MG/DL (ref 1.9–2.7)
MAGNESIUM SERPL-MCNC: 2.6 MG/DL (ref 1.9–2.7)
MCH RBC QN AUTO: 29.3 PG (ref 26.8–34.3)
MCH RBC QN AUTO: 29.4 PG (ref 26.8–34.3)
MCH RBC QN AUTO: 29.5 PG (ref 26.8–34.3)
MCH RBC QN AUTO: 29.6 PG (ref 26.8–34.3)
MCH RBC QN AUTO: 29.7 PG (ref 26.8–34.3)
MCH RBC QN AUTO: 29.8 PG (ref 26.8–34.3)
MCH RBC QN AUTO: 30 PG (ref 26.8–34.3)
MCH RBC QN AUTO: 30.2 PG (ref 26.8–34.3)
MCH RBC QN AUTO: 30.4 PG (ref 26.8–34.3)
MCH RBC QN AUTO: 30.4 PG (ref 26.8–34.3)
MCHC RBC AUTO-ENTMCNC: 30.3 G/DL (ref 31.4–37.4)
MCHC RBC AUTO-ENTMCNC: 30.3 G/DL (ref 31.4–37.4)
MCHC RBC AUTO-ENTMCNC: 30.5 G/DL (ref 31.4–37.4)
MCHC RBC AUTO-ENTMCNC: 30.9 G/DL (ref 31.4–37.4)
MCHC RBC AUTO-ENTMCNC: 31.2 G/DL (ref 31.4–37.4)
MCHC RBC AUTO-ENTMCNC: 31.3 G/DL (ref 31.4–37.4)
MCHC RBC AUTO-ENTMCNC: 31.5 G/DL (ref 31.4–37.4)
MCHC RBC AUTO-ENTMCNC: 31.7 G/DL (ref 31.4–37.4)
MCHC RBC AUTO-ENTMCNC: 31.8 G/DL (ref 31.4–37.4)
MCHC RBC AUTO-ENTMCNC: 31.8 G/DL (ref 31.4–37.4)
MCHC RBC AUTO-ENTMCNC: 31.9 G/DL (ref 31.4–37.4)
MCHC RBC AUTO-ENTMCNC: 32.1 G/DL (ref 31.4–37.4)
MCHC RBC AUTO-ENTMCNC: 32.3 G/DL (ref 31.4–37.4)
MCHC RBC AUTO-ENTMCNC: 32.9 G/DL (ref 31.4–37.4)
MCV RBC AUTO: 90 FL (ref 82–98)
MCV RBC AUTO: 91 FL (ref 82–98)
MCV RBC AUTO: 93 FL (ref 82–98)
MCV RBC AUTO: 94 FL (ref 82–98)
MCV RBC AUTO: 94 FL (ref 82–98)
MCV RBC AUTO: 95 FL (ref 82–98)
MCV RBC AUTO: 96 FL (ref 82–98)
MCV RBC AUTO: 97 FL (ref 82–98)
MCV RBC AUTO: 97 FL (ref 82–98)
MCV RBC AUTO: 98 FL (ref 82–98)
METAMYELOCYTE ABSOLUTE CT: 0.26 THOUSAND/UL (ref 0–0.1)
METAMYELOCYTES NFR BLD MANUAL: 1 % (ref 0–1)
MGLUR1 IGG CSF QL CBA IFA: NEGATIVE
MONOCYTES # BLD AUTO: 0.14 THOUSAND/UL (ref 0–1.22)
MONOCYTES # BLD AUTO: 0.16 THOUSAND/UL (ref 0–1.22)
MONOCYTES # BLD AUTO: 0.4 THOUSAND/ΜL (ref 0.17–1.22)
MONOCYTES # BLD AUTO: 0.47 THOUSAND/UL (ref 0–1.22)
MONOCYTES # BLD AUTO: 0.52 THOUSAND/UL (ref 0–1.22)
MONOCYTES # BLD AUTO: 0.55 THOUSAND/ΜL (ref 0.17–1.22)
MONOCYTES # BLD AUTO: 0.69 THOUSAND/ΜL (ref 0.17–1.22)
MONOCYTES # BLD AUTO: 0.71 THOUSAND/ΜL (ref 0.17–1.22)
MONOCYTES # BLD AUTO: 0.75 THOUSAND/ΜL (ref 0.17–1.22)
MONOCYTES # BLD AUTO: 1.28 THOUSAND/ΜL (ref 0.17–1.22)
MONOCYTES NFR BLD AUTO: 1 % (ref 4–12)
MONOCYTES NFR BLD AUTO: 4 % (ref 4–12)
MONOCYTES NFR BLD AUTO: 5 % (ref 4–12)
MONOCYTES NFR BLD AUTO: 5 % (ref 4–12)
MONOCYTES NFR BLD AUTO: 6 % (ref 4–12)
MONOCYTES NFR BLD AUTO: 6 % (ref 4–12)
MONOCYTES NFR BLD AUTO: 9 % (ref 4–12)
MONOCYTES NFR BLD: 1 % (ref 4–12)
MONOCYTES NFR BLD: 2 % (ref 4–12)
MONOCYTES NFR BLD: 4 % (ref 4–12)
MONOS+MACROS CSF MANUAL: 40 %
MRSA NOSE QL CULT: NORMAL
MV E'TISSUE VEL-LAT: 10 CM/S
MV E'TISSUE VEL-SEP: 7 CM/S
MV PEAK A VEL: 1.05 M/S
MV PEAK E VEL: 107 CM/S
MV STENOSIS PRESSURE HALF TIME: 50 MS
MV VALVE AREA P 1/2 METHOD: 4.4
N MEN DNA CSF QL NAA+NON-PROBE: NOT DETECTED
NASAL CANNULA: 3
NASAL CANNULA: 6
NEUTROPHILS # BLD AUTO: 12.16 THOUSANDS/ΜL (ref 1.85–7.62)
NEUTROPHILS # BLD AUTO: 12.96 THOUSANDS/ΜL (ref 1.85–7.62)
NEUTROPHILS # BLD AUTO: 15.82 THOUSANDS/ΜL (ref 1.85–7.62)
NEUTROPHILS # BLD AUTO: 7.62 THOUSANDS/ΜL (ref 1.85–7.62)
NEUTROPHILS # BLD AUTO: 7.78 THOUSANDS/ΜL (ref 1.85–7.62)
NEUTROPHILS # BLD AUTO: 9.98 THOUSANDS/ΜL (ref 1.85–7.62)
NEUTROPHILS # BLD MANUAL: 11.09 THOUSAND/UL (ref 1.85–7.62)
NEUTROPHILS # BLD MANUAL: 13.03 THOUSAND/UL (ref 1.85–7.62)
NEUTROPHILS # BLD MANUAL: 25.33 THOUSAND/UL (ref 1.85–7.62)
NEUTROPHILS NFR CSF MANUAL: 40 %
NEUTS BAND NFR BLD MANUAL: 4 % (ref 0–8)
NEUTS BAND NFR BLD MANUAL: 41 % (ref 0–8)
NEUTS BAND NFR BLD MANUAL: 8 % (ref 0–8)
NEUTS BAND NFR BLD MANUAL: 8 % (ref 0–8)
NEUTS SEG # BLD: 14.66 THOUSAND/UL (ref 1.81–6.82)
NEUTS SEG NFR BLD AUTO: 56 % (ref 43–75)
NEUTS SEG NFR BLD AUTO: 83 % (ref 43–75)
NEUTS SEG NFR BLD AUTO: 83 % (ref 43–75)
NEUTS SEG NFR BLD AUTO: 86 % (ref 43–75)
NEUTS SEG NFR BLD AUTO: 87 % (ref 43–75)
NEUTS SEG NFR BLD AUTO: 89 % (ref 43–75)
NEUTS SEG NFR BLD AUTO: 89 % (ref 43–75)
NEUTS SEG NFR BLD AUTO: 90 %
NITRITE UR QL STRIP: NEGATIVE
NMDAR IGG CSF QL IF: NEGATIVE
NRBC BLD AUTO-RTO: 0 /100 WBCS
O2 CT BLDA-SCNC: 11.6 ML/DL (ref 16–23)
O2 CT BLDA-SCNC: 12.4 ML/DL (ref 16–23)
O2 CT BLDA-SCNC: 14 ML/DL (ref 16–23)
O2 CT BLDA-SCNC: 15.2 ML/DL (ref 16–23)
O2 CT BLDA-SCNC: 15.3 ML/DL (ref 16–23)
O2 CT BLDV-SCNC: 10.3 ML/DL
O2 CT BLDV-SCNC: 12.1 ML/DL
O2 CT BLDV-SCNC: 16.5 ML/DL
OXYHGB MFR BLDA: 95.8 % (ref 94–97)
OXYHGB MFR BLDA: 96.5 % (ref 94–97)
OXYHGB MFR BLDA: 96.9 % (ref 94–97)
OXYHGB MFR BLDA: 98.6 % (ref 94–97)
OXYHGB MFR BLDA: 99 % (ref 94–97)
P AXIS: 49 DEGREES
P AXIS: 50 DEGREES
P AXIS: 54 DEGREES
P AXIS: 55 DEGREES
P AXIS: 61 DEGREES
P AXIS: 62 DEGREES
PARECHOVIRUS A RNA CSF QL NAA+NON-PROBE: NOT DETECTED
PCA-2 AB CSF QL IF: NEGATIVE
PCA-TR AB CSF QL CBA IFA: NEGATIVE
PCA-TR AB CSF QL IF: NEGATIVE
PCO2 BLD: 26 MMOL/L (ref 21–32)
PCO2 BLD: 26 MMOL/L (ref 21–32)
PCO2 BLD: 27 MMOL/L (ref 21–32)
PCO2 BLD: 28 MMOL/L (ref 21–32)
PCO2 BLD: 48.9 MM HG (ref 36–44)
PCO2 BLD: 51.9 MM HG (ref 36–44)
PCO2 BLD: 56.4 MM HG (ref 36–44)
PCO2 BLD: 69.9 MM HG (ref 36–44)
PCO2 BLDA: 35.8 MM HG (ref 36–44)
PCO2 BLDA: 38.7 MM HG (ref 36–44)
PCO2 BLDA: 41.6 MM HG (ref 36–44)
PCO2 BLDA: 44.1 MM HG (ref 36–44)
PCO2 BLDA: 53 MM HG (ref 36–44)
PCO2 BLDV: 39.5 MM HG (ref 42–50)
PCO2 BLDV: 41.8 MM HG (ref 42–50)
PCO2 BLDV: 57.8 MM HG (ref 42–50)
PCO2 TEMP ADJ BLDA: 53.7 MM HG (ref 36–44)
PEEP RESPIRATORY: 8 CM[H2O]
PEEP RESPIRATORY: 8 CM[H2O]
PH BLD: 7.14 [PH] (ref 7.35–7.45)
PH BLD: 7.26 [PH] (ref 7.35–7.45)
PH BLD: 7.29 [PH] (ref 7.35–7.45)
PH BLD: 7.3 [PH] (ref 7.35–7.45)
PH BLD: 7.31 [PH] (ref 7.35–7.45)
PH BLDA: 7.29 [PH] (ref 7.35–7.45)
PH BLDA: 7.34 [PH] (ref 7.35–7.45)
PH BLDA: 7.38 [PH] (ref 7.35–7.45)
PH BLDA: 7.43 [PH] (ref 7.35–7.45)
PH BLDA: 7.44 [PH] (ref 7.35–7.45)
PH BLDV: 7.28 [PH] (ref 7.3–7.4)
PH BLDV: 7.43 [PH] (ref 7.3–7.4)
PH BLDV: 7.46 [PH] (ref 7.3–7.4)
PH UR STRIP.AUTO: 5 [PH]
PHOSPHATE SERPL-MCNC: 1.7 MG/DL (ref 2.3–4.1)
PHOSPHATE SERPL-MCNC: 2.1 MG/DL (ref 2.3–4.1)
PHOSPHATE SERPL-MCNC: 2.3 MG/DL (ref 2.3–4.1)
PHOSPHATE SERPL-MCNC: 2.6 MG/DL (ref 2.3–4.1)
PHOSPHATE SERPL-MCNC: 2.7 MG/DL (ref 2.3–4.1)
PHOSPHATE SERPL-MCNC: 2.9 MG/DL (ref 2.3–4.1)
PHOSPHATE SERPL-MCNC: 2.9 MG/DL (ref 2.3–4.1)
PHOSPHATE SERPL-MCNC: 3 MG/DL (ref 2.3–4.1)
PHOSPHATE SERPL-MCNC: 3.2 MG/DL (ref 2.3–4.1)
PHOSPHATE SERPL-MCNC: 3.3 MG/DL (ref 2.3–4.1)
PHOSPHATE SERPL-MCNC: 3.4 MG/DL (ref 2.3–4.1)
PHOSPHATE SERPL-MCNC: 3.6 MG/DL (ref 2.3–4.1)
PHOSPHATE SERPL-MCNC: 3.6 MG/DL (ref 2.3–4.1)
PHOSPHATE SERPL-MCNC: 3.7 MG/DL (ref 2.3–4.1)
PHOSPHATE SERPL-MCNC: 3.9 MG/DL (ref 2.3–4.1)
PHOSPHATE SERPL-MCNC: 4.8 MG/DL (ref 2.3–4.1)
PLATELET # BLD AUTO: 119 THOUSANDS/UL (ref 149–390)
PLATELET # BLD AUTO: 136 THOUSANDS/UL (ref 149–390)
PLATELET # BLD AUTO: 166 THOUSANDS/UL (ref 149–390)
PLATELET # BLD AUTO: 178 THOUSANDS/UL (ref 149–390)
PLATELET # BLD AUTO: 195 THOUSANDS/UL (ref 149–390)
PLATELET # BLD AUTO: 201 THOUSANDS/UL (ref 149–390)
PLATELET # BLD AUTO: 240 THOUSANDS/UL (ref 149–390)
PLATELET # BLD AUTO: 267 THOUSANDS/UL (ref 149–390)
PLATELET # BLD AUTO: 285 THOUSANDS/UL (ref 149–390)
PLATELET # BLD AUTO: 326 THOUSANDS/UL (ref 149–390)
PLATELET # BLD AUTO: 326 THOUSANDS/UL (ref 149–390)
PLATELET # BLD AUTO: 374 THOUSANDS/UL (ref 149–390)
PLATELET # BLD AUTO: 385 THOUSANDS/UL (ref 149–390)
PLATELET # BLD AUTO: 399 THOUSANDS/UL (ref 149–390)
PLATELET # BLD AUTO: 409 THOUSANDS/UL (ref 149–390)
PLATELET # BLD AUTO: 99 THOUSANDS/UL (ref 149–390)
PLATELET BLD QL SMEAR: ABNORMAL
PLATELET BLD QL SMEAR: ABNORMAL
PLATELET BLD QL SMEAR: ADEQUATE
PLATELET BLD QL SMEAR: ADEQUATE
PMV BLD AUTO: 10.2 FL (ref 8.9–12.7)
PMV BLD AUTO: 10.2 FL (ref 8.9–12.7)
PMV BLD AUTO: 10.4 FL (ref 8.9–12.7)
PMV BLD AUTO: 10.4 FL (ref 8.9–12.7)
PMV BLD AUTO: 10.7 FL (ref 8.9–12.7)
PMV BLD AUTO: 11.1 FL (ref 8.9–12.7)
PMV BLD AUTO: 8.7 FL (ref 8.9–12.7)
PMV BLD AUTO: 8.9 FL (ref 8.9–12.7)
PMV BLD AUTO: 8.9 FL (ref 8.9–12.7)
PMV BLD AUTO: 9.3 FL (ref 8.9–12.7)
PMV BLD AUTO: 9.3 FL (ref 8.9–12.7)
PMV BLD AUTO: 9.6 FL (ref 8.9–12.7)
PMV BLD AUTO: 9.7 FL (ref 8.9–12.7)
PMV BLD AUTO: 9.8 FL (ref 8.9–12.7)
PO2 BLD: 108 MM HG (ref 75–129)
PO2 BLD: 111 MM HG (ref 75–129)
PO2 BLD: 167 MM HG (ref 75–129)
PO2 BLD: 316.7 MM HG (ref 75–129)
PO2 BLD: 71 MM HG (ref 75–129)
PO2 BLDA: 101.3 MM HG (ref 75–129)
PO2 BLDA: 109 MM HG (ref 75–129)
PO2 BLDA: 160.7 MM HG (ref 75–129)
PO2 BLDA: 315.2 MM HG (ref 75–129)
PO2 BLDA: 90.2 MM HG (ref 75–129)
PO2 BLDV: 37.6 MM HG (ref 35–45)
PO2 BLDV: 55.7 MM HG (ref 35–45)
PO2 BLDV: 76 MM HG (ref 35–45)
POIKILOCYTOSIS BLD QL SMEAR: PRESENT
POIKILOCYTOSIS BLD QL SMEAR: PRESENT
POTASSIUM BLD-SCNC: 3.7 MMOL/L (ref 3.5–5.3)
POTASSIUM BLD-SCNC: 3.9 MMOL/L (ref 3.5–5.3)
POTASSIUM BLD-SCNC: 4.1 MMOL/L (ref 3.5–5.3)
POTASSIUM BLD-SCNC: 4.3 MMOL/L (ref 3.5–5.3)
POTASSIUM SERPL-SCNC: 2.9 MMOL/L (ref 3.5–5.3)
POTASSIUM SERPL-SCNC: 3.1 MMOL/L (ref 3.5–5.3)
POTASSIUM SERPL-SCNC: 3.3 MMOL/L (ref 3.5–5.3)
POTASSIUM SERPL-SCNC: 3.3 MMOL/L (ref 3.5–5.3)
POTASSIUM SERPL-SCNC: 3.4 MMOL/L (ref 3.5–5.3)
POTASSIUM SERPL-SCNC: 3.5 MMOL/L (ref 3.5–5.3)
POTASSIUM SERPL-SCNC: 3.7 MMOL/L (ref 3.5–5.3)
POTASSIUM SERPL-SCNC: 3.7 MMOL/L (ref 3.5–5.3)
POTASSIUM SERPL-SCNC: 3.8 MMOL/L (ref 3.5–5.3)
POTASSIUM SERPL-SCNC: 3.8 MMOL/L (ref 3.5–5.3)
POTASSIUM SERPL-SCNC: 3.9 MMOL/L (ref 3.5–5.3)
POTASSIUM SERPL-SCNC: 4 MMOL/L (ref 3.5–5.3)
POTASSIUM SERPL-SCNC: 4.1 MMOL/L (ref 3.5–5.3)
POTASSIUM SERPL-SCNC: 4.2 MMOL/L (ref 3.5–5.3)
POTASSIUM SERPL-SCNC: 4.4 MMOL/L (ref 3.5–5.3)
POTASSIUM SERPL-SCNC: 4.4 MMOL/L (ref 3.5–5.3)
POTASSIUM SERPL-SCNC: 5.5 MMOL/L (ref 3.5–5.3)
PR INTERVAL: 163 MS
PR INTERVAL: 167 MS
PR INTERVAL: 171 MS
PR INTERVAL: 179 MS
PR INTERVAL: 196 MS
PR INTERVAL: 198 MS
PROCALCITONIN SERPL-MCNC: 1.8 NG/ML
PROCALCITONIN SERPL-MCNC: 18.58 NG/ML
PROCALCITONIN SERPL-MCNC: 4.39 NG/ML
PROCALCITONIN SERPL-MCNC: 5.59 NG/ML
PROT CSF-MCNC: 39 MG/DL (ref 15–45)
PROT SERPL-MCNC: 5.2 G/DL (ref 6.4–8.4)
PROT SERPL-MCNC: 5.5 G/DL (ref 6.4–8.4)
PROT SERPL-MCNC: 5.6 G/DL (ref 6.4–8.4)
PROT SERPL-MCNC: 5.7 G/DL (ref 6.4–8.4)
PROT SERPL-MCNC: 7.2 G/DL (ref 6.4–8.4)
PROT UR STRIP-MCNC: NEGATIVE MG/DL
PROTHROMBIN TIME: 16.8 SECONDS (ref 12.3–15)
PURKINJE CELLS AB CSF QL IF: NEGATIVE
QRS AXIS: 22 DEGREES
QRS AXIS: 27 DEGREES
QRS AXIS: 30 DEGREES
QRS AXIS: 31 DEGREES
QRS AXIS: 32 DEGREES
QRS AXIS: 41 DEGREES
QRS AXIS: 43 DEGREES
QRSD INTERVAL: 100 MS
QRSD INTERVAL: 104 MS
QRSD INTERVAL: 88 MS
QRSD INTERVAL: 88 MS
QRSD INTERVAL: 94 MS
QRSD INTERVAL: 96 MS
QRSD INTERVAL: 98 MS
QT INTERVAL: 379 MS
QT INTERVAL: 388 MS
QT INTERVAL: 396 MS
QT INTERVAL: 400 MS
QT INTERVAL: 430 MS
QT INTERVAL: 471 MS
QT INTERVAL: 578 MS
QTC INTERVAL: 408 MS
QTC INTERVAL: 413 MS
QTC INTERVAL: 425 MS
QTC INTERVAL: 425 MS
QTC INTERVAL: 454 MS
QTC INTERVAL: 464 MS
QTC INTERVAL: 476 MS
RA PRESSURE ESTIMATED: 8 MMHG
RBC # BLD AUTO: 2.39 MILLION/UL (ref 3.81–5.12)
RBC # BLD AUTO: 2.42 MILLION/UL (ref 3.81–5.12)
RBC # BLD AUTO: 2.57 MILLION/UL (ref 3.81–5.12)
RBC # BLD AUTO: 2.84 MILLION/UL (ref 3.81–5.12)
RBC # BLD AUTO: 2.91 MILLION/UL (ref 3.81–5.12)
RBC # BLD AUTO: 2.99 MILLION/UL (ref 3.81–5.12)
RBC # BLD AUTO: 3.02 MILLION/UL (ref 3.81–5.12)
RBC # BLD AUTO: 3.05 MILLION/UL (ref 3.81–5.12)
RBC # BLD AUTO: 3.13 MILLION/UL (ref 3.81–5.12)
RBC # BLD AUTO: 3.13 MILLION/UL (ref 3.81–5.12)
RBC # BLD AUTO: 3.15 MILLION/UL (ref 3.81–5.12)
RBC # BLD AUTO: 3.22 MILLION/UL (ref 3.81–5.12)
RBC # BLD AUTO: 3.27 MILLION/UL (ref 3.81–5.12)
RBC # BLD AUTO: 3.33 MILLION/UL (ref 3.81–5.12)
RBC # BLD AUTO: 3.85 MILLION/UL (ref 3.81–5.12)
RBC # BLD AUTO: 4.44 MILLION/UL (ref 3.81–5.12)
RBC # CSF MANUAL: 33 UL (ref 0–10)
RBC MORPH BLD: PRESENT
RH BLD: POSITIVE
RIGHT ATRIUM AREA SYSTOLE A4C: 12.3 CM2
RIGHT VENTRICLE ID DIMENSION: 3.6 CM
RSV RNA RESP QL NAA+PROBE: NEGATIVE
RV PSP: 44 MMHG
S PNEUM DNA CSF QL NAA+NON-PROBE: NOT DETECTED
SAO2 % BLD FROM PO2: 92 % (ref 60–85)
SAO2 % BLD FROM PO2: 96 % (ref 60–85)
SAO2 % BLD FROM PO2: 98 % (ref 60–85)
SAO2 % BLD FROM PO2: 99 % (ref 60–85)
SARS-COV-2 RNA RESP QL NAA+PROBE: NEGATIVE
SL CV LEFT ATRIUM LENGTH A2C: 5 CM
SL CV LV EF: 60
SL CV PED ECHO LEFT VENTRICLE DIASTOLIC VOLUME (MOD BIPLANE) 2D: 58 ML
SL CV PED ECHO LEFT VENTRICLE SYSTOLIC VOLUME (MOD BIPLANE) 2D: 27 ML
SODIUM BLD-SCNC: 142 MMOL/L (ref 136–145)
SODIUM BLD-SCNC: 144 MMOL/L (ref 136–145)
SODIUM BLD-SCNC: 145 MMOL/L (ref 136–145)
SODIUM BLD-SCNC: 146 MMOL/L (ref 136–145)
SODIUM SERPL-SCNC: 137 MMOL/L (ref 135–147)
SODIUM SERPL-SCNC: 138 MMOL/L (ref 135–147)
SODIUM SERPL-SCNC: 140 MMOL/L (ref 135–147)
SODIUM SERPL-SCNC: 142 MMOL/L (ref 135–147)
SODIUM SERPL-SCNC: 142 MMOL/L (ref 135–147)
SODIUM SERPL-SCNC: 143 MMOL/L (ref 135–147)
SODIUM SERPL-SCNC: 143 MMOL/L (ref 135–147)
SODIUM SERPL-SCNC: 144 MMOL/L (ref 135–147)
SODIUM SERPL-SCNC: 145 MMOL/L (ref 135–147)
SODIUM SERPL-SCNC: 145 MMOL/L (ref 135–147)
SODIUM SERPL-SCNC: 147 MMOL/L (ref 135–147)
SODIUM SERPL-SCNC: 149 MMOL/L (ref 135–147)
SODIUM SERPL-SCNC: 151 MMOL/L (ref 135–147)
SP GR UR STRIP.AUTO: 1.01 (ref 1–1.03)
SPECIMEN EXPIRATION DATE: NORMAL
SPECIMEN SOURCE: ABNORMAL
T WAVE AXIS: -28 DEGREES
T WAVE AXIS: 120 DEGREES
T WAVE AXIS: 14 DEGREES
T WAVE AXIS: 140 DEGREES
T WAVE AXIS: 59 DEGREES
T WAVE AXIS: 61 DEGREES
T WAVE AXIS: 69 DEGREES
T4 FREE SERPL-MCNC: 0.71 NG/DL (ref 0.61–1.12)
TOTAL CELLS COUNTED BLD: NO
TOTAL CELLS COUNTED SPEC: 100
TOTAL CELLS COUNTED SPEC: 5
TR MAX PG: 36 MMHG
TR PEAK VELOCITY: 3 M/S
TRICUSPID ANNULAR PLANE SYSTOLIC EXCURSION: 2 CM
TRICUSPID VALVE PEAK REGURGITATION VELOCITY: 2.98 M/S
TSH SERPL DL<=0.05 MIU/L-ACNC: 2.61 UIU/ML (ref 0.45–4.5)
TUBE # CSF: 4
UNIT DISPENSE STATUS: NORMAL
UNIT PRODUCT CODE: NORMAL
UNIT PRODUCT VOLUME: 250 ML
UNIT PRODUCT VOLUME: 280 ML
UNIT PRODUCT VOLUME: 300 ML
UNIT PRODUCT VOLUME: 300 ML
UNIT PRODUCT VOLUME: 350 ML
UNIT RH: NORMAL
UROBILINOGEN UR STRIP-ACNC: <2 MG/DL
VANCOMYCIN SERPL-MCNC: 14.3 UG/ML (ref 10–20)
VANCOMYCIN SERPL-MCNC: 14.4 UG/ML (ref 10–20)
VANCOMYCIN SERPL-MCNC: 16.4 UG/ML (ref 10–20)
VARIANT LYMPHS # BLD AUTO: 1 % (ref 0–0)
VARIANT LYMPHS # BLD AUTO: 2 %
VENT AC: 14
VENT AC: 18
VENT- AC: AC
VENT- AC: AC
VENTRICULAR RATE: 41 BPM
VENTRICULAR RATE: 45 BPM
VENTRICULAR RATE: 64 BPM
VENTRICULAR RATE: 69 BPM
VENTRICULAR RATE: 70 BPM
VENTRICULAR RATE: 72 BPM
VENTRICULAR RATE: 86 BPM
VT SETTING VENT: 400 ML
VT SETTING VENT: 400 ML
VZV DNA CSF QL NAA+NON-PROBE: NOT DETECTED
WBC # BLD AUTO: 11.26 THOUSAND/UL (ref 4.31–10.16)
WBC # BLD AUTO: 11.8 THOUSAND/UL (ref 4.31–10.16)
WBC # BLD AUTO: 11.8 THOUSAND/UL (ref 4.31–10.16)
WBC # BLD AUTO: 13.18 THOUSAND/UL (ref 4.31–10.16)
WBC # BLD AUTO: 13.72 THOUSAND/UL (ref 4.31–10.16)
WBC # BLD AUTO: 14.6 THOUSAND/UL (ref 4.31–10.16)
WBC # BLD AUTO: 14.7 THOUSAND/UL (ref 4.31–10.16)
WBC # BLD AUTO: 15.6 THOUSAND/UL (ref 4.31–10.16)
WBC # BLD AUTO: 15.85 THOUSAND/UL (ref 4.31–10.16)
WBC # BLD AUTO: 16.75 THOUSAND/UL (ref 4.31–10.16)
WBC # BLD AUTO: 17 THOUSAND/UL (ref 4.31–10.16)
WBC # BLD AUTO: 18.56 THOUSAND/UL (ref 4.31–10.16)
WBC # BLD AUTO: 22.32 THOUSAND/UL (ref 4.31–10.16)
WBC # BLD AUTO: 26.11 THOUSAND/UL (ref 4.31–10.16)
WBC # BLD AUTO: 8.81 THOUSAND/UL (ref 4.31–10.16)
WBC # BLD AUTO: 9.41 THOUSAND/UL (ref 4.31–10.16)
WBC # CSF AUTO: 3 /UL (ref 0–5)
ZIC4 ANTIBODY, CSF: NEGATIVE

## 2025-01-01 PROCEDURE — 83735 ASSAY OF MAGNESIUM: CPT | Performed by: REGISTERED NURSE

## 2025-01-01 PROCEDURE — 0DS64ZZ REPOSITION STOMACH, PERCUTANEOUS ENDOSCOPIC APPROACH: ICD-10-PCS | Performed by: THORACIC SURGERY (CARDIOTHORACIC VASCULAR SURGERY)

## 2025-01-01 PROCEDURE — 99291 CRITICAL CARE FIRST HOUR: CPT | Performed by: EMERGENCY MEDICINE

## 2025-01-01 PROCEDURE — 94664 DEMO&/EVAL PT USE INHALER: CPT

## 2025-01-01 PROCEDURE — 85027 COMPLETE CBC AUTOMATED: CPT

## 2025-01-01 PROCEDURE — 93970 EXTREMITY STUDY: CPT

## 2025-01-01 PROCEDURE — 99223 1ST HOSP IP/OBS HIGH 75: CPT | Performed by: INTERNAL MEDICINE

## 2025-01-01 PROCEDURE — 83735 ASSAY OF MAGNESIUM: CPT | Performed by: STUDENT IN AN ORGANIZED HEALTH CARE EDUCATION/TRAINING PROGRAM

## 2025-01-01 PROCEDURE — G0545 PR INHERENT VISIT TO INPT: HCPCS | Performed by: INTERNAL MEDICINE

## 2025-01-01 PROCEDURE — 83605 ASSAY OF LACTIC ACID: CPT

## 2025-01-01 PROCEDURE — 80048 BASIC METABOLIC PNL TOTAL CA: CPT | Performed by: SURGERY

## 2025-01-01 PROCEDURE — 86850 RBC ANTIBODY SCREEN: CPT

## 2025-01-01 PROCEDURE — 82330 ASSAY OF CALCIUM: CPT

## 2025-01-01 PROCEDURE — 86900 BLOOD TYPING SEROLOGIC ABO: CPT

## 2025-01-01 PROCEDURE — 4A133J1 MONITORING OF ARTERIAL PULSE, PERIPHERAL, PERCUTANEOUS APPROACH: ICD-10-PCS | Performed by: INTERNAL MEDICINE

## 2025-01-01 PROCEDURE — 82948 REAGENT STRIP/BLOOD GLUCOSE: CPT

## 2025-01-01 PROCEDURE — 83735 ASSAY OF MAGNESIUM: CPT

## 2025-01-01 PROCEDURE — A9585 GADOBUTROL INJECTION: HCPCS | Performed by: RADIOLOGY

## 2025-01-01 PROCEDURE — 99221 1ST HOSP IP/OBS SF/LOW 40: CPT | Performed by: PODIATRIST

## 2025-01-01 PROCEDURE — 94003 VENT MGMT INPAT SUBQ DAY: CPT

## 2025-01-01 PROCEDURE — 93005 ELECTROCARDIOGRAM TRACING: CPT

## 2025-01-01 PROCEDURE — 85025 COMPLETE CBC W/AUTO DIFF WBC: CPT | Performed by: NURSE PRACTITIONER

## 2025-01-01 PROCEDURE — 94669 MECHANICAL CHEST WALL OSCILL: CPT | Performed by: SOCIAL WORKER

## 2025-01-01 PROCEDURE — 92526 ORAL FUNCTION THERAPY: CPT

## 2025-01-01 PROCEDURE — 62270 DX LMBR SPI PNXR: CPT | Performed by: EMERGENCY MEDICINE

## 2025-01-01 PROCEDURE — 94640 AIRWAY INHALATION TREATMENT: CPT

## 2025-01-01 PROCEDURE — 99233 SBSQ HOSP IP/OBS HIGH 50: CPT | Performed by: INTERNAL MEDICINE

## 2025-01-01 PROCEDURE — 70450 CT HEAD/BRAIN W/O DYE: CPT

## 2025-01-01 PROCEDURE — 84132 ASSAY OF SERUM POTASSIUM: CPT

## 2025-01-01 PROCEDURE — 82550 ASSAY OF CK (CPK): CPT | Performed by: NURSE PRACTITIONER

## 2025-01-01 PROCEDURE — 88302 TISSUE EXAM BY PATHOLOGIST: CPT | Performed by: PATHOLOGY

## 2025-01-01 PROCEDURE — 93971 EXTREMITY STUDY: CPT | Performed by: SURGERY

## 2025-01-01 PROCEDURE — 99291 CRITICAL CARE FIRST HOUR: CPT | Performed by: PSYCHIATRY & NEUROLOGY

## 2025-01-01 PROCEDURE — 94669 MECHANICAL CHEST WALL OSCILL: CPT

## 2025-01-01 PROCEDURE — 95720 EEG PHY/QHP EA INCR W/VEEG: CPT | Performed by: STUDENT IN AN ORGANIZED HEALTH CARE EDUCATION/TRAINING PROGRAM

## 2025-01-01 PROCEDURE — 82805 BLOOD GASES W/O2 SATURATION: CPT | Performed by: REGISTERED NURSE

## 2025-01-01 PROCEDURE — 80048 BASIC METABOLIC PNL TOTAL CA: CPT

## 2025-01-01 PROCEDURE — 94760 N-INVAS EAR/PLS OXIMETRY 1: CPT

## 2025-01-01 PROCEDURE — 99024 POSTOP FOLLOW-UP VISIT: CPT | Performed by: THORACIC SURGERY (CARDIOTHORACIC VASCULAR SURGERY)

## 2025-01-01 PROCEDURE — 84484 ASSAY OF TROPONIN QUANT: CPT | Performed by: STUDENT IN AN ORGANIZED HEALTH CARE EDUCATION/TRAINING PROGRAM

## 2025-01-01 PROCEDURE — 87077 CULTURE AEROBIC IDENTIFY: CPT | Performed by: PSYCHIATRY & NEUROLOGY

## 2025-01-01 PROCEDURE — NC001 PR NO CHARGE: Performed by: EMERGENCY MEDICINE

## 2025-01-01 PROCEDURE — 4A133B1 MONITORING OF ARTERIAL PRESSURE, PERIPHERAL, PERCUTANEOUS APPROACH: ICD-10-PCS | Performed by: INTERNAL MEDICINE

## 2025-01-01 PROCEDURE — 94668 MNPJ CHEST WALL SBSQ: CPT

## 2025-01-01 PROCEDURE — 4A133B1 MONITORING OF ARTERIAL PRESSURE, PERIPHERAL, PERCUTANEOUS APPROACH: ICD-10-PCS | Performed by: EMERGENCY MEDICINE

## 2025-01-01 PROCEDURE — 82330 ASSAY OF CALCIUM: CPT | Performed by: SURGERY

## 2025-01-01 PROCEDURE — 80048 BASIC METABOLIC PNL TOTAL CA: CPT | Performed by: PSYCHIATRY & NEUROLOGY

## 2025-01-01 PROCEDURE — 95715 VEEG EA 12-26HR INTMT MNTR: CPT

## 2025-01-01 PROCEDURE — 84100 ASSAY OF PHOSPHORUS: CPT

## 2025-01-01 PROCEDURE — 96366 THER/PROPH/DIAG IV INF ADDON: CPT

## 2025-01-01 PROCEDURE — 95816 EEG AWAKE AND DROWSY: CPT | Performed by: PSYCHIATRY & NEUROLOGY

## 2025-01-01 PROCEDURE — 85007 BL SMEAR W/DIFF WBC COUNT: CPT | Performed by: SURGERY

## 2025-01-01 PROCEDURE — 80202 ASSAY OF VANCOMYCIN: CPT | Performed by: INTERNAL MEDICINE

## 2025-01-01 PROCEDURE — 82945 GLUCOSE OTHER FLUID: CPT

## 2025-01-01 PROCEDURE — 84100 ASSAY OF PHOSPHORUS: CPT | Performed by: PHYSICIAN ASSISTANT

## 2025-01-01 PROCEDURE — 5A1955Z RESPIRATORY VENTILATION, GREATER THAN 96 CONSECUTIVE HOURS: ICD-10-PCS | Performed by: EMERGENCY MEDICINE

## 2025-01-01 PROCEDURE — 02HV33Z INSERTION OF INFUSION DEVICE INTO SUPERIOR VENA CAVA, PERCUTANEOUS APPROACH: ICD-10-PCS | Performed by: INTERNAL MEDICINE

## 2025-01-01 PROCEDURE — 99291 CRITICAL CARE FIRST HOUR: CPT | Performed by: ANESTHESIOLOGY

## 2025-01-01 PROCEDURE — 84145 PROCALCITONIN (PCT): CPT | Performed by: INTERNAL MEDICINE

## 2025-01-01 PROCEDURE — 86923 COMPATIBILITY TEST ELECTRIC: CPT

## 2025-01-01 PROCEDURE — 71045 X-RAY EXAM CHEST 1 VIEW: CPT

## 2025-01-01 PROCEDURE — 87181 SC STD AGAR DILUTION PER AGT: CPT | Performed by: PSYCHIATRY & NEUROLOGY

## 2025-01-01 PROCEDURE — 95816 EEG AWAKE AND DROWSY: CPT

## 2025-01-01 PROCEDURE — 99232 SBSQ HOSP IP/OBS MODERATE 35: CPT | Performed by: STUDENT IN AN ORGANIZED HEALTH CARE EDUCATION/TRAINING PROGRAM

## 2025-01-01 PROCEDURE — 85025 COMPLETE CBC W/AUTO DIFF WBC: CPT

## 2025-01-01 PROCEDURE — 83036 HEMOGLOBIN GLYCOSYLATED A1C: CPT

## 2025-01-01 PROCEDURE — 80053 COMPREHEN METABOLIC PANEL: CPT | Performed by: NURSE PRACTITIONER

## 2025-01-01 PROCEDURE — 92610 EVALUATE SWALLOWING FUNCTION: CPT

## 2025-01-01 PROCEDURE — 86255 FLUORESCENT ANTIBODY SCREEN: CPT | Performed by: PSYCHIATRY & NEUROLOGY

## 2025-01-01 PROCEDURE — 99232 SBSQ HOSP IP/OBS MODERATE 35: CPT | Performed by: INTERNAL MEDICINE

## 2025-01-01 PROCEDURE — 89050 BODY FLUID CELL COUNT: CPT

## 2025-01-01 PROCEDURE — 83880 ASSAY OF NATRIURETIC PEPTIDE: CPT | Performed by: STUDENT IN AN ORGANIZED HEALTH CARE EDUCATION/TRAINING PROGRAM

## 2025-01-01 PROCEDURE — 84439 ASSAY OF FREE THYROXINE: CPT | Performed by: PSYCHIATRY & NEUROLOGY

## 2025-01-01 PROCEDURE — 03HY32Z INSERTION OF MONITORING DEVICE INTO UPPER ARTERY, PERCUTANEOUS APPROACH: ICD-10-PCS | Performed by: INTERNAL MEDICINE

## 2025-01-01 PROCEDURE — 95700 EEG CONT REC W/VID EEG TECH: CPT

## 2025-01-01 PROCEDURE — 36620 INSERTION CATHETER ARTERY: CPT | Performed by: EMERGENCY MEDICINE

## 2025-01-01 PROCEDURE — 71260 CT THORAX DX C+: CPT

## 2025-01-01 PROCEDURE — 0BH17EZ INSERTION OF ENDOTRACHEAL AIRWAY INTO TRACHEA, VIA NATURAL OR ARTIFICIAL OPENING: ICD-10-PCS | Performed by: EMERGENCY MEDICINE

## 2025-01-01 PROCEDURE — 95718 EEG PHYS/QHP 2-12 HR W/VEEG: CPT | Performed by: STUDENT IN AN ORGANIZED HEALTH CARE EDUCATION/TRAINING PROGRAM

## 2025-01-01 PROCEDURE — 95712 VEEG 2-12 HR INTMT MNTR: CPT

## 2025-01-01 PROCEDURE — 82550 ASSAY OF CK (CPK): CPT

## 2025-01-01 PROCEDURE — 71250 CT THORAX DX C-: CPT

## 2025-01-01 PROCEDURE — 89051 BODY FLUID CELL COUNT: CPT

## 2025-01-01 PROCEDURE — 85610 PROTHROMBIN TIME: CPT | Performed by: NURSE PRACTITIONER

## 2025-01-01 PROCEDURE — 0B9D8ZZ DRAINAGE OF RIGHT MIDDLE LUNG LOBE, VIA NATURAL OR ARTIFICIAL OPENING ENDOSCOPIC: ICD-10-PCS | Performed by: EMERGENCY MEDICINE

## 2025-01-01 PROCEDURE — 82533 TOTAL CORTISOL: CPT | Performed by: STUDENT IN AN ORGANIZED HEALTH CARE EDUCATION/TRAINING PROGRAM

## 2025-01-01 PROCEDURE — 36620 INSERTION CATHETER ARTERY: CPT | Performed by: INTERNAL MEDICINE

## 2025-01-01 PROCEDURE — C1781 MESH (IMPLANTABLE): HCPCS | Performed by: THORACIC SURGERY (CARDIOTHORACIC VASCULAR SURGERY)

## 2025-01-01 PROCEDURE — 82947 ASSAY GLUCOSE BLOOD QUANT: CPT

## 2025-01-01 PROCEDURE — 83690 ASSAY OF LIPASE: CPT | Performed by: PSYCHIATRY & NEUROLOGY

## 2025-01-01 PROCEDURE — 86140 C-REACTIVE PROTEIN: CPT | Performed by: EMERGENCY MEDICINE

## 2025-01-01 PROCEDURE — 4A133J1 MONITORING OF ARTERIAL PULSE, PERIPHERAL, PERCUTANEOUS APPROACH: ICD-10-PCS | Performed by: EMERGENCY MEDICINE

## 2025-01-01 PROCEDURE — 81003 URINALYSIS AUTO W/O SCOPE: CPT | Performed by: STUDENT IN AN ORGANIZED HEALTH CARE EDUCATION/TRAINING PROGRAM

## 2025-01-01 PROCEDURE — 93010 ELECTROCARDIOGRAM REPORT: CPT | Performed by: INTERNAL MEDICINE

## 2025-01-01 PROCEDURE — 87205 SMEAR GRAM STAIN: CPT | Performed by: PSYCHIATRY & NEUROLOGY

## 2025-01-01 PROCEDURE — 84100 ASSAY OF PHOSPHORUS: CPT | Performed by: SURGERY

## 2025-01-01 PROCEDURE — 0241U HB NFCT DS VIR RESP RNA 4 TRGT: CPT | Performed by: REGISTERED NURSE

## 2025-01-01 PROCEDURE — 82150 ASSAY OF AMYLASE: CPT | Performed by: PSYCHIATRY & NEUROLOGY

## 2025-01-01 PROCEDURE — 84295 ASSAY OF SERUM SODIUM: CPT

## 2025-01-01 PROCEDURE — 99223 1ST HOSP IP/OBS HIGH 75: CPT | Performed by: EMERGENCY MEDICINE

## 2025-01-01 PROCEDURE — 86618 LYME DISEASE ANTIBODY: CPT | Performed by: REGISTERED NURSE

## 2025-01-01 PROCEDURE — 83735 ASSAY OF MAGNESIUM: CPT | Performed by: SURGERY

## 2025-01-01 PROCEDURE — 80053 COMPREHEN METABOLIC PANEL: CPT

## 2025-01-01 PROCEDURE — 96365 THER/PROPH/DIAG IV INF INIT: CPT

## 2025-01-01 PROCEDURE — 94640 AIRWAY INHALATION TREATMENT: CPT | Performed by: SOCIAL WORKER

## 2025-01-01 PROCEDURE — 80053 COMPREHEN METABOLIC PANEL: CPT | Performed by: STUDENT IN AN ORGANIZED HEALTH CARE EDUCATION/TRAINING PROGRAM

## 2025-01-01 PROCEDURE — 93306 TTE W/DOPPLER COMPLETE: CPT

## 2025-01-01 PROCEDURE — 80074 ACUTE HEPATITIS PANEL: CPT | Performed by: NURSE PRACTITIONER

## 2025-01-01 PROCEDURE — 94664 DEMO&/EVAL PT USE INHALER: CPT | Performed by: SOCIAL WORKER

## 2025-01-01 PROCEDURE — 84145 PROCALCITONIN (PCT): CPT | Performed by: REGISTERED NURSE

## 2025-01-01 PROCEDURE — 86617 LYME DISEASE ANTIBODY: CPT | Performed by: REGISTERED NURSE

## 2025-01-01 PROCEDURE — 30233N1 TRANSFUSION OF NONAUTOLOGOUS RED BLOOD CELLS INTO PERIPHERAL VEIN, PERCUTANEOUS APPROACH: ICD-10-PCS | Performed by: INTERNAL MEDICINE

## 2025-01-01 PROCEDURE — 5A1223Z PERFORMANCE OF CARDIAC PACING, CONTINUOUS: ICD-10-PCS | Performed by: ANESTHESIOLOGY

## 2025-01-01 PROCEDURE — NC001 PR NO CHARGE: Performed by: ANESTHESIOLOGY

## 2025-01-01 PROCEDURE — 99291 CRITICAL CARE FIRST HOUR: CPT | Performed by: INTERNAL MEDICINE

## 2025-01-01 PROCEDURE — 87483 CNS DNA AMP PROBE TYPE 12-25: CPT

## 2025-01-01 PROCEDURE — 87040 BLOOD CULTURE FOR BACTERIA: CPT

## 2025-01-01 PROCEDURE — 85025 COMPLETE CBC W/AUTO DIFF WBC: CPT | Performed by: STUDENT IN AN ORGANIZED HEALTH CARE EDUCATION/TRAINING PROGRAM

## 2025-01-01 PROCEDURE — 84145 PROCALCITONIN (PCT): CPT | Performed by: NURSE PRACTITIONER

## 2025-01-01 PROCEDURE — 80048 BASIC METABOLIC PNL TOTAL CA: CPT | Performed by: STUDENT IN AN ORGANIZED HEALTH CARE EDUCATION/TRAINING PROGRAM

## 2025-01-01 PROCEDURE — 84157 ASSAY OF PROTEIN OTHER: CPT

## 2025-01-01 PROCEDURE — 82805 BLOOD GASES W/O2 SATURATION: CPT

## 2025-01-01 PROCEDURE — 99285 EMERGENCY DEPT VISIT HI MDM: CPT

## 2025-01-01 PROCEDURE — 84443 ASSAY THYROID STIM HORMONE: CPT | Performed by: STUDENT IN AN ORGANIZED HEALTH CARE EDUCATION/TRAINING PROGRAM

## 2025-01-01 PROCEDURE — 83735 ASSAY OF MAGNESIUM: CPT | Performed by: PHYSICIAN ASSISTANT

## 2025-01-01 PROCEDURE — 009U3ZX DRAINAGE OF SPINAL CANAL, PERCUTANEOUS APPROACH, DIAGNOSTIC: ICD-10-PCS | Performed by: EMERGENCY MEDICINE

## 2025-01-01 PROCEDURE — 36415 COLL VENOUS BLD VENIPUNCTURE: CPT | Performed by: STUDENT IN AN ORGANIZED HEALTH CARE EDUCATION/TRAINING PROGRAM

## 2025-01-01 PROCEDURE — 36556 INSERT NON-TUNNEL CV CATH: CPT | Performed by: INTERNAL MEDICINE

## 2025-01-01 PROCEDURE — 82550 ASSAY OF CK (CPK): CPT | Performed by: PSYCHIATRY & NEUROLOGY

## 2025-01-01 PROCEDURE — NC001 PR NO CHARGE: Performed by: INTERNAL MEDICINE

## 2025-01-01 PROCEDURE — 70553 MRI BRAIN STEM W/O & W/DYE: CPT

## 2025-01-01 PROCEDURE — 94002 VENT MGMT INPAT INIT DAY: CPT

## 2025-01-01 PROCEDURE — 97167 OT EVAL HIGH COMPLEX 60 MIN: CPT

## 2025-01-01 PROCEDURE — 84100 ASSAY OF PHOSPHORUS: CPT | Performed by: REGISTERED NURSE

## 2025-01-01 PROCEDURE — 87081 CULTURE SCREEN ONLY: CPT | Performed by: REGISTERED NURSE

## 2025-01-01 PROCEDURE — 97163 PT EVAL HIGH COMPLEX 45 MIN: CPT

## 2025-01-01 PROCEDURE — 0BUT4JZ SUPPLEMENT DIAPHRAGM WITH SYNTHETIC SUBSTITUTE, PERCUTANEOUS ENDOSCOPIC APPROACH: ICD-10-PCS | Performed by: THORACIC SURGERY (CARDIOTHORACIC VASCULAR SURGERY)

## 2025-01-01 PROCEDURE — 93970 EXTREMITY STUDY: CPT | Performed by: SURGERY

## 2025-01-01 PROCEDURE — 80143 DRUG ASSAY ACETAMINOPHEN: CPT | Performed by: NURSE PRACTITIONER

## 2025-01-01 PROCEDURE — 97168 OT RE-EVAL EST PLAN CARE: CPT

## 2025-01-01 PROCEDURE — 31500 INSERT EMERGENCY AIRWAY: CPT | Performed by: EMERGENCY MEDICINE

## 2025-01-01 PROCEDURE — 99233 SBSQ HOSP IP/OBS HIGH 50: CPT | Performed by: ANESTHESIOLOGY

## 2025-01-01 PROCEDURE — 85027 COMPLETE CBC AUTOMATED: CPT | Performed by: STUDENT IN AN ORGANIZED HEALTH CARE EDUCATION/TRAINING PROGRAM

## 2025-01-01 PROCEDURE — 99222 1ST HOSP IP/OBS MODERATE 55: CPT | Performed by: INTERNAL MEDICINE

## 2025-01-01 PROCEDURE — NC001 PR NO CHARGE: Performed by: PHYSICIAN ASSISTANT

## 2025-01-01 PROCEDURE — 85007 BL SMEAR W/DIFF WBC COUNT: CPT | Performed by: STUDENT IN AN ORGANIZED HEALTH CARE EDUCATION/TRAINING PROGRAM

## 2025-01-01 PROCEDURE — 99497 ADVNCD CARE PLAN 30 MIN: CPT | Performed by: INTERNAL MEDICINE

## 2025-01-01 PROCEDURE — 82805 BLOOD GASES W/O2 SATURATION: CPT | Performed by: STUDENT IN AN ORGANIZED HEALTH CARE EDUCATION/TRAINING PROGRAM

## 2025-01-01 PROCEDURE — 99223 1ST HOSP IP/OBS HIGH 75: CPT | Performed by: PSYCHIATRY & NEUROLOGY

## 2025-01-01 PROCEDURE — 80048 BASIC METABOLIC PNL TOTAL CA: CPT | Performed by: REGISTERED NURSE

## 2025-01-01 PROCEDURE — 83605 ASSAY OF LACTIC ACID: CPT | Performed by: STUDENT IN AN ORGANIZED HEALTH CARE EDUCATION/TRAINING PROGRAM

## 2025-01-01 PROCEDURE — 03HY32Z INSERTION OF MONITORING DEVICE INTO UPPER ARTERY, PERCUTANEOUS APPROACH: ICD-10-PCS | Performed by: EMERGENCY MEDICINE

## 2025-01-01 PROCEDURE — 80048 BASIC METABOLIC PNL TOTAL CA: CPT | Performed by: NURSE PRACTITIONER

## 2025-01-01 PROCEDURE — 11721 DEBRIDE NAIL 6 OR MORE: CPT | Performed by: PODIATRIST

## 2025-01-01 PROCEDURE — 85007 BL SMEAR W/DIFF WBC COUNT: CPT

## 2025-01-01 PROCEDURE — 0DJ08ZZ INSPECTION OF UPPER INTESTINAL TRACT, VIA NATURAL OR ARTIFICIAL OPENING ENDOSCOPIC: ICD-10-PCS | Performed by: THORACIC SURGERY (CARDIOTHORACIC VASCULAR SURGERY)

## 2025-01-01 PROCEDURE — 80053 COMPREHEN METABOLIC PANEL: CPT | Performed by: PHYSICIAN ASSISTANT

## 2025-01-01 PROCEDURE — 85025 COMPLETE CBC W/AUTO DIFF WBC: CPT | Performed by: PHYSICIAN ASSISTANT

## 2025-01-01 PROCEDURE — 80202 ASSAY OF VANCOMYCIN: CPT | Performed by: EMERGENCY MEDICINE

## 2025-01-01 PROCEDURE — 83605 ASSAY OF LACTIC ACID: CPT | Performed by: SURGERY

## 2025-01-01 PROCEDURE — 87070 CULTURE OTHR SPECIMN AEROBIC: CPT

## 2025-01-01 PROCEDURE — 99238 HOSP IP/OBS DSCHRG MGMT 30/<: CPT

## 2025-01-01 PROCEDURE — 33210 INSERT ELECTRD/PM CATH SNGL: CPT

## 2025-01-01 PROCEDURE — 87070 CULTURE OTHR SPECIMN AEROBIC: CPT | Performed by: PSYCHIATRY & NEUROLOGY

## 2025-01-01 PROCEDURE — 82803 BLOOD GASES ANY COMBINATION: CPT

## 2025-01-01 PROCEDURE — 43282 LAP PARAESOPH HER RPR W/MESH: CPT | Performed by: THORACIC SURGERY (CARDIOTHORACIC VASCULAR SURGERY)

## 2025-01-01 PROCEDURE — P9016 RBC LEUKOCYTES REDUCED: HCPCS

## 2025-01-01 PROCEDURE — 36620 INSERTION CATHETER ARTERY: CPT

## 2025-01-01 PROCEDURE — 74177 CT ABD & PELVIS W/CONTRAST: CPT

## 2025-01-01 PROCEDURE — 86901 BLOOD TYPING SEROLOGIC RH(D): CPT

## 2025-01-01 PROCEDURE — 8E0W4CZ ROBOTIC ASSISTED PROCEDURE OF TRUNK REGION, PERCUTANEOUS ENDOSCOPIC APPROACH: ICD-10-PCS | Performed by: THORACIC SURGERY (CARDIOTHORACIC VASCULAR SURGERY)

## 2025-01-01 PROCEDURE — 97164 PT RE-EVAL EST PLAN CARE: CPT

## 2025-01-01 PROCEDURE — 99291 CRITICAL CARE FIRST HOUR: CPT | Performed by: NURSE PRACTITIONER

## 2025-01-01 PROCEDURE — 76705 ECHO EXAM OF ABDOMEN: CPT

## 2025-01-01 PROCEDURE — S2900 ROBOTIC SURGICAL SYSTEM: HCPCS | Performed by: THORACIC SURGERY (CARDIOTHORACIC VASCULAR SURGERY)

## 2025-01-01 PROCEDURE — 85014 HEMATOCRIT: CPT

## 2025-01-01 PROCEDURE — 82140 ASSAY OF AMMONIA: CPT | Performed by: PSYCHIATRY & NEUROLOGY

## 2025-01-01 PROCEDURE — 86051 AQUAPORIN-4 ANTB ELISA: CPT | Performed by: PSYCHIATRY & NEUROLOGY

## 2025-01-01 PROCEDURE — 87205 SMEAR GRAM STAIN: CPT

## 2025-01-01 PROCEDURE — 82805 BLOOD GASES W/O2 SATURATION: CPT | Performed by: SURGERY

## 2025-01-01 PROCEDURE — 85027 COMPLETE CBC AUTOMATED: CPT | Performed by: SURGERY

## 2025-01-01 PROCEDURE — 82330 ASSAY OF CALCIUM: CPT | Performed by: PHYSICIAN ASSISTANT

## 2025-01-01 PROCEDURE — 87106 FUNGI IDENTIFICATION YEAST: CPT

## 2025-01-01 PROCEDURE — 99223 1ST HOSP IP/OBS HIGH 75: CPT | Performed by: THORACIC SURGERY (CARDIOTHORACIC VASCULAR SURGERY)

## 2025-01-01 PROCEDURE — 93970 EXTREMITY STUDY: CPT | Performed by: STUDENT IN AN ORGANIZED HEALTH CARE EDUCATION/TRAINING PROGRAM

## 2025-01-01 PROCEDURE — 93306 TTE W/DOPPLER COMPLETE: CPT | Performed by: INTERNAL MEDICINE

## 2025-01-01 PROCEDURE — 31500 INSERT EMERGENCY AIRWAY: CPT

## 2025-01-01 PROCEDURE — 31622 DX BRONCHOSCOPE/WASH: CPT | Performed by: EMERGENCY MEDICINE

## 2025-01-01 PROCEDURE — 0B928ZZ DRAINAGE OF CARINA, VIA NATURAL OR ARTIFICIAL OPENING ENDOSCOPIC: ICD-10-PCS | Performed by: EMERGENCY MEDICINE

## 2025-01-01 PROCEDURE — 94660 CPAP INITIATION&MGMT: CPT

## 2025-01-01 PROCEDURE — 84100 ASSAY OF PHOSPHORUS: CPT | Performed by: STUDENT IN AN ORGANIZED HEALTH CARE EDUCATION/TRAINING PROGRAM

## 2025-01-01 PROCEDURE — 82805 BLOOD GASES W/O2 SATURATION: CPT | Performed by: NURSE PRACTITIONER

## 2025-01-01 PROCEDURE — NC001 PR NO CHARGE: Performed by: THORACIC SURGERY (CARDIOTHORACIC VASCULAR SURGERY)

## 2025-01-01 PROCEDURE — 86341 ISLET CELL ANTIBODY: CPT | Performed by: PSYCHIATRY & NEUROLOGY

## 2025-01-01 PROCEDURE — 0B9F8ZZ DRAINAGE OF RIGHT LOWER LUNG LOBE, VIA NATURAL OR ARTIFICIAL OPENING ENDOSCOPIC: ICD-10-PCS | Performed by: EMERGENCY MEDICINE

## 2025-01-01 DEVICE — BIO-A TISSUE REINFORCEMENT 7CMX10CM
Type: IMPLANTABLE DEVICE | Site: ESOPHAGUS | Status: FUNCTIONAL
Brand: GORE BIO-A TISSUE REINFORCEMENT

## 2025-01-01 RX ORDER — HEPARIN SODIUM 5000 [USP'U]/ML
5000 INJECTION, SOLUTION INTRAVENOUS; SUBCUTANEOUS EVERY 8 HOURS SCHEDULED
Status: DISCONTINUED | OUTPATIENT
Start: 2025-01-01 | End: 2025-01-01

## 2025-01-01 RX ORDER — POTASSIUM CHLORIDE 29.8 MG/ML
40 INJECTION INTRAVENOUS ONCE
Status: DISCONTINUED | OUTPATIENT
Start: 2025-01-01 | End: 2025-01-01

## 2025-01-01 RX ORDER — SODIUM CHLORIDE 9 MG/ML
INJECTION, SOLUTION INTRAVENOUS CONTINUOUS PRN
Status: DISCONTINUED | OUTPATIENT
Start: 2025-01-01 | End: 2025-01-01

## 2025-01-01 RX ORDER — SODIUM CHLORIDE, SODIUM GLUCONATE, SODIUM ACETATE, POTASSIUM CHLORIDE, MAGNESIUM CHLORIDE, SODIUM PHOSPHATE, DIBASIC, AND POTASSIUM PHOSPHATE .53; .5; .37; .037; .03; .012; .00082 G/100ML; G/100ML; G/100ML; G/100ML; G/100ML; G/100ML; G/100ML
1000 INJECTION, SOLUTION INTRAVENOUS ONCE
Status: COMPLETED | OUTPATIENT
Start: 2025-01-01 | End: 2025-01-01

## 2025-01-01 RX ORDER — EPHEDRINE SULFATE 50 MG/ML
INJECTION INTRAVENOUS AS NEEDED
Status: DISCONTINUED | OUTPATIENT
Start: 2025-01-01 | End: 2025-01-01

## 2025-01-01 RX ORDER — VANCOMYCIN HYDROCHLORIDE 1 G/200ML
15 INJECTION, SOLUTION INTRAVENOUS EVERY 12 HOURS
Status: DISCONTINUED | OUTPATIENT
Start: 2025-01-01 | End: 2025-01-01

## 2025-01-01 RX ORDER — FUROSEMIDE 10 MG/ML
40 INJECTION INTRAMUSCULAR; INTRAVENOUS ONCE
Status: COMPLETED | OUTPATIENT
Start: 2025-01-01 | End: 2025-01-01

## 2025-01-01 RX ORDER — LIDOCAINE HYDROCHLORIDE 10 MG/ML
30 INJECTION, SOLUTION EPIDURAL; INFILTRATION; INTRACAUDAL; PERINEURAL ONCE
Status: COMPLETED | OUTPATIENT
Start: 2025-01-01 | End: 2025-01-01

## 2025-01-01 RX ORDER — POTASSIUM CHLORIDE 29.8 MG/ML
40 INJECTION INTRAVENOUS ONCE
Status: COMPLETED | OUTPATIENT
Start: 2025-01-01 | End: 2025-01-01

## 2025-01-01 RX ORDER — HYDROCORTISONE SODIUM SUCCINATE 100 MG/2ML
25 INJECTION INTRAMUSCULAR; INTRAVENOUS EVERY 12 HOURS SCHEDULED
Status: DISCONTINUED | OUTPATIENT
Start: 2025-01-01 | End: 2025-01-01

## 2025-01-01 RX ORDER — LORAZEPAM 2 MG/ML
INJECTION INTRAMUSCULAR
Status: DISCONTINUED
Start: 2025-01-01 | End: 2025-01-01 | Stop reason: WASHOUT

## 2025-01-01 RX ORDER — NOREPINEPHRINE BITARTRATE 1 MG/ML
INJECTION, SOLUTION INTRAVENOUS
Status: DISPENSED
Start: 2025-01-01 | End: 2025-01-01

## 2025-01-01 RX ORDER — SODIUM CHLORIDE FOR INHALATION 3 %
4 VIAL, NEBULIZER (ML) INHALATION
Status: COMPLETED | OUTPATIENT
Start: 2025-01-01 | End: 2025-01-01

## 2025-01-01 RX ORDER — INSULIN LISPRO 100 [IU]/ML
1-6 INJECTION, SOLUTION INTRAVENOUS; SUBCUTANEOUS
Status: DISCONTINUED | OUTPATIENT
Start: 2025-01-01 | End: 2025-01-01

## 2025-01-01 RX ORDER — ETOMIDATE 2 MG/ML
20 INJECTION INTRAVENOUS ONCE
Status: COMPLETED | OUTPATIENT
Start: 2025-01-01 | End: 2025-01-01

## 2025-01-01 RX ORDER — POTASSIUM CHLORIDE 20MEQ/15ML
40 LIQUID (ML) ORAL ONCE
Status: COMPLETED | OUTPATIENT
Start: 2025-01-01 | End: 2025-01-01

## 2025-01-01 RX ORDER — LORAZEPAM 2 MG/ML
2 INJECTION INTRAMUSCULAR ONCE
Status: COMPLETED | OUTPATIENT
Start: 2025-01-01 | End: 2025-01-01

## 2025-01-01 RX ORDER — CALCIUM GLUCONATE 20 MG/ML
2 INJECTION, SOLUTION INTRAVENOUS ONCE
Status: DISCONTINUED | OUTPATIENT
Start: 2025-01-01 | End: 2025-01-01

## 2025-01-01 RX ORDER — LEVALBUTEROL INHALATION SOLUTION 1.25 MG/3ML
1.25 SOLUTION RESPIRATORY (INHALATION)
Status: DISCONTINUED | OUTPATIENT
Start: 2025-01-01 | End: 2025-01-01

## 2025-01-01 RX ORDER — POLYETHYLENE GLYCOL 3350 17 G/17G
17 POWDER, FOR SOLUTION ORAL DAILY
Status: DISCONTINUED | OUTPATIENT
Start: 2025-01-01 | End: 2025-01-01

## 2025-01-01 RX ORDER — SUCCINYLCHOLINE/SOD CL,ISO/PF 100 MG/5ML
100 SYRINGE (ML) INTRAVENOUS ONCE
Status: COMPLETED | OUTPATIENT
Start: 2025-01-01 | End: 2025-01-01

## 2025-01-01 RX ORDER — HYDROCORTISONE SODIUM SUCCINATE 100 MG/2ML
100 INJECTION INTRAMUSCULAR; INTRAVENOUS ONCE
Status: COMPLETED | OUTPATIENT
Start: 2025-01-01 | End: 2025-01-01

## 2025-01-01 RX ORDER — FENTANYL CITRATE 50 UG/ML
50 INJECTION, SOLUTION INTRAMUSCULAR; INTRAVENOUS
Refills: 0 | Status: DISCONTINUED | OUTPATIENT
Start: 2025-01-01 | End: 2025-01-01

## 2025-01-01 RX ORDER — MAGNESIUM SULFATE HEPTAHYDRATE 40 MG/ML
2 INJECTION, SOLUTION INTRAVENOUS ONCE
Status: COMPLETED | OUTPATIENT
Start: 2025-01-01 | End: 2025-01-01

## 2025-01-01 RX ORDER — PROPOFOL 10 MG/ML
5-50 INJECTION, EMULSION INTRAVENOUS
Status: DISCONTINUED | OUTPATIENT
Start: 2025-01-01 | End: 2025-01-01

## 2025-01-01 RX ORDER — LORAZEPAM 2 MG/ML
0.5 INJECTION INTRAMUSCULAR ONCE
Status: COMPLETED | OUTPATIENT
Start: 2025-01-01 | End: 2025-01-01

## 2025-01-01 RX ORDER — ENOXAPARIN SODIUM 100 MG/ML
40 INJECTION SUBCUTANEOUS DAILY
Status: DISCONTINUED | OUTPATIENT
Start: 2025-01-01 | End: 2025-01-01

## 2025-01-01 RX ORDER — LANOLIN ALCOHOL/MO/W.PET/CERES
100 CREAM (GRAM) TOPICAL DAILY
Status: DISCONTINUED | OUTPATIENT
Start: 2025-01-01 | End: 2025-01-01

## 2025-01-01 RX ORDER — LORAZEPAM 2 MG/ML
1 INJECTION INTRAMUSCULAR
Status: DISCONTINUED | OUTPATIENT
Start: 2025-01-01 | End: 2025-01-01 | Stop reason: HOSPADM

## 2025-01-01 RX ORDER — QUETIAPINE FUMARATE 25 MG/1
12.5 TABLET, FILM COATED ORAL
Status: DISCONTINUED | OUTPATIENT
Start: 2025-01-01 | End: 2025-01-01

## 2025-01-01 RX ORDER — HYDROCORTISONE SODIUM SUCCINATE 100 MG/2ML
50 INJECTION INTRAMUSCULAR; INTRAVENOUS EVERY 6 HOURS SCHEDULED
Status: DISCONTINUED | OUTPATIENT
Start: 2025-01-01 | End: 2025-01-01

## 2025-01-01 RX ORDER — BISACODYL 10 MG
10 SUPPOSITORY, RECTAL RECTAL ONCE
Status: COMPLETED | OUTPATIENT
Start: 2025-01-01 | End: 2025-01-01

## 2025-01-01 RX ORDER — ACETAMINOPHEN 160 MG/5ML
650 SUSPENSION ORAL EVERY 4 HOURS PRN
Status: DISCONTINUED | OUTPATIENT
Start: 2025-01-01 | End: 2025-01-01 | Stop reason: HOSPADM

## 2025-01-01 RX ORDER — NOREPINEPHRINE BITARTRATE 1 MG/ML
INJECTION, SOLUTION INTRAVENOUS
Status: DISCONTINUED
Start: 2025-01-01 | End: 2025-01-01 | Stop reason: WASHOUT

## 2025-01-01 RX ORDER — CALCIUM GLUCONATE 20 MG/ML
1 INJECTION, SOLUTION INTRAVENOUS ONCE
Status: COMPLETED | OUTPATIENT
Start: 2025-01-01 | End: 2025-01-01

## 2025-01-01 RX ORDER — PANTOPRAZOLE SODIUM 40 MG/10ML
40 INJECTION, POWDER, LYOPHILIZED, FOR SOLUTION INTRAVENOUS EVERY 12 HOURS SCHEDULED
Status: DISCONTINUED | OUTPATIENT
Start: 2025-01-01 | End: 2025-01-01

## 2025-01-01 RX ORDER — FUROSEMIDE 10 MG/ML
20 INJECTION INTRAMUSCULAR; INTRAVENOUS ONCE
Status: COMPLETED | OUTPATIENT
Start: 2025-01-01 | End: 2025-01-01

## 2025-01-01 RX ORDER — SODIUM CHLORIDE, SODIUM GLUCONATE, SODIUM ACETATE, POTASSIUM CHLORIDE, MAGNESIUM CHLORIDE, SODIUM PHOSPHATE, DIBASIC, AND POTASSIUM PHOSPHATE .53; .5; .37; .037; .03; .012; .00082 G/100ML; G/100ML; G/100ML; G/100ML; G/100ML; G/100ML; G/100ML
1000 INJECTION, SOLUTION INTRAVENOUS ONCE
Status: DISCONTINUED | OUTPATIENT
Start: 2025-01-01 | End: 2025-01-01

## 2025-01-01 RX ORDER — SODIUM CHLORIDE, SODIUM GLUCONATE, SODIUM ACETATE, POTASSIUM CHLORIDE, MAGNESIUM CHLORIDE, SODIUM PHOSPHATE, DIBASIC, AND POTASSIUM PHOSPHATE .53; .5; .37; .037; .03; .012; .00082 G/100ML; G/100ML; G/100ML; G/100ML; G/100ML; G/100ML; G/100ML
125 INJECTION, SOLUTION INTRAVENOUS CONTINUOUS
Status: DISCONTINUED | OUTPATIENT
Start: 2025-01-01 | End: 2025-01-01

## 2025-01-01 RX ORDER — LORAZEPAM 2 MG/ML
INJECTION INTRAMUSCULAR
Status: COMPLETED
Start: 2025-01-01 | End: 2025-01-01

## 2025-01-01 RX ORDER — BUPIVACAINE HYDROCHLORIDE 2.5 MG/ML
INJECTION, SOLUTION EPIDURAL; INFILTRATION; INTRACAUDAL AS NEEDED
Status: DISCONTINUED | OUTPATIENT
Start: 2025-01-01 | End: 2025-01-01 | Stop reason: HOSPADM

## 2025-01-01 RX ORDER — FUROSEMIDE 10 MG/ML
40 INJECTION INTRAMUSCULAR; INTRAVENOUS EVERY 12 HOURS
Status: DISCONTINUED | OUTPATIENT
Start: 2025-01-01 | End: 2025-01-01

## 2025-01-01 RX ORDER — DEXMEDETOMIDINE HYDROCHLORIDE 4 UG/ML
.1-.7 INJECTION, SOLUTION INTRAVENOUS
Status: DISCONTINUED | OUTPATIENT
Start: 2025-01-01 | End: 2025-01-01

## 2025-01-01 RX ORDER — OFLOXACIN 3 MG/ML
1 SOLUTION/ DROPS OPHTHALMIC 4 TIMES DAILY
Status: COMPLETED | OUTPATIENT
Start: 2025-01-01 | End: 2025-01-01

## 2025-01-01 RX ORDER — ROCURONIUM BROMIDE 10 MG/ML
INJECTION, SOLUTION INTRAVENOUS AS NEEDED
Status: DISCONTINUED | OUTPATIENT
Start: 2025-01-01 | End: 2025-01-01

## 2025-01-01 RX ORDER — MAGNESIUM SULFATE HEPTAHYDRATE 40 MG/ML
2 INJECTION, SOLUTION INTRAVENOUS
Status: DISCONTINUED | OUTPATIENT
Start: 2025-01-01 | End: 2025-01-01

## 2025-01-01 RX ORDER — CEFAZOLIN SODIUM 1 G/3ML
INJECTION, POWDER, FOR SOLUTION INTRAMUSCULAR; INTRAVENOUS AS NEEDED
Status: DISCONTINUED | OUTPATIENT
Start: 2025-01-01 | End: 2025-01-01

## 2025-01-01 RX ORDER — FENTANYL CITRATE 50 UG/ML
INJECTION, SOLUTION INTRAMUSCULAR; INTRAVENOUS AS NEEDED
Status: DISCONTINUED | OUTPATIENT
Start: 2025-01-01 | End: 2025-01-01

## 2025-01-01 RX ORDER — ATROPINE SULFATE 0.1 MG/ML
INJECTION INTRAVENOUS
Status: COMPLETED
Start: 2025-01-01 | End: 2025-01-01

## 2025-01-01 RX ORDER — ATROPINE SULFATE 1 MG/ML
0.5 INJECTION, SOLUTION INTRAVENOUS ONCE
Status: COMPLETED | OUTPATIENT
Start: 2025-01-01 | End: 2025-01-01

## 2025-01-01 RX ORDER — ACETYLCYSTEINE 200 MG/ML
3 SOLUTION ORAL; RESPIRATORY (INHALATION)
Status: DISCONTINUED | OUTPATIENT
Start: 2025-01-01 | End: 2025-01-01

## 2025-01-01 RX ORDER — CALCIUM GLUCONATE 20 MG/ML
2 INJECTION, SOLUTION INTRAVENOUS ONCE
Status: COMPLETED | OUTPATIENT
Start: 2025-01-01 | End: 2025-01-01

## 2025-01-01 RX ORDER — MAGNESIUM HYDROXIDE 1200 MG/15ML
LIQUID ORAL AS NEEDED
Status: DISCONTINUED | OUTPATIENT
Start: 2025-01-01 | End: 2025-01-01 | Stop reason: HOSPADM

## 2025-01-01 RX ORDER — HYDROMORPHONE HCL/PF 1 MG/ML
0.3 SYRINGE (ML) INJECTION
Status: DISCONTINUED | OUTPATIENT
Start: 2025-01-01 | End: 2025-01-01

## 2025-01-01 RX ORDER — HYDROMORPHONE HCL IN WATER/PF 6 MG/30 ML
0.2 PATIENT CONTROLLED ANALGESIA SYRINGE INTRAVENOUS EVERY 4 HOURS PRN
Status: DISCONTINUED | OUTPATIENT
Start: 2025-01-01 | End: 2025-01-01 | Stop reason: HOSPADM

## 2025-01-01 RX ORDER — CEFAZOLIN SODIUM 1 G/50ML
1000 SOLUTION INTRAVENOUS EVERY 8 HOURS
Status: DISCONTINUED | OUTPATIENT
Start: 2025-01-01 | End: 2025-01-01

## 2025-01-01 RX ORDER — TIMOLOL MALEATE 5 MG/ML
1 SOLUTION/ DROPS OPHTHALMIC 2 TIMES DAILY
Status: DISCONTINUED | OUTPATIENT
Start: 2025-01-01 | End: 2025-01-01

## 2025-01-01 RX ORDER — ATROPINE SULFATE 0.1 MG/ML
INJECTION INTRAVENOUS
Status: DISPENSED
Start: 2025-01-01 | End: 2025-01-01

## 2025-01-01 RX ORDER — NYSTATIN 100000 [USP'U]/G
POWDER TOPICAL 2 TIMES DAILY
Status: DISCONTINUED | OUTPATIENT
Start: 2025-01-01 | End: 2025-01-01

## 2025-01-01 RX ORDER — HYDROCORTISONE SODIUM SUCCINATE 100 MG/2ML
25 INJECTION INTRAMUSCULAR; INTRAVENOUS EVERY 8 HOURS SCHEDULED
Status: DISCONTINUED | OUTPATIENT
Start: 2025-01-01 | End: 2025-01-01

## 2025-01-01 RX ORDER — SODIUM CHLORIDE, SODIUM LACTATE, POTASSIUM CHLORIDE, CALCIUM CHLORIDE 600; 310; 30; 20 MG/100ML; MG/100ML; MG/100ML; MG/100ML
75 INJECTION, SOLUTION INTRAVENOUS CONTINUOUS
Status: DISCONTINUED | OUTPATIENT
Start: 2025-01-01 | End: 2025-01-01

## 2025-01-01 RX ORDER — GLYCOPYRROLATE 0.2 MG/ML
0.1 INJECTION INTRAMUSCULAR; INTRAVENOUS EVERY 4 HOURS PRN
Status: DISCONTINUED | OUTPATIENT
Start: 2025-01-01 | End: 2025-01-01 | Stop reason: HOSPADM

## 2025-01-01 RX ORDER — NOREPINEPHRINE BITARTRATE 1 MG/ML
INJECTION, SOLUTION INTRAVENOUS
Status: COMPLETED
Start: 2025-01-01 | End: 2025-01-01

## 2025-01-01 RX ORDER — CHLORHEXIDINE GLUCONATE ORAL RINSE 1.2 MG/ML
15 SOLUTION DENTAL EVERY 12 HOURS SCHEDULED
Status: DISCONTINUED | OUTPATIENT
Start: 2025-01-01 | End: 2025-01-01

## 2025-01-01 RX ORDER — HALOPERIDOL 5 MG/ML
0.5 INJECTION INTRAMUSCULAR EVERY 2 HOUR PRN
Status: DISCONTINUED | OUTPATIENT
Start: 2025-01-01 | End: 2025-01-01 | Stop reason: HOSPADM

## 2025-01-01 RX ORDER — FUROSEMIDE 10 MG/ML
40 INJECTION INTRAMUSCULAR; INTRAVENOUS DAILY
Status: DISCONTINUED | OUTPATIENT
Start: 2025-01-01 | End: 2025-01-01

## 2025-01-01 RX ORDER — ALBUMIN HUMAN 50 G/1000ML
25 SOLUTION INTRAVENOUS ONCE
Status: COMPLETED | OUTPATIENT
Start: 2025-01-01 | End: 2025-01-01

## 2025-01-01 RX ORDER — INSULIN LISPRO 100 [IU]/ML
1-6 INJECTION, SOLUTION INTRAVENOUS; SUBCUTANEOUS EVERY 6 HOURS SCHEDULED
Status: DISCONTINUED | OUTPATIENT
Start: 2025-01-01 | End: 2025-01-01

## 2025-01-01 RX ORDER — SODIUM CHLORIDE, SODIUM GLUCONATE, SODIUM ACETATE, POTASSIUM CHLORIDE, MAGNESIUM CHLORIDE, SODIUM PHOSPHATE, DIBASIC, AND POTASSIUM PHOSPHATE .53; .5; .37; .037; .03; .012; .00082 G/100ML; G/100ML; G/100ML; G/100ML; G/100ML; G/100ML; G/100ML
500 INJECTION, SOLUTION INTRAVENOUS ONCE
Status: COMPLETED | OUTPATIENT
Start: 2025-01-01 | End: 2025-01-01

## 2025-01-01 RX ORDER — HYDROMORPHONE HCL/PF 1 MG/ML
0.3 SYRINGE (ML) INJECTION EVERY 2 HOUR PRN
Refills: 0 | Status: DISCONTINUED | OUTPATIENT
Start: 2025-01-01 | End: 2025-01-01

## 2025-01-01 RX ORDER — SULFAMETHOXAZOLE AND TRIMETHOPRIM 200; 40 MG/5ML; MG/5ML
160 SUSPENSION ORAL EVERY 12 HOURS SCHEDULED
Status: DISCONTINUED | OUTPATIENT
Start: 2025-01-01 | End: 2025-01-01

## 2025-01-01 RX ORDER — AMOXICILLIN 250 MG
1 CAPSULE ORAL 2 TIMES DAILY
Status: DISCONTINUED | OUTPATIENT
Start: 2025-01-01 | End: 2025-01-01

## 2025-01-01 RX ORDER — POTASSIUM CHLORIDE 20MEQ/15ML
20 LIQUID (ML) ORAL ONCE
Status: COMPLETED | OUTPATIENT
Start: 2025-01-01 | End: 2025-01-01

## 2025-01-01 RX ORDER — SODIUM CHLORIDE, SODIUM GLUCONATE, SODIUM ACETATE, POTASSIUM CHLORIDE, MAGNESIUM CHLORIDE, SODIUM PHOSPHATE, DIBASIC, AND POTASSIUM PHOSPHATE .53; .5; .37; .037; .03; .012; .00082 G/100ML; G/100ML; G/100ML; G/100ML; G/100ML; G/100ML; G/100ML
100 INJECTION, SOLUTION INTRAVENOUS CONTINUOUS
Status: DISCONTINUED | OUTPATIENT
Start: 2025-01-01 | End: 2025-01-01

## 2025-01-01 RX ORDER — ALBUMIN HUMAN 50 G/1000ML
SOLUTION INTRAVENOUS CONTINUOUS PRN
Status: DISCONTINUED | OUTPATIENT
Start: 2025-01-01 | End: 2025-01-01

## 2025-01-01 RX ORDER — INSULIN LISPRO 100 [IU]/ML
1-5 INJECTION, SOLUTION INTRAVENOUS; SUBCUTANEOUS EVERY 6 HOURS SCHEDULED
Status: DISCONTINUED | OUTPATIENT
Start: 2025-01-01 | End: 2025-01-01

## 2025-01-01 RX ORDER — FENTANYL CITRATE-0.9 % NACL/PF 10 MCG/ML
50 PLASTIC BAG, INJECTION (ML) INTRAVENOUS CONTINUOUS
Status: DISCONTINUED | OUTPATIENT
Start: 2025-01-01 | End: 2025-01-01

## 2025-01-01 RX ORDER — SODIUM CHLORIDE, SODIUM LACTATE, POTASSIUM CHLORIDE, CALCIUM CHLORIDE 600; 310; 30; 20 MG/100ML; MG/100ML; MG/100ML; MG/100ML
INJECTION, SOLUTION INTRAVENOUS CONTINUOUS PRN
Status: DISCONTINUED | OUTPATIENT
Start: 2025-01-01 | End: 2025-01-01

## 2025-01-01 RX ORDER — GADOBUTROL 604.72 MG/ML
6.5 INJECTION INTRAVENOUS
Status: COMPLETED | OUTPATIENT
Start: 2025-01-01 | End: 2025-01-01

## 2025-01-01 RX ORDER — BISACODYL 10 MG
10 SUPPOSITORY, RECTAL RECTAL DAILY PRN
Status: DISCONTINUED | OUTPATIENT
Start: 2025-01-01 | End: 2025-01-01 | Stop reason: HOSPADM

## 2025-01-01 RX ADMIN — ACETYLCYSTEINE 600 MG: 200 SOLUTION ORAL; RESPIRATORY (INHALATION) at 19:38

## 2025-01-01 RX ADMIN — HEPARIN SODIUM 5000 UNITS: 5000 INJECTION INTRAVENOUS; SUBCUTANEOUS at 05:07

## 2025-01-01 RX ADMIN — IPRATROPIUM BROMIDE 0.5 MG: 0.5 SOLUTION RESPIRATORY (INHALATION) at 08:03

## 2025-01-01 RX ADMIN — POLYETHYLENE GLYCOL 3350 17 G: 17 POWDER, FOR SOLUTION ORAL at 08:16

## 2025-01-01 RX ADMIN — ALBUMIN (HUMAN): 12.5 INJECTION, SOLUTION INTRAVENOUS at 19:32

## 2025-01-01 RX ADMIN — IPRATROPIUM BROMIDE 0.5 MG: 0.5 SOLUTION RESPIRATORY (INHALATION) at 19:40

## 2025-01-01 RX ADMIN — NYSTATIN: 100000 POWDER TOPICAL at 08:58

## 2025-01-01 RX ADMIN — NYSTATIN: 100000 POWDER TOPICAL at 11:10

## 2025-01-01 RX ADMIN — CHLORHEXIDINE GLUCONATE 0.12% ORAL RINSE 15 ML: 1.2 LIQUID ORAL at 21:31

## 2025-01-01 RX ADMIN — CEFTRIAXONE SODIUM 2000 MG: 10 INJECTION, POWDER, FOR SOLUTION INTRAVENOUS at 09:13

## 2025-01-01 RX ADMIN — PIPERACILLIN SODIUM AND TAZOBACTAM SODIUM 4.5 G: 36; 4.5 INJECTION, POWDER, LYOPHILIZED, FOR SOLUTION INTRAVENOUS at 17:08

## 2025-01-01 RX ADMIN — PANTOPRAZOLE SODIUM 40 MG: 40 INJECTION, POWDER, FOR SOLUTION INTRAVENOUS at 00:00

## 2025-01-01 RX ADMIN — PROPOFOL 20 MCG/KG/MIN: 10 INJECTION, EMULSION INTRAVENOUS at 15:07

## 2025-01-01 RX ADMIN — NYSTATIN: 100000 POWDER TOPICAL at 17:47

## 2025-01-01 RX ADMIN — SODIUM CHLORIDE, SODIUM GLUCONATE, SODIUM ACETATE, POTASSIUM CHLORIDE, MAGNESIUM CHLORIDE, SODIUM PHOSPHATE, DIBASIC, AND POTASSIUM PHOSPHATE 1000 ML: .53; .5; .37; .037; .03; .012; .00082 INJECTION, SOLUTION INTRAVENOUS at 22:11

## 2025-01-01 RX ADMIN — LEVALBUTEROL HYDROCHLORIDE 1.25 MG: 1.25 SOLUTION RESPIRATORY (INHALATION) at 13:28

## 2025-01-01 RX ADMIN — FOLIC ACID 1 MG: 5 INJECTION, SOLUTION INTRAMUSCULAR; INTRAVENOUS; SUBCUTANEOUS at 08:59

## 2025-01-01 RX ADMIN — SENNOSIDES AND DOCUSATE SODIUM 1 TABLET: 50; 8.6 TABLET ORAL at 08:07

## 2025-01-01 RX ADMIN — SULFAMETHOXAZOLE AND TRIMETHOPRIM 280 MG OF TRIMETHOPRIM: 80; 16 INJECTION, SOLUTION, CONCENTRATE INTRAVENOUS at 01:58

## 2025-01-01 RX ADMIN — LEVALBUTEROL HYDROCHLORIDE 1.25 MG: 1.25 SOLUTION RESPIRATORY (INHALATION) at 19:40

## 2025-01-01 RX ADMIN — NOREPINEPHRINE BITARTRATE 3 MCG/MIN: 1 INJECTION, SOLUTION, CONCENTRATE INTRAVENOUS at 04:41

## 2025-01-01 RX ADMIN — SULFAMETHOXAZOLE AND TRIMETHOPRIM 280 MG OF TRIMETHOPRIM: 80; 16 INJECTION, SOLUTION, CONCENTRATE INTRAVENOUS at 00:21

## 2025-01-01 RX ADMIN — FOLIC ACID 1 MG: 5 INJECTION, SOLUTION INTRAMUSCULAR; INTRAVENOUS; SUBCUTANEOUS at 09:05

## 2025-01-01 RX ADMIN — SULFAMETHOXAZOLE AND TRIMETHOPRIM 280 MG OF TRIMETHOPRIM: 80; 16 INJECTION, SOLUTION, CONCENTRATE INTRAVENOUS at 13:57

## 2025-01-01 RX ADMIN — SULFAMETHOXAZOLE AND TRIMETHOPRIM 280 MG OF TRIMETHOPRIM: 80; 16 INJECTION, SOLUTION, CONCENTRATE INTRAVENOUS at 13:29

## 2025-01-01 RX ADMIN — LORAZEPAM 1 MG: 2 INJECTION INTRAMUSCULAR; INTRAVENOUS at 17:30

## 2025-01-01 RX ADMIN — HYDROMORPHONE HYDROCHLORIDE 0.3 MG: 1 INJECTION, SOLUTION INTRAMUSCULAR; INTRAVENOUS; SUBCUTANEOUS at 17:35

## 2025-01-01 RX ADMIN — IPRATROPIUM BROMIDE 0.5 MG: 0.5 SOLUTION RESPIRATORY (INHALATION) at 15:05

## 2025-01-01 RX ADMIN — HEPARIN SODIUM 5000 UNITS: 5000 INJECTION INTRAVENOUS; SUBCUTANEOUS at 14:38

## 2025-01-01 RX ADMIN — FOLIC ACID 1 MG: 5 INJECTION, SOLUTION INTRAMUSCULAR; INTRAVENOUS; SUBCUTANEOUS at 09:46

## 2025-01-01 RX ADMIN — HYDROCORTISONE SODIUM SUCCINATE 50 MG: 100 INJECTION, POWDER, FOR SOLUTION INTRAMUSCULAR; INTRAVENOUS at 00:00

## 2025-01-01 RX ADMIN — THIAMINE HYDROCHLORIDE 200 MG: 100 INJECTION, SOLUTION INTRAMUSCULAR; INTRAVENOUS at 08:08

## 2025-01-01 RX ADMIN — THIAMINE HCL TAB 100 MG 100 MG: 100 TAB at 08:42

## 2025-01-01 RX ADMIN — VASOPRESSIN 0.04 UNITS/MIN: 20 INJECTION INTRAVENOUS at 23:31

## 2025-01-01 RX ADMIN — TIMOLOL MALEATE 1 DROP: 5 SOLUTION OPHTHALMIC at 08:12

## 2025-01-01 RX ADMIN — HEPARIN SODIUM 5000 UNITS: 5000 INJECTION INTRAVENOUS; SUBCUTANEOUS at 22:11

## 2025-01-01 RX ADMIN — NYSTATIN: 100000 POWDER TOPICAL at 20:55

## 2025-01-01 RX ADMIN — THIAMINE HCL TAB 100 MG 100 MG: 100 TAB at 08:25

## 2025-01-01 RX ADMIN — NOREPINEPHRINE BITARTRATE 9 MCG/MIN: 1 INJECTION, SOLUTION, CONCENTRATE INTRAVENOUS at 09:48

## 2025-01-01 RX ADMIN — ACETYLCYSTEINE 600 MG: 200 SOLUTION ORAL; RESPIRATORY (INHALATION) at 19:44

## 2025-01-01 RX ADMIN — FUROSEMIDE 40 MG: 10 INJECTION, SOLUTION INTRAMUSCULAR; INTRAVENOUS at 08:25

## 2025-01-01 RX ADMIN — POLYETHYLENE GLYCOL 3350 17 G: 17 POWDER, FOR SOLUTION ORAL at 08:24

## 2025-01-01 RX ADMIN — NOREPINEPHRINE BITARTRATE 14 MCG/MIN: 1 INJECTION, SOLUTION, CONCENTRATE INTRAVENOUS at 19:36

## 2025-01-01 RX ADMIN — CEFEPIME 2000 MG: 2 INJECTION, POWDER, FOR SOLUTION INTRAVENOUS at 12:06

## 2025-01-01 RX ADMIN — TIMOLOL MALEATE 1 DROP: 5 SOLUTION OPHTHALMIC at 17:08

## 2025-01-01 RX ADMIN — NOREPINEPHRINE BITARTRATE 23 MCG/MIN: 1 INJECTION, SOLUTION, CONCENTRATE INTRAVENOUS at 01:26

## 2025-01-01 RX ADMIN — NOREPINEPHRINE BITARTRATE 5 MCG/MIN: 1 INJECTION, SOLUTION, CONCENTRATE INTRAVENOUS at 20:50

## 2025-01-01 RX ADMIN — LIDOCAINE HYDROCHLORIDE 9 ML: 10 INJECTION, SOLUTION EPIDURAL; INFILTRATION; INTRACAUDAL; PERINEURAL at 16:45

## 2025-01-01 RX ADMIN — VASOPRESSIN 0.04 UNITS/MIN: 20 INJECTION INTRAVENOUS at 04:18

## 2025-01-01 RX ADMIN — IPRATROPIUM BROMIDE 0.5 MG: 0.5 SOLUTION RESPIRATORY (INHALATION) at 19:38

## 2025-01-01 RX ADMIN — LEVALBUTEROL HYDROCHLORIDE 1.25 MG: 1.25 SOLUTION RESPIRATORY (INHALATION) at 19:41

## 2025-01-01 RX ADMIN — POTASSIUM CHLORIDE 40 MEQ: 20 SOLUTION ORAL at 16:29

## 2025-01-01 RX ADMIN — GADOBUTROL 6.5 ML: 604.72 INJECTION INTRAVENOUS at 22:38

## 2025-01-01 RX ADMIN — SODIUM CHLORIDE, SODIUM LACTATE, POTASSIUM CHLORIDE, AND CALCIUM CHLORIDE: .6; .31; .03; .02 INJECTION, SOLUTION INTRAVENOUS at 17:22

## 2025-01-01 RX ADMIN — CHLORHEXIDINE GLUCONATE 0.12% ORAL RINSE 15 ML: 1.2 LIQUID ORAL at 08:29

## 2025-01-01 RX ADMIN — LEVALBUTEROL HYDROCHLORIDE 1.25 MG: 1.25 SOLUTION RESPIRATORY (INHALATION) at 08:10

## 2025-01-01 RX ADMIN — LEVALBUTEROL HYDROCHLORIDE 1.25 MG: 1.25 SOLUTION RESPIRATORY (INHALATION) at 08:43

## 2025-01-01 RX ADMIN — LORAZEPAM 1 MG: 2 INJECTION INTRAMUSCULAR; INTRAVENOUS at 16:51

## 2025-01-01 RX ADMIN — BISACODYL 10 MG: 10 SUPPOSITORY RECTAL at 11:21

## 2025-01-01 RX ADMIN — SODIUM CHLORIDE SOLN NEBU 3% 4 ML: 3 NEBU SOLN at 08:06

## 2025-01-01 RX ADMIN — PANTOPRAZOLE SODIUM 40 MG: 40 INJECTION, POWDER, FOR SOLUTION INTRAVENOUS at 00:26

## 2025-01-01 RX ADMIN — PIPERACILLIN SODIUM AND TAZOBACTAM SODIUM 4.5 G: 36; 4.5 INJECTION, POWDER, LYOPHILIZED, FOR SOLUTION INTRAVENOUS at 12:39

## 2025-01-01 RX ADMIN — NOREPINEPHRINE BITARTRATE 10 MCG/MIN: 1 INJECTION, SOLUTION, CONCENTRATE INTRAVENOUS at 05:11

## 2025-01-01 RX ADMIN — HEPARIN SODIUM 5000 UNITS: 5000 INJECTION INTRAVENOUS; SUBCUTANEOUS at 05:18

## 2025-01-01 RX ADMIN — SODIUM CHLORIDE, SODIUM GLUCONATE, SODIUM ACETATE, POTASSIUM CHLORIDE, MAGNESIUM CHLORIDE, SODIUM PHOSPHATE, DIBASIC, AND POTASSIUM PHOSPHATE 500 ML: .53; .5; .37; .037; .03; .012; .00082 INJECTION, SOLUTION INTRAVENOUS at 16:24

## 2025-01-01 RX ADMIN — ATROPINE SULFATE 1 MG: 0.1 INJECTION INTRAVENOUS at 12:37

## 2025-01-01 RX ADMIN — HEPARIN SODIUM 5000 UNITS: 5000 INJECTION INTRAVENOUS; SUBCUTANEOUS at 05:40

## 2025-01-01 RX ADMIN — IPRATROPIUM BROMIDE 0.5 MG: 0.5 SOLUTION RESPIRATORY (INHALATION) at 08:10

## 2025-01-01 RX ADMIN — PROPOFOL 15 MCG/KG/MIN: 10 INJECTION, EMULSION INTRAVENOUS at 04:47

## 2025-01-01 RX ADMIN — NYSTATIN: 100000 POWDER TOPICAL at 18:09

## 2025-01-01 RX ADMIN — VASOPRESSIN 0.04 UNITS/MIN: 20 INJECTION INTRAVENOUS at 19:32

## 2025-01-01 RX ADMIN — HYDROCORTISONE SODIUM SUCCINATE 25 MG: 100 INJECTION, POWDER, FOR SOLUTION INTRAMUSCULAR; INTRAVENOUS at 22:00

## 2025-01-01 RX ADMIN — OFLOXACIN 1 DROP: 3 SOLUTION OPHTHALMIC at 21:39

## 2025-01-01 RX ADMIN — HYDROCORTISONE SODIUM SUCCINATE 25 MG: 100 INJECTION, POWDER, FOR SOLUTION INTRAMUSCULAR; INTRAVENOUS at 05:40

## 2025-01-01 RX ADMIN — QUETIAPINE FUMARATE 12.5 MG: 25 TABLET ORAL at 21:30

## 2025-01-01 RX ADMIN — PIPERACILLIN AND TAZOBACTAM 4.5 G: 4; .5 INJECTION, POWDER, FOR SOLUTION INTRAVENOUS at 13:39

## 2025-01-01 RX ADMIN — CHLORHEXIDINE GLUCONATE 0.12% ORAL RINSE 15 ML: 1.2 LIQUID ORAL at 08:20

## 2025-01-01 RX ADMIN — LEVALBUTEROL HYDROCHLORIDE 1.25 MG: 1.25 SOLUTION RESPIRATORY (INHALATION) at 14:21

## 2025-01-01 RX ADMIN — CHLORHEXIDINE GLUCONATE 0.12% ORAL RINSE 15 ML: 1.2 LIQUID ORAL at 01:44

## 2025-01-01 RX ADMIN — HEPARIN SODIUM 5000 UNITS: 5000 INJECTION INTRAVENOUS; SUBCUTANEOUS at 22:33

## 2025-01-01 RX ADMIN — HEPARIN SODIUM 5000 UNITS: 5000 INJECTION INTRAVENOUS; SUBCUTANEOUS at 05:10

## 2025-01-01 RX ADMIN — HYDROCORTISONE SODIUM SUCCINATE 25 MG: 100 INJECTION, POWDER, FOR SOLUTION INTRAMUSCULAR; INTRAVENOUS at 13:52

## 2025-01-01 RX ADMIN — LEVALBUTEROL HYDROCHLORIDE 1.25 MG: 1.25 SOLUTION RESPIRATORY (INHALATION) at 13:23

## 2025-01-01 RX ADMIN — INSULIN LISPRO 3 UNITS: 100 INJECTION, SOLUTION INTRAVENOUS; SUBCUTANEOUS at 13:56

## 2025-01-01 RX ADMIN — TIMOLOL MALEATE 1 DROP: 5 SOLUTION OPHTHALMIC at 08:58

## 2025-01-01 RX ADMIN — THIAMINE HCL TAB 100 MG 100 MG: 100 TAB at 08:31

## 2025-01-01 RX ADMIN — LORAZEPAM 2 MG: 2 INJECTION INTRAMUSCULAR; INTRAVENOUS at 03:13

## 2025-01-01 RX ADMIN — PIPERACILLIN SODIUM AND TAZOBACTAM SODIUM 4.5 G: 36; 4.5 INJECTION, POWDER, LYOPHILIZED, FOR SOLUTION INTRAVENOUS at 23:09

## 2025-01-01 RX ADMIN — LEVALBUTEROL HYDROCHLORIDE 1.25 MG: 1.25 SOLUTION RESPIRATORY (INHALATION) at 13:51

## 2025-01-01 RX ADMIN — LEVALBUTEROL HYDROCHLORIDE 1.25 MG: 1.25 SOLUTION RESPIRATORY (INHALATION) at 15:05

## 2025-01-01 RX ADMIN — VASOPRESSIN 0.04 UNITS/MIN: 20 INJECTION INTRAVENOUS at 10:21

## 2025-01-01 RX ADMIN — VANCOMYCIN HYDROCHLORIDE 750 MG: 10 INJECTION, POWDER, LYOPHILIZED, FOR SOLUTION INTRAVENOUS at 09:04

## 2025-01-01 RX ADMIN — CHLORHEXIDINE GLUCONATE 0.12% ORAL RINSE 15 ML: 1.2 LIQUID ORAL at 08:49

## 2025-01-01 RX ADMIN — PIPERACILLIN SODIUM AND TAZOBACTAM SODIUM 4.5 G: 36; 4.5 INJECTION, POWDER, LYOPHILIZED, FOR SOLUTION INTRAVENOUS at 02:02

## 2025-01-01 RX ADMIN — ENOXAPARIN SODIUM 40 MG: 40 INJECTION SUBCUTANEOUS at 08:42

## 2025-01-01 RX ADMIN — OFLOXACIN 1 DROP: 3 SOLUTION OPHTHALMIC at 18:21

## 2025-01-01 RX ADMIN — TIMOLOL MALEATE 1 DROP: 5 SOLUTION OPHTHALMIC at 17:59

## 2025-01-01 RX ADMIN — PANTOPRAZOLE SODIUM 40 MG: 40 INJECTION, POWDER, FOR SOLUTION INTRAVENOUS at 00:19

## 2025-01-01 RX ADMIN — NOREPINEPHRINE BITARTRATE 18 MCG/MIN: 1 INJECTION, SOLUTION, CONCENTRATE INTRAVENOUS at 22:31

## 2025-01-01 RX ADMIN — ACETAMINOPHEN 650 MG: 650 SUSPENSION ORAL at 20:24

## 2025-01-01 RX ADMIN — CALCIUM GLUCONATE 2 G: 20 INJECTION, SOLUTION INTRAVENOUS at 12:48

## 2025-01-01 RX ADMIN — PIPERACILLIN SODIUM AND TAZOBACTAM SODIUM 4.5 G: 36; 4.5 INJECTION, POWDER, LYOPHILIZED, FOR SOLUTION INTRAVENOUS at 10:31

## 2025-01-01 RX ADMIN — POLYETHYLENE GLYCOL 3350 17 G: 17 POWDER, FOR SOLUTION ORAL at 13:15

## 2025-01-01 RX ADMIN — IPRATROPIUM BROMIDE 0.5 MG: 0.5 SOLUTION RESPIRATORY (INHALATION) at 19:24

## 2025-01-01 RX ADMIN — SODIUM CHLORIDE: 0.9 INJECTION, SOLUTION INTRAVENOUS at 17:19

## 2025-01-01 RX ADMIN — SODIUM CHLORIDE SOLN NEBU 3% 4 ML: 3 NEBU SOLN at 19:38

## 2025-01-01 RX ADMIN — SULFAMETHOXAZOLE AND TRIMETHOPRIM 280 MG OF TRIMETHOPRIM: 80; 16 INJECTION, SOLUTION, CONCENTRATE INTRAVENOUS at 15:14

## 2025-01-01 RX ADMIN — FOLIC ACID 1 MG: 5 INJECTION, SOLUTION INTRAMUSCULAR; INTRAVENOUS; SUBCUTANEOUS at 12:56

## 2025-01-01 RX ADMIN — NOREPINEPHRINE BITARTRATE 4 MCG/MIN: 1 INJECTION, SOLUTION, CONCENTRATE INTRAVENOUS at 17:46

## 2025-01-01 RX ADMIN — HEPARIN SODIUM 5000 UNITS: 5000 INJECTION INTRAVENOUS; SUBCUTANEOUS at 22:00

## 2025-01-01 RX ADMIN — HYDROCORTISONE SODIUM SUCCINATE 25 MG: 100 INJECTION, POWDER, FOR SOLUTION INTRAMUSCULAR; INTRAVENOUS at 20:29

## 2025-01-01 RX ADMIN — LEVALBUTEROL HYDROCHLORIDE 1.25 MG: 1.25 SOLUTION RESPIRATORY (INHALATION) at 13:55

## 2025-01-01 RX ADMIN — IPRATROPIUM BROMIDE 0.5 MG: 0.5 SOLUTION RESPIRATORY (INHALATION) at 08:50

## 2025-01-01 RX ADMIN — SODIUM CHLORIDE, SODIUM GLUCONATE, SODIUM ACETATE, POTASSIUM CHLORIDE, MAGNESIUM CHLORIDE, SODIUM PHOSPHATE, DIBASIC, AND POTASSIUM PHOSPHATE 1000 ML: .53; .5; .37; .037; .03; .012; .00082 INJECTION, SOLUTION INTRAVENOUS at 20:46

## 2025-01-01 RX ADMIN — POTASSIUM CHLORIDE 20 MEQ: 20 SOLUTION ORAL at 08:54

## 2025-01-01 RX ADMIN — Medication 50 MCG/HR: at 13:37

## 2025-01-01 RX ADMIN — NYSTATIN: 100000 POWDER TOPICAL at 08:07

## 2025-01-01 RX ADMIN — IPRATROPIUM BROMIDE 0.5 MG: 0.5 SOLUTION RESPIRATORY (INHALATION) at 07:37

## 2025-01-01 RX ADMIN — CHLORHEXIDINE GLUCONATE 0.12% ORAL RINSE 15 ML: 1.2 LIQUID ORAL at 08:54

## 2025-01-01 RX ADMIN — ENOXAPARIN SODIUM 40 MG: 40 INJECTION SUBCUTANEOUS at 08:54

## 2025-01-01 RX ADMIN — IPRATROPIUM BROMIDE 0.5 MG: 0.5 SOLUTION RESPIRATORY (INHALATION) at 13:32

## 2025-01-01 RX ADMIN — SULFAMETHOXAZOLE AND TRIMETHOPRIM 280 MG OF TRIMETHOPRIM: 80; 16 INJECTION, SOLUTION, CONCENTRATE INTRAVENOUS at 01:16

## 2025-01-01 RX ADMIN — FOLIC ACID 1 MG: 5 INJECTION, SOLUTION INTRAMUSCULAR; INTRAVENOUS; SUBCUTANEOUS at 08:46

## 2025-01-01 RX ADMIN — SENNOSIDES AND DOCUSATE SODIUM 1 TABLET: 50; 8.6 TABLET ORAL at 18:41

## 2025-01-01 RX ADMIN — NYSTATIN: 100000 POWDER TOPICAL at 08:17

## 2025-01-01 RX ADMIN — POTASSIUM CHLORIDE 40 MEQ: 20 SOLUTION ORAL at 08:49

## 2025-01-01 RX ADMIN — Medication 750 MG: at 11:47

## 2025-01-01 RX ADMIN — FENTANYL CITRATE 50 MCG: 50 INJECTION INTRAMUSCULAR; INTRAVENOUS at 18:11

## 2025-01-01 RX ADMIN — CHLORHEXIDINE GLUCONATE 0.12% ORAL RINSE 15 ML: 1.2 LIQUID ORAL at 08:05

## 2025-01-01 RX ADMIN — OFLOXACIN 1 DROP: 3 SOLUTION OPHTHALMIC at 12:22

## 2025-01-01 RX ADMIN — TIMOLOL MALEATE 1 DROP: 5 SOLUTION OPHTHALMIC at 09:36

## 2025-01-01 RX ADMIN — LORAZEPAM 2 MG: 2 INJECTION INTRAMUSCULAR at 03:13

## 2025-01-01 RX ADMIN — PROPOFOL 15 MCG/KG/MIN: 10 INJECTION, EMULSION INTRAVENOUS at 21:54

## 2025-01-01 RX ADMIN — HEPARIN SODIUM 5000 UNITS: 5000 INJECTION INTRAVENOUS; SUBCUTANEOUS at 21:20

## 2025-01-01 RX ADMIN — SODIUM CHLORIDE SOLN NEBU 3% 4 ML: 3 NEBU SOLN at 13:30

## 2025-01-01 RX ADMIN — POTASSIUM CHLORIDE 40 MEQ: 29.8 INJECTION, SOLUTION INTRAVENOUS at 23:33

## 2025-01-01 RX ADMIN — CALCIUM GLUCONATE 1 G: 20 INJECTION, SOLUTION INTRAVENOUS at 08:52

## 2025-01-01 RX ADMIN — QUETIAPINE FUMARATE 12.5 MG: 25 TABLET ORAL at 01:48

## 2025-01-01 RX ADMIN — INSULIN LISPRO 1 UNITS: 100 INJECTION, SOLUTION INTRAVENOUS; SUBCUTANEOUS at 00:23

## 2025-01-01 RX ADMIN — CHLORHEXIDINE GLUCONATE 0.12% ORAL RINSE 15 ML: 1.2 LIQUID ORAL at 23:01

## 2025-01-01 RX ADMIN — INSULIN LISPRO 1 UNITS: 100 INJECTION, SOLUTION INTRAVENOUS; SUBCUTANEOUS at 17:12

## 2025-01-01 RX ADMIN — FUROSEMIDE 40 MG: 10 INJECTION, SOLUTION INTRAMUSCULAR; INTRAVENOUS at 01:22

## 2025-01-01 RX ADMIN — OFLOXACIN 1 DROP: 3 SOLUTION OPHTHALMIC at 09:20

## 2025-01-01 RX ADMIN — FUROSEMIDE 40 MG: 10 INJECTION, SOLUTION INTRAMUSCULAR; INTRAVENOUS at 08:42

## 2025-01-01 RX ADMIN — LEVALBUTEROL HYDROCHLORIDE 1.25 MG: 1.25 SOLUTION RESPIRATORY (INHALATION) at 07:44

## 2025-01-01 RX ADMIN — HYDROCORTISONE SODIUM SUCCINATE 25 MG: 100 INJECTION, POWDER, FOR SOLUTION INTRAMUSCULAR; INTRAVENOUS at 21:17

## 2025-01-01 RX ADMIN — SENNOSIDES AND DOCUSATE SODIUM 1 TABLET: 50; 8.6 TABLET ORAL at 13:16

## 2025-01-01 RX ADMIN — VASOPRESSIN 0.04 UNITS/MIN: 20 INJECTION INTRAVENOUS at 01:21

## 2025-01-01 RX ADMIN — LEVALBUTEROL HYDROCHLORIDE 1.25 MG: 1.25 SOLUTION RESPIRATORY (INHALATION) at 13:45

## 2025-01-01 RX ADMIN — NYSTATIN: 100000 POWDER TOPICAL at 18:11

## 2025-01-01 RX ADMIN — TIMOLOL MALEATE 1 DROP: 5 SOLUTION OPHTHALMIC at 08:07

## 2025-01-01 RX ADMIN — INSULIN LISPRO 1 UNITS: 100 INJECTION, SOLUTION INTRAVENOUS; SUBCUTANEOUS at 00:30

## 2025-01-01 RX ADMIN — HYDROCORTISONE SODIUM SUCCINATE 25 MG: 100 INJECTION, POWDER, FOR SOLUTION INTRAMUSCULAR; INTRAVENOUS at 05:18

## 2025-01-01 RX ADMIN — PROPOFOL 10 MCG/KG/MIN: 10 INJECTION, EMULSION INTRAVENOUS at 05:18

## 2025-01-01 RX ADMIN — HYDROCORTISONE SODIUM SUCCINATE 50 MG: 100 INJECTION, POWDER, FOR SOLUTION INTRAMUSCULAR; INTRAVENOUS at 12:06

## 2025-01-01 RX ADMIN — IPRATROPIUM BROMIDE 0.5 MG: 0.5 SOLUTION RESPIRATORY (INHALATION) at 13:23

## 2025-01-01 RX ADMIN — PIPERACILLIN SODIUM AND TAZOBACTAM SODIUM 4.5 G: 36; 4.5 INJECTION, POWDER, LYOPHILIZED, FOR SOLUTION INTRAVENOUS at 01:19

## 2025-01-01 RX ADMIN — TIMOLOL MALEATE 1 DROP: 5 SOLUTION OPHTHALMIC at 23:32

## 2025-01-01 RX ADMIN — SODIUM CHLORIDE, SODIUM GLUCONATE, SODIUM ACETATE, POTASSIUM CHLORIDE, MAGNESIUM CHLORIDE, SODIUM PHOSPHATE, DIBASIC, AND POTASSIUM PHOSPHATE 1000 ML: .53; .5; .37; .037; .03; .012; .00082 INJECTION, SOLUTION INTRAVENOUS at 15:02

## 2025-01-01 RX ADMIN — EPHEDRINE SULFATE 10 MG: 50 INJECTION, SOLUTION INTRAVENOUS at 18:14

## 2025-01-01 RX ADMIN — PIPERACILLIN SODIUM AND TAZOBACTAM SODIUM 4.5 G: 36; 4.5 INJECTION, POWDER, LYOPHILIZED, FOR SOLUTION INTRAVENOUS at 09:27

## 2025-01-01 RX ADMIN — SULFAMETHOXAZOLE AND TRIMETHOPRIM 280 MG OF TRIMETHOPRIM: 80; 16 INJECTION, SOLUTION, CONCENTRATE INTRAVENOUS at 13:39

## 2025-01-01 RX ADMIN — VASOPRESSIN 0.04 UNITS/MIN: 20 INJECTION INTRAVENOUS at 12:38

## 2025-01-01 RX ADMIN — FENTANYL CITRATE 50 MCG: 50 INJECTION INTRAMUSCULAR; INTRAVENOUS at 18:59

## 2025-01-01 RX ADMIN — OFLOXACIN 1 DROP: 3 SOLUTION OPHTHALMIC at 23:02

## 2025-01-01 RX ADMIN — PIPERACILLIN SODIUM AND TAZOBACTAM SODIUM 4.5 G: 36; 4.5 INJECTION, POWDER, LYOPHILIZED, FOR SOLUTION INTRAVENOUS at 09:39

## 2025-01-01 RX ADMIN — IPRATROPIUM BROMIDE 0.5 MG: 0.5 SOLUTION RESPIRATORY (INHALATION) at 13:55

## 2025-01-01 RX ADMIN — HEPARIN SODIUM 5000 UNITS: 5000 INJECTION INTRAVENOUS; SUBCUTANEOUS at 23:01

## 2025-01-01 RX ADMIN — IPRATROPIUM BROMIDE 0.5 MG: 0.5 SOLUTION RESPIRATORY (INHALATION) at 07:44

## 2025-01-01 RX ADMIN — NYSTATIN: 100000 POWDER TOPICAL at 18:00

## 2025-01-01 RX ADMIN — HEPARIN SODIUM 5000 UNITS: 5000 INJECTION INTRAVENOUS; SUBCUTANEOUS at 15:22

## 2025-01-01 RX ADMIN — SENNOSIDES AND DOCUSATE SODIUM 1 TABLET: 50; 8.6 TABLET ORAL at 08:17

## 2025-01-01 RX ADMIN — HEPARIN SODIUM 5000 UNITS: 5000 INJECTION INTRAVENOUS; SUBCUTANEOUS at 21:39

## 2025-01-01 RX ADMIN — PANTOPRAZOLE SODIUM 40 MG: 40 INJECTION, POWDER, FOR SOLUTION INTRAVENOUS at 11:07

## 2025-01-01 RX ADMIN — IPRATROPIUM BROMIDE 0.5 MG: 0.5 SOLUTION RESPIRATORY (INHALATION) at 19:44

## 2025-01-01 RX ADMIN — THIAMINE HCL TAB 100 MG 100 MG: 100 TAB at 08:07

## 2025-01-01 RX ADMIN — OFLOXACIN 1 DROP: 3 SOLUTION OPHTHALMIC at 18:40

## 2025-01-01 RX ADMIN — HEPARIN SODIUM 5000 UNITS: 5000 INJECTION INTRAVENOUS; SUBCUTANEOUS at 21:06

## 2025-01-01 RX ADMIN — NYSTATIN: 100000 POWDER TOPICAL at 18:17

## 2025-01-01 RX ADMIN — LEVALBUTEROL HYDROCHLORIDE 1.25 MG: 1.25 SOLUTION RESPIRATORY (INHALATION) at 13:35

## 2025-01-01 RX ADMIN — ACETAMINOPHEN 650 MG: 650 SUSPENSION ORAL at 12:48

## 2025-01-01 RX ADMIN — PROPOFOL 15 MCG/KG/MIN: 10 INJECTION, EMULSION INTRAVENOUS at 17:58

## 2025-01-01 RX ADMIN — THIAMINE HYDROCHLORIDE 500 MG: 100 INJECTION, SOLUTION INTRAMUSCULAR; INTRAVENOUS at 12:53

## 2025-01-01 RX ADMIN — PANTOPRAZOLE SODIUM 40 MG: 40 INJECTION, POWDER, FOR SOLUTION INTRAVENOUS at 12:15

## 2025-01-01 RX ADMIN — Medication 3 MG: at 21:30

## 2025-01-01 RX ADMIN — SENNOSIDES AND DOCUSATE SODIUM 1 TABLET: 50; 8.6 TABLET ORAL at 08:25

## 2025-01-01 RX ADMIN — VANCOMYCIN HYDROCHLORIDE 1500 MG: 10 INJECTION, POWDER, LYOPHILIZED, FOR SOLUTION INTRAVENOUS at 21:25

## 2025-01-01 RX ADMIN — PIPERACILLIN SODIUM AND TAZOBACTAM SODIUM 4.5 G: 36; 4.5 INJECTION, POWDER, LYOPHILIZED, FOR SOLUTION INTRAVENOUS at 19:00

## 2025-01-01 RX ADMIN — NOREPINEPHRINE BITARTRATE 10 MCG/MIN: 1 INJECTION, SOLUTION, CONCENTRATE INTRAVENOUS at 17:30

## 2025-01-01 RX ADMIN — SODIUM CHLORIDE, SODIUM LACTATE, POTASSIUM CHLORIDE, AND CALCIUM CHLORIDE 75 ML/HR: .6; .31; .03; .02 INJECTION, SOLUTION INTRAVENOUS at 23:33

## 2025-01-01 RX ADMIN — CHLORHEXIDINE GLUCONATE 0.12% ORAL RINSE 15 ML: 1.2 LIQUID ORAL at 09:22

## 2025-01-01 RX ADMIN — LEVALBUTEROL HYDROCHLORIDE 1.25 MG: 1.25 SOLUTION RESPIRATORY (INHALATION) at 19:12

## 2025-01-01 RX ADMIN — LEVALBUTEROL HYDROCHLORIDE 1.25 MG: 1.25 SOLUTION RESPIRATORY (INHALATION) at 07:30

## 2025-01-01 RX ADMIN — OFLOXACIN 1 DROP: 3 SOLUTION OPHTHALMIC at 13:16

## 2025-01-01 RX ADMIN — HEPARIN SODIUM 5000 UNITS: 5000 INJECTION INTRAVENOUS; SUBCUTANEOUS at 14:53

## 2025-01-01 RX ADMIN — LEVALBUTEROL HYDROCHLORIDE 1.25 MG: 1.25 SOLUTION RESPIRATORY (INHALATION) at 19:44

## 2025-01-01 RX ADMIN — ENOXAPARIN SODIUM 40 MG: 40 INJECTION SUBCUTANEOUS at 08:24

## 2025-01-01 RX ADMIN — LEVALBUTEROL HYDROCHLORIDE 1.25 MG: 1.25 SOLUTION RESPIRATORY (INHALATION) at 19:38

## 2025-01-01 RX ADMIN — Medication 10 MG: at 08:42

## 2025-01-01 RX ADMIN — NOREPINEPHRINE BITARTRATE 20 MCG/MIN: 1 INJECTION, SOLUTION, CONCENTRATE INTRAVENOUS at 18:29

## 2025-01-01 RX ADMIN — IPRATROPIUM BROMIDE 0.5 MG: 0.5 SOLUTION RESPIRATORY (INHALATION) at 07:57

## 2025-01-01 RX ADMIN — POTASSIUM PHOSPHATE, MONOBASIC POTASSIUM PHOSPHATE, DIBASIC 30 MMOL: 224; 236 INJECTION, SOLUTION, CONCENTRATE INTRAVENOUS at 08:20

## 2025-01-01 RX ADMIN — CHLORHEXIDINE GLUCONATE 0.12% ORAL RINSE 15 ML: 1.2 LIQUID ORAL at 22:22

## 2025-01-01 RX ADMIN — IPRATROPIUM BROMIDE 0.5 MG: 0.5 SOLUTION RESPIRATORY (INHALATION) at 07:24

## 2025-01-01 RX ADMIN — CEFAZOLIN SODIUM 1000 MG: 1 SOLUTION INTRAVENOUS at 20:23

## 2025-01-01 RX ADMIN — NYSTATIN: 100000 POWDER TOPICAL at 08:46

## 2025-01-01 RX ADMIN — HEPARIN SODIUM 5000 UNITS: 5000 INJECTION INTRAVENOUS; SUBCUTANEOUS at 21:17

## 2025-01-01 RX ADMIN — NYSTATIN 1 APPLICATION: 100000 POWDER TOPICAL at 11:24

## 2025-01-01 RX ADMIN — THIAMINE HCL TAB 100 MG 100 MG: 100 TAB at 08:55

## 2025-01-01 RX ADMIN — FOLIC ACID 1 MG: 5 INJECTION, SOLUTION INTRAMUSCULAR; INTRAVENOUS; SUBCUTANEOUS at 08:24

## 2025-01-01 RX ADMIN — VANCOMYCIN HYDROCHLORIDE 750 MG: 10 INJECTION, POWDER, LYOPHILIZED, FOR SOLUTION INTRAVENOUS at 10:01

## 2025-01-01 RX ADMIN — VASOPRESSIN 0.04 UNITS/MIN: 20 INJECTION INTRAVENOUS at 02:06

## 2025-01-01 RX ADMIN — LEVALBUTEROL HYDROCHLORIDE 1.25 MG: 1.25 SOLUTION RESPIRATORY (INHALATION) at 19:58

## 2025-01-01 RX ADMIN — SODIUM CHLORIDE, SODIUM GLUCONATE, SODIUM ACETATE, POTASSIUM CHLORIDE, MAGNESIUM CHLORIDE, SODIUM PHOSPHATE, DIBASIC, AND POTASSIUM PHOSPHATE 100 ML/HR: .53; .5; .37; .037; .03; .012; .00082 INJECTION, SOLUTION INTRAVENOUS at 21:55

## 2025-01-01 RX ADMIN — POTASSIUM CHLORIDE 40 MEQ: 29.8 INJECTION, SOLUTION INTRAVENOUS at 13:06

## 2025-01-01 RX ADMIN — IPRATROPIUM BROMIDE 0.5 MG: 0.5 SOLUTION RESPIRATORY (INHALATION) at 13:45

## 2025-01-01 RX ADMIN — LEVALBUTEROL HYDROCHLORIDE 1.25 MG: 1.25 SOLUTION RESPIRATORY (INHALATION) at 19:24

## 2025-01-01 RX ADMIN — LORAZEPAM 0.5 MG: 2 INJECTION INTRAMUSCULAR; INTRAVENOUS at 11:51

## 2025-01-01 RX ADMIN — ACETYLCYSTEINE 3 ML: 200 SOLUTION ORAL; RESPIRATORY (INHALATION) at 08:04

## 2025-01-01 RX ADMIN — SODIUM CHLORIDE, SODIUM GLUCONATE, SODIUM ACETATE, POTASSIUM CHLORIDE, MAGNESIUM CHLORIDE, SODIUM PHOSPHATE, DIBASIC, AND POTASSIUM PHOSPHATE 1000 ML: .53; .5; .37; .037; .03; .012; .00082 INJECTION, SOLUTION INTRAVENOUS at 19:32

## 2025-01-01 RX ADMIN — PROPOFOL 15 MCG/KG/MIN: 10 INJECTION, EMULSION INTRAVENOUS at 18:12

## 2025-01-01 RX ADMIN — CEFAZOLIN SODIUM 1000 MG: 1 SOLUTION INTRAVENOUS at 11:51

## 2025-01-01 RX ADMIN — CHLORHEXIDINE GLUCONATE 0.12% ORAL RINSE 15 ML: 1.2 LIQUID ORAL at 08:06

## 2025-01-01 RX ADMIN — TIMOLOL MALEATE 1 DROP: 5 SOLUTION OPHTHALMIC at 08:43

## 2025-01-01 RX ADMIN — LEVALBUTEROL HYDROCHLORIDE 1.25 MG: 1.25 SOLUTION RESPIRATORY (INHALATION) at 07:24

## 2025-01-01 RX ADMIN — HYDROCORTISONE SODIUM SUCCINATE 50 MG: 100 INJECTION, POWDER, FOR SOLUTION INTRAMUSCULAR; INTRAVENOUS at 18:00

## 2025-01-01 RX ADMIN — POTASSIUM & SODIUM PHOSPHATES POWDER PACK 280-160-250 MG 2 PACKET: 280-160-250 PACK at 08:16

## 2025-01-01 RX ADMIN — CHLORHEXIDINE GLUCONATE 0.12% ORAL RINSE 15 ML: 1.2 LIQUID ORAL at 20:29

## 2025-01-01 RX ADMIN — TIMOLOL MALEATE 1 DROP: 5 SOLUTION OPHTHALMIC at 08:25

## 2025-01-01 RX ADMIN — NYSTATIN 1 APPLICATION: 100000 POWDER TOPICAL at 18:40

## 2025-01-01 RX ADMIN — ACETYLCYSTEINE 600 MG: 200 SOLUTION ORAL; RESPIRATORY (INHALATION) at 08:10

## 2025-01-01 RX ADMIN — NOREPINEPHRINE BITARTRATE 4 MCG/MIN: 1 INJECTION, SOLUTION, CONCENTRATE INTRAVENOUS at 04:48

## 2025-01-01 RX ADMIN — FUROSEMIDE 40 MG: 10 INJECTION, SOLUTION INTRAMUSCULAR; INTRAVENOUS at 14:54

## 2025-01-01 RX ADMIN — HYDROMORPHONE HYDROCHLORIDE 0.3 MG: 1 INJECTION, SOLUTION INTRAMUSCULAR; INTRAVENOUS; SUBCUTANEOUS at 16:44

## 2025-01-01 RX ADMIN — NOREPINEPHRINE BITARTRATE 13 MCG/MIN: 1 INJECTION, SOLUTION, CONCENTRATE INTRAVENOUS at 04:52

## 2025-01-01 RX ADMIN — FOLIC ACID 1 MG: 5 INJECTION, SOLUTION INTRAMUSCULAR; INTRAVENOUS; SUBCUTANEOUS at 08:07

## 2025-01-01 RX ADMIN — ETOMIDATE 20 MG: 2 INJECTION INTRAVENOUS at 14:50

## 2025-01-01 RX ADMIN — CHLORHEXIDINE GLUCONATE 0.12% ORAL RINSE 15 ML: 1.2 LIQUID ORAL at 08:42

## 2025-01-01 RX ADMIN — LEVALBUTEROL HYDROCHLORIDE 1.25 MG: 1.25 SOLUTION RESPIRATORY (INHALATION) at 08:03

## 2025-01-01 RX ADMIN — NOREPINEPHRINE BITARTRATE 4 MG: 1 INJECTION INTRAVENOUS at 01:10

## 2025-01-01 RX ADMIN — LEVALBUTEROL HYDROCHLORIDE 1.25 MG: 1.25 SOLUTION RESPIRATORY (INHALATION) at 19:08

## 2025-01-01 RX ADMIN — SODIUM CHLORIDE SOLN NEBU 3% 4 ML: 3 NEBU SOLN at 02:22

## 2025-01-01 RX ADMIN — VASOPRESSIN 0.04 UNITS/MIN: 20 INJECTION INTRAVENOUS at 19:04

## 2025-01-01 RX ADMIN — PANTOPRAZOLE SODIUM 40 MG: 40 INJECTION, POWDER, FOR SOLUTION INTRAVENOUS at 12:48

## 2025-01-01 RX ADMIN — NYSTATIN: 100000 POWDER TOPICAL at 08:55

## 2025-01-01 RX ADMIN — CHLORHEXIDINE GLUCONATE 0.12% ORAL RINSE 15 ML: 1.2 LIQUID ORAL at 09:26

## 2025-01-01 RX ADMIN — HEPARIN SODIUM 5000 UNITS: 5000 INJECTION INTRAVENOUS; SUBCUTANEOUS at 14:55

## 2025-01-01 RX ADMIN — NYSTATIN: 100000 POWDER TOPICAL at 08:31

## 2025-01-01 RX ADMIN — TIMOLOL MALEATE 1 DROP: 5 SOLUTION OPHTHALMIC at 08:05

## 2025-01-01 RX ADMIN — HYDROCORTISONE SODIUM SUCCINATE 25 MG: 100 INJECTION, POWDER, FOR SOLUTION INTRAMUSCULAR; INTRAVENOUS at 08:42

## 2025-01-01 RX ADMIN — NYSTATIN: 100000 POWDER TOPICAL at 18:08

## 2025-01-01 RX ADMIN — LEVALBUTEROL HYDROCHLORIDE 1.25 MG: 1.25 SOLUTION RESPIRATORY (INHALATION) at 07:05

## 2025-01-01 RX ADMIN — THIAMINE HYDROCHLORIDE 500 MG: 100 INJECTION, SOLUTION INTRAMUSCULAR; INTRAVENOUS at 10:27

## 2025-01-01 RX ADMIN — FOLIC ACID 1 MG: 5 INJECTION, SOLUTION INTRAMUSCULAR; INTRAVENOUS; SUBCUTANEOUS at 09:37

## 2025-01-01 RX ADMIN — IPRATROPIUM BROMIDE 0.5 MG: 0.5 SOLUTION RESPIRATORY (INHALATION) at 13:28

## 2025-01-01 RX ADMIN — NOREPINEPHRINE BITARTRATE 7 MCG/MIN: 1 INJECTION, SOLUTION, CONCENTRATE INTRAVENOUS at 14:48

## 2025-01-01 RX ADMIN — VASOPRESSIN 0.04 UNITS/MIN: 20 INJECTION INTRAVENOUS at 20:20

## 2025-01-01 RX ADMIN — HEPARIN SODIUM 5000 UNITS: 5000 INJECTION INTRAVENOUS; SUBCUTANEOUS at 06:24

## 2025-01-01 RX ADMIN — THIAMINE HYDROCHLORIDE 200 MG: 100 INJECTION, SOLUTION INTRAMUSCULAR; INTRAVENOUS at 08:59

## 2025-01-01 RX ADMIN — HEPARIN SODIUM 5000 UNITS: 5000 INJECTION INTRAVENOUS; SUBCUTANEOUS at 13:59

## 2025-01-01 RX ADMIN — FUROSEMIDE 40 MG: 10 INJECTION, SOLUTION INTRAMUSCULAR; INTRAVENOUS at 20:29

## 2025-01-01 RX ADMIN — LEVALBUTEROL HYDROCHLORIDE 1.25 MG: 1.25 SOLUTION RESPIRATORY (INHALATION) at 19:16

## 2025-01-01 RX ADMIN — INSULIN LISPRO 2 UNITS: 100 INJECTION, SOLUTION INTRAVENOUS; SUBCUTANEOUS at 11:06

## 2025-01-01 RX ADMIN — HEPARIN SODIUM 5000 UNITS: 5000 INJECTION INTRAVENOUS; SUBCUTANEOUS at 13:00

## 2025-01-01 RX ADMIN — CEFAZOLIN 2000 MG: 1 INJECTION, POWDER, FOR SOLUTION INTRAMUSCULAR; INTRAVENOUS at 17:47

## 2025-01-01 RX ADMIN — PANTOPRAZOLE SODIUM 40 MG: 40 INJECTION, POWDER, FOR SOLUTION INTRAVENOUS at 11:28

## 2025-01-01 RX ADMIN — NYSTATIN: 100000 POWDER TOPICAL at 09:48

## 2025-01-01 RX ADMIN — PIPERACILLIN SODIUM AND TAZOBACTAM SODIUM 4.5 G: 36; 4.5 INJECTION, POWDER, LYOPHILIZED, FOR SOLUTION INTRAVENOUS at 10:40

## 2025-01-01 RX ADMIN — FOLIC ACID 1 MG: 5 INJECTION, SOLUTION INTRAMUSCULAR; INTRAVENOUS; SUBCUTANEOUS at 11:07

## 2025-01-01 RX ADMIN — NYSTATIN: 100000 POWDER TOPICAL at 08:05

## 2025-01-01 RX ADMIN — SODIUM CHLORIDE SOLN NEBU 3% 4 ML: 3 NEBU SOLN at 08:10

## 2025-01-01 RX ADMIN — IPRATROPIUM BROMIDE 0.5 MG: 0.5 SOLUTION RESPIRATORY (INHALATION) at 19:12

## 2025-01-01 RX ADMIN — ALBUMIN (HUMAN) 25 G: 12.5 INJECTION, SOLUTION INTRAVENOUS at 21:55

## 2025-01-01 RX ADMIN — HEPARIN SODIUM 5000 UNITS: 5000 INJECTION INTRAVENOUS; SUBCUTANEOUS at 05:21

## 2025-01-01 RX ADMIN — PIPERACILLIN SODIUM AND TAZOBACTAM SODIUM 4.5 G: 36; 4.5 INJECTION, POWDER, LYOPHILIZED, FOR SOLUTION INTRAVENOUS at 02:54

## 2025-01-01 RX ADMIN — LEVALBUTEROL HYDROCHLORIDE 1.25 MG: 1.25 SOLUTION RESPIRATORY (INHALATION) at 08:50

## 2025-01-01 RX ADMIN — HEPARIN SODIUM 5000 UNITS: 5000 INJECTION INTRAVENOUS; SUBCUTANEOUS at 05:22

## 2025-01-01 RX ADMIN — TIMOLOL MALEATE 1 DROP: 5 SOLUTION OPHTHALMIC at 08:59

## 2025-01-01 RX ADMIN — SUGAMMADEX 150 MG: 100 INJECTION, SOLUTION INTRAVENOUS at 21:39

## 2025-01-01 RX ADMIN — LEVALBUTEROL HYDROCHLORIDE 1.25 MG: 1.25 SOLUTION RESPIRATORY (INHALATION) at 19:01

## 2025-01-01 RX ADMIN — CHLORHEXIDINE GLUCONATE 0.12% ORAL RINSE 15 ML: 1.2 LIQUID ORAL at 08:59

## 2025-01-01 RX ADMIN — HYDROCORTISONE SODIUM SUCCINATE 50 MG: 100 INJECTION, POWDER, FOR SOLUTION INTRAMUSCULAR; INTRAVENOUS at 05:21

## 2025-01-01 RX ADMIN — PIPERACILLIN SODIUM AND TAZOBACTAM SODIUM 4.5 G: 36; 4.5 INJECTION, POWDER, LYOPHILIZED, FOR SOLUTION INTRAVENOUS at 11:31

## 2025-01-01 RX ADMIN — Medication 25 MCG/HR: at 20:56

## 2025-01-01 RX ADMIN — PIPERACILLIN SODIUM AND TAZOBACTAM SODIUM 4.5 G: 36; 4.5 INJECTION, POWDER, LYOPHILIZED, FOR SOLUTION INTRAVENOUS at 11:24

## 2025-01-01 RX ADMIN — TIMOLOL MALEATE 1 DROP: 5 SOLUTION OPHTHALMIC at 09:33

## 2025-01-01 RX ADMIN — HEPARIN SODIUM 5000 UNITS: 5000 INJECTION INTRAVENOUS; SUBCUTANEOUS at 21:05

## 2025-01-01 RX ADMIN — ATROPINE SULFATE 0.5 MG: 1 INJECTION, SOLUTION INTRAVENOUS at 12:45

## 2025-01-01 RX ADMIN — HEPARIN SODIUM 5000 UNITS: 5000 INJECTION INTRAVENOUS; SUBCUTANEOUS at 05:06

## 2025-01-01 RX ADMIN — HYDROCORTISONE SODIUM SUCCINATE 25 MG: 100 INJECTION, POWDER, FOR SOLUTION INTRAMUSCULAR; INTRAVENOUS at 13:00

## 2025-01-01 RX ADMIN — ACETYLCYSTEINE 600 MG: 200 SOLUTION ORAL; RESPIRATORY (INHALATION) at 13:55

## 2025-01-01 RX ADMIN — SENNOSIDES AND DOCUSATE SODIUM 1 TABLET: 50; 8.6 TABLET ORAL at 08:42

## 2025-01-01 RX ADMIN — IPRATROPIUM BROMIDE 0.5 MG: 0.5 SOLUTION RESPIRATORY (INHALATION) at 13:34

## 2025-01-01 RX ADMIN — ALBUMIN (HUMAN) 25 G: 12.5 INJECTION, SOLUTION INTRAVENOUS at 05:06

## 2025-01-01 RX ADMIN — INSULIN LISPRO 2 UNITS: 100 INJECTION, SOLUTION INTRAVENOUS; SUBCUTANEOUS at 17:00

## 2025-01-01 RX ADMIN — IPRATROPIUM BROMIDE 0.5 MG: 0.5 SOLUTION RESPIRATORY (INHALATION) at 14:21

## 2025-01-01 RX ADMIN — OFLOXACIN 1 DROP: 3 SOLUTION OPHTHALMIC at 17:21

## 2025-01-01 RX ADMIN — PIPERACILLIN SODIUM AND TAZOBACTAM SODIUM 4.5 G: 36; 4.5 INJECTION, POWDER, LYOPHILIZED, FOR SOLUTION INTRAVENOUS at 03:10

## 2025-01-01 RX ADMIN — HEPARIN SODIUM 5000 UNITS: 5000 INJECTION INTRAVENOUS; SUBCUTANEOUS at 05:49

## 2025-01-01 RX ADMIN — POTASSIUM CHLORIDE 40 MEQ: 29.8 INJECTION, SOLUTION INTRAVENOUS at 09:01

## 2025-01-01 RX ADMIN — HYDROCORTISONE SODIUM SUCCINATE 25 MG: 100 INJECTION, POWDER, FOR SOLUTION INTRAMUSCULAR; INTRAVENOUS at 08:25

## 2025-01-01 RX ADMIN — ROCURONIUM BROMIDE 50 MG: 10 INJECTION, SOLUTION INTRAVENOUS at 17:29

## 2025-01-01 RX ADMIN — POTASSIUM CHLORIDE 40 MEQ: 20 SOLUTION ORAL at 22:22

## 2025-01-01 RX ADMIN — SULFAMETHOXAZOLE AND TRIMETHOPRIM 280 MG OF TRIMETHOPRIM: 80; 16 INJECTION, SOLUTION, CONCENTRATE INTRAVENOUS at 02:00

## 2025-01-01 RX ADMIN — Medication 100 MG: at 14:50

## 2025-01-01 RX ADMIN — OFLOXACIN 1 DROP: 3 SOLUTION OPHTHALMIC at 09:07

## 2025-01-01 RX ADMIN — PIPERACILLIN SODIUM AND TAZOBACTAM SODIUM 4.5 G: 36; 4.5 INJECTION, POWDER, LYOPHILIZED, FOR SOLUTION INTRAVENOUS at 03:39

## 2025-01-01 RX ADMIN — NOREPINEPHRINE BITARTRATE 4 MCG/MIN: 1 INJECTION, SOLUTION, CONCENTRATE INTRAVENOUS at 15:43

## 2025-01-01 RX ADMIN — IPRATROPIUM BROMIDE 0.5 MG: 0.5 SOLUTION RESPIRATORY (INHALATION) at 19:41

## 2025-01-01 RX ADMIN — HYDROCORTISONE SODIUM SUCCINATE 25 MG: 100 INJECTION, POWDER, FOR SOLUTION INTRAMUSCULAR; INTRAVENOUS at 22:22

## 2025-01-01 RX ADMIN — POLYETHYLENE GLYCOL 3350 17 G: 17 POWDER, FOR SOLUTION ORAL at 10:21

## 2025-01-01 RX ADMIN — POLYETHYLENE GLYCOL 3350 17 G: 17 POWDER, FOR SOLUTION ORAL at 08:42

## 2025-01-01 RX ADMIN — LEVALBUTEROL HYDROCHLORIDE 1.25 MG: 1.25 SOLUTION RESPIRATORY (INHALATION) at 07:37

## 2025-01-01 RX ADMIN — NYSTATIN: 100000 POWDER TOPICAL at 17:15

## 2025-01-01 RX ADMIN — Medication 10 MG: at 08:24

## 2025-01-01 RX ADMIN — FUROSEMIDE 20 MG: 10 INJECTION, SOLUTION INTRAMUSCULAR; INTRAVENOUS at 12:40

## 2025-01-01 RX ADMIN — FUROSEMIDE 40 MG: 10 INJECTION, SOLUTION INTRAMUSCULAR; INTRAVENOUS at 08:31

## 2025-01-01 RX ADMIN — NYSTATIN: 100000 POWDER TOPICAL at 17:09

## 2025-01-01 RX ADMIN — SULFAMETHOXAZOLE AND TRIMETHOPRIM 280 MG OF TRIMETHOPRIM: 80; 16 INJECTION, SOLUTION, CONCENTRATE INTRAVENOUS at 15:42

## 2025-01-01 RX ADMIN — VANCOMYCIN HYDROCHLORIDE 1500 MG: 10 INJECTION, POWDER, LYOPHILIZED, FOR SOLUTION INTRAVENOUS at 01:20

## 2025-01-01 RX ADMIN — GLYCOPYRROLATE 0.1 MG: 0.2 INJECTION, SOLUTION INTRAMUSCULAR; INTRAVENOUS at 17:28

## 2025-01-01 RX ADMIN — OFLOXACIN 1 DROP: 3 SOLUTION OPHTHALMIC at 13:59

## 2025-01-01 RX ADMIN — IPRATROPIUM BROMIDE 0.5 MG: 0.5 SOLUTION RESPIRATORY (INHALATION) at 13:30

## 2025-01-01 RX ADMIN — SODIUM CHLORIDE, SODIUM LACTATE, POTASSIUM CHLORIDE, AND CALCIUM CHLORIDE 1000 ML: .6; .31; .03; .02 INJECTION, SOLUTION INTRAVENOUS at 11:51

## 2025-01-01 RX ADMIN — TIMOLOL MALEATE 1 DROP: 5 SOLUTION OPHTHALMIC at 08:31

## 2025-01-01 RX ADMIN — Medication 10 MG: at 08:54

## 2025-01-01 RX ADMIN — MAGNESIUM SULFATE HEPTAHYDRATE 2 G: 40 INJECTION, SOLUTION INTRAVENOUS at 07:15

## 2025-01-01 RX ADMIN — LEVALBUTEROL HYDROCHLORIDE 1.25 MG: 1.25 SOLUTION RESPIRATORY (INHALATION) at 07:57

## 2025-01-01 RX ADMIN — HYDROCORTISONE SODIUM SUCCINATE 25 MG: 100 INJECTION, POWDER, FOR SOLUTION INTRAMUSCULAR; INTRAVENOUS at 13:42

## 2025-01-01 RX ADMIN — TIMOLOL MALEATE 1 DROP: 5 SOLUTION OPHTHALMIC at 17:47

## 2025-01-01 RX ADMIN — IPRATROPIUM BROMIDE 0.5 MG: 0.5 SOLUTION RESPIRATORY (INHALATION) at 19:16

## 2025-01-01 RX ADMIN — IPRATROPIUM BROMIDE 0.5 MG: 0.5 SOLUTION RESPIRATORY (INHALATION) at 07:05

## 2025-01-01 RX ADMIN — ALBUMIN (HUMAN): 12.5 INJECTION, SOLUTION INTRAVENOUS at 20:24

## 2025-01-01 RX ADMIN — SODIUM CHLORIDE, SODIUM GLUCONATE, SODIUM ACETATE, POTASSIUM CHLORIDE, MAGNESIUM CHLORIDE, SODIUM PHOSPHATE, DIBASIC, AND POTASSIUM PHOSPHATE 75 ML/HR: .53; .5; .37; .037; .03; .012; .00082 INJECTION, SOLUTION INTRAVENOUS at 10:44

## 2025-01-01 RX ADMIN — TIMOLOL MALEATE 1 DROP: 5 SOLUTION OPHTHALMIC at 17:21

## 2025-01-01 RX ADMIN — HEPARIN SODIUM 5000 UNITS: 5000 INJECTION INTRAVENOUS; SUBCUTANEOUS at 05:42

## 2025-01-01 RX ADMIN — CHLORHEXIDINE GLUCONATE 0.12% ORAL RINSE 15 ML: 1.2 LIQUID ORAL at 20:55

## 2025-01-01 RX ADMIN — NYSTATIN: 100000 POWDER TOPICAL at 17:21

## 2025-01-01 RX ADMIN — ALTEPLASE 2 MG: 2.2 INJECTION, POWDER, LYOPHILIZED, FOR SOLUTION INTRAVENOUS at 09:33

## 2025-01-01 RX ADMIN — PIPERACILLIN SODIUM AND TAZOBACTAM SODIUM 4.5 G: 36; 4.5 INJECTION, POWDER, LYOPHILIZED, FOR SOLUTION INTRAVENOUS at 02:45

## 2025-01-01 RX ADMIN — NYSTATIN: 100000 POWDER TOPICAL at 08:25

## 2025-01-01 RX ADMIN — NYSTATIN: 100000 POWDER TOPICAL at 08:11

## 2025-01-01 RX ADMIN — HYDROMORPHONE HYDROCHLORIDE 0.3 MG: 1 INJECTION, SOLUTION INTRAMUSCULAR; INTRAVENOUS; SUBCUTANEOUS at 18:07

## 2025-01-01 RX ADMIN — THIAMINE HYDROCHLORIDE 200 MG: 100 INJECTION, SOLUTION INTRAMUSCULAR; INTRAVENOUS at 09:05

## 2025-01-01 RX ADMIN — HYDROCORTISONE SODIUM SUCCINATE 50 MG: 100 INJECTION, POWDER, FOR SOLUTION INTRAMUSCULAR; INTRAVENOUS at 05:07

## 2025-01-01 RX ADMIN — CHLORHEXIDINE GLUCONATE 0.12% ORAL RINSE 15 ML: 1.2 LIQUID ORAL at 09:00

## 2025-01-01 RX ADMIN — IPRATROPIUM BROMIDE 0.5 MG: 0.5 SOLUTION RESPIRATORY (INHALATION) at 19:01

## 2025-01-01 RX ADMIN — CHLORHEXIDINE GLUCONATE 0.12% ORAL RINSE 15 ML: 1.2 LIQUID ORAL at 08:16

## 2025-01-01 RX ADMIN — FUROSEMIDE 40 MG: 10 INJECTION, SOLUTION INTRAMUSCULAR; INTRAVENOUS at 08:55

## 2025-01-01 RX ADMIN — IPRATROPIUM BROMIDE 0.5 MG: 0.5 SOLUTION RESPIRATORY (INHALATION) at 08:43

## 2025-01-01 RX ADMIN — TIMOLOL MALEATE 1 DROP: 5 SOLUTION OPHTHALMIC at 17:15

## 2025-01-01 RX ADMIN — VASOPRESSIN 0.04 UNITS/MIN: 20 INJECTION INTRAVENOUS at 12:54

## 2025-01-01 RX ADMIN — NOREPINEPHRINE BITARTRATE 1 MCG/MIN: 1 INJECTION INTRAVENOUS at 05:18

## 2025-01-01 RX ADMIN — SENNOSIDES AND DOCUSATE SODIUM 1 TABLET: 50; 8.6 TABLET ORAL at 17:09

## 2025-01-01 RX ADMIN — HYDROMORPHONE HYDROCHLORIDE 0.2 MG: 0.2 INJECTION, SOLUTION INTRAMUSCULAR; INTRAVENOUS; SUBCUTANEOUS at 00:42

## 2025-01-01 RX ADMIN — OFLOXACIN 1 DROP: 3 SOLUTION OPHTHALMIC at 21:06

## 2025-01-01 RX ADMIN — LEVALBUTEROL HYDROCHLORIDE 1.25 MG: 1.25 SOLUTION RESPIRATORY (INHALATION) at 13:30

## 2025-01-01 RX ADMIN — THIAMINE HYDROCHLORIDE 500 MG: 100 INJECTION, SOLUTION INTRAMUSCULAR; INTRAVENOUS at 09:21

## 2025-01-01 RX ADMIN — Medication 3 MG: at 01:48

## 2025-01-01 RX ADMIN — CHLORHEXIDINE GLUCONATE 0.12% ORAL RINSE 15 ML: 1.2 LIQUID ORAL at 21:39

## 2025-01-01 RX ADMIN — CHLORHEXIDINE GLUCONATE 0.12% ORAL RINSE 15 ML: 1.2 LIQUID ORAL at 21:17

## 2025-01-01 RX ADMIN — SULFAMETHOXAZOLE AND TRIMETHOPRIM 280 MG OF TRIMETHOPRIM: 80; 16 INJECTION, SOLUTION, CONCENTRATE INTRAVENOUS at 01:53

## 2025-01-01 RX ADMIN — HEPARIN SODIUM 5000 UNITS: 5000 INJECTION INTRAVENOUS; SUBCUTANEOUS at 13:38

## 2025-01-01 RX ADMIN — IPRATROPIUM BROMIDE 0.5 MG: 0.5 SOLUTION RESPIRATORY (INHALATION) at 13:51

## 2025-01-01 RX ADMIN — SULFAMETHOXAZOLE AND TRIMETHOPRIM 280 MG OF TRIMETHOPRIM: 80; 16 INJECTION, SOLUTION, CONCENTRATE INTRAVENOUS at 12:49

## 2025-01-01 RX ADMIN — IPRATROPIUM BROMIDE 0.5 MG: 0.5 SOLUTION RESPIRATORY (INHALATION) at 19:58

## 2025-01-01 RX ADMIN — PIPERACILLIN SODIUM AND TAZOBACTAM SODIUM 4.5 G: 36; 4.5 INJECTION, POWDER, LYOPHILIZED, FOR SOLUTION INTRAVENOUS at 18:09

## 2025-01-01 RX ADMIN — TIMOLOL MALEATE 1 DROP: 5 SOLUTION OPHTHALMIC at 18:08

## 2025-01-01 RX ADMIN — CHLORHEXIDINE GLUCONATE 0.12% ORAL RINSE 15 ML: 1.2 LIQUID ORAL at 09:07

## 2025-01-01 RX ADMIN — CHLORHEXIDINE GLUCONATE 0.12% ORAL RINSE 15 ML: 1.2 LIQUID ORAL at 22:11

## 2025-01-01 RX ADMIN — TIMOLOL MALEATE 1 DROP: 5 SOLUTION OPHTHALMIC at 18:00

## 2025-01-01 RX ADMIN — PIPERACILLIN SODIUM AND TAZOBACTAM SODIUM 4.5 G: 36; 4.5 INJECTION, POWDER, LYOPHILIZED, FOR SOLUTION INTRAVENOUS at 03:18

## 2025-01-01 RX ADMIN — CHLORHEXIDINE GLUCONATE 0.12% ORAL RINSE 15 ML: 1.2 LIQUID ORAL at 20:35

## 2025-01-01 RX ADMIN — PROPOFOL 15 MCG/KG/MIN: 10 INJECTION, EMULSION INTRAVENOUS at 02:20

## 2025-01-01 RX ADMIN — PANTOPRAZOLE SODIUM 40 MG: 40 INJECTION, POWDER, FOR SOLUTION INTRAVENOUS at 11:50

## 2025-01-01 RX ADMIN — IOHEXOL 100 ML: 350 INJECTION, SOLUTION INTRAVENOUS at 22:12

## 2025-01-01 RX ADMIN — HEPARIN SODIUM 5000 UNITS: 5000 INJECTION INTRAVENOUS; SUBCUTANEOUS at 14:31

## 2025-01-01 RX ADMIN — SODIUM CHLORIDE SOLN NEBU 3% 4 ML: 3 NEBU SOLN at 19:58

## 2025-01-01 RX ADMIN — IPRATROPIUM BROMIDE 0.5 MG: 0.5 SOLUTION RESPIRATORY (INHALATION) at 07:33

## 2025-01-01 RX ADMIN — LEVALBUTEROL HYDROCHLORIDE 1.25 MG: 1.25 SOLUTION RESPIRATORY (INHALATION) at 13:32

## 2025-01-01 RX ADMIN — IPRATROPIUM BROMIDE 0.5 MG: 0.5 SOLUTION RESPIRATORY (INHALATION) at 19:08

## 2025-01-01 RX ADMIN — SODIUM CHLORIDE 1000 ML: 0.9 INJECTION, SOLUTION INTRAVENOUS at 01:00

## 2025-01-01 RX ADMIN — POTASSIUM CHLORIDE 40 MEQ: 20 SOLUTION ORAL at 08:08

## 2025-01-01 RX ADMIN — SULFAMETHOXAZOLE AND TRIMETHOPRIM 280 MG OF TRIMETHOPRIM: 80; 16 INJECTION, SOLUTION, CONCENTRATE INTRAVENOUS at 14:15

## 2025-01-01 RX ADMIN — HEPARIN SODIUM 5000 UNITS: 5000 INJECTION INTRAVENOUS; SUBCUTANEOUS at 13:42

## 2025-01-01 RX ADMIN — SULFAMETHOXAZOLE AND TRIMETHOPRIM 280 MG OF TRIMETHOPRIM: 80; 16 INJECTION, SOLUTION, CONCENTRATE INTRAVENOUS at 00:23

## 2025-01-01 RX ADMIN — IPRATROPIUM BROMIDE 0.5 MG: 0.5 SOLUTION RESPIRATORY (INHALATION) at 07:30

## 2025-01-01 RX ADMIN — TIMOLOL MALEATE 1 DROP: 5 SOLUTION OPHTHALMIC at 18:48

## 2025-01-01 RX ADMIN — CEFEPIME 2000 MG: 2 INJECTION, POWDER, FOR SOLUTION INTRAVENOUS at 01:17

## 2025-01-01 RX ADMIN — INSULIN LISPRO 3 UNITS: 100 INJECTION, SOLUTION INTRAVENOUS; SUBCUTANEOUS at 18:16

## 2025-01-01 RX ADMIN — Medication 50 MCG/HR: at 09:37

## 2025-01-01 RX ADMIN — CEFTRIAXONE SODIUM 2000 MG: 10 INJECTION, POWDER, FOR SOLUTION INTRAVENOUS at 09:17

## 2025-01-01 RX ADMIN — CHLORHEXIDINE GLUCONATE 0.12% ORAL RINSE 15 ML: 1.2 LIQUID ORAL at 08:24

## 2025-01-01 RX ADMIN — CALCIUM GLUCONATE 2 G: 20 INJECTION, SOLUTION INTRAVENOUS at 00:39

## 2025-01-01 RX ADMIN — CALCIUM GLUCONATE 2 G: 20 INJECTION, SOLUTION INTRAVENOUS at 11:28

## 2025-01-01 RX ADMIN — TIMOLOL MALEATE 1 DROP: 5 SOLUTION OPHTHALMIC at 08:17

## 2025-01-01 RX ADMIN — TIMOLOL MALEATE 1 DROP: 5 SOLUTION OPHTHALMIC at 18:30

## 2025-01-01 RX ADMIN — SULFAMETHOXAZOLE AND TRIMETHOPRIM 280 MG OF TRIMETHOPRIM: 80; 16 INJECTION, SOLUTION, CONCENTRATE INTRAVENOUS at 01:51

## 2025-01-01 RX ADMIN — CHLORHEXIDINE GLUCONATE 0.12% ORAL RINSE 15 ML: 1.2 LIQUID ORAL at 21:20

## 2025-01-01 RX ADMIN — MAGNESIUM SULFATE HEPTAHYDRATE 2 G: 40 INJECTION, SOLUTION INTRAVENOUS at 16:29

## 2025-01-01 RX ADMIN — HEPARIN SODIUM 5000 UNITS: 5000 INJECTION INTRAVENOUS; SUBCUTANEOUS at 13:52

## 2025-01-01 RX ADMIN — FOLIC ACID 1 MG: 5 INJECTION, SOLUTION INTRAMUSCULAR; INTRAVENOUS; SUBCUTANEOUS at 08:36

## 2025-01-01 RX ADMIN — INSULIN LISPRO 2 UNITS: 100 INJECTION, SOLUTION INTRAVENOUS; SUBCUTANEOUS at 05:49

## 2025-01-01 RX ADMIN — NOREPINEPHRINE BITARTRATE 3 MCG/MIN: 1 INJECTION, SOLUTION, CONCENTRATE INTRAVENOUS at 12:16

## 2025-01-01 RX ADMIN — LEVALBUTEROL HYDROCHLORIDE 1.25 MG: 1.25 SOLUTION RESPIRATORY (INHALATION) at 07:34

## 2025-01-01 RX ADMIN — POTASSIUM PHOSPHATE, MONOBASIC POTASSIUM PHOSPHATE, DIBASIC 21 MMOL: 224; 236 INJECTION, SOLUTION, CONCENTRATE INTRAVENOUS at 07:15

## 2025-01-01 RX ADMIN — HEPARIN SODIUM 5000 UNITS: 5000 INJECTION INTRAVENOUS; SUBCUTANEOUS at 14:43

## 2025-01-01 RX ADMIN — HYDROCORTISONE SODIUM SUCCINATE 100 MG: 100 INJECTION, POWDER, FOR SOLUTION INTRAMUSCULAR; INTRAVENOUS at 23:36

## 2025-01-01 RX ADMIN — PANTOPRAZOLE SODIUM 40 MG: 40 INJECTION, POWDER, FOR SOLUTION INTRAVENOUS at 00:14

## 2025-01-01 RX ADMIN — PIPERACILLIN SODIUM AND TAZOBACTAM SODIUM 4.5 G: 36; 4.5 INJECTION, POWDER, LYOPHILIZED, FOR SOLUTION INTRAVENOUS at 17:58

## 2025-01-01 RX ADMIN — PROPOFOL 20 MCG/KG/MIN: 10 INJECTION, EMULSION INTRAVENOUS at 04:50

## 2025-01-01 RX ADMIN — ROCURONIUM BROMIDE 50 MG: 10 INJECTION, SOLUTION INTRAVENOUS at 18:09

## 2025-01-01 RX ADMIN — CHLORHEXIDINE GLUCONATE 0.12% ORAL RINSE 15 ML: 1.2 LIQUID ORAL at 20:24

## 2025-01-01 RX ADMIN — FOLIC ACID 1 MG: 5 INJECTION, SOLUTION INTRAMUSCULAR; INTRAVENOUS; SUBCUTANEOUS at 08:18

## 2025-01-01 RX ADMIN — MAGNESIUM SULFATE HEPTAHYDRATE 2 G: 40 INJECTION, SOLUTION INTRAVENOUS at 11:04

## 2025-01-01 RX ADMIN — PIPERACILLIN SODIUM AND TAZOBACTAM SODIUM 4.5 G: 36; 4.5 INJECTION, POWDER, LYOPHILIZED, FOR SOLUTION INTRAVENOUS at 18:00

## 2025-01-01 RX ADMIN — SODIUM CHLORIDE, SODIUM GLUCONATE, SODIUM ACETATE, POTASSIUM CHLORIDE, MAGNESIUM CHLORIDE, SODIUM PHOSPHATE, DIBASIC, AND POTASSIUM PHOSPHATE 100 ML/HR: .53; .5; .37; .037; .03; .012; .00082 INJECTION, SOLUTION INTRAVENOUS at 23:30

## 2025-01-01 RX ADMIN — VASOPRESSIN 0.04 UNITS/MIN: 20 INJECTION INTRAVENOUS at 23:02

## 2025-01-01 RX ADMIN — SENNOSIDES AND DOCUSATE SODIUM 1 TABLET: 50; 8.6 TABLET ORAL at 18:08

## 2025-01-03 DIAGNOSIS — I10 ESSENTIAL HYPERTENSION: ICD-10-CM

## 2025-01-03 RX ORDER — LISINOPRIL AND HYDROCHLOROTHIAZIDE 20; 25 MG/1; MG/1
1 TABLET ORAL DAILY
Qty: 90 TABLET | Refills: 1 | Status: SHIPPED | OUTPATIENT
Start: 2025-01-03

## 2025-01-10 ENCOUNTER — RA CDI HCC (OUTPATIENT)
Dept: OTHER | Facility: HOSPITAL | Age: 80
End: 2025-01-10

## 2025-01-10 NOTE — PROGRESS NOTES
E11.22 for 2025  HCC coding opportunities          Chart Reviewed number of suggestions sent to Provider: 1     Patients Insurance     Medicare Insurance: [employer] Advantage

## 2025-01-26 NOTE — CASE MANAGEMENT
Case Management Assessment & Discharge Planning Note    Patient name Darlin Zamora  Location [facility] 10/[facility]U 10 MRN [id number]  : 1945 Date 2025       Current Admission Date: 2025  Current Admission Diagnosis:Altered mental status, Hypothermia, initial encounter   Patient Active Problem List    Diagnosis Date Noted Date Diagnosed    Localized edema 2024     Meningioma (HCC) 2022     Mixed hypercholesterolemia and hypertriglyceridemia 2021     Dysfunctional voiding of urine 2019     S/P total hysterectomy and bilateral salpingo-oophorectomy 02/15/2019     Abdominal pain 2019     Brain mass      Syncope 2018     Hypertension 2018     Basal cell carcinoma 2016     Alopecia 10/04/2012     Anemia 2012     Iron deficiency anemia 2012       LOS (days): 0  Geometric Mean LOS (GMLOS) (days):   Days to GMLOS:     OBJECTIVE:    Risk of Unplanned Readmission Score: 11.65         Current admission status: Inpatient       Preferred Pharmacy:   [id number] - [place] [address] [address]  Phone: [phone] Fax: [phone]    Primary Care Provider: Tiffanie Kamara DO    Primary Insurance: [employer] RE[employer]  Secondary Insurance:  FOR LIFE    ASSESSMENT:  Active Health Care Proxies    There are no active Health Care Proxies on file.          Patient Information  Admitted from:: Home  Mental Status: Confused       DISCHARGE DETAILS:     Additional Comments: Pt is reported to have confusion at this time.  CM attempted both emergency contacts listed in chart.  Pt's sister did not answer and CM was unable to leave a  due to mailbox being full.  Pt's son's phone went directly to . Formal open will need to be completed

## 2025-01-26 NOTE — ED PROVIDER NOTES
ED Disposition       None          Assessment & Plan       Medical Decision Making  79-year-old female history of hypertension, meningioma, presents to the emergency department today for evaluation of inability care for self.  Patient bradycardic between 39 and 46.  Confirmed with EKG revealing heart rate of 41 with no ST elevations or depressions or other pathologic arrhythmias.  Rectal temperature 85.1.  Exam reveals bradycardia, clear lungs.  Patient is AOOx4.  No neurologic deficits but is a bit sluggish to follow commands.  Bilateral lower extremity pitting edema with no signs of active cellulitis or NSTI.  Remainder of exam reassuring.  Stroke unlikely given normal neurologic examination.  Suspect her symptoms are due to hypothermia however we will perform workup to include CT of the head chest abdomen pelvis, UA, fingerstick glucose which was 107 upon arrival, TSH, CBC CMP troponin VBG.  Patient has several metabolic derangements.  CBC slightly hemolyzed revealing potassium 5.5 UA negative: Sample obtained through catheterization.  White blood cell count mildly elevated.  Again with sibling negative source of infection, suspect hypothermia.  Patient hemodynamically stable with blood pressure within physiologic range.  Consider pacing if she does become hypotensive and remains bradycardic.  Results of CAT scan pending at time of signout to the night resident.  Patient remained hemodynamically stable and under my care, and signed out to the night resident for further evaluation and treatment.    Amount and/or Complexity of Data Reviewed  Labs: ordered.  Radiology: ordered.    Risk  Prescription drug management.             Medications   multi-electrolyte (ISOLYTE-S PH 7.4) bolus 1,000 mL (1,000 mL Intravenous New Bag 1/25/25 2046)       ED Risk Strat Scores                                              History of Present Illness       Chief Complaint   Patient presents with    Altered Mental Status     Family  friend called for a wellness check, found in alone in hoarding situation with no heat. Pt cold to the touch. Disoriented upon arrival, unknown baseline. Dx of multiple brain tumors.       Past Medical History:   Diagnosis Date    Anemia     Cataracts, bilateral     Heart murmur 04/2018    History of transfusion 2003    had 4 units    Hypertension     Meningioma (HCC) 04/2018    MRI every 6months    Ovarian cyst 2006    Shortness of breath     Skin neoplasm     last assessed: 6/13/2017      Past Surgical History:   Procedure Laterality Date    CYSTOSCOPY N/A 2/14/2019    Procedure: CYSTOSCOPY;  Surgeon: Geronimo Tsai MD;  [facility];  Service: Gynecology Oncology    HYSTERECTOMY N/A 2/14/2019    Procedure: HYSTERECTOMY LAPAROSCOPIC TOTAL (LTH) W/ ROBOTICS bilateral salpingooophorectomy with great difficulty due to mass of 12 cm and taking 2.5 hours;  Surgeon: Geronimo Tsai MD;  [facility];  Service: Gynecology Oncology    TONSILLECTOMY        Family History   Problem Relation Age of Onset    Hypertension Mother     Lung cancer Father     Hypertension Sister     Lymphoma Brother 45    Hypertension Brother     Schizophrenia Brother     Depression Son     Schizophrenia Son     Hypertension Family     Heart disease Other       Social History     Tobacco Use    Smoking status: Never     Passive exposure: Past    Smokeless tobacco: Never   Vaping Use    Vaping status: Never Used   Substance Use Topics    Alcohol use: Never    Drug use: No      E-Cigarette/Vaping    E-Cigarette Use Never User       E-Cigarette/Vaping Substances    Nicotine No     THC No     CBD No     Flavoring No     Other No     Unknown No       I have reviewed and agree with the history as documented.     79-year-old female history of hypertension, meningioma, presents to the emergency department today for evaluation after a welfare check was called.  EMS arrived at home and found patient lying on the floor.  No heat in the  home.  She was using space eaters to keep the home warm.  Clutter everywhere with filthy living conditions.  No obvious signs of trauma per EMS on arrival.  Temperature was 86.7 tympanic.  EKG revealed heart rate of 39 on the scene with [place] waves.  Patient was mentating however she was slow to respond but oriented per EMS.  She is able to follow commands for them.  Patient denies any pain at this time.  She says she does feel a bit cold.  She said she has not been taking her medications.  When asked about the masses in her head, she tells me she has an MRI every year and that they have been stable.        Review of Systems   Constitutional:  Negative for fatigue.   Eyes:  Negative for visual disturbance.   Respiratory:  Negative for chest tightness and shortness of breath.    Cardiovascular:  Positive for leg swelling. Negative for chest pain and palpitations.   Gastrointestinal:  Negative for abdominal pain.   Musculoskeletal:  Negative for back pain and neck pain.   Neurological:  Negative for dizziness, syncope, light-headedness and headaches.           Objective       ED Triage Vitals [01/25/25 2030]   Temperature Pulse Blood Pressure Respirations SpO2 Patient Position - Orthostatic VS   (!) 85.1 °F (29.5 °C) (!) 45 132/61 15 97 % Lying      Temp Source Heart Rate Source BP Location FiO2 (%) Pain Score    Rectal Monitor Right arm -- No Pain      Vitals      Date and Time Temp Pulse SpO2 Resp BP Pain Score FACES Pain Rating User   01/25/25 2130 84.9 °F (29.4 °C) 46 -- 18 115/58 -- -- AS   01/25/25 2115 84.9 °F (29.4 °C) 48 97 % 16 141/65 -- -- AS   01/25/25 2100 84.9 °F (29.4 °C) -- -- -- -- -- -- AS   01/25/25 2030 85.1 °F (29.5 °C) 45 97 % 15 132/61 No Pain -- AS            Physical Exam  Constitutional:       Appearance: She is ill-appearing.   HENT:      Head: Normocephalic and atraumatic.      Mouth/Throat:      Comments: Dry mucous membranes  Eyes:      General: No scleral icterus.     Extraocular  Movements: Extraocular movements intact.      Right eye: No nystagmus.      Left eye: No nystagmus.      Pupils: Pupils are equal, round, and reactive to light.   Cardiovascular:      Rate and Rhythm: Regular rhythm. Bradycardia present.      Heart sounds: No murmur heard.     No friction rub.   Pulmonary:      Effort: Pulmonary effort is normal. No respiratory distress.      Breath sounds: Normal breath sounds. No wheezing, rhonchi or rales.   Abdominal:      General: There is no distension.      Palpations: Abdomen is soft. There is no mass.      Tenderness: There is no abdominal tenderness.   Musculoskeletal:      Cervical back: Normal range of motion and neck supple. No rigidity.   Skin:     Capillary Refill: Capillary refill takes more than 3 seconds.      Comments: Skin is cold and dry.  Edema and erythema of the bilateral lower extremities   Neurological:      Mental Status: She is alert and oriented to person, place, and time.      GCS: GCS eye subscore is 4. GCS verbal subscore is 5. GCS motor subscore is 6.      Cranial Nerves: No cranial nerve deficit, dysarthria or facial asymmetry.      Sensory: No sensory deficit.      Motor: No weakness.      Comments: Response time is slightly delayed but AAOx4         Results Reviewed       Procedure Component Value Units Date/Time    RBC Morphology Reflex Test [[id number]] Collected: 01/25/25 2048    Lab Status: Final result Specimen: Blood from Arm, Left Updated: 01/25/25 2202    TSH [[id number]]  (Normal) Collected: 01/25/25 2048    Lab Status: Final result Specimen: Blood from Arm, Left Updated: 01/25/25 2157     TSH 3RD GENERATON 2.611 uIU/mL     Comprehensive metabolic panel [[id number]]  (Abnormal) Collected: 01/25/25 2048    Lab Status: Final result Specimen: Blood from Arm, Left Updated: 01/25/25 2144     Sodium 138 mmol/L      Potassium 5.5 mmol/L      Chloride 103 mmol/L      CO2 28 mmol/L      ANION GAP 7 mmol/L      BUN 53 mg/dL      Creatinine 0.78 mg/dL       Glucose 114 mg/dL      Calcium 9.8 mg/dL       U/L       U/L      Alkaline Phosphatase 157 U/L      Total Protein 7.2 g/dL      Albumin 3.9 g/dL      Total Bilirubin 0.40 mg/dL      eGFR 73 ml/min/1.73sq m     Narrative:      National Kidney Disease Foundation guidelines for Chronic Kidney Disease (CKD):     Stage 1 with normal or high GFR (GFR > 90 mL/min/1.73 square meters)    Stage 2 Mild CKD (GFR = 60-89 mL/min/1.73 square meters)    Stage 3A Moderate CKD (GFR = 45-59 mL/min/1.73 square meters)    Stage 3B Moderate CKD (GFR = 30-44 mL/min/1.73 square meters)    Stage 4 Severe CKD (GFR = 15-29 mL/min/1.73 square meters)    Stage 5 End Stage CKD (GFR <15 mL/min/1.73 square meters)  Note: GFR calculation is accurate only with a steady state creatinine    CBC and differential [[id number]]  (Abnormal) Collected: 01/25/25 2048    Lab Status: Final result Specimen: Blood from Arm, Left Updated: 01/25/25 2144     WBC 13.72 Thousand/uL      RBC 4.44 Million/uL      Hemoglobin 13.2 g/dL      Hematocrit 40.1 %      MCV 90 fL      MCH 29.7 pg      MCHC 32.9 g/dL      RDW 14.5 %      MPV 8.7 fL      Platelets 409 Thousands/uL     Narrative:      This is an appended report.  These results have been appended to a previously verified report.    Manual Differential(PHLEBS Do Not Order) [[id number]]  (Abnormal) Collected: 01/25/25 2048    Lab Status: Final result Specimen: Blood from Arm, Left Updated: 01/25/25 2144     Segmented % 87 %      Bands % 8 %      Lymphocytes % 2 %      Monocytes % 1 %      Eosinophils % 0 %      Basophils % 0 %      Atypical Lymphocytes % 2 %      Absolute Neutrophils 13.03 Thousand/uL      Absolute Lymphocytes 0.55 Thousand/uL      Absolute Monocytes 0.14 Thousand/uL      Absolute Eosinophils 0.00 Thousand/uL      Absolute Basophils 0.00 Thousand/uL      Total Counted --     RBC Morphology Present     Platelet Estimate Increased     Anisocytosis Present     Naa Cells Present      Macrocytes Present     Poikilocytes Present    HS Troponin 0hr (reflex protocol) [[id number]]  (Normal) Collected: 01/25/25 2048    Lab Status: Final result Specimen: Blood from Arm, Left Updated: 01/25/25 2139     hs TnI 0hr 6 ng/L     HS Troponin I 2hr [[id number]]     Lab Status: No result Specimen: Blood     UA w Reflex to Microscopic w Reflex to Culture [[id number]] Collected: 01/25/25 2104    Lab Status: Final result Specimen: Urine, Clean Catch Updated: 01/25/25 2126     Color, UA Yellow     Clarity, UA Clear     Specific Gravity, UA 1.010     pH, UA 5.0     Leukocytes, UA Negative     Nitrite, UA Negative     Protein, UA Negative mg/dl      Glucose, UA Negative mg/dl      Ketones, UA Negative mg/dl      Urobilinogen, UA <2.0 mg/dl      Bilirubin, UA Negative     Occult Blood, UA Negative    Blood gas, venous [[id number]]  (Abnormal) Collected: 01/25/25 2048    Lab Status: Final result Specimen: Blood from Arm, Left Updated: 01/25/25 2115     pH, [place] 7.283     pCO2, [place] 57.8 mm Hg      pO2, [place] 76.0 mm Hg      HCO3, [place] 26.7 mmol/L      Base Excess, [place] -0.9 mmol/L      O2 Content, [place] 16.5 ml/dL      O2 HGB, VENOUS 92.2 %     B-Type Natriuretic Peptide(BNP) [[id number]] Collected: 01/25/25 2048    Lab Status: In process Specimen: Blood from Arm, Left Updated: 01/25/25 2113    Fingerstick Glucose (POCT) [[id number]]  (Normal) Collected: 01/25/25 2050    Lab Status: Final result Specimen: Blood Updated: 01/25/25 2052     POC Glucose 107 mg/dl             CT head wo contrast    (Results Pending)   CT chest abdomen pelvis w contrast    (Results Pending)       Procedures    ED Medication and Procedure Management   Prior to Admission Medications   Prescriptions Last Dose Informant Patient Reported? Taking?   Cholecalciferol (VITAMIN D3) 1,000 units tablet  Self Yes No   Sig: Take 1,000 Units by mouth daily   Glycerin-Polysorbate 80 (REFRESH DRY EYE THERAPY OP)  Self Yes No   Sig: Apply to eye   furosemide (LASIX) 40 mg  tablet  Self No No   Sig: Take 1 tablet (40 mg total) by mouth daily   Patient not taking: Reported on 9/17/2024   lisinopril-hydrochlorothiazide (PRINZIDE,ZESTORETIC) 20-25 MG per tablet   No No   Sig: take 1 tablet by mouth once daily   multivitamin (THERAGRAN) TABS  Self Yes No   Sig: Take 1 tablet by mouth daily   potassium chloride (Klor-Con M20) 20 mEq tablet   No No   Sig: Take 1 tablet (20 mEq total) by mouth daily   timolol (TIMOPTIC) 0.5 % ophthalmic solution  Self Yes No   Sig: instill 1 drop into both eyes twice a day      Facility-Administered Medications: None     Patient's Medications   Discharge Prescriptions    No medications on file     No discharge procedures on file.  ED SEPSIS DOCUMENTATION      Initial Sepsis Screening       Row Name 01/25/25 2032                Is the patient's history suggestive of a new or worsening infection? Yes (Proceed)  -ET        Suspected source of infection suspect infection, source unknown  -ET        Indicate SIRS criteria --        Are two or more of the above signs & symptoms of infection both present and new to the patient? --                  User Key  (r) = Recorded By, (t) = Taken By, (c) = Cosigned By      Initials Name Provider Type    ET Hamlet Johnson MD Resident                       Hamlet Johnson MD  01/25/25 [id number]

## 2025-01-26 NOTE — H&P
H&P - Critical Care/ICU   Name: Darlin Zamora 79 y.o. female I MRN: [id number]  Unit/Bed#: MICU 10 I Date of Admission: 1/25/2025   Date of Service: 1/26/2025 I Hospital Day: 0       Assessment & Plan   Neuro:   Hypothermia secondary to environmental exposure  Patient found on the floor of her home without heat.  Core temperature 85.1 °F on arrival  TSH WNL  Workup is without any obvious infectious source  Continue active rewarming with shara hugger  Altered mental status  Likely secondary to hypothermia  CTH negative for acute findings  Continue to monitor  Meningioma  Continue outpatient surveillance with neurosurgery    CV:   Shock  Likely due to vasodilation secondary rewarming in the setting of hypothermia  Patient normotensive initially, but became hypotensive after initiation of active rewarming with shara hugger  Titrate levophed to MAP > 65  S/p 2 L fluid bolus in ED  Hypertension  Home regimen: Lisinopril-hydrochlorothiazide 20-25 mg daily  Hold in the setting of hypertension    Pulm:  No active issues    GI:   Transaminitis  ,   Etiology unclear, will repeat CMP    :   No active issues    F/E/N:   F: No active fluids  E: Monitor and replete as needed  N: Regular diet    Heme/Onc:   No active issues    Endo:   No active issues    ID:   No active issues  Hypothermia likely due to environmental exposure  CT chest abdomen and pelvis negative for infectious source  Blood cultures ordered to complete infectious workup    MSK/Skin:   Venous stasis changes of bilateral lower extremities  Pressure offloading  Frequent turning, PT OT when able  Disposition: Critical care    History of Present Illness   Darlin Zamora is a 79 y.o. with a history of hypertension and meningioma who was brought in by EMS after being found on the floor of her house during a wellness check.  Patient's home has no heat and was reportedly cluttered with filthy living conditions.  The patient was reportedly alert and  following commands, but confused.  The patient is a core temperature in the ED was 85.1 °F.  Active rewarming was initiated with shara hugger.  After initiation of rewarming the patient began to become hypotensive.  Patient received 2 L of IV fluids in the emergency department without improvement of her blood pressure.  The patient was started on Levophed and transferred to the MICU.    History obtained from chart review.  Review of Systems: Review of Systems not obtainable due to Altered mental status    Historical Information   Past Medical History:  No date: Anemia  No date: Cataracts, bilateral  04/2018: Heart murmur  2003: History of transfusion      Comment:  had 4 units  No date: Hypertension  04/2018: Meningioma (HCC)      Comment:  MRI every 6months  2006: Ovarian cyst  No date: Shortness of breath  No date: Skin neoplasm      Comment:  last assessed: 6/13/2017 Past Surgical History:  2/14/2019: CYSTOSCOPY; N/A      Comment:  Procedure: CYSTOSCOPY;  Surgeon: Geronimo Tsai MD;                 Location: [facility];  Service: Gynecology Oncology  2/14/2019: HYSTERECTOMY; N/A      Comment:  Procedure: HYSTERECTOMY LAPAROSCOPIC TOTAL (LTH) W/                ROBOTICS bilateral salpingooophorectomy with great                difficulty due to mass of 12 cm and taking 2.5 hours;                 Surgeon: Geronimo Tsai MD;  Location: [facility];                 Service: Gynecology Oncology  No date: TONSILLECTOMY   Current Outpatient Medications   Medication Instructions    Cholecalciferol (VITAMIN D3) 1,000 Units, Oral, Daily    furosemide (LASIX) 40 mg, Oral, Daily    Glycerin-Polysorbate 80 (REFRESH DRY EYE THERAPY OP) Ophthalmic    lisinopril-hydrochlorothiazide (PRINZIDE,ZESTORETIC) 20-25 MG per tablet 1 tablet, Oral, Daily    multivitamin (THERAGRAN) TABS 1 tablet, Oral, Daily    potassium chloride (Klor-Con M20) 20 mEq tablet 20 mEq, Oral, Daily    timolol (TIMOPTIC) 0.5 % ophthalmic solution instill 1  drop into both eyes twice a day    No Known Allergies   Social History     Tobacco Use    Smoking status: Never     Passive exposure: Past    Smokeless tobacco: Never   Vaping Use    Vaping status: Never Used   Substance Use Topics    Alcohol use: Never    Drug use: No    Family History   Problem Relation Age of Onset    Hypertension Mother     Lung cancer Father     Hypertension Sister     Lymphoma Brother 45    Hypertension Brother     Schizophrenia Brother     Depression Son     Schizophrenia Son     Hypertension Family     Heart disease Other           Objective :                   Vitals I/O      Most Recent Min/Max in 24hrs   Temp (!) 93.2 °F (34 °C) Temp  Min: 84.9 °F (29.4 °C)  Max: 93.2 °F (34 °C)   Pulse 78 Pulse  Min: 42  Max: 80   Resp 16 Resp  Min: 15  Max: 32   /53 BP  Min: 75/48  Max: 141/65   O2 Sat 98 % SpO2  Min: 96 %  Max: 99 %    No intake or output data in the 24 hours ending 25 0404    Diet Regular; Regular House    Invasive Monitoring           Physical Exam   Physical Exam  Vitals reviewed.   Eyes:      Extraocular Movements: Extraocular movements intact.      Conjunctiva/sclera: Conjunctivae normal.      Pupils: Pupils are equal, round, and reactive to light.   Skin:     General: Skin is cool and not mottled extremities.      Capillary Refill: Capillary refill takes 2 to 3 seconds.      Coloration: Skin is not jaundiced or pale.      Comments: Venous stasis changes of the bilateral lower extremities   HENT:      Head: Normocephalic and atraumatic.      Nose: No congestion or rhinorrhea.      Mouth/Throat:      Mouth: Mucous membranes are moist.   Neck:   no midline tenderness Cardiovascular:      Rate and Rhythm: Normal rate and regular rhythm.      Pulses: Normal pulses.   Musculoskeletal:         General: No deformity or signs of injury. Normal range of motion.      Right lower le+ Edema present.      Left lower le+ Edema present.   Abdominal: General: There is no  distension.      Palpations: Abdomen is soft.      Tenderness: There is no abdominal tenderness.   Constitutional:       General: She is not in acute distress.     Appearance: She is not toxic-appearing.      Comments: Disheveled appearing   Pulmonary:      Effort: Pulmonary effort is normal. No respiratory distress.      Breath sounds: Normal breath sounds.   Neurological:      General: No focal deficit present.      Mental Status: She is alert. She is disoriented to place, disoriented to time and disoriented to situation.      Cranial Nerves: No facial asymmetry.      Sensory: No sensory deficit.      Motor: Strength full and intact in all extremities.      Comments: A&O x1, patient follows commands, patient moving all extremities equally   Genitourinary/Anorectal:  Vasquez present.        Diagnostic Studies        Lab Results: I have reviewed the following results:     Medications:  Scheduled PRN   Albumin 5%, 25 g, Once  chlorhexidine, 15 mL, Q12H JOSTIN  heparin (porcine), 5,000 Units, Q8H JOSTIN  multi-electrolyte, 1,000 mL, Once          Continuous    norepinephrine, 1-30 mcg/min, Last Rate: 6 mcg/min (01/26/25 0230)         Labs:   CBC    Recent Labs     01/25/25 2048   WBC 13.72*   HGB 13.2   HCT 40.1   *   BANDSPCT 8     BMP    Recent Labs     01/25/25 2048   SODIUM 138   K 5.5*      CO2 28   AGAP 7   BUN 53*   CREATININE 0.78   CALCIUM 9.8       Coags    No recent results     Additional Electrolytes  No recent results       Blood Gas    No recent results  Recent Labs     01/25/25 2048   PHVEN 7.283*   AOY2CQC 57.8*   PO2VEN 76.0*   RAP8QBD 26.7   BEVEN -0.9   Y3GZKEQ 92.2*    LFTs  Recent Labs     01/25/25 2048   *   *   ALKPHOS 157*   ALB 3.9   TBILI 0.40       Infectious  No recent results  Glucose  Recent Labs     01/25/25 2048   GLUC 114

## 2025-01-26 NOTE — SPEECH THERAPY NOTE
Order received for swallow eval. RN advised that pt is not appropriate today. Will follow up tomorrow.

## 2025-01-27 PROBLEM — R41.82 ALTERED MENTAL STATUS: Status: ACTIVE | Noted: 2025-01-01

## 2025-01-27 NOTE — PROGRESS NOTES
Darlin Zamora is a 79 y.o. female who is currently ordered Vancomycin IV with management by the Pharmacy Consult service.  Relevant clinical data and objective / subjective history reviewed.  Vancomycin Assessment:  Indication and Goal AUC/Trough: Other, septic shock, -600, trough >10  Clinical Status:  critically ill, leukocytosis with bandemia, hypothermic  Micro:    MRSA culture: in process   CSF culture: in process   ME Panel: nothing detected   Blood cultures x 2: in process  Renal Function:  SCr: 1.36 mg/dL (from 0.77, baseline 0.7-0.9)  CrCl: 25 mL/min  Renal replacement: [place], Not on dialysis  Days of Therapy: 2  Current Dose: 1500 mg IV x 1  Vancomycin Plan:  New Dosin mg IV daily PRN when random level is less than 15  Next Level: Random  at 0600  Renal Function Monitoring: Daily BMP and UOP  Pharmacy will continue to follow closely for s/sx of nephrotoxicity, infusion reactions and appropriateness of therapy.  BMP and CBC will be ordered per protocol. We will continue to follow the patient’s culture results and clinical progress daily.    Montserrat Santamaria, PharmD, BCCCP  Critical Care Clinical Pharmacist  [phone]

## 2025-01-27 NOTE — PLAN OF CARE
Problem: PAIN - ADULT  Goal: Verbalizes/displays adequate comfort level or baseline comfort level  Description: Interventions:  - Encourage patient to monitor pain and request assistance  - Assess pain using appropriate pain scale  - Administer analgesics based on type and severity of pain and evaluate response  - Implement non-pharmacological measures as appropriate and evaluate response  - Consider cultural and social influences on pain and pain management  - Notify physician/advanced practitioner if interventions unsuccessful or patient reports new pain  Outcome: Progressing     Problem: INFECTION - ADULT  Goal: Absence or prevention of progression during hospitalization  Description: INTERVENTIONS:  - Assess and monitor for signs and symptoms of infection  - Monitor lab/diagnostic results  - Monitor all insertion sites, i.e. indwelling lines, tubes, and drains  - Monitor endotracheal if appropriate and nasal secretions for changes in amount and color  - [place] appropriate cooling/warming therapies per order  - Administer medications as ordered  - Instruct and encourage patient and family to use good hand hygiene technique  - Identify and instruct in appropriate isolation precautions for identified infection/condition  Outcome: Progressing  Goal: Absence of fever/infection during neutropenic period  Description: INTERVENTIONS:  - Monitor WBC    Outcome: Progressing     Problem: SAFETY ADULT  Goal: Patient will remain free of falls  Description: INTERVENTIONS:  - Educate patient/family on patient safety including physical limitations  - Instruct patient to call for assistance with activity   - Consult OT/PT to assist with strengthening/mobility   - Keep Call bell within reach  - Keep bed low and locked with side rails adjusted as appropriate  - Keep care items and personal belongings within reach  - Initiate and maintain comfort rounds  - Make Fall Risk Sign visible to staff  - Offer Toileting every  Hours,  in advance of need  - Initiate/Maintain alarm  - Obtain necessary fall risk management equipment:   - Apply yellow socks and bracelet for high fall risk patients  - Consider moving patient to room near nurses station  Outcome: Progressing  Goal: Maintain or return to baseline ADL function  Description: INTERVENTIONS:  -  Assess patient's ability to carry out ADLs; assess patient's baseline for ADL function and identify physical deficits which impact ability to perform ADLs (bathing, care of mouth/teeth, toileting, grooming, dressing, etc.)  - Assess/evaluate cause of self-care deficits   - Assess range of motion  - Assess patient's mobility; develop plan if impaired  - Assess patient's need for assistive devices and provide as appropriate  - Encourage maximum independence but intervene and supervise when necessary  - Involve family in performance of ADLs  - Assess for home care needs following discharge   - Consider OT consult to assist with ADL evaluation and planning for discharge  - Provide patient education as appropriate  Outcome: Progressing  Goal: Maintains/Returns to pre admission functional level  Description: INTERVENTIONS:  - Perform AM-PAC 6 Click Basic Mobility/ Daily Activity assessment daily.  - Set and communicate daily mobility goal to care team and patient/family/caregiver.   - Collaborate with rehabilitation services on mobility goals if consulted  - Perform Range of Motion  times a day.  - Reposition patient every  hours.  - Dangle patient  times a day  - Stand patient  times a day  - Ambulate patient  times a day  - Out of bed to chair  times a day   - Out of bed for meal times a day  - Out of bed for toileting  - Record patient progress and toleration of activity level   Outcome: Progressing     Problem: DISCHARGE PLANNING  Goal: Discharge to home or other facility with appropriate resources  Description: INTERVENTIONS:  - Identify barriers to discharge w/patient and caregiver  - Arrange for  needed discharge resources and transportation as appropriate  - Identify discharge learning needs (meds, wound care, etc.)  - Arrange for interpretive services to assist at discharge as needed  - Refer to Case Management Department for coordinating discharge planning if the patient needs post-hospital services based on physician/advanced practitioner order or complex needs related to functional status, cognitive ability, or social support system  Outcome: Progressing     Problem: Knowledge Deficit  Goal: Patient/family/caregiver demonstrates understanding of disease process, treatment plan, medications, and discharge instructions  Description: Complete learning assessment and assess knowledge base.  Interventions:  - Provide teaching at level of understanding  - Provide teaching via preferred learning methods  Outcome: Progressing     Problem: Prexisting or High Potential for Compromised Skin Integrity  Goal: Skin integrity is maintained or improved  Description: INTERVENTIONS:  - Identify patients at risk for skin breakdown  - Assess and monitor skin integrity  - Assess and monitor nutrition and hydration status  - Monitor labs   - Assess for incontinence   - Turn and reposition patient  - Assist with mobility/ambulation  - Relieve pressure over bony prominences  - Avoid friction and shearing  - Provide appropriate hygiene as needed including keeping skin clean and dry  - Evaluate need for skin moisturizer/barrier cream  - Collaborate with interdisciplinary team   - Patient/family teaching  - Consider wound care consult   Outcome: Progressing

## 2025-01-27 NOTE — PROCEDURES
Central Line Insertion    Date/Time: 1/26/2025 7:30 PM    Performed by: Dragan Jeong MD  Authorized by: Dragan Jeong MD    Patient location:  Bedside and ICU  Consent:     Consent obtained:  Verbal    Consent given by:  Healthcare agent    Risks discussed:  Arterial puncture, incorrect placement, bleeding, infection and pneumothorax  Universal protocol:     Procedure explained and questions answered to patient or proxy's satisfaction: yes      Radiology Images displayed and confirmed.  If images not available, report reviewed: yes      Required blood products, implants, devices, and special equipment available: yes      Site/side marked: yes      Immediately prior to procedure, a time out was called: yes      Patient identity confirmed:  Hospital-assigned identification number and arm band  Pre-procedure details:     Hand hygiene: Hand hygiene performed prior to insertion      Sterile barrier technique: All elements of maximal sterile technique followed      Skin preparation:  2% chlorhexidine  Indications:     Central line indications: medications requiring central line      Site selection rationale:  RIJ was partially encompassing R carotid artery on US  Anesthesia (see MAR for exact dosages):     Anesthesia method:  Local infiltration    Local anesthetic:  Lidocaine 1% w/o epi  Procedure details:     Location:  Left internal jugular    Patient position:  Flat    Catheter type:  Triple lumen 16cm    Landmarks identified: yes      Ultrasound guidance: yes      Sterile ultrasound techniques: Sterile gel and sterile probe covers were used      Manometry confirmation: yes      Number of attempts:  1    Successful placement: yes      Catheter tip vessel location: superior vena cava    Post-procedure details:     Post-procedure:  Dressing applied and line sutured    Assessment:  Blood return through all ports and free fluid flow    Post-procedure complications: none      Patient tolerance of procedure:  Tolerated  well, no immediate complications

## 2025-01-27 NOTE — DISCHARGE INSTR - OTHER ORDERS
Skin care plans:  1-Apply thin layer of Calazime/Protective Zinc Oxide paste to sacrum and buttocks TID and PRN.  2-Hydraguard/Silicone Cream to bilateral heels BID and PRN.  3-Elevate heels to offload pressure.  4-Ehob cushion when out of bed.  5-Turn/reposition q2h or when medically stable for pressure re-distribution on skin.  6-Moisturize skin daily with skin nourishing cream.  7-Place Patient on ATR Turning and repositioning system  8-Cleanse bilateral lower extremity wounds with NSS. Apply dermagran to wounds, cover with DSD, ABD wrap with ross, Change every other day and prn

## 2025-01-27 NOTE — UTILIZATION REVIEW
Initial Clinical Review    Admission: Date/Time/Statement:   Admission Orders (From admission, onward)       Ordered        01/26/25 0212  INPATIENT ADMISSION  Once                          Orders Placed This Encounter   Procedures    INPATIENT ADMISSION     Standing Status:   Standing     Number of Occurrences:   1     Level of Care:   Critical Care [15]     Estimated length of stay:   More than 2 Midnights     Certification:   I certify that inpatient services are medically necessary for this patient for a duration of greater than two midnights. See H&P and MD Progress Notes for additional information about the patient's course of treatment.     ED Arrival Information       Expected   -    Arrival   1/25/2025 20:25    Acuity   Emergent              Means of arrival   Ambulance    Escorted by   [facility]    Service   Critical Care/ICU    Admission type   Emergency              Arrival complaint   Ems             Chief Complaint   Patient presents with    Altered Mental Status     Family friend called for a wellness check, found in alone in hoarding situation with no heat. Pt cold to the touch. Disoriented upon arrival, unknown baseline. Dx of multiple brain tumors.       Initial Presentation: 79 y.o. female to ED presents by EMS after being found on the floor of her house during a wellness check. Pt's home has no heat and was reportedly cluttered with filthy living conditions. He was reportedly alert and following commands, but confused. In ED, core temp was 85.1 °F.  Active rewarming was initiated with shara hugger.  After initiation of rewarming pt became hypotensive. Given 2L Iv fluids in ED without improvement of BP. Started Levophed drip and transferred to MICU.  PMH for HTN and meningioma  Admit to Inpatient Dx; Shock, Hypothermia secondary to environmental exposure, Altered mental status, Transaminitis  Core temp of 85.1 °F.. ,   Plan; Continue active rewarming with Shara hugger.  Titrate levophed to MAP > 65. Bld cultures.   Neuro checks q4h.  Hold home Lisinopril-hydrochlorothiazide   On exam; Venous stasis changes of BLLE. +1 edema BLLE. Disoriented, AO x1. Vasquez    Date: 1/27   Day 2:  Progress notes;  Acute metabolic encephalopathy; Seizure-like activity, Respiratory insufficiency, [place]  Witnessed episode of seizure-like activity - received 2 mg Ativan and Keppra load   , , Alk Phos 294, Total bilirubin 2.88   NPO. vEEG. Currently on O2 4-5 L NC. F/u CT Head. S/p Lumbar Puncture, f/u CSF results. Frequent neuro checks.   Neurology consult. Echo. Bld cultures pending. Iv antibiotics.   Titrate levophed for MAP > 65. Continuous cardiopulmonary monitoring. Continuous pulse oximetry. Maintain O2 sat >92%.   Hold anti-hypertensive   Lines - left IJ CVC, right radial A-line, peripheral IV   24hr events; Witnessed seizure like activity. After shining light in her eyes, she had witnessed shaking. She has had increasing pressor requirements and declining mental status over the past 24 hours.   Transferred to neurocritical care unit.     Neurology cons; Continue vEEG, currently on no AEDs, given ativan overnight, no seizures seen on EEG at this time  -LP with CSF analysis, f/u results. MRI Brain when stable.   Continue to treat underlying metabolic/infectious derangements, hypothermia/hypotension, the patient was noted to be hypotensive overnight at times of these events - the patient is on BP/pressor support.     ED Treatment-Medication Administration from 01/25/2025 2025 to 01/26/2025 0337         Date/Time Order Dose Route Action     01/25/2025 2046 multi-electrolyte (ISOLYTE-S PH 7.4) bolus 1,000 mL 1,000 mL Intravenous New Bag     01/25/2025 2212 iohexol (OMNIPAQUE) 350 MG/ML injection (MULTI-DOSE) 100 mL 100 mL Intravenous Given     01/26/2025 0100 sodium chloride 0.9 % bolus 1,000 mL 1,000 mL Intravenous New Bag     01/26/2025 0230 NOREPINEPHRINE 4 MG  ML NSS (CMPD ORDER)  infusion 6 mcg/min Intravenous Rate/Dose Change     01/26/2025 0110 norepinephrine (LEVOPHED) 1 mg/mL injection **ADS Override Pull** 4 mg  Given            Scheduled Medications:  chlorhexidine, 15 mL, Mouth/Throat, Q12H JOSTIN  heparin (porcine), 5,000 Units, Subcutaneous, Q8H JOSTIN  piperacillin-tazobactam, 4.5 g, Intravenous, Q8H      Continuous IV Infusions:  multi-electrolyte, 75 mL/hr, Intravenous, Continuous  norepinephrine, 1-30 mcg/min, Intravenous, Titrated  vasopressin, 0.04 Units/min, Intravenous, Continuous      PRN Meds:  vancomycin, 15 mg/kg (Adjusted), Intravenous, Daily PRN      ED Triage Vitals [01/25/25 2030]   Temperature Pulse Respirations Blood Pressure SpO2 Pain Score   (!) 85.1 °F (29.5 °C) (!) 45 15 132/61 97 % No Pain     Weight (last 2 days)       Date/Time Weight    01/26/25 0404 65.6 (144.62)            Vital Signs (last 3 days)       Date/Time Temp Pulse Resp BP MAP (mmHg) Arterial Line BP MAP SpO2 Calculated FIO2 (%) - Nasal Cannula Nasal Cannula O2 Flow Rate (L/min) O2 Device Patient Position - Orthostatic VS Martell Coma Scale Score Pain    01/27/25 1100 98.2 °F (36.8 °C) 86 18 110/56 79 -- -- 97 % 36 4 L/min -- -- -- --    01/27/25 1000 98.2 °F (36.8 °C) 92 -- 137/93 115 -- -- -- -- -- -- -- -- --    01/27/25 0930 97.9 °F (36.6 °C) 82 20 132/85 122 -- -- -- -- -- -- -- -- --    01/27/25 0900 97.9 °F (36.6 °C) 68 16 103/53 70 -- -- 98 % -- -- -- -- -- --    01/27/25 0800 97.9 °F (36.6 °C) 76 18 116/47 70 86/78 82 mmHg 98 % -- -- -- -- 8 --    01/27/25 0700 98.2 °F (36.8 °C) 66 21 117/60 86 -- -- 99 % -- -- -- -- -- --    01/27/25 0650 98.2 °F (36.8 °C) 64 17 113/55 81 -- -- 99 % -- -- -- -- -- --    01/27/25 0600 98.2 °F (36.8 °C) 68 16 106/56 77 -- -- 99 % -- -- -- -- -- --    01/27/25 0550 98.4 °F (36.9 °C) 68 23 111/58 84 104/46 70 mmHg 99 % 40 5 L/min Nasal cannula Lying 8 --    01/27/25 0511 -- -- -- -- 66 -- -- -- -- -- -- -- -- --    01/27/25 0500 99.5 °F (37.5 °C) 76 19 113/55 75  106/68 77 mmHg 100 % -- -- -- -- -- --    01/27/25 0430 99.1 °F (37.3 °C) 86 30 120/65 87 143/76 100 mmHg 96 % -- -- -- -- -- --    01/27/25 0400 99.9 °F (37.7 °C) 82 40 108/54 76 109/52 73 mmHg 98 % -- -- -- -- 10 --    01/27/25 0330 99.9 °F (37.7 °C) 82 37 96/55 71 84/47 62 mmHg 95 % -- -- -- -- -- --    01/27/25 0315 99.9 °F (37.7 °C) 93 21 79/44 56 77/54 65 mmHg 96 % 44 6 L/min Nasal cannula -- -- --    01/27/25 0145 100.4 °F (38 °C) 86 24 -- -- 97/71 84 mmHg 96 % -- -- -- -- -- --    01/27/25 0130 100.4 °F (38 °C) 87 29 110/64 82 99/45 67 mmHg 96 % -- -- -- -- -- --    01/27/25 0115 100.2 °F (37.9 °C) 87 29 124/62 84 101/44 66 mmHg 96 % -- -- -- -- -- --    01/27/25 0100 100 °F (37.8 °C) 88 28 107/59 78 106/44 68 mmHg 97 % -- -- -- -- -- --    01/27/25 0045 99.9 °F (37.7 °C) 86 34 109/60 79 106/44 68 mmHg 97 % -- -- -- -- -- --    01/27/25 0030 99.9 °F (37.7 °C) 88 37 118/56 79 103/44 67 mmHg 97 % -- -- -- -- -- --    01/27/25 0015 99.7 °F (37.6 °C) 90 35 117/59 82 107/45 69 mmHg 97 % -- -- -- -- -- --    01/27/25 0000 99.7 °F (37.6 °C) 88 26 112/62 83 105/44 67 mmHg 97 % -- -- -- -- 10 --    01/26/25 2345 99.5 °F (37.5 °C) 88 32 118/64 85 107/45 68 mmHg 99 % -- -- -- -- -- --    01/26/25 2330 99.5 °F (37.5 °C) 86 31 113/60 80 110/47 71 mmHg 99 % -- -- -- -- -- --    01/26/25 2315 99.3 °F (37.4 °C) 87 33 121/59 83 111/49 73 mmHg 99 % -- -- -- -- -- --    01/26/25 2300 99.3 °F (37.4 °C) 88 26 107/60 77 107/48 72 mmHg 99 % -- -- -- -- -- --    01/26/25 2245 99.1 °F (37.3 °C) 86 33 117/59 80 106/47 70 mmHg 99 % -- -- -- -- -- --    01/26/25 2230 99.1 °F (37.3 °C) 86 24 112/58 78 102/49 71 mmHg 98 % -- -- -- -- -- --    01/26/25 2215 99.1 °F (37.3 °C) 87 27 119/57 82 105/51 75 mmHg 98 % -- -- -- -- -- --    01/26/25 2200 99 °F (37.2 °C) 89 36 115/61 84 107/54 76 mmHg 93 % -- -- -- -- -- --    01/26/25 2145 99 °F (37.2 °C) 90 29 103/61 77 115/56 80 mmHg 97 % -- -- -- -- -- --    01/26/25 2130 99 °F (37.2 °C) 84 23  106/57 79 107/50 71 mmHg 98 % -- -- -- -- -- --    01/26/25 2115 99 °F (37.2 °C) 83 22 106/55 75 106/51 73 mmHg 99 % -- -- -- -- -- --    01/26/25 2100 99 °F (37.2 °C) 88 23 108/56 78 108/56 79 mmHg 100 % 28 2 L/min Nasal cannula -- -- --    01/26/25 2045 98.8 °F (37.1 °C) 96 31 143/60 73 153/69 102 mmHg 96 % -- -- -- -- -- --    01/26/25 2030 98.8 °F (37.1 °C) 81 23 131/59 85 115/60 83 mmHg 100 % -- -- -- -- -- --    01/26/25 2015 98.8 °F (37.1 °C) 84 136 134/63 90 115/51 76 mmHg 91 % -- -- -- -- -- --    01/26/25 2000 98.6 °F (37 °C) 85 25 112/57 79 106/44 65 mmHg 96 % -- -- -- -- 10 --    01/26/25 1945 98.8 °F (37.1 °C) 91 26 90/51 65 91/39 56 mmHg 93 % -- -- -- -- -- --    01/26/25 1936 -- -- -- -- -- -- 45 mmHg -- -- -- -- -- -- --    01/26/25 1930 98.8 °F (37.1 °C) 93 26 86/51 64 87/32 49 mmHg 92 % -- -- -- -- -- --    01/26/25 1915 98.8 °F (37.1 °C) 92 28 99/51 72 90/33 50 mmHg 93 % -- -- -- -- -- --    01/26/25 1900 98.8 °F (37.1 °C) 87 24 87/51 67 84/32 49 mmHg 91 % -- -- -- -- -- --    01/26/25 1830 99 °F (37.2 °C) 87 26 91/53 67 -- -- 93 % -- -- -- -- -- --    01/26/25 1825 99 °F (37.2 °C) 86 28 81/42 57 -- -- 94 % -- -- -- -- -- --    01/26/25 1822 99 °F (37.2 °C) 87 27 88/50 65 -- -- 93 % -- -- -- -- -- --    01/26/25 1815 99 °F (37.2 °C) 87 27 77/41 54 -- -- 94 % -- -- -- -- -- --    01/26/25 1812 99 °F (37.2 °C) 87 29 65/36 46 -- -- 96 % -- -- -- -- -- --    01/26/25 1811 99 °F (37.2 °C) 85 27 58/33 41 -- -- 96 % -- -- -- -- -- --    01/26/25 1809 99 °F (37.2 °C) 86 27 64/34 44 -- -- 96 % -- -- -- -- -- --    01/26/25 1800 99 °F (37.2 °C) 85 30 252/187 208 -- -- 94 % -- -- -- -- -- --    01/26/25 1715 99 °F (37.2 °C) 87 27 75/47 57 -- -- 95 % -- -- -- -- -- --    01/26/25 1700 99 °F (37.2 °C) 88 29 82/47 60 -- -- 95 % -- -- -- -- -- --    01/26/25 1636 99.1 °F (37.3 °C) 85 28 76/41 54 -- -- 95 % -- -- -- -- -- --    01/26/25 1601 -- -- -- -- -- -- -- -- -- -- -- -- 9 --    01/26/25 1600 99.1 °F (37.3  °C) 99 35 82/50 62 -- -- 91 % -- -- -- -- -- --    01/26/25 1530 99.1 °F (37.3 °C) 91 29 79/47 59 -- -- 94 % -- -- -- -- -- --    01/26/25 1521 99.3 °F (37.4 °C) 91 27 78/45 58 -- -- 95 % -- -- -- -- -- --    01/26/25 1500 99.3 °F (37.4 °C) 93 27 94/53 70 -- -- 93 % -- -- -- -- -- --    01/26/25 1400 99.3 °F (37.4 °C) 98 28 122/63 86 -- -- 92 % -- -- -- -- -- --    01/26/25 1300 99.5 °F (37.5 °C) 100 29 122/59 84 -- -- 93 % -- -- -- -- -- --    01/26/25 1208 -- -- -- -- -- -- -- -- -- -- -- -- 9 --    01/26/25 1200 100.2 °F (37.9 °C) 102 28 92/50 68 -- -- 91 % -- -- -- -- -- --    01/26/25 1100 99.9 °F (37.7 °C) 107 27 92/54 68 -- -- 91 % -- -- -- -- -- --    01/26/25 1000 99.3 °F (37.4 °C) 109 38 103/56 76 -- -- 92 % -- -- -- -- -- --    01/26/25 0900 98.2 °F (36.8 °C) 90 23 89/52 65 -- -- 92 % -- -- -- -- -- --    01/26/25 0800 96.6 °F (35.9 °C) 83 24 90/52 68 -- -- 92 % -- -- None (Room air) -- 12 --    01/26/25 0700 95.2 °F (35.1 °C) 82 28 97/51 71 -- -- 94 % -- -- -- -- -- --    01/26/25 0630 94.6 °F (34.8 °C) 82 36 102/51 73 -- -- 94 % -- -- -- -- -- --    01/26/25 0600 94.3 °F (34.6 °C) 69 24 84/44 62 -- -- 96 % -- -- -- -- -- --    01/26/25 0530 94.1 °F (34.5 °C) 80 30 117/55 79 -- -- 95 % -- -- -- -- -- --    01/26/25 0500 93.7 °F (34.3 °C) 71 26 88/51 65 -- -- 95 % -- -- -- -- -- --    01/26/25 0445 93.7 °F (34.3 °C) 64 24 79/48 59 -- -- 95 % -- -- -- -- 14 No Pain    01/26/25 0430 93.6 °F (34.2 °C) 65 25 72/38 51 -- -- 96 % -- -- -- -- -- --    01/26/25 0415 93.4 °F (34.1 °C) 68 20 87/51 66 -- -- 94 % -- -- -- -- -- --    01/26/25 0400 93.6 °F (34.2 °C) 78 29 115/53 77 -- -- 95 % -- -- -- -- -- --    01/26/25 0330 93.2 °F (34 °C) 78 16 111/53 74 -- -- 98 % -- -- -- -- -- --    01/26/25 0315 93.2 °F (34 °C) 80 18 111/53 -- -- -- -- -- -- -- -- -- --    01/26/25 0300 92.8 °F (33.8 °C) 80 19 -- -- -- -- -- -- -- -- -- -- --    01/26/25 0245 92.5 °F (33.6 °C) 68 17 98/54 71 -- -- -- -- -- -- -- -- --     01/26/25 0230 92.1 °F (33.4 °C) 70 18 78/48 58 -- -- 97 % -- -- -- -- -- --    01/26/25 0215 92.1 °F (33.4 °C) 74 17 110/56 77 -- -- -- -- -- -- -- -- --    01/26/25 0200 91.8 °F (33.2 °C) 72 17 102/55 73 -- -- -- -- -- -- -- -- --    01/26/25 0145 91.8 °F (33.2 °C) 68 32 105/57 78 -- -- 97 % -- -- None (Room air) -- -- --    01/26/25 0130 90.7 °F (32.6 °C) -- -- -- -- -- -- -- -- -- -- -- -- --    01/26/25 0115 90.7 °F (32.6 °C) 72 17 106/53 76 -- -- 97 % -- -- None (Room air) -- -- --    01/26/25 0100 90.3 °F (32.4 °C) 60 16 75/48 58 -- -- -- -- -- -- -- -- --    01/26/25 0045 90 °F (32.2 °C) 68 16 80/52 62 -- -- -- -- -- -- -- -- --    01/26/25 0030 89.2 °F (31.8 °C) 64 16 86/56 65 -- -- 96 % -- -- -- -- -- --    01/26/25 0015 88.9 °F (31.6 °C) 62 16 88/50 64 -- -- 96 % -- -- -- -- -- --    01/26/25 0000 88.2 °F (31.2 °C) 58 16 94/50 64 -- -- 97 % -- -- -- -- -- --    01/25/25 2345 88.2 °F (31.2 °C) 60 18 89/51 64 -- -- 97 % -- -- -- -- -- --    01/25/25 2330 87.4 °F (30.8 °C) 56 19 82/48 60 -- -- 98 % -- -- None (Room air) Lying -- --    01/25/25 2315 87.1 °F (30.6 °C) 56 18 81/44 57 -- -- 98 % -- -- -- -- -- --    01/25/25 2300 87.1 °F (30.6 °C) -- -- -- -- -- -- -- -- -- -- -- -- --    01/25/25 2245 86.4 °F (30.2 °C) 57 -- 86/53 65 -- -- 97 % -- -- None (Room air) Lying -- --    01/25/25 2230 86.4 °F (30.2 °C) 54 -- -- -- -- -- 99 % -- -- -- -- -- --    01/25/25 2215 86.4 °F (30.2 °C) -- -- -- -- -- -- -- -- -- -- -- -- --    01/25/25 2200 85.6 °F (29.8 °C) 42 19 -- -- -- -- -- -- -- -- -- -- --    01/25/25 2145 85.3 °F (29.6 °C) 44 18 100/52 72 -- -- 97 % -- -- -- -- -- --    01/25/25 2130 84.9 °F (29.4 °C) 46 18 115/58 84 -- -- -- -- -- -- -- -- --    01/25/25 2115 84.9 °F (29.4 °C) 48 16 141/65 94 -- -- 97 % -- -- -- -- -- --    01/25/25 2100 84.9 °F (29.4 °C) -- -- -- -- -- -- -- -- -- -- -- -- --    01/25/25 2030 85.1 °F (29.5 °C) 45 15 132/61 88 -- -- 97 % -- -- None (Room air) Lying -- No Pain             Pertinent Labs/Diagnostic Test Results:   Radiology:  CT head wo contrast   Final Interpretation by George Bejarano DO (01/27 0831)      No acute intracranial abnormality.      Stable left inferior frontal meningioma. Previously described small left temporal and right posterior fossa meningiomas are better visualized on previous MRI 7/7/2023 although also grossly stable.      Major findings are concordant with initial interpretation provided by Dr. Maryann De of [facility].                           Resident: BRANDY AUGUST I, the attending radiologist, have reviewed the images and agree with the final report above.      Workstation performed: [id number]         XR chest portable ICU   Final Interpretation by Linda Troy MD (01/27 0658)      Left jugular catheter with a transverse orientation in the upper SVC with no pneumothorax.      Moderate cardiomegaly.      Herniation of abdominal contents into the thorax.            Workstation performed: [id number]         CT head wo contrast   Final Interpretation by Reese Hamm DO (01/25 2331)      No acute intracranial abnormality.   Redemonstrated meningiomas as detailed above. Consider nonemergent evaluation with contrast-enhanced MR brain to better evaluate.                  Workstation performed: [id number]         CT chest abdomen pelvis w contrast   Final Interpretation by Rd Ramirez MD (01/26 1717)      1. Small bowel wall thickening. Correlate for a mild enteritis.   2. Stable large diaphragmatic hernia containing stomach and transverse colon.   3. Thyroid isthmus nodule. Further evaluation with ultrasound suggested.      The findings are concordant with the preliminary report provided by [facility].            Workstation performed: [id number]         US right upper quadrant    (Results Pending)     Cardiology:  ECG 12 lead   Final Result by Mike Tavares MD (01/26 0639)   Normal sinus rhythm   Nonspecific T wave  "abnormality   Abnormal ECG   Confirmed by Mike Tavares ([id number]) on 1/26/2025 6:39:44 AM      ECG 12 lead   Final Result by Mike Tavares MD (01/26 0639)   Poor data quality, interpretation may be adversely affected   Undetermined rhythm   Baseline Artifact   Abnormal ECG   Confirmed by Mike Tavares ([id number]) on 1/26/2025 6:39:31 AM        GI:  No orders to display           Results from last 7 days   Lab Units 01/27/25  0552 01/26/25  0435 01/25/25 2048   WBC Thousand/uL 26.11* 15.85* 13.72*   HEMOGLOBIN g/dL 9.9* 11.3* 13.2   HEMATOCRIT % 31.1* 35.2 40.1   PLATELETS Thousands/uL 240 374 409*   BANDS PCT % 41*  --  8         Results from last 7 days   Lab Units 01/27/25  0552 01/26/25  0435 01/25/25 2048   SODIUM mmol/L 147 142 138   POTASSIUM mmol/L 3.5 4.4 5.5*   CHLORIDE mmol/L 112* 108 103   CO2 mmol/L 24 26 28   ANION GAP mmol/L 11 8 7   BUN mg/dL 52* 51* 53*   CREATININE mg/dL 1.36* 0.77 0.78   EGFR ml/min/1.73sq m 37 74 73   CALCIUM mg/dL 8.2* 8.4 9.8   MAGNESIUM mg/dL 2.0 2.0  --    PHOSPHORUS mg/dL 4.8* 3.6  --      Results from last 7 days   Lab Units 01/27/25  0552 01/26/25  0435 01/25/25 2048   AST U/L 155* 97* 107*   ALT U/L 153* 107* 119*   ALK PHOS U/L 294* 150* 157*   TOTAL PROTEIN g/dL 5.5* 5.7* 7.2   ALBUMIN g/dL 3.1* 3.1* 3.9   TOTAL BILIRUBIN mg/dL 2.88* 0.49 0.40     Results from last 7 days   Lab Units 01/26/25  1453 01/25/25 2050   POC GLUCOSE mg/dl 82 107     Results from last 7 days   Lab Units 01/27/25  0552 01/26/25  0435 01/25/25 2048   GLUCOSE RANDOM mg/dL 104 84 114             No results found for: \"BETA-HYDROXYBUTYRATE\"   Results from last 7 days   Lab Units 01/26/25  2130   PH ART  7.432   PCO2 ART mm Hg 35.8*   PO2 ART mm Hg 109.0   HCO3 ART mmol/L 23.3   BASE EXC ART mmol/L -0.6   O2 CONTENT ART mL/dL 15.2*   O2 HGB, ARTERIAL % 96.9   ABG SOURCE  Line, Arterial     Results from last 7 days   Lab Units 01/25/25 2048   PH BERNARDINO  7.283*   PCO2 BERNARDINO mm Hg 57.8*   PO2 BERNARDINO mm Hg 76.0* "   HCO3 BERNARDINO mmol/L 26.7   BASE EXC BERNARDINO mmol/L -0.9   O2 CONTENT BERNARDINO ml/dL 16.5   O2 HGB, VENOUS % 92.2*         Results from last 7 days   Lab Units 01/26/25  0230   CK TOTAL U/L 46     Results from last 7 days   Lab Units 01/26/25  0230 01/25/25  2313 01/25/25  2048   HS TNI 0HR ng/L  --   --  6   HS TNI 2HR ng/L  --  7  --    HSTNI D2 ng/L  --  1  --    HS TNI 4HR ng/L 11  --   --    HSTNI D4 ng/L 5  --   --              Results from last 7 days   Lab Units 01/25/25  2048   TSH 3RD GENERATON uIU/mL 2.611     Results from last 7 days   Lab Units 01/27/25  0552   PROCALCITONIN ng/ml 18.58*     Results from last 7 days   Lab Units 01/26/25  1817   LACTIC ACID mmol/L 1.4             Results from last 7 days   Lab Units 01/25/25  2048   BNP pg/mL 85                                     Results from last 7 days   Lab Units 01/25/25  2104   CLARITY UA  Clear   COLOR UA  Yellow   SPEC GRAV UA  1.010   PH UA  5.0   GLUCOSE UA mg/dl Negative   KETONES UA mg/dl Negative   BLOOD UA  Negative   PROTEIN UA mg/dl Negative   NITRITE UA  Negative   BILIRUBIN UA  Negative   UROBILINOGEN UA (BE) mg/dl <2.0   LEUKOCYTES UA  Negative       Results from last 7 days   Lab Units 01/26/25  0436   BLOOD CULTURE  Received in Microbiology Lab. Culture in Progress.  Received in Microbiology Lab. Culture in Progress.     Results from last 7 days   Lab Units 01/27/25  0527   TOTAL COUNTED  5     Results from last 7 days   Lab Units 01/27/25  0527   APPEARANCE CSF  Clear   TUBE NUM CSF  4   WBC CSF /uL 3   XANTHOCHROMIA  No   NEUTROPHILS % (CSF) % 40   LYMPHS % (CSF) % 20   MONOCYTES % (CSF) % 40   GLUCOSE CSF mg/dL 63   PROTEIN CSF mg/dL 39   RBC CSF uL 33*     Results from last 7 days   Lab Units 01/27/25  0552   VANCOMYCIN RM ug/mL 16.4       Past Medical History:   Diagnosis Date    Anemia     Cataracts, bilateral     Heart murmur 04/2018    History of transfusion 2003    had 4 units    Hypertension     Meningioma (HCC) 04/2018    MRI every  6months    Ovarian cyst 2006    Shortness of breath     Skin neoplasm     last assessed: 6/13/2017     Present on Admission:  **None**      Admitting Diagnosis: Altered mental status [R41.82]  Hypothermia, initial encounter [T68.XXXA]  Age/Sex: 79 y.o. female    Network Utilization Review Department  ATTENTION: Please call with any questions or concerns to [phone] and carefully listen to the prompts so that you are directed to the right person. All voicemails are confidential.   For Discharge needs, contact Care Management DC Support Team at [phone] opt. [phone]  Send all requests for admission clinical reviews, approved or denied determinations and any other requests to dedicated fax number below belonging to the campus where the patient is receiving treatment. List of dedicated fax numbers for the Facilities:  FACILITY NAME UR FAX NUMBER   ADMISSION DENIALS (Administrative/Medical Necessity) [phone]   DISCHARGE SUPPORT TEAM (NETWORK) [phone]   PARENT CHILD HEALTH (Maternity/NICU/Pediatrics) [phone]   General acute hospital [phone]   [facility] [phone]   [facility] [phone]   [facility] [phone]   [facility] [phone]   [facility] [phone]   [facility] [phone]   [facility] [phone]   [facility] [phone]   [facility] [phone]   [facility] [phone]   [facility] [phone]

## 2025-01-27 NOTE — PROCEDURES
"Lumbar puncture    Date/Time: 1/27/2025 5:27 AM    Performed by: Celso Chan DO  Authorized by: Celso Chan DO  [place] Protocol:  Procedure performed by: (Dr. Lara, Dr. Hemphill)  Time out: Immediately prior to procedure a \"time out\" was called to verify the correct patient, procedure, equipment, support staff and site/side marked as required.  Patient identity confirmed: arm band    Patient location:  Bedside  Pre-procedure details:     Preparation: Patient was prepped and draped in usual sterile fashion    Indications:     Indications: evaluation for infection and evaluation for altered mental status    Anesthesia (see MAR for exact dosages):     Anesthesia method:  Local infiltration    Local anesthetic:  Lidocaine 1% w/o epi  Procedure details:     Lumbar space:  L4-L5 interspace    Patient position:  L lateral decubitus    Needle gauge:  20G x 3.5in    Number of attempts:  5 or more    Fluid appearance:  Clear    Tubes of fluid:  4    Total volume (ml):  10  Post-procedure:     Puncture site:  Adhesive bandage applied and direct pressure applied    Patient tolerance of procedure:  Tolerated well, no immediate complications          "

## 2025-01-27 NOTE — PROGRESS NOTES
Darlin Zamora is a 79 y.o. female who is currently ordered Vancomycin IV with management by the Pharmacy Consult service.  Relevant clinical data and objective / subjective history reviewed.  Vancomycin Assessment:  Indication and Goal AUC/Trough: Other, undifferentiated shock, -600, trough >10  Clinical Status:  critically ill  Micro:     Renal Function:  SCr: 0.77 mg/dL  CrCl: 52.8 mL/min  Renal replacement: Not on dialysis  Days of Therapy: 1  Current Dose: 1500mg iv x 1 load  Vancomycin Plan:  New Dosinmg iv q24hrs  Estimated AUC: 490 mcg*hr/mL  Estimated Trough: 12.2 mcg/mL  Next Level:  0600  Potential dose change after level  am  Renal Function Monitoring: Daily BMP and UOP  Pharmacy will continue to follow closely for s/sx of nephrotoxicity, infusion reactions and appropriateness of therapy.  BMP and CBC will be ordered per protocol. We will continue to follow the patient’s culture results and clinical progress daily.    Wilda Gallagher, Pharmacist

## 2025-01-27 NOTE — CONSULTS
Consultation - Neurology   Name: Darlin Zamora 79 y.o. female I MRN: [id number]  Unit/Bed#: ICU 10 I Date of Admission: 1/25/2025   Date of Service: 1/27/2025 I Hospital Day: 1   Inpatient consult to Neurology  Consult performed by: Rubio Ford PA-C  Consult ordered by: JOSE MANUEL Tena        Physician Requesting Evaluation: Reese Lara MD   Reason for Evaluation / Principal Problem: Seizure    Assessment & Plan  Altered mental status/Seizure like activity  79 year old with history of hypertension and meningioma who presents to [facility] via EMS, after being found on the floor of her house after a wellness check.  It was reported the patient had no heat and the house was reported to be cluttered with filthy living condition.  It was reported on arrival the patient was pretty alert and following commands but confused.  Patient was found to be hypothermic with temperatures as low as 85.1 °F, the patient was placed in active rewarming with initiation of An hugger, the patient was also noted to be hypotensive requiring IV fluids and blood pressure pressor support.  The patient was admitted to the ICU.   The patient was also noted to be bracycardia.    The ICU currently is following the patient and is being treated for undifferentiated shock, the patient is on blood pressure pressor support, the patient was also noted to have transaminitis, acute kidney injury, in regards to the patient's hypothermia infectious workup is ongoing, CSF results pending.  Per record review the patient had witnessed seizure like activity which received 2 mg Ativan at 0313.   There was reports of change in mental status and eye twitching noted, with upper extremity myoclonus noted (some stimulus induced). Video EEG was requested, which preliminarily had no EEG correlate with these episodes, vEEG continues. The patient is not on any medications at home that would cause a serotonin syndrome or NMS,  "per our record review.     It is reported that the patient blood pressures were on the lower side on 1/27 - systolic blood pressure reported to be 79 systolic 0315, 0100 systolic 107.    Labs/testing:  CK was normal  Patient did have a leukocytosis on arrival of 15.85H  CMP -with AST ALT elevation, protein was low, GFR was 74  Blood cultures pending  Lactic acid was normal  Cortisol level was 52.7H    CSF testing -white blood cell CSF was 3 there was no xanthochromia, red blood cell CSF - 33H, CSF protein 39, CSF glucose of 63, CSF culture and Gram stain pending, ME panel negative.    CT of head with contrast - \"No acute intracranial abnormality.Stable left inferior frontal meningioma. Previously described small left temporal and right posterior fossa meningiomas are better visualized on previous MRI 7/7/2023 although also grossly stable.\"    Video EEG pending (reviewed with epilepsy preliminary, seizure like activity of upper body tremoring, myoclonus, at times stimulus induced with no eeg correlate or seizure seen).     Echo pending.     -Continue video-EEG, currently on no AEDs, given ativan overnight, no seizures seen on EEG at this time  -LP with CSF analysis completed as above, continue to follow outstanding CSF results. ME panel was negative, protein 39  -CT of the head with no acute intracranial abnormality, stable left inferior frontal meningioma was noted, would recommend MRI of the brain when stable  -Continue to treat underlying metabolic/infectious derangements, hypothermia/hypotension, the patient was noted to be hypotensive overnight at times of these events - the patient is on blood pressure/pressor support.   -Neurology will continue to follow, please see attestation by attending for further details and recommendations, above plan of care per attending  -We will continue to follow vEEG with epilepsy team and make further recommendations.    Recommendations for outpatient neurological follow up have " "yet to be determined.    History of Present Illness   Darlin Zamora is a 79 y.o. with history of hypertension and meningioma who presents to [facility] via EMS, after being found on the floor of her house after a wellness check.  It was reported the patient had no heat and the house was reported to be cluttered with filthy living condition.  It was reported on arrival the patient was pretty alert and following commands but confused.  Patient was found to be hypothermic with temperatures as low as 85.1 °F, the patient was placed in active rewarming with initiation of An hugger, the patient was also noted to be hypotensive requiring IV fluids and blood pressure pressor support.  The patient was admitted to the ICU.   The patient was also noted to be bracycardia.    The ICU was following the patient and is being treated for undifferentiated shock, the patient is on blood pressure pressor support, the patient was also noted to have transaminitis, acute kidney injury, in regards to the patient's hypothermia infectious workup is ongoing, CSF results pending.  Per record review the patient had witnessed seizure like activity which received 2 mg Ativan at 0313.   There was reports of change in mental status and eye twitching noted, Video EEG was requested.     It is reported that the patient blood pressures were on the lower side on 1/27 - systolic blood pressure reported to be 79 systolic 0315, 0100 systolic 107.    Labs/testing:  CK was normal  Patient did have a leukocytosis on arrival of 15.85H  CMP -with AST ALT elevation, protein was low, GFR was 74  Blood cultures pending  Lactic acid was normal  Cortisol level was 52.7H    CSF testing -white blood cell CSF was 3 there was no xanthochromia, red blood cell CSF - 33H, CSF protein 39, CSF glucose of 63, CSF culture and Gram stain pending, ME panel negative.    CT of head with contrast - \"No acute intracranial abnormality.Stable left inferior frontal " "meningioma. Previously described small left temporal and right posterior fossa meningiomas are better visualized on previous MRI 7/7/2023 although also grossly stable.\"    Video EEG pending (reviewed with epilepsy preliminary, seizure like activity or upper body tremor, myoclonus, at times stimulus induced with no eeg correlate or seizure seen.     Echo pending.     Reviewed case with ICU team, as well as epilepsy team, the patient cannot provide a hx, reviewed medication list and no medications that could potential cause serotonin syndrome or neuroleptic malignant syndrome.    Review of Systems   Unable secondary to mental status.     I have reviewed the patient's PMH, PSH, Social History, Family History, Meds, and Allergies  Historical Information   Past Medical History:   Diagnosis Date    Anemia     Cataracts, bilateral     Heart murmur 04/2018    History of transfusion 2003    had 4 units    Hypertension     Meningioma (HCC) 04/2018    MRI every 6months    Ovarian cyst 2006    Shortness of breath     Skin neoplasm     last assessed: 6/13/2017     Past Surgical History:   Procedure Laterality Date    CYSTOSCOPY N/A 2/14/2019    Procedure: CYSTOSCOPY;  Surgeon: Geronimo Tsai MD;  Location: [facility];  Service: Gynecology Oncology    HYSTERECTOMY N/A 2/14/2019    Procedure: HYSTERECTOMY LAPAROSCOPIC TOTAL (LTH) W/ ROBOTICS bilateral salpingooophorectomy with great difficulty due to mass of 12 cm and taking 2.5 hours;  Surgeon: Geronimo Tsai MD;  Location: [facility];  Service: Gynecology Oncology    TONSILLECTOMY       Social History     Tobacco Use    Smoking status: Never     Passive exposure: Past    Smokeless tobacco: Never   Vaping Use    Vaping status: Never Used   Substance and Sexual Activity    Alcohol use: Never    Drug use: No    Sexual activity: Not Currently     E-Cigarette/Vaping    E-Cigarette Use Never User      E-Cigarette/Vaping Substances    Nicotine No     THC No     CBD No     " Flavoring No     Other No     Unknown No      Family History   Problem Relation Age of Onset    Hypertension Mother     Lung cancer Father     Hypertension Sister     Lymphoma Brother 45    Hypertension Brother     Schizophrenia Brother     Depression Son     Schizophrenia Son     Hypertension Family     Heart disease Other      Social History     Tobacco Use    Smoking status: Never     Passive exposure: Past    Smokeless tobacco: Never   Vaping Use    Vaping status: Never Used   Substance and Sexual Activity    Alcohol use: Never    Drug use: No    Sexual activity: Not Currently       Current Facility-Administered Medications:     chlorhexidine (PERIDEX) 0.12 % oral rinse 15 mL, Q12H JOSTIN    heparin (porcine) subcutaneous injection 5,000 Units, Q8H JOSTIN **AND** [CANCELED] Platelet count, Once    multi-electrolyte (PLASMALYTE-A/ISOLYTE-S PH 7.4) IV solution, Continuous    NOREPINEPHRINE 4 MG  ML NSS (CMPD ORDER) infusion, Titrated, Last Rate: 6 mcg/min (01/27/25 0940)    [COMPLETED] piperacillin-tazobactam (ZOSYN) 4.5 g in sodium chloride 0.9 % 100 mL IV LOADING DOSE, Once, Last Rate: Stopped (01/26/25 2342) **FOLLOWED BY** piperacillin-tazobactam (ZOSYN) 4.5 g in sodium chloride 0.9 % 100 mL IVPB (EXTENDED INFUSION), Q8H, Last Rate: 4.5 g (01/27/25 0339)    vancomycin 750 mg in sodium chloride 0.9% 250 mL, Daily PRN    vancomycin 750 mg in sodium chloride 0.9% 250 mL, Once    vasopressin (PITRESSIN) 20 Units in sodium chloride 0.9 % 100 mL infusion, Continuous, Last Rate: 0.04 Units/min (01/27/25 0316)  Prior to Admission Medications   Prescriptions Last Dose Informant Patient Reported? Taking?   Cholecalciferol (VITAMIN D3) 1,000 units tablet  Self Yes No   Sig: Take 1,000 Units by mouth daily   Glycerin-Polysorbate 80 (REFRESH DRY EYE THERAPY OP)  Self Yes No   Sig: Apply to eye   furosemide (LASIX) 40 mg tablet  Self No No   Sig: Take 1 tablet (40 mg total) by mouth daily   Patient not taking: Reported on  9/17/2024   lisinopril-hydrochlorothiazide (PRINZIDE,ZESTORETIC) 20-25 MG per tablet   No No   Sig: take 1 tablet by mouth once daily   multivitamin (THERAGRAN) TABS  Self Yes No   Sig: Take 1 tablet by mouth daily   potassium chloride (Klor-Con M20) 20 mEq tablet   No No   Sig: Take 1 tablet (20 mEq total) by mouth daily   timolol (TIMOPTIC) 0.5 % ophthalmic solution  Self Yes No   Sig: instill 1 drop into both eyes twice a day      Facility-Administered Medications: None     Patient has no known allergies.    Objective :  Temp:  [97.9 °F (36.6 °C)-100.4 °F (38 °C)] 97.9 °F (36.6 °C)  HR:  [] 82  BP: ()/() 132/85  Resp:  [] 20  SpO2:  [91 %-100 %] 98 %  O2 Device: Nasal cannula  Nasal Cannula O2 Flow Rate (L/min):  [2 L/min-6 L/min] 5 L/min    Vital Sign reviewed  Constitutional -the patient is in the ICU, ill-appearing, eyes are closed, not tracking to examiner or following commands, non verbal, mouth open with no seizure like activity.  Video EEG attached.   HEENT - NC/AT, no tongue bite, at time eye resistance noted.   Lungs - No obvious respiratory distress noted.   Abdomen - non distended  Extremities - No edema noted  Skin - no rashes noted  Neurological  Mental status -the patient is encephalopathic, the patient's eyes are closed on examination she does resist eye opening at times, the patient does not follow commands, does not attempt speech, does not track to examiner, hide mental status cannot be assessed at this time, language could not be assessed at this time as the patient was nonverbal   Cranial nerves 2 through 12 -eyelid resistance noted, however upon manual opening of eyes eyes were midline without any gaze deviation or roving eye movements, primary gaze was midline, difficult to assess blink to threat bilaterally, corneals appear intact, there was no obvious facial droop noted  Motor -there was slight tremoring noted in the upper extremities more stimulus induced, as well  as increased tone/paratonia noted in the upper extremities, she did withdrawal in the uppers and lowers fairly symmetrically with no focality appreciated, he did grimace as well x 4 and withdrew.   Sensation - as above  Coordination -  unable to assess  Reflexes are equal - 1 in the uppers and lowers.   Toes are equivocal bilaterally  No evidence of seizure activity, observed.        Lab Results: I have reviewed the following results:I have personally reviewed pertinent reports.    Recent Labs     01/27/25  0552   WBC 26.11*   HGB 9.9*   HCT 31.1*      BANDSPCT 41*   SODIUM 147   K 3.5   *   CO2 24   BUN 52*   CREATININE 1.36*   GLUC 104   MG 2.0   PHOS 4.8*     Recent Results (from the past 24 hours)   Fingerstick Glucose (POCT)    Collection Time: 01/26/25  2:53 PM   Result Value Ref Range    POC Glucose 82 65 - 140 mg/dl   Lactic acid, plasma (w/reflex if result > 2.0)    Collection Time: 01/26/25  6:17 PM   Result Value Ref Range    LACTIC ACID 1.4 0.5 - 2.0 mmol/L   Blood gas, arterial    Collection Time: 01/26/25  9:30 PM   Result Value Ref Range    pH, Arterial 7.432 7.350 - 7.450    pCO2, Arterial 35.8 (L) 36.0 - 44.0 mm Hg    pO2, Arterial 109.0 75.0 - 129.0 mm Hg    HCO3, Arterial 23.3 22.0 - 28.0 mmol/L    Base Excess, Arterial -0.6 mmol/L    O2 Content, Arterial 15.2 (L) 16.0 - 23.0 mL/dL    O2 HGB,Arterial  96.9 94.0 - 97.0 %    SOURCE Line, Arterial    RBC count,CSF    Collection Time: 01/27/25  5:27 AM   Result Value Ref Range    RBC, CSF 33 (H) 0 - 10 uL   CSF white cell count with differential    Collection Time: 01/27/25  5:27 AM   Result Value Ref Range    Tube Number, CSF 4     WBC, CSF 3 0 - 5 /uL    Xanthochromia No No    Appearance, CSF Clear    Total Protein, CSF    Collection Time: 01/27/25  5:27 AM   Result Value Ref Range    Protein, CSF 39 15 - 45 mg/dL   Glucose, CSF    Collection Time: 01/27/25  5:27 AM   Result Value Ref Range    Glucose, CSF 63 40 - 70 mg/dL    Meningitis/Encephalitis (ME) Panel    Collection Time: 01/27/25  5:27 AM   Result Value Ref Range    C.NEOFORMANS/GATTII Not Detected Not Detected    CYTOMEGALOVIRUS Not Detected Not Detected    ENTEROVIRUS Not Detected Not Detected    E.COLI K1 Not Detected Not Detected    H.INFLUENZAE Not Detected Not Detected    H.SIMPLEX 1 Not Detected Not Detected    H.SIMPLEX 2 Not Detected Not Detected    HERPES VIRUS 6 Not Detected Not Detected    PARECHOVIRUS Not Detected Not Detected    L.MONOCYTOGENES Not Detected Not Detected    N.MENINGITIDIS Not Detected Not Detected    S.AGALACTIAE Not Detected Not Detected    S.PNEUMONIAE Not Detected Not Detected    V.ZOSTER Not Detected Not Detected   CSF Diff    Collection Time: 01/27/25  5:27 AM   Result Value Ref Range    Total Counted 5     Neutrophils % (CSF) 40 %    Lymphs % CSF 20 %    Monocytes % (CSF) 40 %   CBC and differential    Collection Time: 01/27/25  5:52 AM   Result Value Ref Range    WBC 26.11 (H) 4.31 - 10.16 Thousand/uL    RBC 3.33 (L) 3.81 - 5.12 Million/uL    Hemoglobin 9.9 (L) 11.5 - 15.4 g/dL    Hematocrit 31.1 (L) 34.8 - 46.1 %    MCV 93 82 - 98 fL    MCH 29.7 26.8 - 34.3 pg    MCHC 31.8 31.4 - 37.4 g/dL    RDW 15.6 (H) 11.6 - 15.1 %    MPV 9.3 8.9 - 12.7 fL    Platelets 240 149 - 390 Thousands/uL   Magnesium    Collection Time: 01/27/25  5:52 AM   Result Value Ref Range    Magnesium 2.0 1.9 - 2.7 mg/dL   Phosphorus    Collection Time: 01/27/25  5:52 AM   Result Value Ref Range    Phosphorus 4.8 (H) 2.3 - 4.1 mg/dL   Vancomycin, random    Collection Time: 01/27/25  5:52 AM   Result Value Ref Range    Vancomycin Rm 16.4 10.0 - 20.0 ug/mL   Comprehensive metabolic panel    Collection Time: 01/27/25  5:52 AM   Result Value Ref Range    Sodium 147 135 - 147 mmol/L    Potassium 3.5 3.5 - 5.3 mmol/L    Chloride 112 (H) 96 - 108 mmol/L    CO2 24 21 - 32 mmol/L    ANION GAP 11 4 - 13 mmol/L    BUN 52 (H) 5 - 25 mg/dL    Creatinine 1.36 (H) 0.60 - 1.30 mg/dL     "Glucose 104 65 - 140 mg/dL    Calcium 8.2 (L) 8.4 - 10.2 mg/dL    Corrected Calcium 8.9 8.3 - 10.1 mg/dL     (H) 13 - 39 U/L     (H) 7 - 52 U/L    Alkaline Phosphatase 294 (H) 34 - 104 U/L    Total Protein 5.5 (L) 6.4 - 8.4 g/dL    Albumin 3.1 (L) 3.5 - 5.0 g/dL    Total Bilirubin 2.88 (H) 0.20 - 1.00 mg/dL    eGFR 37 ml/min/1.73sq m   Cortisol Level, AM Specimen    Collection Time: 01/27/25  5:52 AM   Result Value Ref Range    Cortisol - AM 52.7 (H) 6.7 - 22.6 ug/dL   Manual Differential(PHLEBS Do Not Order)    Collection Time: 01/27/25  5:52 AM   Result Value Ref Range    Segmented % 56 43 - 75 %    Bands % 41 (H) 0 - 8 %    Lymphocytes % 0 (L) 14 - 44 %    Monocytes % 2 (L) 4 - 12 %    Eosinophils % 0 0 - 6 %    Basophils % 0 0 - 1 %    Metamyelocytes % 1 0 - 1 %    Absolute Neutrophils 25.33 (H) 1.85 - 7.62 Thousand/uL    Absolute Lymphocytes 0.00 (L) 0.60 - 4.47 Thousand/uL    Absolute Monocytes 0.52 0.00 - 1.22 Thousand/uL    Absolute Eosinophils 0.00 0.00 - 0.40 Thousand/uL    Absolute Basophils 0.00 0.00 - 0.10 Thousand/uL    Absolute Metamyelocytes 0.26 (H) 0.00 - 0.10 Thousand/uL    Total Counted      RBC Morphology Present     Platelet Estimate Adequate Adequate    Poikilocytes Present        Per radiology interpretation -    \"  Procedure: CT head wo contrast  Result Date: 1/27/2025  Narrative: CT BRAIN - WITHOUT CONTRAST INDICATION:  Worsening mental status. COMPARISON: CT head 1/25/2025 and 4/8/2018, MRI brain 7/7/2023 TECHNIQUE:  CT examination of the brain was performed.  Multiplanar 2D reformatted images were created from the source data. Radiation dose length product (DLP) for this visit:  1215 mGy-cm.  This examination, like all CT scans performed in the [facility], was performed utilizing techniques to minimize radiation dose exposure, including the use of iterative reconstruction and automated exposure control. IMAGE QUALITY:  Diagnostic. FINDINGS: PARENCHYMA: " Redemonstrated left inferior frontal parafalcine hyperdense peripherally calcified extra-axial mass measuring 3.0 x 2.4 cm, not significantly changed from MRI 7/7/2023. Small component again extends to the right of midline. There is local mass effect. No midline shift. Right posterior fossa and left temporal meningiomas are better appreciated on MRI 7/7/2023 although appear grossly stable (2/14 and 2/18). Decreased attenuation is noted in periventricular and subcortical white matter demonstrating an appearance that is statistically most likely to represent mild microangiopathic change. No CT signs of acute infarction.  No acute parenchymal hemorrhage. VENTRICLES AND EXTRA-AXIAL SPACES:  Ventricles and extra-axial CSF spaces are prominent commensurate with the degree of volume loss.  No hydrocephalus.  No acute extra-axial hemorrhage. VISUALIZED ORBITS: Normal visualized orbits. PARANASAL SINUSES: Normal visualized paranasal sinuses. CALVARIUM AND EXTRACRANIAL SOFT TISSUES: Normal.     Impression: No acute intracranial abnormality. Stable left inferior frontal meningioma. Previously described small left temporal and right posterior fossa meningiomas are better visualized on previous MRI 7/7/2023 although also grossly stable. Major findings are concordant with initial interpretation provided by Dr. Maryann De of [facility]. Resident: BRANDY AUGUST I, the attending radiologist, have reviewed the images and agree with the final report above. Workstation performed: [id number]     Procedure: XR chest portable ICU  Result Date: 1/27/2025  Narrative: XR CHEST PORTABLE ICU INDICATION: CVC. COMPARISON: Chest CT 1/25/2025, CXR 2/19/2012. FINDINGS: Left jugular catheter with a transverse orientation in the upper SVC. Clear lungs. No pneumothorax or pleural effusion. Left skin fold. Moderate cardiomegaly. Herniation of abdominal contents into the thorax. Bones are unremarkable for age. Normal upper abdomen.      Impression: Left jugular catheter with a transverse orientation in the upper SVC with no pneumothorax. Moderate cardiomegaly. Herniation of abdominal contents into the thorax. Workstation performed: [id number]     Procedure: CT chest abdomen pelvis w contrast  Result Date: 1/26/2025  Narrative: CT CHEST, ABDOMEN AND PELVIS WITH IV CONTRAST INDICATION: AMS, hypothermic, bradycardic. COMPARISON: CT 2/12/2019 TECHNIQUE: CT examination of the chest, abdomen and pelvis was performed. Multiplanar 2D reformatted images were created from the source data. This examination, like all CT scans performed in the [facility], was performed utilizing techniques to minimize radiation dose exposure, including the use of iterative reconstruction and automated exposure control. Radiation dose length product (DLP) for this visit: 464 mGy-cm IV Contrast: 100 mL of iohexol (OMNIPAQUE) Enteric Contrast: Not administered. FINDINGS: CHEST LUNGS: Bibasilar atelectasis. Right middle and left lingular scarring/atelectasis. No tracheal or endobronchial lesion. PLEURA: Unremarkable. HEART/GREAT VESSELS: Heart is unremarkable for patient's age. No thoracic aortic aneurysm. MEDIASTINUM AND JEREMY: Unremarkable. CHEST WALL AND LOWER NECK: 1.9 cm thyroid isthmus nodule. Incidental discovery of one or more thyroid nodule(s) measuring more than 1.5 cm and without suspicious features is noted in this patient who is above 35 years old; according to guidelines published in the February 2015 white paper on incidental thyroid nodules in the Journal of the American College of Radiology (JACR), further characterization with thyroid ultrasound is recommended. ABDOMEN LIVER/BILIARY TREE: Unremarkable. GALLBLADDER: Collapsed or absent. SPLEEN: Unremarkable. PANCREAS: Unremarkable. ADRENAL GLANDS: Unremarkable. KIDNEYS/URETERS: Unremarkable. No hydronephrosis. STOMACH AND BOWEL: Large diaphragmatic hernia again visualized containing the stomach  and transverse colon. No bowel obstruction. Colonic diverticulosis without diverticulitis. Small bowel wall thickening. Duodenal diverticulum. APPENDIX: No findings to suggest appendicitis. ABDOMINOPELVIC CAVITY: No ascites. No pneumoperitoneum. No lymphadenopathy. VESSELS: Atherosclerosis without abdominal aortic aneurysm. PELVIS REPRODUCTIVE ORGANS: Post hysterectomy. URINARY BLADDER: Collapsed bladder with Vasquez catheter. ABDOMINAL WALL/INGUINAL REGIONS: Unremarkable. BONES: No acute fracture or suspicious osseous lesion. Spine degenerative change. L5 spondylolysis with grade 1 anterolisthesis on S1. Mild sclerosis along the SI joints appears similar.     Impression: 1. Small bowel wall thickening. Correlate for a mild enteritis. 2. Stable large diaphragmatic hernia containing stomach and transverse colon. 3. Thyroid isthmus nodule. Further evaluation with ultrasound suggested. The findings are concordant with the preliminary report provided by [facility]. Workstation performed: [id number]     Procedure: CT head wo contrast  Result Date: 1/25/2025  Narrative: CT BRAIN - WITHOUT CONTRAST INDICATION:   AMS, hypothermic, bradycardic. COMPARISON: MR brain dated 7/7/2023 TECHNIQUE:  CT examination of the brain was performed.  Multiplanar 2D reformatted images were created from the source data. Radiation dose length product (DLP) for this visit:  866 mGy-cm .  This examination, like all CT scans performed in the [facility], was performed utilizing techniques to minimize radiation dose exposure, including the use of iterative reconstruction and automated exposure control. IMAGE QUALITY:  Diagnostic. FINDINGS: PARENCHYMA: Redemonstrated is a large left inferior frontal parafalcine calcified meningioma with approximate measurements of up to 2.9 x 2.7 x 3.0 cm, similar in size to prior MRI. 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 No CT signs of acute infarction.  No acute parenchymal hemorrhage. VENTRICLES AND EXTRA-AXIAL SPACES:  Normal for the patient's age. VISUALIZED ORBITS: Normal visualized orbits. PARANASAL SINUSES: Normal visualized paranasal sinuses. CALVARIUM AND EXTRACRANIAL SOFT TISSUES: Normal.     Impression: No acute intracranial abnormality. Redemonstrated meningiomas as detailed above. Consider nonemergent evaluation with contrast-enhanced MR brain to better evaluate. Workstation performed: [id number]   \"  Reviewed case with neurology attending, history and physical examination, labs and imaging completed, plan of care as per attending physician.  Please see attestation for further details.  Examined alongside attending physician.    "

## 2025-01-27 NOTE — CASE MANAGEMENT
Case Management Discharge Planning Note    Patient name Darlin Zamora  Location ICU 10/ICU 10 MRN [id number]  : 1945 Date 2025       Current Admission Date: 2025  Current Admission Diagnosis:Altered mental status/Seizure like activity  Patient Active Problem List    Diagnosis Date Noted Date Diagnosed    Altered mental status/Seizure like activity 2025     Localized edema 2024     Meningioma (HCC) 2022     Mixed hypercholesterolemia and hypertriglyceridemia 2021     Dysfunctional voiding of urine 2019     S/P total hysterectomy and bilateral salpingo-oophorectomy 02/15/2019     Abdominal pain 2019     Brain mass      Syncope 2018     Hypertension 2018     Basal cell carcinoma 2016     Alopecia 10/04/2012     Anemia 2012     Iron deficiency anemia 2012       LOS (days): 1  Geometric Mean LOS (GMLOS) (days):   Days to GMLOS:     OBJECTIVE:  Risk of Unplanned Readmission Score: 15.1         Current admission status: Inpatient   Preferred Pharmacy:   [id number] - [address] [address]  Phone: [phone] Fax: [phone]    Primary Care Provider: Tiffanie Kamara DO    [facility]  Secondary Insurance:  FOR LIFE    DISCHARGE DETAILS:    Discharge planning discussed with:: lela Dixon  Freedom of Choice: Yes     CM contacted family/caregiver?: Yes  Were Treatment Team discharge recommendations reviewed with patient/caregiver?: Yes  Did patient/caregiver verbalize understanding of patient care needs?: Yes  Were patient/caregiver advised of the risks associated with not following Treatment Team discharge recommendations?: Yes    Contacts  Patient Contacts: lela Dixon  Relationship to Patient:: Family  Contact Method: Phone  Phone Number: [phone]  Reason/Outcome: Emergency Contact, Discharge Planning, Continuity of Care              Other  Referral/Resources/Interventions Provided:  Referral Comments: S/w son Brain on floor & explained CM role. Pt lived alone in 2 sty home. IPTA. DME cane. Drove. CM to follow for dc needs.

## 2025-01-27 NOTE — WOUND OSTOMY CARE
Consult Note - Wound   Darlin Zamora 79 y.o. female MRN: [id number]  Unit/Bed#: ICU 10 Encounter: [id number]      History and Present Illness:  Presented to ED after pt neighbor called 911 for a wellness check, Pt found lying on the floor, with altered mental status, confusion, in a hoarding type residence with out heat T 86.7.PMH Hypertension,history of multiple brain tumors. Alert and oriented x4 upon on exam in ED       Assessment Findings:   Seen for initial wound consult, Seen In ICU dependent for all aspects of care,  Max assist to turn, on ATR system, sanchez cath,currently NPO.  1)Sacrum buttocks dry intact  2)Abdominal fold MASD   3)Right lateral distal tibia, dry intact scab and area of partial thickness skin loss, pink wound bed  4)Left medial posterior tibia pink wound bed,clear drainage strike through of dressing,   5)Hydraguard applied heels offloaded    No induration, fluctuance, odor, warmth/temperature differences, redness, or purulence noted to the above noted wounds and skin areas assessed. New dressings applied per orders listed below. Patient tolerated well- no s/s of non-verbal pain or discomfort observed during the encounter. Bedside nurse aware of plan of care. See flow sheets for more detailed assessment findings.  Wound care will continue to follow, call or Secure Chat Message with questions.     Skin care plans:  1-Apply thin layer of Calazime/Protective Zinc Oxide paste to sacrum and buttocks TID and PRN.  2-Hydraguard/Silicone Cream to bilateral heels BID and PRN.  3-Elevate heels to offload pressure.  4-Ehob cushion when out of bed.  5-Turn/reposition q2h or when medically stable for pressure re-distribution on skin.  6-Moisturize skin daily with skin nourishing cream.  7-Place Patient on ATR Turning and repositioning system  8-Cleanse bilateral lower extremity wounds with NSS. Apply dermagran to wounds, cover with DSD, ABD wrap with ross, Change every other day and prn   9-Nystatin to  abdominal fold BID     Wound 01/26/25 Venous Ulcer Pretibial Distal;Right;Lateral (Active)   Wound Image   01/27/25 1051   Wound Description Dry;Brown;Beefy red;Drainage 01/27/25 1051   Nkechi-wound Assessment Denuded 01/27/25 1051   Wound Length (cm) 2 cm 01/27/25 1051   Wound Width (cm) 3 cm 01/27/25 1051   Wound Depth (cm) 0.1 cm 01/27/25 1051   Wound Surface Area (cm^2) 6 cm^2 01/27/25 1051   Wound Volume (cm^3) 0.6 cm^3 01/27/25 1051   Calculated Wound Volume (cm^3) 0.6 cm^3 01/27/25 1051   Drainage Amount Small 01/27/25 1051   Drainage Description Clear 01/27/25 1051   Non-staged Wound Description Partial thickness 01/27/25 1051   Treatments Cleansed;Site care;Elevated 01/27/25 1051   Dressing Dermagran gauze;ABD 01/27/25 1051   Dressing Changed New 01/27/25 1051   Patient Tolerance Tolerated well 01/27/25 1051   Dressing Status Clean;Dry;Intact 01/27/25 1051       Wound 01/26/25 Venous Ulcer Ankle Anterior;Left (Active)   Wound Image   01/27/25 1052   Wound Description Drainage;Sellers 01/27/25 1052   Nkechi-wound Assessment Intact;Edema;Erythema 01/27/25 1052   Wound Length (cm) 2 cm 01/27/25 1052   Wound Width (cm) 3 cm 01/27/25 1052   Wound Depth (cm) 0.1 cm 01/27/25 1052   Wound Surface Area (cm^2) 6 cm^2 01/27/25 1052   Wound Volume (cm^3) 0.6 cm^3 01/27/25 1052   Calculated Wound Volume (cm^3) 0.6 cm^3 01/27/25 1052   Drainage Amount Small 01/27/25 1052   Drainage Description Clear 01/27/25 1052   Non-staged Wound Description Partial thickness 01/27/25 1052   Treatments Cleansed;Site care;Elevated 01/27/25 1052   Dressing Dermagran gauze;ABD 01/27/25 1052   Dressing Changed New 01/27/25 1052   Patient Tolerance Tolerated well 01/27/25 1052   Dressing Status Clean;Dry;Intact 01/27/25 1052       Wound 01/26/25 MASD Pelvis Anterior (Active)   Wound Image   01/27/25 1052   Wound Description Dry;Fragile;Pink 01/27/25 1052   Nkechi-wound Assessment Dry;Intact 01/27/25 1052   Wound Length (cm) 2 cm 01/27/25 1052    Wound Width (cm) 10 cm 01/27/25 1052   Wound Depth (cm) 0.1 cm 01/27/25 1052   Wound Surface Area (cm^2) 20 cm^2 01/27/25 1052   Wound Volume (cm^3) 2 cm^3 01/27/25 1052   Calculated Wound Volume (cm^3) 2 cm^3 01/27/25 1052   Drainage Amount None 01/27/25 1052   Treatments Cleansed;Site care 01/27/25 1052   Dressing Open to air 01/27/25 1052   Patient Tolerance Tolerated well 01/27/25 1052       Wound 01/26/25 Abrasion(s) Finger D1, thumb Posterior;Right (Active)   Wound Image   01/27/25 1053   Wound Description Beefy red;Intact;Dry 01/27/25 1053   Nkechi-wound Assessment Intact;Dry 01/27/25 1053   Wound Width (cm) 6 cm 01/27/25 1053   Wound Depth (cm) 4 cm 01/27/25 1053   Drainage Amount None 01/27/25 1053   Treatments Cleansed;Site care;Elevated 01/27/25 1053   Dressing Open to air 01/27/25 1053   Patient Tolerance Tolerated well 01/27/25 1053   Dressing Status Other (Comment) 01/27/25 1053       Call or secure chat  with any questions  Wound Care will continue to follow weekly    Marjorie HAHNN RN CWON

## 2025-01-27 NOTE — PROCEDURES
Arterial Line Insertion    Date/Time: 1/27/2025 5:43 PM    Performed by: Michelle Holley PA-C  Authorized by: Michelle Holley PA-C    Patient location:  ICU  Procedure performed by consultant: Cinthia RICHARD.    Consent:     Consent obtained:  Emergent situation  Indications:     Indications: hemodynamic monitoring, multiple ABGs, continuous blood pressure monitoring and frequent labs / infusion    Pre-procedure details:     Skin preparation:  Chlorhexidine    Preparation: Patient was prepped and draped in sterile fashion    Procedure details:     Location / Tip of Catheter:  Radial    Laterality:  Right    Needle gauge:  20 G    Placement technique:  Percutaneous    Number of attempts:  3    Successful placement: no    Comments:      Also attempted to exchange left radial arterial line over wire. Catheter exchanged however unable to obtain adequate waveform.

## 2025-01-27 NOTE — ASSESSMENT & PLAN NOTE
79 year old with history of hypertension and meningioma who presents to [facility] via EMS, after being found on the floor of her house after a wellness check.  It was reported the patient had no heat and the house was reported to be cluttered with filthy living condition.  It was reported on arrival the patient was pretty alert and following commands but confused.  Patient was found to be hypothermic with temperatures as low as 85.1 °F, the patient was placed in active rewarming with initiation of An hugger, the patient was also noted to be hypotensive requiring IV fluids and blood pressure pressor support.  The patient was admitted to the ICU.   The patient was also noted to be bracycardia.    The ICU currently is following the patient and is being treated for undifferentiated shock, the patient is on blood pressure pressor support, the patient was also noted to have transaminitis, acute kidney injury, in regards to the patient's hypothermia infectious workup is ongoing, CSF results pending.  Per record review the patient had witnessed seizure like activity which received 2 mg Ativan at 0313.   There was reports of change in mental status and eye twitching noted, with upper extremity myoclonus noted (some stimulus induced). Video EEG was requested, which preliminarily had no EEG correlate with these episodes, vEEG continues. The patient is not on any medications at home that would cause a serotonin syndrome or NMS, per our record review.     It is reported that the patient blood pressures were on the lower side on 1/27 - systolic blood pressure reported to be 79 systolic 0315, 0100 systolic 107.    Labs/testing:  CK was normal  Patient did have a leukocytosis on arrival of 15.85H  CMP -with AST ALT elevation, protein was low, GFR was 74  Blood cultures pending  Lactic acid was normal  Cortisol level was 52.7H    CSF testing -white blood cell CSF was 3 there was no xanthochromia, red blood cell CSF -  "33H, CSF protein 39, CSF glucose of 63, CSF culture and Gram stain pending, ME panel negative.    CT of head with contrast - \"No acute intracranial abnormality.Stable left inferior frontal meningioma. Previously described small left temporal and right posterior fossa meningiomas are better visualized on previous MRI 7/7/2023 although also grossly stable.\"    Video EEG pending (reviewed with epilepsy preliminary, seizure like activity of upper body tremoring, myoclonus, at times stimulus induced with no eeg correlate or seizure seen).     Echo pending.     -Continue video-EEG, currently on no AEDs, given ativan overnight, no seizures seen on EEG at this time  -LP with CSF analysis completed as above, continue to follow outstanding CSF results. ME panel was negative, protein 39  -CT of the head with no acute intracranial abnormality, stable left inferior frontal meningioma was noted, would recommend MRI of the brain when stable  -Continue to treat underlying metabolic/infectious derangements, hypothermia/hypotension, the patient was noted to be hypotensive overnight at times of these events - the patient is on blood pressure/pressor support.   -Neurology will continue to follow, please see attestation by attending for further details and recommendations, above plan of care per attending  -We will continue to follow vEEG with epilepsy team and make further recommendations.  "

## 2025-01-27 NOTE — PROGRESS NOTES
Will monitor care of plan, diet advacement versus need for nutrition support, if PT remains not appropriate for po, consider initiating TF via keofed, recommend Jevity 1.0@20mL/hr, advance by 10mL every 6-8hrs to goal of 65mL/hr, provides total volume 1560mL, 1654cal, 69g pro, 1304mL, water flushes 100mL every 6hrs provides total of 1704mL.

## 2025-01-27 NOTE — PROCEDURES
Arterial Line Insertion    Date/Time: 1/26/2025 7:28 PM    Performed by: Dragan Jeong MD  Authorized by: Dragan Jeong MD    Patient location:  ICU  Consent:     Consent obtained:  Verbal    Consent given by:  Healthcare agent    Risks discussed:  Bleeding, repeat procedure, infection and pain  Universal protocol:     Procedure explained and questions answered to patient or proxy's satisfaction: yes      Relevant documents present and verified: yes      Immediately prior to procedure a time out was called: yes      Patient identity confirmed:  Arm band and hospital-assigned identification number  Indications:     Indications: hemodynamic monitoring and continuous blood pressure monitoring    Pre-procedure details:     Skin preparation:  Chlorhexidine    Preparation: Patient was prepped and draped in sterile fashion    Anesthesia (see MAR for exact dosages):     Anesthesia method:  Local infiltration    Local anesthetic:  Lidocaine 1% w/o epi  Procedure details:     Location / Tip of Catheter:  Radial    Laterality:  Right    Reno's test performed: yes      Number of attempts:  1    Successful placement: yes      Transducer: waveform confirmed    Post-procedure details:     Post-procedure:  Sterile dressing applied and sutured    Patient tolerance of procedure:  Tolerated well, no immediate complications

## 2025-01-27 NOTE — PROGRESS NOTES
Progress Note - Critical Care/ICU   Name: Darlin Zamora 79 y.o. female I MRN: [id number]  Unit/Bed#: ICU 10 I Date of Admission: 2025   Date of Service: 2025 I Hospital Day: 1       Assessment & Plan   Neuro/Psych:  Diagnoses: Acute metabolic encephalopathy; seizure-like activity   Witnessed episode of seizure-like activity - received 2 mg Ativan and Keppra load   CTH : No acute intracranial abnormality; known meningiomas   Plan:  Continue video EEG   F/u morning CT Head   F/u CSF results   Continue frequent neuro checks  Sedation: n/a  Analgesia: Multimodal. Acetaminophen as needed     CV:  Diagnoses: Undifferentiated shock; history of hypertension   Likely due to vasodilation secondary to rewarming in setting of hypothermia   Plan:  Echo ordered   Home anti-hypertensive held   Titrate levophed for MAP > 65   Continue vasopressin infusion   Continuous cardiopulmonary monitoring.     Pulm:  Diagnoses: Respiratory insuffiencey   Imagin/25 CT Chest - no acute findings   Plan:  Continuous pulse oximetry. Maintain O2 sat >92%.     GI:  Diagnoses: Transaminitis   Last BM: prior to arrival   CT A/P: Small bowel wall thickening. Correlate for a mild enteritis.   , , Alk Phos 294, Total bilirubin 2.88   Plan:  Trend CMP/liver enzymes     :  Diagnoses: Acute kidney injury   Creatinine trend: elevated - 1.36   Baseline creatinine: 0.7-0.9  Plan:  Trend renal indices.   Monitor I/Os.    F/E/N:  Plan:   F: Fluid resuscitation prn.  E: Monitor and replete electrolytes for Mg >2, Phos >3, K >4.  N: NPO    Heme/Onc:  Diagnoses: No active issues   Plan:  VTE prophylaxis: JOSTIN, SCDs    Endo:  Diagnoses: No active issues   AM Cortisol 52.7    TSH 2.611  Plan:   Insulin: Monitor blood glucose.    ID:  Diagnoses: Hypothermia   Culture data:    BC Pending    CSF results pending   Labs: Lactic 1.4  Temperature: Normothermic overnight   Plan:  Abx: Zosyn, vancomycin   Monitor fever curve  and WBC.    MSK/Skin:  Plan:  PT/OT when appropriate. Encourage OOB and ambulation when appropriate. Local wound care prn.    LDAs:  Lines - left IJ CVC, right radial A-line, peripheral IV   Drains - sanchez  Airways -  n/a    Disposition: Critical care    ICU Core Measures     A: Assess, Prevent, and Manage Pain Has pain been assessed? Yes  Need for changes to pain regimen? No   B: Both SAT/SAT  N/A   C: Choice of Sedation RASS Goal: 0 Alert and Calm or N/A patient not on sedation  Need for changes to sedation or analgesia regimen? NA   D: Delirium CAM-ICU: Unable to perform secondary to Acute cognitive dysfunction   E: Early Mobility  Plan for early mobility? Yes   F: Family Engagement Plan for family engagement today? Yes       Antibiotic Review: Continue broad spectrum secondary to severity of illness.     Review of Invasive Devices:    Sanchez Plan: Continue for accurate I/O monitoring for 48 hours  Central access plan: Medications requiring central line Hemodynamic monitoring  [place] Plan: Keep arterial line for hemodynamic monitoring, frequent ABGs, and frequent labs    Prophylaxis:  VTE VTE covered by:  heparin (porcine), Subcutaneous, 5,000 Units at 01/27/25 0549       Stress Ulcer  not ordered         24 Hour Events : Patient transferred to neurocritical care unit after having witnessed seizure like activity. After shining light in her eyes, she had witnessed shaking. She has had increasing pressor requirements and declining mental status over the past 24 hours.     Subjective       Objective :                   Vitals I/O      Most Recent Min/Max in 24hrs   Temp 98.2 °F (36.8 °C) Temp  Min: 96.6 °F (35.9 °C)  Max: 100.4 °F (38 °C)   Pulse 66 Pulse  Min: 64  Max: 109   Resp 21 Resp  Min: 16  Max: 136   /60 BP  Min: 58/33  Max: 252/187   O2 Sat 99 % SpO2  Min: 91 %  Max: 100 %      Intake/Output Summary (Last 24 hours) at 1/27/2025 0748  Last data filed at 1/27/2025 0600  Gross per 24 hour   Intake 3290.06  ml   Output 2135 ml   Net 1155.06 ml       Diet NPO    Invasive Monitoring           Physical Exam   Physical Exam  Vitals reviewed.   Eyes:      Pupils: Pupils are equal, round, and reactive to light.   Skin:     General: Skin is warm.   HENT:      Head: Normocephalic.      Nose: No congestion.      Mouth/Throat:      Mouth: Mucous membranes are moist.      Dentition: Abnormal dentition.   Cardiovascular:      Rate and Rhythm: Normal rate and regular rhythm.      Pulses: Normal pulses.      Heart sounds: Normal heart sounds.   Musculoskeletal:      Right lower leg: Edema present.      Left lower leg: Edema present.   Abdominal:      Palpations: Abdomen is soft.   Constitutional:       General: She is not in acute distress.     Appearance: She is well-developed. She is ill-appearing.   Pulmonary:      Effort: Pulmonary effort is normal.   Neurological:      GCS: GCS eye subscore is 3. GCS verbal subscore is 2. GCS motor subscore is 5.      Cranial Nerves: Dysarthria: Limited 2/2 mental status.          Diagnostic Studies        Lab Results: I have reviewed the following results:     Medications:  Scheduled PRN   chlorhexidine, 15 mL, Q12H JOSTIN  heparin (porcine), 5,000 Units, Q8H JOSTIN  piperacillin-tazobactam, 4.5 g, Q8H  vancomycin, 1,250 mg, Q24H          Continuous    norepinephrine, 1-30 mcg/min, Last Rate: 10 mcg/min (01/27/25 0511)  vasopressin, 0.04 Units/min, Last Rate: 0.04 Units/min (01/27/25 0316)         Labs:   CBC    Recent Labs     01/25/25 2048 01/26/25  0435 01/27/25  0552   WBC 13.72* 15.85* 26.11*   HGB 13.2 11.3* 9.9*   HCT 40.1 35.2 31.1*   * 374 240   BANDSPCT 8  --  41*     BMP    Recent Labs     01/26/25  0435 01/27/25  0552   SODIUM 142 147   K 4.4 3.5    112*   CO2 26 24   AGAP 8 11   BUN 51* 52*   CREATININE 0.77 1.36*   CALCIUM 8.4 8.2*       Coags    No recent results     Additional Electrolytes  Recent Labs     01/26/25  0435 01/27/25  0552   MG 2.0 2.0   PHOS 3.6 4.8*           Blood Gas    Recent Labs     01/26/25 2130   PHART 7.432   FXJ1IQL 35.8*   PO2ART 109.0   UUS8EGX 23.3   BEART -0.6   SOURCE Line, Arterial     Recent Labs     01/25/25 2048 01/26/25 2130   PHVEN 7.283*  --    YOK4BYA 57.8*  --    PO2VEN 76.0*  --    AUX5DXT 26.7  --    BEVEN -0.9  --    P2SBQOA 92.2*  --    SOURCE  --  Line, Arterial    LFTs  Recent Labs     01/26/25 0435 01/27/25  0552   * 153*   AST 97* 155*   ALKPHOS 150* 294*   ALB 3.1* 3.1*   TBILI 0.49 2.88*       Infectious  No recent results  Glucose  Recent Labs     01/25/25 2048 01/26/25 0435 01/27/25  0552   GLUC 114 84 104

## 2025-01-27 NOTE — SEPSIS NOTE
Sepsis Note   Darlin Zamora 79 y.o. female MRN: [id number]  [facility]: [id number]       Initial Sepsis Screening       Row Name 01/26/25 2319 01/25/25 2032             Is the patient's history suggestive of a new or worsening infection? No  -AA Yes (Proceed)  -ET       Suspected source of infection -- suspect infection, source unknown  -ET       Indicate SIRS criteria -- --       Are two or more of the above signs & symptoms of infection both present and new to the patient? -- --                 User Key  (r) = Recorded By, (t) = Taken By, (c) = Cosigned By      Initials Name Provider Type    ET Hamlet Johnson MD Resident    AA Gaurav Hemphill DO Fellow                        Body mass index is 29.21 kg/m².  Wt Readings from Last 1 Encounters:   01/26/25 65.6 kg (144 lb 10 oz)     IBW (Ideal Body Weight): 43.2 kg    Ideal body weight: 48.8 kg (107 lb 8.4 oz)  Adjusted ideal body weight: 55.5 kg (122 lb 5.8 oz)    Patient currently being treated for suspected septic shock with unknown infectious etiology. She has already completed appropriate fluid administration (30 cc/kg) and is on broad spectrum antibiotics.

## 2025-01-28 NOTE — PROGRESS NOTES
Progress Note - Critical Care/ICU   Name: Darlin Zamora 79 y.o. female I MRN: [id number]  Unit/Bed#: ICU 10 I Date of Admission: 2025   Date of Service: 2025 I Hospital Day: 2       Assessment & Plan   Neuro/Psych:  Diagnoses: Acute metabolic encephalopathy; seizure-like activity; Meningioma    Witnessed episode of seizure-like activity - received 2 mg Ativan  CTH : No acute intracranial abnormality; known meningiomas   Plan:  D/c EEG   Continue frequent neuro checks  Sedation: n/a  Analgesia: Multimodal. Acetaminophen as needed      CV:  Diagnoses: Septic shock; history of hypertension   Plan:  Echo ordered, f/u results    Home anti-hypertensive held   Titrate levophed for MAP > 65   Continue vasopressin infusion   Continuous cardiopulmonary monitoring.      Pulm:  Diagnoses: Respiratory insuffiencey, pulmonary infiltrate    Imagin/25 CT Chest - no acute findings   Plan:  Respiratory protocol   Continue broad spectrum abx  Start Xopenex/Atrovent   Continuous pulse oximetry. Maintain O2 sat >92%.      GI:  Diagnoses: Transaminitis - improving    Last BM:   CT A/P: Small bowel wall thickening. Correlate for a mild enteritis.   AST 69 from 155, ALT 83 from 153, Alk Phos 222 from 294, Total bilirubin 2.0 from 2.88   Plan:  Trend CMP/liver enzymes   F/u RUQ ultrasound      :  Diagnoses: Acute kidney injury   Creatinine trend: elevated - 1.36   Baseline creatinine: 0.7-0.9  Plan:  Trend renal indices.   Monitor I/Os.     F/E/N:  Plan:   F: Fluid resuscitation prn.  E: Monitor and replete electrolytes for Mg >2, Phos >3, K >4.  N: Trickle feeds, advance to goal as tolerated      Heme/Onc:  Diagnoses: No active issues   Plan:  VTE prophylaxis: JOSTIN, SCDs     Endo:  Diagnoses: No active issues   AM Cortisol 52.7    TSH 2.611  Plan:   Insulin: Monitor blood glucose.     ID:  Diagnoses: Hypothermia   Culture data:    BC Pending    CSF results pending   Labs: Lactic 1.4  Temperature:  Normothermic overnight   Plan:  Abx: Zosyn, vancomycin   Monitor fever curve and WBC.     MSK/Skin:  Plan:  PT/OT when appropriate. Encourage OOB and ambulation when appropriate. Local wound care prn.     LDAs:  Lines - left IJ CVC, right radial A-line, peripheral IV   Drains - sanchez  Airways -  n/a    Disposition: Critical care    ICU Core Measures     A: Assess, Prevent, and Manage Pain Has pain been assessed? Yes  Need for changes to pain regimen? No   B: Both SAT/SAT  N/A   C: Choice of Sedation RASS Goal: 0 Alert and Calm or N/A patient not on sedation  Need for changes to sedation or analgesia regimen? NA   D: Delirium CAM-ICU: Unable to perform secondary to Acute cognitive dysfunction   E: Early Mobility  Plan for early mobility? Yes   F: Family Engagement Plan for family engagement today? Yes       Antibiotic Review: Continue broad spectrum secondary to severity of illness.     Review of Invasive Devices:    Sanchez Plan: Continue for accurate I/O monitoring for 48 hours  Central access plan: Patient has multiple central venous catheters.  Medications requiring central line Hemodynamic monitoring  Basco Plan: Discontinue arterial line    Prophylaxis:  VTE VTE covered by:  heparin (porcine), Subcutaneous, 5,000 Units at 01/28/25 0510       Stress Ulcer  not ordered         24 Hour Events : No events overnight. Increasing pulmonary secretions and worsening infiltrate on CXR.     Subjective       Objective :                   Vitals I/O      Most Recent Min/Max in 24hrs   Temp 99.7 °F (37.6 °C) Temp  Min: 97.9 °F (36.6 °C)  Max: 100 °F (37.8 °C)   Pulse 76 Pulse  Min: 64  Max: 108   Resp 22 Resp  Min: 16  Max: 23   /53 BP  Min: 86/47  Max: 140/100   O2 Sat 98 % SpO2  Min: 94 %  Max: 98 %      Intake/Output Summary (Last 24 hours) at 1/28/2025 0729  Last data filed at 1/28/2025 0600  Gross per 24 hour   Intake 4204.86 ml   Output 2400 ml   Net 1804.86 ml       Diet Enteral/Parenteral; Tube Feeding No Oral  Diet; Jevity 1.2 Jae; Continuous; 10; 250; Water; Every 4 hours    Invasive Monitoring           Physical Exam   Physical Exam  Vitals and nursing note reviewed.   Eyes:      Comments: Pupils reactive   Skin:     General: Skin is warm and dry.      Comments: Venous stasis rash of bilateral lower extremities.   HENT:      Head: Normocephalic.      Nose: Nasogastric tube present. No congestion.      Mouth/Throat:      Mouth: Mucous membranes are moist.   Cardiovascular:      Rate and Rhythm: Normal rate and regular rhythm.      Pulses: Normal pulses.      Heart sounds: Normal heart sounds.   Musculoskeletal:      Right lower le+ Edema present.      Left lower le+ Edema present.   Abdominal:      Palpations: Abdomen is soft.   Constitutional:       Appearance: She is well-developed. She is ill-appearing.   Pulmonary:      Effort: Tachypnea present. No respiratory distress.      Breath sounds: Rhonchi present. No wheezing.   Secretions are abnormal .Neurological:      GCS: GCS eye subscore is 3. GCS verbal subscore is 2. GCS motor subscore is 5.      Comments: Moves all extremities. Localizes to pain. Opens eyes to voice. Moans incomprehensible sounds.    Genitourinary/Anorectal:  Vasquez present.        Diagnostic Studies        Lab Results: I have reviewed the following results:     Medications:  Scheduled PRN   chlorhexidine, 15 mL, Q12H JOSTIN  heparin (porcine), 5,000 Units, Q8H JOSTIN  nystatin, , BID  piperacillin-tazobactam, 4.5 g, Q8H  potassium chloride, 40 mEq, Once  timolol, 1 drop, BID      vancomycin, 15 mg/kg (Adjusted), Daily PRN       Continuous    lactated ringers, 75 mL/hr, Last Rate: 75 mL/hr (25 2333)  norepinephrine, 1-30 mcg/min, Last Rate: 2 mcg/min (257)  vasopressin, 0.04 Units/min, Last Rate: 0.04 Units/min (25 2331)         Labs:   CBC    Recent Labs     25  0552 25  0511   WBC 26.11* 16.75*   HGB 9.9* 9.3*   HCT 31.1* 29.5*    166   BANDSPCT 41*  --       BMP    Recent Labs     01/27/25 2219 01/28/25  0511   SODIUM 149* 149*   K 3.3* 3.7   * 114*   CO2 26 23   AGAP 10 12   BUN 48* 46*   CREATININE 1.30 1.23   CALCIUM 8.3* 8.2*       Coags    Recent Labs     01/28/25  0511   INR 1.34*        Additional Electrolytes  Recent Labs     01/27/25  0552 01/28/25  0511   MG 2.0 2.4   PHOS 4.8* 3.4          Blood Gas    Recent Labs     01/26/25  2130   PHART 7.432   QZT3LDG 35.8*   PO2ART 109.0   UEX2HIZ 23.3   BEART -0.6   SOURCE Line, Arterial     Recent Labs     01/26/25  2130   SOURCE Line, Arterial    LFTs  Recent Labs     01/27/25 0552 01/28/25  0511   * 83*   * 69*   ALKPHOS 294* 222*   ALB 3.1* 2.9*   TBILI 2.88* 2.00*       Infectious  Recent Labs     01/27/25  0552 01/28/25  0511   PROCALCITONI 18.58* 4.39*     Glucose  Recent Labs     01/27/25  0552 01/27/25 2219 01/28/25  0511   GLUC 104 130 134

## 2025-01-28 NOTE — CONSULTS
Podiatry - Consultation    Patient Information:   Darlin Zamora 79 y.o. female MRN: [id number]  Unit/Bed#: ICU 10 Encounter: [id number]  PCP: Tiffanie Kamara DO  Date of Admission:  1/25/2025  Date of Consultation: 01/28/25  Requesting Physician: Reese Lara MD      ASSESSMENT:    Darlin Zamora is a 79 y.o. female with:    Onychomycosis    PLAN:    Patient seen at bedside. No acute pedal concerns at this time.    Elongated mycotic toenails x10 were excisionally debrided. See procedure note below.   Bilateral lower leg wounds per wound care.  Dressings left intact at bedside  Rest of Medical care per primary team.  Podiatry signing off at this time, please re-consult with any questions or concerns as needed.  Plan discussed with Dr. Block    Nail debridement: Elongated, dystrophic, thickened, discolored nail plates x 10 debrided using large nail nipper with removal of all devitalized tissue and subungual debris. This was performed without incident and to patient satisfaction.  Dr. Block was present for entire procedure and participated in all key aspects      Weightbearing status: Weightbearing as tolerated    SUBJECTIVE:    History of Present Illness:    Darlin Zamora is a 79 y.o. female who is originally admitted 1/25/2025 due to acute metabolic encephalopathy. Patient has a past medical history of meningioma, hypertension.  Patient not responsive to verbal stimuli, did open her eyes at several points throughout encounter.  Patient not able to speak or provide any medical history.  No family at bedside    We are consulted for onychomycosis    Review of Systems:    Constitutional: Negative.    HENT: Negative.    Eyes: Negative.    Respiratory: Negative.    Cardiovascular: Negative.    Gastrointestinal: Negative.    Musculoskeletal: Negative  Skin: Negative  Neurological: Negative  Psych: Negative.     Past Medical and Surgical History:     Past Medical History:   Diagnosis Date    Anemia      Cataracts, bilateral     Heart murmur 04/2018    History of transfusion 2003    had 4 units    Hypertension     Meningioma (HCC) 04/2018    MRI every 6months    Ovarian cyst 2006    Shortness of breath     Skin neoplasm     last assessed: 6/13/2017       Past Surgical History:   Procedure Laterality Date    CYSTOSCOPY N/A 2/14/2019    Procedure: CYSTOSCOPY;  Surgeon: Geronimo Tsai MD;  Location: [facility];  Service: Gynecology Oncology    HYSTERECTOMY N/A 2/14/2019    Procedure: HYSTERECTOMY LAPAROSCOPIC TOTAL (LTH) W/ ROBOTICS bilateral salpingooophorectomy with great difficulty due to mass of 12 cm and taking 2.5 hours;  Surgeon: Geronimo Tsai MD;  Location: [facility];  Service: Gynecology Oncology    TONSILLECTOMY         Meds/Allergies:      Medications Prior to Admission:     Cholecalciferol (VITAMIN D3) 1,000 units tablet    furosemide (LASIX) 40 mg tablet    Glycerin-Polysorbate 80 (REFRESH DRY EYE THERAPY OP)    lisinopril-hydrochlorothiazide (PRINZIDE,ZESTORETIC) 20-25 MG per tablet    multivitamin (THERAGRAN) TABS    potassium chloride (Klor-Con M20) 20 mEq tablet    timolol (TIMOPTIC) 0.5 % ophthalmic solution    No Known Allergies    Social History:     Marital Status:     Substance Use History:   Social History     Substance and Sexual Activity   Alcohol Use Never     Social History     Tobacco Use   Smoking Status Never    Passive exposure: Past   Smokeless Tobacco Never     Social History     Substance and Sexual Activity   Drug Use No       Family History:    Family History   Problem Relation Age of Onset    Hypertension Mother     Lung cancer Father     Hypertension Sister     Lymphoma Brother 45    Hypertension Brother     Schizophrenia Brother     Depression Son     Schizophrenia Son     Hypertension Family     Heart disease Other          OBJECTIVE:    Vitals:   Blood Pressure: 122/58 (01/28/25 0930)  Pulse: 66 (01/28/25 0930)  Temperature: 99 °F (37.2 °C) (01/28/25  "0930)  Temp Source: Bladder (01/28/25 0400)  Respirations: 21 (01/28/25 0930)  Height: 4' 11\" (149.9 cm) (01/26/25 0800)  Weight - Scale: 65.6 kg (144 lb 10 oz) (01/26/25 0404)  SpO2: 98 % (01/28/25 0930)    Physical Exam:    General Appearance: Alert, cooperative, no distress.  HEENT: Head normocephalic, atraumatic, without obvious abnormality.  Heart: Normal rate and rhythm.  Lungs: Non-labored breathing. No respiratory distress.  Abdomen: Without distension.  Psychiatric: AAOx3  Lower Extremity:    Vascular:   DP: Right: Palpable left: Palpable  PT: Right: Palpable left: Palpable  CRT < 3 seconds at the digits.   Pedal hair is absent.   Skin temperature is WNL bilaterally.    Musculoskeletal:    No gross deformities noted.     Dermatological:  Nail plates x 10 are elongated, dystrophic, thickened, discolored with presence of subungual debris.     Bilateral lower leg dressings left intact.  They are clean, dry, intact with no evidence of strikethrough    No wounds, calluses, lesions to the bilateral feet/heels    Neurological:  Not assessed due to patient level of consciousness        Additional data:     Lab Results: I have personally reviewed pertinent labs including:    Results from last 7 days   Lab Units 01/28/25  0511 01/27/25  0552   WBC Thousand/uL 16.75* 26.11*   HEMOGLOBIN g/dL 9.3* 9.9*   HEMATOCRIT % 29.5* 31.1*   PLATELETS Thousands/uL 166 240   LYMPHO PCT %  --  0*   MONO PCT %  --  2*   EOS PCT %  --  0     Results from last 7 days   Lab Units 01/28/25  0511   POTASSIUM mmol/L 3.7   CHLORIDE mmol/L 114*   CO2 mmol/L 23   BUN mg/dL 46*   CREATININE mg/dL 1.23   CALCIUM mg/dL 8.2*   ALK PHOS U/L 222*   ALT U/L 83*   AST U/L 69*     Results from last 7 days   Lab Units 01/28/25  0511   INR  1.34*       Cultures: I have personally reviewed pertinent cultures including:    Results from last 7 days   Lab Units 01/26/25  0436   BLOOD CULTURE  No Growth at 24 hrs.  No Growth at 24 hrs.           Imaging: I " "have personally reviewed pertinent reports in PACS.  EKG, Pathology, and Other Studies: I have personally reviewed pertinent reports.    Time Spent for Care: 30 minutes.  More than 50% of total time spent on counseling and coordination of care as described above.      ** Please Note: Portions of the record may have been created with voice recognition software. Occasional wrong word or \"sound a like\" substitutions may have occurred due to the inherent limitations of voice recognition software. Read the chart carefully and recognize, using context, where substitutions have occurred. **    "

## 2025-01-28 NOTE — SPEECH THERAPY NOTE
Speech-Language Pathology Bedside Swallow Evaluation      Patient Name: Darlin Zamora    Today's Date: 1/28/2025     Problem List  Active Problems:    Altered mental status/Seizure like activity    Past Medical History  Past Medical History:   Diagnosis Date    Anemia     Cataracts, bilateral     Heart murmur 04/2018    History of transfusion 2003    had 4 units    Hypertension     Meningioma (HCC) 04/2018    MRI every 6months    Ovarian cyst 2006    Shortness of breath     Skin neoplasm     last assessed: 6/13/2017     Past Surgical History  Past Surgical History:   Procedure Laterality Date    CYSTOSCOPY N/A 2/14/2019    Procedure: CYSTOSCOPY;  Surgeon: Geronimo Tsai MD;  Location: [facility];  Service: Gynecology Oncology    HYSTERECTOMY N/A 2/14/2019    Procedure: HYSTERECTOMY LAPAROSCOPIC TOTAL (LTH) W/ ROBOTICS bilateral salpingooophorectomy with great difficulty due to mass of 12 cm and taking 2.5 hours;  Surgeon: Geronimo Tsai MD;  Location: [facility];  Service: Gynecology Oncology    TONSILLECTOMY         Summary  Evaluation process begun. Pt was more wakeful today, opens eyes and has some weak voicing however not able to follow commands. Oral care was completed and oral moisturizer applied. Pt resists oral care. Few ice chips offered with oral suction to remove debris as ice melted. Pt was able to contain the ice chips but made minimal attempt to manipulate and transfer, and no complete swallows were observed. Maintain NPO status with NGT, ST will continue to follow.     Risk/s for Aspiration: high    Recommended Diet: NPO with tube feeds   Recommended Form of Meds: non-oral if possible   Aspiration precautions and swallowing strategies:  TF precautions  Other Recommendations: Continue frequent oral care      Current Medical Status per H&P 1/26/25   Darlin Zamora is a 79 y.o. with a history of hypertension and meningioma who was brought in by EMS after being found on the floor of her house  during a wellness check.  Patient's home has no heat and was reportedly cluttered with filthy living conditions.  The patient was reportedly alert and following commands, but confused.  The patient is a core temperature in the ED was 85.1 °F.  Active rewarming was initiated with shara hugger.  After initiation of rewarming the patient began to become hypotensive.  Patient received 2 L of IV fluids in the emergency department without improvement of her blood pressure.  The patient was started on Levophed and transferred to the MICU.     Special Studies:  CT head 1/26/25 IMPRESSION:  No acute intracranial abnormality.  Stable left inferior frontal meningioma. Previously described small left temporal and right posterior fossa meningiomas are better visualized on previous MRI 7/7/2023 although also grossly stable.  CXR 1/26/25 IMPRESSION:  No acute intracranial abnormality.  Stable left inferior frontal meningioma. Previously described small left temporal and right posterior fossa meningiomas are better visualized on previous MRI 7/7/2023 although also grossly stable.    Social/Education/Vocational Hx:  Pt lives alone    Swallow Information   Current Risks for Dysphagia & Aspiration: AMS and decreased alertness  Current Diet: NPO with tube feeds   Baseline Diet:  suspect regular/thin      Baseline Assessment   Behavior/Cognition: alert, waxing and waning arousal level, and decreased attention  Speech/Language Status: not able to to follow commands and limited verbal output  Patient Positioning: upright in bed  Pain Status/Interventions/Response to Interventions: No report of or nonverbal indications of pain.       Swallow Mechanism Exam  Facial: masked facies  Labial: decreased strength  Lingual: unable to test 2/2 limited command following  Velum: symmetrical  Mandible: adequate ROM  Dentition: adequate  Vocal quality:weak   Volitional Cough: unable to initiate volitional cough   Respiratory Status: on MFNC        Consistencies Assessed and Performance   Consistencies Administered: ice chips    Oral Stage:  limited assessment - containment but no manipulation/transfer of ice chips    Pharyngeal Stage:  limited assessment, no complete swallows today    Esophageal Concerns: none reported      Summary and Recommendations (see above)    Results Reviewed with: patient and RN     Treatment Recommended: yes     Frequency of treatment: 3-5x/week as appropriate      Dysphagia LTG  -Patient will demonstrate safe and effective oral intake (without overt s/s significant oral/pharyngeal dysphagia including s/s penetration or aspiration) for the highest appropriate diet level.     Short Term Goals:  -Pt will tolerate the least restrictive diet with the least restrictive liquid consistency without s/s of aspiration x72hrs.    -Patient will tolerate trials of upgraded food and/or liquid texture with no significant s/s of oral or pharyngeal dysphagia including aspiration across 1-3 diagnostic sessions.     -Patient will comply with a Video/Modified Barium Swallow study for more complete assessment of swallowing anatomy/physiology/aspiration risk and to assess efficacy of treatment techniques so as to best guide treatment plan.

## 2025-01-28 NOTE — UTILIZATION REVIEW
Continued Stay Review    Date: 1/28                          Current Patient Class: Inpatient  Current Level of Care: ICU    HPI:79 y.o. female initially admitted on 1/26     Assessment/Plan:     Date: 1/28  Day 3: Has surpassed a 2nd midnight with active treatments and services. Increasing pulmonary secretions and worsening infiltrate on CXR. 1/27  results pending . Cont iv zosyn and vanco . Maintain O2 sat >92 % . Cont nebs . Cotn vasopressin infusion . Freq neuro checks .  + 2 BLE edema . + rhonchi . Opens eyes to voice . Moans incomprehensible sounds . NG tube intact . Wbc improved to 16.75 from 26.11 . LFTs improved . Pct improved to 4.39 form 18.58 .            Medications:   Scheduled Medications:  chlorhexidine, 15 mL, Mouth/Throat, Q12H JOSITN  heparin (porcine), 5,000 Units, Subcutaneous, Q8H JOSTIN  nystatin, , Topical, BID  piperacillin-tazobactam, 4.5 g, Intravenous, Q8H  potassium chloride, 40 mEq, Per NG Tube, Once  timolol, 1 drop, Both Eyes, BID      Continuous IV Infusions:  lactated ringers, 75 mL/hr, Intravenous, Continuous  norepinephrine, 1-30 mcg/min, Intravenous, Titrated  vasopressin, 0.04 Units/min, Intravenous, Continuous      PRN Meds:  vancomycin, 15 mg/kg (Adjusted), Intravenous, Daily PRN      Discharge Plan: TBD     Vital Signs (last 3 days)       Date/Time Temp Pulse Resp BP MAP (mmHg) Arterial Line BP MAP SpO2 Calculated FIO2 (%) - Nasal Cannula Nasal Cannula O2 Flow Rate (L/min) O2 Device Patient Position - Orthostatic VS [place] Coma Scale Score Pain    01/28/25 0700 -- 76 22 118/53 83 -- -- 98 % -- -- -- -- -- --    01/28/25 0600 99.7 °F (37.6 °C) 96 23 140/100 121 104/98 104 mmHg 97 % -- -- -- -- -- --    01/28/25 0500 99.3 °F (37.4 °C) 68 23 134/72 87 -- -- 96 % -- -- -- -- -- --    01/28/25 0400 99.3 °F (37.4 °C) 78 18 134/63 96 -- -- 96 % 44 6 L/min Mid flow nasal cannula Lying 10 --    01/28/25 0300 99.3 °F (37.4 °C) 82 18 135/51 75 110/58 82 mmHg 95 % -- -- -- -- -- --     01/28/25 0200 99.3 °F (37.4 °C) 90 21 134/82 106 116/70 90 mmHg 94 % -- -- -- -- -- --    01/28/25 0100 99.3 °F (37.4 °C) 64 17 106/47 67 94/56 72 mmHg 97 % -- -- -- -- -- --    01/28/25 0000 99.1 °F (37.3 °C) 86 22 137/50 67 100/76 88 mmHg 96 % 52 8 L/min Mid flow nasal cannula Lying 10 --    01/27/25 2300 -- 72 20 104/57 77 92/54 70 mmHg 97 % -- -- -- -- -- --    01/27/25 2230 -- -- -- 122/58 81 -- -- -- -- -- -- -- -- --    01/27/25 2200 99.7 °F (37.6 °C) 80 17 114/52 78 -- -- 97 % -- -- -- -- -- --    01/27/25 2100 99.7 °F (37.6 °C) 74 17 114/59 79 -- -- 95 % -- -- -- -- -- --    01/27/25 2000 100 °F (37.8 °C) 86 19 121/56 84 134/110 118 mmHg 96 % 52 8 L/min Mid flow nasal cannula Lying 10 --    01/27/25 1900 99.3 °F (37.4 °C) 78 22 123/59 83 -- -- 97 % -- -- -- -- -- --    01/27/25 1800 99.3 °F (37.4 °C) 84 21 113/57 80 76/54 64 mmHg 96 % 52 8 L/min -- -- -- --    01/27/25 1700 99.3 °F (37.4 °C) 96 18 86/47 69 -- -- 98 % -- -- -- -- -- --    01/27/25 1600 99.3 °F (37.4 °C) 108 18 90/44 65 -- -- 97 % -- -- -- -- 10 --    01/27/25 1500 99.3 °F (37.4 °C) 94 18 129/72 106 -- -- 96 % 60 10 L/min Mid flow nasal cannula -- -- --    01/27/25 1400 99 °F (37.2 °C) 94 18 136/48 72 -- -- -- -- -- -- -- -- --    01/27/25 1200 98.6 °F (37 °C) -- -- -- -- -- -- -- -- -- -- -- 10 --    01/27/25 1100 98.2 °F (36.8 °C) 86 18 110/56 79 -- -- 97 % 36 4 L/min -- -- -- --    01/27/25 1000 98.2 °F (36.8 °C) 92 -- 137/93 115 -- -- -- -- -- -- -- -- --    01/27/25 0930 97.9 °F (36.6 °C) 82 20 132/85 122 -- -- -- -- -- -- -- -- --    01/27/25 0900 97.9 °F (36.6 °C) 68 16 103/53 70 -- -- 98 % -- -- -- -- -- --    01/27/25 0800 97.9 °F (36.6 °C) 76 18 116/47 70 86/78 82 mmHg 98 % -- -- -- -- 8 --    01/27/25 0700 98.2 °F (36.8 °C) 66 21 117/60 86 -- -- 99 % -- -- -- -- -- --    01/27/25 0650 98.2 °F (36.8 °C) 64 17 113/55 81 -- -- 99 % -- -- -- -- -- --    01/27/25 0600 98.2 °F (36.8 °C) 68 16 106/56 77 -- -- 99 % -- -- -- -- -- --     01/27/25 0550 98.4 °F (36.9 °C) 68 23 111/58 84 104/46 70 mmHg 99 % 40 5 L/min Nasal cannula Lying 8 --    01/27/25 0511 -- -- -- -- 66 -- -- -- -- -- -- -- -- --    01/27/25 0500 99.5 °F (37.5 °C) 76 19 113/55 75 106/68 77 mmHg 100 % -- -- -- -- -- --    01/27/25 0430 99.1 °F (37.3 °C) 86 30 120/65 87 143/76 100 mmHg 96 % -- -- -- -- -- --    01/27/25 0400 99.9 °F (37.7 °C) 82 40 108/54 76 109/52 73 mmHg 98 % -- -- -- -- 10 --    01/27/25 0330 99.9 °F (37.7 °C) 82 37 96/55 71 84/47 62 mmHg 95 % -- -- -- -- -- --    01/27/25 0315 99.9 °F (37.7 °C) 93 21 79/44 56 77/54 65 mmHg 96 % 44 6 L/min Nasal cannula -- -- --    01/27/25 0145 100.4 °F (38 °C) 86 24 -- -- 97/71 84 mmHg 96 % -- -- -- -- -- --    01/27/25 0130 100.4 °F (38 °C) 87 29 110/64 82 99/45 67 mmHg 96 % -- -- -- -- -- --    01/27/25 0115 100.2 °F (37.9 °C) 87 29 124/62 84 101/44 66 mmHg 96 % -- -- -- -- -- --    01/27/25 0100 100 °F (37.8 °C) 88 28 107/59 78 106/44 68 mmHg 97 % -- -- -- -- -- --    01/27/25 0045 99.9 °F (37.7 °C) 86 34 109/60 79 106/44 68 mmHg 97 % -- -- -- -- -- --    01/27/25 0030 99.9 °F (37.7 °C) 88 37 118/56 79 103/44 67 mmHg 97 % -- -- -- -- -- --    01/27/25 0015 99.7 °F (37.6 °C) 90 35 117/59 82 107/45 69 mmHg 97 % -- -- -- -- -- --    01/27/25 0000 99.7 °F (37.6 °C) 88 26 112/62 83 105/44 67 mmHg 97 % -- -- -- -- 10 --    01/26/25 2345 99.5 °F (37.5 °C) 88 32 118/64 85 107/45 68 mmHg 99 % -- -- -- -- -- --    01/26/25 2330 99.5 °F (37.5 °C) 86 31 113/60 80 110/47 71 mmHg 99 % -- -- -- -- -- --    01/26/25 2315 99.3 °F (37.4 °C) 87 33 121/59 83 111/49 73 mmHg 99 % -- -- -- -- -- --    01/26/25 2300 99.3 °F (37.4 °C) 88 26 107/60 77 107/48 72 mmHg 99 % -- -- -- -- -- --    01/26/25 2245 99.1 °F (37.3 °C) 86 33 117/59 80 106/47 70 mmHg 99 % -- -- -- -- -- --    01/26/25 2230 99.1 °F (37.3 °C) 86 24 112/58 78 102/49 71 mmHg 98 % -- -- -- -- -- --    01/26/25 2215 99.1 °F (37.3 °C) 87 27 119/57 82 105/51 75 mmHg 98 % -- -- -- -- -- --     01/26/25 2200 99 °F (37.2 °C) 89 36 115/61 84 107/54 76 mmHg 93 % -- -- -- -- -- --    01/26/25 2145 99 °F (37.2 °C) 90 29 103/61 77 115/56 80 mmHg 97 % -- -- -- -- -- --    01/26/25 2130 99 °F (37.2 °C) 84 23 106/57 79 107/50 71 mmHg 98 % -- -- -- -- -- --    01/26/25 2115 99 °F (37.2 °C) 83 22 106/55 75 106/51 73 mmHg 99 % -- -- -- -- -- --    01/26/25 2100 99 °F (37.2 °C) 88 23 108/56 78 108/56 79 mmHg 100 % 28 2 L/min Nasal cannula -- -- --    01/26/25 2045 98.8 °F (37.1 °C) 96 31 143/60 73 153/69 102 mmHg 96 % -- -- -- -- -- --    01/26/25 2030 98.8 °F (37.1 °C) 81 23 131/59 85 115/60 83 mmHg 100 % -- -- -- -- -- --    01/26/25 2015 98.8 °F (37.1 °C) 84 136 134/63 90 115/51 76 mmHg 91 % -- -- -- -- -- --    01/26/25 2000 98.6 °F (37 °C) 85 25 112/57 79 106/44 65 mmHg 96 % -- -- -- -- 10 --    01/26/25 1945 98.8 °F (37.1 °C) 91 26 90/51 65 91/39 56 mmHg 93 % -- -- -- -- -- --    01/26/25 1936 -- -- -- -- -- -- 45 mmHg -- -- -- -- -- -- --    01/26/25 1930 98.8 °F (37.1 °C) 93 26 86/51 64 87/32 49 mmHg 92 % -- -- -- -- -- --    01/26/25 1915 98.8 °F (37.1 °C) 92 28 99/51 72 90/33 50 mmHg 93 % -- -- -- -- -- --    01/26/25 1900 98.8 °F (37.1 °C) 87 24 87/51 67 84/32 49 mmHg 91 % -- -- -- -- -- --    01/26/25 1830 99 °F (37.2 °C) 87 26 91/53 67 -- -- 93 % -- -- -- -- -- --    01/26/25 1825 99 °F (37.2 °C) 86 28 81/42 57 -- -- 94 % -- -- -- -- -- --    01/26/25 1822 99 °F (37.2 °C) 87 27 88/50 65 -- -- 93 % -- -- -- -- -- --    01/26/25 1815 99 °F (37.2 °C) 87 27 77/41 54 -- -- 94 % -- -- -- -- -- --    01/26/25 1812 99 °F (37.2 °C) 87 29 65/36 46 -- -- 96 % -- -- -- -- -- --    01/26/25 1811 99 °F (37.2 °C) 85 27 58/33 41 -- -- 96 % -- -- -- -- -- --    01/26/25 1809 99 °F (37.2 °C) 86 27 64/34 44 -- -- 96 % -- -- -- -- -- --    01/26/25 1800 99 °F (37.2 °C) 85 30 252/187 208 -- -- 94 % -- -- -- -- -- --    01/26/25 1715 99 °F (37.2 °C) 87 27 75/47 57 -- -- 95 % -- -- -- -- -- --    01/26/25 1700 99 °F (37.2 °C)  88 29 82/47 60 -- -- 95 % -- -- -- -- -- --    01/26/25 1636 99.1 °F (37.3 °C) 85 28 76/41 54 -- -- 95 % -- -- -- -- -- --    01/26/25 1601 -- -- -- -- -- -- -- -- -- -- -- -- 9 --    01/26/25 1600 99.1 °F (37.3 °C) 99 35 82/50 62 -- -- 91 % -- -- -- -- -- --    01/26/25 1530 99.1 °F (37.3 °C) 91 29 79/47 59 -- -- 94 % -- -- -- -- -- --    01/26/25 1521 99.3 °F (37.4 °C) 91 27 78/45 58 -- -- 95 % -- -- -- -- -- --    01/26/25 1500 99.3 °F (37.4 °C) 93 27 94/53 70 -- -- 93 % -- -- -- -- -- --    01/26/25 1400 99.3 °F (37.4 °C) 98 28 122/63 86 -- -- 92 % -- -- -- -- -- --    01/26/25 1300 99.5 °F (37.5 °C) 100 29 122/59 84 -- -- 93 % -- -- -- -- -- --    01/26/25 1208 -- -- -- -- -- -- -- -- -- -- -- -- 9 --    01/26/25 1200 100.2 °F (37.9 °C) 102 28 92/50 68 -- -- 91 % -- -- -- -- -- --    01/26/25 1100 99.9 °F (37.7 °C) 107 27 92/54 68 -- -- 91 % -- -- -- -- -- --    01/26/25 1000 99.3 °F (37.4 °C) 109 38 103/56 76 -- -- 92 % -- -- -- -- -- --    01/26/25 0900 98.2 °F (36.8 °C) 90 23 89/52 65 -- -- 92 % -- -- -- -- -- --    01/26/25 0800 96.6 °F (35.9 °C) 83 24 90/52 68 -- -- 92 % -- -- None (Room air) -- 12 --    01/26/25 0700 95.2 °F (35.1 °C) 82 28 97/51 71 -- -- 94 % -- -- -- -- -- --    01/26/25 0630 94.6 °F (34.8 °C) 82 36 102/51 73 -- -- 94 % -- -- -- -- -- --    01/26/25 0600 94.3 °F (34.6 °C) 69 24 84/44 62 -- -- 96 % -- -- -- -- -- --    01/26/25 0530 94.1 °F (34.5 °C) 80 30 117/55 79 -- -- 95 % -- -- -- -- -- --    01/26/25 0500 93.7 °F (34.3 °C) 71 26 88/51 65 -- -- 95 % -- -- -- -- -- --    01/26/25 0445 93.7 °F (34.3 °C) 64 24 79/48 59 -- -- 95 % -- -- -- -- 14 No Pain    01/26/25 0430 93.6 °F (34.2 °C) 65 25 72/38 51 -- -- 96 % -- -- -- -- -- --    01/26/25 0415 93.4 °F (34.1 °C) 68 20 87/51 66 -- -- 94 % -- -- -- -- -- --    01/26/25 0400 93.6 °F (34.2 °C) 78 29 115/53 77 -- -- 95 % -- -- -- -- -- --    01/26/25 0330 93.2 °F (34 °C) 78 16 111/53 74 -- -- 98 % -- -- -- -- -- --    01/26/25 0315 93.2  °F (34 °C) 80 18 111/53 -- -- -- -- -- -- -- -- -- --    01/26/25 0300 92.8 °F (33.8 °C) 80 19 -- -- -- -- -- -- -- -- -- -- --    01/26/25 0245 92.5 °F (33.6 °C) 68 17 98/54 71 -- -- -- -- -- -- -- -- --    01/26/25 0230 92.1 °F (33.4 °C) 70 18 78/48 58 -- -- 97 % -- -- -- -- -- --    01/26/25 0215 92.1 °F (33.4 °C) 74 17 110/56 77 -- -- -- -- -- -- -- -- --    01/26/25 0200 91.8 °F (33.2 °C) 72 17 102/55 73 -- -- -- -- -- -- -- -- --    01/26/25 0145 91.8 °F (33.2 °C) 68 32 105/57 78 -- -- 97 % -- -- None (Room air) -- -- --    01/26/25 0130 90.7 °F (32.6 °C) -- -- -- -- -- -- -- -- -- -- -- -- --    01/26/25 0115 90.7 °F (32.6 °C) 72 17 106/53 76 -- -- 97 % -- -- None (Room air) -- -- --    01/26/25 0100 90.3 °F (32.4 °C) 60 16 75/48 58 -- -- -- -- -- -- -- -- --    01/26/25 0045 90 °F (32.2 °C) 68 16 80/52 62 -- -- -- -- -- -- -- -- --    01/26/25 0030 89.2 °F (31.8 °C) 64 16 86/56 65 -- -- 96 % -- -- -- -- -- --    01/26/25 0015 88.9 °F (31.6 °C) 62 16 88/50 64 -- -- 96 % -- -- -- -- -- --    01/26/25 0000 88.2 °F (31.2 °C) 58 16 94/50 64 -- -- 97 % -- -- -- -- -- --    01/25/25 2345 88.2 °F (31.2 °C) 60 18 89/51 64 -- -- 97 % -- -- -- -- -- --    01/25/25 2330 87.4 °F (30.8 °C) 56 19 82/48 60 -- -- 98 % -- -- None (Room air) Lying -- --    01/25/25 2315 87.1 °F (30.6 °C) 56 18 81/44 57 -- -- 98 % -- -- -- -- -- --    01/25/25 2300 87.1 °F (30.6 °C) -- -- -- -- -- -- -- -- -- -- -- -- --    01/25/25 2245 86.4 °F (30.2 °C) 57 -- 86/53 65 -- -- 97 % -- -- None (Room air) Lying -- --    01/25/25 2230 86.4 °F (30.2 °C) 54 -- -- -- -- -- 99 % -- -- -- -- -- --    01/25/25 2215 86.4 °F (30.2 °C) -- -- -- -- -- -- -- -- -- -- -- -- --    01/25/25 2200 85.6 °F (29.8 °C) 42 19 -- -- -- -- -- -- -- -- -- -- --    01/25/25 2145 85.3 °F (29.6 °C) 44 18 100/52 72 -- -- 97 % -- -- -- -- -- --    01/25/25 2130 84.9 °F (29.4 °C) 46 18 115/58 84 -- -- -- -- -- -- -- -- --    01/25/25 2115 84.9 °F (29.4 °C) 48 16 141/65 94 -- --  97 % -- -- -- -- -- --    01/25/25 2100 84.9 °F (29.4 °C) -- -- -- -- -- -- -- -- -- -- -- -- --    01/25/25 2030 85.1 °F (29.5 °C) 45 15 132/61 88 -- -- 97 % -- -- None (Room air) Lying -- No Pain          Weight (last 2 days)       Date/Time Weight    01/26/25 0404 65.6 (144.62)            Pertinent Labs/Diagnostic Results:   Radiology:  CT head wo contrast   Final Interpretation by George Bejarano DO (01/27 0831)      No acute intracranial abnormality.      Stable left inferior frontal meningioma. Previously described small left temporal and right posterior fossa meningiomas are better visualized on previous MRI 7/7/2023 although also grossly stable.      Major findings are concordant with initial interpretation provided by Dr. Maryann De of [facility].                           Resident: BRANDY AUGUST I, the attending radiologist, have reviewed the images and agree with the final report above.      Workstation performed: [id number]         XR chest portable ICU   Final Interpretation by Linda Troy MD (01/27 0695)      Left jugular catheter with a transverse orientation in the upper SVC with no pneumothorax.      Moderate cardiomegaly.      Herniation of abdominal contents into the thorax.            Workstation performed: [id number]         CT head wo contrast   Final Interpretation by Reese Hamm DO (01/25 1637)      No acute intracranial abnormality.   Redemonstrated meningiomas as detailed above. Consider nonemergent evaluation with contrast-enhanced MR brain to better evaluate.                  Workstation performed: [id number]         CT chest abdomen pelvis w contrast   Final Interpretation by Rd Ramirez MD (01/26 5682)      1. Small bowel wall thickening. Correlate for a mild enteritis.   2. Stable large diaphragmatic hernia containing stomach and transverse colon.   3. Thyroid isthmus nodule. Further evaluation with ultrasound suggested.      The findings are  concordant with the preliminary report provided by [facility].            Workstation performed: [id number]         US right upper quadrant    (Results Pending)   XR chest portable ICU    (Results Pending)     Cardiology:  ECG 12 lead    by Interface, Ris Results In (01/28 0735)      ECG 12 lead   Final Result by Mike Tavares MD (01/26 0639)   Normal sinus rhythm   Nonspecific T wave abnormality   Abnormal ECG   Confirmed by Mike Tavares ([id number]) on 1/26/2025 6:39:44 AM      ECG 12 lead   Final Result by Mike Tavares MD (01/26 0639)   ** Poor data quality, interpretation may be adversely affected   Undetermined rhythm   Baseline Artifact   Abnormal ECG   Confirmed by Mike Tavares ([id number]) on 1/26/2025 6:39:31 AM        GI:  No orders to display       Results from last 7 days   Lab Units 01/27/25  1435   SARS-COV-2  Negative     Results from last 7 days   Lab Units 01/28/25  0511 01/27/25  0552 01/26/25  0435 01/25/25  2048   WBC Thousand/uL 16.75* 26.11* 15.85* 13.72*   HEMOGLOBIN g/dL 9.3* 9.9* 11.3* 13.2   HEMATOCRIT % 29.5* 31.1* 35.2 40.1   PLATELETS Thousands/uL 166 240 374 409*   BANDS PCT %  --  41*  --  8         Results from last 7 days   Lab Units 01/28/25  0511 01/27/25  2219 01/27/25  0552 01/26/25  0435 01/25/25  2048   SODIUM mmol/L 149* 149* 147 142 138   POTASSIUM mmol/L 3.7 3.3* 3.5 4.4 5.5*   CHLORIDE mmol/L 114* 113* 112* 108 103   CO2 mmol/L 23 26 24 26 28   ANION GAP mmol/L 12 10 11 8 7   BUN mg/dL 46* 48* 52* 51* 53*   CREATININE mg/dL 1.23 1.30 1.36* 0.77 0.78   EGFR ml/min/1.73sq m 42 39 37 74 73   CALCIUM mg/dL 8.2* 8.3* 8.2* 8.4 9.8   MAGNESIUM mg/dL 2.4  --  2.0 2.0  --    PHOSPHORUS mg/dL 3.4  --  4.8* 3.6  --      Results from last 7 days   Lab Units 01/28/25  0511 01/27/25  0552 01/26/25  0435 01/25/25 2048   AST U/L 69* 155* 97* 107*   ALT U/L 83* 153* 107* 119*   ALK PHOS U/L 222* 294* 150* 157*   TOTAL PROTEIN g/dL 5.6* 5.5* 5.7* 7.2   ALBUMIN g/dL 2.9* 3.1* 3.1* 3.9   TOTAL  "BILIRUBIN mg/dL 2.00* 2.88* 0.49 0.40     Results from last 7 days   Lab Units 01/26/25  1453 01/25/25 2050   POC GLUCOSE mg/dl 82 107     Results from last 7 days   Lab Units 01/28/25  0511 01/27/25  2219 01/27/25  0552 01/26/25  0435 01/25/25 2048   GLUCOSE RANDOM mg/dL 134 130 104 84 114             No results found for: \"BETA-HYDROXYBUTYRATE\"   Results from last 7 days   Lab Units 01/26/25 2130   PH ART  7.432   PCO2 ART mm Hg 35.8*   PO2 ART mm Hg 109.0   HCO3 ART mmol/L 23.3   BASE EXC ART mmol/L -0.6   O2 CONTENT ART mL/dL 15.2*   O2 HGB, ARTERIAL % 96.9   ABG SOURCE  Line, Arterial     Results from last 7 days   Lab Units 01/25/25 2048   PH BERNRADINO  7.283*   PCO2 BERNARDINO mm Hg 57.8*   PO2 BERNARDINO mm Hg 76.0*   HCO3 BERNARDINO mmol/L 26.7   BASE EXC BERNARDINO mmol/L -0.9   O2 CONTENT BERNARDINO ml/dL 16.5   O2 HGB, VENOUS % 92.2*         Results from last 7 days   Lab Units 01/27/25 2219 01/26/25  0230   CK TOTAL U/L 350* 46     Results from last 7 days   Lab Units 01/26/25  0230 01/25/25  2313 01/25/25 2048   HS TNI 0HR ng/L  --   --  6   HS TNI 2HR ng/L  --  7  --    HSTNI D2 ng/L  --  1  --    HS TNI 4HR ng/L 11  --   --    HSTNI D4 ng/L 5  --   --          Results from last 7 days   Lab Units 01/28/25  0511   PROTIME seconds 16.8*   INR  1.34*     Results from last 7 days   Lab Units 01/25/25 2048   TSH 3RD GENERATON uIU/mL 2.611     Results from last 7 days   Lab Units 01/28/25  0511 01/27/25  0552   PROCALCITONIN ng/ml 4.39* 18.58*     Results from last 7 days   Lab Units 01/26/25  1817   LACTIC ACID mmol/L 1.4             Results from last 7 days   Lab Units 01/25/25  2048   BNP pg/mL 85                                     Results from last 7 days   Lab Units 01/25/25  2104   CLARITY UA  Clear   COLOR UA  Yellow   SPEC GRAV UA  1.010   PH UA  5.0   GLUCOSE UA mg/dl Negative   KETONES UA mg/dl Negative   BLOOD UA  Negative   PROTEIN UA mg/dl Negative   NITRITE UA  Negative   BILIRUBIN UA  Negative   UROBILINOGEN UA (BE) mg/dl " <2.0   LEUKOCYTES UA  Negative     Results from last 7 days   Lab Units 01/27/25  1435   INFLUENZA A PCR  Negative   INFLUENZA B PCR  Negative   RSV PCR  Negative             Results from last 7 days   Lab Units 01/27/25  2219   ACETAMINOPHEN LVL ug/mL <2*                 Results from last 7 days   Lab Units 01/26/25  0436   BLOOD CULTURE  No Growth at 24 hrs.  No Growth at 24 hrs.     Results from last 7 days   Lab Units 01/27/25  0527   TOTAL COUNTED  5     Results from last 7 days   Lab Units 01/27/25  0527   APPEARANCE CSF  Clear   TUBE NUM CSF  4   WBC CSF /uL 3   XANTHOCHROMIA  No   NEUTROPHILS % (CSF) % 40   LYMPHS % (CSF) % 20   MONOCYTES % (CSF) % 40   GLUCOSE CSF mg/dL 63   PROTEIN CSF mg/dL 39   RBC CSF uL 33*     Results from last 7 days   Lab Units 01/28/25  0511 01/27/25  0552   VANCOMYCIN RM ug/mL 14.4 16.4       Network Utilization Review Department  ATTENTION: Please call with any questions or concerns to [phone] and carefully listen to the prompts so that you are directed to the right person. All voicemails are confidential.   For Discharge needs, contact Care Management DC Support Team at [phone] opt. 2  Send all requests for admission clinical reviews, approved or denied determinations and any other requests to dedicated fax number below belonging to the campus where the patient is receiving treatment. List of dedicated fax numbers for the Facilities:  FACILITY NAME UR FAX NUMBER   ADMISSION DENIALS (Administrative/Medical Necessity) [phone]   DISCHARGE SUPPORT TEAM (NETWORK) [phone]   PARENT CHILD HEALTH (Maternity/NICU/Pediatrics) [phone]   [facility] [phone]   [facility] [phone]   [facility] [phone]   [facility] [phone]   [facility] [phone]   [facility] [phone]   [facility]  [facility] [phone]   [facility] [phone]   [facility] [phone]   [facility] [phone]   [facility] [phone]   [facility] [phone]

## 2025-01-28 NOTE — PROGRESS NOTES
Darlin Zamora is a 79 y.o. female who is currently ordered Vancomycin IV with management by the Pharmacy Consult service.  Relevant clinical data and objective / subjective history reviewed.  Vancomycin Assessment:  Indication and Goal AUC/Trough: Other, septic shock, -600, trough >10  Clinical Status:  critically ill, leukocytosis, afebrile    Micro:    Flu/RSV/Covid: negative   MRSA culture: in process   CSF culture: in process   ME Panel: nothing detected   Blood cultures x 2: no growth at 24 hours  Renal Function:  SCr: 1.23 mg/dL (from 1.36, baseline 0.7-0.9)  CrCl: 30 mL/min  Renal replacement: Not on dialysis  Days of Therapy: 3  Current Dose: 750 mg IV daily PRN when random level is less than 15  Vancomycin Plan:  Dosin mg IV daily PRN when random level is less than 15 - give 750 mg IV x 1 now for random level of 14.4  Next Level: Random  at 0600  Renal Function Monitoring: Daily BMP and UOP  Pharmacy will continue to follow closely for s/sx of nephrotoxicity, infusion reactions and appropriateness of therapy.  BMP and CBC will be ordered per protocol. We will continue to follow the patient’s culture results and clinical progress daily.    Montserrat Santamaria, PharmD, [facility]  Critical Care Clinical Pharmacist  [phone]

## 2025-01-28 NOTE — RESPIRATORY THERAPY NOTE
RT Protocol Note  Darlin Zamora 79 y.o. female MRN: [id number]  Unit/Bed#: ICU 10 Encounter: [id number]    Assessment    Active Problems:    Altered mental status/Seizure like activity      Home Pulmonary Medications:         Past Medical History:   Diagnosis Date    Anemia     Cataracts, bilateral     Heart murmur 04/2018    History of transfusion 2003    had 4 units    Hypertension     Meningioma (HCC) 04/2018    MRI every 6months    Ovarian cyst 2006    Shortness of breath     Skin neoplasm     last assessed: 6/13/2017     Social History     Socioeconomic History    Marital status:      Spouse name: Not on file    Number of children: Not on file    Years of education: Not on file    Highest education level: Not on file   Occupational History    Not on file   Tobacco Use    Smoking status: Never     Passive exposure: Past    Smokeless tobacco: Never   Vaping Use    Vaping status: Never Used   Substance and Sexual Activity    Alcohol use: Never    Drug use: No    Sexual activity: Not Currently   Other Topics Concern    Not on file   Social History Narrative    Daily coffee consumption (___cups/day)    Daily cola consumption (____cans/day)    Daily tea consumption (____cups/day)    Uses safety equipment-seatbelts     Social Drivers of Health     Financial Resource Strain: Low Risk  (11/7/2022)    Overall Financial Resource Strain (CARDIA)     Difficulty of Paying Living Expenses: Not hard at all   Food Insecurity: No Food Insecurity (5/2/2024)    Nursing - Inadequate Food Risk Classification     Worried About Running Out of Food in the Last Year: Never true     Ran Out of Food in the Last Year: Never true     Ran Out of Food in the Last Year: Not on file   Transportation Needs: No Transportation Needs (5/2/2024)    PRAPARE - Transportation     Lack of Transportation (Medical): No     Lack of Transportation (Non-Medical): No   Physical Activity: Not on file   Stress: Not on file   Social Connections: Not on  "file   Intimate Partner Violence: Not on file   Housing Stability: Low Risk  (5/2/2024)    Housing Stability Vital Sign     Unable to Pay for Housing in the Last Year: No     Number of Times Moved in the Last Year: 1     Homeless in the Last Year: No       Subjective         Objective    Physical Exam:   Assessment Type: Assess only  General Appearance: Sleeping  Respiratory Pattern: Normal  Chest Assessment: Chest expansion symmetrical  Bilateral Breath Sounds: Clear, Diminished  Cough: None  O2 Device: midflow    Vitals:  Blood pressure 122/58, pulse 66, temperature 99 °F (37.2 °C), resp. rate 21, height 4' 11\" (1.499 m), weight 65.6 kg (144 lb 10 oz), SpO2 96%.    Results from last 7 days   Lab Units 01/26/25  2130   PH ART  7.432   PCO2 ART mm Hg 35.8*   PO2 ART mm Hg 109.0   HCO3 ART mmol/L 23.3   BASE EXC ART mmol/L -0.6   O2 CONTENT ART mL/dL 15.2*   O2 HGB, ARTERIAL % 96.9   ABG SOURCE  Line, Arterial       Imaging and other studies: Results Review Statement: I reviewed radiology reports from this admission including: chest xray.    O2 Device: midflow     Plan    Respiratory Plan: Discontinue Protocol        Resp Comments: Pt assesed for RCP.pt is hospitaslized for Acute metabolic encephalopathy; seizure-like activity; Meningioma.pt doesnt have any pulmonary hx and doesnt take any pulmonary med as per home med.pt is nard at this time and no further resp intervention needed at this time.   "

## 2025-01-28 NOTE — CASE MANAGEMENT
Case Management Discharge Planning Note    Patient name Darlin Zamora  Location ICU 10/ICU 10 MRN [id number]  : 1945 Date 2025       Current Admission Date: 2025  Current Admission Diagnosis:Altered mental status/Seizure like activity  Patient Active Problem List    Diagnosis Date Noted Date Diagnosed    Altered mental status/Seizure like activity 2025     Localized edema 2024     Meningioma (HCC) 2022     Mixed hypercholesterolemia and hypertriglyceridemia 2021     Dysfunctional voiding of urine 2019     S/P total hysterectomy and bilateral salpingo-oophorectomy 02/15/2019     Abdominal pain 2019     Brain mass      Syncope 2018     Hypertension 2018     Basal cell carcinoma 2016     Alopecia 10/04/2012     Anemia 2012     Iron deficiency anemia 2012       LOS (days): 2  Geometric Mean LOS (GMLOS) (days): 4.9  Days to GMLOS:2.3     OBJECTIVE:  Risk of Unplanned Readmission Score: 16.08         Current admission status: Inpatient   Preferred Pharmacy:   [id number] - [place], [place] - [address] [address]  Phone: [phone] Fax: [phone]    Primary Care Provider: Tiffanie Kamara DO    [facility]  Secondary Insurance:  FOR [employer]    DISCHARGE DETAILS:    Other Referral/Resources/Interventions Provided:  Referral Comments: Message received from [facility], regarding patient status.  This CM called [facility] at [phone], left VM that patient remains in ICU, no DC as of yet.

## 2025-01-28 NOTE — PLAN OF CARE
Problem: PAIN - ADULT  Goal: Verbalizes/displays adequate comfort level or baseline comfort level  Description: Interventions:  - Encourage patient to monitor pain and request assistance  - Assess pain using appropriate pain scale  - Administer analgesics based on type and severity of pain and evaluate response  - Implement non-pharmacological measures as appropriate and evaluate response  - Consider cultural and social influences on pain and pain management  - Notify physician/advanced practitioner if interventions unsuccessful or patient reports new pain  Outcome: Progressing     Problem: INFECTION - ADULT  Goal: Absence or prevention of progression during hospitalization  Description: INTERVENTIONS:  - Assess and monitor for signs and symptoms of infection  - Monitor lab/diagnostic results  - Monitor all insertion sites, i.e. indwelling lines, tubes, and drains  - Monitor endotracheal if appropriate and nasal secretions for changes in amount and color  - [place] appropriate cooling/warming therapies per order  - Administer medications as ordered  - Instruct and encourage patient and family to use good hand hygiene technique  - Identify and instruct in appropriate isolation precautions for identified infection/condition  Outcome: Progressing  Goal: Absence of fever/infection during neutropenic period  Description: INTERVENTIONS:  - Monitor WBC    Outcome: Progressing     Problem: SAFETY ADULT  Goal: Patient will remain free of falls  Description: INTERVENTIONS:  - Educate patient/family on patient safety including physical limitations  - Instruct patient to call for assistance with activity   - Consult OT/PT to assist with strengthening/mobility   - Keep Call bell within reach  - Keep bed low and locked with side rails adjusted as appropriate  - Keep care items and personal belongings within reach  - Initiate and maintain comfort rounds  - Make Fall Risk Sign visible to staff  - Offer Toileting every  Hours, in  advance of need  - Initiate/Maintain alarm  - Obtain necessary fall risk management equipment:   - Apply yellow socks and bracelet for high fall risk patients  - Consider moving patient to room near nurses station  Outcome: Progressing  Goal: Maintain or return to baseline ADL function  Description: INTERVENTIONS:  -  Assess patient's ability to carry out ADLs; assess patient's baseline for ADL function and identify physical deficits which impact ability to perform ADLs (bathing, care of mouth/teeth, toileting, grooming, dressing, etc.)  - Assess/evaluate cause of self-care deficits   - Assess range of motion  - Assess patient's mobility; develop plan if impaired  - Assess patient's need for assistive devices and provide as appropriate  - Encourage maximum independence but intervene and supervise when necessary  - Involve family in performance of ADLs  - Assess for home care needs following discharge   - Consider OT consult to assist with ADL evaluation and planning for discharge  - Provide patient education as appropriate  Outcome: Progressing  Goal: Maintains/Returns to pre admission functional level  Description: INTERVENTIONS:  - Perform AM-PAC 6 Click Basic Mobility/ Daily Activity assessment daily.  - Set and communicate daily mobility goal to care team and patient/family/caregiver.   - Collaborate with rehabilitation services on mobility goals if consulted  - Perform Range of Motion  times a day.  - Reposition patient every  hours.  - Dangle patient  times a day  - Stand patient  times a day  - Ambulate patient  times a day  - Out of bed to chair  times a day   - Out of bed for binh times a day  - Out of bed for toileting  - Record patient progress and toleration of activity level   Outcome: Progressing     Problem: DISCHARGE PLANNING  Goal: Discharge to home or other facility with appropriate resources  Description: INTERVENTIONS:  - Identify barriers to discharge w/patient and caregiver  - Arrange for needed  discharge resources and transportation as appropriate  - Identify discharge learning needs (meds, wound care, etc.)  - Arrange for interpretive services to assist at discharge as needed  - Refer to Case Management Department for coordinating discharge planning if the patient needs post-hospital services based on physician/advanced practitioner order or complex needs related to functional status, cognitive ability, or social support system  Outcome: Progressing     Problem: DISCHARGE PLANNING  Goal: Discharge to home or other facility with appropriate resources  Description: INTERVENTIONS:  - Identify barriers to discharge w/patient and caregiver  - Arrange for needed discharge resources and transportation as appropriate  - Identify discharge learning needs (meds, wound care, etc.)  - Arrange for interpretive services to assist at discharge as needed  - Refer to Case Management Department for coordinating discharge planning if the patient needs post-hospital services based on physician/advanced practitioner order or complex needs related to functional status, cognitive ability, or social support system  Outcome: Progressing     Problem: Knowledge Deficit  Goal: Patient/family/caregiver demonstrates understanding of disease process, treatment plan, medications, and discharge instructions  Description: Complete learning assessment and assess knowledge base.  Interventions:  - Provide teaching at level of understanding  - Provide teaching via preferred learning methods  Outcome: Progressing     Problem: Prexisting or High Potential for Compromised Skin Integrity  Goal: Skin integrity is maintained or improved  Description: INTERVENTIONS:  - Identify patients at risk for skin breakdown  - Assess and monitor skin integrity  - Assess and monitor nutrition and hydration status  - Monitor labs   - Assess for incontinence   - Turn and reposition patient  - Assist with mobility/ambulation  - Relieve pressure over bony  prominences  - Avoid friction and shearing  - Provide appropriate hygiene as needed including keeping skin clean and dry  - Evaluate need for skin moisturizer/barrier cream  - Collaborate with interdisciplinary team   - Patient/family teaching  - Consider wound care consult   Outcome: Progressing     Problem: Prexisting or High Potential for Compromised Skin Integrity  Goal: Skin integrity is maintained or improved  Description: INTERVENTIONS:  - Identify patients at risk for skin breakdown  - Assess and monitor skin integrity  - Assess and monitor nutrition and hydration status  - Monitor labs   - Assess for incontinence   - Turn and reposition patient  - Assist with mobility/ambulation  - Relieve pressure over bony prominences  - Avoid friction and shearing  - Provide appropriate hygiene as needed including keeping skin clean and dry  - Evaluate need for skin moisturizer/barrier cream  - Collaborate with interdisciplinary team   - Patient/family teaching  - Consider wound care consult   Outcome: Progressing     Problem: Nutrition/Hydration-ADULT  Goal: Nutrient/Hydration intake appropriate for improving, restoring or maintaining nutritional needs  Description: Monitor and assess patient's nutrition/hydration status for malnutrition. Collaborate with interdisciplinary team and initiate plan and interventions as ordered.  Monitor patient's weight and dietary intake as ordered or per policy. Utilize nutrition screening tool and intervene as necessary. Determine patient's food preferences and provide high-protein, high-caloric foods as appropriate.     INTERVENTIONS:  - Monitor oral intake, urinary output, labs, and treatment plans  - Assess nutrition and hydration status and recommend course of action  - Evaluate amount of meals eaten  - Assist patient with eating if necessary   - Allow adequate time for meals  - Recommend/ encourage appropriate diets, oral nutritional supplements, and vitamin/mineral supplements  -  Order, calculate, and assess calorie counts as needed  - Recommend, monitor, and adjust tube feedings and TPN/PPN based on assessed needs  - Assess need for intravenous fluids  - Provide specific nutrition/hydration education as appropriate  - Include patient/family/caregiver in decisions related to nutrition  Outcome: Progressing

## 2025-01-28 NOTE — PLAN OF CARE
Problem: PAIN - ADULT  Goal: Verbalizes/displays adequate comfort level or baseline comfort level  Description: Interventions:  - Encourage patient to monitor pain and request assistance  - Assess pain using appropriate pain scale  - Administer analgesics based on type and severity of pain and evaluate response  - Implement non-pharmacological measures as appropriate and evaluate response  - Consider cultural and social influences on pain and pain management  - Notify physician/advanced practitioner if interventions unsuccessful or patient reports new pain  Outcome: Progressing     Problem: INFECTION - ADULT  Goal: Absence or prevention of progression during hospitalization  Description: INTERVENTIONS:  - Assess and monitor for signs and symptoms of infection  - Monitor lab/diagnostic results  - Monitor all insertion sites, i.e. indwelling lines, tubes, and drains  - Monitor endotracheal if appropriate and nasal secretions for changes in amount and color  - [place] appropriate cooling/warming therapies per order  - Administer medications as ordered  - Instruct and encourage patient and family to use good hand hygiene technique  - Identify and instruct in appropriate isolation precautions for identified infection/condition  Outcome: Progressing  Goal: Absence of fever/infection during neutropenic period  Description: INTERVENTIONS:  - Monitor WBC    Outcome: Progressing     Problem: SAFETY ADULT  Goal: Patient will remain free of falls  Description: INTERVENTIONS:  - Educate patient/family on patient safety including physical limitations  - Instruct patient to call for assistance with activity   - Consult OT/PT to assist with strengthening/mobility   - Keep Call bell within reach  - Keep bed low and locked with side rails adjusted as appropriate  - Keep care items and personal belongings within reach  - Initiate and maintain comfort rounds  - Make Fall Risk Sign visible to staff  - Offer Toileting every 2 Hours,  in advance of need  - Initiate/Maintain bed alarm  - Obtain necessary fall risk management equipment  - Apply yellow socks and bracelet for high fall risk patients  - Consider moving patient to room near nurses station  Outcome: Progressing  Goal: Maintain or return to baseline ADL function  Description: INTERVENTIONS:  -  Assess patient's ability to carry out ADLs; assess patient's baseline for ADL function and identify physical deficits which impact ability to perform ADLs (bathing, care of mouth/teeth, toileting, grooming, dressing, etc.)  - Assess/evaluate cause of self-care deficits   - Assess range of motion  - Assess patient's mobility; develop plan if impaired  - Assess patient's need for assistive devices and provide as appropriate  - Encourage maximum independence but intervene and supervise when necessary  - Involve family in performance of ADLs  - Assess for home care needs following discharge   - Consider OT consult to assist with ADL evaluation and planning for discharge  - Provide patient education as appropriate  Outcome: Progressing  Goal: Maintains/Returns to pre admission functional level  Description: INTERVENTIONS:  - Perform AM-PAC 6 Click Basic Mobility/ Daily Activity assessment daily.  - Set and communicate daily mobility goal to care team and patient/family/caregiver.   - Collaborate with rehabilitation services on mobility goals if consulted  - Out of bed for toileting  - Record patient progress and toleration of activity level   Outcome: Progressing     Problem: DISCHARGE PLANNING  Goal: Discharge to home or other facility with appropriate resources  Description: INTERVENTIONS:  - Identify barriers to discharge w/patient and caregiver  - Arrange for needed discharge resources and transportation as appropriate  - Identify discharge learning needs (meds, wound care, etc.)  - Arrange for interpretive services to assist at discharge as needed  - Refer to Case Management Department for  coordinating discharge planning if the patient needs post-hospital services based on physician/advanced practitioner order or complex needs related to functional status, cognitive ability, or social support system  Outcome: Progressing     Problem: Knowledge Deficit  Goal: Patient/family/caregiver demonstrates understanding of disease process, treatment plan, medications, and discharge instructions  Description: Complete learning assessment and assess knowledge base.  Interventions:  - Provide teaching at level of understanding  - Provide teaching via preferred learning methods  Outcome: Progressing     Problem: Prexisting or High Potential for Compromised Skin Integrity  Goal: Skin integrity is maintained or improved  Description: INTERVENTIONS:  - Identify patients at risk for skin breakdown  - Assess and monitor skin integrity  - Assess and monitor nutrition and hydration status  - Monitor labs   - Assess for incontinence   - Turn and reposition patient  - Assist with mobility/ambulation  - Relieve pressure over bony prominences  - Avoid friction and shearing  - Provide appropriate hygiene as needed including keeping skin clean and dry  - Evaluate need for skin moisturizer/barrier cream  - Collaborate with interdisciplinary team   - Patient/family teaching  - Consider wound care consult   Outcome: Progressing     Problem: Nutrition/Hydration-ADULT  Goal: Nutrient/Hydration intake appropriate for improving, restoring or maintaining nutritional needs  Description: Monitor and assess patient's nutrition/hydration status for malnutrition. Collaborate with interdisciplinary team and initiate plan and interventions as ordered.  Monitor patient's weight and dietary intake as ordered or per policy. Utilize nutrition screening tool and intervene as necessary. Determine patient's food preferences and provide high-protein, high-caloric foods as appropriate.     INTERVENTIONS:  - Monitor oral intake, urinary output, labs,  and treatment plans  - Assess nutrition and hydration status and recommend course of action  - Evaluate amount of meals eaten  - Assist patient with eating if necessary   - Allow adequate time for meals  - Recommend/ encourage appropriate diets, oral nutritional supplements, and vitamin/mineral supplements  - Order, calculate, and assess calorie counts as needed  - Recommend, monitor, and adjust tube feedings and TPN/PPN based on assessed needs  - Assess need for intravenous fluids  - Provide specific nutrition/hydration education as appropriate  - Include patient/family/caregiver in decisions related to nutrition  Outcome: Progressing

## 2025-01-29 NOTE — PLAN OF CARE
Problem: PAIN - ADULT  Goal: Verbalizes/displays adequate comfort level or baseline comfort level  Description: Interventions:  - Encourage patient to monitor pain and request assistance  - Assess pain using appropriate pain scale  - Administer analgesics based on type and severity of pain and evaluate response  - Implement non-pharmacological measures as appropriate and evaluate response  - Consider cultural and social influences on pain and pain management  - Notify physician/advanced practitioner if interventions unsuccessful or patient reports new pain  Outcome: Progressing     Problem: INFECTION - ADULT  Goal: Absence or prevention of progression during hospitalization  Description: INTERVENTIONS:  - Assess and monitor for signs and symptoms of infection  - Monitor lab/diagnostic results  - Monitor all insertion sites, i.e. indwelling lines, tubes, and drains  - Monitor endotracheal if appropriate and nasal secretions for changes in amount and color  - [place] appropriate cooling/warming therapies per order  - Administer medications as ordered  - Instruct and encourage patient and family to use good hand hygiene technique  - Identify and instruct in appropriate isolation precautions for identified infection/condition  Outcome: Progressing  Goal: Absence of fever/infection during neutropenic period  Description: INTERVENTIONS:  - Monitor WBC    Outcome: Progressing     Problem: SAFETY ADULT  Goal: Patient will remain free of falls  Description: INTERVENTIONS:  - Educate patient/family on patient safety including physical limitations  - Instruct patient to call for assistance with activity   - Consult OT/PT to assist with strengthening/mobility   - Keep Call bell within reach  - Keep bed low and locked with side rails adjusted as appropriate  - Keep care items and personal belongings within reach  - Initiate and maintain comfort rounds  - Make Fall Risk Sign visible to staff  - Offer Toileting every  Hours, in  advance of need  - Initiate/Maintain alarm  - Obtain necessary fall risk management equipment:   - Apply yellow socks and bracelet for high fall risk patients  - Consider moving patient to room near nurses station  Outcome: Progressing  Goal: Maintain or return to baseline ADL function  Description: INTERVENTIONS:  -  Assess patient's ability to carry out ADLs; assess patient's baseline for ADL function and identify physical deficits which impact ability to perform ADLs (bathing, care of mouth/teeth, toileting, grooming, dressing, etc.)  - Assess/evaluate cause of self-care deficits   - Assess range of motion  - Assess patient's mobility; develop plan if impaired  - Assess patient's need for assistive devices and provide as appropriate  - Encourage maximum independence but intervene and supervise when necessary  - Involve family in performance of ADLs  - Assess for home care needs following discharge   - Consider OT consult to assist with ADL evaluation and planning for discharge  - Provide patient education as appropriate  Outcome: Progressing  Goal: Maintains/Returns to pre admission functional level  Description: INTERVENTIONS:  - Perform AM-PAC 6 Click Basic Mobility/ Daily Activity assessment daily.  - Set and communicate daily mobility goal to care team and patient/family/caregiver.   - Collaborate with rehabilitation services on mobility goals if consulted  - Perform Range of Motion  times a day.  - Reposition patient every  hours.  - Dangle patient  times a day  - Stand patient  times a day  - Ambulate patient  times a day  - Out of bed to chair  times a day   - Out of bed for meal times a day  - Out of bed for toileting  - Record patient progress and toleration of activity level   Outcome: Progressing     Problem: DISCHARGE PLANNING  Goal: Discharge to home or other facility with appropriate resources  Description: INTERVENTIONS:  - Identify barriers to discharge w/patient and caregiver  - Arrange for  needed discharge resources and transportation as appropriate  - Identify discharge learning needs (meds, wound care, etc.)  - Arrange for interpretive services to assist at discharge as needed  - Refer to Case Management Department for coordinating discharge planning if the patient needs post-hospital services based on physician/advanced practitioner order or complex needs related to functional status, cognitive ability, or social support system  Outcome: Progressing     Problem: Knowledge Deficit  Goal: Patient/family/caregiver demonstrates understanding of disease process, treatment plan, medications, and discharge instructions  Description: Complete learning assessment and assess knowledge base.  Interventions:  - Provide teaching at level of understanding  - Provide teaching via preferred learning methods  Outcome: Progressing     Problem: Prexisting or High Potential for Compromised Skin Integrity  Goal: Skin integrity is maintained or improved  Description: INTERVENTIONS:  - Identify patients at risk for skin breakdown  - Assess and monitor skin integrity  - Assess and monitor nutrition and hydration status  - Monitor labs   - Assess for incontinence   - Turn and reposition patient  - Assist with mobility/ambulation  - Relieve pressure over bony prominences  - Avoid friction and shearing  - Provide appropriate hygiene as needed including keeping skin clean and dry  - Evaluate need for skin moisturizer/barrier cream  - Collaborate with interdisciplinary team   - Patient/family teaching  - Consider wound care consult   Outcome: Progressing     Problem: Nutrition/Hydration-ADULT  Goal: Nutrient/Hydration intake appropriate for improving, restoring or maintaining nutritional needs  Description: Monitor and assess patient's nutrition/hydration status for malnutrition. Collaborate with interdisciplinary team and initiate plan and interventions as ordered.  Monitor patient's weight and dietary intake as ordered or  per policy. Utilize nutrition screening tool and intervene as necessary. Determine patient's food preferences and provide high-protein, high-caloric foods as appropriate.     INTERVENTIONS:  - Monitor oral intake, urinary output, labs, and treatment plans  - Assess nutrition and hydration status and recommend course of action  - Evaluate amount of meals eaten  - Assist patient with eating if necessary   - Allow adequate time for meals  - Recommend/ encourage appropriate diets, oral nutritional supplements, and vitamin/mineral supplements  - Order, calculate, and assess calorie counts as needed  - Recommend, monitor, and adjust tube feedings and TPN/PPN based on assessed needs  - Assess need for intravenous fluids  - Provide specific nutrition/hydration education as appropriate  - Include patient/family/caregiver in decisions related to nutrition  Outcome: Progressing

## 2025-01-29 NOTE — CONSULTS
Vancomycin IV Pharmacy-to-Dose Consultation    Darlin Zamora is a 79 y.o. female who was receiving Vancomycin IV with management by the Pharmacy Consult service.    The patient’s Vancomycin therapy has been discontinued. Thank you for allowing us to take part in this patient's care. Pharmacy will sign-off now; please call or re-consult if there are any questions.      Montserrat Santamaria, PharmD, [facility]  Critical Care Clinical Pharmacist  [phone]

## 2025-01-29 NOTE — PROGRESS NOTES
Progress Note - Critical Care/ICU   Name: Darlin Zamora 79 y.o. female I MRN: [id number]  Unit/Bed#: ICU 10 I Date of Admission: 2025   Date of Service: 2025 I Hospital Day: 3       Assessment & Plan   Neuro/Psych:  Diagnoses: Acute metabolic encephalopathy; seizure-like activity; Meningioma     Witnessed episode of seizure-like activity - received 2 mg Ativan  CTH : No acute intracranial abnormality; known meningiomas   Ammonia 15  Plan:  D/c EEG   MRI brain ordered   Continue frequent neuro checks  Analgesia: Multimodal. Acetaminophen as needed      CV:  Diagnoses: Septic shock; history of hypertension    Echo: moderate symmetric hypertrophy of basal septal wall (LV); EF 60%; normal systolic function; grade II diastolic dysfunction; mild dilation of left atrium; mild/moderate mitral and tricuspid regurgitation   Plan:  Home anti-hypertensive held   Titrate levophed for MAP > 65   Continuous cardiopulmonary monitoring.      Pulm:  Diagnoses: Respiratory insufficiency, pulmonary consolidation  Imagin/28 CT Chest - worsening right-sided pulmonary infiltrate & effusion, final read pending   Plan:  Respiratory protocol   Continue broad spectrum abx  Continue Xopenex/Atrovent   Supplemental O2, titrate for SpO2 > 92%   Continuous pulse oximetry. Maintain O2 sat >92%.      GI:  Diagnoses: Transaminitis - improving    Last BM:   CT A/P: Small bowel wall thickening. Correlate for a mild enteritis.   AST 69 from 155, ALT 83 from 153, Alk Phos 222 from 294, Total bilirubin 2.0 from 2.88   Plan:  Trend CMP/liver enzymes   F/u RUQ ultrasound      :  Diagnoses: Acute kidney injury   Creatinine trend: elevated   Baseline creatinine: 0.7-0.9  Plan:  Trend renal indices.   Monitor I/Os.     F/E/N:  Plan:   F: Fluid resuscitation prn.  E: Monitor and replete electrolytes for Mg >2, Phos >3, K >4.  N: Tube feeds, advance to goal as tolerated      Heme/Onc:  Diagnoses: No active issues   Plan:  VAS  duplex ordered top evaluate for DVTs  VTE prophylaxis: JOSTIN, SCDs     Endo:  Diagnoses: No active issues  1/27 AM Cortisol 52.7   1/25 TSH 2.611  Plan:   Insulin: Monitor blood glucose.     ID:  Diagnoses: Septic shock w/ upper respiratory infection    Culture data:   1/26 BC negative   1/27 CSF negative  1/27 Flu/COVID/RSV negative   1/27 MRSA negative   Temperature: Normothermic   Plan:  Abx: Zosyn, vancomycin   D/c vancomycin   Monitor fever curve and WBC.     MSK/Skin:  Plan:  PT/OT when appropriate. Encourage OOB and ambulation when appropriate. Local wound care prn.     LDAs:  Lines - left IJ CVC, right radial A-line, peripheral IV   Drains - sanchez  Airways -  n/a    Disposition: Critical care    ICU Core Measures     A: Assess, Prevent, and Manage Pain Has pain been assessed? Yes  Need for changes to pain regimen? No   B: Both SAT/SAT  N/A   C: Choice of Sedation RASS Goal: 0 Alert and Calm or N/A patient not on sedation  Need for changes to sedation or analgesia regimen? NA   D: Delirium CAM-ICU: Unable to perform secondary to Acute cognitive dysfunction   E: Early Mobility  Plan for early mobility? Yes   F: Family Engagement Plan for family engagement today? Yes       Antibiotic Review: d/c vancomycin continue Zosyn     Review of Invasive Devices:    Sanchez Plan: Continue for accurate I/O monitoring for 48 hours  Central access plan: Patient has multiple central venous catheters.  Medications requiring central line Hemodynamic monitoring      Prophylaxis:  VTE VTE covered by:  heparin (porcine), Subcutaneous, 5,000 Units at 01/29/25 0522       Stress Ulcer  not ordered         24 Hour Events : Patient with multiple multiple bradycardic episodes overnight. Reports as low as 27. Sinus bradycardia on telemetry. Maintained BP on 3 of levo.     Subjective       Objective :                   Vitals I/O      Most Recent Min/Max in 24hrs   Temp 98.2 °F (36.8 °C) Temp  Min: 97.9 °F (36.6 °C)  Max: 99.3 °F (37.4 °C)    Pulse 66 Pulse  Min: 54  Max: 84   Resp 15 Resp  Min: 12  Max: 23   /61 BP  Min: 80/48  Max: 152/60   O2 Sat 93 % SpO2  Min: 93 %  Max: 98 %      Intake/Output Summary (Last 24 hours) at 1/29/2025 0900  Last data filed at 1/29/2025 0800  Gross per 24 hour   Intake 2185.07 ml   Output 1315 ml   Net 870.07 ml       Diet Enteral/Parenteral; Tube Feeding No Oral Diet; Jevity 1.2 Jae; Continuous; 30; 250; Water; Every 4 hours    Invasive Monitoring           Physical Exam   Physical Exam  Vitals reviewed.   Eyes:      Extraocular Movements: Extraocular movements intact.      Conjunctiva/sclera:      Right eye: Right conjunctiva is injected.      Pupils: Pupils are equal, round, and reactive to light.   Skin:     General: Skin is warm and dry.      Comments: Bilateral lower extremity erythema. Small superficial abrasions to LLE with dressings in place. Bilateral hands with multiple scratches over surface.    HENT:      Head: Normocephalic.      Nose: No congestion.      Mouth/Throat:      Mouth: Mucous membranes are dry.      Dentition: Abnormal dentition.   Cardiovascular:      Rate and Rhythm: Normal rate and regular rhythm.      Pulses: Normal pulses.   Musculoskeletal:      Right lower leg: Edema present.      Left lower leg: Edema present.   Abdominal:      Palpations: Abdomen is soft.      Tenderness: There is no abdominal tenderness.   Constitutional:       General: She is not in acute distress.     Appearance: She is well-developed.   Pulmonary:      Effort: Pulmonary effort is normal. No respiratory distress.      Breath sounds: Examination of the right-middle field reveals decreased breath sounds. Examination of the right-lower field reveals decreased breath sounds. Examination of the left-lower field reveals decreased breath sounds. Decreased breath sounds and rhonchi present.   Neurological:      GCS: GCS eye subscore is 3. GCS verbal subscore is 2. GCS motor subscore is 5.      Comments: Moving all  extremities.           Diagnostic Studies        Lab Results: I have reviewed the following results:     Medications:  Scheduled PRN   chlorhexidine, 15 mL, Q12H JOSTIN  heparin (porcine), 5,000 Units, Q8H JOSTIN  ipratropium, 0.5 mg, TID  levalbuterol, 1.25 mg, TID  nystatin, , BID  piperacillin-tazobactam, 4.5 g, Q8H  potassium phosphate, 30 mmol, Once  timolol, 1 drop, BID  vancomycin, 15 mg/kg (Adjusted), Q24H            Continuous    norepinephrine, 1-30 mcg/min, Last Rate: Stopped (01/29/25 0820)         Labs:   CBC    Recent Labs     01/28/25  0511 01/29/25  0522   WBC 16.75* 15.60*   HGB 9.3* 9.1*   HCT 29.5* 28.2*    136*     BMP    Recent Labs     01/28/25  0511 01/29/25  0522   SODIUM 149* 149*   K 3.7 3.3*   * 112*   CO2 23 28   AGAP 12 9   BUN 46* 41*   CREATININE 1.23 1.09   CALCIUM 8.2* 8.7       Coags    Recent Labs     01/28/25  0511   INR 1.34*        Additional Electrolytes  Recent Labs     01/28/25  0511 01/29/25  0522   MG 2.4 2.3   PHOS 3.4 1.7*          Blood Gas    No recent results  Recent Labs     01/29/25  0534   PHVEN 7.458*   [id number] 39.5*   PO2VEN 37.6   XVZ6YRC 27.3   BEVEN 3.3   A0LPTYG 72.8    LFTs  Recent Labs     01/28/25  0511   ALT 83*   AST 69*   ALKPHOS 222*   ALB 2.9*   TBILI 2.00*       Infectious  Recent Labs     01/28/25  0511 01/29/25  0522   PROCALCITONI 4.39* 1.80*     Glucose  Recent Labs     01/27/25  2219 01/28/25  0511 01/29/25  0522   GLUC 130 134 171*

## 2025-01-29 NOTE — PLAN OF CARE
Problem: OCCUPATIONAL THERAPY ADULT  Goal: Performs self-care activities at highest level of function for planned discharge setting.  See evaluation for individualized goals.  Description: Treatment Interventions: ADL retraining, Functional transfer training, UE strengthening/ROM, Endurance training, Cognitive reorientation, Patient/family training, Equipment evaluation/education, Compensatory technique education, Continued evaluation, Energy conservation, Activityengagement          See flowsheet documentation for full assessment, interventions and recommendations.   Note: Limitation: Decreased ADL status, Decreased UE ROM, Decreased UE strength, Decreased Safe judgement during ADL, Decreased cognition, Decreased endurance, Decreased self-care trans, Decreased high-level ADLs, Visual deficit  Prognosis: Fair  Assessment: Pt is a 78 y/o female seen for OT eval s/p [facility] [place] after being found down on floor of house during wellness check. Pt is dx'd w/ acute metabolic encephalopathy, and seizure like activity. Pt  has a past medical history of Anemia, Cataracts, bilateral, Heart murmur (04/2018), History of transfusion (2003), Hypertension, Meningioma (HCC) (04/2018), Ovarian cyst (2006), Shortness of breath, and Skin neoplasm. Pt with active OT orders and activity as tolerated orders. Pt lives alone in 2 . Pt was I w/  ADLS and IADLS, drove, & required use of DME PTA including SPC. Pt is currently demonstrating the following occupational deficits: Max/Total A UB/LB ADLS, Max a x2 bed mobility. These deficits that are impacting pt's baseline areas of occupation are a result of the following impairments: pain, endurance, activity tolerance, functional mobility, forward functional reach, balance, trunk control, functional standing tolerance, unsupportive home environment, decreased I w/ ADLS/IADLS, strength, ROM, visual deficits, cognitive impairments, decreased safety awareness, decreased insight into deficits,  impaired fine motor skills, and coordination deficits.The following Occupational Performance Areas to address include: eating, grooming, bathing/shower, toilet hygiene, dressing, medication management, socialization, health maintenance, functional mobility, community mobility, clothing management, cleaning, meal prep, money management, household maintenance, and social participation. Recommend moderate resource intensity, upon D/C. Pt to continue to benefit from acute immediate OT services to address the following goals 2-3x/week to  w/in 10-14 days:     Rehab Resource Intensity Level, OT: II (Moderate Resource Intensity)     Mikayla Barbosa MS, OTR/L

## 2025-01-29 NOTE — PHYSICAL THERAPY NOTE
PHYSICAL THERAPY EVALUATION  NAME:  Darlin Zamora  DATE: 01/29/25    AGE:   79 y.o.  Mrn:   [id number]  ADMIT DX:  Altered mental status [R41.82]  Hypothermia, initial encounter [T68.XXXA]    Past Medical History:   Diagnosis Date    Anemia     Cataracts, bilateral     Heart murmur 04/2018    History of transfusion 2003    had 4 units    Hypertension     Meningioma (HCC) 04/2018    MRI every 6months    Ovarian cyst 2006    Shortness of breath     Skin neoplasm     last assessed: 6/13/2017     Past Surgical History:   Procedure Laterality Date    CYSTOSCOPY N/A 2/14/2019    Procedure: CYSTOSCOPY;  Surgeon: Geronimo Tsai MD;  Location: [facility];  Service: Gynecology Oncology    HYSTERECTOMY N/A 2/14/2019    Procedure: HYSTERECTOMY LAPAROSCOPIC TOTAL (LTH) W/ ROBOTICS bilateral salpingooophorectomy with great difficulty due to mass of 12 cm and taking 2.5 hours;  Surgeon: Geronmio Tsai MD;  Location: [facility];  Service: Gynecology Oncology    TONSILLECTOMY         Length Of Stay: 3  PHYSICAL THERAPY EVALUATION :    01/29/25 1017   PT Last Visit   PT Visit Date 01/29/25   Note Type   Note type Evaluation   Pain Assessment   Pain Assessment Tool FLACC   Pain Rating: FLACC (Rest) - Face 1   Pain Rating: FLACC (Rest) - Legs 0   Pain Rating: FLACC (Rest) - Activity 0   Pain Rating: FLACC (Rest) - Cry 1   Pain Rating: FLACC (Rest) - Consolability 1   Score: FLACC (Rest) 3   Pain Rating: FLACC (Activity) - Face 1   Pain Rating: FLACC (Activity) - Legs 0   Pain Rating: FLACC (Activity) - Activity 1   Pain Rating: FLACC (Activity) - Cry 1   Pain Rating: FLACC (Activity) - Consolability 1   Score: FLACC (Activity) 4   Restrictions/Precautions   Weight Bearing Precautions Per Order Yes   RLE Weight Bearing Per Order WBAT   LLE Weight Bearing Per Order WBAT   Other Precautions Cognitive;Bed Alarm;Aspiration;Multiple lines;Telemetry;O2;Fall Risk;Pain;Visual impairment  (NGT)   Home Living   Type of Home House    Home Layout Two level   Home Equipment Cane   Additional Comments Pt is a poor historian/ nonverbal and per chart -lives alone in 2 SH. Pt was I w/ ADLS and IADLS, drove, & required use of DME PTA including SPC.   Prior Function   Level of [place] Independent with ADLs;Independent with functional mobility;Independent with IADLS   Lives With Alone   IADLs Independent with driving;Independent with meal prep;Independent with medication management   Vocational Retired   Cognition   Overall Cognitive Status Impaired   Attention Difficulty attending to directions   Orientation Level Unable to assess   Memory Unable to assess   Following Commands Unable to follow one step commands   Comments lethargic and nonverbal; groans; briefly turns head to R> L (not consistantly) ; does not track or follow commands.   RUE Assessment   RUE Assessment X  (grossly 2/5)   LUE Assessment   LUE Assessment X  (grossly 3-/5; more spontaneous mvmt noted on L side)   RLE Assessment   RLE Assessment X  (2-2/5 (more spontaneous movements noted throughout L side))   LLE Assessment   LLE Assessment X  (2+/5)   Coordination   Movements are Fluid and Coordinated 0   Sensation   ((+) withdrawl to pain R+ L LE's ;- nonsustained clonus on R (mild))   Bed Mobility   Rolling R 2  Maximal assistance   Additional items Assist x 2   Rolling L 2  Maximal assistance   Additional items Assist x 2   Supine to Sit 2  Maximal assistance   Additional items Assist x 2   Sit to Supine 2  Maximal assistance   Additional items Assist x 2   Additional Comments sits EOB w/ maxA x2 - limtied righting reactions   Ambulation/Elevation   Gait pattern Not appropriate   Balance   Static Sitting Poor -   Dynamic Sitting Poor -   Static Standing Zero   Activity Tolerance   Activity Tolerance Patient limited by fatigue;Patient limited by pain   Medical Staff Made Aware RIGO Ashley   Nurse Made Aware augustine VALENTINE   Assessment   Prognosis Guarded   Problem List Decreased  strength;Decreased range of motion;Decreased endurance;Impaired balance;Decreased mobility;Decreased coordination;Decreased cognition;Impaired judgement;Decreased safety awareness;Impaired vision;Impaired sensation;Impaired tone;Decreased skin integrity;Pain   Assessment Pt is 79 y.o. female seen for PT evaluation s/p admit to [facility] on 1/25/2025  after being found down on floor of house during wellness check. Pt is dx'd w/ acute metabolic encephalopathy,  hypothermia and seizure like activity. Pt has a past medical history of Anemia, Cataracts, bilateral, Heart murmur (04/2018), History of transfusion (2003), Hypertension, Meningioma (HCC) (04/2018), Ovarian cyst (2006), Shortness of breath, and Skin neoplasm.     Pt is a poor historian/ nonverbal and per chart -lives alone in 2 SH. Pt was I w/ ADLS and IADLS, drove, & required use of DME PTA including SPC.  Due to acute medical issues, ongoing medical workup for primary dx; EMG; cognition  decreased activity tolerance compared to baseline, increased assistance needed from caregiver at current time, continuous monitoring, trending labs;  multiple lines, decline in overall functional mobility status; health management issues; note unstable clinical picture (high complexity).     Currently,  pt  is requiring max / DEP A x2 A for bed skills; and for seated balance at EOB; pt noted to turn head to R + L w/ sound; 0 tracking or focus noted; R> L spontaneous movement in UE and LE noted on eval. Nonverbal and lethargic- (+) withdrawal to pain B/l LE's.      Pt presents functioning below baseline and currently w/ overall mobility deficits 2* to: decreased LE strength/AROM; lgross UE and LE weakness/ deconditioning; decreased endurance; decreased activity tolerance; decreased coordination; impaired  sitting balance and tolerance; nonverbal; lethargic; minimally arousable; bed/ chair alarms; multiple lines; hearing impairment/ visual impairments; (Please find  additional objective findings from PT assessment regarding body systems outlined above.) Pt will continue to benefit from skilled PT interventions to address stated impairments; to maximize functional potential; for ongoing pt/ family training; and DME needs.      PT is recommending PT Resource Intensity Level  2 on d/c. PT to see for OOB - mobility progression + updated STG w/ progress.      PT will follow for STG as outlined on eval.   Barriers to Discharge Inaccessible home environment;Decreased caregiver support   Goals   Patient Goals non stated- nonveral   STG Expiration Date 02/12/25   Short Term Goal #1 In 14 days pt will:  Complete bed mobility skills with modAx1 to facilitate safe return to previous living environment and decrease burden on caregivers.  Sit EOB w/ F balane x 25 mins w/ stable vitals. Follow directions for active participation in PT 50% during tx sessions.    Improve LE strength grades by 1 to increase independence w/ all functional mobility, transfers and gait.  Actively participate w/ PT for ongoing pt and family education; DME needs and D/C planning to promote highest level of function in least restrictive environment.   PT Treatment Day 0   Plan   Treatment/Interventions ADL retraining;Functional transfer training;Therapeutic exercise;Endurance training;Cognitive reorientation;LE strengthening/ROM;Patient/family training;Equipment eval/education;Bed mobility;Compensatory technique education;Continued evaluation;Spoke to nursing;Spoke to case management;Spoke to advanced practitioner;OT   PT Frequency 2-3x/wk   Discharge Recommendation   Rehab Resource Intensity Level, PT II (Moderate Resource Intensity)   AM-PAC Basic Mobility Inpatient   Turning in Flat Bed Without Bedrails 1   Lying on Back to Sitting on Edge of Flat Bed Without Bedrails 1   Moving Bed to Chair 1   Standing Up From Chair Using Arms 1   Walk in Room 1   Climb 3-5 Stairs With Railing 1   Basic Mobility Inpatient Raw  Score 6   Turning Head Towards Sound 1   Follow Simple Instructions 1   Low Function Basic Mobility Raw Score  8   Low Function Basic Mobility Standardized Score  10.37   [facility] Level Of Mobility   [facility] Goal 2: Bed activities/Dependent transfer   [facility] Achieved 3: Sit at edge of bed   Barthel Index   Feeding 0   Bathing 0   Grooming Score 0   Dressing Score 0   Bladder Score 0   Bowels Score 0   Toilet Use Score 0   Transfers (Bed/Chair) Score 0   Mobility (Level Surface) Score 0   Stairs Score 0   Barthel Index Score 0   End of Consult   Patient Position at End of Consult Supine;Bed/Chair alarm activated;All needs within reach     The patient's AM-PAC Basic Mobility Inpatient Short Form Raw Score is 6. A Raw score of less than or equal to 16 suggests the patient may benefit from discharge to post-acute rehabilitation services. Please also refer to the recommendation of the Physical Therapist for safe discharge planning.

## 2025-01-29 NOTE — PLAN OF CARE
Problem: PHYSICAL THERAPY ADULT  Goal: Performs mobility at highest level of function for planned discharge setting.  See evaluation for individualized goals.  Description: Treatment/Interventions: ADL retraining, Functional transfer training, Therapeutic exercise, Endurance training, Cognitive reorientation, LE strengthening/ROM, Patient/family training, Equipment eval/education, Bed mobility, Compensatory technique education, Continued evaluation, Spoke to nursing, Spoke to case management, Spoke to advanced practitioner, OT          See flowsheet documentation for full assessment, interventions and recommendations.  Note: Prognosis: Guarded  Problem List: Decreased strength, Decreased range of motion, Decreased endurance, Impaired balance, Decreased mobility, Decreased coordination, Decreased cognition, Impaired judgement, Decreased safety awareness, Impaired vision, Impaired sensation, Impaired tone, Decreased skin integrity, Pain  Assessment: Pt is 79 y.o. female seen for PT evaluation s/p admit to [facility] on 1/25/2025  after being found down on floor of house during wellness check. Pt is dx'd w/ acute metabolic encephalopathy,  hypothermia and seizure like activity. Pt has a past medical history of Anemia, Cataracts, bilateral, Heart murmur (04/2018), History of transfusion (2003), Hypertension, Meningioma (HCC) (04/2018), Ovarian cyst (2006), Shortness of breath, and Skin neoplasm. Pt is a poor historian/ nonverbal and per chart -lives alone in 2 SH. Pt was I w/ ADLS and IADLS, drove, & required use of DME PTA including SPC.  Due to acute medical issues, ongoing medical workup for primary dx; EMG; cognition  decreased activity tolerance compared to baseline, increased assistance needed from caregiver at current time, continuous monitoring, trending labs;  multiple lines, decline in overall functional mobility status; health management issues; note unstable clinical picture (high complexity).  Currently,  pt  is requiring max / DEP A x2 A for bed skills; and for seated balance at EOB; pt noted to turn head to R + L w/ sound; 0 tracking or focus noted; R> L spontaneous movement in UE and LE noted on eval. Nonverbal and lethargic- (+) withdrawal to pain B/l LE's.  Pt presents functioning below baseline and currently w/ overall mobility deficits 2* to: decreased LE strength/AROM; lgross UE and LE weakness/ deconditioning; decreased endurance; decreased activity tolerance; decreased coordination; impaired  sitting balance and tolerance; nonverbal; lethargic; minimally arousable; bed/ chair alarms; multiple lines; hearing impairment/ visual impairments; (Please find additional objective findings from PT assessment regarding body systems outlined above.) Pt will continue to benefit from skilled PT interventions to address stated impairments; to maximize functional potential; for ongoing pt/ family training; and DME needs.  PT is recommending PT Resource Intensity Level  2 on d/c. PT to see for OOB - mobility progression + updated STG w/ progress.      PT will follow for STG as outlined on eval.  Barriers to Discharge: Inaccessible home environment, Decreased caregiver support     Rehab Resource Intensity Level, PT: II (Moderate Resource Intensity)    See flowsheet documentation for full assessment.

## 2025-01-29 NOTE — NURSING NOTE
Critical Care team want this patient on 1:1 in person monitoring d/t AMS on bipap. The patient is at risk for aspiration

## 2025-01-29 NOTE — PLAN OF CARE
Problem: PAIN - ADULT  Goal: Verbalizes/displays adequate comfort level or baseline comfort level  Description: Interventions:  - Encourage patient to monitor pain and request assistance  - Assess pain using appropriate pain scale  - Administer analgesics based on type and severity of pain and evaluate response  - Implement non-pharmacological measures as appropriate and evaluate response  - Consider cultural and social influences on pain and pain management  - Notify physician/advanced practitioner if interventions unsuccessful or patient reports new pain  Outcome: Progressing     Problem: INFECTION - ADULT  Goal: Absence or prevention of progression during hospitalization  Description: INTERVENTIONS:  - Assess and monitor for signs and symptoms of infection  - Monitor lab/diagnostic results  - Monitor all insertion sites, i.e. indwelling lines, tubes, and drains  - Monitor endotracheal if appropriate and nasal secretions for changes in amount and color  - [place] appropriate cooling/warming therapies per order  - Administer medications as ordered  - Instruct and encourage patient and family to use good hand hygiene technique  - Identify and instruct in appropriate isolation precautions for identified infection/condition  Outcome: Progressing  Goal: Absence of fever/infection during neutropenic period  Description: INTERVENTIONS:  - Monitor WBC    Outcome: Progressing     Problem: SAFETY ADULT  Goal: Patient will remain free of falls  Description: INTERVENTIONS:  - Educate patient/family on patient safety including physical limitations  - Instruct patient to call for assistance with activity   - Consult OT/PT to assist with strengthening/mobility   - Keep Call bell within reach  - Keep bed low and locked with side rails adjusted as appropriate  - Keep care items and personal belongings within reach  - Initiate and maintain comfort rounds  - Make Fall Risk Sign visible to staff  - Offer Toileting every 2 Hours,  in advance of need  - Initiate/Maintain bed alarm  - Obtain necessary fall risk management equipment  - Apply yellow socks and bracelet for high fall risk patients  - Consider moving patient to room near nurses station  Outcome: Progressing  Goal: Maintain or return to baseline ADL function  Description: INTERVENTIONS:  -  Assess patient's ability to carry out ADLs; assess patient's baseline for ADL function and identify physical deficits which impact ability to perform ADLs (bathing, care of mouth/teeth, toileting, grooming, dressing, etc.)  - Assess/evaluate cause of self-care deficits   - Assess range of motion  - Assess patient's mobility; develop plan if impaired  - Assess patient's need for assistive devices and provide as appropriate  - Encourage maximum independence but intervene and supervise when necessary  - Involve family in performance of ADLs  - Assess for home care needs following discharge   - Consider OT consult to assist with ADL evaluation and planning for discharge  - Provide patient education as appropriate  Outcome: Progressing  Goal: Maintains/Returns to pre admission functional level  Description: INTERVENTIONS:  - Perform AM-PAC 6 Click Basic Mobility/ Daily Activity assessment daily.  - Set and communicate daily mobility goal to care team and patient/family/caregiver.   - Collaborate with rehabilitation services on mobility goals if consulted  - Out of bed for toileting  - Record patient progress and toleration of activity level   Outcome: Progressing     Problem: DISCHARGE PLANNING  Goal: Discharge to home or other facility with appropriate resources  Description: INTERVENTIONS:  - Identify barriers to discharge w/patient and caregiver  - Arrange for needed discharge resources and transportation as appropriate  - Identify discharge learning needs (meds, wound care, etc.)  - Arrange for interpretive services to assist at discharge as needed  - Refer to Case Management Department for  coordinating discharge planning if the patient needs post-hospital services based on physician/advanced practitioner order or complex needs related to functional status, cognitive ability, or social support system  Outcome: Progressing     Problem: Knowledge Deficit  Goal: Patient/family/caregiver demonstrates understanding of disease process, treatment plan, medications, and discharge instructions  Description: Complete learning assessment and assess knowledge base.  Interventions:  - Provide teaching at level of understanding  - Provide teaching via preferred learning methods  Outcome: Progressing     Problem: Prexisting or High Potential for Compromised Skin Integrity  Goal: Skin integrity is maintained or improved  Description: INTERVENTIONS:  - Identify patients at risk for skin breakdown  - Assess and monitor skin integrity  - Assess and monitor nutrition and hydration status  - Monitor labs   - Assess for incontinence   - Turn and reposition patient  - Assist with mobility/ambulation  - Relieve pressure over bony prominences  - Avoid friction and shearing  - Provide appropriate hygiene as needed including keeping skin clean and dry  - Evaluate need for skin moisturizer/barrier cream  - Collaborate with interdisciplinary team   - Patient/family teaching  - Consider wound care consult   Outcome: Progressing     Problem: Nutrition/Hydration-ADULT  Goal: Nutrient/Hydration intake appropriate for improving, restoring or maintaining nutritional needs  Description: Monitor and assess patient's nutrition/hydration status for malnutrition. Collaborate with interdisciplinary team and initiate plan and interventions as ordered.  Monitor patient's weight and dietary intake as ordered or per policy. Utilize nutrition screening tool and intervene as necessary. Determine patient's food preferences and provide high-protein, high-caloric foods as appropriate.     INTERVENTIONS:  - Monitor oral intake, urinary output, labs,  and treatment plans  - Assess nutrition and hydration status and recommend course of action  - Evaluate amount of meals eaten  - Assist patient with eating if necessary   - Allow adequate time for meals  - Recommend/ encourage appropriate diets, oral nutritional supplements, and vitamin/mineral supplements  - Order, calculate, and assess calorie counts as needed  - Recommend, monitor, and adjust tube feedings and TPN/PPN based on assessed needs  - Assess need for intravenous fluids  - Provide specific nutrition/hydration education as appropriate  - Include patient/family/caregiver in decisions related to nutrition  Outcome: Progressing

## 2025-01-29 NOTE — OCCUPATIONAL THERAPY NOTE
Occupational Therapy Evaluation     Patient Name: Darlin Zamora  Today's Date: 1/29/2025  Problem List  Active Problems:    Altered mental status/Seizure like activity    Past Medical History  Past Medical History:   Diagnosis Date    Anemia     Cataracts, bilateral     Heart murmur 04/2018    History of transfusion 2003    had 4 units    Hypertension     Meningioma (HCC) 04/2018    MRI every 6months    Ovarian cyst 2006    Shortness of breath     Skin neoplasm     last assessed: 6/13/2017     Past Surgical History  Past Surgical History:   Procedure Laterality Date    CYSTOSCOPY N/A 2/14/2019    Procedure: CYSTOSCOPY;  Surgeon: Geronimo Tsai MD;  [facility]: [facility];  Service: Gynecology Oncology    HYSTERECTOMY N/A 2/14/2019    Procedure: HYSTERECTOMY LAPAROSCOPIC TOTAL (LTH) W/ ROBOTICS bilateral salpingooophorectomy with great difficulty due to mass of 12 cm and taking 2.5 hours;  Surgeon: Geronimo Tsai MD;  Location: [facility];  Service: Gynecology Oncology    TONSILLECTOMY             01/29/25 1016   OT Last Visit   OT Visit Date 01/29/25   Note Type   Note type Evaluation   Pain Assessment   Pain Assessment Tool FLACC   Pain Rating: FLACC (Rest) - Face 1   Pain Rating: FLACC (Rest) - Legs 0   Pain Rating: FLACC (Rest) - Activity 0   Pain Rating: FLACC (Rest) - Cry 1   Pain Rating: FLACC (Rest) - Consolability 1   Score: FLACC (Rest) 3   Pain Rating: FLACC (Activity) - Face 1   Pain Rating: FLACC (Activity) - Legs 0   Pain Rating: FLACC (Activity) - Activity 1   Pain Rating: FLACC (Activity) - Cry 1   Pain Rating: FLACC (Activity) - Consolability 1   Score: FLACC (Activity) 4   Restrictions/Precautions   Weight Bearing Precautions Per Order Yes   RLE Weight Bearing Per Order WBAT   LLE Weight Bearing Per Order WBAT   Other Precautions Cognitive;Chair Alarm;Bed Alarm;Multiple lines;Telemetry;WBS;Fall Risk;Pain;Visual impairment  (NGT)   Home Living   Type of Home House   Home Layout Two level    Home Equipment Cane   Additional Comments Pt is nonverbal aside from groaning; brief hx obtained from EMR.   Prior Function   Level of [place] Independent with ADLs;Independent with functional mobility;Independent with IADLS   Lives With Alone   IADLs Independent with driving;Independent with meal prep;Independent with medication management   Vocational Retired   Lifestyle   Autonomy I w/ ADLS, IADLS, Mod I w/ SPC for transfers and  functional mobility PTA   Reciprocal Relationships pt lives alone   Service to Others retired   Intrinsic Gratification unable to report   Subjective   Subjective n/a   ADL   Eating Assistance 1  Total Assistance   Grooming Assistance 2  Maximal Assistance   UB Bathing Assistance 2  Maximal Assistance   LB Bathing Assistance 1  Total Assistance   UB Dressing Assistance 2  Maximal Assistance   LB Dressing Assistance 1  Total Assistance   Toileting Assistance  1  Total Assistance   Functional Assistance 2  Maximal Assistance   Functional Deficit Steadying;Verbal cueing;Supervision/safety;Increased time to complete  (Ax2)   Bed Mobility   Supine to Sit 2  Maximal assistance   Additional items Assist x 2;HOB elevated;Increased time required;Verbal cues;LE management   Sit to Supine 2  Maximal assistance   Additional items Assist x 2;Increased time required;Verbal cues;LE management   Additional Comments pt sat EOB w/ Max A for sitting balance/trunk control   Transfers   Sit to Stand Unable to assess   Stand to Sit Unable to assess   Additional Comments not appropriate @ this time   Functional Mobility   Additional Comments not appropriate @ this time   Balance   Static Sitting Poor -   Dynamic Sitting Poor -   Static Standing Zero   Activity Tolerance   Activity Tolerance Patient limited by fatigue;Patient limited by pain   Medical Staff Made Aware [place] ISSA   Nurse Made Aware yes, Hope LORENZ Assessment   RUE Assessment X  (grossly 2/5)   LUE Assessment   LUE Assessment X  (grossly  3-/5; more spontaneous mvmt noted on L side)   Hand Function   Gross Motor Coordination Impaired   Fine Motor Coordination Impaired   Sensation   Additional Comments withdraws when tickling bottoms of b/l feet; withdraws to pain   Vision - Complex Assessment   Additional Comments eyes closed most of session; noted to gaze in b/l directions; glossy R eye   Cognition   Overall Cognitive Status Impaired   Arousal/Participation Arousable;Lethargic;Poorly responsive   Attention Difficulty attending to directions   Orientation Level Unable to assess   Memory Unable to assess   Following Commands Unable to follow one step commands   Comments pt is lethargic; nonverbal aside from groaning. Not following commands   Assessment   Limitation Decreased ADL status;Decreased UE ROM;Decreased UE strength;Decreased Safe judgement during ADL;Decreased cognition;Decreased endurance;Decreased self-care trans;Decreased high-level ADLs;Visual deficit   Prognosis Fair   Assessment Pt is a 80 y/o female seen for OT eval s/p adm to [facility] after being found down on floor of house during wellness check. Pt is dx'd w/ acute metabolic encephalopathy, and seizure like activity. Pt  has a past medical history of Anemia, Cataracts, bilateral, Heart murmur (04/2018), History of transfusion (2003), Hypertension, Meningioma (HCC) (04/2018), Ovarian cyst (2006), Shortness of breath, and Skin neoplasm. Pt with active OT orders and activity as tolerated orders. Pt lives alone in 2 . Pt was I w/  ADLS and IADLS, drove, & required use of DME PTA including SPC. Pt is currently demonstrating the following occupational deficits: Max/Total A UB/LB ADLS, Max a x2 bed mobility. These deficits that are impacting pt's baseline areas of occupation are a result of the following impairments: pain, endurance, activity tolerance, functional mobility, forward functional reach, balance, trunk control, functional standing tolerance, unsupportive home environment,  decreased I w/ ADLS/IADLS, strength, ROM, visual deficits, cognitive impairments, decreased safety awareness, decreased insight into deficits, impaired fine motor skills, and coordination deficits.The following Occupational Performance Areas to address include: eating, grooming, bathing/shower, toilet hygiene, dressing, medication management, socialization, health maintenance, functional mobility, community mobility, clothing management, cleaning, meal prep, money management, household maintenance, and social participation. Recommend moderate resource intensity, upon D/C. Pt to continue to benefit from acute immediate OT services to address the following goals 2-3x/week to  w/in 10-14 days:   Goals   Patient Goals none stated @ this time   LTG Time Frame 10-   Long Term Goal #1 see below listed goals   Plan   Treatment Interventions ADL retraining;Functional transfer training;UE strengthening/ROM;Endurance training;Cognitive reorientation;Patient/family training;Equipment evaluation/education;Compensatory technique education;Continued evaluation;Energy conservation;Activityengagement   Goal Expiration Date 25   OT Frequency 2-3x/wk   Discharge Recommendation   Rehab Resource Intensity Level, OT II (Moderate Resource Intensity)   Additional Comments  The patient's raw score on the -PAC Daily Activity Inpatient Short Form is 7. A raw score of less than 19 suggests the patient may benefit from discharge to post-acute rehabilitation services. Please refer to the recommendation of the Occupational Therapist for safe discharge planning.   Additional Comments 2 Pt seen as a co-session due to the patient's co-morbidities, clinically unstable presentation, and present impairments which are a regression from the patient's baseline   -[facility] Daily Activity Inpatient   Lower Body Dressing 1   Bathing 1   Toileting 1   Upper Body Dressing 1   Grooming 2   Eating 1   Daily Activity Raw Score 7   Turning Head  Towards Sound 2   Follow Simple Instructions 2   Low Function Daily Activity Raw Score 11   Low Function Daily Activity Standardized Score  19.45   AM-PAC Applied Cognition Inpatient   Following a Speech/Presentation 1   Understanding Ordinary Conversation 2   Taking Medications 1   Remembering Where Things Are Placed or Put Away 1   Remembering List of 4-5 Errands 1   Taking Care of Complicated Tasks 1   Applied Cognition Raw Score 7   Applied Cognition Standardized Score 15.17   End of Consult   Education Provided Yes   Patient Position at End of Consult Supine;Bed/Chair alarm activated;All needs within reach   Nurse Communication Nurse aware of consult        GOALS    1) Pt will complete rolling left/right in bed with Min assist, as prerequisite for further engagement in ADLS   2) Pt will complete supine to sit transfer with Min A using B/L UEs to initiate bed mobility   3) Pt will tolerate sitting at EOB 20 minutes with S assist and stable vital signs, as prerequisite for further engagement in ADLS   4) Pt will complete grooming task with S assist and increased time to increase independence in functional tasks  5) Pt will increase B/L UE ROM 1/2 MMT to increase functional UB use during ADLS   6) Pt will complete UB ADLS with Min A and use of AD/DME as needed to increase independence in functional tasks  7) Pt will complete LB ADLS with Mod A and use of AD/DME as needed to increase independence in functional tasks  8) Pt will follow 100% simple one step verbal commands and be A/Ox4 consistently t/o use of external environmental cues to increase awareness for functional tasks  9) Pt will demonstrate 100% carryover of E.C. techniques t/o fx'l I/ADL/ leisure tasks w/o cues s/p skilled education  10) Pt will engage in ongoing  assessments, screens, and activities t/o fx'l I/ADL/leisure tasks w/ G participation to A w/ adaptation and accomodations or rule out visual perceptual impairments    ** OTR to assess  transfers/functional mobility when appropriate    Mikayla Barbosa MS, OTR/L

## 2025-01-29 NOTE — PROGRESS NOTES
Darlin Zamora is a 79 y.o. female who is currently ordered Vancomycin IV with management by the Pharmacy Consult service.  Relevant clinical data and objective / subjective history reviewed.  Vancomycin Assessment:  Indication and Goal AUC/Trough: Other, septic shock, -600, trough >10  Clinical Status:  critically ill, leukocytosis, afebrile  Micro:    Flu/RSV/Covid: negative   MRSA culture: negative   CSF culture: no growth   ME Panel: nothing detected   Blood cultures x 2: no growth at 48 hours   Renal Function:  SCr: 1.09 mg/dL (from 1.23, baseline 0.7-0.9)  CrCl: 35 mL/min  Renal replacement: Not on dialysis  Days of Therapy: 4  Current Dose: 750 mg IV daily PRN when random level is less than 15  Vancomycin Plan:  New Dosin mg IV q24h  Estimated AUC: 457 mcg*hr/mL  Estimated Trough: 13.8 mcg/mL  Next Level: Random 2/5 at 0600  Renal Function Monitoring: Daily BMP and UOP  Pharmacy will continue to follow closely for s/sx of nephrotoxicity, infusion reactions and appropriateness of therapy.  BMP and CBC will be ordered per protocol. We will continue to follow the patient’s culture results and clinical progress daily.    Montserrat Santamaria, PharmD, [facility]  Critical Care Clinical Pharmacist  [phone]

## 2025-01-29 NOTE — CASE MANAGEMENT
Case Management Discharge Planning Note    Patient name Darlin Zamora  Location ICU 10/ICU 10 MRN [id number]  : 1945 Date 2025       Current Admission Date: 2025  Current Admission Diagnosis:Altered mental status/Seizure like activity  Patient Active Problem List    Diagnosis Date Noted Date Diagnosed    Altered mental status/Seizure like activity 2025     Localized edema 2024     Meningioma (HCC) 2022     Mixed hypercholesterolemia and hypertriglyceridemia 2021     Dysfunctional voiding of urine 2019     S/P total hysterectomy and bilateral salpingo-oophorectomy 02/15/2019     Abdominal pain 2019     Brain mass      Syncope 2018     Hypertension 2018     Basal cell carcinoma 2016     Alopecia 10/04/2012     Anemia 2012     Iron deficiency anemia 2012       LOS (days): 3  Geometric Mean LOS (GMLOS) (days): 4.9  Days to GMLOS:1.5     OBJECTIVE:  Risk of Unplanned Readmission Score: 14.97       Current admission status: Inpatient   Preferred Pharmacy:   [id number] - [address] [address]  Phone: [phone] Fax: [phone]    Primary Care Provider: Tiffanie Kamara DO    Primary Insurance: [facility]  Secondary Insurance:  FOR LIFE    DISCHARGE DETAILS:    Contacts  Patient Contacts: Jes  Relationship to Patient:: Other (Comment) (Adult Protective Services )  Contact Method: Phone  Phone Number: [phone]     Additional Comments: CM received voicemail from Jes from [facility] Department of Aging, sharing patient’s full name and , requesting update on patient ([phone]). TC with Jes. Jes is the  assigned to the patient from [facility], and Adult Protective Services.    Jes shared there is an active and open case regarding the patient, as 2 reports of need have been placed concerning the patient to  Adult Protective Services.     Jes shared that the [facility] notified her department yesterday that the patient's home is going to be condemned and she will be evicted. Jes and this CM are both unaware regarding if patient or family has been notified or is aware of this.     Jes reported that patient's case was originally with [facility] but transferred to [facility]. Jes verbalized awareness and would like update re: patient's DCP when available. CM following

## 2025-01-30 NOTE — CASE MANAGEMENT
Case Management Discharge Planning Note    Patient name Darlin Zamora  Location ICU 10/ICU 10 MRN [id number]  : 1945 Date 2025       Current Admission Date: 2025  Current Admission Diagnosis:Altered mental status/Seizure like activity  Patient Active Problem List    Diagnosis Date Noted Date Diagnosed    Altered mental status/Seizure like activity 2025     Localized edema 2024     Meningioma (HCC) 2022     Mixed hypercholesterolemia and hypertriglyceridemia 2021     Dysfunctional voiding of urine 2019     S/P total hysterectomy and bilateral salpingo-oophorectomy 02/15/2019     Abdominal pain 2019     Brain mass      Syncope 2018     Hypertension 2018     Basal cell carcinoma 2016     Alopecia 10/04/2012     Anemia 2012     Iron deficiency anemia 2012       LOS (days): 4  Geometric Mean LOS (GMLOS) (days): 4.9  Days to GMLOS:0.3     OBJECTIVE:  Risk of Unplanned Readmission Score: 17.32       Current admission status: Inpatient   Preferred Pharmacy:   [id number] - [address] [address]  Phone: [phone] Fax: [phone]    Primary Care Provider: Tiffanie Kamara DO    Primary Insurance: [employer]: [facility] RE[facility]  Secondary Insurance:  FOR LIFE    DISCHARGE DETAILS:  Additional Comments: CM met with patient, patient sister, and patient son bedside. Patient alert and talking. Patient and patient son share patient does not have additional children and is .    CM discussed STR recommendation. Son and patient agreeable to blanket referral to in-network facilities. Son and sister state they know and are planning for patient to no longer be able to be residing alone, and they reside within 30 minutes of [place]. Pt owns cane as DME. CM sent referrals via [employer]. CM following

## 2025-01-30 NOTE — PLAN OF CARE
01/30/25 1433   Recommendations/Interventions   Recommendations to Provider jevity 1.2 at 60ml/hr with 125ml free water q 6hrs will provide 1440ml, 1728kcal (26kcal/kg), 80g protein (1.2g/kg), 1662ml free water from TF and flushes (25ml/kg)         Problem: Nutrition/Hydration-ADULT  Goal: Nutrient/Hydration intake appropriate for improving, restoring or maintaining nutritional needs  Description: Monitor and assess patient's nutrition/hydration status for malnutrition. Collaborate with interdisciplinary team and initiate plan and interventions as ordered.  Monitor patient's weight and dietary intake as ordered or per policy. Utilize nutrition screening tool and intervene as necessary. Determine patient's food preferences and provide high-protein, high-caloric foods as appropriate.     INTERVENTIONS:  - Monitor oral intake, urinary output, labs, and treatment plans  - Assess nutrition and hydration status and recommend course of action  - Evaluate amount of meals eaten  - Assist patient with eating if necessary   - Allow adequate time for meals  - Recommend/ encourage appropriate diets, oral nutritional supplements, and vitamin/mineral supplements  - Order, calculate, and assess calorie counts as needed  - Recommend, monitor, and adjust tube feedings and TPN/PPN based on assessed needs  - Assess need for intravenous fluids  - Provide specific nutrition/hydration education as appropriate  - Include patient/family/caregiver in decisions related to nutrition  Outcome: Progressing

## 2025-01-30 NOTE — QUICK NOTE
Attempted to contact son Zack. Left voicemail advising non-emergent update, provided ICU callback number.

## 2025-01-30 NOTE — PROGRESS NOTES
Progress Note - Critical Care/ICU   Name: Darlin Zamora 79 y.o. female I MRN: [id number]  Unit/Bed#: ICU 10 I Date of Admission: 2025   Date of Service: 2025 I Hospital Day: 4      Assessment & Plan   Neuro/Psych:  Diagnoses: Acute metabolic encephalopathy; seizure-like activity; Meningioma     Witnessed episode of seizure-like activity - received 2 mg Ativan  CTH : No acute intracranial abnormality; known meningiomas   Ammonia 15  MRI brain w/wo:  No obvious infarct.  Meningiomas appear stable.    Plan:   Continue frequent neuro checks  Paraneoplastic panel pending  Analgesia: Multimodal. Acetaminophen as needed      CV:  Diagnoses: Septic shock; history of hypertension    Echo: moderate symmetric hypertrophy of basal septal wall (LV); EF 60%; normal systolic function; grade II diastolic dysfunction; mild dilation of left atrium; mild/moderate mitral and tricuspid regurgitation   Telemetry:  Some bradycardia in 40s/50s, last pause was 10:21   Plan:  Home anti-hypertensive held   MAP > 65, has been off levophed since yesterday    Continuous cardiopulmonary monitoring.      Pulm:  Diagnoses: Respiratory insufficiency, pleural effusions  Imagin/28 CT Chest - New moderate-sized right and small left pleural effusions with new complete right lower lobe atelectasis and significant left lower lobe atelectasis. No PNA  Plan:  Consider thoracentesis  Respiratory protocol   Continue CTX  Continue Xopenex/Atrovent   BIPAP qhs   Continuous pulse oximetry. Maintain O2 sat >92%.      GI:  Diagnoses: Transaminitis - resolved  Last BM Date: 25 (25 : Peter Graham RN)   CT A/P: Small bowel wall thickening. Correlate for a mild enteritis.   AST 69 from 155, ALT 83 from 153, Alk Phos 222 from 294, Total bilirubin 2.0 from 2.88   Lipase and amylase normal  RUQ US: Trace perihepatic ascites.  Gallbladder not visualized. No biliary ductal dilation.    Plan:  Trend CMP        :  Diagnoses: Acute kidney injury   Creatinine trend: at baseline  Baseline creatinine: 0.7-0.9  Plan:  Trend renal indices.   Monitor I/Os.     F/E/N:  Plan:   F: Fluid resuscitation prn.  E: Monitor and replete electrolytes for Mg >2, Phos >3, K >4.  Na rising 151.  Repeat BMP at 1200  N: Tube feeds, advance to goal as tolerated.  Increase free water flushes to 250 ml q1h, adjust after repeat BMP     Heme/Onc:  Diagnoses: Thrombocytopenia  LE Duplex: No DVT or superficial thrombophlebitis b/l  Plan:  VTE prophylaxis: JOSTIN, SCDs  4T score 3, low probability of HIT - no need to send HIT panel or switch to argatroban at this time.  May be due to Zosyn or infection.       Endo:  Diagnoses: No active issues  1/27 AM Cortisol 52.7   1/25 TSH 2.611  Plan:   Insulin: Monitor blood glucose.     ID:  Diagnoses: Septic shock w/ upper respiratory infection    Culture data:   1/26 BC negative   1/27 CSF negative  1/27 Flu/COVID/RSV negative   1/27 MRSA negative   Temperature: Normothermic   Previously on Vanc/Zosyn for 4 days, CTX day 1  Pending Lyme  Plan:  Abx CTX day 1  Monitor fever curve and WBC.     MSK/Skin:  Diagnoses: LE cellulitis b/l.  Skin wounds and cuts on UE b/l  Plan:  PT/OT when appropriate. Encourage OOB and ambulation when appropriate. Local wound care prn.     LDAs:  Lines - left IJ CVC, right radial A-line, peripheral IV   Drains - sanchez  Airways -  n/a  Disposition: Critical care    ICU Core Measures     A: Assess, Prevent, and Manage Pain Has pain been assessed? Yes  Need for changes to pain regimen? No   B: Both SAT/SAT  N/A   C: Choice of Sedation RASS Goal: N/A patient not on sedation  Need for changes to sedation or analgesia regimen? NA   D: Delirium CAM-ICU: Negative   E: Early Mobility  Plan for early mobility? Yes   F: Family Engagement Plan for family engagement today? Yes       Antibiotic Review: Awaiting culture results.     Review of Invasive Devices:    Sanchez Plan: Continue for accurate  I/O monitoring for 48 hours  Central access plan: Will obtain peripheral access and discontinue prior to transfer      Prophylaxis:  VTE VTE covered by:  heparin (porcine), Subcutaneous, 5,000 Units at 01/29/25 2301       Stress Ulcer  not ordered         24 Hour Events : Patient had a few episodes of bradycardia in the high 40s but otherwise tolerated BiPAP.  This a.m. she is currently off of BiPAP.  Able to answer questions appropriately and follow commands however lethargic.  Subjective   Review of Systems: See HPI for Review of Systems    Objective :                   Vitals I/O      Most Recent Min/Max in 24hrs   Temp 97.9 °F (36.6 °C) Temp  Min: 97.9 °F (36.6 °C)  Max: 99 °F (37.2 °C)   Pulse 66 Pulse  Min: 56  Max: 84   Resp (!) 10 Resp  Min: 10  Max: 23   /69 BP  Min: 84/52  Max: 153/70   O2 Sat 100 % SpO2  Min: 93 %  Max: 100 %      Intake/Output Summary (Last 24 hours) at 1/30/2025 0616  Last data filed at 1/30/2025 0400  Gross per 24 hour   Intake 2014.62 ml   Output 980 ml   Net 1034.62 ml       Diet Enteral/Parenteral; Tube Feeding No Oral Diet; Jevity 1.2 Jae; Continuous; 30; 250; Water; Every 4 hours    Invasive Monitoring           Physical Exam   Physical Exam  Vitals reviewed.   Eyes:      Extraocular Movements: Extraocular movements intact.      Conjunctiva/sclera:      Right eye: Right conjunctiva is injected.      Pupils: Pupils are equal, round, and reactive to light.   Skin:     General: Skin is warm and dry.      Comments: Bilateral lower extremity erythema. Small superficial abrasions to LLE with dressings in place. Bilateral hands with multiple scratches over surface.    HENT:      Head: Normocephalic.      Nose: No congestion.      Mouth/Throat:      Mouth: Mucous membranes are dry.      Dentition: Abnormal dentition.   Cardiovascular:      Rate and Rhythm: Normal rate and regular rhythm.      Pulses: Normal pulses.   Musculoskeletal:      Right lower leg: Edema present.      Left  lower leg: Edema present.   Abdominal:      Palpations: Abdomen is soft.      Tenderness: There is no abdominal tenderness.   Constitutional:       General: She is not in acute distress.     Appearance: She is well-developed.   Pulmonary:      Effort: Pulmonary effort is normal. No respiratory distress.      Breath sounds: Examination of the right-middle field reveals decreased breath sounds. Examination of the right-lower field reveals decreased breath sounds. Examination of the left-lower field reveals decreased breath sounds. Decreased breath sounds and rhonchi present.   Neurological:      Mental Status: She is oriented to person, place and time.      GCS: GCS eye subscore is 3. GCS verbal subscore is 2. GCS motor subscore is 5.      Cranial Nerves: Dysarthria present.      Comments: Moving all extremities, follows commands.           Diagnostic Studies        Lab Results: I have reviewed the following results:     Medications:  Scheduled PRN   cefTRIAXone, 1,000 mg, Q24H  chlorhexidine, 15 mL, Q12H JOSTIN  folic acid 1 mg in sodium chloride 0.9 % 50 mL IVPB, 1 mg, Daily  heparin (porcine), 5,000 Units, Q8H JOSTIN  ipratropium, 0.5 mg, TID  levalbuterol, 1.25 mg, TID  nystatin, , BID  ofloxacin, 1 drop, 4x Daily  [START ON 2/1/2025] thiamine, 200 mg, Daily  thiamine, 500 mg, Daily  [START ON 2/4/2025] thiamine, 100 mg, Daily  timolol, 1 drop, BID          Continuous    norepinephrine, 1-30 mcg/min, Last Rate: Stopped (01/29/25 1745)         Labs:   CBC    Recent Labs     01/29/25 0522 01/30/25  0457   WBC 15.60* 8.81   HGB 9.1* 8.8*   HCT 28.2* 29.0*   * 99*   BANDSPCT 4  --      BMP    Recent Labs     01/29/25 0522 01/30/25  0457   SODIUM 149* 151*   K 3.3* 3.8   * 115*   CO2 28 29   AGAP 9 7   BUN 41* 37*   CREATININE 1.09 0.87   CALCIUM 8.7 8.4       Coags    No recent results     Additional Electrolytes  Recent Labs     01/29/25 0522 01/30/25  0457   MG 2.3  --    PHOS 1.7* 3.3   CAIONIZED  --   1.18          Blood Gas    No recent results  Recent Labs     01/29/25  0534   PHVEN 7.458*   OBG5PRX 39.5*   PO2VEN 37.6   AIG5OTR 27.3   BEVEN 3.3   C6ECRHT 72.8    LFTs  Recent Labs     01/30/25  0457   ALT 46   AST 24   ALKPHOS 261*   ALB 2.8*   TBILI 0.95       Infectious  Recent Labs     01/29/25  0522   PROCALCITONI 1.80*     Glucose  Recent Labs     01/29/25  0522 01/30/25  0457   GLUC 171* 137

## 2025-01-30 NOTE — SPEECH THERAPY NOTE
Speech-Language Pathology Progress Note      Patient Name: Darlin Zamora    Today's Date: 1/30/2025      Subjective:  Pt was  more awake and responsive today, able to answer simple questions, slow to respond at times . She was sitting upright in bed. Patient's brother present in room.    Objective:  Pt was seen today for dysphagia therapy. She is currently NPO with NGT. Pt was on 3L O2 via nasal cannula. Oral care was completed with use of oral care kits. Focus of today's session was  to re assess for PO readiness . Textures offered today included puree, ice chips via tsp, thin liquid via tsp and via straw, and honey thick liquid via tsp.  Swallow function:   Oral opening was weak but adequate. She was dependent for bolus delivery and containment was reduced with anterior spillage. Oral manipulation/initiation was delayed and weak. Max cues were needed for transfer/swallow of ice chips, puree and HTL. Swallow of thin liquids via straw trial yielded independent swallow initiation but was followed by immediate cough. As trials progressed increased cueing was needed for pt to maintain alertness.     Assessment:  Swallow function is improving but remains limited and concerning for aspiration at this time. Alertness noted to wax/wane.     Plan:  Continue NPO with NGT now, ST f/u ongoing to assess for PO readiness.

## 2025-01-31 NOTE — SPEECH THERAPY NOTE
Speech-Language Pathology Progress Note      Patient Name: Darlin Zamora    Today's Date: 1/31/2025      Subjective:  Pt was awake and alert. She was repositioned for optimal PO intake safety.     Objective:  Pt was seen today for dysphagia therapy. She remains NPO with NGT at this time. Pt was on 3L O2 via nasal cannula. Oral care was completed with use of oral care kits. Focus of today's session was to maximize PO intake safety and determine readiness for PO intake . Textures offered today included puree, thin liquid via cup, and honey thick liquid via cup.  Swallow function:   Pt had weak oral containment with anterior spillage of thin liquid. No bolus loss with puree/HTL. Suspect premature posterior spillage of thin. Cough occurred with thin liquid trial. Pt had slow but functional transfer of puree and HTL. Mild cough noted x1 with trials of apple sauce, and all remaining trials were tolerated without s/s aspiration.     Assessment:  Swallow function is improving. Initiation of PO diet is recommended at this time.     Plan:  Begin  puree and honey thick liquids. Continue ST follow up. Subsequent sessions to focus on maximizing PO intake safety, determining potential for diet texture advancement, and determining potential for liquid consistency advancement.

## 2025-01-31 NOTE — PROGRESS NOTES
Progress Note - Critical Care/ICU   Name: Darlin Zamora 79 y.o. female I MRN: [id number]  Unit/Bed#: ICU 10 I Date of Admission: 2025   Date of Service: 2025 I Hospital Day: 5      Assessment & Plan   Neuro/Psych:  Diagnoses: Acute metabolic encephalopathy; seizure-like activity; Meningioma     Witnessed episode of seizure-like activity - received 2 mg Ativan  CTH : No acute intracranial abnormality; known meningiomas   Ammonia 15  MRI brain w/wo:  No obvious infarct.  Meningiomas appear stable.    Plan:   Continue frequent neuro checks, q2h during day and q4h  Paraneoplastic panel pending  Analgesia: Multimodal. Acetaminophen as needed      CV:  Diagnoses: Septic shock; history of hypertension    Echo: moderate symmetric hypertrophy of basal septal wall (LV); EF 60%; normal systolic function; grade II diastolic dysfunction; mild dilation of left atrium; mild/moderate mitral and tricuspid regurgitation   Telemetry:  Some bradycardia in 40s/50s, last pause was 10:21   Plan:  Home anti-hypertensive held   MAP > 65, has been off levophed since yesterday    Continuous cardiopulmonary monitoring.      Pulm:  Diagnoses: Respiratory insufficiency, pleural effusions  Imagin/28 CT Chest - New moderate-sized right and small left pleural effusions with new complete right lower lobe atelectasis and significant left lower lobe atelectasis. No PNA  Plan:  Thoracentesis likely not appropriate based on diaphragmatic hernia and pleural effusions are not large enough in volume to warrant tap  Respiratory protocol   Continue CTX  Continue Xopenex/Atrovent   BIPAP qhs   Continuous pulse oximetry. Maintain O2 sat >92%.      GI:  Diagnoses: Transaminitis - resolved  Last BM Date: 25 (25 0600 : Jossy Vines RN)   CT A/P: Small bowel wall thickening. Correlate for a mild enteritis.   Lipase and amylase normal  RUQ US: Trace perihepatic ascites.  Gallbladder not visualized. No biliary  ductal dilation.    Plan:  Trend CMP        :  Diagnoses: Acute kidney injury   Creatinine trend: at baseline  Baseline creatinine: 0.7-0.9  Plan:  Trend renal indices.   Monitor I/Os.     F/E/N:  Plan:   F: Fluid resuscitation prn.  E: Monitor and replete electrolytes for Mg >2, Phos >3, K >4.    N: Pureed diet with honey thick liquids    Heme/Onc:  Diagnoses: Thrombocytopenia  LE Duplex: No DVT or superficial thrombophlebitis b/l  Plan:  VTE prophylaxis: JOSTIN, SCDs  4T score 3, low probability of HIT - no need to send HIT panel or switch to argatroban at this time.  May be due to Zosyn or infection.       Endo:  Diagnoses: No active issues  1/27 AM Cortisol 52.7   1/25 TSH 2.611  Plan:   Insulin: Monitor blood glucose.     ID:  Diagnoses: Septic shock w/ upper respiratory infection    Culture data:   1/26 BC negative   1/27 CSF negative  1/27 Flu/COVID/RSV negative   1/27 MRSA negative   Lyme IGM neg but IGG positive  Sputum shows Stenotrophomonas maltophilia.     Temperature: Normothermic   Previously on Vanc/Zosyn for 4 days, CTX day 2  Ofloxacin eye drops QID b/l for 3 days total    Plan:  Abx CTX   Monitor fever curve and WBC.       MSK/Skin:  Diagnoses: LE cellulitis b/l.  Skin wounds and cuts on UE b/l  Plan:  PT/OT when appropriate. Encourage OOB and ambulation when appropriate. Local wound care prn.     LDAs:  Lines - left IJ CVC, peripheral IV   Drains - n/a  Airways -  n/a    Disposition: Med Surg with Telemetry    ICU Core Measures     A: Assess, Prevent, and Manage Pain Has pain been assessed? Yes  Need for changes to pain regimen? No   B: Both SAT/SAT  N/A   C: Choice of Sedation RASS Goal: N/A patient not on sedation  Need for changes to sedation or analgesia regimen? NA   D: Delirium CAM-ICU: Negative   E: Early Mobility  Plan for early mobility? Yes   F: Family Engagement Plan for family engagement today? Yes       Antibiotic Review: Awaiting culture results.     Review of Invasive  Devices:      Central access plan: Will obtain peripheral access and discontinue prior to transfer      Prophylaxis:  VTE VTE covered by:  heparin (porcine), Subcutaneous, 5,000 Units at 01/31/25 0542       Stress Ulcer  not ordered         24 Hour Events : Pt was seen resting in bed with BIPAP on.  Reports of urinary retention requiring straight cath overnight.  She currently denies any current complaints otherwise.    Subjective   Review of Systems: See HPI for Review of Systems    Objective :                   Vitals I/O      Most Recent Min/Max in 24hrs   Temp 98.4 °F (36.9 °C) Temp  Min: 97.9 °F (36.6 °C)  Max: 99.3 °F (37.4 °C)   Pulse 60 Pulse  Min: 54  Max: 68   Resp 12 Resp  Min: 10  Max: 24   /69 BP  Min: 95/55  Max: 158/75   O2 Sat 99 % SpO2  Min: 97 %  Max: 100 %      Intake/Output Summary (Last 24 hours) at 1/31/2025 0600  Last data filed at 1/31/2025 0500  Gross per 24 hour   Intake 3515 ml   Output 560 ml   Net 2955 ml       Diet Enteral/Parenteral; Tube Feeding No Oral Diet; Jevity 1.2 Jae; Continuous; 60; 100; Water; Every 4 hours    Invasive Monitoring           Physical Exam   Physical Exam  Vitals reviewed.   Eyes:      Extraocular Movements: Extraocular movements intact.      Conjunctiva/sclera:      Right eye: Right conjunctiva is injected.      Pupils: Pupils are equal, round, and reactive to light.   Skin:     General: Skin is warm and dry.      Comments: Bilateral lower extremity erythema. Small superficial abrasions to LLE with dressings in place. Bilateral hands with multiple scratches over surface.    HENT:      Head: Normocephalic.      Nose: No congestion.      Mouth/Throat:      Mouth: Mucous membranes are dry.      Dentition: Abnormal dentition.   Cardiovascular:      Rate and Rhythm: Normal rate and regular rhythm.      Pulses: Normal pulses.   Musculoskeletal:      Right lower leg: Edema present.      Left lower leg: Edema present.   Abdominal:      Palpations: Abdomen is  soft.      Tenderness: There is no abdominal tenderness.   Constitutional:       General: She is not in acute distress.     Appearance: She is well-developed.   Pulmonary:      Effort: Pulmonary effort is normal. No respiratory distress.      Breath sounds: Examination of the right-middle field reveals decreased breath sounds. Examination of the right-lower field reveals decreased breath sounds. Examination of the left-lower field reveals decreased breath sounds. Decreased breath sounds and rhonchi present.   Neurological:      Mental Status: She is oriented to person, place and time.      GCS: GCS eye subscore is 3. GCS verbal subscore is 2. GCS motor subscore is 5.      Cranial Nerves: Dysarthria present.      Comments: Moving all extremities, follows commands.           Diagnostic Studies        Lab Results: I have reviewed the following results:     Medications:  Scheduled PRN   cefTRIAXone, 2,000 mg, Q24H  chlorhexidine, 15 mL, Q12H JOSTIN  folic acid 1 mg in sodium chloride 0.9 % 50 mL IVPB, 1 mg, Daily  heparin (porcine), 5,000 Units, Q8H JOSTIN  ipratropium, 0.5 mg, TID  levalbuterol, 1.25 mg, TID  nystatin, , BID  ofloxacin, 1 drop, 4x Daily  polyethylene glycol, 17 g, Daily  senna-docusate sodium, 1 tablet, BID  [START ON 2/1/2025] thiamine, 200 mg, Daily  thiamine, 500 mg, Daily  [START ON 2/4/2025] thiamine, 100 mg, Daily  timolol, 1 drop, BID          Continuous          Labs:   CBC    Recent Labs     01/30/25 0457   WBC 8.81   HGB 8.8*   HCT 29.0*   PLT 99*     BMP    Recent Labs     01/30/25 0457 01/30/25  1242   SODIUM 151* 145   K 3.8 3.9   * 114*   CO2 29 29   AGAP 7 2*   BUN 37* 31*   CREATININE 0.87 0.78   CALCIUM 8.4 8.2*       Coags    No recent results     Additional Electrolytes  Recent Labs     01/30/25  0457   MG 2.1   PHOS 3.3   CAIONIZED 1.18          Blood Gas    No recent results  No recent results LFTs  Recent Labs     01/30/25 0457   ALT 46   AST 24   ALKPHOS 261*   ALB 2.8*    TBILI 0.95       Infectious  No recent results  Glucose  Recent Labs     01/30/25  0457 01/30/25  1242   GLUC 137 158*

## 2025-01-31 NOTE — PROCEDURES
Intubation    Date/Time: 1/31/2025 1:38 PM    Performed by: Michelle Winters DO  Authorized by: Michelle Winters DO    Patient location:  Bedside  Consent:     Consent obtained:  Emergent situation  Universal protocol:     Patient identity confirmed:  Arm band  Pre-procedure details:     Patient status:  Unresponsive    Mallampati score:  3    Pretreatment medications:  Etomidate    Paralytics:  Succinylcholine  Indications:     Indications for intubation: respiratory failure    Procedure details:     Preoxygenation:  Bag valve mask    CPR in progress: no      Intubation method:  Oral    Oral intubation technique:  Direct    Laryngoscope blade:  Mac 4    Tube size (mm):  7.5    Tube type:  Cuffed    Number of attempts:  1    Ventilation between attempts: no      Cricoid pressure: no      Tube visualized through cords: yes    Placement assessment:     Tube secured with:  ETT villegas    Breath sounds:  Equal    Placement verification: chest rise, condensation, CXR verification, direct visualization, equal breath sounds, ETCO2 detector and tube exhalation      CXR findings:  ETT in right main stem    Tube repositioned: yes    Comments:      Patient vomited attempting to position for intubation.  She did have a significant decline in her oxygenation to 4% on the monitor.  Endotracheal tube was placed emergently once airway was suctioned of vomitus.  Endotracheal tube was right mainstem positions.  It was repositioned with final markings of 22 cm at the teeth.    Procedure does not include critical care time.

## 2025-01-31 NOTE — RESPIRATORY THERAPY NOTE
01/31/25 1500   Respiratory Assessment   Assessment Type Assess only   Respiratory Pattern Assisted   Chest Assessment Chest expansion symmetrical   Bilateral Breath Sounds Diminished   Resp Comments Pt was apneic, requiring intubation. Pt on bipap upon arrival to room, pt taken off of bipap and began vomiting, pt rolled on side and sx orally with yankauer. Pt was then intubated on first attempt by Dr. Winters 7.5 ett 24 at the lip, positive color change and bilateral breath sounds. After CXR ett withdrawn to 22 at lip. Pt placed onto vent on documented VC settings, 14/400/100/+8, 9ml/kg IBW.   O2 Device vent   Vent Information   Vent ID [id number]   Vent type Verdugo G5   Verdugo Vent Mode (S)CMV   $ Vent Charge-INITIAL Yes   Ventilator Start Yes   $ Pulse Oximetry Spot Check Charge Completed   SpO2 100 %   (S)CMV Settings   Resp Rate (BPM) 14 BPM   VT (mL) 400 mL   FIO2 (%) 100 %   PEEP (cmH2O) 8 cmH2O   I:E Ratio 1:3.3   Insp Time (%) 1 %   Flow Trigger (LPM) 5   Humidification Heater   Heater Temperature (Set) 98.6 °F (37 °C)   (S)CMV Actuals   Resp Rate (BPM) 14 BPM   VT (mL) 409   MV 5.7   MAP (cmH2O) 14 cmH2O   Peak Pressure (cmH2O) 32 cmH2O   Heater Temperature (Obs) 95 °F (35 °C)   (S)CMV Alarms   High Peak Pressure (cmH2O) 40   Low Pressure (cmH2O) 5 cm H2O   High Resp Rate (BPM) 40 BPM   Low Resp Rate (BPM) 8 BPM   High MV (L/min) 18 L/min   Low MV (L/min) 4 L/min   High VT (mL) 750 mL   Low VT (mL) 250 mL   Apnea Time (s) 20 S   Maintenance   Alarm (pink) cable attached No   Resuscitation bag with peep valve at bedside Yes   Water bag changed No   Circuit changed No   Respiratory Charges    $ Intubation/Intubation Assist Yes

## 2025-01-31 NOTE — PROGRESS NOTES
I have personally seen and examined patient and reviewed all data with resident. Agree with note, assessment and plan. Critical care time 68 minutes. Please refer to attending comments below. Critical care time does not include procedures, family meeting or teaching.     Left frontal meningioma with concern for seizure event  Right inferior cerebellar pontine angle meningioma  Hypernatremia  Severe encephalopathy  Thrombocytopenia  Leukocytosis  [place]-improved  Elevated LFTs/tremors and a minus-resolved  Bilateral pleural effusions  Right lower lobe atelectasis greater than left lower lobe  Bradycardia  Grade 2 diastolic cardiac dysfunction  Septic shock-resolved  Hypothermia- resolved  Hiatal hernia  Excoriation bilateral UE    Exam:  Please see update below    Goal systolic blood pressure:  Normotensive with mean arterial pressures greater than 65    Respiratory support:  BiPAP inspiratory time, expiratory 6, FiO2 40%  Atrovent 0.02% inhaled 0.5 mg 3 times daily  Xopenex 1.25 mg inhaled 3 times daily    I/O 2.9 L   mL  BM 1    Devices:  1/26/2025 triple-lumen central venous access right IJ  1/27/2025 NG tube  1/30/2025 external urinary catheter    Micro:  1/29/2025 sputum culture +2 stenotrophomonas maltophilia  1/29/2025 Lyme IgM-negative  1/29/2025 Lyme IgG positive  1/29/2025 Lyme total antibodies positive  1/27/2025 flu/COVID/RSV-negative    Antimicrobials:  Ceftriaxone 2 g IV every 24 hours initiated 1/30/2025  Ofloxacin ophthalmic 1 drop 4 times daily to both eyes    Vital signs reviewed overnight without significant abnormality.  Patient's heart rate remained in the 50s to low 60s.  No febrile events.  Patient was maintained on BiPAP.  No new imaging to review.  Patient did have an MRI completed on 1/30/2025 with no acute intracranial pathology.  Stable meningiomas.  Stable chronic microangiopathy.  Laboratory data reviewed magnesium 1.9, will replete and recheck.  Hemoglobin is 8.8.  Platelets are  stable at 119.  Lyme total antibodies returned positive.  Lyme IgG positive.  Lyme IgM negative.  All glucoses less than 180.    Patient is positive for stenotrophomonas in his sputum.  Awaiting culture sensitivities to adjust antibiotics.  Patient continues to require BiPAP for respiratory support.  He has bilateral basilar atelectasis at pleural effusions.  The pleural effusions are likely not significantly compromising his respiratory status but the atelectasis is likely contributing.  Will review respiratory protocol and aggressiveness of pulmonary hygiene.  Consider repeat chest x-ray today.    Patient has not had any clinical seizure events.  He is presently not on any AEDs.  Continue to monitor neurologic exam every 2 hours during today every 4 hours at night.    Patient had bradycardic events.  He did have Lyme positive for IgG but not for IgM.  Likely this is not the etiology for his bradycardia.  Continue to monitor on telemetry.  Consider EP evaluation.  TSH within appropriate range.    Update: 128pm    Continue bipap HS and prn    Aggressive pulmonary hygiene    Continue TF, transition to po diet    Platelets improved    Update 5:30 PM    Patient was reevaluated as nursing was concerned that she had worsening work of breathing.  Patient was noted to be using accessory muscles and having increased respiratory rate.  She was placed on BiPAP.  She started to have episodes of bradycardia and had become less responsive.    Patient did not respond appropriately to BiPAP and required emergent intubation.  Prior to intubation with positioning patient did have vomiting event.  7.5 endotracheal tube was placed and noted to be right mainstem.  It was repositioned to 22 cm at the teeth.    During these events patient had episodes of hypotension.  IV fluid bolus was administered and norepinephrine infusion was initiated.  Prior to patient having respiratory decline she had significant hypertension.  It was unclear if  patient had a flash pulmonary edema type event that pushed her to respiratory decompensation or if it was related to her participating with a p.o. diet and having a significant hiatal hernia for which she was unable to maintain her respiratory status.    Post decompensation labs pending.  EKG pending.  Imaging of chest x-ray as described above with right mainstem bronchus endotracheal tube position subsequently repositioned.  Patient also had OG tube placed which appears to be in her hiatal hernia in her stomach.  Patient's family to be updated regarding events.  There is a concern that patient's respiratory status is poor due to multiple reasons.  Is unclear how she will progress from this recent event.    Continue with hemodynamic support.  Attempt to wean norepinephrine to maintain mean arterial pressure greater than 65.  Lactic acid returned at 0.8.  BMP is pending.  ABG with pH of 7.289, pCO2 53.7.  PaO2 315.  Base excess -1.8..  Adjust ventilator to achieve normal CO2.

## 2025-01-31 NOTE — PLAN OF CARE
Problem: PAIN - ADULT  Goal: Verbalizes/displays adequate comfort level or baseline comfort level  Description: Interventions:  - Encourage patient to monitor pain and request assistance  - Assess pain using appropriate pain scale  - Administer analgesics based on type and severity of pain and evaluate response  - Implement non-pharmacological measures as appropriate and evaluate response  - Consider cultural and social influences on pain and pain management  - Notify physician/advanced practitioner if interventions unsuccessful or patient reports new pain  Outcome: Progressing     Problem: INFECTION - ADULT  Goal: Absence or prevention of progression during hospitalization  Description: INTERVENTIONS:  - Assess and monitor for signs and symptoms of infection  - Monitor lab/diagnostic results  - Monitor all insertion sites, i.e. indwelling lines, tubes, and drains  - Monitor endotracheal if appropriate and nasal secretions for changes in amount and color  - [place] appropriate cooling/warming therapies per order  - Administer medications as ordered  - Instruct and encourage patient and family to use good hand hygiene technique  - Identify and instruct in appropriate isolation precautions for identified infection/condition  Outcome: Progressing  Goal: Absence of fever/infection during neutropenic period  Description: INTERVENTIONS:  - Monitor WBC    Outcome: Progressing     Problem: SAFETY ADULT  Goal: Patient will remain free of falls  Description: INTERVENTIONS:  - Educate patient/family on patient safety including physical limitations  - Instruct patient to call for assistance with activity   - Consult OT/PT to assist with strengthening/mobility   - Keep Call bell within reach  - Keep bed low and locked with side rails adjusted as appropriate  - Keep care items and personal belongings within reach  - Initiate and maintain comfort rounds  - Make Fall Risk Sign visible to staff  - Apply yellow socks and bracelet  for high fall risk patients  - Consider moving patient to room near nurses station  Outcome: Progressing  Goal: Maintain or return to baseline ADL function  Description: INTERVENTIONS:  -  Assess patient's ability to carry out ADLs; assess patient's baseline for ADL function and identify physical deficits which impact ability to perform ADLs (bathing, care of mouth/teeth, toileting, grooming, dressing, etc.)  - Assess/evaluate cause of self-care deficits   - Assess range of motion  - Assess patient's mobility; develop plan if impaired  - Assess patient's need for assistive devices and provide as appropriate  - Encourage maximum independence but intervene and supervise when necessary  - Involve family in performance of ADLs  - Assess for home care needs following discharge   - Consider OT consult to assist with ADL evaluation and planning for discharge  - Provide patient education as appropriate  Outcome: Progressing  Goal: Maintains/Returns to pre admission functional level  Description: INTERVENTIONS:  - Perform AM-PAC 6 Click Basic Mobility/ Daily Activity assessment daily.  - Set and communicate daily mobility goal to care team and patient/family/caregiver.   - Collaborate with rehabilitation services on mobility goals if consulted  - Out of bed for toileting  - Record patient progress and toleration of activity level   Outcome: Progressing     Problem: DISCHARGE PLANNING  Goal: Discharge to home or other facility with appropriate resources  Description: INTERVENTIONS:  - Identify barriers to discharge w/patient and caregiver  - Arrange for needed discharge resources and transportation as appropriate  - Identify discharge learning needs (meds, wound care, etc.)  - Arrange for interpretive services to assist at discharge as needed  - Refer to Case Management Department for coordinating discharge planning if the patient needs post-hospital services based on physician/advanced practitioner order or complex needs  related to functional status, cognitive ability, or social support system  Outcome: Progressing     Problem: Knowledge Deficit  Goal: Patient/family/caregiver demonstrates understanding of disease process, treatment plan, medications, and discharge instructions  Description: Complete learning assessment and assess knowledge base.  Interventions:  - Provide teaching at level of understanding  - Provide teaching via preferred learning methods  Outcome: Progressing     Problem: Prexisting or High Potential for Compromised Skin Integrity  Goal: Skin integrity is maintained or improved  Description: INTERVENTIONS:  - Identify patients at risk for skin breakdown  - Assess and monitor skin integrity  - Assess and monitor nutrition and hydration status  - Monitor labs   - Assess for incontinence   - Turn and reposition patient  - Assist with mobility/ambulation  - Relieve pressure over bony prominences  - Avoid friction and shearing  - Provide appropriate hygiene as needed including keeping skin clean and dry  - Evaluate need for skin moisturizer/barrier cream  - Collaborate with interdisciplinary team   - Patient/family teaching  - Consider wound care consult   Outcome: Progressing     Problem: Nutrition/Hydration-ADULT  Goal: Nutrient/Hydration intake appropriate for improving, restoring or maintaining nutritional needs  Description: Monitor and assess patient's nutrition/hydration status for malnutrition. Collaborate with interdisciplinary team and initiate plan and interventions as ordered.  Monitor patient's weight and dietary intake as ordered or per policy. Utilize nutrition screening tool and intervene as necessary. Determine patient's food preferences and provide high-protein, high-caloric foods as appropriate.     INTERVENTIONS:  - Monitor oral intake, urinary output, labs, and treatment plans  - Assess nutrition and hydration status and recommend course of action  - Evaluate amount of meals eaten  - Assist  patient with eating if necessary   - Allow adequate time for meals  - Recommend/ encourage appropriate diets, oral nutritional supplements, and vitamin/mineral supplements  - Order, calculate, and assess calorie counts as needed  - Recommend, monitor, and adjust tube feedings and TPN/PPN based on assessed needs  - Assess need for intravenous fluids  - Provide specific nutrition/hydration education as appropriate  - Include patient/family/caregiver in decisions related to nutrition  Outcome: Progressing      Add Intralesional Injection: No

## 2025-01-31 NOTE — QUICK NOTE
Called patient's sister and updated her on patient's condition, patient unfortunately had sudden respiratory distress requiring intubation.  Answered in depth her questions regarding her medical status.      Attempted to call patient's son, Zack Simon but went to voicemail immediately.

## 2025-01-31 NOTE — PROCEDURES
Arterial Line Insertion    Date/Time: 1/31/2025 3:37 PM    Performed by: Yoanna Yusuf DO  Authorized by: Yoanna Yusuf DO    Patient location:  ICU  Consent:     Consent obtained:  Emergent situation  Universal protocol:     Procedure explained and questions answered to patient or proxy's satisfaction: no      Relevant documents present and verified: no      Test results available and properly labeled: no      Radiology Images displayed and confirmed.  If images not available, report reviewed: no      Required blood products, implants, devices, and special equipment available: yes      Site/side marked: no      Immediately prior to procedure a time out was called: no      Patient identity confirmed:  Arm band  Indications:     Indications: hemodynamic monitoring and continuous blood pressure monitoring    Pre-procedure details:     Skin preparation:  Chlorhexidine    Preparation: Patient was prepped and draped in sterile fashion    Anesthesia (see MAR for exact dosages):     Anesthesia method:  None  Procedure details:     Location / Tip of Catheter:  Radial    Laterality:  Right    Reno's test performed: no      Needle gauge:  20 G    Placement technique:  Percutaneous, ultrasound guided and Seldinger    Ultrasound image availability:  Not saved    Sterile ultrasound techniques: Sterile gel and sterile probe covers were used      Number of attempts:  1    Successful placement: yes      Transducer: waveform confirmed    Post-procedure details:     Post-procedure:  Sterile dressing applied and sutured    Patient tolerance of procedure:  Tolerated well, no immediate complications  Comments:      Radial and Ulnar arteries had palpable pulses and patency of both were confirmed on US      Yoanna Yusuf DO

## 2025-02-01 PROBLEM — J96.01 ACUTE HYPOXIC RESPIRATORY FAILURE (HCC): Status: ACTIVE | Noted: 2025-01-01

## 2025-02-01 PROBLEM — R65.21 SEPTIC SHOCK (HCC): Status: ACTIVE | Noted: 2025-01-01

## 2025-02-01 PROBLEM — A41.9 SEPTIC SHOCK (HCC): Status: ACTIVE | Noted: 2025-01-01

## 2025-02-01 PROBLEM — G93.40 ACUTE ENCEPHALOPATHY: Status: ACTIVE | Noted: 2025-02-01

## 2025-02-01 NOTE — ASSESSMENT & PLAN NOTE
Possibly all secondary to an aspiration event with pneumonia versus pneumonitis.  Still requiring reasonably high level ventilatory support.  Sputum culture only growing stenotrophomonas of unclear significance.  -Antibiotics as above for now  -Recheck imaging of the chest  -Await bronchoscopy for later today  -Ongoing ventilatory support  -Wean off the ventilator as able

## 2025-02-01 NOTE — ASSESSMENT & PLAN NOTE
In the setting of recent severe hypothermia and presumptive pneumonia.  Cognition had improved but now on a ventilator and sedated.  MRI of the brain and CSF analysis not indicative of a new acute process.  Workup for active seizure negative thus far  -Monitor cognition  -Neurology follow-up  -Additional workup as per neurology

## 2025-02-01 NOTE — RESPIRATORY THERAPY NOTE
Bedside bronchoscopy performed by pulm fellow with Dr Winters. Instilled 9ml 1% lidocaine, pt tolerated procedure well. One sample taken and walked to lab. ETT withdrawn 1cm to 21 at teeth.

## 2025-02-01 NOTE — PROGRESS NOTES
Progress Note - Critical Care/ICU   Name: Darlin Zamora 79 y.o. female I MRN: [id number]  Unit/Bed#: ICU 10 I Date of Admission: 1/25/2025   Date of Service: 2/1/2025 I Hospital Day: 6       Assessment & Plan     Neuro/Psych:  Diagnoses: Acute metabolic encephalopathy; seizure-like activity; Meningioma    1/27 Witnessed episode of seizure-like activity - received 2 mg Ativan  CTH 1/26: No acute intracranial abnormality; known meningiomas   Ammonia 15  MRI brain w/wo:  No obvious infarct.  Meningiomas appear stable.    Plan:   Continue frequent neuro checks, q2h during day and q4h  Paraneoplastic panel pending  Analgesia   Multimodal. Acetaminophen as needed      CV:  Diagnoses: Septic shock, bradycardia, history of hypertension   1/28 Echo: moderate symmetric hypertrophy of basal septal wall (LV); EF 60%; normal systolic function; grade II diastolic dysfunction; mild dilation of left atrium; mild/moderate mitral and tricuspid regurgitation   Telemetry:  Some bradycardia in 40s/50s, last pause was 10:21 1/29  Plan:  Continue levo, started vasopressin overnight   MAP > 65  Started IVF  Bolus 1L IVF and 500cc albumin overnight   Started stress dose steroids 1/31  Continue arterial line   Continue central line      Pulm:  Diagnoses: Acute hypoxic respiratory failure in the setting of pneumonia   1/28 CT Chest - New moderate-sized right and small left pleural effusions with new complete right lower lobe atelectasis and significant left lower lobe atelectasis. No PNA  Intubated 1/31 for respiratory failure/hypoxia  Plan:  Continue AC/VC 18/400/8/60%  Escalated to Cefepime and Vanco given degree of shock and risk for concomitate infection   HTS/Mucomyst  Respiratory protocol/Airway clearance protocol   Continue Xopenex/Atrovent   Continuous pulse oximetry. Maintain O2 sat >92%.      GI:  Diagnoses: Transaminitis - resolved  CT A/P: Small bowel wall thickening. Correlate for a mild enteritis.   Lipase and amylase  normal  RUQ US: Trace perihepatic ascites.  Gallbladder not visualized. No biliary ductal dilation.    Plan:  Trend CMP        :  Diagnoses: Acute kidney injury   Creatinine trend: at baseline  Baseline creatinine: 0.7-0.9  Plan:  Continue sanchez  Monitor I/Os.     F/E/N:  F: Continue IVF  E: Trend electrolytes and replete PRN   N: NPO- restart TF today      Heme/Onc:  Diagnoses: Thrombocytopenia likely in the setting of sepsis   LE Duplex: No DVT or superficial thrombophlebitis b/l  Plan:  VTE prophylaxis: JOSTIN, SCDs  4T score 3, low probability of HIT - no need to send HIT panel or switch to argatroban at this time  Trend CBC       Endo:  Diagnoses: No active issues  1/27 AM Cortisol 52.7   1/25 TSH 2.611  Plan:   Insulin  Goal -180     ID:  Diagnoses: Septic shock w/ upper respiratory infection    Culture data:   1/26 BC negative   1/27 CSF negative  1/27 Flu/COVID/RSV negative   1/27 MRSA negative   Lyme IGM neg but IGG positive  Sputum shows Stenotrophomonas maltophilia.     Changed to cefepime and vanco given significant degree of shock        MSK/Skin:  Diagnoses: LE cellulitis b/l.  Skin wounds and cuts on UE b/l  Plan:  PT/OT when appropriate. Encourage OOB and ambulation when appropriate. Local wound care prn.     LDAs:  Lines - left IJ CVC, peripheral IV   Drains - n/a  Airways -  n/a    Disposition: Critical care    ICU Core Measures     Vented Patient  VAP Bundle  VAP bundle ordered     A: Assess, Prevent, and Manage Pain Has pain been assessed? Yes  Need for changes to pain regimen? No   B: Both Spontaneous Awakening Trials (SATs) and Spontaneous Breathing Trials (SBTs) Plan to perform spontaneous awakening trial today? No secondary to severe hypoxia/hypocapnia   Plan to perform spontaneous breathing trial today? No secondary to severe hypoxia/hypocapnia   Obvious barriers to extubation? Yes   C: Choice of Sedation RASS Goal: -2 Light Sedation or -1 Drowsy  Need for changes to sedation or  analgesia regimen? No   D: Delirium CAM-ICU: Unable to perform secondary to Acute cognitive dysfunction   E: Early Mobility  Plan for early mobility? No   F: Family Engagement Plan for family engagement today? Yes       Antibiotic Review: Continue broad spectrum secondary to severity of illness.     Review of Invasive Devices:    Christina Plan: Continue for accurate I/O monitoring for 48 hours  Central access plan: Patient has multiple central venous catheters.   Sirisha Plan: Keep arterial line for hemodynamic monitoring    Prophylaxis:  VTE VTE covered by:  heparin (porcine), Subcutaneous, 5,000 Units at 01/31/25 2106       Stress Ulcer  not ordered         24 Hour Events : Patient developed worsening respiratory failure requiring intubation.  Patient developed shock overnight likely secondary to ongoing pneumonia- received 1L isolyte and 500cc albumin   Patient had increasing vasopressor requirements- currently on levo 13 and vaso  Stress dose steroids started on 1/31    Subjective   Review of Systems: Review of Systems not obtainable due to Clinical Condition    Objective :                   Vitals I/O      Most Recent Min/Max in 24hrs   Temp 99.4 °F (37.4 °C) Temp  Min: 98.4 °F (36.9 °C)  Max: 99.4 °F (37.4 °C)   Pulse 66 Pulse  Min: 34  Max: 124   Resp 19 Resp  Min: 10  Max: 34   /51 BP  Min: 52/37  Max: 234/90   O2 Sat 95 % SpO2  Min: 80 %  Max: 100 %      Intake/Output Summary (Last 24 hours) at 2/1/2025 0011  Last data filed at 2/1/2025 0000  Gross per 24 hour   Intake 4205.55 ml   Output 613 ml   Net 3592.55 ml       Diet Enteral/Parenteral; Tube Feeding No Oral Diet; Jevity 1.2 Jae; Continuous; 40; 100; Water; Every 4 hours    Invasive Monitoring   Arterial Line  Sirisha /44  Arterial Line BP  Min: 58/34  Max: 176/74   MAP 74 mmHg  Arterial Line MAP (mmHg)  Min: 42 mmHg  Max: 108 mmHg           Physical Exam   Physical Exam  Vitals reviewed.   Eyes:      Pupils: Pupils are equal, round, and reactive  to light.   Skin:     General: Skin is warm.      Capillary Refill: Capillary refill takes 2 to 3 seconds.   HENT:      Head: Atraumatic.      Mouth/Throat:      Mouth: Mucous membranes are moist.   Neck:      Vascular: Central line present.   Cardiovascular:      Rate and Rhythm: Normal rate.   Abdominal:      Palpations: Abdomen is soft.   Constitutional:       Appearance: She is ill-appearing and toxic-appearing.      Interventions: She is sedated and intubated.   Pulmonary:      Effort: She is intubated.   Neurological:      Comments: Patients eyes open  Does not follow commands   Genitourinary/Anorectal:  Vasquez present.        Diagnostic Studies        Lab Results: I have reviewed the following results:     Medications:  Scheduled PRN   acetylcysteine, 3 mL, Q6H While awake  cefTRIAXone, 2,000 mg, Q24H  chlorhexidine, 15 mL, Q12H JOSTIN  folic acid 1 mg in sodium chloride 0.9 % 50 mL IVPB, 1 mg, Daily  heparin (porcine), 5,000 Units, Q8H JOSTIN  hydrocortisone sodium succinate, 50 mg, Q6H JOSTIN  ipratropium, 0.5 mg, TID  levalbuterol, 1.25 mg, TID  norepinephrine, ,   nystatin, , BID  polyethylene glycol, 17 g, Daily  senna-docusate sodium, 1 tablet, BID  sodium chloride, 4 mL, Q6H  thiamine, 200 mg, Daily  [START ON 2/4/2025] thiamine, 100 mg, Daily  timolol, 1 drop, BID      fentaNYL, 50 mcg, Q1H PRN  norepinephrine, ,        Continuous    fentaNYL, 50 mcg/hr, Last Rate: 50 mcg/hr (01/31/25 2308)  multi-electrolyte, 100 mL/hr, Last Rate: 100 mL/hr (01/31/25 2330)  norepinephrine, 1-30 mcg/min, Last Rate: 23 mcg/min (02/01/25 0001)  propofol, 5-50 mcg/kg/min, Last Rate: 15 mcg/kg/min (02/01/25 0001)  vasopressin, 0.04 Units/min, Last Rate: 0.04 Units/min (01/31/25 1932)         Labs:   CBC    Recent Labs     01/31/25  0540 01/31/25  1549 01/31/25 1931   WBC 9.41 18.56*  --    HGB 8.8* 9.6* 11.6   HCT 27.7* 31.1* 34*   * 195  --      BMP    Recent Labs     01/31/25  0540 01/31/25  1549 01/31/25  1931   SODIUM  142 144  --    K 3.9 4.2  --     109*  --    CO2 28 25 26   AGAP 6 10  --    BUN 27* 23  --    CREATININE 0.68 0.66  --    CALCIUM 8.2* 8.3*  --        Coags    No recent results     Additional Electrolytes  Recent Labs     01/30/25  0457 01/31/25  0540 01/31/25  1549 01/31/25  1931   MG 2.1 1.9 2.6  --    PHOS 3.3 2.7 3.9  --    CAIONIZED 1.18  --   --  1.23          Blood Gas    Recent Labs     01/31/25  1550   PHART 7.293*   THL7DNL 53.0*   PO2ART 315.2*   FDA7HTF 25.1   BEART -1.8   SOURCE Line, Arterial     Recent Labs     01/31/25  1550   SOURCE Line, Arterial    LFTs  Recent Labs     01/30/25  0457   ALT 46   AST 24   ALKPHOS 261*   ALB 2.8*   TBILI 0.95       Infectious  No recent results  Glucose  Recent Labs     01/30/25  0457 01/30/25  1242 01/31/25  0540 01/31/25  1549   GLUC 137 158* 184* 177*        Dawit Guardado PA-C

## 2025-02-01 NOTE — PLAN OF CARE
Problem: PAIN - ADULT  Goal: Verbalizes/displays adequate comfort level or baseline comfort level  Description: Interventions:  - Encourage patient to monitor pain and request assistance  - Assess pain using appropriate pain scale  - Administer analgesics based on type and severity of pain and evaluate response  - Implement non-pharmacological measures as appropriate and evaluate response  - Consider cultural and social influences on pain and pain management  - Notify physician/advanced practitioner if interventions unsuccessful or patient reports new pain  Outcome: Progressing     Problem: INFECTION - ADULT  Goal: Absence or prevention of progression during hospitalization  Description: INTERVENTIONS:  - Assess and monitor for signs and symptoms of infection  - Monitor lab/diagnostic results  - Monitor all insertion sites, i.e. indwelling lines, tubes, and drains  - Monitor endotracheal if appropriate and nasal secretions for changes in amount and color  - Keyser appropriate cooling/warming therapies per order  - Administer medications as ordered  - Instruct and encourage patient and family to use good hand hygiene technique  - Identify and instruct in appropriate isolation precautions for identified infection/condition  Outcome: Progressing  Goal: Absence of fever/infection during neutropenic period  Description: INTERVENTIONS:  - Monitor WBC    Outcome: Progressing     Problem: SAFETY ADULT  Goal: Patient will remain free of falls  Description: INTERVENTIONS:  - Educate patient/family on patient safety including physical limitations  - Instruct patient to call for assistance with activity   - Consult OT/PT to assist with strengthening/mobility   - Keep Call bell within reach  - Keep bed low and locked with side rails adjusted as appropriate  - Keep care items and personal belongings within reach  - Initiate and maintain comfort rounds  - Make Fall Risk Sign visible to staff  - Offer Toileting every 2 Hours,  in advance of need  - Initiate/Maintain Bed/Chair alarm  - Obtain necessary fall risk management equipment  - Apply yellow socks and bracelet for high fall risk patients  - Consider moving patient to room near nurses station  Outcome: Progressing  Goal: Maintain or return to baseline ADL function  Description: INTERVENTIONS:  -  Assess patient's ability to carry out ADLs; assess patient's baseline for ADL function and identify physical deficits which impact ability to perform ADLs (bathing, care of mouth/teeth, toileting, grooming, dressing, etc.)  - Assess/evaluate cause of self-care deficits   - Assess range of motion  - Assess patient's mobility; develop plan if impaired  - Assess patient's need for assistive devices and provide as appropriate  - Encourage maximum independence but intervene and supervise when necessary  - Involve family in performance of ADLs  - Assess for home care needs following discharge   - Consider OT consult to assist with ADL evaluation and planning for discharge  - Provide patient education as appropriate  Outcome: Progressing  Goal: Maintains/Returns to pre admission functional level  Description: INTERVENTIONS:  - Perform AM-PAC 6 Click Basic Mobility/ Daily Activity assessment daily.  - Set and communicate daily mobility goal to care team and patient/family/caregiver.   - Collaborate with rehabilitation services on mobility goals if consulted  - Perform Range of Motion 10 times a day.  - Reposition patient every 2 hours.  - Dangle patient 2 times a day  - Record patient progress and toleration of activity level   Outcome: Progressing     Problem: DISCHARGE PLANNING  Goal: Discharge to home or other facility with appropriate resources  Description: INTERVENTIONS:  - Identify barriers to discharge w/patient and caregiver  - Arrange for needed discharge resources and transportation as appropriate  - Identify discharge learning needs (meds, wound care, etc.)  - Arrange for interpretive  services to assist at discharge as needed  - Refer to Case Management Department for coordinating discharge planning if the patient needs post-hospital services based on physician/advanced practitioner order or complex needs related to functional status, cognitive ability, or social support system  Outcome: Progressing     Problem: Knowledge Deficit  Goal: Patient/family/caregiver demonstrates understanding of disease process, treatment plan, medications, and discharge instructions  Description: Complete learning assessment and assess knowledge base.  Interventions:  - Provide teaching at level of understanding  - Provide teaching via preferred learning methods  Outcome: Progressing     Problem: Prexisting or High Potential for Compromised Skin Integrity  Goal: Skin integrity is maintained or improved  Description: INTERVENTIONS:  - Identify patients at risk for skin breakdown  - Assess and monitor skin integrity  - Assess and monitor nutrition and hydration status  - Monitor labs   - Assess for incontinence   - Turn and reposition patient  - Assist with mobility/ambulation  - Relieve pressure over bony prominences  - Avoid friction and shearing  - Provide appropriate hygiene as needed including keeping skin clean and dry  - Evaluate need for skin moisturizer/barrier cream  - Collaborate with interdisciplinary team   - Patient/family teaching  - Consider wound care consult   Outcome: Progressing     Problem: Nutrition/Hydration-ADULT  Goal: Nutrient/Hydration intake appropriate for improving, restoring or maintaining nutritional needs  Description: Monitor and assess patient's nutrition/hydration status for malnutrition. Collaborate with interdisciplinary team and initiate plan and interventions as ordered.  Monitor patient's weight and dietary intake as ordered or per policy. Utilize nutrition screening tool and intervene as necessary. Determine patient's food preferences and provide high-protein, high-caloric  foods as appropriate.     INTERVENTIONS:  - Monitor oral intake, urinary output, labs, and treatment plans  - Assess nutrition and hydration status and recommend course of action  - Evaluate amount of meals eaten  - Assist patient with eating if necessary   - Allow adequate time for meals  - Recommend/ encourage appropriate diets, oral nutritional supplements, and vitamin/mineral supplements  - Order, calculate, and assess calorie counts as needed  - Recommend, monitor, and adjust tube feedings and TPN/PPN based on assessed needs  - Assess need for intravenous fluids  - Provide specific nutrition/hydration education as appropriate  - Include patient/family/caregiver in decisions related to nutrition  Outcome: Progressing     Problem: NEUROSENSORY - ADULT  Goal: Achieves stable or improved neurological status  Description: INTERVENTIONS  - Monitor and report changes in neurological status  - Monitor vital signs such as temperature, blood pressure, glucose, and any other labs ordered   - Initiate measures to prevent increased intracranial pressure  - Monitor for seizure activity and implement precautions if appropriate      Outcome: Progressing  Goal: Achieves maximal functionality and self care  Description: INTERVENTIONS  - Monitor swallowing and airway patency with patient fatigue and changes in neurological status  - Encourage and assist patient to increase activity and self care.   - Encourage visually impaired, hearing impaired and aphasic patients to use assistive/communication devices  Outcome: Progressing     Problem: CARDIOVASCULAR - ADULT  Goal: Maintains optimal cardiac output and hemodynamic stability  Description: INTERVENTIONS:  - Monitor I/O, vital signs and rhythm  - Monitor for S/S and trends of decreased cardiac output  - Administer and titrate ordered vasoactive medications to optimize hemodynamic stability  - Assess quality of pulses, skin color and temperature  - Assess for signs of decreased  coronary artery perfusion  - Instruct patient to report change in severity of symptoms  Outcome: Progressing  Goal: Absence of cardiac dysrhythmias or at baseline rhythm  Description: INTERVENTIONS:  - Continuous cardiac monitoring, vital signs, obtain 12 lead EKG if ordered  - Administer antiarrhythmic and heart rate control medications as ordered  - Monitor electrolytes and administer replacement therapy as ordered  Outcome: Progressing     Problem: RESPIRATORY - ADULT  Goal: Achieves optimal ventilation and oxygenation  Description: INTERVENTIONS:  - Assess for changes in respiratory status  - Assess for changes in mentation and behavior  - Position to facilitate oxygenation and minimize respiratory effort  - Oxygen administered by appropriate delivery if ordered  - Initiate smoking cessation education as indicated  - Encourage broncho-pulmonary hygiene including cough, deep breathe, Incentive Spirometry  - Assess the need for suctioning and aspirate as needed  - Assess and instruct to report SOB or any respiratory difficulty  - Respiratory Therapy support as indicated  Outcome: Progressing     Problem: GASTROINTESTINAL - ADULT  Goal: Maintains or returns to baseline bowel function  Description: INTERVENTIONS:  - Assess bowel function  - Encourage oral fluids to ensure adequate hydration  - Administer IV fluids if ordered to ensure adequate hydration  - Administer ordered medications as needed  - Encourage mobilization and activity  - Consider nutritional services referral to assist patient with adequate nutrition and appropriate food choices  Outcome: Progressing  Goal: Maintains adequate nutritional intake  Description: INTERVENTIONS:  - Monitor percentage of each meal consumed  - Identify factors contributing to decreased intake, treat as appropriate  - Assist with meals as needed  - Monitor I&O, weight, and lab values if indicated  - Obtain nutrition services referral as needed  Outcome: Progressing      Problem: GENITOURINARY - ADULT  Goal: Maintains or returns to baseline urinary function  Description: INTERVENTIONS:  - Assess urinary function  - Encourage oral fluids to ensure adequate hydration if ordered  - Administer IV fluids as ordered to ensure adequate hydration  - Administer ordered medications as needed  - Offer frequent toileting  - Follow urinary retention protocol if ordered  Outcome: Progressing  Goal: Absence of urinary retention  Description: INTERVENTIONS:  - Assess patient’s ability to void and empty bladder  - Monitor I/O  - Bladder scan as needed  - Discuss with physician/AP medications to alleviate retention as needed  - Discuss catheterization for long term situations as appropriate  Outcome: Progressing  Goal: Urinary catheter remains patent  Description: INTERVENTIONS:  - Assess patency of urinary catheter  - If patient has a chronic sanchez, consider changing catheter if non-functioning  - Follow guidelines for intermittent irrigation of non-functioning urinary catheter  Outcome: Progressing     Problem: METABOLIC, FLUID AND ELECTROLYTES - ADULT  Goal: Electrolytes maintained within normal limits  Description: INTERVENTIONS:  - Monitor labs and assess patient for signs and symptoms of electrolyte imbalances  - Administer electrolyte replacement as ordered  - Monitor response to electrolyte replacements, including repeat lab results as appropriate  - Instruct patient on fluid and nutrition as appropriate  Outcome: Progressing  Goal: Fluid balance maintained  Description: INTERVENTIONS:  - Monitor labs   - Monitor I/O and WT  - Instruct patient on fluid and nutrition as appropriate  - Assess for signs & symptoms of volume excess or deficit  Outcome: Progressing     Problem: SKIN/TISSUE INTEGRITY - ADULT  Goal: Skin Integrity remains intact(Skin Breakdown Prevention)  Description: Assess:  -Perform True assessment every shift  -Clean and moisturize skin every shift  -Inspect skin when  repositioning, toileting, and assisting with ADLS  -Assess under medical devices every shift  -Assess extremities for adequate circulation and sensation     Bed Management:  -Have minimal linens on bed & keep smooth, unwrinkled  -Change linens as needed when moist or perspiring  -Avoid sitting or lying in one position for more than 2 hours while in bed  -Keep HOB at 30 degrees     Toileting:  -Offer bedside commode  -Assess for incontinence every 2 hours  -Use incontinent care products after each incontinent episode     Activity:  -Mobilize patient 2 times a day  -Encourage activity and walks on unit  -Encourage or provide ROM exercises   -Turn and reposition patient every 2 Hours  -Use appropriate equipment to lift or move patient in bed    Skin Care:  -Avoid use of baby powder, tape, friction and shearing, hot water or constrictive clothing  -Relieve pressure over bony prominences   -Do not massage red bony areas    Next Steps:  -Teach patient strategies to minimize risks    -Consider consults to  interdisciplinary teams   Outcome: Progressing  Goal: Incision(s), wounds(s) or drain site(s) healing without S/S of infection  Description: INTERVENTIONS  - Assess and document dressing, incision, wound bed, drain sites and surrounding tissue  - Provide patient and family education  - Perform skin care/dressing changes every shift  Outcome: Progressing     Problem: HEMATOLOGIC - ADULT  Goal: Maintains hematologic stability  Description: INTERVENTIONS  - Assess for signs and symptoms of bleeding or hemorrhage  - Monitor labs  - Administer supportive blood products/factors as ordered and appropriate  Outcome: Progressing     Problem: MUSCULOSKELETAL - ADULT  Goal: Maintain or return mobility to safest level of function  Description: INTERVENTIONS:  - Assess patient's ability to carry out ADLs; assess patient's baseline for ADL function and identify physical deficits which impact ability to perform ADLs (bathing, care of  mouth/teeth, toileting, grooming, dressing, etc.)  - Assess/evaluate cause of self-care deficits   - Assess range of motion  - Assess patient's mobility  - Assess patient's need for assistive devices and provide as appropriate  - Encourage maximum independence but intervene and supervise when necessary  - Involve family in performance of ADLs  - Assess for home care needs following discharge   - Consider OT consult to assist with ADL evaluation and planning for discharge  - Provide patient education as appropriate  Outcome: Progressing     Problem: COPING  Goal: Pt/Family able to verbalize concerns and demonstrate effective coping strategies  Description: INTERVENTIONS:  - Assist patient/family to identify coping skills, available support systems and cultural and spiritual values  - Provide emotional support, including active listening and acknowledgement of concerns of patient and caregivers  - Reduce environmental stimuli, as able  - Provide patient education  - Assess for spiritual pain/suffering and initiate spiritual care, including notification of Pastoral Care or chan based community as needed  - Assess effectiveness of coping strategies  Outcome: Progressing  Goal: Will report anxiety at manageable levels  Description: INTERVENTIONS:  - Administer medication as ordered  - Teach and encourage coping skills  - Provide emotional support  - Assess patient/family for anxiety and ability to cope  Outcome: Progressing     Problem: Potential for Falls  Goal: Patient will remain free of falls  Description: INTERVENTIONS:  - Educate patient/family on patient safety including physical limitations  - Instruct patient to call for assistance with activity   - Consult OT/PT to assist with strengthening/mobility   - Keep Call bell within reach  - Keep bed low and locked with side rails adjusted as appropriate  - Keep care items and personal belongings within reach  - Initiate and maintain comfort rounds  - Make Fall Risk  Sign visible to staff  - Offer Toileting every 2 Hours, in advance of need  - Initiate/Maintain Bed/Chair alarm  - Obtain necessary fall risk management equipment  - Apply yellow socks and bracelet for high fall risk patients  - Consider moving patient to room near nurses station  Outcome: Progressing     Problem: SAFETY,RESTRAINT: NV/NON-SELF DESTRUCTIVE BEHAVIOR  Goal: Remains free of harm/injury (restraint for non violent/non self-detsructive behavior)  Description: INTERVENTIONS:  - Instruct patient/family regarding restraint use   - Assess and monitor physiologic and psychological status   - Provide interventions and comfort measures to meet assessed patient needs   - Identify and implement measures to help patient regain control  - Assess readiness for release of restraint   Outcome: Progressing  Goal: Returns to optimal restraint-free functioning  Description: INTERVENTIONS:  - Assess the patient's behavior and symptoms that indicate continued need for restraint  - Identify and implement measures to help patient regain control  - Assess readiness for release of restraint   Outcome: Progressing

## 2025-02-01 NOTE — CONSULTS
Consultation - Infectious Disease   Name: Darlin Zamora 79 y.o. female I MRN: [id number]  Unit/Bed#: ICU 10 I Date of Admission: 1/25/2025   Date of Service: 2/1/2025 I Hospital Day: 6   Inpatient consult to Infectious Diseases  Consult performed by: Eyal Wright MD  Consult ordered by: Bossman Santillan MD        Physician Requesting Evaluation: Michelle Winters DO   Reason for Evaluation / Principal Problem: Sepsis    Assessment & Plan  Septic shock (HCC)  With presumptive aspiration pneumonitis/pneumonia.  Consideration for possibility of another etiology such as an occult intra-abdominal process.  The patient's been quite ill with receiving a high level vasopressor support although the pressor needs seem to be decreasing.  MRSA screen is negative and sputum culture without resistant pathogens.  Unclear significance of the stenotrophomonas in the airway culture however will cover for now  -Discontinue vancomycin and cefepime  -Restart Zosyn extended infusion for now  -Change the Bactrim to intravenous at 5 mg/kg IV every 12 hours  -Patient for bronchoscopy  -Consider CT abdomen pelvis to look for an occult abdominal process  -Recheck CBC with differential and CMP to make sure no developing toxicities  -Check procalcitonin level tomorrow a.m.  -Source control measures as needed  -Supportive care  Acute hypoxic respiratory failure (HCC)  Possibly all secondary to an aspiration event with pneumonia versus pneumonitis.  Still requiring reasonably high level ventilatory support.  Sputum culture only growing stenotrophomonas of unclear significance.  -Antibiotics as above for now  -Recheck imaging of the chest  -Await bronchoscopy for later today  -Ongoing ventilatory support  -Wean off the ventilator as able  Acute encephalopathy  In the setting of recent severe hypothermia and presumptive pneumonia.  Cognition had improved but now on a ventilator and sedated.  MRI of the brain and CSF analysis not  indicative of a new acute process.  Workup for active seizure negative thus far  -Monitor cognition  -Neurology follow-up  -Additional workup as per neurology  Meningioma (HCC)    I have discussed with the critical care service the above plan to change the antibiotics as above.  The critical care service agrees with the plan.    Antibiotics:  Vancomycin 2  Cefepime 2  Bactrim 1    History of Present Illness   Darlin Zamora is a 79 y.o. year old female with meningioma, hypertension initially admitted 1/25/2025 after being found down on a wellness check and found to be hypothermic and meeting sepsis criteria who we are now asked to assist with management of a possible aspiration pneumonia.  The patient was found and apparently filthy living conditions with no heat and was brought to the emergency department for further evaluation was found to be hypothermic.  She received resuscitation was admitted to the ICU and placed on vasopressor support.  She is felt to have sepsis possibly from aspiration pneumonia and was treated initially with Zosyn and vancomycin.  However she recovered, her white count came down and she did not regard any resistant organisms.  Lumbar puncture with CSF analysis was negative for any evidence of a CNS infection.  She was de-escalated to ceftriaxone and was doing better until yesterday when she began eating she had a witnessed aspiration event and has clinically deteriorated including respiratory failure requiring intubation, and hemodynamic instability requiring high-level vasopressor support.  Her antibiotics have been broadened to vancomycin and cefepime and Bactrim is being added.  She has come down as far as her vasopressor needs but remains on the ventilator.  She is unable to give any history and therefore the entire history is through detailed chart review and discussion with the critical care service.    A complete review of systems is negative other than that noted in the HPI.    I  have reviewed the patient's PMH, PSH, Social History, Family History, Meds, and Allergies    Objective :  Temp:  [99.1 °F (37.3 °C)-100.4 °F (38 °C)] 100.4 °F (38 °C)  HR:  [] 56  BP: ()/() 121/60  Resp:  [14-34] 20  SpO2:  [80 %-100 %] 98 %  O2 Device: Ventilator  Nasal Cannula O2 Flow Rate (L/min):  [3 L/min] 3 L/min    General: Resting on the ventilator no acute distress.  Endotracheal tube in place.  Psychiatric:  Awake and alert  Pulmonary:  Normal respiratory excursion without accessory muscle use  Abdomen:  Soft, nontender  Extremities:  No edema  Skin:  No rashes      Lab Results: I have reviewed the following results:  Results from last 7 days   Lab Units 02/01/25 0458 01/31/25 1931 01/31/25  1549 01/31/25  0540   WBC Thousand/uL 22.32*  --  18.56* 9.41   HEMOGLOBIN g/dL 9.4*  --  9.6* 8.8*   I STAT HEMOGLOBIN g/dl  --  11.6  --   --    PLATELETS Thousands/uL 178  --  195 119*     Results from last 7 days   Lab Units 02/01/25 0458 01/31/25 1931 01/31/25 1549 01/31/25  0540 01/30/25  1242 01/30/25  0457 01/29/25  0522 01/28/25  0511 01/27/25  2219 01/27/25  0552   SODIUM mmol/L 144  --  144 142   < > 151*   < > 149*   < > 147   POTASSIUM mmol/L 4.4  --  4.2 3.9   < > 3.8   < > 3.7   < > 3.5   CHLORIDE mmol/L 110*  --  109* 108   < > 115*   < > 114*   < > 112*   CO2 mmol/L 24  --  25 28   < > 29   < > 23   < > 24   CO2, I-STAT mmol/L  --  26  --   --   --   --   --   --   --   --    BUN mg/dL 23  --  23 27*   < > 37*   < > 46*   < > 52*   CREATININE mg/dL 0.81  --  0.66 0.68   < > 0.87   < > 1.23   < > 1.36*   EGFR ml/min/1.73sq m 69  --  84 84   < > 64   < > 42   < > 37   GLUCOSE, ISTAT mg/dl  --  116  --   --   --   --   --   --   --   --    CALCIUM mg/dL 7.8*  --  8.3* 8.2*   < > 8.4   < > 8.2*   < > 8.2*   AST U/L  --   --   --   --   --  24  --  69*  --  155*   ALT U/L  --   --   --   --   --  46  --  83*  --  153*   ALK PHOS U/L  --   --   --   --   --  261*  --  222*  --  294*    ALBUMIN g/dL  --   --   --   --   --  2.8*  --  2.9*  --  3.1*    < > = values in this interval not displayed.     Results from last 7 days   Lab Units 01/29/25  1628 01/27/25  0839 01/26/25  0436   BLOOD CULTURE   --   --  No Growth After 5 Days.  No Growth After 5 Days.   SPUTUM CULTURE  2+ Growth of Stenotrophomonas maltophilia*  --   --    GRAM STAIN RESULT  2+ Epithelial cells per low power field*  No polys seen*  Rare Gram positive rods*  --   --    MRSA CULTURE ONLY   --  No Methicillin Resistant Staphlyococcus aureus (MRSA) isolated  --      Results from last 7 days   Lab Units 01/29/25  0522 01/28/25  0511 01/27/25  0552   PROCALCITONIN ng/ml 1.80* 4.39* 18.58*     Results from last 7 days   Lab Units 01/31/25  1549   CRP mg/L 23.0*               Imaging Results Review: I personally reviewed the following image studies in PACS and associated radiology reports: chest xray. My interpretation of the radiology images/reports is: Improved aeration..

## 2025-02-01 NOTE — PROGRESS NOTES
Darlin Zamora is a 79 y.o. female who is currently ordered Vancomycin IV with management by the Pharmacy Consult service.  Relevant clinical data and objective / subjective history reviewed.  Vancomycin Assessment:  Indication and Goal AUC/Trough: Pneumonia (goal -600, trough >10), septic shock, -600, trough >10  Clinical Status: worsening  Micro:   Paraneoplastic profile CSF - negative  : Sputum culture and Gram stain - 2+ Growth of Stenotrophoomonas maltophilia, 2+ Epithelial cells per low power field  : MRSA culture - negative  Renal Function:  SCr: 0.81 mg/dL  CrCl: 50.2 mL/min  Renal replacement: Not on dialysis  Days of Therapy: 2  Current Dose: Loaded 1500 mg IV once  Vancomycin Plan:  New Dosin mg IV Q24H  Estimated AUC: 532 mcg*hr/mL  Estimated Trough: 14.2 mcg/mL  Next Level: / with AM labs  Renal Function Monitoring: Daily BMP and UOP  Pharmacy will continue to follow closely for s/sx of nephrotoxicity, infusion reactions and appropriateness of therapy.  BMP and CBC will be ordered per protocol. We will continue to follow the patient’s culture results and clinical progress daily.

## 2025-02-01 NOTE — RESPIRATORY THERAPY NOTE
02/01/25 0439   Respiratory Assessment   Assessment Type Assess only   General Appearance Sedated   Respiratory Pattern Assisted   Chest Assessment Chest expansion symmetrical   Bilateral Breath Sounds Diminished   Cough Productive   Suction ET Tube   Resp Comments Pt. remains on documented CMV settings, tolerating well.   O2 Device G5   Vent Information   Vent ID [id number]   Vent type Verdugo G5   Verdugo Vent Mode (S)CMV   $ Vent Daily Charge-Subsequent Yes   $ Pulse Oximetry Spot Check Charge Completed   (S)CMV Settings   Resp Rate (BPM) 18 BPM   VT (mL) 400 mL   FIO2 (%) 60 %   PEEP (cmH2O) 8 cmH2O   I:E Ratio 1/3.3   Insp Time (%) 1 %   Flow Trigger (LPM) 5   Humidification Heater   Heater Temperature (Set) 98.6 °F (37 °C)   (S)CMV Actuals   Resp Rate (BPM) 18 BPM   VT (mL) 400   MV 7.3   MAP (cmH2O) 14 cmH2O   Peak Pressure (cmH2O) 35 cmH2O   I:E Ratio (Obs) 1/3.2   Insp Resistance 28   Heater Temperature (Obs) 98.6 °F (37 °C)   Static Compliance (mL/cmH20) 20.1 mL/cmH2O   (S)CMV Alarms   High Peak Pressure (cmH2O) 40   Low Pressure (cmH2O) 5 cm H2O   High Resp Rate (BPM) 40 BPM   Low Resp Rate (BPM) 8 BPM   High MV (L/min) 20 L/min   Low MV (L/min) 4 L/min   High VT (mL) 1000 mL   Low VT (mL) 250 mL   Apnea Time (s) 20 S   Maintenance   Alarm (pink) cable attached No   Resuscitation bag with peep valve at bedside Yes   Water bag changed No   Circuit changed No   Daily Screen   Patient safety screen outcome: Failed   IHI Ventilator Associated Pneumonia Bundle   Head of Bed Elevated HOB 30   ETT  Cuffed 7.5 mm   Placement Date/Time: 01/31/25 c) 9990   Type: Cuffed  Tube Size: 7.5 mm  Location: Oral  Insertion attempts: 1  Placement Verification: Chest x-ray;End tidal CO2;Symmetrical chest wall movement  Secured at (cm): 22   Secured at (cm) 22   Measured from Lips   Secured Location Left   Secured by Commercial tube villegas   Site Condition Dry   Cuff Pressure (color) Green   HI-LO Suction  Intermittent  suction   HI-LO Secretions Scant   HI-LO Intervention Patent

## 2025-02-01 NOTE — PROGRESS NOTES
Progress Note - Critical Care/ICU   Name: aDrlin Zamora 79 y.o. female I MRN: [id number]  Unit/Bed#: ICU 10 I Date of Admission: 1/25/2025   Date of Service: 1/31/2025 I Hospital Day: 5       Interval Events:   Called to patient bedside secondary to an acute change in BP  SBP 50's on both [place] and BP cuff      Pertinent New Data:   blood pressure, pulse, temperature, respirations, and pulse oximetry  Arterial Line  Sirisha /50  Arterial Line BP  Min: 58/34  Max: 176/74   MAP 70 mmHg  Arterial Line MAP (mmHg)  Min: 42 mmHg  Max: 108 mmHg       ABG:   Lab Results   Component Value Date    PHART 7.293 (L) 01/31/2025    UKW0BOJ 53.0 (H) 01/31/2025    PO2ART 315.2 (H) 01/31/2025    FFA3SOX 25.1 01/31/2025    BEART -1.8 01/31/2025    SOURCE Line, Arterial 01/31/2025     chest xrayResults Review Statement: I personally reviewed the following image studies/reports in PACS and discussed pertinent findings with Radiology: chest xray. My interpretation of the radiology images/reports is: Worsening R side consolidation, ETT in proper position .    Assessment and Plan  Diagnosis: Shock likely in the setting of sepsis, hypovolemia  Plan: POCUS showed normal function, IVC without respiratory variation, LV under filled  Bolus 1L Isolyte  Reviewed istat ABG 7.3/49/71/24  -2  Started vaso  Titrate Levo  Goal MAP >65    Billing Level:  Critical Care Time Statement: Upon my evaluation, this patient had a high probability of imminent or life-threatening deterioration due to Shock, which required my direct attention, intervention, and personal management.  I spent a total of 18 minutes directly providing critical care services, including interpretation of complex medical databases, evaluating for the presence of life-threatening injuries or illnesses, management of organ system failure(s) , complex medical decision making (to support/prevent further life-threatening deterioration)., interpretation of hemodynamic data, titration of  vasoactive medications, titration of continuous IV medications (drips), and ventilator management. This time is exclusive of procedures, teaching, family meetings, and any prior time recorded by providers other than myself.      Dawit Guardado PA-C

## 2025-02-01 NOTE — ASSESSMENT & PLAN NOTE
With presumptive aspiration pneumonitis/pneumonia.  Consideration for possibility of another etiology such as an occult intra-abdominal process.  The patient's been quite ill with receiving a high level vasopressor support although the pressor needs seem to be decreasing.  MRSA screen is negative and sputum culture without resistant pathogens.  Unclear significance of the stenotrophomonas in the airway culture however will cover for now  -Discontinue vancomycin and cefepime  -Restart Zosyn extended infusion for now  -Change the Bactrim to intravenous at 5 mg/kg IV every 12 hours  -Patient for bronchoscopy  -Consider CT abdomen pelvis to look for an occult abdominal process  -Recheck CBC with differential and CMP to make sure no developing toxicities  -Check procalcitonin level tomorrow a.m.  -Source control measures as needed  -Supportive care

## 2025-02-01 NOTE — PROGRESS NOTES
Darlin Zamora is a 79 y.o. female who is currently ordered Vancomycin IV with management by the Pharmacy Consult service.  Relevant clinical data and objective / subjective history reviewed.  Vancomycin Assessment:  Indication and Goal AUC/Trough: Pneumonia (goal -600, trough >10), septic shock, -600, trough >10  Clinical Status: worsening  Micro:   : Sputum culture and Gram stain - 2+ Growth of Stenotrophoomonas maltophilia, 2+ Epithelial cells per low power field  : MRSA culture - negative  Renal Function:  SCr: 0.66 mg/dL  CrCl: 57.7 mL/min  Renal replacement: Not on dialysis  Days of Therapy: 1  Current Dose: Loaded 1500 mg IV once  Vancomycin Plan:  New Dosin mg IV Q24H  Estimated AUC: 513 mcg*hr/mL  Estimated Trough: 12.3 mcg/mL  Next Level:  with AM labs  Renal Function Monitoring: Daily BMP and UOP  Pharmacy will continue to follow closely for s/sx of nephrotoxicity, infusion reactions and appropriateness of therapy.  BMP and CBC will be ordered per protocol. We will continue to follow the patient’s culture results and clinical progress daily.    Frieda Osborn, Pharmacist

## 2025-02-01 NOTE — SPEECH THERAPY NOTE
Pt is now intubated/sedated. Please send a new order for swallow eval when pt is extubated and appropriate.

## 2025-02-01 NOTE — PLAN OF CARE
Problem: PAIN - ADULT  Goal: Verbalizes/displays adequate comfort level or baseline comfort level  Description: Interventions:  - Encourage patient to monitor pain and request assistance  - Assess pain using appropriate pain scale  - Administer analgesics based on type and severity of pain and evaluate response  - Implement non-pharmacological measures as appropriate and evaluate response  - Consider cultural and social influences on pain and pain management  - Notify physician/advanced practitioner if interventions unsuccessful or patient reports new pain  Outcome: Progressing     Problem: INFECTION - ADULT  Goal: Absence or prevention of progression during hospitalization  Description: INTERVENTIONS:  - Assess and monitor for signs and symptoms of infection  - Monitor lab/diagnostic results  - Monitor all insertion sites, i.e. indwelling lines, tubes, and drains  - Monitor endotracheal if appropriate and nasal secretions for changes in amount and color  - [place] appropriate cooling/warming therapies per order  - Administer medications as ordered  - Instruct and encourage patient and family to use good hand hygiene technique  - Identify and instruct in appropriate isolation precautions for identified infection/condition  Outcome: Progressing     Problem: SAFETY ADULT  Goal: Patient will remain free of falls  Description: INTERVENTIONS:  - Educate patient/family on patient safety including physical limitations  - Instruct patient to call for assistance with activity   - Consult OT/PT to assist with strengthening/mobility   - Keep Call bell within reach  - Keep bed low and locked with side rails adjusted as appropriate  - Keep care items and personal belongings within reach  - Initiate and maintain comfort rounds  - Make Fall Risk Sign visible to staff  - Offer Toileting every 2 Hours, in advance of need  - Apply yellow socks and bracelet for high fall risk patients  - Consider moving patient to room near nurses  station  Outcome: Progressing  Goal: Maintain or return to baseline ADL function  Description: INTERVENTIONS:  -  Assess patient's ability to carry out ADLs; assess patient's baseline for ADL function and identify physical deficits which impact ability to perform ADLs (bathing, care of mouth/teeth, toileting, grooming, dressing, etc.)  - Assess/evaluate cause of self-care deficits   - Assess range of motion  - Assess patient's mobility; develop plan if impaired  - Assess patient's need for assistive devices and provide as appropriate  - Encourage maximum independence but intervene and supervise when necessary  - Involve family in performance of ADLs  - Assess for home care needs following discharge   - Consider OT consult to assist with ADL evaluation and planning for discharge  - Provide patient education as appropriate  Outcome: Progressing  Goal: Maintains/Returns to pre admission functional level  Description: INTERVENTIONS:  - Perform AM-PAC 6 Click Basic Mobility/ Daily Activity assessment daily.  - Set and communicate daily mobility goal to care team and patient/family/caregiver.   - Collaborate with rehabilitation services on mobility goals if consulted  - Reposition patient every 2 hours.  - Out of bed for toileting  - Record patient progress and toleration of activity level   Outcome: Progressing     Problem: DISCHARGE PLANNING  Goal: Discharge to home or other facility with appropriate resources  Description: INTERVENTIONS:  - Identify barriers to discharge w/patient and caregiver  - Arrange for needed discharge resources and transportation as appropriate  - Identify discharge learning needs (meds, wound care, etc.)  - Arrange for interpretive services to assist at discharge as needed  - Refer to Case Management Department for coordinating discharge planning if the patient needs post-hospital services based on physician/advanced practitioner order or complex needs related to functional status, cognitive  ability, or social support system  Outcome: Progressing     Problem: Knowledge Deficit  Goal: Patient/family/caregiver demonstrates understanding of disease process, treatment plan, medications, and discharge instructions  Description: Complete learning assessment and assess knowledge base.  Interventions:  - Provide teaching at level of understanding  - Provide teaching via preferred learning methods  Outcome: Progressing     Problem: Prexisting or High Potential for Compromised Skin Integrity  Goal: Skin integrity is maintained or improved  Description: INTERVENTIONS:  - Identify patients at risk for skin breakdown  - Assess and monitor skin integrity  - Assess and monitor nutrition and hydration status  - Monitor labs   - Assess for incontinence   - Turn and reposition patient  - Assist with mobility/ambulation  - Relieve pressure over bony prominences  - Avoid friction and shearing  - Provide appropriate hygiene as needed including keeping skin clean and dry  - Evaluate need for skin moisturizer/barrier cream  - Collaborate with interdisciplinary team   - Patient/family teaching  - Consider wound care consult   Outcome: Progressing     Problem: Nutrition/Hydration-ADULT  Goal: Nutrient/Hydration intake appropriate for improving, restoring or maintaining nutritional needs  Description: Monitor and assess patient's nutrition/hydration status for malnutrition. Collaborate with interdisciplinary team and initiate plan and interventions as ordered.  Monitor patient's weight and dietary intake as ordered or per policy. Utilize nutrition screening tool and intervene as necessary. Determine patient's food preferences and provide high-protein, high-caloric foods as appropriate.     INTERVENTIONS:  - Monitor oral intake, urinary output, labs, and treatment plans  - Assess nutrition and hydration status and recommend course of action  - Evaluate amount of meals eaten  - Assist patient with eating if necessary   - Allow  adequate time for meals  - Recommend/ encourage appropriate diets, oral nutritional supplements, and vitamin/mineral supplements  - Order, calculate, and assess calorie counts as needed  - Recommend, monitor, and adjust tube feedings and TPN/PPN based on assessed needs  - Assess need for intravenous fluids  - Provide specific nutrition/hydration education as appropriate  - Include patient/family/caregiver in decisions related to nutrition  Outcome: Progressing     Problem: NEUROSENSORY - ADULT  Goal: Achieves stable or improved neurological status  Description: INTERVENTIONS  - Monitor and report changes in neurological status  - Monitor vital signs such as temperature, blood pressure, glucose, and any other labs ordered   - Initiate measures to prevent increased intracranial pressure  - Monitor for seizure activity and implement precautions if appropriate      Outcome: Progressing  Goal: Achieves maximal functionality and self care  Description: INTERVENTIONS  - Monitor swallowing and airway patency with patient fatigue and changes in neurological status  - Encourage and assist patient to increase activity and self care.   - Encourage visually impaired, hearing impaired and aphasic patients to use assistive/communication devices  Outcome: Progressing     Problem: CARDIOVASCULAR - ADULT  Goal: Maintains optimal cardiac output and hemodynamic stability  Description: INTERVENTIONS:  - Monitor I/O, vital signs and rhythm  - Monitor for S/S and trends of decreased cardiac output  - Administer and titrate ordered vasoactive medications to optimize hemodynamic stability  - Assess quality of pulses, skin color and temperature  - Assess for signs of decreased coronary artery perfusion  - Instruct patient to report change in severity of symptoms  Outcome: Progressing  Goal: Absence of cardiac dysrhythmias or at baseline rhythm  Description: INTERVENTIONS:  - Continuous cardiac monitoring, vital signs, obtain 12 lead EKG if  ordered  - Administer antiarrhythmic and heart rate control medications as ordered  - Monitor electrolytes and administer replacement therapy as ordered  Outcome: Progressing     Problem: RESPIRATORY - ADULT  Goal: Achieves optimal ventilation and oxygenation  Description: INTERVENTIONS:  - Assess for changes in respiratory status  - Assess for changes in mentation and behavior  - Position to facilitate oxygenation and minimize respiratory effort  - Oxygen administered by appropriate delivery if ordered  - Initiate smoking cessation education as indicated  - Encourage broncho-pulmonary hygiene including cough, deep breathe, Incentive Spirometry  - Assess the need for suctioning and aspirate as needed  - Assess and instruct to report SOB or any respiratory difficulty  - Respiratory Therapy support as indicated  Outcome: Progressing     Problem: GASTROINTESTINAL - ADULT  Goal: Maintains or returns to baseline bowel function  Description: INTERVENTIONS:  - Assess bowel function  - Encourage oral fluids to ensure adequate hydration  - Administer IV fluids if ordered to ensure adequate hydration  - Administer ordered medications as needed  - Encourage mobilization and activity  - Consider nutritional services referral to assist patient with adequate nutrition and appropriate food choices  Outcome: Progressing  Goal: Maintains adequate nutritional intake  Description: INTERVENTIONS:  - Monitor percentage of each meal consumed  - Identify factors contributing to decreased intake, treat as appropriate  - Assist with meals as needed  - Monitor I&O, weight, and lab values if indicated  - Obtain nutrition services referral as needed  Outcome: Progressing     Problem: GENITOURINARY - ADULT  Goal: Maintains or returns to baseline urinary function  Description: INTERVENTIONS:  - Assess urinary function  - Encourage oral fluids to ensure adequate hydration if ordered  - Administer IV fluids as ordered to ensure adequate  hydration  - Administer ordered medications as needed  - Offer frequent toileting  - Follow urinary retention protocol if ordered  Outcome: Progressing  Goal: Absence of urinary retention  Description: INTERVENTIONS:  - Assess patient’s ability to void and empty bladder  - Monitor I/O  - Bladder scan as needed  - Discuss with physician/AP medications to alleviate retention as needed  - Discuss catheterization for long term situations as appropriate  Outcome: Progressing  Goal: Urinary catheter remains patent  Description: INTERVENTIONS:  - Assess patency of urinary catheter  - If patient has a chronic sanchez, consider changing catheter if non-functioning  - Follow guidelines for intermittent irrigation of non-functioning urinary catheter  Outcome: Progressing     Problem: METABOLIC, FLUID AND ELECTROLYTES - ADULT  Goal: Electrolytes maintained within normal limits  Description: INTERVENTIONS:  - Monitor labs and assess patient for signs and symptoms of electrolyte imbalances  - Administer electrolyte replacement as ordered  - Monitor response to electrolyte replacements, including repeat lab results as appropriate  - Instruct patient on fluid and nutrition as appropriate  Outcome: Progressing     Problem: SKIN/TISSUE INTEGRITY - ADULT  Goal: Skin Integrity remains intact(Skin Breakdown Prevention)  Description: Assess:  -Inspect skin when repositioning, toileting, and assisting with ADLS  -Assess extremities for adequate circulation and sensation     Bed Management:  -Have minimal linens on bed & keep smooth, unwrinkled  -Change linens as needed when moist or perspiring    Activity:  -Encourage activity and walks on unit  -Encourage or provide ROM exercises   -Turn and reposition patient every 2 Hours  -Use appropriate equipment to lift or move patient in bed    Skin Care:  -Avoid use of baby powder, tape, friction and shearing, hot water or constrictive clothing  -Do not massage red bony areas  Outcome:  Progressing     Problem: HEMATOLOGIC - ADULT  Goal: Maintains hematologic stability  Description: INTERVENTIONS  - Assess for signs and symptoms of bleeding or hemorrhage  - Monitor labs  - Administer supportive blood products/factors as ordered and appropriate  Outcome: Progressing     Problem: MUSCULOSKELETAL - ADULT  Goal: Maintain or return mobility to safest level of function  Description: INTERVENTIONS:  - Assess patient's ability to carry out ADLs; assess patient's baseline for ADL function and identify physical deficits which impact ability to perform ADLs (bathing, care of mouth/teeth, toileting, grooming, dressing, etc.)  - Assess/evaluate cause of self-care deficits   - Assess range of motion  - Assess patient's mobility  - Assess patient's need for assistive devices and provide as appropriate  - Encourage maximum independence but intervene and supervise when necessary  - Involve family in performance of ADLs  - Assess for home care needs following discharge   - Consider OT consult to assist with ADL evaluation and planning for discharge  - Provide patient education as appropriate  Outcome: Progressing     Problem: COPING  Goal: Pt/Family able to verbalize concerns and demonstrate effective coping strategies  Description: INTERVENTIONS:  - Assist patient/family to identify coping skills, available support systems and cultural and spiritual values  - Provide emotional support, including active listening and acknowledgement of concerns of patient and caregivers  - Reduce environmental stimuli, as able  - Provide patient education  - Assess for spiritual pain/suffering and initiate spiritual care, including notification of Pastoral Care or chan based community as needed  - Assess effectiveness of coping strategies  Outcome: Progressing  Goal: Will report anxiety at manageable levels  Description: INTERVENTIONS:  - Administer medication as ordered  - Teach and encourage coping skills  - Provide emotional  support  - Assess patient/family for anxiety and ability to cope  Outcome: Progressing     Problem: Potential for Falls  Goal: Patient will remain free of falls  Description: INTERVENTIONS:  - Educate patient/family on patient safety including physical limitations  - Instruct patient to call for assistance with activity   - Consult OT/PT to assist with strengthening/mobility   - Keep Call bell within reach  - Keep bed low and locked with side rails adjusted as appropriate  - Keep care items and personal belongings within reach  - Initiate and maintain comfort rounds  - Make Fall Risk Sign visible to staff  - Offer Toileting every 2 Hours, in advance of need  - Apply yellow socks and bracelet for high fall risk patients  - Consider moving patient to room near nurses station  Outcome: Progressing     Problem: SAFETY,RESTRAINT: NV/NON-SELF DESTRUCTIVE BEHAVIOR  Goal: Remains free of harm/injury (restraint for non violent/non self-detsructive behavior)  Description: INTERVENTIONS:  - Instruct patient/family regarding restraint use   - Assess and monitor physiologic and psychological status   - Provide interventions and comfort measures to meet assessed patient needs   - Identify and implement measures to help patient regain control  - Assess readiness for release of restraint   Outcome: Progressing  Goal: Returns to optimal restraint-free functioning  Description: INTERVENTIONS:  - Assess the patient's behavior and symptoms that indicate continued need for restraint  - Identify and implement measures to help patient regain control  - Assess readiness for release of restraint   Outcome: Progressing

## 2025-02-02 PROBLEM — K44.9 HIATAL HERNIA: Status: ACTIVE | Noted: 2025-01-01

## 2025-02-02 NOTE — CONSULTS
Vancomycin IV Pharmacy-to-Dose Consultation    Darlin Zamora is a 79 y.o. female who was receiving Vancomycin IV with management by the Pharmacy Consult service for treatment of Pneumonia (goal -600, trough >10) septic shock.       The patient’s Vancomycin therapy has been discontinued. Thank you for allowing us to take part in this patient's care. Pharmacy will sign-off now; please call or re-consult if there are any questions.        Frieda Osborn, VivianeD

## 2025-02-02 NOTE — PROGRESS NOTES
I have personally seen and examined patient and reviewed all data with resident. Agree with note, assessment and plan. Critical care time 48 minutes. Please refer to attending comments below. Critical care time does not include procedures, family meeting or teaching.     Acute hypoxic respiratory failure with aspiration event  Shock/hypotension multifactorial with SIRS response secondary to aspiration  Bradycardia arrhythmia associated with hypoxia  Left frontal meningioma with concern for seizure event  Right inferior cerebellar pontine angle meningioma  Hypernatremia  Severe encephalopathy  Thrombocytopenia  Leukocytosis  [place]-improved  Elevated LFTs/tremors and a minus-resolved  Bilateral pleural effusions  Right lower lobe atelectasis greater than left lower lobe  Bradycardia  Grade 2 diastolic cardiac dysfunction  Septic shock-resolved  Hypothermia- resolved  Hiatal hernia  Excoriation bilateral UE    Exam:  Patient is on the ventilator opens her eyes to verbal stimulation.  She will nod her head appropriately to questions.  She is reluctant to follow commands.  She will grimace to noxious stimuli in all extremities.  She does have spontaneous movement of her bilateral upper and lower extremities.  Breath sounds are rhonchorous bilaterally.  Secretions are tan/gray in appearance.  Patient has diffuse edema.    Goal systolic blood pressure:  Normotensive with mean arterial pressure greater than 65     Respiratory support:  Assist-control rate 18, , FiO2 40, PEEP 8,  Hypertonic saline inhaled 3% 4 mL  Atrovent 0.02% inhaled 0.5 mg 3 times daily  Xopenex 1.25 mg inhaled 3 times daily     I/O +4.5 L    mL  BM 1     Devices:  1/26/2025 left IJ central venous catheter  1/31/2025 OG tube  1/31/2025 urinary catheter  1/31/2025 endotracheal tube  1/31/2025 right radial arterial line     Micro:  2/1/2025 bronchial culture-pending  1/29/2025 sputum culture-+2 stenotrophomonas sensitivities reported  1/29/2025  Lyme IgM-negative  1/29/2025 Lyme IgG positive  1/29/2025 Lyme total antibodies positive  1/27/2025 flu/COVID/RSV-negative     Antimicrobials:  Cefepime 2 g IV every 12 hours  Vancomycin 1500 mg IV every 24 hours pharmacy consult     Hemodynamic support:  Hydrocortisone 50 mg IV every 6 hours  Norepinephrine-3mcg/ min  Vasopressin 0.04 units/min  Isolyte 100 mL/h     Sedation:  Fentanyl 50 mcg/h  Propofol 15 mcg/kg/min    Patient had a bronchoscopy yesterday which showed friable mucosa consistent with significant aspiration events.  Patient's heart rate 40 this morning.  She had several episodes of mean arterial pressure that were less than 65 overnight.  Her heart rate was persistently in the 40s.  No new imaging to review.  Procalcitonin elevated at 5.59.  Glucose elevated at 350.  Calcium is 7.8.  Ionized calcium 1.12.  WBC count elevated to 17.  Hemoglobin of 7.8.  Blood glucose this morning elevated to 350 otherwise blood glucoses have been well-controlled.    Continue with ventilatory support.  Assess for appropriateness of spontaneous breathing trial.  Patient has significant mucosal aspiration changes.  Likely will not be extubated today but may tolerate spontaneous breathing trial.  Continue with aggressive airway clearance protocol.  Mucomyst discontinued due to concern for worsening irritation of the airways.  Hypertonic saline reordered.  There is concern with patient's hiatal hernia that she will continue to have respiratory compromise and decompensation.  Patient had respiratory compromise after she ate which was likely secondary to a full stomach that is located in her chest.  This subsequently resulted in more hypoxia and ultimately vomiting and patient requiring intubation.  After discussion with patient's son yesterday patient rations her food throughout the day and has early satiety.  When patient is stable from respiratory standpoint we will have further discussions about options regarding hiatal  hernia.  Unknown at this time if patient would be a surgical candidate.  There is concern that she has hiatal hernia vagal nerve syndrome resulting in her bradycardia.  Considerations would be does she require pacemaker and she may require J-tube/enteral access for feeding.    Continue with antibiotics.  Appreciate infectious disease recommendations.  Patient is currently on Zosyn as well as IV Bactrim.    Plan to decrease stress dose steroids to 25 mg every 8 hours as patient is presently off norepinephrine infusion.  Attempt to wean off vasopressin.    Patient's blood glucoses significantly elevated likely related to steroids.  Steroids have been weaned today.  If patient has persistent elevated blood glucoses we will plan to initiate insulin infusion.  Start insulin sliding scale.    Continue with low-dose propofol for sedation while on ventilator.  Attempt to transition off fentanyl infusion to as needed fentanyl.    Update: 1222pm    Discontinue fentanyl infusion, change to prn    Discontinue IVF    Lasix 20mg x 1 now    Trial of atropine if HR <45    Update:  1242pm    HR 41, atropine given and HR increased to 76bpm. Response concerning for hiatal hernia vagal syndrome.     SBP improve to 140 with HR 70s.     Start ISS    Update 2:49 PM    Discussed with thoracic surgery Dr. Barrett possibility of surgical intervention.  He is in agreement to consultation with further discussion regarding patient being a candidate for surgical intervention for hiatal hernia as it is likely causing her bradycardic symptoms and hypotension.  Also is impacting her respiratory status.    Awaiting patient's son to arrive to have discussion regarding consultation.    Update 7:07 PM  Discussed with patient's son in detail need for optimization patient would want to proceed with possible surgical intervention.  He is aware thoracic surgery is going to consult.  He was also informed that patient would need a temporary transvenous  pacemaker in order to wean her off pressors and diurese her with the plan to get her extubated.  He stated he would like to consider the risks and benefits with possible surgical intervention before moving forward with transvenous pacemaker.  Plan is to continue with current level of care.  Patient was given Lasix earlier today but unfortunately had a decrease in her pressures where she could not tolerate.  Attempt additional diuresis if patient's hemodynamically able to tolerate.  In our conversation we also discussed the possibility of palliative care consultation.  Patient's son was open to palliative care as well

## 2025-02-02 NOTE — RESPIRATORY THERAPY NOTE
02/02/25 0247   Respiratory Assessment   Assessment Type Assess only   General Appearance Sedated   Respiratory Pattern Assisted   Chest Assessment Chest expansion symmetrical   Bilateral Breath Sounds Diminished   Cough Productive   Suction ET Tube   Resp Comments Pt. remains on documented CMV settings, tolerating well.   O2 Device G5   Vent Information   Vent ID [id number]   Vent type Verdugo G5   $ Vent Daily Charge-Subsequent Yes   $ Pulse Oximetry Spot Check Charge Completed   (S)CMV Settings   Resp Rate (BPM) 18 BPM   VT (mL) 400 mL   FIO2 (%) 40 %   PEEP (cmH2O) 8 cmH2O   I:E Ratio 1/3.2   Insp Time (%) 0.8 %   Flow Trigger (LPM) 5   Humidification Heater   Heater Temperature (Set) 98.6 °F (37 °C)   (S)CMV Actuals   Resp Rate (BPM) 18 BPM   VT (mL) 4.3   MV 7.3   MAP (cmH2O) 14 cmH2O   Peak Pressure (cmH2O) 34 cmH2O   I:E Ratio (Obs) 1/3.2   Heater Temperature (Obs) 98.6 °F (37 °C)   Static Compliance (mL/cmH20) 24.6 mL/cmH2O   (S)CMV Alarms   High Peak Pressure (cmH2O) 40   Low Pressure (cmH2O) 5 cm H2O   High Resp Rate (BPM) 40 BPM   Low Resp Rate (BPM) 8 BPM   High MV (L/min) 20 L/min   Low MV (L/min) 4 L/min   High VT (mL) 1000 mL   Low VT (mL) 250 mL   Apnea Time (s) 20 S   Maintenance   Alarm (pink) cable attached No   Resuscitation bag with peep valve at bedside Yes   Water bag changed No   Circuit changed No   Daily Screen   Patient safety screen outcome: Failed   Not Ready for Weaning due to: Underline problem not resolved   IHI Ventilator Associated Pneumonia Bundle   Daily Assessment of Readiness to Extubate Yes   Head of Bed Elevated HOB 30   ETT  Cuffed 7.5 mm   Placement Date/Time: 01/31/25 c) 4280   Type: Cuffed  Tube Size: 7.5 mm  Location: Oral  Insertion attempts: 1  Placement Verification: Chest x-ray;End tidal CO2;Symmetrical chest wall movement  Secured at (cm): 21 @ teeth   Secured at (cm) 21   Measured from Teeth   Secured Location Center   Secured by Commercial tube villegas   Site  Condition Dry   Cuff Pressure (color) Green   HI-LO Suction  Intermittent suction   HI-LO Secretions Scant   HI-LO Intervention Patent     RT Ventilator Management Note  Darlin Zamora 79 y.o. female MRN: [id number]  Unit/Bed#: ICU 10 Encounter: [id number]      Daily Screen         2/1/2025 2309 2/2/2025  0242          Patient safety screen outcome:: Failed Failed (P)       Not Ready for Weaning due to:: Underline problem not resolved Underline problem not resolved (P)                 Physical Exam:   Assessment Type: (P) Assess only  General Appearance: (P) Sedated  Respiratory Pattern: (P) Assisted  Chest Assessment: (P) Chest expansion symmetrical  Bilateral Breath Sounds: (P) Diminished  R Breath Sounds: Rhonchi  L Breath Sounds: Diminished  Cough: (P) Productive  Suction: (P) ET Tube  O2 Device: (P) G5      Resp Comments: (P) Pt. remains on documented CMV settings, tolerating well.

## 2025-02-02 NOTE — PLAN OF CARE
Problem: PAIN - ADULT  Goal: Verbalizes/displays adequate comfort level or baseline comfort level  Description: Interventions:  - Encourage patient to monitor pain and request assistance  - Assess pain using appropriate pain scale  - Administer analgesics based on type and severity of pain and evaluate response  - Implement non-pharmacological measures as appropriate and evaluate response  - Consider cultural and social influences on pain and pain management  - Notify physician/advanced practitioner if interventions unsuccessful or patient reports new pain  Outcome: Progressing     Problem: Knowledge Deficit  Goal: Patient/family/caregiver demonstrates understanding of disease process, treatment plan, medications, and discharge instructions  Description: Complete learning assessment and assess knowledge base.  Interventions:  - Provide teaching at level of understanding  - Provide teaching via preferred learning methods  Outcome: Progressing     Problem: NEUROSENSORY - ADULT  Goal: Achieves stable or improved neurological status  Description: INTERVENTIONS  - Monitor and report changes in neurological status  - Monitor vital signs such as temperature, blood pressure, glucose, and any other labs ordered   - Initiate measures to prevent increased intracranial pressure  - Monitor for seizure activity and implement precautions if appropriate      Outcome: Progressing  Goal: Achieves maximal functionality and self care  Description: INTERVENTIONS  - Monitor swallowing and airway patency with patient fatigue and changes in neurological status  - Encourage and assist patient to increase activity and self care.   - Encourage visually impaired, hearing impaired and aphasic patients to use assistive/communication devices  Outcome: Progressing     Problem: GENITOURINARY - ADULT  Goal: Maintains or returns to baseline urinary function  Description: INTERVENTIONS:  - Assess urinary function  - Encourage oral fluids to ensure  adequate hydration if ordered  - Administer IV fluids as ordered to ensure adequate hydration  - Administer ordered medications as needed  - Offer frequent toileting  - Follow urinary retention protocol if ordered  Outcome: Progressing  Goal: Absence of urinary retention  Description: INTERVENTIONS:  - Assess patient’s ability to void and empty bladder  - Monitor I/O  - Bladder scan as needed  - Discuss with physician/AP medications to alleviate retention as needed  - Discuss catheterization for long term situations as appropriate  Outcome: Progressing  Goal: Urinary catheter remains patent  Description: INTERVENTIONS:  - Assess patency of urinary catheter  - If patient has a chronic sanchez, consider changing catheter if non-functioning  - Follow guidelines for intermittent irrigation of non-functioning urinary catheter  Outcome: Progressing

## 2025-02-02 NOTE — PROGRESS NOTES
Progress Note - Critical Care/ICU   Name: Darlin Zamora 79 y.o. female I MRN: [id number]  Unit/Bed#: ICU 10 I Date of Admission: 1/25/2025   Date of Service: 2/2/2025 I Hospital Day: 7      Assessment & Plan   Neuro/Psych:  Diagnoses: Acute metabolic encephalopathy; seizure-like activity; Meningioma    1/27 Witnessed episode of seizure-like activity - received 2 mg Ativan  CTH 1/26: No acute intracranial abnormality; known meningiomas   Ammonia 15  MRI brain w/wo:  No obvious infarct.  Meningiomas appear stable.    Paraneoplastic panel negative  Plan:   Continue frequent neuro checks, q2h during day and q4h  Sedation: propofol gtt and fentanyl PRN  Analgesia   Multimodal. Acetaminophen as needed.        CV:  Diagnoses: Septic shock, bradycardia, history of hypertension   1/28 Echo: moderate symmetric hypertrophy of basal septal wall (LV); EF 60%; normal systolic function; grade II diastolic dysfunction; mild dilation of left atrium; mild/moderate mitral and tricuspid regurgitation   Telemetry:  Some bradycardia in 40s/50s, last pause was 10:21 1/29  Plan:  Wean Levo and Vasopressin as able   MAP > 65  Continue IVF  Continue stress dose steroids      Pulm:  Diagnoses: Acute hypoxic respiratory failure in the setting of pneumonia   1/28 CT Chest - New moderate-sized right and small left pleural effusions with new complete right lower lobe atelectasis and significant left lower lobe atelectasis. No PNA  Intubated 1/31 for respiratory failure/hypoxia  Plan:  Continue AC/VC 18/400/8/40%  HTS/Mucomyst discontinued   Respiratory protocol/Airway clearance protocol   Continue Xopenex/Atrovent   Continuous pulse oximetry. Maintain O2 sat >92%.      GI:  Diagnoses: Large Hiatal Hernia - vagal nerve syndrome  CT A/P: Small bowel wall thickening. Correlate for a mild enteritis.   Lipase and amylase normal  RUQ US: Trace perihepatic ascites.  Gallbladder not visualized. No biliary ductal dilation.    Patient is currently not a  surgical candidate for her hernia from a respiratory standpoint, once stabilized unsure if this would be an option  Plan:  Atropine 0.5mg once for HR < 45 did show appropriate response for both HR and BP  Will need discussion with CT surgery for possible intervention.  If not patient would benefit from EP consultation for possible PPM placement.           :  Diagnoses: Acute kidney injury - resolved   Creatinine trend: at baseline  Baseline creatinine: 0.7-0.9  Plan:  Continue sanchez  Monitor I/Os.   One dose of IV lasix 20 mg for patient's volume overload     F/E/N:  F: Dc IVF  E: Trend electrolytes and replete PRN   N: TF      Heme/Onc:  Diagnoses: Thrombocytopenia resolved.  Anemia worsening 9.4>7.8 Hgb today.    LE Duplex: No DVT or superficial thrombophlebitis b/l  Plan:  VTE prophylaxis: JOSTIN, SCDs  Trend CBC       Endo:  Diagnoses: Hyperglycemia likely secondary to steroids  1/27 AM Cortisol 52.7   1/25 TSH 2.611  Plan:   SSI  Goal -180     ID:  Diagnoses: Septic shock w/ upper respiratory infection    Culture data:   1/26 BC negative   1/27 CSF negative  1/27 Flu/COVID/RSV negative   1/27 MRSA negative   Lyme IGM neg but IGG positive  Sputum shows Stenotrophomonas maltophilia.    PCT elevated 5.59  Leukocytosis downtrending 22>17   Plan:  ID consulted  Bactrim IV started  Zosyn started  Vancomycin and cefepime discontinued       MSK/Skin:  Diagnoses: LE cellulitis b/l.  Skin wounds and cuts on UE b/l  Plan:  PT/OT when appropriate. Encourage OOB and ambulation when appropriate. Local wound care prn.     LDAs:  Lines - left IJ CVC, peripheral IV   Drains - Sanchez  Airways -  ETT    Disposition: Critical care    ICU Core Measures     Vented Patient  VAP Bundle  VAP bundle ordered     A: Assess, Prevent, and Manage Pain Has pain been assessed? Yes  Need for changes to pain regimen? Yes   B: Both Spontaneous Awakening Trials (SATs) and Spontaneous Breathing Trials (SBTs) Plan to perform spontaneous  awakening trial today? Yes   Plan to perform spontaneous breathing trial today? Yes   Obvious barriers to extubation? Yes   C: Choice of Sedation RASS Goal: 0 Alert and Calm  Need for changes to sedation or analgesia regimen? Yes   D: Delirium CAM-ICU: Unable to perform secondary to Acute cognitive dysfunction   E: Early Mobility  Plan for early mobility? No   F: Family Engagement Plan for family engagement today? Yes, updated cousin at bedside, son Zack did not answer phone.         Antibiotic Review: Awaiting culture results.     Review of Invasive Devices:    Vasquez Plan: Continue for accurate I/O monitoring for 48 hours  Central access plan: Hemodynamic monitoring  Sirisha Plan: Keep arterial line for hemodynamic monitoring    Prophylaxis:  VTE VTE covered by:  heparin (porcine), Subcutaneous, 5,000 Units at 02/02/25 0521       Stress Ulcer  covered bypantoprazole (PROTONIX) injection 40 mg [[id number]]         24 Hour Events : Patient was bradycardic overnight to 39 HR.  Has been in 40s in general overnight.  She did nod her head yes when asked if she can hear us however otherwise was not able to move her extremities to commands.    Subjective   Review of Systems: See HPI for Review of Systems    Objective :                   Vitals I/O      Most Recent Min/Max in 24hrs   Temp 99.3 °F (37.4 °C) Temp  Min: 98.2 °F (36.8 °C)  Max: 100.8 °F (38.2 °C)   Pulse (!) 46 Pulse  Min: 40  Max: 66   Resp 18 Resp  Min: 17  Max: 20   /59 BP  Min: 96/48  Max: 142/59   O2 Sat 96 % SpO2  Min: 95 %  Max: 100 %      Intake/Output Summary (Last 24 hours) at 2/2/2025 0728  Last data filed at 2/2/2025 0600  Gross per 24 hour   Intake 5470.2 ml   Output 880 ml   Net 4590.2 ml       Diet Enteral/Parenteral; Tube Feeding No Oral Diet; Jevity 1.2 Jae; Continuous; 40; 100; Water; Every 4 hours    Invasive Monitoring   Arterial Line  Sirisha /56  Arterial Line BP  Min: 98/88  Max: 144/48   MAP 76 mmHg  Arterial Line MAP (mmHg)   Min: 58 mmHg  Max: 92 mmHg           Physical Exam   Physical Exam  Vitals reviewed.   Eyes:      Extraocular Movements: Extraocular movements intact.      Conjunctiva/sclera:      Right eye: Right conjunctiva is injected.      Pupils: Pupils are equal, round, and reactive to light.   Skin:     General: Skin is warm and dry.      Comments: Bilateral lower extremity erythema. Small superficial abrasions to LLE with dressings in place. Bilateral hands with multiple scratches over surface.    HENT:      Head: Normocephalic.      Nose: No congestion.      Mouth/Throat:      Mouth: Mucous membranes are dry.      Dentition: Abnormal dentition.   Cardiovascular:      Rate and Rhythm: Normal rate and regular rhythm.      Pulses: Normal pulses.   Musculoskeletal:         General: Swelling present.      Right lower leg: Edema present.      Left lower leg: Edema present.      Comments: Legs appear edematous, 1+   Abdominal:      Palpations: Abdomen is soft.      Tenderness: There is no abdominal tenderness.   Constitutional:       General: She is not in acute distress.     Appearance: She is well-developed.   Pulmonary:      Effort: Pulmonary effort is normal. No respiratory distress.      Breath sounds: Examination of the right-middle field reveals decreased breath sounds. Examination of the right-lower field reveals decreased breath sounds. Examination of the left-lower field reveals decreased breath sounds. Decreased breath sounds and rhonchi present.   Neurological:      Mental Status: She is oriented to person, place and time.      GCS: GCS eye subscore is 3. GCS verbal subscore is 2. GCS motor subscore is 5.      Cranial Nerves: Dysarthria present.      Comments: Moving all extremities, follows commands.             Diagnostic Studies        Lab Results: I have reviewed the following results:     Medications:  Scheduled PRN   acetylcysteine, 3 mL, Q6H While awake  chlorhexidine, 15 mL, Q12H JOSTIN  folic acid 1 mg in sodium  chloride 0.9 % 50 mL IVPB, 1 mg, Daily  heparin (porcine), 5,000 Units, Q8H JOSTIN  hydrocortisone sodium succinate, 50 mg, Q6H JOSTIN  ipratropium, 0.5 mg, TID  levalbuterol, 1.25 mg, TID  nystatin, , BID  pantoprazole, 40 mg, Q12H JOSTIN  piperacillin-tazobactam, 4.5 g, Q8H  polyethylene glycol, 17 g, Daily  senna-docusate sodium, 1 tablet, BID  sulfamethoxazole-trimethoprim, 5 mg/kg of trimethoprim (Adjusted), Q12H  thiamine, 200 mg, Daily  [START ON 2/4/2025] thiamine, 100 mg, Daily  timolol, 1 drop, BID      acetaminophen, 650 mg, Q4H PRN  fentaNYL, 50 mcg, Q1H PRN       Continuous    fentaNYL, 50 mcg/hr, Last Rate: 50 mcg/hr (02/01/25 1337)  multi-electrolyte, 100 mL/hr, Last Rate: 100 mL/hr (02/01/25 2155)  norepinephrine, 1-30 mcg/min, Last Rate: 4 mcg/min (02/01/25 2200)  propofol, 5-50 mcg/kg/min, Last Rate: 15 mcg/kg/min (02/02/25 0220)  vasopressin, 0.04 Units/min, Last Rate: 0.04 Units/min (02/02/25 4018)         Labs:   CBC    Recent Labs     02/01/25 0458 02/02/25  0519   WBC 22.32* 17.00*   HGB 9.4* 7.8*   HCT 30.1* 24.6*    201     BMP    Recent Labs     02/01/25  0458 02/02/25  0519   SODIUM 144 140   K 4.4 4.0   * 105   CO2 24 27   AGAP 10 8   BUN 23 26*   CREATININE 0.81 0.88   CALCIUM 7.8* 7.8*       Coags    No recent results     Additional Electrolytes  Recent Labs     02/01/25 0458 02/02/25  0519   MG 2.3 2.3   PHOS 3.6 3.2   CAIONIZED 1.05* 1.12          Blood Gas    Recent Labs     02/01/25  0755   PHART 7.340*   UML8HVG 41.6   PO2ART 101.3   EOP9PYS 21.9*   BEART -3.6   SOURCE Line, Arterial     Recent Labs     02/01/25  0755   SOURCE Line, Arterial    LFTs  No recent results    Infectious  Recent Labs     02/02/25  0519   PROCALCITONI 5.59*     Glucose  Recent Labs     01/31/25  1549 02/01/25  0458 02/02/25  0519   GLUC 177* 147* 350*

## 2025-02-02 NOTE — RESPIRATORY THERAPY NOTE
02/02/25 0806   Inhalation Therapy Tx   $ Inhalation Therapy Performed Yes   $ Pulse Oximetry Spot Check Charge Completed   SpO2 97 %   Pre-Treatment Pulse 73   Pre-Treatment Respirations 18   Breath Sounds Pre-Treatment Bilateral Diminished   Breath Sounds Post-Treatment Bilateral Diminished   Post-Treatment Pulse 57   Post-Treatment Respirations 18   Delivery Source Aerogen;Vent   Position Semi Roman's   Treatment Tolerance Tolerated well   Resp Comments Pt on SCMV. No vent changes this am. BS decreased clear. Will continue to monitor pt.   Airway Suctioning/Secretions   Suction Type Endotracheal tube   Suction Device Inline;Catheter   Secretion Amount Small   Secretion Color Yellow   Secretion Consistency Thick   Suction Tolerance Tolerated well   Suctioning Adverse Effects None

## 2025-02-02 NOTE — PLAN OF CARE
Problem: PAIN - ADULT  Goal: Verbalizes/displays adequate comfort level or baseline comfort level  Description: Interventions:  - Encourage patient to monitor pain and request assistance  - Assess pain using appropriate pain scale  - Administer analgesics based on type and severity of pain and evaluate response  - Implement non-pharmacological measures as appropriate and evaluate response  - Consider cultural and social influences on pain and pain management  - Notify physician/advanced practitioner if interventions unsuccessful or patient reports new pain  Outcome: Progressing     Problem: INFECTION - ADULT  Goal: Absence or prevention of progression during hospitalization  Description: INTERVENTIONS:  - Assess and monitor for signs and symptoms of infection  - Monitor lab/diagnostic results  - Monitor all insertion sites, i.e. indwelling lines, tubes, and drains  - Monitor endotracheal if appropriate and nasal secretions for changes in amount and color  - [place] appropriate cooling/warming therapies per order  - Administer medications as ordered  - Instruct and encourage patient and family to use good hand hygiene technique  - Identify and instruct in appropriate isolation precautions for identified infection/condition  Outcome: Progressing     Problem: SAFETY ADULT  Goal: Patient will remain free of falls  Description: INTERVENTIONS:  - Educate patient/family on patient safety including physical limitations  - Instruct patient to call for assistance with activity   - Consult OT/PT to assist with strengthening/mobility   - Keep Call bell within reach  - Keep bed low and locked with side rails adjusted as appropriate  - Keep care items and personal belongings within reach  - Initiate and maintain comfort rounds  - Make Fall Risk Sign visible to staff  - Offer Toileting every 2 Hours, in advance of need  - Apply yellow socks and bracelet for high fall risk patients  - Consider moving patient to room near nurses  station  Outcome: Progressing  Goal: Maintain or return to baseline ADL function  Description: INTERVENTIONS:  -  Assess patient's ability to carry out ADLs; assess patient's baseline for ADL function and identify physical deficits which impact ability to perform ADLs (bathing, care of mouth/teeth, toileting, grooming, dressing, etc.)  - Assess/evaluate cause of self-care deficits   - Assess range of motion  - Assess patient's mobility; develop plan if impaired  - Assess patient's need for assistive devices and provide as appropriate  - Encourage maximum independence but intervene and supervise when necessary  - Involve family in performance of ADLs  - Assess for home care needs following discharge   - Consider OT consult to assist with ADL evaluation and planning for discharge  - Provide patient education as appropriate  Outcome: Progressing  Goal: Maintains/Returns to pre admission functional level  Description: INTERVENTIONS:  - Perform AM-PAC 6 Click Basic Mobility/ Daily Activity assessment daily.  - Set and communicate daily mobility goal to care team and patient/family/caregiver.   - Collaborate with rehabilitation services on mobility goals if consulted  - Reposition patient every 2 hours.  - Out of bed for toileting  - Record patient progress and toleration of activity level   Outcome: Progressing     Problem: DISCHARGE PLANNING  Goal: Discharge to home or other facility with appropriate resources  Description: INTERVENTIONS:  - Identify barriers to discharge w/patient and caregiver  - Arrange for needed discharge resources and transportation as appropriate  - Identify discharge learning needs (meds, wound care, etc.)  - Arrange for interpretive services to assist at discharge as needed  - Refer to Case Management Department for coordinating discharge planning if the patient needs post-hospital services based on physician/advanced practitioner order or complex needs related to functional status, cognitive  ability, or social support system  Outcome: Progressing     Problem: Knowledge Deficit  Goal: Patient/family/caregiver demonstrates understanding of disease process, treatment plan, medications, and discharge instructions  Description: Complete learning assessment and assess knowledge base.  Interventions:  - Provide teaching at level of understanding  - Provide teaching via preferred learning methods  Outcome: Progressing     Problem: Prexisting or High Potential for Compromised Skin Integrity  Goal: Skin integrity is maintained or improved  Description: INTERVENTIONS:  - Identify patients at risk for skin breakdown  - Assess and monitor skin integrity  - Assess and monitor nutrition and hydration status  - Monitor labs   - Assess for incontinence   - Turn and reposition patient  - Assist with mobility/ambulation  - Relieve pressure over bony prominences  - Avoid friction and shearing  - Provide appropriate hygiene as needed including keeping skin clean and dry  - Evaluate need for skin moisturizer/barrier cream  - Collaborate with interdisciplinary team   - Patient/family teaching  - Consider wound care consult   Outcome: Progressing     Problem: Nutrition/Hydration-ADULT  Goal: Nutrient/Hydration intake appropriate for improving, restoring or maintaining nutritional needs  Description: Monitor and assess patient's nutrition/hydration status for malnutrition. Collaborate with interdisciplinary team and initiate plan and interventions as ordered.  Monitor patient's weight and dietary intake as ordered or per policy. Utilize nutrition screening tool and intervene as necessary. Determine patient's food preferences and provide high-protein, high-caloric foods as appropriate.     INTERVENTIONS:  - Monitor oral intake, urinary output, labs, and treatment plans  - Assess nutrition and hydration status and recommend course of action  - Evaluate amount of meals eaten  - Assist patient with eating if necessary   - Allow  adequate time for meals  - Recommend/ encourage appropriate diets, oral nutritional supplements, and vitamin/mineral supplements  - Order, calculate, and assess calorie counts as needed  - Recommend, monitor, and adjust tube feedings and TPN/PPN based on assessed needs  - Assess need for intravenous fluids  - Provide specific nutrition/hydration education as appropriate  - Include patient/family/caregiver in decisions related to nutrition  Outcome: Progressing     Problem: NEUROSENSORY - ADULT  Goal: Achieves stable or improved neurological status  Description: INTERVENTIONS  - Monitor and report changes in neurological status  - Monitor vital signs such as temperature, blood pressure, glucose, and any other labs ordered   - Initiate measures to prevent increased intracranial pressure  - Monitor for seizure activity and implement precautions if appropriate      Outcome: Progressing  Goal: Achieves maximal functionality and self care  Description: INTERVENTIONS  - Monitor swallowing and airway patency with patient fatigue and changes in neurological status  - Encourage and assist patient to increase activity and self care.   - Encourage visually impaired, hearing impaired and aphasic patients to use assistive/communication devices  Outcome: Progressing     Problem: CARDIOVASCULAR - ADULT  Goal: Maintains optimal cardiac output and hemodynamic stability  Description: INTERVENTIONS:  - Monitor I/O, vital signs and rhythm  - Monitor for S/S and trends of decreased cardiac output  - Administer and titrate ordered vasoactive medications to optimize hemodynamic stability  - Assess quality of pulses, skin color and temperature  - Assess for signs of decreased coronary artery perfusion  - Instruct patient to report change in severity of symptoms  Outcome: Progressing     Problem: RESPIRATORY - ADULT  Goal: Achieves optimal ventilation and oxygenation  Description: INTERVENTIONS:  - Assess for changes in respiratory status  -  Assess for changes in mentation and behavior  - Position to facilitate oxygenation and minimize respiratory effort  - Oxygen administered by appropriate delivery if ordered  - Initiate smoking cessation education as indicated  - Encourage broncho-pulmonary hygiene including cough, deep breathe, Incentive Spirometry  - Assess the need for suctioning and aspirate as needed  - Assess and instruct to report SOB or any respiratory difficulty  - Respiratory Therapy support as indicated  Outcome: Progressing     Problem: GENITOURINARY - ADULT  Goal: Maintains or returns to baseline urinary function  Description: INTERVENTIONS:  - Assess urinary function  - Encourage oral fluids to ensure adequate hydration if ordered  - Administer IV fluids as ordered to ensure adequate hydration  - Administer ordered medications as needed  - Offer frequent toileting  - Follow urinary retention protocol if ordered  Outcome: Progressing  Goal: Absence of urinary retention  Description: INTERVENTIONS:  - Assess patient’s ability to void and empty bladder  - Monitor I/O  - Bladder scan as needed  - Discuss with physician/AP medications to alleviate retention as needed  - Discuss catheterization for long term situations as appropriate  Outcome: Progressing  Goal: Urinary catheter remains patent  Description: INTERVENTIONS:  - Assess patency of urinary catheter  - If patient has a chronic sanchez, consider changing catheter if non-functioning  - Follow guidelines for intermittent irrigation of non-functioning urinary catheter  Outcome: Progressing     Problem: METABOLIC, FLUID AND ELECTROLYTES - ADULT  Goal: Electrolytes maintained within normal limits  Description: INTERVENTIONS:  - Monitor labs and assess patient for signs and symptoms of electrolyte imbalances  - Administer electrolyte replacement as ordered  - Monitor response to electrolyte replacements, including repeat lab results as appropriate  - Instruct patient on fluid and nutrition  as appropriate  Outcome: Progressing  Goal: Fluid balance maintained  Description: INTERVENTIONS:  - Monitor labs   - Monitor I/O and WT  - Instruct patient on fluid and nutrition as appropriate  - Assess for signs & symptoms of volume excess or deficit  Outcome: Progressing     Problem: SKIN/TISSUE INTEGRITY - ADULT  Goal: Skin Integrity remains intact(Skin Breakdown Prevention)  Description: Assess:  -Inspect skin when repositioning, toileting, and assisting with ADLS  -Assess extremities for adequate circulation and sensation     Bed Management:  -Have minimal linens on bed & keep smooth, unwrinkled  -Change linens as needed when moist or perspiring    Activity:  -Encourage activity and walks on unit  -Encourage or provide ROM exercises   -Turn and reposition patient every 2 Hours  -Use appropriate equipment to lift or move patient in bed    Skin Care:  -Avoid use of baby powder, tape, friction and shearing, hot water or constrictive clothing  -Do not massage red bony areas  Outcome: Progressing     Problem: HEMATOLOGIC - ADULT  Goal: Maintains hematologic stability  Description: INTERVENTIONS  - Assess for signs and symptoms of bleeding or hemorrhage  - Monitor labs  - Administer supportive blood products/factors as ordered and appropriate  Outcome: Progressing     Problem: Potential for Falls  Goal: Patient will remain free of falls  Description: INTERVENTIONS:  - Educate patient/family on patient safety including physical limitations  - Instruct patient to call for assistance with activity   - Consult OT/PT to assist with strengthening/mobility   - Keep Call bell within reach  - Keep bed low and locked with side rails adjusted as appropriate  - Keep care items and personal belongings within reach  - Initiate and maintain comfort rounds  - Make Fall Risk Sign visible to staff  - Offer Toileting every 2 Hours, in advance of need  - Apply yellow socks and bracelet for high fall risk patients  - Consider moving  patient to room near nurses station  Outcome: Progressing     Problem: SAFETY,RESTRAINT: NV/NON-SELF DESTRUCTIVE BEHAVIOR  Goal: Remains free of harm/injury (restraint for non violent/non self-detsructive behavior)  Description: INTERVENTIONS:  - Instruct patient/family regarding restraint use   - Assess and monitor physiologic and psychological status   - Provide interventions and comfort measures to meet assessed patient needs   - Identify and implement measures to help patient regain control  - Assess readiness for release of restraint   Outcome: Progressing  Goal: Returns to optimal restraint-free functioning  Description: INTERVENTIONS:  - Assess the patient's behavior and symptoms that indicate continued need for restraint  - Identify and implement measures to help patient regain control  - Assess readiness for release of restraint   Outcome: Progressing

## 2025-02-02 NOTE — PROGRESS NOTES
02/02/25 1700   Clinical Encounter Type   Visited With Patient and family together   Referral From Nurse   Rastafarian Encounters   Rastafarian Needs Prayer   Family Spiritual Encounters   Family Coping Anxiety;Open/discussion;Sadness   Family Normalization 5   Family Support During Treatment 5      met with pt son and she is intubated. Pt is a person of chan (Sunny) and this is a major part of her life. Son struggling with plan of care and pt disposition and would benefit from IDT family meeting to review all options. He will consult other family members as well for input regarding pt best interests.  services is here to support during pt/family stay here. Son appreciative of spiritual care discussion.

## 2025-02-03 PROBLEM — Z51.5 PALLIATIVE CARE BY SPECIALIST: Status: ACTIVE | Noted: 2025-01-01

## 2025-02-03 PROBLEM — Z71.89 GOALS OF CARE, COUNSELING/DISCUSSION: Status: ACTIVE | Noted: 2025-01-01

## 2025-02-03 NOTE — ASSESSMENT & PLAN NOTE
Palliative Diagnosis: large hiatal hernia, acute hypoxic respiratory failure, septic shock    Social Support:  Patient's support system: son Zack  Supportive listening provided  Normalized experience of patient  Advocated for patient/family with interdisciplinary team    Care Coordination  Case discussed with critical care team, thoracic surgery    Follow-up  We appreciate the opportunity to participate in this patient's care.   We will continue to follow while admitted.    Please do not hesitate to contact our on-call provider through [employer] Secure Chat or contact [phone] should there be an acute change or other symptom control concerns.    Please do not make any changes to symptom medications (opioid analgesics, nonopioid analgesics, antiemetics, etc) without first consulting the on-call Palliative Care provider for your specific campus; including after hours and on weekends. We are available 24/7, and can be contacted on gdgt chat or at [phone].

## 2025-02-03 NOTE — ASSESSMENT & PLAN NOTE
Multifactorial the setting of septic shock, bradycardia, acute hypoxic respiratory failure 2/2 large hiatal hernia/aspiration PNA  Management per critical care team

## 2025-02-03 NOTE — ASSESSMENT & PLAN NOTE
History of left frontal meningioma, right inferior cerebellar pontine angle mengioma  Stable on CT and MRI imaging

## 2025-02-03 NOTE — CONSULTS
Consultation - Palliative Care   Name: Darlin Zamora 79 y.o. female I MRN: [id number]  Unit/Bed#: ICU 10 I Date of Admission: 1/25/2025   Date of Service: 2/3/2025 I Hospital Day: 8       Inpatient consult to Palliative Care     Date/Time  2/3/2025 12:12 PM     Performed by  Raymundo Tan MD   Authorized by  Michelle Winters DO         Physician Requesting Evaluation: Michelle Winters DO   Reason for Evaluation / Principal Problem: GOC discussion    Assessment & Plan  Palliative care by specialist  Palliative Diagnosis: large hiatal hernia, acute hypoxic respiratory failure, septic shock    Social Support:  Patient's support system: lela Dixon  Supportive listening provided  Normalized experience of patient  Advocated for patient/family with interdisciplinary team    Care Coordination  Case discussed with critical care team, thoracic surgery    Follow-up  We appreciate the opportunity to participate in this patient's care.   We will continue to follow while admitted.    Please do not hesitate to contact our on-call provider through [facility] Secure Chat or contact [phone] should there be an acute change or other symptom control concerns.    Please do not make any changes to symptom medications (opioid analgesics, nonopioid analgesics, antiemetics, etc) without first consulting the on-call Palliative Care provider for your specific campus; including after hours and on weekends. We are available 24/7, and can be contacted on [employer] chat or at [phone].    Goals of care, counseling/discussion  Goals:  Code Status: Full code - Level 1  Decisional apparatus:  Patient is not competent on my exam today.  If competence is lost, patient's substitute decision maker would default to her son Zack by [place].  Advance Directive / Living Will / POLST:  none    Discussion between palliative care, thoracic surgery and lela Dixon at bedside. Son agrees to proceed with surgery. Tentatively planned for tomorrow    Palliative will follow for ongoing goals of care discussions as situation evolves.    Hiatal hernia  Giant type IV hernia compressing the majority of her lower lobes, likely causing respiratory compromise and aspiration issues  Thoracic surgery consulted - reasonable to consider urgent surgical intervention. Spoke with son who is agreeable to proceed with surgery. Will also need transvenous pacer placement   Altered mental status/Seizure like activity  Multifactorial the setting of septic shock, bradycardia, acute hypoxic respiratory failure 2/2 large hiatal hernia/aspiration PNA  Management per critical care team  Meningioma ([facility])  History of left frontal meningioma, right inferior cerebellar pontine angle mengioma  Stable on CT and MRI imaging   Septic shock ([facility])  In the setting of aspiration PNA vs pneumonitis  Lyme studies +IgG, -IgM  Sputum culture shows Stenotrophomonas maltophilia  Bronchoscopy culture pending  ID following   Acute hypoxic respiratory failure ([facility])  1/28 CT Chest - New moderate-sized right and small left pleural effusions with new complete right lower lobe atelectasis and significant left lower lobe atelectasis. No PNA   Intubated 1/31 for increased work of breathing, hypoxia  Contributed by aspiration, large hiatal hernia - vagal nerve syndrome    Acute encephalopathy        PDMP Review: I have reviewed the patient's controlled substance dispensing history in the Prescription Drug Monitoring Program in compliance with the [place] regulations before prescribing any controlled substances.    History of Present Illness   HPI: Darlin Zamora is a 79 y.o. year old female who presents with septic shock, acute hypoxic respiratory failure with aspiration event, bradycardia and a large hiatal hernia.     Patient presented via EMS on 1/26 after being found down on the floor unresponsive, hypothermic, hypotensive during a wellness check.  Patient's home was reportedly cluttered, filthy and with no  heat.  Core temperature in the ED was 81.5F. After her blood pressures failed to improve with IV fluids, the patient was started on Levophed and transferred to the MICU. Additional workup showed - [place] with mild elevation of LFTs, CT head with stable known meningioma, benign LP results. Neurology was consulted given concern for seizures and vEEG returned unremarkable. The patient was later started on antibiotics after CT chest showed signs concerning for pneumonia; however, the patient developed worsening work of breathing and ultimately was intubated (1/31/25). Bronchoscopy (2/1/25) showed friable mucosa consistent with significant aspiration events. Currently, there is significant concern that the patient will continue to have respiratory compromise/decompensation due to her large hiatal hernia. Of note, this hiatal hernia may also be causing persistent bradycardia for which EP was consulted. Per thoracic surgery, they can certainly offer to repair this hiatal hernia and hopefully this will improve her symptoms. Other considerations though include transvenous pacer placement, the possibility for long-term tube feeds, continued risk for aspiration. Palliative care is consulted to help engage family on GOC.           Review of Systems   Unable to perform ROS: Intubated     I have reviewed the patient's PMH, PSH, Social History, Family History, Meds, and Allergies    Objective :  Temp:  [99.3 °F (37.4 °C)-100.4 °F (38 °C)] 99.3 °F (37.4 °C)  HR:  [38-68] 48  BP: (116)/(53) 116/53  Resp:  [12-23] 19  SpO2:  [91 %-98 %] 94 %  O2 Device: Ventilator    Physical Exam  Vitals reviewed.   Constitutional:       General: She is not in acute distress.     Appearance: She is ill-appearing. She is not toxic-appearing.      Comments: Patient is intubated, able to open her eyes and sometimes nods/shakes her head. However, is lethargic. Does not have capacity on my exam   HENT:      Head: Normocephalic and atraumatic.    Cardiovascular:      Rate and Rhythm: Normal rate.      Comments: Of note, having intermittent bradycardia   Pulmonary:      Comments: Intubated due to acute hypoxic respiratory failure   Neurological:      Mental Status: She is alert.      Motor: Weakness present.   Psychiatric:         Cognition and Memory: Cognition is impaired.            Lab Results: I have reviewed the following results:  Lab Results   Component Value Date/Time    SODIUM 137 02/03/2025 05:40 AM    K 3.4 (L) 02/03/2025 05:40 AM    K 4.1 02/06/2017 12:28 PM    BUN 31 (H) 02/03/2025 05:40 AM    BUN 24 02/06/2017 12:28 PM    CREATININE 1.00 02/03/2025 05:40 AM    CREATININE 0.77 02/06/2017 12:28 PM    GLUC 278 (H) 02/03/2025 05:40 AM    CALCIUM 7.4 (L) 02/03/2025 05:40 AM    CALCIUM 9.5 02/06/2017 12:28 PM    AST 24 01/30/2025 04:57 AM    AST 15 06/20/2016 11:27 AM    ALT 46 01/30/2025 04:57 AM    ALT 19 06/20/2016 11:27 AM    ALB 2.8 (L) 01/30/2025 04:57 AM    ALB 3.9 06/20/2016 11:27 AM    TP 5.2 (L) 01/30/2025 04:57 AM    EGFR 54 02/03/2025 05:40 AM    EGFR 65 04/07/2018 08:47 PM     Lab Results   Component Value Date/Time    HGB 7.1 (L) 02/03/2025 05:40 AM    HGB 13.4 02/06/2017 12:28 PM    WBC 13.18 (H) 02/03/2025 05:40 AM    WBC 9.3 06/20/2016 11:27 AM     02/03/2025 05:40 AM     (H) 06/20/2016 11:27 AM    INR 1.34 (H) 01/28/2025 05:11 AM     Lab Results   Component Value Date/Time    [id number] 2.611 01/25/2025 08:48 PM       Imaging Results Review: I reviewed radiology reports from this admission including: chest xray, CT chest, CT abdomen/pelvis, CT head, MRI brain, and Echocardiogram.  Other Study Results Review: EKG was reviewed.     Code Status: Level 1 - Full Code  Advance Directive and Living Will:      Power of :    POLST:      Administrative Statements   I have spent a total time of 40 minutes in caring for this patient on the day of the visit/encounter including Diagnostic results, Impressions, Counseling  / Coordination of care, Documenting in the medical record, Reviewing / ordering tests, medicine, procedures  , Obtaining or reviewing history  , and Communicating with other healthcare professionals . Topics discussed with the patient / family include symptom assessment and management, medication review, goals of care, and anticipatory guidance.

## 2025-02-03 NOTE — PROGRESS NOTES
Progress Note - Critical Care/ICU   Name: Darlin Zamora 79 y.o. female I MRN: [id number]  Unit/Bed#: ICU 10 I Date of Admission: 1/25/2025   Date of Service: 2/3/2025 I Hospital Day: 8      Assessment & Plan   Neuro/Psych:  Diagnoses: Acute metabolic encephalopathy; seizure-like activity; Meningioma    1/27 Witnessed episode of seizure-like activity - received 2 mg Ativan  CTH 1/26: No acute intracranial abnormality; known meningiomas   Ammonia 15  MRI brain w/wo:  No obvious infarct.  Meningiomas appear stable.    Paraneoplastic panel negative  Plan:   Continue frequent neuro checks, q4h  Sedation: propofol gtt and fentanyl PRN  Analgesia   Multimodal. Acetaminophen as needed.        CV:  Diagnoses: Septic shock, bradycardia, history of hypertension   1/28 Echo: moderate symmetric hypertrophy of basal septal wall (LV); EF 60%; normal systolic function; grade II diastolic dysfunction; mild dilation of left atrium; mild/moderate mitral and tricuspid regurgitation   Telemetry:  Some bradycardia in 40s/50s, last pause was 10:21 1/29  Plan:  Wean Levo and Vasopressin as able   MAP > 65  Continue stress dose steroids      Pulm:  Diagnoses: Acute hypoxic respiratory failure in the setting of pneumonia   1/28 CT Chest - New moderate-sized right and small left pleural effusions with new complete right lower lobe atelectasis and significant left lower lobe atelectasis. No PNA  Intubated 1/31 for respiratory failure/hypoxia  Plan:  Continue AC/VC 18/400/8/40%  ABG and CXR today  Respiratory protocol/Airway clearance protocol   Continue Xopenex/Atrovent   Continuous pulse oximetry. Maintain O2 sat >92%.      GI:  Diagnoses: Large Hiatal Hernia - vagal nerve syndrome  CT A/P: Small bowel wall thickening. Correlate for a mild enteritis.   Lipase and amylase normal  RUQ US: Trace perihepatic ascites.  Gallbladder not visualized. No biliary ductal dilation.    Patient is currently not a surgical candidate for her hernia from a  respiratory standpoint, once stabilized unsure if this would be an option  Last BM Last BM Date: 02/02/25 (02/02/25 0536 : Jossy Vines RN)   Plan:  Atropine 0.5mg once for HR < 45 did show appropriate response for both HR and BP  Will need discussion with CT surgery for possible intervention.  If not patient would benefit from EP consultation for possible PPM placement.           :  Diagnoses: Acute kidney injury - resolved   Creatinine trend: at baseline  Baseline creatinine: 0.7-0.9  Plan:  Continue sanchez  Monitor I/Os.      F/E/N:  F: Fluid resuscitation as needed  E: Trend electrolytes and replete PRN   N: TF      Heme/Onc:  Diagnoses: Thrombocytopenia resolved.  Anemia worsening 7.8>7.1 Hgb today.    LE Duplex: No DVT or superficial thrombophlebitis b/l  Plan:  VTE prophylaxis: JOSTIN, SCDs  Trend CBC       Endo:  Diagnoses: Hyperglycemia likely secondary to steroids  1/27 AM Cortisol 52.7   1/25 TSH 2.611  Plan:   SSI, Increased to Alg 4  Goal -180     ID:  Diagnoses: Septic shock w/ upper respiratory infection    Culture data:   1/26 BC negative   1/27 CSF negative  1/27 Flu/COVID/RSV negative   1/27 MRSA negative   Lyme IGM neg but IGG positive  Sputum shows Stenotrophomonas maltophilia.    PCT elevated 5.59  Leukocytosis downtrending 22>17   Bronchoscopy culture pending  Plan:  ID consulted  Continue Bactrim and Zosyn  Vancomycin and cefepime discontinued        MSK/Skin:  Diagnoses: LE cellulitis b/l.  Skin wounds and cuts on UE b/l  Plan:  PT/OT when appropriate. Encourage OOB and ambulation when appropriate. Local wound care prn.     LDAs:  Lines - left IJ CVC, peripheral IV   Drains - Sanchez  Airways -  ETT    Disposition: Critical care    ICU Core Measures     Vented Patient  VAP Bundle  VAP bundle ordered     A: Assess, Prevent, and Manage Pain Has pain been assessed? Yes  Need for changes to pain regimen? No   B: Both Spontaneous Awakening Trials (SATs) and Spontaneous Breathing Trials (SBTs)  Plan to perform spontaneous awakening trial today? Yes   Plan to perform spontaneous breathing trial today? Yes   Obvious barriers to extubation? Yes   C: Choice of Sedation RASS Goal: 0 Alert and Calm  Need for changes to sedation or analgesia regimen? No   D: Delirium CAM-ICU: Unable to perform secondary to Acute cognitive dysfunction   E: Early Mobility  Plan for early mobility? No   F: Family Engagement Plan for family engagement today? Yes       Antibiotic Review: Awaiting culture results.     Review of Invasive Devices:    [place] Plan: Continue for accurate I/O monitoring for 48 hours  Central access plan: Medications requiring central line  [place] Plan: Keep arterial line for hemodynamic monitoring    Prophylaxis:  VTE VTE covered by:  heparin (porcine), Subcutaneous, 5,000 Units at 02/03/25 0540       Stress Ulcer  covered bypantoprazole (PROTONIX) injection 40 mg [[id number]]         24 Hour Events : No acute events overnight.  Currently off of levo, still on vasopressin.  Subjective   Review of Systems: See HPI for Review of Systems    Objective :                   Vitals I/O      Most Recent Min/Max in 24hrs   Temp 100 °F (37.8 °C) Temp  Min: 98.2 °F (36.8 °C)  Max: 100.4 °F (38 °C)   Pulse (!) 42 Pulse  Min: 38  Max: 68   Resp 21 Resp  Min: 12  Max: 23   /53 BP  Min: 100/52  Max: 116/53   O2 Sat 96 % SpO2  Min: 95 %  Max: 99 %      Intake/Output Summary (Last 24 hours) at 2/3/2025 0637  Last data filed at 2/2/2025 2200  Gross per 24 hour   Intake 2443.26 ml   Output 1295 ml   Net 1148.26 ml       Diet Enteral/Parenteral; Tube Feeding No Oral Diet; Jevity 1.2 Jae; Continuous; 40; 50; Water; Every 4 hours    Invasive Monitoring   Arterial Line  [place] /48  Arterial Line BP  Min: 94/44  Max: 138/58   MAP 76 mmHg  Arterial Line MAP (mmHg)  Min: 62 mmHg  Max: 156 mmHg           Physical Exam   Physical Exam  Vitals reviewed.   Eyes:      Extraocular Movements: Extraocular movements intact.       Conjunctiva/sclera:      Right eye: Right conjunctiva is injected.      Pupils: Pupils are equal, round, and reactive to light.   Skin:     General: Skin is warm and dry.      Comments: Bilateral lower extremity erythema. Small superficial abrasions to LLE with dressings in place. Bilateral hands with multiple scratches over surface.    HENT:      Head: Normocephalic.      Nose: No congestion.      Mouth/Throat:      Mouth: Mucous membranes are dry.      Dentition: Abnormal dentition.   Cardiovascular:      Rate and Rhythm: Normal rate and regular rhythm.      Pulses: Normal pulses.   Musculoskeletal:      Right lower le+ Edema present.      Left lower le+ Edema present.   Abdominal:      Palpations: Abdomen is soft.      Tenderness: There is no abdominal tenderness.   Constitutional:       Appearance: She is well-developed. She is ill-appearing and toxic-appearing.      Interventions: She is sedated, intubated and restrained.   Pulmonary:      Effort: Pulmonary effort is normal. No respiratory distress. She is intubated.      Breath sounds: Examination of the right-middle field reveals decreased breath sounds. Examination of the right-lower field reveals decreased breath sounds. Examination of the left-lower field reveals decreased breath sounds. Decreased breath sounds and rhonchi (Diffusely, worse on right fields) present.   Neurological:      GCS: GCS eye subscore is 3. GCS motor subscore is 6.        Cough reflex.      Comments: Able to nod her head to answer to questions.  She is able to wiggle her toes to command however not able to move her upper extremities to command.  To noxious stim she does withdraw minimally with her left upper extremity, right upper extremity noxious stimuli only produces a grimace.    She is able to follow commands to track, open and close her eyes and can move her tongue.           Diagnostic Studies        Lab Results: I have reviewed the following results:      Medications:  Scheduled PRN   chlorhexidine, 15 mL, Q12H JOSTIN  folic acid 1 mg in sodium chloride 0.9 % 50 mL IVPB, 1 mg, Daily  heparin (porcine), 5,000 Units, Q8H JOSTIN  hydrocortisone sodium succinate, 25 mg, Q8H JOSTIN  insulin lispro, 1-5 Units, Q6H JOSTIN  ipratropium, 0.5 mg, TID  levalbuterol, 1.25 mg, TID  nystatin, , BID  pantoprazole, 40 mg, Q12H JOSTIN  piperacillin-tazobactam, 4.5 g, Q8H  polyethylene glycol, 17 g, Daily  senna-docusate sodium, 1 tablet, BID  sulfamethoxazole-trimethoprim, 5 mg/kg of trimethoprim (Adjusted), Q12H  thiamine, 200 mg, Daily  [START ON 2/4/2025] thiamine, 100 mg, Daily  timolol, 1 drop, BID      acetaminophen, 650 mg, Q4H PRN  fentaNYL, 50 mcg, Q1H PRN       Continuous    fentaNYL, 50 mcg/hr, Last Rate: Stopped (02/02/25 1600)  norepinephrine, 1-30 mcg/min, Last Rate: Stopped (02/03/25 0540)  propofol, 5-50 mcg/kg/min, Last Rate: 20 mcg/kg/min (02/03/25 0450)  vasopressin, 0.04 Units/min, Last Rate: 0.04 Units/min (02/02/25 2302)         Labs:   CBC    Recent Labs     02/02/25  0519 02/03/25  0540   WBC 17.00* 13.18*   HGB 7.8* 7.1*   HCT 24.6* 23.4*    285     BMP    Recent Labs     02/02/25  0519   SODIUM 140   K 4.0      CO2 27   AGAP 8   BUN 26*   CREATININE 0.88   CALCIUM 7.8*       Coags    No recent results     Additional Electrolytes  Recent Labs     02/02/25  0519   MG 2.3   PHOS 3.2   CAIONIZED 1.12          Blood Gas    Recent Labs     02/01/25  0755   PHART 7.340*   MCY9ZDH 41.6   PO2ART 101.3   [id number] 21.9*   BEART -3.6   SOURCE Line, Arterial     Recent Labs     02/01/25  0755   SOURCE Line, Arterial    LFTs  No recent results    Infectious  Recent Labs     02/02/25  0519   PROCALCITONI 5.59*     Glucose  Recent Labs     02/02/25  0519   GLUC 350*

## 2025-02-03 NOTE — ASSESSMENT & PLAN NOTE
1/28 CT Chest - New moderate-sized right and small left pleural effusions with new complete right lower lobe atelectasis and significant left lower lobe atelectasis. No PNA   Intubated 1/31 for increased work of breathing, hypoxia  Contributed by aspiration, large hiatal hernia - vagal nerve syndrome

## 2025-02-03 NOTE — ASSESSMENT & PLAN NOTE
In the setting of aspiration PNA vs pneumonitis  Lyme studies +IgG, -IgM  Sputum culture shows Stenotrophomonas maltophilia  Bronchoscopy culture pending  ID following

## 2025-02-03 NOTE — PROGRESS NOTES
Pastoral Care Progress Note          Chaplaincy Interventions Utilized:   Empowerment: Clarified, confirmed, or reviewed information from treatment team     Exploration: Explored spiritual needs & resources    Collaboration: Consulted with interdisciplinary team     Relationship Building: Cultivated a relationship of care and support, Listened empathically, and Hospitality    Ritual:  provided sacrament of the sick and Read sacred text       02/03/25 1100   Clinical Encounter Type   Visited With Patient  ()   Referral From    Referral To        Chaplaincy Outcomes Achieved:     went to follow up from the weekend admit and found the patients  in the room.   spoke with the  and stayed with him, per his request, while he anointed the patient.  The  offered any other help we or the family would need.   spoke to the patient briefly as she is intubated and unable to talk but can communicate by nodding her head and blinking her eyes.   offered comfort and support.     Spiritual Coping Strategies Utilized:        remains available.

## 2025-02-03 NOTE — PLAN OF CARE
Problem: PAIN - ADULT  Goal: Verbalizes/displays adequate comfort level or baseline comfort level  Description: Interventions:  - Encourage patient to monitor pain and request assistance  - Assess pain using appropriate pain scale  - Administer analgesics based on type and severity of pain and evaluate response  - Implement non-pharmacological measures as appropriate and evaluate response  - Consider cultural and social influences on pain and pain management  - Notify physician/advanced practitioner if interventions unsuccessful or patient reports new pain  Outcome: Progressing     Problem: INFECTION - ADULT  Goal: Absence or prevention of progression during hospitalization  Description: INTERVENTIONS:  - Assess and monitor for signs and symptoms of infection  - Monitor lab/diagnostic results  - Monitor all insertion sites, i.e. indwelling lines, tubes, and drains  - Monitor endotracheal if appropriate and nasal secretions for changes in amount and color  - [place] appropriate cooling/warming therapies per order  - Administer medications as ordered  - Instruct and encourage patient and family to use good hand hygiene technique  - Identify and instruct in appropriate isolation precautions for identified infection/condition  Outcome: Progressing  Goal: Absence of fever/infection during neutropenic period  Description: INTERVENTIONS:  - Monitor WBC    Outcome: Progressing     Problem: SAFETY ADULT  Goal: Patient will remain free of falls  Description: INTERVENTIONS:  - Educate patient/family on patient safety including physical limitations  - Instruct patient to call for assistance with activity   - Consult OT/PT to assist with strengthening/mobility   - Keep Call bell within reach  - Keep bed low and locked with side rails adjusted as appropriate  - Keep care items and personal belongings within reach  - Initiate and maintain comfort rounds  - Make Fall Risk Sign visible to staff  - Offer Toileting every 2 Hours,  in advance of need  - Initiate/Maintain bed alarm  - Obtain necessary fall risk management equipment: n/a  - Apply yellow socks and bracelet for high fall risk patients  - Consider moving patient to room near nurses station  Outcome: Progressing  Goal: Maintain or return to baseline ADL function  Description: INTERVENTIONS:  -  Assess patient's ability to carry out ADLs; assess patient's baseline for ADL function and identify physical deficits which impact ability to perform ADLs (bathing, care of mouth/teeth, toileting, grooming, dressing, etc.)  - Assess/evaluate cause of self-care deficits   - Assess range of motion  - Assess patient's mobility; develop plan if impaired  - Assess patient's need for assistive devices and provide as appropriate  - Encourage maximum independence but intervene and supervise when necessary  - Involve family in performance of ADLs  - Assess for home care needs following discharge   - Consider OT consult to assist with ADL evaluation and planning for discharge  - Provide patient education as appropriate  Outcome: Progressing  Goal: Maintains/Returns to pre admission functional level  Description: INTERVENTIONS:  - Perform AM-PAC 6 Click Basic Mobility/ Daily Activity assessment daily.  - Set and communicate daily mobility goal to care team and patient/family/caregiver.   - Collaborate with rehabilitation services on mobility goals if consulted  - Perform Range of Motion 3 times a day.  - Reposition patient every 2 hours.  - Dangle patient 3 times a day  - Stand patient 3 times a day  - Ambulate patient 3 times a day  - Out of bed to chair 3 times a day   - Out of bed for meals 3 times a day  - Out of bed for toileting  - Record patient progress and toleration of activity level   Outcome: Progressing     Problem: DISCHARGE PLANNING  Goal: Discharge to home or other facility with appropriate resources  Description: INTERVENTIONS:  - Identify barriers to discharge w/patient and  caregiver  - Arrange for needed discharge resources and transportation as appropriate  - Identify discharge learning needs (meds, wound care, etc.)  - Arrange for interpretive services to assist at discharge as needed  - Refer to Case Management Department for coordinating discharge planning if the patient needs post-hospital services based on physician/advanced practitioner order or complex needs related to functional status, cognitive ability, or social support system  Outcome: Progressing     Problem: Knowledge Deficit  Goal: Patient/family/caregiver demonstrates understanding of disease process, treatment plan, medications, and discharge instructions  Description: Complete learning assessment and assess knowledge base.  Interventions:  - Provide teaching at level of understanding  - Provide teaching via preferred learning methods  Outcome: Progressing     Problem: Prexisting or High Potential for Compromised Skin Integrity  Goal: Skin integrity is maintained or improved  Description: INTERVENTIONS:  - Identify patients at risk for skin breakdown  - Assess and monitor skin integrity  - Assess and monitor nutrition and hydration status  - Monitor labs   - Assess for incontinence   - Turn and reposition patient  - Assist with mobility/ambulation  - Relieve pressure over bony prominences  - Avoid friction and shearing  - Provide appropriate hygiene as needed including keeping skin clean and dry  - Evaluate need for skin moisturizer/barrier cream  - Collaborate with interdisciplinary team   - Patient/family teaching  - Consider wound care consult   Outcome: Progressing     Problem: Nutrition/Hydration-ADULT  Goal: Nutrient/Hydration intake appropriate for improving, restoring or maintaining nutritional needs  Description: Monitor and assess patient's nutrition/hydration status for malnutrition. Collaborate with interdisciplinary team and initiate plan and interventions as ordered.  Monitor patient's weight and  dietary intake as ordered or per policy. Utilize nutrition screening tool and intervene as necessary. Determine patient's food preferences and provide high-protein, high-caloric foods as appropriate.     INTERVENTIONS:  - Monitor oral intake, urinary output, labs, and treatment plans  - Assess nutrition and hydration status and recommend course of action  - Evaluate amount of meals eaten  - Assist patient with eating if necessary   - Allow adequate time for meals  - Recommend/ encourage appropriate diets, oral nutritional supplements, and vitamin/mineral supplements  - Order, calculate, and assess calorie counts as needed  - Recommend, monitor, and adjust tube feedings and TPN/PPN based on assessed needs  - Assess need for intravenous fluids  - Provide specific nutrition/hydration education as appropriate  - Include patient/family/caregiver in decisions related to nutrition  Outcome: Progressing     Problem: NEUROSENSORY - ADULT  Goal: Achieves stable or improved neurological status  Description: INTERVENTIONS  - Monitor and report changes in neurological status  - Monitor vital signs such as temperature, blood pressure, glucose, and any other labs ordered   - Initiate measures to prevent increased intracranial pressure  - Monitor for seizure activity and implement precautions if appropriate      Outcome: Progressing  Goal: Achieves maximal functionality and self care  Description: INTERVENTIONS  - Monitor swallowing and airway patency with patient fatigue and changes in neurological status  - Encourage and assist patient to increase activity and self care.   - Encourage visually impaired, hearing impaired and aphasic patients to use assistive/communication devices  Outcome: Progressing     Problem: CARDIOVASCULAR - ADULT  Goal: Maintains optimal cardiac output and hemodynamic stability  Description: INTERVENTIONS:  - Monitor I/O, vital signs and rhythm  - Monitor for S/S and trends of decreased cardiac output  -  Administer and titrate ordered vasoactive medications to optimize hemodynamic stability  - Assess quality of pulses, skin color and temperature  - Assess for signs of decreased coronary artery perfusion  - Instruct patient to report change in severity of symptoms  Outcome: Progressing  Goal: Absence of cardiac dysrhythmias or at baseline rhythm  Description: INTERVENTIONS:  - Continuous cardiac monitoring, vital signs, obtain 12 lead EKG if ordered  - Administer antiarrhythmic and heart rate control medications as ordered  - Monitor electrolytes and administer replacement therapy as ordered  Outcome: Progressing     Problem: RESPIRATORY - ADULT  Goal: Achieves optimal ventilation and oxygenation  Description: INTERVENTIONS:  - Assess for changes in respiratory status  - Assess for changes in mentation and behavior  - Position to facilitate oxygenation and minimize respiratory effort  - Oxygen administered by appropriate delivery if ordered  - Initiate smoking cessation education as indicated  - Encourage broncho-pulmonary hygiene including cough, deep breathe, Incentive Spirometry  - Assess the need for suctioning and aspirate as needed  - Assess and instruct to report SOB or any respiratory difficulty  - Respiratory Therapy support as indicated  Outcome: Progressing     Problem: GASTROINTESTINAL - ADULT  Goal: Maintains or returns to baseline bowel function  Description: INTERVENTIONS:  - Assess bowel function  - Encourage oral fluids to ensure adequate hydration  - Administer IV fluids if ordered to ensure adequate hydration  - Administer ordered medications as needed  - Encourage mobilization and activity  - Consider nutritional services referral to assist patient with adequate nutrition and appropriate food choices  Outcome: Progressing  Goal: Maintains adequate nutritional intake  Description: INTERVENTIONS:  - Monitor percentage of each meal consumed  - Identify factors contributing to decreased intake, treat  as appropriate  - Assist with meals as needed  - Monitor I&O, weight, and lab values if indicated  - Obtain nutrition services referral as needed  Outcome: Progressing     Problem: GENITOURINARY - ADULT  Goal: Maintains or returns to baseline urinary function  Description: INTERVENTIONS:  - Assess urinary function  - Encourage oral fluids to ensure adequate hydration if ordered  - Administer IV fluids as ordered to ensure adequate hydration  - Administer ordered medications as needed  - Offer frequent toileting  - Follow urinary retention protocol if ordered  Outcome: Progressing  Goal: Absence of urinary retention  Description: INTERVENTIONS:  - Assess patient’s ability to void and empty bladder  - Monitor I/O  - Bladder scan as needed  - Discuss with physician/AP medications to alleviate retention as needed  - Discuss catheterization for long term situations as appropriate  Outcome: Progressing  Goal: Urinary catheter remains patent  Description: INTERVENTIONS:  - Assess patency of urinary catheter  - If patient has a chronic sanchez, consider changing catheter if non-functioning  - Follow guidelines for intermittent irrigation of non-functioning urinary catheter  Outcome: Progressing     Problem: METABOLIC, FLUID AND ELECTROLYTES - ADULT  Goal: Electrolytes maintained within normal limits  Description: INTERVENTIONS:  - Monitor labs and assess patient for signs and symptoms of electrolyte imbalances  - Administer electrolyte replacement as ordered  - Monitor response to electrolyte replacements, including repeat lab results as appropriate  - Instruct patient on fluid and nutrition as appropriate  Outcome: Progressing  Goal: Fluid balance maintained  Description: INTERVENTIONS:  - Monitor labs   - Monitor I/O and WT  - Instruct patient on fluid and nutrition as appropriate  - Assess for signs & symptoms of volume excess or deficit  Outcome: Progressing     Problem: SKIN/TISSUE INTEGRITY - ADULT  Goal: Skin Integrity  remains intact(Skin Breakdown Prevention)  Description: Assess:  -Perform True assessment every   -Clean and moisturize skin every   -Inspect skin when repositioning, toileting, and assisting with ADLS  -Assess under medical devices such as  every   -Assess extremities for adequate circulation and sensation     Bed Management:  -Have minimal linens on bed & keep smooth, unwrinkled  -Change linens as needed when moist or perspiring  -Avoid sitting or lying in one position for more than  hours while in bed  -Keep HOB at degrees     Toileting:  -Offer bedside commode  -Assess for incontinence every   -Use incontinent care products after each incontinent episode such as     Activity:  -Mobilize patient  times a day  -Encourage activity and walks on unit  -Encourage or provide ROM exercises   -Turn and reposition patient every  Hours  -Use appropriate equipment to lift or move patient in bed  -Instruct/ Assist with weight shifting every  when out of bed in chair  -Consider limitation of chair time  hour intervals    Skin Care:  -Avoid use of baby powder, tape, friction and shearing, hot water or constrictive clothing  -Relieve pressure over bony prominences using   -Do not massage red bony areas    Next Steps:  -Teach patient strategies to minimize risks such as    -Consider consults to  interdisciplinary teams such as   Outcome: Progressing  Goal: Incision(s), wounds(s) or drain site(s) healing without S/S of infection  Description: INTERVENTIONS  - Assess and document dressing, incision, wound bed, drain sites and surrounding tissue  - Provide patient and family education  - Perform skin care/dressing changes every   Outcome: Progressing     Problem: HEMATOLOGIC - ADULT  Goal: Maintains hematologic stability  Description: INTERVENTIONS  - Assess for signs and symptoms of bleeding or hemorrhage  - Monitor labs  - Administer supportive blood products/factors as ordered and appropriate  Outcome: Progressing     Problem:  MUSCULOSKELETAL - ADULT  Goal: Maintain or return mobility to safest level of function  Description: INTERVENTIONS:  - Assess patient's ability to carry out ADLs; assess patient's baseline for ADL function and identify physical deficits which impact ability to perform ADLs (bathing, care of mouth/teeth, toileting, grooming, dressing, etc.)  - Assess/evaluate cause of self-care deficits   - Assess range of motion  - Assess patient's mobility  - Assess patient's need for assistive devices and provide as appropriate  - Encourage maximum independence but intervene and supervise when necessary  - Involve family in performance of ADLs  - Assess for home care needs following discharge   - Consider OT consult to assist with ADL evaluation and planning for discharge  - Provide patient education as appropriate  Outcome: Progressing     Problem: COPING  Goal: Pt/Family able to verbalize concerns and demonstrate effective coping strategies  Description: INTERVENTIONS:  - Assist patient/family to identify coping skills, available support systems and cultural and spiritual values  - Provide emotional support, including active listening and acknowledgement of concerns of patient and caregivers  - Reduce environmental stimuli, as able  - Provide patient education  - Assess for spiritual pain/suffering and initiate spiritual care, including notification of Pastoral Care or chan based community as needed  - Assess effectiveness of coping strategies  Outcome: Progressing  Goal: Will report anxiety at manageable levels  Description: INTERVENTIONS:  - Administer medication as ordered  - Teach and encourage coping skills  - Provide emotional support  - Assess patient/family for anxiety and ability to cope  Outcome: Progressing     Problem: Potential for Falls  Goal: Patient will remain free of falls  Description: INTERVENTIONS:  - Educate patient/family on patient safety including physical limitations  - Instruct patient to call for  assistance with activity   - Consult OT/PT to assist with strengthening/mobility   - Keep Call bell within reach  - Keep bed low and locked with side rails adjusted as appropriate  - Keep care items and personal belongings within reach  - Initiate and maintain comfort rounds  - Make Fall Risk Sign visible to staff  - Offer Toileting every  Hours, in advance of need  - Initiate/Maintain alarm  - Obtain necessary fall risk management equipment:   - Apply yellow socks and bracelet for high fall risk patients  - Consider moving patient to room near nurses station  Outcome: Progressing     Problem: SAFETY,RESTRAINT: NV/NON-SELF DESTRUCTIVE BEHAVIOR  Goal: Remains free of harm/injury (restraint for non violent/non self-detsructive behavior)  Description: INTERVENTIONS:  - Instruct patient/family regarding restraint use   - Assess and monitor physiologic and psychological status   - Provide interventions and comfort measures to meet assessed patient needs   - Identify and implement measures to help patient regain control  - Assess readiness for release of restraint   Outcome: Progressing  Goal: Returns to optimal restraint-free functioning  Description: INTERVENTIONS:  - Assess the patient's behavior and symptoms that indicate continued need for restraint  - Identify and implement measures to help patient regain control  - Assess readiness for release of restraint   Outcome: Progressing

## 2025-02-03 NOTE — ASSESSMENT & PLAN NOTE
79-year-old female with a large hiatal hernia containing the entirety of the stomach, thought to be causing bradycardia and impaired respiratory function, thoracic surgery team asked to evaluate with consideration for surgical repair    Hypotension requiring levo and vasopressin    Plan  -Goals of care discussion with family to determine their willingness to undergo a surgical procedure while critically ill with the increased morbidity associated  -Follow-up vasopressor requirement  -Operative plan to be determined  -Timing to be determined  -Rest of care per primary team

## 2025-02-03 NOTE — OCCUPATIONAL THERAPY NOTE
Occupational Therapy Cancel Note        Patient Name: Darlin Zamora  Today's Date: 2/3/2025         02/03/25 0749   OT Last Visit   OT Visit Date 02/03/25   Note Type   Note Type Cancelled Session   Cancel Reasons Intubated/sedated       Pt is currently intubated/sedated and is not appropriate to participate in skilled OT services at this time. Will continue to follow on caseload and see when appropriate.    Simona Aviles MS, OTR/L

## 2025-02-03 NOTE — ASSESSMENT & PLAN NOTE
Goals:  Code Status: Full code - Level 1  Decisional apparatus:  Patient is not competent on my exam today.  If competence is lost, patient's substitute decision maker would default to her son Zack by [place].  Advance Directive / Living Will / POLST:  none    Discussion between palliative care, thoracic surgery and son Zack at bedside. Son agrees to proceed with surgery. Tentatively planned for tomorrow   Palliative will follow for ongoing goals of care discussions as situation evolves.

## 2025-02-03 NOTE — CONSULTS
Consultation - Thoracic    Name: Darlin Zamora 79 y.o. female I MRN: [id number]  Unit/Bed#: ICU 10 I Date of Admission: 1/25/2025   Date of Service: 2/2/2025 I Hospital Day: 7   Inpatient consult to Thoracic Surgery  Consult performed by: Kael Howard MD  Consult ordered by: Michelle Winters DO        Physician Requesting Evaluation: Michelle Winters DO   Reason for Evaluation / Principal Problem: Evaluation for paraesophageal hernia repair    Assessment & Plan  Altered mental status/Seizure like activity    -Management per ICU team  Septic shock (HCC)    -Requiring ICU level of care  -Requiring levo and vasopressin for hemodynamic support  -Intubated  Hiatal hernia  79-year-old female with a large hiatal hernia containing the entirety of the stomach, thought to be causing bradycardia and impaired respiratory function, thoracic surgery team asked to evaluate with consideration for surgical repair    Hypotension requiring levo and vasopressin    Plan  -Goals of care discussion with family to determine their willingness to undergo a surgical procedure while critically ill with the increased morbidity associated  -Follow-up vasopressor requirement  -Operative plan to be determined  -Timing to be determined  -Rest of care per primary team      History of Present Illness   History obtained from chart review and patient's son  Darlin Zamora is a 79 y.o. female who presents via EMS after a welfare check, patient was found down and hypothermic.  Patient underwent resuscitation in the ICU and was clinically improving until 1/31 when she required reintubation.  Now, bradycardic hypoxic and hypotensive requiring vasopressor support.     Son reports his mother did not disclose all of her medical information.  Son believes mother was symptomatic of the paraesophageal hernia repair for years never had it formally evaluated by a thoracic surgeon.     Past surgical history includes a hysterectomy in 2018.  Medical  history is significant for hypertension, meningioma, ovarian cysts and skin lesions.  No anticoagulation or antiplatelets per chart review.    Review of Systems  I have reviewed the patient's PMH, PSH, Social History, Family History, Meds, and Allergies  Historical Information   Past Medical History:   Diagnosis Date    Anemia     Cataracts, bilateral     Heart murmur 04/2018    History of transfusion 2003    had 4 units    Hypertension     Meningioma (HCC) 04/2018    MRI every 6months    Ovarian cyst 2006    Shortness of breath     Skin neoplasm     last assessed: 6/13/2017     Past Surgical History:   Procedure Laterality Date    CYSTOSCOPY N/A 2/14/2019    Procedure: CYSTOSCOPY;  Surgeon: Geronimo Tsai MD;  Location: [facility];  Service: Gynecology Oncology    HYSTERECTOMY N/A 2/14/2019    Procedure: HYSTERECTOMY LAPAROSCOPIC TOTAL (LTH) W/ ROBOTICS bilateral salpingooophorectomy with great difficulty due to mass of 12 cm and taking 2.5 hours;  Surgeon: Geronimo Tsai MD;  Location: [facility];  Service: Gynecology Oncology    TONSILLECTOMY       Social History     Tobacco Use    Smoking status: Never     Passive exposure: Past    Smokeless tobacco: Never   Vaping Use    Vaping status: Never Used   Substance and Sexual Activity    Alcohol use: Never    Drug use: No    Sexual activity: Not Currently     E-Cigarette/Vaping    E-Cigarette Use Never User      E-Cigarette/Vaping Substances    Nicotine No     THC No     CBD No     Flavoring No     Other No     Unknown No      Family History   Problem Relation Age of Onset    Hypertension Mother     Lung cancer Father     Hypertension Sister     Lymphoma Brother 45    Hypertension Brother     Schizophrenia Brother     Depression Son     Schizophrenia Son     Hypertension Family     Heart disease Other      Social History     Tobacco Use    Smoking status: Never     Passive exposure: Past    Smokeless tobacco: Never   Vaping Use    Vaping status: Never Used    Substance and Sexual Activity    Alcohol use: Never    Drug use: No    Sexual activity: Not Currently       Current Facility-Administered Medications:     acetaminophen (TYLENOL) oral suspension 650 mg, Q4H PRN    chlorhexidine (PERIDEX) 0.12 % oral rinse 15 mL, Q12H JSOTIN    fentaNYL 1000 mcg in sodium chloride 0.9% 100mL infusion, Continuous, Last Rate: Stopped (02/02/25 1600)    fentaNYL injection 50 mcg, Q1H PRN    folic acid 1 mg in sodium chloride 0.9 % 50 mL IVPB, Daily, Last Rate: Stopped (02/02/25 1201)    heparin (porcine) subcutaneous injection 5,000 Units, Q8H JOSTIN **AND** [CANCELED] Platelet count, Once    hydrocortisone (Solu-CORTEF) injection 25 mg, Q8H JOSTIN    insulin lispro (HumALOG/ADMELOG) 100 units/mL subcutaneous injection 1-5 Units, Q6H JOSTIN **AND** Fingerstick Glucose (POCT), Q6H    ipratropium (ATROVENT) 0.02 % inhalation solution 0.5 mg, TID    levalbuterol (XOPENEX) inhalation solution 1.25 mg, TID    norepinephrine (LEVOPHED) 4 mg (STANDARD CONCENTRATION) IV in sodium chloride 0.9% 250 mL, Titrated, Last Rate: 4 mcg/min (02/02/25 1535)    nystatin (MYCOSTATIN) powder, BID    pantoprazole (PROTONIX) injection 40 mg, Q12H JOSTIN    [COMPLETED] piperacillin-tazobactam (ZOSYN) 4.5 g in sodium chloride 0.9 % 100 mL IV LOADING DOSE, Once, Last Rate: Stopped (02/01/25 1528) **FOLLOWED BY** piperacillin-tazobactam (ZOSYN) 4.5 g in sodium chloride 0.9 % 100 mL IVPB (EXTENDED INFUSION), Q8H, Last Rate: 4.5 g (02/02/25 1758)    polyethylene glycol (MIRALAX) packet 17 g, Daily    propofol (DIPRIVAN) 1000 mg in 100 mL infusion (premix), Titrated, Last Rate: 20 mcg/kg/min (02/02/25 1800)    senna-docusate sodium (SENOKOT S) 8.6-50 mg per tablet 1 tablet, BID    sulfamethoxazole-trimethoprim (BACTRIM) 280 mg of trimethoprim in dextrose 5 % 500 mL IVPB, Q12H, Last Rate: 280 mg of trimethoprim (02/02/25 1514)    thiamine (VITAMIN B1) 200 mg in sodium chloride 0.9 % 50 mL IVPB, Daily, Last Rate: Stopped (02/02/25  1201)    [START ON 2/4/2025] thiamine tablet 100 mg, Daily    timolol (TIMOPTIC) 0.5 % ophthalmic solution 1 drop, BID    vasopressin (PITRESSIN) 20 Units in sodium chloride 0.9 % 100 mL infusion, Continuous, Last Rate: 0.04 Units/min (02/02/25 2302)  Prior to Admission Medications   Prescriptions Last Dose Informant Patient Reported? Taking?   Cholecalciferol (VITAMIN D3) 1,000 units tablet  Self Yes No   Sig: Take 1,000 Units by mouth daily   Glycerin-Polysorbate 80 (REFRESH DRY EYE THERAPY OP)  Self Yes No   Sig: Apply to eye   furosemide (LASIX) 40 mg tablet  Self No No   Sig: Take 1 tablet (40 mg total) by mouth daily   Patient not taking: Reported on 9/17/2024   lisinopril-hydrochlorothiazide (PRINZIDE,ZESTORETIC) 20-25 MG per tablet   No No   Sig: take 1 tablet by mouth once daily   multivitamin (THERAGRAN) TABS  Self Yes No   Sig: Take 1 tablet by mouth daily   potassium chloride (Klor-Con M20) 20 mEq tablet   No No   Sig: Take 1 tablet (20 mEq total) by mouth daily   timolol (TIMOPTIC) 0.5 % ophthalmic solution  Self Yes No   Sig: instill 1 drop into both eyes twice a day      Facility-Administered Medications: None       Objective :  Temp:  [98.2 °F (36.8 °C)-100 °F (37.8 °C)] 100 °F (37.8 °C)  HR:  [38-68] 44  BP: ()/(48-67) 116/53  Resp:  [12-21] 19  SpO2:  [95 %-99 %] 97 %  O2 Device: Ventilator    Lines/Drains/Airways       Active Status       Name Placement date Placement time Site Days    CVC Central Lines 01/26/25 Triple 16cm 01/26/25  1930  --  7    Arterial Line 01/31/25 Right Radial 01/31/25  1537  Radial  2    ETT  Cuffed 7.5 mm 01/31/25  1338  -- 2    NG/OG/Enteral Tube Orogastric 12 Fr Center mouth 01/31/25  1500  Center mouth  2    Urethral Catheter 16 Fr. 01/31/25  1557  --  2                  Physical Exam  Vitals and nursing note reviewed.   Constitutional:       General: She is not in acute distress.     Appearance: She is ill-appearing.   HENT:      Head: Normocephalic.   Eyes:       General: No scleral icterus.  Abdominal:      General: Abdomen is flat. There is no distension.      Palpations: Abdomen is soft.      Tenderness: There is no abdominal tenderness.   Musculoskeletal:      Right lower leg: No edema.      Left lower leg: No edema.   Skin:     General: Skin is warm.   Neurological:      Comments: Intubated and sedated         Lab Results: I have reviewed the following results:  Recent Labs     01/31/25  1549 01/31/25  1727 01/31/25  1931 01/31/25  1947 02/01/25  0458 02/02/25  0519   WBC 18.56*  --   --   --    < > 17.00*   HGB 9.6*  --    < >  --    < > 7.8*   HCT 31.1*  --    < >  --    < > 24.6*     --   --   --    < > 201   SODIUM 144  --   --   --    < > 140   K 4.2  --   --   --    < > 4.0   *  --   --   --    < > 105   CO2 25  --    < >  --    < > 27   BUN 23  --   --   --    < > 26*   CREATININE 0.66  --   --   --    < > 0.88   GLUC 177*  --   --   --    < > 350*   CAIONIZED  --   --    < >  --    < > 1.12   MG 2.6  --   --   --    < > 2.3   PHOS 3.9  --   --   --    < > 3.2   HSTNI0  --  51*  --   --   --   --    HSTNI2  --   --   --  46  --   --    LACTICACID 0.8  --   --   --   --   --     < > = values in this interval not displayed.     -Historic CT scans reviewed showing evidence of hiatal hernia containing the entirety of the stomach.  -1/28/2025 CT A/P reviewed      VTE Pharmacologic Prophylaxis: VTE covered by:  heparin (porcine), Subcutaneous, 5,000 Units at 02/02/25 2200      VTE Mechanical Prophylaxis: sequential compression device

## 2025-02-03 NOTE — PHYSICAL THERAPY NOTE
Physical Therapy Cancellation Note    PT orders received chart review completed. Pt is currently intubated/sedated and not appropriate to participate in skilled PT at this time. PT will follow and treat as medically appropriate.     02/03/25 8332   Note Type   Note type Cancelled Session   Cancel Reasons Intubated/sedated       Miguelina Henson, PT

## 2025-02-03 NOTE — RESPIRATORY THERAPY NOTE
RT Ventilator Management Note  Darlin Zamora 79 y.o. female MRN: [id number]  Unit/Bed#: ICU 10 Encounter: [id number]      Daily Screen         2/3/2025  0400 2/3/2025  0846          Patient safety screen outcome:: Failed Failed (P)       Not Ready for Weaning due to:: Underline problem not resolved Underline problem not resolved (P)                 Physical Exam:   Assessment Type: (P) During-treatment  General Appearance: (P) Sedated  Respiratory Pattern: (P) Assisted  Chest Assessment: (P) Chest expansion symmetrical  Bilateral Breath Sounds: (P) Diminished  Cough: Productive  Suction: ET Tube  O2 Device: G5      Resp Comments: (P) pt currently on cmv

## 2025-02-03 NOTE — ED ATTENDING ATTESTATION
1/25/2025  I, Zack Bridges DO, saw and evaluated the patient. I have discussed the patient with the resident/non-physician practitioner and agree with the resident's/non-physician practitioner's findings, Plan of Care, and MDM as documented in the resident's/non-physician practitioner's note, except where noted. All available labs and Radiology studies were reviewed.  I was present for key portions of any procedure(s) performed by the resident/non-physician practitioner and I was immediately available to provide assistance.       At this point I agree with the current assessment done in the Emergency Department.  I have conducted an independent evaluation of this patient a history and physical is as follows:    79-year-old female presenting for altered mental status.  Patient was a wellness check and they found her in the house without any heat rectal temperature was 85 here with sinus bradycardia 41 without any concerning arrhythmias or heart block.  Patient is awake and alert somewhat confused but knows where she is and is sluggish to answer.  Nonfocal neurologic exam, bilateral lower extremity edema at unlikely be sepsis at risk for sepsis though will perform CT head chest abdomen pelvis labs to evaluate for metabolic derangement    ED Course   Patient developed some hypotension requiring vasopressors doubt infection do not think this is sepsis will continue with resuscitation will need admission to the ICU, unclear cause of her acute encephalopathy but likely from hypothermia.  Shock could be secondary to peripheral vascular dilatation secondary to rewarming patient is on An hugger temp Vasquez      Critical Care Time  Procedures    Critical Care Time Statement: Upon my evaluation, this patient had a high probability of imminent or life-threatening deterioration due to hypothermia, shock, bradycarida, which required my direct attention, intervention, and personal management.  I spent a total of 50  minutes directly providing critical care services, including evaluating for the presence of life-threatening injuries or illnesses, management of organ system failure(s) , and titration of vasoactive medications. This time is exclusive of procedures, teaching, treating other patients, family meetings, and any prior time recorded by providers other than myself.

## 2025-02-03 NOTE — ASSESSMENT & PLAN NOTE
-Requiring ICU level of care  -Requiring levo and vasopressin for hemodynamic support  -Intubated

## 2025-02-03 NOTE — ASSESSMENT & PLAN NOTE
Giant type IV hernia compressing the majority of her lower lobes, likely causing respiratory compromise and aspiration issues  Thoracic surgery consulted - reasonable to consider urgent surgical intervention. Spoke with son who is agreeable to proceed with surgery. Will also need transvenous pacer placement

## 2025-02-04 PROBLEM — R00.1 BRADYCARDIA: Status: ACTIVE | Noted: 2025-01-01

## 2025-02-04 NOTE — OR NURSING
Patient arrived to OR. Due to patient intubation, patient bypassed holding area and went straight back to OR room. Accompanied by circulator and Anesthesia team.

## 2025-02-04 NOTE — ASSESSMENT & PLAN NOTE
Sinus bradycardia noted throughout admission, no evidence of high-grade AV block or significant pauses  Possibly due to vagal nerve stimulation in the setting of large hiatal hernia  Only on timolol eyedrops, not on standing beta-blockers as an outpatient  No other significant history

## 2025-02-04 NOTE — QUICK NOTE
Progress Note - Palliative & Supportive Care     Patient currently getting a transvenous pacer to prepare for surgery later today.  Plan to go to the OR with thoracic surgery for repair of paraesophageal hernia.    Palliative care will continue to follow, we will follow-up again on February 5.    Cecy Castañeda,   Palliative and Supportive Care  [phone]

## 2025-02-04 NOTE — ASSESSMENT & PLAN NOTE
Initially admitted with altered mental status and hypothermia, seizure-like activity noted during admission --- managed by primary team

## 2025-02-04 NOTE — PROGRESS NOTES
Progress Note - Surgery-General   Name: Darlin Zamora 79 y.o. female I MRN: [id number]  Unit/Bed#: ICU 10 I Date of Admission: 1/25/2025   Date of Service: 2/4/2025 I Hospital Day: 9    Assessment & Plan  Altered mental status/Seizure like activity    -Management per ICU team  Septic shock (HCC)    -Requiring ICU level of care  -Initially requiring levo and vasopressin for hemodynamic support, currently off pressors  -Intubated  Hiatal hernia  79-year-old female with a large hiatal hernia containing the entirety of the stomach, thought to be causing bradycardia and impaired respiratory function, thoracic surgery team asked to evaluate with consideration for surgical repair.    Currently not on vasopressors.    Plan  - OR today for PEHR, gastropexy, EGD  -Follow-up vasopressor requirement  -Wean vent as able  -Rest of care per primary team  Acute hypoxic respiratory failure (HCC)  Patient mechanically ventilated  Goals of care, counseling/discussion  Family meeting with thoracic surgery and palliative care on 2/3, deciding to pursue surgery.    Please contact the SecureChat role for the Thoracic  service with any questions/concerns.    Subjective   Resting comfortably in bed. Voiding into sanchez. Opens eyes spontaneously and nods head appropriately. Able to move toes in response to command.    Objective :  Temp:  [98.2 °F (36.8 °C)-100 °F (37.8 °C)] 99.7 °F (37.6 °C)  HR:  [40-92] 72  BP: (117-137)/(57-59) 137/57  Resp:  [19-23] 20  SpO2:  [91 %-97 %] 95 %  O2 Device: Ventilator  FiO2 (%):  [40] 40    I/O         02/02 0701  02/03 0700 02/03 0701  02/04 0700    I.V. (mL/kg) 1427.5 (21.8) 37.5 (0.6)    NG/GT 50 100    IV Piggyback 1100 750    Feedings 880 330    Total Intake(mL/kg) 3457.5 (52.8) 1217.5 (18.6)    Urine (mL/kg/hr) 1495 (1) 3735 (2.4)    Emesis/NG output 0     Stool 0 0    Total Output 1495 3735    Net +1962.5 -2517.5          Unmeasured Stool Occurrence 2 x 2 x          Lines/Drains/Airways        Active Status       Name Placement date Placement time Site Days    CVC Central Lines 01/26/25 Triple 16cm 01/26/25  1930  --  8    Arterial Line 01/31/25 Right Radial 01/31/25  1537  Radial  3    ETT  Cuffed 7.5 mm 01/31/25  1338  -- 3    NG/OG/Enteral Tube Orogastric 12 Fr Center mouth 01/31/25  1500  Center mouth  3    Urethral Catheter 16 Fr. 01/31/25  1557  --  3                  Physical Exam  Constitutional:       General: She is not in acute distress.  HENT:      Head: Normocephalic and atraumatic.      Right Ear: External ear normal.      Left Ear: External ear normal.      Nose: Nose normal.      Mouth/Throat:      Pharynx: Oropharynx is clear.   Eyes:      Extraocular Movements: Extraocular movements intact.   Cardiovascular:      Rate and Rhythm: Normal rate.   Pulmonary:      Effort: Pulmonary effort is normal.      Comments: Intubated, mechanically ventilated  Abdominal:      General: There is no distension.      Palpations: Abdomen is soft.      Tenderness: There is no abdominal tenderness.   Musculoskeletal:      Cervical back: Normal range of motion.   Skin:     General: Skin is warm and dry.   Neurological:      Mental Status: She is alert.      Comments: Follows commands   Psychiatric:         Mood and Affect: Mood normal.           Lab Results: I have reviewed the following results:  Recent Labs     02/03/25  0540 02/03/25  0802   WBC 13.18*  --    HGB 7.1*  --    HCT 23.4*  --      --    SODIUM 137  --    K 3.4*  --      --    CO2 25  --    BUN 31*  --    CREATININE 1.00  --    GLUC 278*  --    CAIONIZED  --  1.11*   MG 2.2  --    PHOS 2.3  --        Imaging Results Review: I reviewed radiology reports from this admission including: CT chest and CT abdomen/pelvis.  Other Study Results Review: No additional pertinent studies reviewed.    VTE Pharmacologic Prophylaxis: VTE covered by:  heparin (porcine), Subcutaneous, 5,000 Units at 02/03/25 2117     VTE Mechanical Prophylaxis:  sequential compression device

## 2025-02-04 NOTE — CONSULTS
Consultation - Electrophysiology-Cardiology (EP)   Darlin Zamora 79 y.o. female MRN: [id number]  Unit/Bed#: ICU 10 Encounter: [id number]      Inpatient consult to Cardiology  Consult performed by: Dolly Couch PA-C  Consult ordered by: JOSE MANUEL Tena          Assessment & Plan     Assessment:  Assessment & Plan  Bradycardia  Sinus bradycardia noted throughout admission, no evidence of high-grade AV block or significant pauses  Possibly due to vagal nerve stimulation in the setting of large hiatal hernia  Only on timolol eyedrops, not on standing beta-blockers as an outpatient  No other significant history  Hiatal hernia  Large hiatal hernia with concern for vagal nerve stimulation, plan for repair later today  Altered mental status/Seizure like activity  Initially admitted with altered mental status and hypothermia, seizure-like activity noted during admission --- managed by primary team  Meningioma (HCC)  Managed by primary team, stable  Septic shock ([facility])  Septic shock with upper respiratory infection, managed by primary team  Acute hypoxic respiratory failure ([facility])  Intubated  Goals of care, counseling/discussion          Plan:  She is already planned for surgical repair of her large hiatal hernia later today in the OR.  Placement of a temporary pacemaker has been recommended by the surgical team given her baseline sinus bradycardia and concern for ongoing vagal nerve stimulation.    Discussed this plan with critical care (Dr. Lischner), who is already planned to place a TVP at bedside prior to the procedure.  Dr. Montana recommended the possibility of bringing a portable C arm into the OR if repositioning is needed.    EP will then follow in the postoperative setting to see if her rhythm improves following surgical repair, or if she has intrinsic bradycardia and may require pacemaker implantation moving forward.  For now, continue to monitor telemetry closely.    Please contact EP with any  acute concerns or issues.      History of Present Illness   Physician Requesting Consult: Michelle Winters DO  Reason for Consult / Principal Problem: Bradycardia    HPI: Darlin Zamora is a 79 y.o. year old female with hypertension, preserved LV systolic function, meningioma, but no significant cardiac history.  She presented to the hospital 1/25/2025 after wellness visit showed acute confusion and inability to care for herself.  She was found to be significantly hypothermic with altered mental status, she was admitted to the hospital and was noted to have seizure-like activity.  Workup ultimately  revealed a large hiatal hernia, telemetry has shown periods of sinus bradycardia.  There is concern for vagal nerve stimulation in the setting of her large hiatal hernia, and surgical repair is scheduled for later today.  The surgical team would prefer that a temporary pacemaker be placed to avoid any excessive bradycardia in the setting of potential vagus nerve manipulation during the procedure, thus EP was asked to see the patient in consultation to help arrange.      Review of Systems  ROS as noted above, otherwise 12 point review of systems was performed and is negative.       Historical Information   Past Medical History:   Diagnosis Date    Anemia     Cataracts, bilateral     Heart murmur 04/2018    History of transfusion 2003    had 4 units    Hypertension     Meningioma (HCC) 04/2018    MRI every 6months    Ovarian cyst 2006    Shortness of breath     Skin neoplasm     last assessed: 6/13/2017     Past Surgical History:   Procedure Laterality Date    CYSTOSCOPY N/A 2/14/2019    Procedure: CYSTOSCOPY;  Surgeon: Geronimo Tsai MD;  Location: [facility];  Service: Gynecology Oncology    HYSTERECTOMY N/A 2/14/2019    Procedure: HYSTERECTOMY LAPAROSCOPIC TOTAL (LTH) W/ ROBOTICS bilateral salpingooophorectomy with great difficulty due to mass of 12 cm and taking 2.5 hours;  Surgeon: Geronimo Tsai MD;   Location: [facility];  Service: Gynecology Oncology    TONSILLECTOMY       Social History     Substance and Sexual Activity   Alcohol Use Never     Social History     Substance and Sexual Activity   Drug Use No     Social History     Tobacco Use   Smoking Status Never    Passive exposure: Past   Smokeless Tobacco Never     Family History:   Family History   Problem Relation Age of Onset    Hypertension Mother     Lung cancer Father     Hypertension Sister     Lymphoma Brother 45    Hypertension Brother     Schizophrenia Brother     Depression Son     Schizophrenia Son     Hypertension Family     Heart disease Other        Meds/Allergies   Hospital Medications:   Current Facility-Administered Medications:     acetaminophen (TYLENOL) oral suspension 650 mg, Q4H PRN    chlorhexidine (PERIDEX) 0.12 % oral rinse 15 mL, Q12H JOSTIN    fentaNYL injection 50 mcg, Q1H PRN    folic acid 1 mg in sodium chloride 0.9 % 50 mL IVPB, Daily, Last Rate: 1 mg (02/04/25 0807)    heparin (porcine) subcutaneous injection 5,000 Units, Q8H JOSTIN **AND** [CANCELED] Platelet count, Once    hydrocortisone (Solu-CORTEF) injection 25 mg, Q8H JOSTIN    insulin lispro (HumALOG/ADMELOG) 100 units/mL subcutaneous injection 1-6 Units, TID AC **AND** Fingerstick Glucose (POCT), TID AC    ipratropium (ATROVENT) 0.02 % inhalation solution 0.5 mg, TID    levalbuterol (XOPENEX) inhalation solution 1.25 mg, TID    magnesium sulfate 2 g/50 mL IVPB (premix) 2 g, After Breakfast, Last Rate: 2 g (02/04/25 0715)    nystatin (MYCOSTATIN) powder, BID    pantoprazole (PROTONIX) injection 40 mg, Q12H JOSTIN    [COMPLETED] piperacillin-tazobactam (ZOSYN) 4.5 g in sodium chloride 0.9 % 100 mL IV LOADING DOSE, Once, Last Rate: Stopped (02/01/25 1528) **FOLLOWED BY** piperacillin-tazobactam (ZOSYN) 4.5 g in sodium chloride 0.9 % 100 mL IVPB (EXTENDED INFUSION), Q8H, Last Rate: 4.5 g (02/04/25 1131)    polyethylene glycol (MIRALAX) packet 17 g, Daily    potassium chloride 40 mEq  "IVPB (premix), Once, Last Rate: 40 mEq (02/04/25 1132)    propofol (DIPRIVAN) 1000 mg in 100 mL infusion (premix), Titrated, Last Rate: 35 mcg/kg/min (02/04/25 1044)    senna-docusate sodium (SENOKOT S) 8.6-50 mg per tablet 1 tablet, BID    sulfamethoxazole-trimethoprim (BACTRIM) 280 mg of trimethoprim in dextrose 5 % 500 mL IVPB, Q12H, Last Rate: Stopped (02/04/25 0200)    thiamine tablet 100 mg, Daily    timolol (TIMOPTIC) 0.5 % ophthalmic solution 1 drop, BID  Home Medications:   Medications Prior to Admission:     Cholecalciferol (VITAMIN D3) 1,000 units tablet    furosemide (LASIX) 40 mg tablet    Glycerin-Polysorbate 80 (REFRESH DRY EYE THERAPY OP)    lisinopril-hydrochlorothiazide (PRINZIDE,ZESTORETIC) 20-25 MG per tablet    multivitamin (THERAGRAN) TABS    potassium chloride (Klor-Con M20) 20 mEq tablet    timolol (TIMOPTIC) 0.5 % ophthalmic solution    No Known Allergies    Objective   Vitals: Blood pressure 123/58, pulse 55, temperature 98.6 °F (37 °C), resp. rate 18, height 4' 11\" (1.499 m), weight 65.5 kg (144 lb 6.4 oz), SpO2 97%.  Orthostatic Blood Pressures      Flowsheet Row Most Recent Value   Blood Pressure 123/58 filed at 02/04/2025 0947   Patient Position - Orthostatic VS Lying filed at 02/04/2025 0400              Intake/Output Summary (Last 24 hours) at 2/4/2025 1140  Last data filed at 2/4/2025 1000  Gross per 24 hour   Intake 1725.02 ml   Output 3765 ml   Net -2039.98 ml       Invasive Devices       Central Venous Catheter Line  Duration             CVC Central Lines 01/26/25 Triple 16cm 8 days              Arterial Line  Duration             Arterial Line 01/31/25 Right Radial 3 days              Drain  Duration             NG/OG/Enteral Tube Orogastric 12 Fr Center mouth 3 days    Urethral Catheter 16 Fr. 3 days              Airway  Duration             ETT  Cuffed 7.5 mm 3 days                    Physical Exam  GEN: NAD, responds to voice, nods head with questions   SKIN: lower extremity " erythema  HEENT: NCAT, PERRL, EOMs intact  NECK: No JVD appreciated  CARDIOVASCULAR: RRR, normal S1, S2 without murmurs, rubs, or gallops appreciated  LUNGS: intubated, decreased breath sounds anteriorly   ABDOMEN: Soft, nontender, nondistended  EXTREMITIES/VASCULAR: perfused without clubbing, cyanosis, or LE edema b/l        Lab Results: I have personally reviewed pertinent lab results.    Results from last 7 days   Lab Units 25   WBC Thousand/uL 11.26* 13.18* 17.00*   HEMOGLOBIN g/dL 7.1* 7.1* 7.8*   HEMATOCRIT % 23.3* 23.4* 24.6*   PLATELETS Thousands/uL 399* 285 201     Results from last 7 days   Lab Units 2518 25  0540 25  0519 25  0458 25  1931   POTASSIUM mmol/L 2.9* 3.4* 4.0   < >  --    CHLORIDE mmol/L 108 104 105   < >  --    CO2 mmol/L 29 25 27   < >  --    CO2, I-STAT mmol/L  --   --   --   --  26   BUN mg/dL 31* 31* 26*   < >  --    CREATININE mg/dL 1.02 1.00 0.88   < >  --    GLUCOSE, ISTAT mg/dl  --   --   --   --  116   CALCIUM mg/dL 7.7* 7.4* 7.8*   < >  --     < > = values in this interval not displayed.         Results from last 7 days   Lab Units 25   MAGNESIUM mg/dL 2.0 2.2 2.3       Imaging: Results Review Statement: No pertinent imaging studies reviewed.    ECHO: Results for orders placed during the hospital encounter of 19    Echo complete with contrast if indicated    Narrative  [address] [address] [phone]    Transthoracic Echocardiogram  2D, M-mode, Doppler, and Color Doppler    Study date:  2019    Patient: CHANTELLE MCCALL  MR number: [id number]  Account number: [id number]  : 1945  Age: 73 years  Gender: Female  Status: Outpatient  Location: Bedside  Height: 59 in  Weight: 141 lb  BP: 114/ 63 mmHg    Indications: Murmur.    Diagnoses: R01.1 - Cardiac murmur,  unspecified    Sonographer:  AGAPITO Lee  Primary Physician:  Tiffanie Kamara DO  Referring Physician:  Chico Barron MD  Group:  [facility]  Interpreting Physician:  Will Shearer MD    SUMMARY    LEFT VENTRICLE:  Systolic function was normal. Ejection fraction was estimated to be 65 %.  There were no regional wall motion abnormalities.  There was mild concentric hypertrophy.    TRICUSPID VALVE:  There was mild regurgitation.  Pulmonary artery systolic pressure was mildly increased.    PULMONIC VALVE:  There was mild to moderate regurgitation.    HISTORY: PRIOR HISTORY: Risk factors: hypertension.    PROCEDURE: The procedure was performed at the bedside. This was a routine study. The transthoracic approach was used. The study included complete 2D imaging, M-mode, complete spectral Doppler, and color Doppler. The heart rate was 73 bpm,  at the start of the study. Image quality was adequate.    LEFT VENTRICLE: Size was normal. Systolic function was normal. Ejection fraction was estimated to be 65 %. There were no regional wall motion abnormalities. Wall thickness was mildly increased. There was mild concentric hypertrophy.  DOPPLER: Left ventricular diastolic function parameters were normal.    RIGHT VENTRICLE: The size was normal. Systolic function was normal. Wall thickness was normal.    LEFT ATRIUM: Size was at the upper limits of normal.    RIGHT ATRIUM: Size was normal.    MITRAL VALVE: Valve structure was normal. There was normal leaflet separation. DOPPLER: The transmitral velocity was within the normal range. There was no evidence for stenosis. There was no significant regurgitation.    AORTIC VALVE: The valve was trileaflet. Leaflets exhibited normal thickness, normal cuspal separation, and sclerosis. DOPPLER: Transaortic velocity was within the normal range. There was no evidence for stenosis. There was no significant  regurgitation.    TRICUSPID VALVE: The valve  structure was normal. There was normal leaflet separation. DOPPLER: The transtricuspid velocity was within the normal range. There was no evidence for stenosis. There was mild regurgitation. Pulmonary artery  systolic pressure was mildly increased.    PULMONIC VALVE: Not well visualized. DOPPLER: The transpulmonic velocity was within the normal range. There was mild to moderate regurgitation.    PERICARDIUM: There was no pericardial effusion. A pericardial fat pad was present. The pericardium was normal in appearance.    AORTA: The root exhibited normal size.    SYSTEMIC VEINS: IVC: The inferior vena cava was normal in size.    PULMONARY VEINS: DOPPLER: Doppler signals were not recordable in the pulmonary vein(s).    SYSTEM MEASUREMENT TABLES    2D  %FS: 42.32 %  Ao Diam: 3.02 cm  EDV(Teich): 64.9 ml  EF(Teich): 74.02 %  ESV(Teich): 16.86 ml  IVSd: 1.03 cm  LA Area: 14.58 cm2  LA Diam: 2 cm  LVEDV MOD A4C: 46.73 ml  LVEF MOD A4C: 70.43 %  LVESV MOD A4C: 13.81 ml  LVIDd: 3.87 cm  LVIDs: 2.23 cm  LVLd A4C: 7.08 cm  LVLs A4C: 5.68 cm  LVPWd: 1 cm  RA Area: 12.08 cm2  RVIDd: 2.88 cm  SV MOD A4C: 32.91 ml  SV(Teich): 48.04 ml    CW  TR Vmax: 2.89 m/s  TR maxP.48 mmHg    MM  TAPSE: 1.78 cm    PW  E': 0.08 m/s  E/E': 12.64  MV A [place]: 0.86 m/s  MV Dec [place]: 4.53 m/s2  MV DecT: 218.43 ms  MV E [place]: 0.99 m/s  MV E/A Ratio: 1.15  MV PHT: 63.35 ms  MVA By PHT: 3.47 cm2    [employer] Accredited Echocardiography Laboratory    Prepared and electronically signed by    Will Shearer MD  Signed 2019 17:07:53      Results for orders placed during the hospital encounter of 25    Echo complete w/ contrast if indicated    Interpretation Summary    Left Ventricle: Left ventricular cavity size is normal. Wall thickness is mildly increased. There is moderate asymmetric hypertrophy of the basal septal wall. The left ventricular ejection fraction is 60%. Systolic function is normal. Wall motion is normal.  Diastolic function is moderately abnormal, consistent with grade II (pseudonormal) relaxation.    Right Ventricle: Systolic function is normal.    Left Atrium: The atrium is mildly dilated.    Mitral Valve: There is mild to moderate regurgitation.    Tricuspid Valve: There is mild regurgitation. The estimated right ventricular systolic pressure is 44.00 mmHg.        CATH/STRESS TEST: no recent studies noted      EKG:         TELEMETRY: Normal sinus rhythm with sinus bradycardia noted, no significant pauses or evidence of high-grade AV block noted      VTE Prophylaxis: VTE covered by:  heparin (porcine), Subcutaneous, 5,000 Units at 02/03/25 7186

## 2025-02-04 NOTE — ANESTHESIA PREPROCEDURE EVALUATION
Procedure:  REPAIR HERNIA PARAESOPHAGEAL LAPAROSCOPIC W ROBOTICS (Abdomen)  GASTROPEXY,LAPAROSCOPIC ASSISTED (Abdomen)    Relevant Problems   ANESTHESIA (within normal limits)      CARDIO   (+) Hypertension   (+) Mixed hypercholesterolemia and hypertriglyceridemia      GI/HEPATIC   (+) Hiatal hernia      HEMATOLOGY   (+) Anemia   (+) Iron deficiency anemia      MUSCULOSKELETAL   (+) Hiatal hernia      PULMONARY   (+) Acute hypoxic respiratory failure (HCC)        Physical Exam    Airway    Mallampati score: unable to assess         Dental       Cardiovascular      Pulmonary      Other Findings  +intubatedpost-pubertal.      Anesthesia Plan  ASA Score- 4 Emergent    Anesthesia Type- general with ASA Monitors.         Additional Monitors: central venous line and arterial line.    Airway Plan: ETT.           Plan Factors-    Chart reviewed. EKG reviewed. Imaging results reviewed. Existing labs reviewed. Patient summary reviewed.                  Induction- intravenous and inhalational.    Postoperative Plan- Plan for postoperative opioid use.     Perioperative Resuscitation Plan - Level 1 - Full Code.       Informed Consent- Anesthetic plan and risks discussed with sibling.  I personally reviewed this patient with the CRNA. Discussed and agreed on the Anesthesia Plan with the CRNA..      NPO Status:  No vitals data found for the desired time range.

## 2025-02-04 NOTE — WOUND OSTOMY CARE
Progress Note - Wound   Darlin Zamora 79 y.o. female MRN: [id number]  Unit/Bed#: ICU 10 Encounter: [id number]        Assessment Findings:   Patient seen today for wound care follow up assessment. Patient is incontinent of bowel and has indwelling sanchez catheter. Patient is dependent for all care, intubated and sedated in ICU, on critical care low air loss mattress. Patient receiving enteral nutrition.    B/L heels intact and blanching, preventative skin care orders placed.     B/L legs- partial thickness skin loss with pink tissue, periwound skin is fragile, scant serosanguineous drainage present.   POA evolving DTI buttocks- patient has history of being found down and was hypothermic, it is likely wound was present on admission and did not manifest fully. Purple, nonblanchable erythema, no drainage present. Recommending moisture barrier cream.     No induration, fluctuance, odor, warmth/temperature differences, redness, or purulence noted to the above noted wounds and skin areas assessed. New dressings applied per orders listed below. Patient tolerated well- no s/s of non-verbal pain or discomfort observed during the encounter. Bedside nurse aware of plan of care. See flow sheets for more detailed assessment findings.  Wound care will continue to follow, call or Secure Chat Message with questions.     Skin care plans:  1-Apply thin layer of Calazime/Protective Zinc Oxide paste to sacrum and buttocks TID and PRN.  2-Hydraguard/Silicone Cream to bilateral heels BID and PRN.  3-Elevate heels to offload pressure.  4-Ehob cushion when out of bed.  5-Turn/reposition q2h or when medically stable for pressure re-distribution on skin.  6-Moisturize skin daily with skin nourishing cream.  7-Place Patient on ATR Turning and repositioning system  8-Cleanse bilateral lower extremity wounds with NSS. Apply dermagran to wounds, cover with DSD, ABD wrap with ross, Change every other day and prn       Wounds:  Wound 01/26/25  Venous Ulcer Pretibial Distal;Right;Lateral (Active)   Wound Image   02/04/25 1132   Wound Description Pink 02/04/25 1500   Nkechi-wound Assessment Fragile 02/04/25 1500   Wound Length (cm) 0.5 cm 02/04/25 1132   Wound Width (cm) 2 cm 02/04/25 1132   Wound Depth (cm) 0.1 cm 02/04/25 1132   Wound Surface Area (cm^2) 1 cm^2 02/04/25 1132   Wound Volume (cm^3) 0.1 cm^3 02/04/25 1132   Calculated Wound Volume (cm^3) 0.1 cm^3 02/04/25 1132   Change in Wound Size % 83.33 02/04/25 1132   Wound Site Closure ANISHA 02/04/25 1500   Drainage Amount Scant 02/04/25 1500   Drainage Description Serosanguineous 02/04/25 1500   Non-staged Wound Description Partial thickness 02/04/25 1500   Treatments Cleansed 02/04/25 1500   Dressing Dermagran gauze;Dry dressing 02/04/25 1500   Wound packed? No 02/04/25 1500   Packing- # removed 0 02/04/25 1500   Packing- # inserted 0 02/04/25 1500   Dressing Changed New 02/04/25 1500   Patient Tolerance Tolerated well 02/04/25 1500   Dressing Status Clean;Dry;Intact 02/04/25 1500       Wound 01/26/25 Venous Ulcer Ankle Anterior;Left (Active)   Wound Image   02/04/25 1133   Wound Description Pink 02/04/25 1500   Nkechi-wound Assessment Fragile 02/04/25 1500   Wound Length (cm) 1.5 cm 02/04/25 1133   Wound Width (cm) 3 cm 02/04/25 1133   Wound Depth (cm) 0.1 cm 02/04/25 1133   Wound Surface Area (cm^2) 4.5 cm^2 02/04/25 1133   Wound Volume (cm^3) 0.45 cm^3 02/04/25 1133   Calculated Wound Volume (cm^3) 0.45 cm^3 02/04/25 1133   Change in Wound Size % 25 02/04/25 1133   Wound Site Closure ANISHA 02/04/25 1500   Drainage Amount Scant 02/04/25 1500   Drainage Description Serosanguineous 02/04/25 1500   Non-staged Wound Description Partial thickness 02/04/25 1500   Treatments Cleansed 02/04/25 1500   Dressing Dermagran gauze;Dry dressing 02/04/25 1500   Wound packed? No 02/04/25 1500   Packing- # removed 0 02/04/25 1500   Packing- # inserted 0 02/04/25 1500   Dressing Changed New 02/04/25 1500   Patient Tolerance  Tolerated well 02/04/25 1500   Dressing Status Clean;Dry;Intact 02/04/25 1500       Wound 01/30/25 Pressure Injury Buttocks Left;Inner (Active)   Wound Image   01/30/25 1040   Wound Description Dark edges;Non-blanchable erythema;Other (Comment);Pink;Fragile 02/04/25 1200   Pressure Injury Stage DTPI 01/30/25 1600   Nkechi-wound Assessment Dry;Intact;[place] 02/04/25 1200   Wound Length (cm) 5.7 cm 01/30/25 1040   Wound Width (cm) 1.7 cm 01/30/25 1040   Wound Surface Area (cm^2) 9.69 cm^2 01/30/25 1040   Drainage Amount None 02/04/25 1200   Treatments Cleansed;Site care 02/03/25 1600   Dressing Open to air;Moisture barrier 02/04/25 1200           Sully Laboy BSN, RN, CWOCN

## 2025-02-04 NOTE — PLAN OF CARE
Problem: PAIN - ADULT  Goal: Verbalizes/displays adequate comfort level or baseline comfort level  Description: Interventions:  - Encourage patient to monitor pain and request assistance  - Assess pain using appropriate pain scale  - Administer analgesics based on type and severity of pain and evaluate response  - Implement non-pharmacological measures as appropriate and evaluate response  - Consider cultural and social influences on pain and pain management  - Notify physician/advanced practitioner if interventions unsuccessful or patient reports new pain  Outcome: Progressing     Problem: INFECTION - ADULT  Goal: Absence or prevention of progression during hospitalization  Description: INTERVENTIONS:  - Assess and monitor for signs and symptoms of infection  - Monitor lab/diagnostic results  - Monitor all insertion sites, i.e. indwelling lines, tubes, and drains  - Monitor endotracheal if appropriate and nasal secretions for changes in amount and color  - [place] appropriate cooling/warming therapies per order  - Administer medications as ordered  - Instruct and encourage patient and family to use good hand hygiene technique  - Identify and instruct in appropriate isolation precautions for identified infection/condition  Outcome: Progressing  Goal: Absence of fever/infection during neutropenic period  Description: INTERVENTIONS:  - Monitor WBC    Outcome: Progressing     Problem: SAFETY ADULT  Goal: Patient will remain free of falls  Description: INTERVENTIONS:  - Educate patient/family on patient safety including physical limitations  - Instruct patient to call for assistance with activity   - Consult OT/PT to assist with strengthening/mobility   - Keep Call bell within reach  - Keep bed low and locked with side rails adjusted as appropriate  - Keep care items and personal belongings within reach  - Initiate and maintain comfort rounds  - Make Fall Risk Sign visible to staff  - Offer Toileting every 2 Hours,  in advance of need  - Initiate/Maintain bed alarm  - Obtain necessary fall risk management equipment  - Apply yellow socks and bracelet for high fall risk patients  - Consider moving patient to room near nurses station  Outcome: Progressing     Problem: DISCHARGE PLANNING  Goal: Discharge to home or other facility with appropriate resources  Description: INTERVENTIONS:  - Identify barriers to discharge w/patient and caregiver  - Arrange for needed discharge resources and transportation as appropriate  - Identify discharge learning needs (meds, wound care, etc.)  - Arrange for interpretive services to assist at discharge as needed  - Refer to Case Management Department for coordinating discharge planning if the patient needs post-hospital services based on physician/advanced practitioner order or complex needs related to functional status, cognitive ability, or social support system  Outcome: Progressing     Problem: Knowledge Deficit  Goal: Patient/family/caregiver demonstrates understanding of disease process, treatment plan, medications, and discharge instructions  Description: Complete learning assessment and assess knowledge base.  Interventions:  - Provide teaching at level of understanding  - Provide teaching via preferred learning methods  Outcome: Progressing     Problem: Prexisting or High Potential for Compromised Skin Integrity  Goal: Skin integrity is maintained or improved  Description: INTERVENTIONS:  - Identify patients at risk for skin breakdown  - Assess and monitor skin integrity  - Assess and monitor nutrition and hydration status  - Monitor labs   - Assess for incontinence   - Turn and reposition patient  - Assist with mobility/ambulation  - Relieve pressure over bony prominences  - Avoid friction and shearing  - Provide appropriate hygiene as needed including keeping skin clean and dry  - Evaluate need for skin moisturizer/barrier cream  - Collaborate with interdisciplinary team   -  Patient/family teaching  - Consider wound care consult   Outcome: Progressing     Problem: Nutrition/Hydration-ADULT  Goal: Nutrient/Hydration intake appropriate for improving, restoring or maintaining nutritional needs  Description: Monitor and assess patient's nutrition/hydration status for malnutrition. Collaborate with interdisciplinary team and initiate plan and interventions as ordered.  Monitor patient's weight and dietary intake as ordered or per policy. Utilize nutrition screening tool and intervene as necessary. Determine patient's food preferences and provide high-protein, high-caloric foods as appropriate.     INTERVENTIONS:  - Monitor oral intake, urinary output, labs, and treatment plans  - Assess nutrition and hydration status and recommend course of action  - Evaluate amount of meals eaten  - Assist patient with eating if necessary   - Allow adequate time for meals  - Recommend/ encourage appropriate diets, oral nutritional supplements, and vitamin/mineral supplements  - Order, calculate, and assess calorie counts as needed  - Recommend, monitor, and adjust tube feedings and TPN/PPN based on assessed needs  - Assess need for intravenous fluids  - Provide specific nutrition/hydration education as appropriate  - Include patient/family/caregiver in decisions related to nutrition  Outcome: Progressing     Problem: Potential for Falls  Goal: Patient will remain free of falls  Description: INTERVENTIONS:  - Educate patient/family on patient safety including physical limitations  - Instruct patient to call for assistance with activity   - Consult OT/PT to assist with strengthening/mobility   - Keep Call bell within reach  - Keep bed low and locked with side rails adjusted as appropriate  - Keep care items and personal belongings within reach  - Initiate and maintain comfort rounds  - Make Fall Risk Sign visible to staff  - Offer Toileting every 2 Hours, in advance of need  - Initiate/Maintain bed alarm  -  Obtain necessary fall risk management equipment  - Apply yellow socks and bracelet for high fall risk patients  - Consider moving patient to room near nurses station  Outcome: Progressing     Problem: SAFETY,RESTRAINT: NV/NON-SELF DESTRUCTIVE BEHAVIOR  Goal: Remains free of harm/injury (restraint for non violent/non self-detsructive behavior)  Description: INTERVENTIONS:  - Instruct patient/family regarding restraint use   - Assess and monitor physiologic and psychological status   - Provide interventions and comfort measures to meet assessed patient needs   - Identify and implement measures to help patient regain control  - Assess readiness for release of restraint   Outcome: Progressing  Goal: Returns to optimal restraint-free functioning  Description: INTERVENTIONS:  - Assess the patient's behavior and symptoms that indicate continued need for restraint  - Identify and implement measures to help patient regain control  - Assess readiness for release of restraint   Outcome: Progressing

## 2025-02-04 NOTE — RESPIRATORY THERAPY NOTE
RT Ventilator Management Note  Darlin Zamora 79 y.o. female MRN: [id number]  Unit/Bed#: ICU 10 Encounter: [id number]      Daily Screen         2/3/2025  0846 2/4/2025  0751          Patient safety screen outcome:: Failed Failed (P)       Not Ready for Weaning due to:: Underline problem not resolved Underline problem not resolved (P)                 Physical Exam:   Assessment Type: Assess only  General Appearance: Sleeping, Drowsy  Respiratory Pattern: Assisted  Chest Assessment: Chest expansion symmetrical  Bilateral Breath Sounds: Diminished  Cough: Non-productive  Suction: Oral, ET Tube  O2 Device: vent      Resp Comments: PT received on scmv settings.tolerating well.will continue monitor the pt. alarms on and vent cleaned

## 2025-02-04 NOTE — PROGRESS NOTES
Progress Note - Critical Care/ICU   Name: Darlin Zamora 79 y.o. female I MRN: [id number]  Unit/Bed#: ICU 10 I Date of Admission: 1/25/2025   Date of Service: 2/4/2025 I Hospital Day: 9      Assessment & Plan   Neuro/Psych:  Diagnoses: Acute metabolic encephalopathy; seizure-like activity; Meningioma    1/27 Witnessed episode of seizure-like activity - received 2 mg Ativan  CTH 1/26: No acute intracranial abnormality; known meningiomas   Ammonia 15  MRI brain w/wo:  No obvious infarct.  Meningiomas appear stable.    Paraneoplastic panel negative  Plan:   Continue frequent neuro checks, q4h  Sedation: propofol gtt and fentanyl PRN  Analgesia   Multimodal. Acetaminophen as needed.        CV:  Diagnoses: Septic shock, bradycardia, history of hypertension   1/28 Echo: moderate symmetric hypertrophy of basal septal wall (LV); EF 60%; normal systolic function; grade II diastolic dysfunction; mild dilation of left atrium; mild/moderate mitral and tricuspid regurgitation   Telemetry:  Some bradycardia in 40s/50s, last pause was 10:21 1/29  Plan:  Off vasopressors  MAP > 65  Continue steroids  TVP this AM     Pulm:  Diagnoses: Acute hypoxic respiratory failure in the setting of pneumonia   1/28 CT Chest - New moderate-sized right and small left pleural effusions with new complete right lower lobe atelectasis and significant left lower lobe atelectasis. No PNA  Intubated 1/31 for respiratory failure/hypoxia  CXR 2/3: Bilateral consolidations suspicious for multifocal pneumonia and/or aspiration-related event new since the study of 1/31.  Prominent pulmonary vascular/interstitial markings suspicious for pulmonary vascular congestion/edema.  New small bilateral pleural effusions.  Nasogastric tube appears to be coiled within large hiatus hernia not passing below the hemidiaphragm but similar to the CT from January 28 which demonstrated a large hiatus hernia.  Correlation for any new vomiting is advised as the patient may be at  risk for volvulus. There appears to be less overall distention of the hernia as compared to January 31.  Plan:  Continue AC/VC 18/400/8/40%  Respiratory protocol/Airway clearance protocol   Continue Xopenex/Atrovent   Continuous pulse oximetry. Maintain O2 sat >92%.      GI:  Diagnoses: Large Hiatal Hernia - vagal nerve syndrome  CT A/P: Small bowel wall thickening. Correlate for a mild enteritis.   Lipase and amylase normal  RUQ US: Trace perihepatic ascites.  Gallbladder not visualized. No biliary ductal dilation.    Patient is currently not a surgical candidate for her hernia from a respiratory standpoint, once stabilized unsure if this would be an option  Last BM Last BM Date: 02/02/25 (02/02/25 0536 : Jossy Vines RN)   Plan:  CT surgery to operate and repair HH today after TVP placement, at 4PM.           :  Diagnoses: Acute kidney injury - resolved   Creatinine trend: at baseline  Baseline creatinine: 0.7-0.9  Plan:  Continue sanchez  Monitor I/Os.      F/E/N:  F: Fluid resuscitation as needed  E: Trend electrolytes and replete PRN, K and Phos low, replete   N: NPO for procedure today     Heme/Onc:  Diagnoses: Thrombocytopenia resolved.  Anemia 7.1 stable  LE Duplex: No DVT or superficial thrombophlebitis b/l  Plan:  VTE prophylaxis: JOSTIN, SCDs  Trend CBC    Two units pRBCs prepared for surgery  One unit to be transfused now     Endo:  Diagnoses: Hyperglycemia likely secondary to steroids  1/27 AM Cortisol 52.7   1/25 TSH 2.611  Plan:   SSI Alg 4  Goal -180     ID:  Diagnoses: Septic shock w/ upper respiratory infection    Culture data:   1/26 BC negative   1/27 CSF negative  1/27 Flu/COVID/RSV negative   1/27 MRSA negative   Lyme IGM neg but IGG positive  Vancomycin and cefepime discontinued  Sputum shows Stenotrophomonas maltophilia - sensitive to Bactrim  PCT elevated 5.59  Leukocytosis downtrending 11  Bronchoscopy culture Candida albicans - likely contaminant  Plan:  ID consulted  Continue  Bactrim Day 4   Continue Zosyn        MSK/Skin:  Diagnoses: LE cellulitis b/l.  Skin wounds and cuts on UE b/l  Plan:  PT/OT when appropriate. Encourage OOB and ambulation when appropriate. Local wound care prn.     LDAs:  Lines - left IJ CVC, peripheral IV   Drains - Vasquez  Airways -  ETT     Disposition: Critical care    ICU Core Measures     Vented Patient  VAP Bundle  VAP bundle ordered     A: Assess, Prevent, and Manage Pain Has pain been assessed? Yes  Need for changes to pain regimen? No   B: Both Spontaneous Awakening Trials (SATs) and Spontaneous Breathing Trials (SBTs) Plan to perform spontaneous awakening trial today? Yes   Plan to perform spontaneous breathing trial today? Yes   Obvious barriers to extubation? Yes   C: Choice of Sedation RASS Goal: 0 Alert and Calm  Need for changes to sedation or analgesia regimen? No   D: Delirium CAM-ICU: Unable to perform secondary to Acute cognitive dysfunction   E: Early Mobility  Plan for early mobility? NA   F: Family Engagement Plan for family engagement today? Yes, spoke with son at bedside       Antibiotic Review: Awaiting culture results.     Review of Invasive Devices:    Vasquez Plan: Continue for accurate I/O monitoring for 48 hours  Central access plan: Medications requiring central line  [place] Plan: Keep arterial line for hemodynamic monitoring    Prophylaxis:  VTE VTE covered by:  heparin (porcine), Subcutaneous, 5,000 Units at 02/03/25 2117       Stress Ulcer  covered bypantoprazole (PROTONIX) injection 40 mg [[id number]]         24 Hour Events : No acute events overnight.  Patient was more awake and resting in bed, able to more briskly track and follow me somewhat around the room.  Following commands, however weak.  Subjective   Review of Systems: See HPI for Review of Systems    Objective :                   Vitals I/O      Most Recent Min/Max in 24hrs   Temp 98.6 °F (37 °C) Temp  Min: 98.2 °F (36.8 °C)  Max: 99.7 °F (37.6 °C)   Pulse (!) 50 Pulse  Min:  46  Max: 92   Resp 18 Resp  Min: 18  Max: 23   BP (!) 101/46 BP  Min: 101/46  Max: 140/59   O2 Sat 98 % SpO2  Min: 93 %  Max: 98 %      Intake/Output Summary (Last 24 hours) at 2/4/2025 0626  Last data filed at 2/4/2025 0600  Gross per 24 hour   Intake 1725.02 ml   Output 4240 ml   Net -2514.98 ml       Diet NPO    Invasive Monitoring   Arterial Line  [place] /76  Arterial Line BP  Min: 84/54  Max: 148/84    mmHg  Arterial Line MAP (mmHg)  Min: 65 mmHg  Max: 118 mmHg           Physical Exam   Physical Exam  Vitals reviewed.   Eyes:      Extraocular Movements: Extraocular movements intact.      Conjunctiva/sclera:      Right eye: Right conjunctiva is injected.      Pupils: Pupils are equal, round, and reactive to light.   Skin:     General: Skin is warm and dry.      Comments: Bilateral lower extremity erythema. Small superficial abrasions to LLE with dressings in place. Bilateral hands with multiple scratches over surface.    HENT:      Head: Normocephalic.      Nose: No congestion.      Mouth/Throat:      Mouth: Mucous membranes are dry.      Dentition: Abnormal dentition.   Cardiovascular:      Rate and Rhythm: Normal rate and regular rhythm.      Pulses: Normal pulses.      Heart sounds: Normal heart sounds.   Abdominal:      Palpations: Abdomen is soft.      Tenderness: There is no abdominal tenderness.   Constitutional:       Appearance: She is well-developed. She is ill-appearing.      Interventions: She is sedated, intubated and restrained.   Pulmonary:      Effort: Pulmonary effort is normal. No respiratory distress. She is intubated.      Breath sounds: Examination of the right-middle field reveals decreased breath sounds. Examination of the right-lower field reveals decreased breath sounds. Examination of the left-lower field reveals decreased breath sounds. Decreased breath sounds and rhonchi (Improved, right worse than left) present.   Neurological:      Mental Status: She is alert.      GCS:  GCS eye subscore is 3. GCS motor subscore is 6.        Cough reflex.      Comments: Able to nod her head to answer to questions.  She is able to wiggle her toes and weakly squeeze both hands.      She is able to follow commands to track, open and close her eyes and can move her tongue.             Diagnostic Studies        Lab Results: I have reviewed the following results:     Medications:  Scheduled PRN   chlorhexidine, 15 mL, Q12H JOSTIN  folic acid 1 mg in sodium chloride 0.9 % 50 mL IVPB, 1 mg, Daily  heparin (porcine), 5,000 Units, Q8H JOSTIN  hydrocortisone sodium succinate, 25 mg, Q8H JOSTIN  insulin lispro, 1-6 Units, TID AC  ipratropium, 0.5 mg, TID  levalbuterol, 1.25 mg, TID  nystatin, , BID  pantoprazole, 40 mg, Q12H JOSTIN  piperacillin-tazobactam, 4.5 g, Q8H  polyethylene glycol, 17 g, Daily  potassium phosphate, 21 mmol, Once  senna-docusate sodium, 1 tablet, BID  sulfamethoxazole-trimethoprim, 5 mg/kg of trimethoprim (Adjusted), Q12H  thiamine, 100 mg, Daily  timolol, 1 drop, BID      acetaminophen, 650 mg, Q4H PRN  fentaNYL, 50 mcg, Q1H PRN       Continuous    norepinephrine, 1-30 mcg/min, Last Rate: Stopped (02/03/25 0540)  propofol, 5-50 mcg/kg/min, Last Rate: 5 mcg/kg/min (02/04/25 0310)         Labs:   CBC    Recent Labs     02/03/25 0540 02/04/25  0518   WBC 13.18* 11.26*   HGB 7.1* 7.1*   HCT 23.4* 23.3*    399*     BMP    Recent Labs     02/03/25  0540 02/04/25  0518   SODIUM 137 144   K 3.4* 2.9*    108   CO2 25 29   AGAP 8 7   BUN 31* 31*   CREATININE 1.00 1.02   CALCIUM 7.4* 7.7*       Coags    No recent results     Additional Electrolytes  Recent Labs     02/03/25  0540 02/03/25  0802 02/04/25  0518   MG 2.2  --  2.0   PHOS 2.3  --  2.1*   CAIONIZED  --  1.11*  --           Blood Gas    Recent Labs     02/03/25  1057   PHART 7.441   FTC7GCM 38.7   PO2ART 160.7*   [id number] 25.8   BEART 1.5   SOURCE Line, Arterial     Recent Labs     02/03/25  1057   SOURCE Line, Arterial    LFTs  No  recent results    Infectious  No recent results  Glucose  Recent Labs     02/03/25  0540 02/04/25  0518   GLUC 278* 132

## 2025-02-04 NOTE — ASSESSMENT & PLAN NOTE
-Requiring ICU level of care  -Initially requiring levo and vasopressin for hemodynamic support, currently off pressors  -Intubated

## 2025-02-04 NOTE — PHYSICAL THERAPY NOTE
Physical Therapy Cancellation Note    PT orders received chart review completed. Pt is currently intubated/sedated and not appropriate to participate in skilled PT at this time. PT will follow and eval as medically appropriate.     02/04/25 1300   Note Type   Note type Cancelled Session   Cancel Reasons Intubated/sedated       Miguelina Henson, PT

## 2025-02-04 NOTE — OCCUPATIONAL THERAPY NOTE
Occupational Therapy         Patient Name: Darlin Bellner  Today's Date: 2/4/2025 02/04/25 1100   OT Last Visit   OT Visit Date 02/04/25   Note Type   Note Type Cancelled Session   Cancel Reasons Intubated/sedated     Janny Adames

## 2025-02-04 NOTE — PROCEDURES
Transvenous Pacing Wire Insertion    Date/Time: 2/4/2025 12:30 PM    Performed by: Michelle Holley PA-C  Authorized by: Michelle Holley PA-C    Patient location:  ICU  Other Assisting Provider: Yes (comment) (Dr. Lischner)    Consent:     Consent obtained:  Written    Consent given by:  Healthcare agent  Universal protocol:     Patient identity confirmed:  Hospital-assigned identification number and arm band  Pre-procedure details:     Hand hygiene: Hand hygiene performed prior to insertion      Sterile barrier technique: All elements of maximal sterile technique followed      Skin preparation:  2% chlorhexidine    Skin preparation agent: Skin preparation agent completely dried prior to procedure    Sedation:     Sedation type:  Propofol  Anesthesia (see MAR for exact dosages):     Anesthesia method:  Local infiltration    Local anesthetic:  Lidocaine 1% w/o epi  Indications:     Temporary pacing indications:  Symptomatic bradycardia  Procedure details:     Patient position:  Flat    Venous introducer: new sheath/introducer placed      Location:  Right internal jugular    Landmarks identified: yes      Ultrasound guidance: yes      Sterile ultrasound techniques: Sterile gel and sterile probe covers were used      Number of attempts:  1

## 2025-02-04 NOTE — ASSESSMENT & PLAN NOTE
79-year-old female with a large hiatal hernia containing the entirety of the stomach, thought to be causing bradycardia and impaired respiratory function, thoracic surgery team asked to evaluate with consideration for surgical repair.    Currently not on vasopressors.    Plan  - OR today for PEHR, gastropexy, EGD  -Follow-up vasopressor requirement  -Wean vent as able  -Rest of care per primary team

## 2025-02-05 NOTE — ASSESSMENT & PLAN NOTE
79-year-old female with a large hiatal hernia containing the entirety of the stomach, thought to be causing bradycardia and impaired respiratory function, thoracic surgery team asked to evaluate with consideration for surgical repair.    Now s/p robotic paraesophageal hernia repair on 2/5    Levo 1 mcg/M    CMP: 18/400/8/40%    UOP: 1.9 L    Plan  -Trickle tube feeds via KO fed  -Wean pressors  -Wean vent as able  -Once extubated will perform swallow study  -Rest of care per primary team

## 2025-02-05 NOTE — PROGRESS NOTES
Progress Note - Thoracic    Name: Darlin Zamora 79 y.o. female I MRN: [id number]  Unit/Bed#: ICU 10 I Date of Admission: 1/25/2025   Date of Service: 2/5/2025 I Hospital Day: 10    Assessment & Plan  Altered mental status/Seizure like activity    -Management per ICU team  Septic shock (HCC)    -Requiring ICU level of care  -Initially requiring levo and vasopressin for hemodynamic support, currently off pressors  -Intubated  Hiatal hernia  79-year-old female with a large hiatal hernia containing the entirety of the stomach, thought to be causing bradycardia and impaired respiratory function, thoracic surgery team asked to evaluate with consideration for surgical repair.    Now s/p robotic paraesophageal hernia repair on 2/5    Levo 1 mcg/M    CMP: 18/400/8/40%    UOP: 1.9 L    Plan  -Trickle tube feeds via KO fed  -Wean pressors  -Wean vent as able  -Once extubated will perform swallow study  -Rest of care per primary team  Acute hypoxic respiratory failure (HCC)  Patient mechanically ventilated  Goals of care, counseling/discussion  Family meeting with thoracic surgery and palliative care on 2/3, deciding to pursue surgery.  Meningioma (HCC)    Acute encephalopathy    Palliative care by specialist    Bradycardia      Please contact the SecureChat role for the Thoracic  service with any questions/concerns.    Subjective   Intubated and sedated    Objective :  Temp:  [67.6 °F (19.8 °C)-98.6 °F (37 °C)] 97.9 °F (36.6 °C)  HR:  [44-72] 60  BP: ()/(44-81) 114/53  Resp:  [13-41] 22  SpO2:  [93 %-100 %] 97 %  O2 Device: Ventilator  FiO2 (%):  [40] 40    I/O         02/02 0701  02/03 0700 02/03 0701  02/04 0700    I.V. (mL/kg) 1427.5 (21.8) 37.5 (0.6)    NG/GT 50 100    IV Piggyback 1100 750    Feedings 880 330    Total Intake(mL/kg) 3457.5 (52.8) 1217.5 (18.6)    Urine (mL/kg/hr) 1495 (1) 3735 (2.4)    Emesis/NG output 0     Stool 0 0    Total Output 1495 3735    Net +1962.5 -2517.5          Unmeasured Stool  Occurrence 2 x 2 x          Lines/Drains/Airways       Active Status       Name Placement date Placement time Site Days    CVC Central Lines 01/26/25 Triple 16cm 01/26/25  1930  --  9    Arterial Line 01/31/25 Right Radial 01/31/25  1537  Radial  4    ETT  Cuffed 7.5 mm 01/31/25  1338  -- 4    NG/OG/Enteral Tube Small Bore Feeding Tube 12 Fr Right nare 02/04/25  2200  Right nare  less than 1    Urethral Catheter 16 Fr. 01/31/25  1557  --  4                  Physical Exam  General: Intubated and sedated  Skin: Warm, dry, anicteric  HEENT: Normocephalic, atraumatic  CV: Paced rhythm  Pulm: Mechanically ventilated  Abd: Soft, appropriately tender, minimally distended; incisions clean dry and intact  MSK: Symmetric, no edema, no tenderness, no deformity  Neuro: AOx3, GCS 15     Lab Results: I have reviewed the following results:  Recent Labs     02/04/25 2158 02/05/25  0537   WBC 11.80* 11.80*   HGB 8.4* 9.0*   HCT 26.7* 28.2*    326   BANDSPCT 8  --    SODIUM 144  --    K 3.9  --    *  --    CO2 26  --    BUN 27*  --    CREATININE 0.88  --    GLUC 109  --    CAIONIZED 1.04* 1.08*   MG 2.2  --    PHOS 3.7  --    LACTICACID 1.3  --        Imaging Results Review: I reviewed radiology reports from this admission including: CT chest and CT abdomen/pelvis.  Other Study Results Review: No additional pertinent studies reviewed.    VTE Pharmacologic Prophylaxis: VTE covered by:  heparin (porcine), Subcutaneous, 5,000 Units at 02/05/25 0518      VTE Mechanical Prophylaxis: sequential compression device

## 2025-02-05 NOTE — RESPIRATORY THERAPY NOTE
Resp Therapy   02/05/25 1315   Respiratory Assessment   Resp Comments Pt extubated per order, pt extubated to 4L NC. NO stridor heard.   Vent Information   Ventilator End Yes   Daily Screen   Patient safety screen outcome: Passed

## 2025-02-05 NOTE — QUICK NOTE
S: Darlin Zamora is a 79 y.o. female who returns to the ICU s/p robotic type 4 paraesophageal hernia repair with mesh, gastroplexy, and Keophed placement. EBL minimal.  cc. Received 250 cc albumin. Returns intubated and on 4 mcg/min levophed. TVP in place, requiring persistent pacing     O:    Vitals:    02/04/25 1700   BP: 147/81   Pulse: (!) 54   Resp: 20   Temp: 98.2 °F (36.8 °C)   SpO2: 95%       General: Intubated/sedated/restrained  HEENT: normocephalic, atraumatic, perrla, neck supple, no tracheal deviation, mmm  Cardiac: rrr, no m/r/g, 2+ radial and DP pulses b/l. Pacer spikes on monitor with good capture, intermittently required at rate of 50; increased rate to 70 with 100% capture, then lowered back to 50; 10 mA  Pulm: lungs cta, no w/r/r, intubated w vent settings 18/400/40%/PEEP 8  Abd: soft, soft tender, non-distended; Incision sites C/D/I  : sanchez placed  MSK: equal arom of ue and le  Neuro: RASS -2  Skin: lines c/d/i    A/P    POD #0 robotic type 4 paraesophageal hernia repair with mesh, gastroplexy, and Keophed placement.  Obtain endpoints  Obtain CXR for TVP placement (though appears to be capturing well)  Wean levo for MAP > 65  NPO and NPNJ overnight    Will Bond PA-C

## 2025-02-05 NOTE — ASSESSMENT & PLAN NOTE
Possibly all secondary to an aspiration event with pneumonia versus pneumonitis.  Still requiring reasonably high level ventilatory support.  Sputum culture only growing stenotrophomonas of unclear significance.  -Antibiotics as above for now  -Recheck imaging of the chest  -Ongoing ventilatory support  -Wean off the ventilator as able

## 2025-02-05 NOTE — ASSESSMENT & PLAN NOTE
With presumptive aspiration pneumonitis/pneumonia.  Consideration for possibility of another etiology such as an occult intra-abdominal process.  The patient's been quite ill with receiving a high level vasopressor support although the pressor needs seem to be decreasing.  MRSA screen is negative and sputum culture without resistant pathogens.  Unclear significance of the stenotrophomonas in the airway culture however will cover for now.  Bronchoscopy cultures negative for any new pathogens.  -Continue intravenous Bactrim through 2/8/2025 to complete 7 days of treatment  -Discontinue Zosyn  -Recheck CBC with differential and CMP to make sure no developing toxicities  -Check procalcitonin level tomorrow a.m. to confirm improvement.  -Source control measures as needed  -Supportive care

## 2025-02-05 NOTE — OP NOTE
OPERATIVE REPORT  PATIENT NAME: Darlin Zamora    :  1945  MRN: [id number]  Pt Location: [facility] OR ROOM 15    SURGERY DATE: 2025    Surgeons and Role:     * Albaro Barrett MD - Primary     * Nellie Preciado PA-C - Assisting     * Forrest Blake MD - Assisting    Preop Diagnosis:  Hiatal hernia [K44.9]    Post-Op Diagnosis Codes:     * Hiatal hernia [K44.9]    Procedure(s):  Robotic type IV giant paraesophageal hernia repair with absorbable mesh and gastropexy  EGD  Nasogastric tube placement    Specimen(s):  ID Type Source Tests Collected by Time Destination   1 : hernia sac Tissue Soft Tissue, Other TISSUE EXAM Albaro Barrett MD 2025        Estimated Blood Loss:   Minimal    Drains:  NG/OG/Enteral Tube Orogastric 12 Fr Center mouth (Active)   Placement Reverification Auscultation 25 1600   Site Assessment Clean;Dry;Intact 25 1600   Securement Assessment Adhesive Securement 25 1600   Status Clamped 25 1600   Drainage Appearance Yellow 25 1600   Intake (mL) 0 mL 25 2000   Output (mL) 0 mL 25 2000   Number of days: 4       Urethral Catheter 16 Fr. (Active)   Reasons to continue Urinary Catheter  Accurate I&O assessment in critically ill patients (48 hr. max) 25 2000   Goal for Removal Voiding trial when ambulation improves 25 2000   Site Assessment Clean;Skin intact 25 0800   Vasquez Care Done 25 0800   Collection Container Standard drainage bag 25 0800   Securement Method Securing device (Describe) 25 0800   Output (mL) 125 mL 25 1600   Number of days: 4       Anesthesia Type:   General    Operative Indications:  Hiatal hernia [K44.9]  79-year-old female with a giant type IV paraesophageal hernia containing the entirety of her stomach, colon, and small bowel causing aspiration, respiratory failure, and likely bradycardia    Operative Findings:  Giant type IV paraesophageal hernia.  Initially GE  junction located at 30 cm from the incisors.  Enlarged crura.  Hernia containing transverse colon and small bowel in the entirety of the stomach.  Able to fully reduce with at least 3 cm of intra-abdominal length.  GE junction at the conclusion 38 cm from the incisors.  Closed crura tightly and placed a absorbable mesh.  Placed a nasogastric possible nasojejunal feeding tube at the conclusion    Patient had ascites and a large pleural effusion.  Drained both totaling 1.4 L of serous fluid.      Complications:   None    Procedure and Technique:  After obtaining informed consent from the patient's son, they were transferred to the operating room and placed supine on the operating table. General anesthesia was then induced and the patient was placed on the vent. A footboard was placed at the base of the bed and the patients feet, legs and thighs were secured to the bed. All bony prominences were padded and checked.  The operating room table was placed in full reverse Trendelenburg to check for any patient movement and adjustments were made as needed.    Next a formal time-out was called verifying patient , date of birth, procedure, consent, antibiotics, beta-blocker as indicated, anticipated specimens with handling, SCD use and other special considerations.     Next and EGD was performed.  The adult endoscope was inserted into the patient's oropharynx and directed down into their esophagus. Here we encountered a GI and hernia with the GE junction at 30 cm from the incisors.  This was fully suctioned out..      The abdomen was then prepped with ChloraPrep and draped in standard surgical fashion. An 8 mm incision was made at Patel's point, 2 finger breaths below the left costal margin in the midclavicular line. The Veress needle was inserted through the abdominal wall and into the abdominal cavity using the water drop technique. Next the abdomen was then insufflated without issue.  A supraumbilical 8 mm incision was  made and a robotic trocar was placed. The 30 degree robotic camera was inserted and the abdomen was thoroughly inspected. There was no sign Veress needle or trocar injury. All other ports were placed under direct visualization using the robotic camera.  3mL of 0.25% Marcaine was used to anesthetize the peritoneum for all additional port placements. Next a left upper quadrant 8mm lateral robotic port was placed. An robotic 8mm port was placed through the Veress incision.  An 8mm RUQ robotic port was placed just lateral to the falciform, at the same level as the LUQ port. A RLQ 12mm assistant port was placed between the camera and the RUQ robotic port.  Finally a 5 mm incision was made just to the left of the xiphoid process.  A Alison liver retractor was placed through here and secured to the bedside with a sterile villegas. The patient was then placed in full reverse trendeleburg.     The [employer] robot was docked at this time. A tip-up fenestrated grasper, caudier grasper and robotic vessel sealer were inserted and tested. I un-scrubbed at this time and went to the robotic console.     I started by gently reducing the hernia contents with the help of rolled gauze cigars.  We are able to fully reduce the omentum, colon, and small bowel out of the chest.  The stomach remained in the chest.  This was done without causing any trauma to the bowel.    We started our dissection opening up the gastrohepatic ligament.  We followed this down onto the right mark.  This was followed into the pleural space.  We were careful to retract the hernia sac with us and try to stay out of the pleura.  We continued our dissection up into the chest trying to identify and avoid the bilateral vagus nerves.  We kept our hernia sac with our stomach.  The stomach and hernia sac were densely adherent to the pleura and we entered the pleura.  This was inadvertent.  We drained a large pleural effusion through here.  We continued to dissect the  esophagus circumferentially off meet all mediastinal attachments.  Once we are satisfied this was freed up above the inferior pulmonary veins we looked back towards the GE junction.    The hernia sac itself was dissected free and sent for permanent pathology.  The GE junction at this point appeared to be at least 3 cm in the abdomen.  Satisfied with our dissection we performed a repeat EGD    Repeat EGD again showed a fully reduced hernia. The gastroesophageal junction was located at 38 cm from the incisors.  There was no evidence of gastric masses, polyps or ulcers.  The duodenum was entered and there was no evidence ulcers or masses. Retroflexion view of the GE junction did not show any abnormalities and showed a fully reduced hiatal hernia. Next a 52 Fr bougie was placed without issue.      Next we performed our crural repair. Simple, interrupted 0 Ethibond sutures were used to reapproximate the crura posteriorly.  A total of 3 sutures were placed posteriorly.  We placed an additional suture at the 11 o'clock position to further close down the crura.  When finished the esophagus with the bougie in place was touching the crura circumferentially but was not constricted by this.  Next a Bio A Corrigan semi-biologic mesh was placed at the hiatus. This was positioned in place behind the liver and underneath the gastroesophageal junction.    Next we performed a formal gastropexy.  The greater curvature of the stomach was sutured to the underlying portion of left hemidiaphragm using interrupted 2-0 Ethibond sutures.  We used 4 total sutures.  We made certain to recreate the angle of Hiss and the first suture went through the mesh as well.     The abdomen was then inspected and thoroughly aspirated dry.  There was no active bleeding identified. The robot was undocked at this time and all robotic instruments were removed.  The fascia for the 12 mm assistant port site was then closed using a percutaneous closure device and an  0 vicryl suture. The skin and soft tissue was closed with 4 Monocryl.  Surgical glue was applied to all incisions.     Sponge, needle and instrument counts were correct at the conclusion of the procedure. The patient was extubated without incident in the operating room and was transported to the PACU in stable condition.     I was present for the entire procedure.    Patient Disposition:  Critical Care Unit             SIGNATURE: Albaro Barrett MD  DATE: February 4, 2025  TIME: 9:07 PM

## 2025-02-05 NOTE — ASSESSMENT & PLAN NOTE
Palliative Diagnosis: large hiatal hernia, acute hypoxic respiratory failure, septic shock    Social Support:  Patient's support system: son Zack  Supportive listening provided  Normalized experience of patient  Advocated for patient/family with interdisciplinary team    Care Coordination  Case discussed with critical care team, thoracic surgery    Follow-up  We appreciate the opportunity to participate in this patient's care.   We will continue to follow while admitted.    Please do not hesitate to contact our on-call provider through [employer] Secure Chat or contact [phone] should there be an acute change or other symptom control concerns.    Please do not make any changes to symptom medications (opioid analgesics, nonopioid analgesics, antiemetics, etc) without first consulting the on-call Palliative Care provider for your specific campus; including after hours and on weekends. We are available 24/7, and can be contacted on [employer] chat or at [phone].

## 2025-02-05 NOTE — PROGRESS NOTES
Progress Note - Critical Care/ICU   Name: Darlin Zamora 79 y.o. female I MRN: [id number]  Unit/Bed#: ICU 10 I Date of Admission: 1/25/2025   Date of Service: 2/5/2025 I Hospital Day: 10       Assessment & Plan   Neuro/Psych:  Diagnoses: Acute metabolic encephalopathy; meningioma   MRI Brain: no obvious infarct; meningiomas stable   Plan:  Neuro checks q4 hours  Sedation: propofol @ 10  Analgesia: Multimodal. Acetaminophen, fentanyl prn     CV:  Diagnoses: Septic shock, bradycardia s/p TVP placement; history of hypertension   1/28 Echo: moderate symmetric hypertrophy of basal septal wall (LV); EF 60%; normal systolic function; grade II diastolic dysfunction; mild dilation of left atrium; mild/moderate mitral and tricuspid regurgitation   Plan:  Consider cardiology/EP consult for persistent bradycardia   Continue to wean levophed for MAP > 65   Continuous cardiopulmonary monitoring.     Pulm:  Diagnoses: Acute hypoxic respiratory failure in setting of pneumonia; pleural effusions   Plan:  Vent: . Daily SAT/SBT. Wean FiO2 and PEEP as tolerated.   Respiratory/airway clearance protocol   Continue Xopenex/Atrovent   Continuous pulse oximetry. Maintain O2 sat >92%.     GI:  Diagnoses: Large hiatal hernia concern for vagal nerve syndrome s/p paraesophageal hernia repair   POD 1 Paraesophageal hernia repair   Last BM: 2/4  Plan:  Thoracic surgery following, appreciate recommendations   Maintain NGT, monitor output   Bowel regimen: MiraLax and Senokot   Continue PPI     :  Diagnoses: No active issues   Creatinine trend: at baseline  Baseline creatinine: 0.7-0.9  Plan:  Trend renal indices.   Monitor I/Os.    F/E/N:  Plan:   F: Fluid resuscitation prn.  E: Monitor and replete electrolytes for Mg >2, Phos >3, K >4.  N: NPO    Heme/Onc:  Diagnoses: Anemia   S/p 1 unit of pRBCs pre-operatively   Hgb stable at 8.4   Plan:  Monitor H/H  VTE prophylaxis: JOSTIN, SCDs    Endo:  Diagnoses: Hyperglycemia likely 2/2 steroid use   Plan:    Insulin: SSI algorithm 4. Monitor blood glucose.    ID:  Diagnoses: Septic shock 2/2 pneumonia   Culture data:   1/26 BC negative   1/27 CSF negative  1/27 Flu/COVID/RSV negative   1/27 MRSA negative   1/29 Sputum culture + for stenotrophomonas   Temperature: afebrile  Plan:  Infectious disease consulted   Abx: Day 10 of antibiotics - Bactrim day 4, Zosyn   Monitor fever curve and WBC.    MSK/Skin:  Diagnoses: Bilateral lower extremity cellulitis   Plan:  PT/OT when appropriate. Encourage OOB and ambulation when appropriate. Local wound care prn.    LDAs:  Lines - Left IJ CVC, right IJ TVP, PIV  Drains - sanchez  Airways -  ETT    Disposition: Critical care    ICU Core Measures     Vented Patient  VAP Bundle  VAP bundle ordered     A: Assess, Prevent, and Manage Pain Has pain been assessed? Yes  Need for changes to pain regimen? No   B: Both Spontaneous Awakening Trials (SATs) and Spontaneous Breathing Trials (SBTs) Plan to perform spontaneous awakening trial today? Yes   Plan to perform spontaneous breathing trial today? Yes   Obvious barriers to extubation? No   C: Choice of Sedation RASS Goal: 0 Alert and Calm  Need for changes to sedation or analgesia regimen? No   D: Delirium CAM-ICU: Unable to perform secondary to Acute cognitive dysfunction   E: Early Mobility  Plan for early mobility? Yes   F: Family Engagement Plan for family engagement today? Yes       Antibiotic Review: Infectious disease consulted    Review of Invasive Devices:    [place] Plan: Continue for accurate I/O monitoring for 48 hours  Central access plan: Medications requiring central line Hemodynamic monitoring  [place] Plan: Keep arterial line for hemodynamic monitoring, frequent ABGs, and frequent labs    Prophylaxis:  VTE VTE covered by:  heparin (porcine), Subcutaneous, 5,000 Units at 02/05/25 0518       Stress Ulcer  covered bypantoprazole (PROTONIX) injection 40 mg [[id number]]         24 Hour Events : Patient went to OR yesterday for  paraesophageal hernia repair. Prior to OR, she had TVP placed at bedside for symptomatic bradycardia. She received 1 unit of pRBCs. Hemoglobin stable postoperatively.     Subjective       Objective :                   Vitals I/O      Most Recent Min/Max in 24hrs   Temp 97.9 °F (36.6 °C) Temp  Min: 67.6 °F (19.8 °C)  Max: 98.6 °F (37 °C)   Pulse 68 Pulse  Min: 44  Max: 72   Resp 17 Resp  Min: 13  Max: 41   /54 BP  Min: 85/48  Max: 158/65   O2 Sat 97 % SpO2  Min: 93 %  Max: 100 %      Intake/Output Summary (Last 24 hours) at 2025 0751  Last data filed at 2025 0600  Gross per 24 hour   Intake 2283.12 ml   Output 1935 ml   Net 348.12 ml       Diet NPO    Invasive Monitoring           Physical Exam   Physical Exam  Vitals and nursing note reviewed.   Eyes:      Extraocular Movements: Extraocular movements intact.      Pupils: Pupils are equal, round, and reactive to light.   Skin:     General: Skin is warm.   HENT:      Head: Normocephalic.      Right Ear: Tympanic membrane normal.   Cardiovascular:      Rate and Rhythm: Bradycardia present. Paced rhythm.      Pulses: Normal pulses.   Musculoskeletal:      Right lower le+ Edema present.      Left lower le+ Edema present.   Abdominal:      Palpations: Abdomen is soft.      Tenderness: There is no abdominal tenderness.   Constitutional:       General: She is not in acute distress.     Appearance: She is well-developed. She is ill-appearing.      Interventions: She is sedated and intubated.   Pulmonary:      Effort: Pulmonary effort is normal. No respiratory distress. She is intubated.      Breath sounds: No wheezing, rhonchi or rales.   Neurological:      Comments: Limited exam on 10 of propofol. Opens eyes spontaneously. Nods to yes or no questions. Attempts to move upper extremities when asked, strength 1/5 in uppers. Not following commands in lower extremities but minimal withdraw to pain.           Diagnostic Studies        Lab Results: I have  reviewed the following results:     Medications:  Scheduled PRN   chlorhexidine, 15 mL, Q12H JOSTIN  folic acid 1 mg in sodium chloride 0.9 % 50 mL IVPB, 1 mg, Daily  heparin (porcine), 5,000 Units, Q8H JOSTIN  hydrocortisone sodium succinate, 25 mg, Q8H JOSTIN  insulin lispro, 1-6 Units, TID AC  ipratropium, 0.5 mg, TID  levalbuterol, 1.25 mg, TID  magnesium sulfate, 2 g, After Breakfast  nystatin, , BID  pantoprazole, 40 mg, Q12H JOSTIN  piperacillin-tazobactam, 4.5 g, Q8H  polyethylene glycol, 17 g, Daily  senna-docusate sodium, 1 tablet, BID  sulfamethoxazole-trimethoprim, 5 mg/kg of trimethoprim (Adjusted), Q12H  thiamine, 100 mg, Daily  timolol, 1 drop, BID      acetaminophen, 650 mg, Q4H PRN  fentaNYL, 50 mcg, Q1H PRN       Continuous    norepinephrine, 1-30 mcg/min, Last Rate: Stopped (02/05/25 0534)  propofol, 5-50 mcg/kg/min, Last Rate: 10 mcg/kg/min (02/05/25 0518)         Labs:   CBC    Recent Labs     02/04/25 2158 02/05/25 0537   WBC 11.80* 11.80*   HGB 8.4* 9.0*   HCT 26.7* 28.2*    326   BANDSPCT 8  --      BMP    Recent Labs     02/04/25 2158 02/05/25 0537   SODIUM 144 145   K 3.9 3.9   * 111*   CO2 26 27   AGAP 8 7   BUN 27* 24   CREATININE 0.88 0.85   CALCIUM 7.8* 8.1*       Coags    No recent results     Additional Electrolytes  Recent Labs     02/04/25 2158 02/05/25 0537   MG 2.2 2.2   PHOS 3.7 2.9   CAIONIZED 1.04* 1.08*          Blood Gas    Recent Labs     02/04/25 2158   PHART 7.379   TYH0QGE 44.1*   PO2ART 90.2   HVR5PRY 25.4   BEART 0.2   SOURCE Line, Arterial     Recent Labs     02/04/25 2158   SOURCE Line, Arterial    LFTs  No recent results    Infectious  No recent results  Glucose  Recent Labs     02/04/25 0518 02/04/25 2158 02/05/25  0537   GLUC 132 109 119

## 2025-02-05 NOTE — ANESTHESIA POSTPROCEDURE EVALUATION
Post-Op Assessment Note    CV Status:  Stable    Pain management: adequate       Mental Status:  Somnolent and sleepy   PONV Controlled:  Controlled   Airway Patency:  Patent  Airway: intubated     Post Op Vitals Reviewed: Yes    No anethesia notable event occurred.    Staff: CRNA   Comments: Pt transported to ICU intubated/sedated on gtt's as documented/fully monitored, remained stable throughout transport and on report/transfer of care to ICU RN/AP          Last Filed PACU Vitals:  Vitals Value Taken Time   Temp     Pulse 50    /53    Resp 18    SpO2 98% MV

## 2025-02-05 NOTE — PROGRESS NOTES
Progress Note - Infectious Disease   Name: Darlin Zamora 79 y.o. female I MRN: [id number]  Unit/Bed#: ICU 10 I Date of Admission: 1/25/2025   Date of Service: 2/5/2025 I Hospital Day: 10    Assessment & Plan  Septic shock (HCC)  With presumptive aspiration pneumonitis/pneumonia.  Consideration for possibility of another etiology such as an occult intra-abdominal process.  The patient's been quite ill with receiving a high level vasopressor support although the pressor needs seem to be decreasing.  MRSA screen is negative and sputum culture without resistant pathogens.  Unclear significance of the stenotrophomonas in the airway culture however will cover for now.  Bronchoscopy cultures negative for any new pathogens.  -Continue intravenous Bactrim through 2/8/2025 to complete 7 days of treatment  -Discontinue Zosyn  -Recheck CBC with differential and CMP to make sure no developing toxicities  -Check procalcitonin level tomorrow a.m. to confirm improvement.  -Source control measures as needed  -Supportive care  Acute hypoxic respiratory failure (HCC)  Possibly all secondary to an aspiration event with pneumonia versus pneumonitis.  Still requiring reasonably high level ventilatory support.  Sputum culture only growing stenotrophomonas of unclear significance.  -Antibiotics as above for now  -Recheck imaging of the chest  -Ongoing ventilatory support  -Wean off the ventilator as able  Acute encephalopathy  In the setting of recent severe hypothermia and presumptive pneumonia.  Cognition had improved but now on a ventilator and sedated.  MRI of the brain and CSF analysis not indicative of a new acute process.  Workup for active seizure negative thus far  -Monitor cognition  -Neurology follow-up  -Additional workup as per neurology  Meningioma (HCC)      I have discussed with care service the above plan to discontinue the Zosyn and continue the Bactrim.  The critical care service agrees with the  plan.    Antibiotics:  Zosyn 5  Bactrim 5    Subjective   Patient with decreased temperature curve.  Continues to require ventilatory support.  Not requiring any vasopressor support.  Remains nonverbal on the ventilator.    Objective :  Temp:  [97.7 °F (36.5 °C)-98.8 °F (37.1 °C)] 98.8 °F (37.1 °C)  HR:  [44-72] 68  BP: ()/(47-81) 99/51  Resp:  [10-24] 10  SpO2:  [93 %-100 %] 97 %  O2 Device: Ventilator  FiO2 (%):  [40] 40    General: Resting on the ventilator no acute distress.  Endotracheal tube in place.  Psychiatric:  Awake and alert  Pulmonary:  Normal respiratory excursion without accessory muscle use  Abdomen:  Soft, nontender  Extremities:  No edema  Skin:  No rashes      Lab Results: I have reviewed the following results:  Results from last 7 days   Lab Units 02/05/25 0537 02/04/25 2158 02/04/25 2104 02/04/25 1748 02/04/25 0518   WBC Thousand/uL 11.80* 11.80*  --   --  11.26*   HEMOGLOBIN g/dL 9.0* 8.4*  --   --  7.1*   I STAT HEMOGLOBIN g/dl  --   --  8.2*   < >  --    PLATELETS Thousands/uL 326 385  --   --  399*    < > = values in this interval not displayed.     Results from last 7 days   Lab Units 02/05/25  0537 02/04/25 2158 02/04/25 2104 02/04/25 1748 02/04/25  0518 01/30/25  1242 01/30/25  0457   SODIUM mmol/L 145 144  --   --  144   < > 151*   POTASSIUM mmol/L 3.9 3.9  --   --  2.9*   < > 3.8   CHLORIDE mmol/L 111* 110*  --   --  108   < > 115*   CO2 mmol/L 27 26  --   --  29   < > 29   CO2, I-STAT mmol/L  --   --  27   < >  --    < >  --    BUN mg/dL 24 27*  --   --  31*   < > 37*   CREATININE mg/dL 0.85 0.88  --   --  1.02   < > 0.87   EGFR ml/min/1.73sq m 65 63  --   --  52   < > 64   GLUCOSE, ISTAT mg/dl  --   --  113   < >  --    < >  --    CALCIUM mg/dL 8.1* 7.8*  --   --  7.7*   < > 8.4   AST U/L  --   --   --   --   --   --  24   ALT U/L  --   --   --   --   --   --  46   ALK PHOS U/L  --   --   --   --   --   --  261*   ALBUMIN g/dL  --   --   --   --   --   --  2.8*    < > =  values in this interval not displayed.     Results from last 7 days   Lab Units 02/01/25  1637 01/29/25  1628   SPUTUM CULTURE   --  2+ Growth of Stenotrophomonas maltophilia*   GRAM STAIN RESULT  3+ Polys  2+ Mononuclear Cells  No organisms seen 2+ Epithelial cells per low power field*  No polys seen*  Rare Gram positive rods*     Results from last 7 days   Lab Units 02/02/25  0519   PROCALCITONIN ng/ml 5.59*     Results from last 7 days   Lab Units 01/31/25  1549   CRP mg/L 23.0*             Imaging Results Review: I personally reviewed the following image studies in PACS and associated radiology reports: chest xray. My interpretation of the radiology images/reports is: Improving edema..

## 2025-02-05 NOTE — PROGRESS NOTES
Progress Note - Palliative Care   Name: Darlin Zamora 79 y.o. female I MRN: [id number]  Unit/Bed#: ICU 10 I Date of Admission: 1/25/2025   Date of Service: 2/5/2025 I Hospital Day: 10    Assessment & Plan  Palliative care by specialist  Palliative Diagnosis: large hiatal hernia, acute hypoxic respiratory failure, septic shock    Social Support:  Patient's support system: son Zack  Supportive listening provided  Normalized experience of patient  Advocated for patient/family with interdisciplinary team    Care Coordination  Case discussed with critical care team, thoracic surgery    Follow-up  We appreciate the opportunity to participate in this patient's care.   We will continue to follow while admitted.    Please do not hesitate to contact our on-call provider through [employer] Secure Chat or contact [phone] should there be an acute change or other symptom control concerns.    Please do not make any changes to symptom medications (opioid analgesics, nonopioid analgesics, antiemetics, etc) without first consulting the on-call Palliative Care provider for your specific campus; including after hours and on weekends. We are available 24/7, and can be contacted on [email or web address] or at [phone].    Goals of care, counseling/discussion  Goals:  Code Status: Full code - Level 1  Decisional apparatus:  Patient is not competent on my exam today.  If competence is lost, patient's substitute decision maker would default to her son Zack by [place] 169.  Advance Directive / Living Will / POLST:  none    Palliative will follow for ongoing goals of care discussions as situation evolves.    Hiatal hernia  Post-op from paraesophageal hernia repair on February 5  Altered mental status/Seizure like activity  improving  Meningioma (HCC)  History of left frontal meningioma, right inferior cerebellar pontine angle mengioma  Stable on CT and MRI imaging   Septic shock (HCC)    Acute hypoxic respiratory failure (HCC)  Currently on  spontaneous breathing trial to wean    Acute encephalopathy    Bradycardia        PDMP Review: I have reviewed the patient's controlled substance dispensing history in the Prescription Drug Monitoring Program in compliance with the [facility] regulations before prescribing any controlled substances.    Subjective   Patient went to the OR yesterday and tolerated the procedure well.  Currently on a spontaneous breathing trial with goal of extubation possibly even today.    Objective :  Temp:  [97.7 °F (36.5 °C)-98.8 °F (37.1 °C)] 98.8 °F (37.1 °C)  HR:  [44-72] 68  BP: ()/(47-81) 99/51  Resp:  [10-24] 10  SpO2:  [93 %-100 %] 97 %  O2 Device: Ventilator  FiO2 (%):  [40] 40    Physical Exam  Vitals and nursing note reviewed.   Constitutional:       General: She is not in acute distress.  HENT:      Head: Normocephalic and atraumatic.      Right Ear: External ear normal.      Left Ear: External ear normal.   Eyes:      General:         Right eye: No discharge.         Left eye: No discharge.   Cardiovascular:      Rate and Rhythm: Normal rate.   Pulmonary:      Comments: Intubated on SBT  Abdominal:      General: There is no distension.      Palpations: Abdomen is soft.   Skin:     Coloration: Skin is pale.   Neurological:      Cranial Nerves: No cranial nerve deficit.            Lab Results: I have reviewed the following results:  Lab Results   Component Value Date/Time    SODIUM 145 02/05/2025 05:37 AM    K 3.9 02/05/2025 05:37 AM    K 4.1 02/06/2017 12:28 PM    BUN 24 02/05/2025 05:37 AM    BUN 24 02/06/2017 12:28 PM    CREATININE 0.85 02/05/2025 05:37 AM    CREATININE 0.77 02/06/2017 12:28 PM    GLUC 119 02/05/2025 05:37 AM    CALCIUM 8.1 (L) 02/05/2025 05:37 AM    CALCIUM 9.5 02/06/2017 12:28 PM    AST 24 01/30/2025 04:57 AM    AST 15 06/20/2016 11:27 AM    ALT 46 01/30/2025 04:57 AM    ALT 19 06/20/2016 11:27 AM    ALB 2.8 (L) 01/30/2025 04:57 AM    ALB 3.9 06/20/2016 11:27 AM    TP 5.2 (L) 01/30/2025 04:57 AM     EGFR 65 02/05/2025 05:37 AM    EGFR 65 04/07/2018 08:47 PM     Lab Results   Component Value Date/Time    HGB 9.0 (L) 02/05/2025 05:37 AM    HGB 13.4 02/06/2017 12:28 PM    WBC 11.80 (H) 02/05/2025 05:37 AM    WBC 9.3 06/20/2016 11:27 AM     02/05/2025 05:37 AM     (H) 06/20/2016 11:27 AM    INR 1.34 (H) 01/28/2025 05:11 AM     Lab Results   Component Value Date/Time    WVE8ABRVPQLH 2.611 01/25/2025 08:48 PM       Code Status: Level 1 - Full Code  Advance Directive and Living Will:      Power of :    POLST:      Administrative Statements   I have spent a total time of 15 minutes in caring for this patient on the day of the visit/encounter including Risk factor reductions, Impressions, Counseling / Coordination of care, Documenting in the medical record, Reviewing / ordering tests, medicine, procedures  , Obtaining or reviewing history  , and Communicating with other healthcare professionals . Topics discussed with the patient / family include symptom assessment and management, psychosocial support, and supportive listening.

## 2025-02-05 NOTE — PROGRESS NOTES
Progress Note - Cardiology   Name: Darlin Zamora 79 y.o. female I MRN: [id number]  Unit/Bed#: ICU 10 I Date of Admission: 1/25/2025   Date of Service: 2/5/2025 I Hospital Day: 10     Assessment & Plan  Bradycardia  Sinus bradycardia noted throughout admission, no evidence of high-grade AV block or significant pauses. Rates have now recovered post operatively and after extubation.  Likely due to vagal nerve stimulation in the setting of large hiatal hernia  Only on timolol eyedrops, not on standing beta-blockers as an outpatient  No other significant history  With improved heart rates and stable sinus rhythm, TVP can be removed.  Hiatal hernia  Large hiatal hernia with concern for vagal nerve stimulation, s/p repair 2/4/25  Altered mental status/Seizure like activity  Initially admitted with altered mental status and hypothermia, seizure-like activity noted during admission --- managed by primary team  Meningioma (HCC)  Managed by primary team, stable  Septic shock ([facility])  Septic shock with upper respiratory infection, managed by primary team  Acute hypoxic respiratory failure ([facility])  Intubated  Goals of care, counseling/discussion        She is now s/p hiatal hernia repair and doing well post op. Off pressors and all cardiac medications. Extubated this morining    Plan:  - TVP can be removed today with stable normal sinus rhythm  - now off pressors and BP reaming stable  - no further interventions planned at this time.    Case discussed and reviewed with Dr. Montana who agrees with my assessment and plan.    Thank you for involving us in the care of your patient.      Zack Daly DO  Cardiology Fellow   PGY-5      Subjective:   Patient seen and examined. Intubated this morning but opens eyes. She was extubated today with improvement in heart rate which was sinus in the 70s-80s.    Hospital Course:   Darlin Zamora is a 79 y.o. year old female with hypertension, preserved LV systolic function, meningioma, but  "no significant cardiac history.  She presented to the hospital 1/25/2025 after wellness visit showed acute confusion and inability to care for herself.  She was found to be significantly hypothermic with altered mental status, she was admitted to the hospital and was noted to have seizure-like activity.  Workup ultimately  revealed a large hiatal hernia, telemetry has shown periods of sinus bradycardia.  There is concern for vagal nerve stimulation in the setting of her large hiatal hernia, and surgical repair is scheduled for later today.  The surgical team would prefer that a temporary pacemaker be placed to avoid any excessive bradycardia in the setting of potential vagus nerve manipulation during the procedure, thus EP was asked to see the patient in consultation to help arrange.     Vitals: Blood pressure 113/60, pulse 70, temperature 98.8 °F (37.1 °C), temperature source Oral, resp. rate 20, height 4' 11\" (1.499 m), weight 65.5 kg (144 lb 6.4 oz), SpO2 97%., Body mass index is 29.17 kg/m².,   Orthostatic Blood Pressures      Flowsheet Row Most Recent Value   Blood Pressure 113/60 filed at 02/05/2025 0900   Patient Position - Orthostatic VS Lying filed at 02/05/2025 0400              Intake/Output Summary (Last 24 hours) at 2/5/2025 0956  Last data filed at 2/5/2025 0930  Gross per 24 hour   Intake 2441.57 ml   Output 2385 ml   Net 56.57 ml       Review of System:  Review of system was conducted and was negative except for as stated in the subjective course.    Physical Exam:    GEN: Darlin Zamora appears to be resting comfortably in no acute distresss but altered and not answering questions.   HEENT:  Normocephalic, atraumatic, anicteric, moist mucous membranes  NECK: No JVD or carotid bruits   HEART: regular rhythm, regular rate, normal S1 and S2, no murmurs, clicks, gallops or rubs   LUNGS: Clear to auscultation bilaterally; no wheezes, rales, or rhonchi; respiration nonlabored   ABDOMEN:  Normoactive " bowel sounds, soft, no tenderness, no distention  EXTREMITIES: peripheral pulses palpable; no edema  NEURO: not answering questions or following commands.  SKIN:  Dry, intact, warm to touch      Current Facility-Administered Medications:     acetaminophen (TYLENOL) oral suspension 650 mg, 650 mg, Oral, Q4H PRN, Forrest Blake MD, 650 mg at 02/01/25 2024    chlorhexidine (PERIDEX) 0.12 % oral rinse 15 mL, 15 mL, Mouth/Throat, Q12H JOSTIN, Forrest Blake MD, 15 mL at 02/05/25 0926    dexmedeTOMIDine (Precedex) 400 mcg in sodium chloride 0.9% 100 mL, 0.1-0.7 mcg/kg/hr, Intravenous, Titrated, JOSE MANUEL Tena    fentaNYL injection 50 mcg, 50 mcg, Intravenous, Q1H PRN, Forrest Blake MD    folic acid 1 mg in sodium chloride 0.9 % 50 mL IVPB, 1 mg, Intravenous, Daily, Forrest Blake MD, Stopped at 02/05/25 0906    heparin (porcine) subcutaneous injection 5,000 Units, 5,000 Units, Subcutaneous, Q8H JOSTIN, 5,000 Units at 02/05/25 0518 **AND** [CANCELED] Platelet count, , , Once, Celso Chan,     hydrocortisone (Solu-CORTEF) injection 25 mg, 25 mg, Intramuscular, Q12H JOSTIN, JOSE MANUEL Tena    insulin lispro (HumALOG/ADMELOG) 100 units/mL subcutaneous injection 1-6 Units, 1-6 Units, Subcutaneous, TID AC, 2 Units at 02/04/25 1700 **AND** Fingerstick Glucose (POCT), , , TID AC, Forrest Blake MD    ipratropium (ATROVENT) 0.02 % inhalation solution 0.5 mg, 0.5 mg, Nebulization, TID, Forrest Blake MD, 0.5 mg at 02/05/25 0850    levalbuterol (XOPENEX) inhalation solution 1.25 mg, 1.25 mg, Nebulization, TID, Forrest Blake MD, 1.25 mg at 02/05/25 0850    nystatin (MYCOSTATIN) powder, , Topical, BID, Forrest Blake MD, Given at 02/05/25 0831    pantoprazole (PROTONIX) injection 40 mg, 40 mg, Intravenous, Q12H JOSTIN, Forrest Blake MD, 40 mg at 02/05/25 0014    [COMPLETED] piperacillin-tazobactam (ZOSYN) 4.5 g in sodium chloride 0.9 % 100 mL IV LOADING DOSE, 4.5 g, Intravenous, Once, Stopped at 02/01/25  1528 **FOLLOWED BY** piperacillin-tazobactam (ZOSYN) 4.5 g in sodium chloride 0.9 % 100 mL IVPB (EXTENDED INFUSION), 4.5 g, Intravenous, Q8H, Forrest Blake MD, Last Rate: 25 mL/hr at 02/05/25 0927, 4.5 g at 02/05/25 0927    polyethylene glycol (MIRALAX) packet 17 g, 17 g, Per NG Tube, Daily, Forrest Blake MD, 17 g at 02/04/25 1021    senna-docusate sodium (SENOKOT S) 8.6-50 mg per tablet 1 tablet, 1 tablet, Per NG Tube, BID, Forrest Blake MD, 1 tablet at 02/04/25 0807    sulfamethoxazole-trimethoprim (BACTRIM) 280 mg of trimethoprim in dextrose 5 % 500 mL IVPB, 5 mg/kg of trimethoprim (Adjusted), Intravenous, Q12H, Forrest Blake MD, Stopped at 02/05/25 0325    thiamine tablet 100 mg, 100 mg, Per NG Tube, Daily, Forrest Blake MD, 100 mg at 02/05/25 0831    timolol (TIMOPTIC) 0.5 % ophthalmic solution 1 drop, 1 drop, Both Eyes, BID, Forrest Blake MD, 1 drop at 02/05/25 0831    Labs & Results:  Results from last 7 days   Lab Units 01/31/25 2151 01/31/25  1947 01/31/25  1727   HS TNI 0HR ng/L  --   --  51*   HS TNI 2HR ng/L  --  46  --    HS TNI 4HR ng/L 45  --   --          Results from last 7 days   Lab Units 02/05/25  0537 02/04/25  2158 02/04/25  2104 02/04/25  1748 02/04/25  0518   POTASSIUM mmol/L 3.9 3.9  --   --  2.9*   CO2 mmol/L 27 26  --   --  29   CO2, I-STAT mmol/L  --   --  27   < >  --    CHLORIDE mmol/L 111* 110*  --   --  108   BUN mg/dL 24 27*  --   --  31*   CREATININE mg/dL 0.85 0.88  --   --  1.02    < > = values in this interval not displayed.     Results from last 7 days   Lab Units 02/05/25  0537 02/04/25  2158 02/04/25  2104 02/04/25  1748 02/04/25  0518   HEMOGLOBIN g/dL 9.0* 8.4*  --   --  7.1*   I STAT HEMOGLOBIN g/dl  --   --  8.2*   < >  --    HEMATOCRIT % 28.2* 26.7*  --   --  23.3*   HEMATOCRIT, ISTAT %  --   --  24*   < >  --    PLATELETS Thousands/uL 326 385  --   --  399*    < > = values in this interval not displayed.     Results from last 7 days   Lab Units 02/02/25  1401    HEMOGLOBIN A1C % 6.6*       Telemetry:   Personally reviewed by Zack Daly, DO: normal sinus rhythm.        ** Please Note: Fluency DirectDictation voice to text software may have been used in the creation of this document. **

## 2025-02-05 NOTE — OCCUPATIONAL THERAPY NOTE
Occupational Therapy         Patient Name: Darlin Zamora  Today's Date: 2/5/2025 02/05/25 1000   OT Last Visit   OT Visit Date 02/05/25   Note Type   Note type Cancelled Session   Cancel Reasons Intubated/sedated   Additional Comments remains intubated and not medically appropriate for therapy at this time- please reconsult when pt medically stable and able to participate - d/c from caseload       Janny Adames

## 2025-02-05 NOTE — ASSESSMENT & PLAN NOTE
Goals:  Code Status: Full code - Level 1  Decisional apparatus:  Patient is not competent on my exam today.  If competence is lost, patient's substitute decision maker would default to her son Zack by [place] 169.  Advance Directive / Living Will / POLST:  none    Palliative will follow for ongoing goals of care discussions as situation evolves.

## 2025-02-05 NOTE — PLAN OF CARE
Problem: PAIN - ADULT  Goal: Verbalizes/displays adequate comfort level or baseline comfort level  Description: Interventions:  - Encourage patient to monitor pain and request assistance  - Assess pain using appropriate pain scale  - Administer analgesics based on type and severity of pain and evaluate response  - Implement non-pharmacological measures as appropriate and evaluate response  - Consider cultural and social influences on pain and pain management  - Notify physician/advanced practitioner if interventions unsuccessful or patient reports new pain  Outcome: Progressing     Problem: INFECTION - ADULT  Goal: Absence or prevention of progression during hospitalization  Description: INTERVENTIONS:  - Assess and monitor for signs and symptoms of infection  - Monitor lab/diagnostic results  - Monitor all insertion sites, i.e. indwelling lines, tubes, and drains  - Monitor endotracheal if appropriate and nasal secretions for changes in amount and color  - [place] appropriate cooling/warming therapies per order  - Administer medications as ordered  - Instruct and encourage patient and family to use good hand hygiene technique  - Identify and instruct in appropriate isolation precautions for identified infection/condition  Outcome: Progressing  Goal: Absence of fever/infection during neutropenic period  Description: INTERVENTIONS:  - Monitor WBC    Outcome: Progressing     Problem: SAFETY ADULT  Goal: Patient will remain free of falls  Description: INTERVENTIONS:  - Educate patient/family on patient safety including physical limitations  - Instruct patient to call for assistance with activity   - Consult OT/PT to assist with strengthening/mobility   - Keep Call bell within reach  - Keep bed low and locked with side rails adjusted as appropriate  - Keep care items and personal belongings within reach  - Initiate and maintain comfort rounds  - Make Fall Risk Sign visible to staff  - Offer Toileting every 2 Hours,  in advance of need  - Initiate/Maintain bed alarm  - Obtain necessary fall risk management equipment  - Apply yellow socks and bracelet for high fall risk patients  - Consider moving patient to room near nurses station  Outcome: Progressing     Problem: DISCHARGE PLANNING  Goal: Discharge to home or other facility with appropriate resources  Description: INTERVENTIONS:  - Identify barriers to discharge w/patient and caregiver  - Arrange for needed discharge resources and transportation as appropriate  - Identify discharge learning needs (meds, wound care, etc.)  - Arrange for interpretive services to assist at discharge as needed  - Refer to Case Management Department for coordinating discharge planning if the patient needs post-hospital services based on physician/advanced practitioner order or complex needs related to functional status, cognitive ability, or social support system  Outcome: Progressing     Problem: Knowledge Deficit  Goal: Patient/family/caregiver demonstrates understanding of disease process, treatment plan, medications, and discharge instructions  Description: Complete learning assessment and assess knowledge base.  Interventions:  - Provide teaching at level of understanding  - Provide teaching via preferred learning methods  Outcome: Progressing     Problem: Prexisting or High Potential for Compromised Skin Integrity  Goal: Skin integrity is maintained or improved  Description: INTERVENTIONS:  - Identify patients at risk for skin breakdown  - Assess and monitor skin integrity  - Assess and monitor nutrition and hydration status  - Monitor labs   - Assess for incontinence   - Turn and reposition patient  - Assist with mobility/ambulation  - Relieve pressure over bony prominences  - Avoid friction and shearing  - Provide appropriate hygiene as needed including keeping skin clean and dry  - Evaluate need for skin moisturizer/barrier cream  - Collaborate with interdisciplinary team   -  Patient/family teaching  - Consider wound care consult   Outcome: Progressing     Problem: Nutrition/Hydration-ADULT  Goal: Nutrient/Hydration intake appropriate for improving, restoring or maintaining nutritional needs  Description: Monitor and assess patient's nutrition/hydration status for malnutrition. Collaborate with interdisciplinary team and initiate plan and interventions as ordered.  Monitor patient's weight and dietary intake as ordered or per policy. Utilize nutrition screening tool and intervene as necessary. Determine patient's food preferences and provide high-protein, high-caloric foods as appropriate.     INTERVENTIONS:  - Monitor oral intake, urinary output, labs, and treatment plans  - Assess nutrition and hydration status and recommend course of action  - Evaluate amount of meals eaten  - Assist patient with eating if necessary   - Allow adequate time for meals  - Recommend/ encourage appropriate diets, oral nutritional supplements, and vitamin/mineral supplements  - Order, calculate, and assess calorie counts as needed  - Recommend, monitor, and adjust tube feedings and TPN/PPN based on assessed needs  - Assess need for intravenous fluids  - Provide specific nutrition/hydration education as appropriate  - Include patient/family/caregiver in decisions related to nutrition  Outcome: Progressing     Problem: NEUROSENSORY - ADULT  Goal: Achieves stable or improved neurological status  Description: INTERVENTIONS  - Monitor and report changes in neurological status  - Monitor vital signs such as temperature, blood pressure, glucose, and any other labs ordered   - Initiate measures to prevent increased intracranial pressure  - Monitor for seizure activity and implement precautions if appropriate      Outcome: Progressing  Goal: Achieves maximal functionality and self care  Description: INTERVENTIONS  - Monitor swallowing and airway patency with patient fatigue and changes in neurological status  -  Encourage and assist patient to increase activity and self care.   - Encourage visually impaired, hearing impaired and aphasic patients to use assistive/communication devices  Outcome: Progressing     Problem: RESPIRATORY - ADULT  Goal: Achieves optimal ventilation and oxygenation  Description: INTERVENTIONS:  - Assess for changes in respiratory status  - Assess for changes in mentation and behavior  - Position to facilitate oxygenation and minimize respiratory effort  - Oxygen administered by appropriate delivery if ordered  - Initiate smoking cessation education as indicated  - Encourage broncho-pulmonary hygiene including cough, deep breathe, Incentive Spirometry  - Assess the need for suctioning and aspirate as needed  - Assess and instruct to report SOB or any respiratory difficulty  - Respiratory Therapy support as indicated  Outcome: Progressing     Problem: GENITOURINARY - ADULT  Goal: Maintains or returns to baseline urinary function  Description: INTERVENTIONS:  - Assess urinary function  - Encourage oral fluids to ensure adequate hydration if ordered  - Administer IV fluids as ordered to ensure adequate hydration  - Administer ordered medications as needed  - Offer frequent toileting  - Follow urinary retention protocol if ordered  Outcome: Progressing  Goal: Absence of urinary retention  Description: INTERVENTIONS:  - Assess patient’s ability to void and empty bladder  - Monitor I/O  - Bladder scan as needed  - Discuss with physician/AP medications to alleviate retention as needed  - Discuss catheterization for long term situations as appropriate  Outcome: Progressing  Goal: Urinary catheter remains patent  Description: INTERVENTIONS:  - Assess patency of urinary catheter  - If patient has a chronic sanchez, consider changing catheter if non-functioning  - Follow guidelines for intermittent irrigation of non-functioning urinary catheter  Outcome: Progressing     Problem: METABOLIC, FLUID AND ELECTROLYTES -  ADULT  Goal: Electrolytes maintained within normal limits  Description: INTERVENTIONS:  - Monitor labs and assess patient for signs and symptoms of electrolyte imbalances  - Administer electrolyte replacement as ordered  - Monitor response to electrolyte replacements, including repeat lab results as appropriate  - Instruct patient on fluid and nutrition as appropriate  Outcome: Progressing  Goal: Fluid balance maintained  Description: INTERVENTIONS:  - Monitor labs   - Monitor I/O and WT  - Instruct patient on fluid and nutrition as appropriate  - Assess for signs & symptoms of volume excess or deficit  Outcome: Progressing        Problem: Potential for Falls  Goal: Patient will remain free of falls  Description: INTERVENTIONS:  - Educate patient/family on patient safety including physical limitations  - Instruct patient to call for assistance with activity   - Consult OT/PT to assist with strengthening/mobility   - Keep Call bell within reach  - Keep bed low and locked with side rails adjusted as appropriate  - Keep care items and personal belongings within reach  - Initiate and maintain comfort rounds  - Make Fall Risk Sign visible to staff  - Offer Toileting every 2 Hours, in advance of need  - Initiate/Maintain bed alarm  - Obtain necessary fall risk management equipment  - Apply yellow socks and bracelet for high fall risk patients  - Consider moving patient to room near nurses station  Outcome: Progressing     Problem: SAFETY,RESTRAINT: NV/NON-SELF DESTRUCTIVE BEHAVIOR  Goal: Remains free of harm/injury (restraint for non violent/non self-detsructive behavior)  Description: INTERVENTIONS:  - Instruct patient/family regarding restraint use   - Assess and monitor physiologic and psychological status   - Provide interventions and comfort measures to meet assessed patient needs   - Identify and implement measures to help patient regain control  - Assess readiness for release of restraint   Outcome:  Progressing  Goal: Returns to optimal restraint-free functioning  Description: INTERVENTIONS:  - Assess the patient's behavior and symptoms that indicate continued need for restraint  - Identify and implement measures to help patient regain control  - Assess readiness for release of restraint   Outcome: Progressing

## 2025-02-05 NOTE — HOSPITAL COURSE
1/26 admitted with hypothermia, rewarmed  1/27 concern for septic shock, severe metabolic encephalopathy placed on video EEG monitoring to rule out seizures.  Video EEG negative.  Required pressors to maintain BP.  Significant bandemia, leukocytosis.  Concern for pneumonia. Started on cefepime, vancomycin.  1/29/2025 sputum culture +2 stenotrophomonas maltophilia, Bactrim, Zosyn.  ID consulted.  Continued episodes of bradycardia.   1/31 decline in respiratory status requiring intubation, worsening hypotension requiring stress dose steroids.  2/1 status post bronchoscopy showing friable mucosa significant with aspiration, continued episodes of bradycardia.  Given Lasix for diuresis.  With continued bradycardia, concern for hiatal hernia vagal syndrome  2/3 palliative care consulted  2/4 TVP placed for plans to go to the OR today for paraesophageal hernia repair.  2/4 status post paraesophageal hernia repair with mesh, gastropexy, Keophed placement.  Persistently bradycardic postop and worsening hypotension, TVP increased to rate of 60 with improvement in BP.  2/5 tolerating trickle tube feeds, extubated to nasal cannula, to optimize respiratory status high flow nasal cannula.  2/5 per ID continue Bactrim, discontinue Zosyn

## 2025-02-05 NOTE — ASSESSMENT & PLAN NOTE
Sinus bradycardia noted throughout admission, no evidence of high-grade AV block or significant pauses. Rates have now recovered post operatively and after extubation.  Likely due to vagal nerve stimulation in the setting of large hiatal hernia  Only on timolol eyedrops, not on standing beta-blockers as an outpatient  No other significant history  With improved heart rates and stable sinus rhythm, TVP can be removed.

## 2025-02-05 NOTE — RESPIRATORY THERAPY NOTE
02/04/25 2157   Respiratory Assessment   Assessment Type Assess only   General Appearance Sedated   Respiratory Pattern Assisted   Chest Assessment Chest expansion symmetrical   Bilateral Breath Sounds Clear  (anterior-lateral)   Cough None   Resp Comments Patient returned from O.R. s/p hernia repair. She was placed on previous ventilatory settings.   O2 Device G5 Vent   Vent Information   Vent ID [id number]   Vent type Verdugo G5   Verdugo Vent Mode (S)CMV   $ Pulse Oximetry Spot Check Charge Completed   (S)CMV Settings   Resp Rate (BPM) 18 BPM   VT (mL) 400 mL   FIO2 (%) 40 %   PEEP (cmH2O) 8 cmH2O   I:E Ratio 1:3.2   Insp Time (%)   (0.8sec)   Flow Trigger (LPM) 3   Humidification Heater   Heater Temperature (Set) 98.6 °F (37 °C)   (S)CMV Actuals   Resp Rate (BPM) 18 BPM   VT (mL) 399   MV 7.3   MAP (cmH2O) 12 cmH2O   Peak Pressure (cmH2O) 26 cmH2O   I:E Ratio (Obs) 1:3.2   Insp Resistance 14   Heater Temperature (Obs) 98.6 °F (37 °C)   Static Compliance (mL/cmH20) 31 mL/cmH2O   (S)CMV Alarms   High Peak Pressure (cmH2O) 40   Low Pressure (cmH2O) 5 cm H2O   High Resp Rate (BPM) 40 BPM   Low Resp Rate (BPM) 8 BPM   High MV (L/min) 20 L/min   Low MV (L/min) 4 L/min   High VT (mL) 1000 mL   Low VT (mL) 220 mL   Apnea Time (s) 20 S   Maintenance   Alarm (pink) cable attached No   Resuscitation bag with peep valve at bedside Yes   Water bag changed No   Circuit changed No   IHI Ventilator Associated Pneumonia Bundle   Daily Assessment of Readiness to Extubate Not applicable (Comment)   Head of Bed Elevated HOB 20   ETT  Cuffed 7.5 mm   Placement Date/Time: 01/31/25 (c) 5346   Type: Cuffed  Tube Size: 7.5 mm  Location: Oral  Insertion attempts: 1  Placement Verification: Chest x-ray;End tidal CO2;Symmetrical chest wall movement  Secured at (cm): 21 @ teeth   Secured at (cm) 21   Measured from Lips   Secured Location Left   Secured by Commercial tube villegas   Cuff Pressure (cm H2O)   (MLT)   HI-LO Suction  Capped

## 2025-02-06 PROBLEM — R00.1 BRADYCARDIA: Status: RESOLVED | Noted: 2025-01-01 | Resolved: 2025-01-01

## 2025-02-06 NOTE — CASE MANAGEMENT
Case Management Progress Note    Patient name Darlin Zamora  Location ICU 10/ICU 10 MRN [id number]  : 1945 Date 2025       LOS (days): 11  Geometric Mean LOS (GMLOS) (days): 9.6  Days to GMLOS:-2        OBJECTIVE:        Current admission status: Inpatient  Preferred Pharmacy:   [id number] - [place] - [address]  [address]  [place] [address]  Phone: [phone] Fax: [phone]    Primary Care Provider: Tiffanie Kamara DO    Primary Insurance: [employer]  Secondary Insurance:  FOR [employer]    PROGRESS NOTE:  Pt has been accepted at [facility] for STR. Rivera Hartman is reviewing. TC to son/ECZack. Left message re: choice and if plan for pt is STR w/ LT b/u. Await r/t TC

## 2025-02-06 NOTE — PLAN OF CARE
Problem: PAIN - ADULT  Goal: Verbalizes/displays adequate comfort level or baseline comfort level  Description: Interventions:  - Encourage patient to monitor pain and request assistance  - Assess pain using appropriate pain scale  - Administer analgesics based on type and severity of pain and evaluate response  - Implement non-pharmacological measures as appropriate and evaluate response  - Consider cultural and social influences on pain and pain management  - Notify physician/advanced practitioner if interventions unsuccessful or patient reports new pain  Outcome: Progressing     Problem: INFECTION - ADULT  Goal: Absence or prevention of progression during hospitalization  Description: INTERVENTIONS:  - Assess and monitor for signs and symptoms of infection  - Monitor lab/diagnostic results  - Monitor all insertion sites, i.e. indwelling lines, tubes, and drains  - Monitor endotracheal if appropriate and nasal secretions for changes in amount and color  - Dewey appropriate cooling/warming therapies per order  - Administer medications as ordered  - Instruct and encourage patient and family to use good hand hygiene technique  - Identify and instruct in appropriate isolation precautions for identified infection/condition  Outcome: Progressing  Goal: Absence of fever/infection during neutropenic period  Description: INTERVENTIONS:  - Monitor WBC    Outcome: Progressing     Problem: SAFETY ADULT  Goal: Patient will remain free of falls  Description: INTERVENTIONS:  - Educate patient/family on patient safety including physical limitations  - Instruct patient to call for assistance with activity   - Consult OT/PT to assist with strengthening/mobility   - Keep Call bell within reach  - Keep bed low and locked with side rails adjusted as appropriate  - Keep care items and personal belongings within reach  - Initiate and maintain comfort rounds  - Make Fall Risk Sign visible to staff  - Offer Toileting every 2 Hours,  in advance of need  - Initiate/Maintain bed alarm  - Obtain necessary fall risk management equipment  - Apply yellow socks and bracelet for high fall risk patients  - Consider moving patient to room near nurses station  Outcome: Progressing     Problem: DISCHARGE PLANNING  Goal: Discharge to home or other facility with appropriate resources  Description: INTERVENTIONS:  - Identify barriers to discharge w/patient and caregiver  - Arrange for needed discharge resources and transportation as appropriate  - Identify discharge learning needs (meds, wound care, etc.)  - Arrange for interpretive services to assist at discharge as needed  - Refer to Case Management Department for coordinating discharge planning if the patient needs post-hospital services based on physician/advanced practitioner order or complex needs related to functional status, cognitive ability, or social support system  Outcome: Progressing     Problem: Knowledge Deficit  Goal: Patient/family/caregiver demonstrates understanding of disease process, treatment plan, medications, and discharge instructions  Description: Complete learning assessment and assess knowledge base.  Interventions:  - Provide teaching at level of understanding  - Provide teaching via preferred learning methods  Outcome: Progressing     Problem: Prexisting or High Potential for Compromised Skin Integrity  Goal: Skin integrity is maintained or improved  Description: INTERVENTIONS:  - Identify patients at risk for skin breakdown  - Assess and monitor skin integrity  - Assess and monitor nutrition and hydration status  - Monitor labs   - Assess for incontinence   - Turn and reposition patient  - Assist with mobility/ambulation  - Relieve pressure over bony prominences  - Avoid friction and shearing  - Provide appropriate hygiene as needed including keeping skin clean and dry  - Evaluate need for skin moisturizer/barrier cream  - Collaborate with interdisciplinary team   -  Patient/family teaching  - Consider wound care consult   Outcome: Progressing     Problem: Nutrition/Hydration-ADULT  Goal: Nutrient/Hydration intake appropriate for improving, restoring or maintaining nutritional needs  Description: Monitor and assess patient's nutrition/hydration status for malnutrition. Collaborate with interdisciplinary team and initiate plan and interventions as ordered.  Monitor patient's weight and dietary intake as ordered or per policy. Utilize nutrition screening tool and intervene as necessary. Determine patient's food preferences and provide high-protein, high-caloric foods as appropriate.     INTERVENTIONS:  - Monitor oral intake, urinary output, labs, and treatment plans  - Assess nutrition and hydration status and recommend course of action  - Evaluate amount of meals eaten  - Assist patient with eating if necessary   - Allow adequate time for meals  - Recommend/ encourage appropriate diets, oral nutritional supplements, and vitamin/mineral supplements  - Order, calculate, and assess calorie counts as needed  - Recommend, monitor, and adjust tube feedings and TPN/PPN based on assessed needs  - Assess need for intravenous fluids  - Provide specific nutrition/hydration education as appropriate  - Include patient/family/caregiver in decisions related to nutrition  Outcome: Progressing     Problem: RESPIRATORY - ADULT  Goal: Achieves optimal ventilation and oxygenation  Description: INTERVENTIONS:  - Assess for changes in respiratory status  - Assess for changes in mentation and behavior  - Position to facilitate oxygenation and minimize respiratory effort  - Oxygen administered by appropriate delivery if ordered  - Initiate smoking cessation education as indicated  - Encourage broncho-pulmonary hygiene including cough, deep breathe, Incentive Spirometry  - Assess the need for suctioning and aspirate as needed  - Assess and instruct to report SOB or any respiratory difficulty  -  Respiratory Therapy support as indicated  Outcome: Progressing     Problem: GASTROINTESTINAL - ADULT  Goal: Maintains or returns to baseline bowel function  Description: INTERVENTIONS:  - Assess bowel function  - Encourage oral fluids to ensure adequate hydration  - Administer IV fluids if ordered to ensure adequate hydration  - Administer ordered medications as needed  - Encourage mobilization and activity  - Consider nutritional services referral to assist patient with adequate nutrition and appropriate food choices  Outcome: Progressing  Goal: Maintains adequate nutritional intake  Description: INTERVENTIONS:  - Monitor percentage of each meal consumed  - Identify factors contributing to decreased intake, treat as appropriate  - Assist with meals as needed  - Monitor I&O, weight, and lab values if indicated  - Obtain nutrition services referral as needed  Outcome: Progressing     Problem: GENITOURINARY - ADULT  Goal: Maintains or returns to baseline urinary function  Description: INTERVENTIONS:  - Assess urinary function  - Encourage oral fluids to ensure adequate hydration if ordered  - Administer IV fluids as ordered to ensure adequate hydration  - Administer ordered medications as needed  - Offer frequent toileting  - Follow urinary retention protocol if ordered  Outcome: Progressing  Goal: Absence of urinary retention  Description: INTERVENTIONS:  - Assess patient’s ability to void and empty bladder  - Monitor I/O  - Bladder scan as needed  - Discuss with physician/AP medications to alleviate retention as needed  - Discuss catheterization for long term situations as appropriate  Outcome: Progressing  Goal: Urinary catheter remains patent  Description: INTERVENTIONS:  - Assess patency of urinary catheter  - If patient has a chronic sanchez, consider changing catheter if non-functioning  - Follow guidelines for intermittent irrigation of non-functioning urinary catheter  Outcome: Progressing     Problem:  METABOLIC, FLUID AND ELECTROLYTES - ADULT  Goal: Electrolytes maintained within normal limits  Description: INTERVENTIONS:  - Monitor labs and assess patient for signs and symptoms of electrolyte imbalances  - Administer electrolyte replacement as ordered  - Monitor response to electrolyte replacements, including repeat lab results as appropriate  - Instruct patient on fluid and nutrition as appropriate  Outcome: Progressing  Goal: Fluid balance maintained  Description: INTERVENTIONS:  - Monitor labs   - Monitor I/O and WT  - Instruct patient on fluid and nutrition as appropriate  - Assess for signs & symptoms of volume excess or deficit  Outcome: Progressing     Problem: MUSCULOSKELETAL - ADULT  Goal: Maintain or return mobility to safest level of function  Description: INTERVENTIONS:  - Assess patient's ability to carry out ADLs; assess patient's baseline for ADL function and identify physical deficits which impact ability to perform ADLs (bathing, care of mouth/teeth, toileting, grooming, dressing, etc.)  - Assess/evaluate cause of self-care deficits   - Assess range of motion  - Assess patient's mobility  - Assess patient's need for assistive devices and provide as appropriate  - Encourage maximum independence but intervene and supervise when necessary  - Involve family in performance of ADLs  - Assess for home care needs following discharge   - Consider OT consult to assist with ADL evaluation and planning for discharge  - Provide patient education as appropriate  Outcome: Progressing     Problem: Potential for Falls  Goal: Patient will remain free of falls  Description: INTERVENTIONS:  - Educate patient/family on patient safety including physical limitations  - Instruct patient to call for assistance with activity   - Consult OT/PT to assist with strengthening/mobility   - Keep Call bell within reach  - Keep bed low and locked with side rails adjusted as appropriate  - Keep care items and personal belongings  within reach  - Initiate and maintain comfort rounds  - Make Fall Risk Sign visible to staff  - Offer Toileting every 2 Hours, in advance of need  - Initiate/Maintain bed alarm  - Obtain necessary fall risk management equipment  - Apply yellow socks and bracelet for high fall risk patients  - Consider moving patient to room near nurses station  Outcome: Progressing

## 2025-02-06 NOTE — PROGRESS NOTES
Progress Note - Palliative Care   Name: Darlin Zamora 79 y.o. female I MRN: [id number]  Unit/Bed#: ICU 10 I Date of Admission: 1/25/2025   Date of Service: 2/6/2025 I Hospital Day: 11    Assessment & Plan  Palliative care by specialist  Palliative Diagnosis: large hiatal hernia, acute hypoxic respiratory failure, septic shock    Social Support:  Patient's support system: son Zack  Supportive listening provided  Normalized experience of patient  Advocated for patient/family with interdisciplinary team    Care Coordination  Case discussed with critical care team, thoracic surgery    Follow-up  We appreciate the opportunity to participate in this patient's care.   We will continue to follow while admitted.  PSC will follow up on Monday, 2/10- please contact on-call provider with any acute concerns.   Please do not hesitate to contact our on-call provider through [employer] or contact [phone] should there be an acute change or other symptom control concerns.    Please do not make any changes to symptom medications (opioid analgesics, nonopioid analgesics, antiemetics, etc) without first consulting the on-call Palliative Care provider for your specific campus; including after hours and on weekends. We are available 24/7, and can be contacted on [employer] or at [phone].    Goals of care, counseling/discussion  Goals:  Code Status: Full code - Level 1  Decisional apparatus:  Patient is not competent on my exam today.  If competence is lost, patient's substitute decision maker would default to her son Zack by [place].  Advance Directive / Living Will / POLST:  none    Palliative will follow for ongoing goals of care discussions as situation evolves.    Hiatal hernia  Post-op from paraesophageal hernia repair on February 5  Altered mental status/Seizure like activity  improving  Meningioma (HCC)  History of left frontal meningioma, right inferior cerebellar pontine angle mengioma  Stable on CT  and MRI imaging   Septic shock (HCC)    Acute hypoxic respiratory failure (HCC)  Currently on spontaneous breathing trial to wean    Acute encephalopathy    Bradycardia (Resolved: 2/6/2025)      Decisional apparatus: Patient is not competent on my exam today. If competence is lost, patient's substitute decision maker would default to son by [place] Act 169.   Advance Directive / Living Will / POLST: no     PDMP Review: I have reviewed the patient's controlled substance dispensing history in the Prescription Drug Monitoring Program in compliance with the [facility] regulations before prescribing any controlled substances.    Subjective   Patient continues to do well.  She has been extubated and currently on mid flow nasal cannula.  Temporary pacer was removed as well.  She remains encephalopathic but is able to be oriented.  No family currently at bedside.    Objective :  Temp:  [98.2 °F (36.8 °C)-99.6 °F (37.6 °C)] 99.6 °F (37.6 °C)  HR:  [52-84] 66  BP: (104-175)/(46-78) 158/71  Resp:  [13-28] 20  SpO2:  [91 %-99 %] 98 %  O2 Device: Mid flow nasal cannula  Nasal Cannula O2 Flow Rate (L/min):  [8 L/min-12 L/min] 8 L/min  FiO2 (%):  [40] 40    Physical Exam  Vitals and nursing note reviewed.   Constitutional:       General: She is not in acute distress.  HENT:      Head: Normocephalic and atraumatic.      Right Ear: External ear normal.      Left Ear: External ear normal.   Eyes:      General:         Right eye: No discharge.         Left eye: No discharge.   Cardiovascular:      Rate and Rhythm: Normal rate and regular rhythm.   Pulmonary:      Effort: Pulmonary effort is normal.   Abdominal:      General: There is no distension.      Palpations: Abdomen is soft.   Skin:     Coloration: Skin is pale.            Lab Results: I have reviewed the following results:  Lab Results   Component Value Date/Time    SODIUM 143 02/06/2025 02:07 PM    K 3.7 02/06/2025 02:07 PM    K 4.1 02/06/2017 12:28 PM    BUN 18 02/06/2025 02:07 PM     BUN 24 02/06/2017 12:28 PM    CREATININE 0.88 02/06/2025 02:07 PM    CREATININE 0.77 02/06/2017 12:28 PM    GLUC 213 (H) 02/06/2025 02:07 PM    CALCIUM 8.0 (L) 02/06/2025 02:07 PM    CALCIUM 9.5 02/06/2017 12:28 PM    AST 24 01/30/2025 04:57 AM    AST 15 06/20/2016 11:27 AM    ALT 46 01/30/2025 04:57 AM    ALT 19 06/20/2016 11:27 AM    ALB 2.8 (L) 01/30/2025 04:57 AM    ALB 3.9 06/20/2016 11:27 AM    TP 5.2 (L) 01/30/2025 04:57 AM    EGFR 63 02/06/2025 02:07 PM    EGFR 65 04/07/2018 08:47 PM     Lab Results   Component Value Date/Time    HGB 9.6 (L) 02/06/2025 05:57 AM    HGB 13.4 02/06/2017 12:28 PM    WBC 14.60 (H) 02/06/2025 05:57 AM    WBC 9.3 06/20/2016 11:27 AM     02/06/2025 05:57 AM     (H) 06/20/2016 11:27 AM    INR 1.34 (H) 01/28/2025 05:11 AM     Lab Results   Component Value Date/Time    HFP1OXZHCUWX 2.611 01/25/2025 08:48 PM       Code Status: Level 1 - Full Code  Advance Directive and Living Will:      Power of :    POLST:      Administrative Statements   I have spent a total time of 15 minutes in caring for this patient on the day of the visit/encounter including Impressions, Counseling / Coordination of care, Documenting in the medical record, Reviewing / ordering tests, medicine, procedures  , Obtaining or reviewing history  , and Communicating with other healthcare professionals . Topics discussed with the patient / family include psychosocial support.

## 2025-02-06 NOTE — NURSING NOTE
RIJ central line removed, pt noted to be bradycardic to 34 immediately after. [facility] team aware and at bedside. At this time it was noted pts R pupil is 6mm and nonreactive and left pupil 3mm and sluggishly reactive, different from previous pupil assessment. Yimi Santillan and Nam at bedside. Dr. Lischner aware of above events.

## 2025-02-06 NOTE — ASSESSMENT & PLAN NOTE
With presumptive aspiration pneumonitis/pneumonia.  Consideration for possibility of another etiology such as an occult intra-abdominal process.  The patient's been quite ill with receiving a high level vasopressor support although the pressor needs seem to be decreasing.  MRSA screen is negative and sputum culture without resistant pathogens.  Unclear significance of the stenotrophomonas in the airway culture however will cover for now.  Bronchoscopy cultures negative for any new pathogens.  -Continue intravenous Bactrim through tomorrow to complete 7 days of treatment  -Recheck CBC with differential and CMP to make sure no developing toxicities  -Check procalcitonin level tomorrow a.m. to confirm improvement.  -Source control measures as needed  -Supportive care

## 2025-02-06 NOTE — ANESTHESIA POSTPROCEDURE EVALUATION
Post-Op Assessment Note    Last Filed PACU Vitals:  Vitals Value Taken Time   Temp 98.5 °F (36.9 °C) 02/06/25 0400   Pulse 56 02/06/25 1027   /76 02/06/25 0958   Resp 17 02/06/25 1027   SpO2 98 % 02/06/25 1027   Vitals shown include unfiled device data.

## 2025-02-06 NOTE — PROGRESS NOTES
Progress Note - Critical Care/ICU   Name: Darlin Zamora 79 y.o. female I MRN: [id number]  Unit/Bed#: ICU 10 I Date of Admission: 1/25/2025   Date of Service: 2/6/2025 I Hospital Day: 11       Assessment & Plan   Neuro:   Diagnosis: Encephalopathy TME  1/29 MRI-stable meningiomas  1/27 LP panel neg  Plan:   Delirium precautions/sleep hygiene    CV:   Diagnosis: Bradycardia s/p TVP placement  Concern for vagal nerve syndrome with hernia now s/p repair  TSH 2.6  Plan:   Cardiology following  TVP rate decreased to 40. No pacemaker need overnight  Consider d/c today     Diagnosis: Chronic DCHF/Hx HTN  1/29 ECHO EF 60% G2DD  Plan:   Lasix IV daily eventual transition back to oral however has significant edema  Consider restarting home lisinopril   Hold home HCTZ with IV lasix  Pulm:  Diagnosis: Acute hypoxic resp failure 2/2 pna and vol overload  Plan:   Cont diuresis goal net neg 1-1.5L  ID following for antibx through 2.8  Add vest therapy  Cont nebs  Wean HFNC to MFNC sats >92%    GI:   Diagnosis: Dysphagia  Plan:   Keofed with trickle feeds  Advance feeds     Diagnosis: POD#2 s/p paraesophageal hernia repair w mesh/gastroplexy  Plan:   Thoracic following   Pain mgmnt: prn tylenol    :   No active issues    F/E/N:   Diagnosis: Hypokalemia  Plan: replete and recheck    Heme/Onc:   Diagnosis: Anemia  Baseline 12-13  Plan:   No s/s bleeding  Cbc in am    Endo:   Diagnosis: Hyperglycemia on steroids  Plan:   Improved d/c ssi with steroid wean    ID:   Diagnosis: Stenotrophomonas pna  1/26 BC NG  1/29 Sputum +  Plan:   ID following   Bactrim through 2/8  F/u am pct  Wean steroids    MSK/Skin:   Diagnosis: Critical Care myopathy  Plan:   PT/OT/speech    Disposition: Stepdown Level 1    ICU Core Measures     A: Assess, Prevent, and Manage Pain Has pain been assessed? Yes  Need for changes to pain regimen? No   B: Both SAT/SAT  N/A   C: Choice of Sedation RASS Goal: 0 Alert and Calm  Need for changes to sedation or  analgesia regimen? No   D: Delirium CAM-ICU: Unable to perform secondary to Acute cognitive dysfunction   E: Early Mobility  Plan for early mobility? Yes   F: Family Engagement Plan for family engagement today? Yes       Antibiotic Review: Infectious disease consulted    Review of Invasive Devices:    Vasquez Plan: Vasquez to be removed. Order has been placed  Central access plan:  tvp  t/c discontinue today  Consider midline and d/c central line      Prophylaxis:  VTE VTE covered by:  enoxaparin, Subcutaneous       Stress Ulcer  covered byomeprazole (PRILOSEC) suspension 2 mg/mL [[id number]]         24 Hour Events : TVP rate dropped to 40. Extubated HFNC weaned to 40/40  Subjective       Objective :                   Vitals I/O      Most Recent Min/Max in 24hrs   Temp 99.1 °F (37.3 °C) Temp  Min: 97.9 °F (36.6 °C)  Max: 99.6 °F (37.6 °C)   Pulse 72 Pulse  Min: 56  Max: 84   Resp 22 Resp  Min: 10  Max: 25   /69 BP  Min: 98/52  Max: 170/78   O2 Sat 94 % SpO2  Min: 91 %  Max: 98 %      Intake/Output Summary (Last 24 hours) at 2025 0245  Last data filed at 2025 0200  Gross per 24 hour   Intake 2485.84 ml   Output 4120 ml   Net -1634.16 ml       Diet Enteral/Parenteral; Tube Feeding No Oral Diet; Jevity 1.2 Jae; Continuous; 20    Invasive Monitoring           Physical Exam   Physical Exam  Eyes:      Pupils: Pupils are equal, round, and reactive to light.   Skin:     General: Skin is warm and dry.   HENT:      Head: Normocephalic and atraumatic.      Mouth/Throat:      Mouth: Mucous membranes are moist.   Cardiovascular:      Rate and Rhythm: Normal rate and regular rhythm.      Pulses: Normal pulses.      Heart sounds: Normal heart sounds.   Musculoskeletal:         General: Swelling present.      Right lower le+ Edema present.      Left lower le+ Edema present.      Comments: UE 2/5 edema   Abdominal: General: Bowel sounds are normal. There is no distension.      Palpations: Abdomen is soft.       Tenderness: There is no abdominal tenderness.   Constitutional:       General: She is not in acute distress.     Appearance: She is ill-appearing.   Pulmonary:      Effort: Pulmonary effort is normal. No respiratory distress.      Breath sounds: Normal breath sounds.   Neurological:      Mental Status: She is lethargic.      GCS: GCS eye subscore is 1. GCS verbal subscore is 4. GCS motor subscore is 6.      Motor: Strength full and intact in all extremities. No motor deficit.      Comments: 3/5 UE lower arms Upper arms 2/5. LE 2/5          Diagnostic Studies        Lab Results: I have reviewed the following results:     Medications:  Scheduled PRN   chlorhexidine, 15 mL, Q12H JOSTIN  enoxaparin, 40 mg, Daily  folic acid 1 mg in sodium chloride 0.9 % 50 mL IVPB, 1 mg, Daily  hydrocortisone sodium succinate, 25 mg, Q12H JOSTIN  insulin lispro, 1-6 Units, Q6H JOSTIN  ipratropium, 0.5 mg, TID  levalbuterol, 1.25 mg, TID  nystatin, , BID  omeprazole (PRILOSEC) suspension 2 mg/mL, 10 mg, Daily  polyethylene glycol, 17 g, Daily  senna-docusate sodium, 1 tablet, BID  sulfamethoxazole-trimethoprim, 5 mg/kg of trimethoprim (Adjusted), Q12H  thiamine, 100 mg, Daily  timolol, 1 drop, BID      acetaminophen, 650 mg, Q4H PRN       Continuous          Labs:   CBC    Recent Labs     02/04/25 2158 02/05/25 0537   WBC 11.80* 11.80*   HGB 8.4* 9.0*   HCT 26.7* 28.2*    326   BANDSPCT 8  --      BMP    Recent Labs     02/05/25 0537 02/05/25  1447   SODIUM 145 144   K 3.9 3.1*   * 107   CO2 27 29   AGAP 7 8   BUN 24 22   CREATININE 0.85 0.87   CALCIUM 8.1* 8.1*       Coags    No recent results     Additional Electrolytes  Recent Labs     02/04/25 2158 02/05/25 0537 02/05/25  1447   MG 2.2 2.2 1.9   PHOS 3.7 2.9 3.0   CAIONIZED 1.04* 1.08*  --           Blood Gas    Recent Labs     02/04/25 2158   PHART 7.379   WVL3VDG 44.1*   PO2ART 90.2   KWX9ZCS 25.4   BEART 0.2   SOURCE Line, Arterial     Recent Labs     02/04/25  0834    SOURCE Line, Arterial    LFTs  No recent results    Infectious  No recent results  Glucose  Recent Labs     02/04/25  0518 02/04/25  2158 02/05/25  0537 02/05/25  1447   GLUC 132 109 119 162*

## 2025-02-06 NOTE — ASSESSMENT & PLAN NOTE
Possibly all secondary to an aspiration event with pneumonia versus pneumonitis.  Sputum culture only growing stenotrophomonas of unclear significance.  Patient now extubated and on oxygen by nasal cannula.  Chest x-ray improved with decreased pulmonary edema.  -Antibiotics as above for now  -Oxygen support  -Wean down oxygen as able

## 2025-02-06 NOTE — ASSESSMENT & PLAN NOTE
-Requiring ICU level of care  -Initially requiring levo and vasopressin for hemodynamic support, now off pressors

## 2025-02-06 NOTE — PROGRESS NOTES
Progress Note - Infectious Disease   Name: Darlin Zamora 79 y.o. female I MRN: [id number]  Unit/Bed#: ICU 10 I Date of Admission: 1/25/2025   Date of Service: 2/6/2025 I Hospital Day: 11    Assessment & Plan  Septic shock (HCC)  With presumptive aspiration pneumonitis/pneumonia.  Consideration for possibility of another etiology such as an occult intra-abdominal process.  The patient's been quite ill with receiving a high level vasopressor support although the pressor needs seem to be decreasing.  MRSA screen is negative and sputum culture without resistant pathogens.  Unclear significance of the stenotrophomonas in the airway culture however will cover for now.  Bronchoscopy cultures negative for any new pathogens.  -Continue intravenous Bactrim through tomorrow to complete 7 days of treatment  -Recheck CBC with differential and CMP to make sure no developing toxicities  -Check procalcitonin level tomorrow a.m. to confirm improvement.  -Source control measures as needed  -Supportive care  Acute hypoxic respiratory failure (HCC)  Possibly all secondary to an aspiration event with pneumonia versus pneumonitis.  Sputum culture only growing stenotrophomonas of unclear significance.  Patient now extubated and on oxygen by nasal cannula.  Chest x-ray improved with decreased pulmonary edema.  -Antibiotics as above for now  -Oxygen support  -Wean down oxygen as able  Acute encephalopathy  In the setting of recent severe hypothermia and presumptive pneumonia.  Cognition had improved but now on a ventilator and sedated.  MRI of the brain and CSF analysis not indicative of a new acute process.  Workup for active seizure negative thus far  -Monitor cognition  -Neurology follow-up  -Additional workup as per neurology  Hiatal hernia  Status post robotic paraesophageal hernia repair on 2/5/2025.  Meningioma (HCC)      I have discussed with the critical care service the above plan to continue the Bactrim through tomorrow to  complete 7 days of treatment.  Critical care service agrees with the plan.    Antibiotics:  Bactrim 6    Subjective   Patient without fever chills or sweats.  Patient now extubated.  She is awake and alert but nonverbal.  No reported diarrhea or vomiting.  She is not requiring any vasopressor support.    Objective :  Temp:  [98.2 °F (36.8 °C)-99.6 °F (37.6 °C)] 98.5 °F (36.9 °C)  HR:  [62-84] 64  BP: ()/(46-78) 104/46  Resp:  [10-25] 17  SpO2:  [91 %-98 %] 98 %  O2 Device: Mid flow nasal cannula  Nasal Cannula O2 Flow Rate (L/min):  [12 L/min] 12 L/min  FiO2 (%):  [40] 40    General:  No acute distress but with a notable wet cough  Psychiatric: Somnolent, arousable.  Pulmonary:  Normal respiratory excursion without accessory muscle use  Abdomen:  Soft, nontender  Extremities:  No edema  Skin:  No rashes      Lab Results: I have reviewed the following results:  Results from last 7 days   Lab Units 02/06/25  0557 02/05/25  0537 02/04/25  2158   WBC Thousand/uL 14.60* 11.80* 11.80*   HEMOGLOBIN g/dL 9.6* 9.0* 8.4*   PLATELETS Thousands/uL 267 326 385     Results from last 7 days   Lab Units 02/06/25  0557 02/05/25  1447 02/05/25  0537 02/04/25  2158 02/04/25  2104   SODIUM mmol/L 144 144 145   < >  --    POTASSIUM mmol/L 4.1 3.1* 3.9   < >  --    CHLORIDE mmol/L 108 107 111*   < >  --    CO2 mmol/L 30 29 27   < >  --    CO2, I-STAT mmol/L  --   --   --   --  27   BUN mg/dL 19 22 24   < >  --    CREATININE mg/dL 0.90 0.87 0.85   < >  --    EGFR ml/min/1.73sq m 61 64 65   < >  --    GLUCOSE, ISTAT mg/dl  --   --   --   --  113   CALCIUM mg/dL 7.8* 8.1* 8.1*   < >  --     < > = values in this interval not displayed.     Results from last 7 days   Lab Units 02/01/25  1637   GRAM STAIN RESULT  3+ Polys  2+ Mononuclear Cells  No organisms seen     Results from last 7 days   Lab Units 02/02/25  0519   PROCALCITONIN ng/ml 5.59*     Results from last 7 days   Lab Units 01/31/25  1549   CRP mg/L 23.0*             Imaging  Results Review: I personally reviewed the following image studies in PACS and associated radiology reports: chest xray. My interpretation of the radiology images/reports is: Improved pulmonary edema..

## 2025-02-06 NOTE — ASSESSMENT & PLAN NOTE
Palliative Diagnosis: large hiatal hernia, acute hypoxic respiratory failure, septic shock    Social Support:  Patient's support system: son Zack  Supportive listening provided  Normalized experience of patient  Advocated for patient/family with interdisciplinary team    Care Coordination  Case discussed with critical care team, thoracic surgery    Follow-up  We appreciate the opportunity to participate in this patient's care.   We will continue to follow while admitted.  PSC will follow up on Monday, 2/10- please contact on-call provider with any acute concerns.   Please do not hesitate to contact our on-call provider through [facility] Secure Chat or contact [phone] should there be an acute change or other symptom control concerns.    Please do not make any changes to symptom medications (opioid analgesics, nonopioid analgesics, antiemetics, etc) without first consulting the on-call Palliative Care provider for your specific campus; including after hours and on weekends. We are available 24/7, and can be contacted on Punch[employer] chat or at [phone].

## 2025-02-06 NOTE — PROGRESS NOTES
Progress Note - Critical Care/ICU   Name: Darlin Zamora 79 y.o. female I MRN: [id number]  Unit/Bed#: ICU 10 I Date of Admission: 1/25/2025   Date of Service: 2/6/2025 I Hospital Day: 11      Critical Care Interval Transfer Note:    Brief Hospital Summary: 79 y.o. female with a history of hypertension and meningioma who was brought in by EMS after being found on the floor of her house during a wellness check. Patient's home has no heat and was reportedly cluttered with filthy living conditions. The patient was reportedly alert and following commands, but confused. The patient is a core temperature in the ED was 85.1 °F. Active rewarming was initiated with shara hugger. After initiation of rewarming the patient began to become hypotensive. Patient received 2 L of IV fluids in the emergency department without improvement of her blood pressure. The patient was started on Levophed and transferred to ICU. Patient had encephalopathy suspected to be TME. MRI on 1/29 showed stable meningiomas. LP on 1/27 was negative. Continue delirium precautions/sleep hygiene. The patient had developed bradycardia and a TVP was placed, thought to be due to a paraesophageal hernia. She is now POD2 from the hernia repair with mesh, gastropexy and the TVP was discontinued and removed. She has shown improvement. Currently  diuresing with IV lasix . Continue to optimize pulmonary function with pulmonary protocol and chest PT. Currently on midflow at 12L. 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    Barriers to discharge:   PT/OT recommendations     Consults: IP CONSULT TO CASE MANAGEMENT  IP CONSULT TO PHARMACY  IP CONSULT TO NEUROLOGY  IP CONSULT TO PODIATRY  IP CONSULT TO PHARMACY  IP CONSULT TO INFECTIOUS DISEASES  IP CONSULT TO THORACIC SURGERY  IP CONSULT TO PALLIATIVE CARE  IP CONSULT TO CARDIOLOGY    Recommended to review admission imaging for incidental findings and document in discharge  navigator: Chart reviewed, no known incidental findings noted at this time.      Discharge Plan: Anticipate discharge in >72 hrs to discharge location to be determined pending rehab evaluations.  Vasquez Plan: Vasquez to be removed. Order has been placed  Central access plan: Medications requiring central line       Patient seen and evaluated by Critical Care today and deemed to be appropriate for transfer to Med Surg. Spoke to Dr. Akins from [facility] to accept transfer. Critical care can be contacted via SecureChat with any questions or concerns. Please use the Critical Care AP Role in Secure Chat for any provider inquires until the patient is transferred out of the ICU or until tomorrow at 0600.

## 2025-02-06 NOTE — PROGRESS NOTES
Progress Note - Thoracic    Name: Darlin Zamora 79 y.o. female I MRN: [id number]  Unit/Bed#: ICU 10 I Date of Admission: 1/25/2025   Date of Service: 2/6/2025 I Hospital Day: 11    Assessment & Plan  Altered mental status/Seizure like activity  -Management per ICU team  Septic shock (HCC)  -Requiring ICU level of care  -Initially requiring levo and vasopressin for hemodynamic support, now off pressors  Hiatal hernia  -w large hiatal hernia containing the entirety of the stomach, thought to be causing bradycardia and impaired respiratory function, thoracic surgery team asked to evaluate with consideration for surgical repair.  -Now s/p robotic paraesophageal hernia repair on 2/5  -extubated to HFNC 2/5 then transitioned to MFNC  -remains disoriented  -feeds thru keofed tube  -would get speech eval and give CLD when able to tolerate, UGI pending that  Acute hypoxic respiratory failure (HCC)  -now extubated first to HFNC now on MFNC   Goals of care, counseling/discussion  -appreciate palliative recs        24 Hour Events : events as above   Subjective : Remains disoriented, tolerating keofed feeds, voiding, resting in bed.     Objective :  Temp:  [98.2 °F (36.8 °C)-99.6 °F (37.6 °C)] 98.5 °F (36.9 °C)  HR:  [62-84] 74  BP: ()/(50-78) 160/70  Resp:  [10-25] 21  SpO2:  [91 %-98 %] 98 %  O2 Device: Mid flow nasal cannula  FiO2 (%):  [40] 40    I/O         02/04 0701  02/05 0700 02/05 0701  02/06 0700 02/06 0701  02/07 0700    I.V. (mL/kg) 183.1 (2.8) 8.5 (0.1)     Blood 350      NG/GT  150     IV Piggyback 1750 1400     Feedings  450     Total Intake(mL/kg) 2283.1 (34.9) 2008.5 (30.7)     Urine (mL/kg/hr) 1935 (1.2) 4010 (2.6)     Stool 0 0     Total Output 1935 4010     Net +348.1 -2001.6            Unmeasured Stool Occurrence 1 x 1 x           Lines/Drains/Airways       Active Status       Name Placement date Placement time Site Days    Introducer  Internal jugular Right --  --  -- --    CVC Central Lines  01/26/25 Triple 16cm 01/26/25  1930  --  10    NG/OG/Enteral Tube Small Bore Feeding Tube 12 Fr Right nare 02/04/25 2200  Right nare  1    Urethral Catheter 16 Fr. 01/31/25  1557  --  5                  Physical Exam    General: NAD  Skin: Warm, dry, anicteric  HEENT: Normocephalic, atraumatic  CV: RRR, no m/r/g  Pulm: CTA b/l, no inc WOB, 15L MFNC  Abd: Soft, ND/NT  MSK: Symmetric, no edema, no tenderness, no deformity  Neuro: disoriented        Lab Results: I have reviewed the following results:  Recent Labs     02/04/25  2158 02/05/25  0537 02/06/25  0557   WBC 11.80*   < > 14.60*   HGB 8.4*   < > 9.6*   HCT 26.7*   < > 30.5*      < > 267   BANDSPCT 8  --   --    SODIUM 144   < > 144   K 3.9   < > 4.1   *   < > 108   CO2 26   < > 30   BUN 27*   < > 19   CREATININE 0.88   < > 0.90   GLUC 109   < > 163*   CAIONIZED 1.04*   < > 1.08*   MG 2.2   < > 2.3   PHOS 3.7   < > 2.6   LACTICACID 1.3  --   --     < > = values in this interval not displayed.             VTE Pharmacologic Prophylaxis: VTE covered by:  enoxaparin, Subcutaneous     VTE Mechanical Prophylaxis: sequential compression device

## 2025-02-06 NOTE — SPEECH THERAPY NOTE
Speech-Language Pathology Bedside Swallow Evaluation      Patient Name: Darlin Zamora    Today's Date: 2/6/2025     Problem List  Principal Problem:    Acute encephalopathy  Active Problems:    Meningioma (HCC)    Altered mental status/Seizure like activity    Septic shock (HCC)    Acute hypoxic respiratory failure (HCC)    Hiatal hernia    Palliative care by specialist    Goals of care, counseling/discussion      Past Medical History  Past Medical History:   Diagnosis Date    Anemia     Cataracts, bilateral     Heart murmur 04/2018    History of transfusion 2003    had 4 units    Hypertension     Meningioma (HCC) 04/2018    MRI every 6months    Ovarian cyst 2006    Shortness of breath     Skin neoplasm     last assessed: 6/13/2017       Past Surgical History  Past Surgical History:   Procedure Laterality Date    CYSTOSCOPY N/A 2/14/2019    Procedure: CYSTOSCOPY;  Surgeon: Geronimo Tsai MD;  Location: [facility];  Service: Gynecology Oncology    HYSTERECTOMY N/A 2/14/2019    Procedure: HYSTERECTOMY LAPAROSCOPIC TOTAL (LTH) W/ ROBOTICS bilateral salpingooophorectomy with great difficulty due to mass of 12 cm and taking 2.5 hours;  Surgeon: Geronimo Tsai MD;  Location: [facility];  Service: Gynecology Oncology    PARAESOPHAGEAL HERNIA REPAIR N/A 2/4/2025    Procedure: TYPE 4 PARAESOPHAGEAL HERNIA REPAIR WITH ABSORBABLE MESH, LAPAROSCOPIC W ROBOTICS, EGD;  Surgeon: Albaro Barrett MD;  Location: [facility];  Service: Thoracic    WV LAPS SURG GASTROSTOMY W/O CONSTJ GSTR TUBE SPX N/A 2/4/2025    Procedure: GASTROPEXY;  Surgeon: Albaro Barrett MD;  Location: [facility];  Service: Thoracic    TONSILLECTOMY         Summary   Evaluation process begun. Patient is awake and alert. Oral care completed. Attempted 1/4 tsp honey thick liquids x3. Patient orally manipulated bolus and attempted to transfer. No swallow initiated. Oral suction used to remove some oral residue, but patient is resistive. Delayed cough  observed after trials. Patient presents with a moderate oral dysphagia with absent pharyngeal swallow during assessment today.     Risk/s for Aspiration: high      Recommended Diet:  NPO with NG    Recommended Form of Meds:  via NG    Other Recommendations: Continue frequent oral care; will probably need VBS once more medically stable and swallow can be initiated at bedside    Current Medical Status  Darlin Zamora is a 79 y.o. with a history of hypertension and meningioma who was brought in by EMS after being found on the floor of her house during a wellness check.  Patient's home has no heat and was reportedly cluttered with filthy living conditions.  The patient was reportedly alert and following commands, but confused.  The patient is a core temperature in the ED was 85.1 °F.  Active rewarming was initiated with shara hugger.  After initiation of rewarming the patient began to become hypotensive.  Patient received 2 L of IV fluids in the emergency department without improvement of her blood pressure.  The patient was started on Levophed and transferred to the MICU.     Patient went to the OR 2/5 for paraesophageal hernia repair. She was intubated 6 days and is not extubated. She is not able to follow commands and does not have any verbalizations during session. Surgery would like her assessed for clear liquids.     Current Precautions:  Fall  Aspiration    Allergies:  No known food allergies  Past medical history:  Please see H&P for details    Special Studies:  Chest xray 2/4/25:   Feeding tube in stomach.   Improving pulmonary edema with small pleural effusions and bibasilar atelectasis.    Social/Education/Vocational Hx:  Pt lives alone    Swallow Information   Current Risks for Dysphagia & Aspiration: recent surgery, recent intubation, and AMS  Current Symptoms/Concerns:  no swallow initiated   Current Diet: NPO with tube feeds   Baseline Diet:  was placed on puree and honey just before she required  intubation     Baseline Assessment   Behavior/Cognition: alert  Speech/Language Status: not able to to follow commands and no verbal output noted  Patient Positioning: upright in bed  Pain Status/Interventions/Response to Interventions: No report of or nonverbal indications of pain.     Swallow Mechanism Exam  Not formally assessed. No asymmetry or weakness observed. Patient has poor dentition. She is on 8L NC.     Consistencies Assessed and Performance   Consistencies Administered: honey thick  Materials administered included 1/4 tsp honey thick liquids x3    Oral Stage: moderate  Oral closure was adequate. The patient attempts to transfer bolus, but has decreased transfer with oral residue. Suspect poor oral control with spillage over BOT.    Pharyngeal Stage: absent  No swallow initiated today.    Esophageal Concerns: none reported    Summary and Recommendations (see above)    Results Reviewed with: RN     Treatment Recommended: dysphagia therapy      Frequency of treatment: 2-3x week, as able     Patient Stated Goal: none stated (patient not verbal during evaluation)    Dysphagia LTG  -Patient will demonstrate safe and effective oral intake (without overt s/s significant oral/pharyngeal dysphagia including s/s penetration or aspiration) for the highest appropriate diet level.     Short Term Goals:  -Pt will tolerate Dysphagia 1/pureed diet and honey thick liquid with no significant s/s oral or pharyngeal dysphagia across 1-3 diagnostic session/s    -Patient will comply with a Video/Modified Barium Swallow study for more complete assessment of swallowing anatomy/physiology/aspiration risk and to assess efficacy of treatment techniques so as to best guide treatment plan    Speech Therapy Prognosis   Prognosis:  guarded     Prognosis Considerations: medical status, prior medical history, medically fragile status, and respiratory status

## 2025-02-06 NOTE — ASSESSMENT & PLAN NOTE
-w large hiatal hernia containing the entirety of the stomach, thought to be causing bradycardia and impaired respiratory function, thoracic surgery team asked to evaluate with consideration for surgical repair.  -Now s/p robotic paraesophageal hernia repair on 2/5  -extubated to HFNC 2/5 then transitioned to NC  -remains disoriented  -feeds thru keofed tube  -would get speech eval and give CLD when able to tolerate, UGI pending that

## 2025-02-07 NOTE — ASSESSMENT & PLAN NOTE
With presumptive aspiration pneumonitis/pneumonia.  Consideration for possibility of another etiology such as an occult intra-abdominal process.  The patient's been quite ill with receiving a high level vasopressor support although the pressor needs seem to be decreasing.  However now weaned off vasopressor support.  MRSA screen is negative and sputum culture without resistant pathogens. Bronchoscopy cultures negative for any new pathogens.  -Discontinue the Bactrim after the last dose tomorrow a.m. as the patient will completed 7 days of treatment  -Recheck CBC with differential and CMP to make sure no developing toxicities  -Source control measures as needed  -Supportive care

## 2025-02-07 NOTE — OCCUPATIONAL THERAPY NOTE
Occupational Therapy Re-Evaluation     Darlin Sunner    2/7/2025    Principal Problem:    Acute encephalopathy  Active Problems:    Meningioma (HCC)    Altered mental status/Seizure like activity    Septic shock (HCC)    Acute hypoxic respiratory failure (HCC)    Hiatal hernia    Palliative care by specialist    Goals of care, counseling/discussion    Bradycardia      @hPMHl@    Past Surgical History:   Procedure Laterality Date    CYSTOSCOPY N/A 2/14/2019    Procedure: CYSTOSCOPY;  Surgeon: Geronimo Tsai MD;  Location: [facility];  Service: Gynecology Oncology    HYSTERECTOMY N/A 2/14/2019    Procedure: HYSTERECTOMY LAPAROSCOPIC TOTAL (LTH) W/ ROBOTICS bilateral salpingooophorectomy with great difficulty due to mass of 12 cm and taking 2.5 hours;  Surgeon: Geronimo Tsai MD;  Location: [facility];  Service: Gynecology Oncology    PARAESOPHAGEAL HERNIA REPAIR N/A 2/4/2025    Procedure: TYPE 4 PARAESOPHAGEAL HERNIA REPAIR WITH ABSORBABLE MESH, LAPAROSCOPIC W ROBOTICS, EGD;  Surgeon: Albaro Barrett MD;  Location: [facility];  Service: Thoracic    IN LAPS SURG GASTROSTOMY W/O CONSTJ GSTR TUBE SPX N/A 2/4/2025    Procedure: GASTROPEXY;  Surgeon: Albaro Barrett MD;  Location: [facility];  Service: Thoracic    TONSILLECTOMY          02/07/25 1040   OT Last Visit   OT Visit Date 02/07/25   Note Type   Note type Re-Evaluation   Pain Assessment   Pain Assessment Tool FLACC   Pain Rating: FLACC (Rest) - Face 1   Pain Rating: FLACC (Rest) - Legs 0   Pain Rating: FLACC (Rest) - Activity 0   Pain Rating: FLACC (Rest) - Cry 1   Pain Rating: FLACC (Rest) - Consolability 1   Score: FLACC (Rest) 3   Pain Rating: FLACC (Activity) - Face 1   Pain Rating: FLACC (Activity) - Legs 0   Pain Rating: FLACC (Activity) - Activity 1   Pain Rating: FLACC (Activity) - Cry 1   Pain Rating: FLACC (Activity) - Consolability 1   Score: FLACC (Activity) 4   Restrictions/Precautions   Weight Bearing Precautions Per Order Yes   RLE  "Weight Bearing Per Order WBAT   LLE Weight Bearing Per Order WBAT   Other Precautions Cognitive;Chair Alarm;Bed Alarm;Multiple lines;Telemetry;Fall Risk;Pain;Visual impairment;Hard of hearing;O2;WBS   Home Living   Additional Comments see initial OT eval   Prior Function   Comments see initial OT eval   Lifestyle   Autonomy I w/ ADLS, IADLS, Mod I w/ SPC for transfers and  functional mobility PTA   Reciprocal Relationships pt lives alone   Service to Others retired   Intrinsic Gratification unable to report   Subjective   Subjective \"i'm tired\"   ADL   Eating Assistance Unable to assess   Eating Deficit NPO   Grooming Assistance 2  Maximal Assistance   UB Bathing Assistance 1  Total Assistance   LB Bathing Assistance 1  Total Assistance   UB Dressing Assistance 1  Total Assistance   LB Dressing Assistance 1  Total Assistance   Toileting Assistance  1  Total Assistance   Functional Assistance 2  Maximal Assistance   Functional Deficit Steadying;Verbal cueing;Supervision/safety;Increased time to complete  (Ax2)   Bed Mobility   Supine to Sit 2  Maximal assistance   Additional items Assist x 2;HOB elevated;Increased time required;Verbal cues;LE management   Sit to Supine 2  Maximal assistance   Additional items Assist x 2;Increased time required;Verbal cues;LE management   Additional Comments pt sat EOB w/ Mod-max A for sitting balance/trunk control   Transfers   Sit to Stand 1  Dependent   Additional items Assist x 2;Increased time required;Verbal cues   Stand to Sit 1  Dependent   Additional items Assist x 2;Increased time required;Verbal cues   Additional Comments Attempted sit-stand from EOB w/ Total  Ax2; cleared buttocks 50% off EOB but unsuccessful at getting full upright.   Functional Mobility   Additional Comments Not appropriate @ this time   Balance   Static Sitting Poor   Dynamic Sitting Poor -   Static Standing Poor -   Activity Tolerance   Activity Tolerance Patient limited by pain;Patient limited by " "fatigue   Medical Staff Made Aware PT ,Miguelina   Nurse Made Aware yes, Noemi   RUALLI Assessment   RUE Assessment X  (grossly 2/5)   LUE Assessment   LUE Assessment X  (grossly 2/5)   Hand Function   Gross Motor Coordination Impaired   Fine Motor Coordination Impaired   Vision - Complex Assessment   Additional Comments unable to formally assess; is alert w/ eyes open, appears to attend to moving stimuli   Psychosocial   Psychosocial (WDL) X   Cognition   Overall Cognitive Status Impaired   Arousal/Participation Arousable;Lethargic;Cooperative   Attention Difficulty attending to directions   Orientation Level Oriented to place;Disoriented to situation;Disoriented to time;Disoriented to place   Memory Decreased recall of precautions;Decreased recall of recent events;Decreased short term memory;Decreased recall of biographical information   Following Commands Follows one step commands with increased time or repetition   Comments pt is cooperative; very lethargic; mumbles/groans noted, only able to hear pt state \"I'm tired\"; no other sensical speech @ this time. Appears to have poor understanding of deficits.   Assessment   Limitation Decreased ADL status;Decreased UE ROM;Decreased UE strength;Decreased Safe judgement during ADL;Decreased cognition;Decreased endurance;Decreased sensation;Visual deficit;Decreased fine motor control;Decreased self-care trans;Decreased high-level ADLs   Prognosis Fair   Assessment Pt is a 80 yo female seen for OT re-reval s/p intubation, and EGD/NGT placement on 2/4. Pt now extubated.  Pt initially admitted for being found down during a wellness check, dx'd w/ encephalopathy, septic shock, respiratory failure, and onychomycoses; WBAT B/L Les per podiatry. Pt currently performing @ a level of Max/Total A ADLS, Max A x2 bed mobility, Total A x2 attempted sit-stand transfers w/ B/L HHA. Pt remains limited 2* confusion, pain, fatigue, activity tolerance, strength, balance, endurance, " functional mobility, forward functional reach, unsupportive home environment, vision, cognition and decreased I w/ ADLS. Recommend moderate resource intensity, upon D/C. Pt continues to benefit from immediate inpatient skilled OT services, 2-3x/wk. Will continue to follow to address goals stated below to be met within the next 10-14 days:   Goals   Patient Goals none stated 2/2 confusion   LTG Time Frame 10-14   Long Term Goal #1 see below listed goals   Plan   Treatment Interventions ADL retraining;Visual perceptual retraining;Functional transfer training;UE strengthening/ROM;Endurance training;Cognitive reorientation;Patient/family training;Equipment evaluation/education;Compensatory technique education;Fine motor coordination activities;Neuromuscular reeducation;Continued evaluation;Energy conservation;Activityengagement   Goal Expiration Date 02/21/25   OT Frequency 2-3x/wk   Discharge Recommendation   Rehab Resource Intensity Level, OT II (Moderate Resource Intensity)   Additional Comments  The patient's raw score on the AM-PAC Daily Activity Inpatient Short Form is 8. A raw score of less than 19 suggests the patient may benefit from discharge to post-acute rehabilitation services. Please refer to the recommendation of the Occupational Therapist for safe discharge planning.   Additional Comments 2 Pt seen as a co-session due to the patient's co-morbidities, clinically unstable presentation, and present impairments which are a regression from the patient's baseline.   AM-PAC Daily Activity Inpatient   Lower Body Dressing 1   Bathing 1   Toileting 1   Upper Body Dressing 2   Grooming 2   Eating 1   Daily Activity Raw Score 8   Turning Head Towards Sound 2   Follow Simple Instructions 2   Low Function Daily Activity Raw Score 12   Low Function Daily Activity Standardized Score  21.38   AM-PAC Applied Cognition Inpatient   Following a Speech/Presentation 1   Understanding Ordinary Conversation 2   Taking  Medications 1   Remembering Where Things Are Placed or Put Away 1   Remembering List of 4-5 Errands 1   Taking Care of Complicated Tasks 1   Applied Cognition Raw Score 7   Applied Cognition Standardized Score 15.17   End of Consult   Education Provided Yes   Patient Position at End of Consult Supine;Bed/Chair alarm activated;All needs within reach   Nurse Communication Nurse aware of consult     GOALS    1) Pt will complete rolling left/right in bed with Mod assist, as prerequisite for further engagement in ADLS   2) Pt will complete supine to sit transfer with Mod A using B/L UEs to initiate bed mobility   3) Pt will tolerate sitting at EOB 20 minutes with S assist and stable vital signs, as prerequisite for further engagement in ADLS   4) Pt will complete grooming task with Min assist and increased time to increase independence in functional tasks  5) Pt will increase B/L UE ROM 1/2 MMT to increase functional UB use during ADLS   6) Pt will complete UB ADLS with Min A and use of AD/DME as needed to increase independence in functional tasks  7) Pt will complete LB ADLS with Mod A and use of AD/DME as needed to increase independence in functional tasks  8) Pt will complete sit to stand transfer / sit pivot transfer / stand pivot transfer with Mod  assist on/off all ADL surfaces   9) Pt will follow 100% simple one step verbal commands and be A/Ox4 consistently t/o use of external environmental cues to increase awareness for functional tasks  10) Pt will demonstrate 100% carryover of E.C. techniques t/o fx'l I/ADL/ leisure tasks w/o cues s/p skilled education  11) Pt will engage in ongoing  assessments, screens, and activities t/o fx'l I/ADL/leisure tasks w/ G participation to A w/ adaptation and accomodations or rule out visual perceptual impairments    Mikayla Barbosa MS, OTR/L

## 2025-02-07 NOTE — PROGRESS NOTES
Progress Note - Infectious Disease   Name: Darlin Zamora 79 y.o. female I MRN: [id number]  Unit/Bed#: ICU 10 I Date of Admission: 1/25/2025   Date of Service: 2/7/2025 I Hospital Day: 12    Assessment & Plan  Septic shock (HCC)  With presumptive aspiration pneumonitis/pneumonia.  Consideration for possibility of another etiology such as an occult intra-abdominal process.  The patient's been quite ill with receiving a high level vasopressor support although the pressor needs seem to be decreasing.  However now weaned off vasopressor support.  MRSA screen is negative and sputum culture without resistant pathogens. Bronchoscopy cultures negative for any new pathogens.  -Discontinue the Bactrim after the last dose tomorrow a.m. as the patient will completed 7 days of treatment  -Recheck CBC with differential and CMP to make sure no developing toxicities  -Source control measures as needed  -Supportive care  Acute hypoxic respiratory failure (HCC)  Possibly all secondary to an aspiration event with pneumonia versus pneumonitis.  Sputum culture only growing stenotrophomonas of unclear significance.  Patient now extubated and on oxygen by nasal cannula.  Chest x-ray improved with decreased pulmonary edema.  -Antibiotics as above for now  -Oxygen support  -Wean down oxygen as able  Acute encephalopathy  In the setting of recent severe hypothermia and presumptive pneumonia.  Cognition had improved but now on a ventilator and sedated.  MRI of the brain and CSF analysis not indicative of a new acute process.  Workup for active seizure negative thus far  -Monitor cognition  -Neurology follow-up  -Additional workup as per neurology  Hiatal hernia  Status post robotic paraesophageal hernia repair on 2/5/2025.  Meningioma (HCC)      I have discussed with the critical care service the above plan to discontinue the Bactrim after the dose tomorrow a.m.  The critical care service agrees with the plan.    Will see the patient again  on 2/10/2025.  Please message me over the weekend if questions.    Antibiotics:  Bactrim 7    Subjective   Patient has no fever, chills, sweats; no reported nausea, vomiting, diarrhea; still has a wet cough and requiring oxygen by nasal cannula; no reported pain. No new symptoms.  Not requiring any vasopressor support.    Objective :  Temp:  [98.4 °F (36.9 °C)-98.9 °F (37.2 °C)] 98.4 °F (36.9 °C)  HR:  [52-94] 62  BP: (106-179)/() 127/58  Resp:  [16-28] 19  SpO2:  [90 %-98 %] 95 %  O2 Device: Mid flow nasal cannula  Nasal Cannula O2 Flow Rate (L/min):  [6 L/min-10 L/min] 6 L/min    General: Mild respiratory distress and a wet cough  Psychiatric:  Awake and alert but confused.  Pulmonary:  Normal respiratory excursion without accessory muscle use  Abdomen:  Soft, nontender  Extremities:  No edema  Skin:  No rashes      Lab Results: I have reviewed the following results:  Results from last 7 days   Lab Units 02/06/25  0557 02/05/25  0537 02/04/25  2158   WBC Thousand/uL 14.60* 11.80* 11.80*   HEMOGLOBIN g/dL 9.6* 9.0* 8.4*   PLATELETS Thousands/uL 267 326 385     Results from last 7 days   Lab Units 02/06/25  1407 02/06/25  0557 02/05/25  1447 02/04/25  2158 02/04/25  2104   SODIUM mmol/L 143 144 144   < >  --    POTASSIUM mmol/L 3.7 4.1 3.1*   < >  --    CHLORIDE mmol/L 105 108 107   < >  --    CO2 mmol/L 33* 30 29   < >  --    CO2, I-STAT mmol/L  --   --   --   --  27   BUN mg/dL 18 19 22   < >  --    CREATININE mg/dL 0.88 0.90 0.87   < >  --    EGFR ml/min/1.73sq m 63 61 64   < >  --    GLUCOSE, ISTAT mg/dl  --   --   --   --  113   CALCIUM mg/dL 8.0* 7.8* 8.1*   < >  --     < > = values in this interval not displayed.     Results from last 7 days   Lab Units 02/01/25  1637   GRAM STAIN RESULT  3+ Polys  2+ Mononuclear Cells  No organisms seen     Results from last 7 days   Lab Units 02/02/25  0519   PROCALCITONIN ng/ml 5.59*     Results from last 7 days   Lab Units 01/31/25  1549   CRP mg/L 23.0*

## 2025-02-07 NOTE — ASSESSMENT & PLAN NOTE
Large hiatal hernia containing the entirety of the stomach, thought to be causing bradycardia and impaired respiratory function, thoracic surgery team asked to evaluate with consideration for surgical repair.    -Now s/p robotic paraesophageal hernia repair on 2/5  -extubated to HFNC 2/5 then transitioned to NC  -remains disoriented   -Evaluated by speech; inappropriate for PO intake   -feeds thru keofed tube now at goal  -Continued evaluation by Speech;  CLD when able to tolerate will obtain UGI pending that

## 2025-02-07 NOTE — PROGRESS NOTES
Progress Note - Surgery-General   Name: Darlin Zamora 79 y.o. female I MRN: [id number]  Unit/Bed#: ICU 10 I Date of Admission: 1/25/2025   Date of Service: 2/7/2025 I Hospital Day: 12    Assessment & Plan  Hiatal hernia  Large hiatal hernia containing the entirety of the stomach, thought to be causing bradycardia and impaired respiratory function, thoracic surgery team asked to evaluate with consideration for surgical repair.    -Now s/p robotic paraesophageal hernia repair on 2/5  -extubated to HFNC 2/5 then transitioned to MFNC  -remains disoriented   -Evaluated by speech; inappropriate for PO intake   -feeds thru keofed tube now at goal  -Continued evaluation by Speech;  CLD when able to tolerate will obtain UGI pending that  Altered mental status/Seizure like activity  -Management per ICU team  Septic shock (HCC)  -Requiring ICU level of care  -Initially requiring levo and vasopressin for hemodynamic support, now off pressors  Acute hypoxic respiratory failure (HCC)  -now extubated first to HFNC now on MFNC   Goals of care, counseling/discussion  -appreciate palliative recs        24 Hour Events : events as above   Subjective : Remains disoriented, did not respond to questions, tolerating keofed feeds, voiding, resting in bed.     Objective :  Temp:  [98.4 °F (36.9 °C)-99.6 °F (37.6 °C)] 98.4 °F (36.9 °C)  HR:  [52-94] 62  BP: (104-179)/() 127/58  Resp:  [16-28] 19  SpO2:  [90 %-99 %] 97 %  O2 Device: Mid flow nasal cannula  Nasal Cannula O2 Flow Rate (L/min):  [6 L/min-12 L/min] 6 L/min    I/O         02/04 0701  02/05 0700 02/05 0701  02/06 0700 02/06 0701  02/07 0700    I.V. (mL/kg) 183.1 (2.8) 8.5 (0.1)     Blood 350      NG/GT  150     IV Piggyback 1750 1400     Feedings  450     Total Intake(mL/kg) 2283.1 (34.9) 2008.5 (30.7)     Urine (mL/kg/hr) 1935 (1.2) 4010 (2.6)     Stool 0 0     Total Output 1935 4010     Net +348.1 -2001.6            Unmeasured Stool Occurrence 1 x 1 x            Lines/Drains/Airways       Active Status       Name Placement date Placement time Site Days    CVC Central Lines 01/26/25 Triple 16cm 01/26/25  1930  --  11    NG/OG/Enteral Tube Small Bore Feeding Tube 12 Fr Right nare 02/04/25 2200  Right nare  2    Urethral Catheter 16 Fr. 01/31/25  1557  --  6                  Physical Exam    General: NAD  Skin: Warm, dry, anicteric  HEENT: Normocephalic, atraumatic  CV: RRR, no m/r/g  Pulm: CTA b/l, no inc WOB, 15L MFNC  Abd: Soft, ND/NT; incision C  MSK: Symmetric, no edema, no tenderness, no deformity  Neuro: disoriented        Lab Results: I have reviewed the following results:  Recent Labs     02/04/25  2158 02/05/25  0537 02/06/25  0557 02/06/25  1407   WBC 11.80*   < > 14.60*  --    HGB 8.4*   < > 9.6*  --    HCT 26.7*   < > 30.5*  --       < > 267  --    BANDSPCT 8  --   --   --    SODIUM 144   < > 144 143   K 3.9   < > 4.1 3.7   *   < > 108 105   CO2 26   < > 30 33*   BUN 27*   < > 19 18   CREATININE 0.88   < > 0.90 0.88   GLUC 109   < > 163* 213*   CAIONIZED 1.04*   < > 1.08*  --    MG 2.2   < > 2.3 2.0   PHOS 3.7   < > 2.6 2.9   LACTICACID 1.3  --   --   --     < > = values in this interval not displayed.             VTE Pharmacologic Prophylaxis: VTE covered by:  enoxaparin, Subcutaneous, 40 mg at 02/06/25 0824      VTE Mechanical Prophylaxis: sequential compression device

## 2025-02-07 NOTE — SPEECH THERAPY NOTE
Attempted to see patient for dysphagia therapy. S/W RN. Patient is lethargic today and continues to be mostly nonverbal. She continues on HFNC and did have desaturation event last evening. Patient is not appropriate for PO trials at this time. Will f/u as able. Patient will most likely need VBS once O2 requirements are decreased.

## 2025-02-07 NOTE — PHYSICAL THERAPY NOTE
Physical Therapy Re-Evaluation     Patient's Name: Darlin Zamora    Admitting Diagnosis  Altered mental status [R41.82]  Hypothermia, initial encounter [T68.XXXA]    Problem List  Patient Active Problem List   Diagnosis    Syncope    Hypertension    Brain mass    Alopecia    Anemia    Basal cell carcinoma    Iron deficiency anemia    Abdominal pain    S/P total hysterectomy and bilateral salpingo-oophorectomy    Dysfunctional voiding of urine    Mixed hypercholesterolemia and hypertriglyceridemia    Meningioma (HCC)    Localized edema    Altered mental status/Seizure like activity    Septic shock (HCC)    Acute hypoxic respiratory failure (HCC)    Acute encephalopathy    Hiatal hernia    Palliative care by specialist    Goals of care, counseling/discussion    Bradycardia       Past Medical History  Past Medical History:   Diagnosis Date    Anemia     Cataracts, bilateral     Heart murmur 04/2018    History of transfusion 2003    had 4 units    Hypertension     Meningioma (HCC) 04/2018    MRI every 6months    Ovarian cyst 2006    Shortness of breath     Skin neoplasm     last assessed: 6/13/2017       Past Surgical History  Past Surgical History:   Procedure Laterality Date    CYSTOSCOPY N/A 2/14/2019    Procedure: CYSTOSCOPY;  Surgeon: Geronimo Tsai MD;  [facility]: [facility];  Service: Gynecology Oncology    HYSTERECTOMY N/A 2/14/2019    Procedure: HYSTERECTOMY LAPAROSCOPIC TOTAL (LTH) W/ ROBOTICS bilateral salpingooophorectomy with great difficulty due to mass of 12 cm and taking 2.5 hours;  Surgeon: Geronimo Tsai MD;  [facility];  Service: Gynecology Oncology    PARAESOPHAGEAL HERNIA REPAIR N/A 2/4/2025    Procedure: TYPE 4 PARAESOPHAGEAL HERNIA REPAIR WITH ABSORBABLE MESH, LAPAROSCOPIC W ROBOTICS, EGD;  Surgeon: Albaro Barrett MD;  Location[facility];  Service: Thoracic    MD LAPS SURG GASTROSTOMY W/O CONSTJ GSTR TUBE SPX N/A 2/4/2025    Procedure: GASTROPEXY;  Surgeon: Albaro  MD Nena;  [facility];  Service: Thoracic    TONSILLECTOMY          02/07/25 1041   PT Last Visit   PT Visit Date 02/07/25   Note Type   Note type Re-Evaluation   Pain Assessment   Pain Assessment Tool FLACC   Pain Rating: FLACC (Rest) - Face 1   Pain Rating: FLACC (Rest) - Legs 0   Pain Rating: FLACC (Rest) - Activity 0   Pain Rating: FLACC (Rest) - Cry 1   Pain Rating: FLACC (Rest) - Consolability 1   Score: FLACC (Rest) 3   Pain Rating: FLACC (Activity) - Face 1   Pain Rating: FLACC (Activity) - Legs 0   Pain Rating: FLACC (Activity) - Activity 1   Pain Rating: FLACC (Activity) - Cry 1   Pain Rating: FLACC (Activity) - Consolability 1   Score: FLACC (Activity) 4   Restrictions/Precautions   Weight Bearing Precautions Per Order Yes   RLE Weight Bearing Per Order WBAT   LLE Weight Bearing Per Order WBAT   Other Precautions Pain;Fall Risk;Telemetry;Multiple lines;Bed Alarm;Chair Alarm;Cognitive;O2   Cognition   Orientation Level Oriented to person   RLE Assessment   RLE Assessment   (grossly 3-/5 with movement)   LLE Assessment   LLE Assessment   (grossly 3-/5 with movement)   Coordination   Movements are Fluid and Coordinated 0   Bed Mobility   Supine to Sit 2  Maximal assistance   Additional items Assist x 2;HOB elevated   Sit to Supine 2  Maximal assistance   Additional items Assist x 2   Transfers   Sit to Stand 1  Dependent   Additional items Assist x 2   Stand to Sit 1  Dependent   Additional items Assist x 2   Ambulation/Elevation   Gait pattern Not appropriate   Balance   Static Sitting Poor   Static Standing Zero   Endurance Deficit   Endurance Deficit Yes   Endurance Deficit Description limited by episode of bradycardia (MD in room during episode), fatigue, weakness, confusion   Activity Tolerance   Activity Tolerance Patient limited by fatigue;Patient limited by pain   Medical Staff Made Aware OT   Nurse Made Aware yes, nsg gave clearance to work with pt   Assessment   Prognosis Guarded    Problem List Decreased strength;Decreased range of motion;Decreased endurance;Impaired balance;Decreased mobility;Decreased coordination;Decreased cognition;Impaired judgement;Decreased safety awareness;Pain;Decreased skin integrity   Assessment Pt is 79 y.o. female seen for PT evaluation s/p admit to [facility] on 1/25/2025 w/ Acute encephalopathy. PT consulted to assess pt's functional mobility and d/c needs. Order placed for PT eval and tx, w/ up w/ A order. Comorbidities affecting pt's physical performance at time of assessment include:  has a past medical history of Anemia, Cataracts, bilateral, Heart murmur, History of transfusion, Hypertension, Meningioma (HCC), Ovarian cyst, Shortness of breath, and Skin neoplasm. PTA, pt was ambulates community distances and elevations and lives in multi-level home. Personal factors affecting pt at time of IE include: inaccessible home environment, ambulating w/ assistive device, stairs to enter home, decreased cognition, positive fall history, decreased initiation and engagement, unable to perform physical activity, limited insight into impairments, inability to perform IADLs, and inability to perform ADLs. Please find objective findings from PT assessment regarding body systems outlined above with impairments and limitations including weakness, impaired balance, decreased endurance, impaired coordination, gait deviations, pain, decreased activity tolerance, decreased functional mobility tolerance, decreased safety awareness, impaired judgement, fall risk, and decreased cognition. The following objective measures performed on IE also reveal limitations: The patient's AM-PAC Basic Mobility Inpatient Short Form Raw Score is 6.  A standardized score less than 42.9 suggests the patient may benefit from discharge to post-acute rehabilitation services. Please also refer to the recommendation of the Physical Therapist for safe discharge planning. Pt's clinical  presentation is currently unstable/unpredictable seen in pt's presentation of critical care monitoring. Pt to benefit from continued PT tx to address deficits as defined above and maximize level of functional independent mobility and consistency. From PT/mobility standpoint, recommendation at time of d/c would be level II pending progress in order to facilitate return to PLOF.   Barriers to Discharge Inaccessible home environment;Decreased caregiver support   Goals   Patient Goals None verbalized   STG Expiration Date 02/19/25   Short Term Goal #1 In 14 days pt will: Complete bed mobility skills with modAx1 to facilitate safe return to previous living environment and decrease burden on caregivers. Sit EOB w/ F balane x 25 mins w/ stable vitals. Follow directions for active participation in PT 50% during tx sessions. Improve LE strength grades by 1 to increase independence w/ all functional mobility, transfers and gait. Actively participate w/ PT for ongoing pt and family education; DME needs and D/C planning to promote highest level of function in least restrictive environment.   Plan   Treatment/Interventions OT;Spoke to case management;Spoke to nursing;Gait training;Bed mobility;Patient/family training;Endurance training;LE strengthening/ROM;Functional transfer training   PT Frequency 2-3x/wk   Discharge Recommendation   Rehab Resource Intensity Level, PT II (Moderate Resource Intensity)   AM-PAC Basic Mobility Inpatient   Turning in Flat Bed Without Bedrails 1   Lying on Back to Sitting on Edge of Flat Bed Without Bedrails 1   Moving Bed to Chair 1   Standing Up From Chair Using Arms 1   Walk in Room 1   Climb 3-5 Stairs With Railing 1   Basic Mobility Inpatient Raw Score 6   Turning Head Towards Sound 2   Follow Simple Instructions 2   Low Function Basic Mobility Raw Score  10   Low Function Basic Mobility Standardized Score  14.65   [facility] Highest Level Of Mobility   [facility] Goal 2: Bed  activities/Dependent transfer   [facility] Achieved 2: Bed activities/Dependent transfer           Miguelina Henson, PT

## 2025-02-07 NOTE — PLAN OF CARE
Problem: OCCUPATIONAL THERAPY ADULT  Goal: Performs self-care activities at highest level of function for planned discharge setting.  See evaluation for individualized goals.  Description: Treatment Interventions: ADL retraining, Functional transfer training, UE strengthening/ROM, Endurance training, Cognitive reorientation, Patient/family training, Equipment evaluation/education, Compensatory technique education, Continued evaluation, Energy conservation, Activityengagement          See flowsheet documentation for full assessment, interventions and recommendations.   2/7/2025 1532 by Mikayla Barbosa OT  Note: Limitation: Decreased ADL status, Decreased UE ROM, Decreased UE strength, Decreased Safe judgement during ADL, Decreased cognition, Decreased endurance, Decreased sensation, Visual deficit, Decreased fine motor control, Decreased self-care trans, Decreased high-level ADLs  Prognosis: Fair  Assessment: Pt is a 80 yo female seen for OT re-reval s/p intubation, and EGD/NGT placement on 2/4. Pt now extubated.  Pt initially admitted for being found down during a wellness check, dx'd w/ encephalopathy, septic shock, respiratory failure, and onychomycoses; WBAT B/L Les per podiatry. Pt currently performing @ a level of Max/Total A ADLS, Max A x2 bed mobility, Total A x2 attempted sit-stand transfers w/ B/L HHA. Pt remains limited 2* confusion, pain, fatigue, activity tolerance, strength, balance, endurance, functional mobility, forward functional reach, unsupportive home environment, vision, cognition and decreased I w/ ADLS. Recommend moderate resource intensity, upon D/C. Pt continues to benefit from immediate inpatient skilled OT services, 2-3x/wk. Will continue to follow to address goals stated below to be met within the next 10-14 days:     Rehab Resource Intensity Level, OT: II (Moderate Resource Intensity)       2/7/2025 1532 by Mikayla Barbosa OT  Note: Limitation: Decreased ADL status, Decreased UE  ROM, Decreased UE strength, Decreased Safe judgement during ADL, Decreased cognition, Decreased endurance, Decreased sensation, Visual deficit, Decreased fine motor control, Decreased self-care trans, Decreased high-level ADLs  Prognosis: Fair  Assessment: Pt is a 80 yo female seen for OT re-reval s/p intubation, and EGD/NGT placement on 2/4. Pt now extubated.  Pt initially admitted for being found down during a wellness check, dx'd w/ encephalopathy, septic shock, respiratory failure, and onychomycoses; WBAT B/L Les per podiatry. Pt currently performing @ a level of Max/Total A ADLS, Max A x2 bed mobility, Total A x2 attempted sit-stand transfers w/ B/L HHA. Pt remains limited 2* confusion, pain, fatigue, activity tolerance, strength, balance, endurance, functional mobility, forward functional reach, unsupportive home environment, vision, cognition and decreased I w/ ADLS. Recommend moderate resource intensity, upon D/C. Pt continues to benefit from immediate inpatient skilled OT services, 2-3x/wk. Will continue to follow to address goals stated below to be met within the next 10-14 days:     Rehab Resource Intensity Level, OT: II (Moderate Resource Intensity)        Mikayla Barbosa MS, OTR/L

## 2025-02-07 NOTE — ASSESSMENT & PLAN NOTE
Sinus bradycardia noted earlier this admission, no evidence of high-grade AV block or significant pauses  thought due to vagal nerve stimulation in the setting of large hiatal hernia  Only on timolol eyedrops, not on standing beta-blockers as an outpatient  No other significant history  Rates initially recovered post operatively and after extubation --- status post TVP removal 2/6/2024 2/7 - Mild bradycardia noted on telemetry, appears to be vagal, otherwise graphic trends are stable

## 2025-02-07 NOTE — PROGRESS NOTES
Progress Note - Electrophysiology-Cardiology (EP)   Darlin Zamora 79 y.o. female MRN: [id number]  Unit/Bed#: ICU 10 Encounter: [id number]      Assessment:  Assessment & Plan  Bradycardia  Sinus bradycardia noted earlier this admission, no evidence of high-grade AV block or significant pauses  thought due to vagal nerve stimulation in the setting of large hiatal hernia  Only on timolol eyedrops, not on standing beta-blockers as an outpatient  No other significant history  Rates initially recovered post operatively and after extubation --- status post TVP removal 2/6/2024 2/7 - Mild bradycardia noted on telemetry, appears to be vagal, otherwise graphic trends are stable  Hiatal hernia  Large hiatal hernia with concern for vagal nerve stimulation, s/p repair 2/4/25  Altered mental status/Seizure like activity  Initially admitted with altered mental status and hypothermia, seizure-like activity noted during admission --- managed by primary team  Meningioma (HCC)  Managed by primary team, stable  Septic shock ([facility])  Septic shock with upper respiratory infection, managed by primary team  Acute hypoxic respiratory failure ([facility])  Intubated  Goals of care, counseling/discussion          Plan:  She had mild bradycardia on telemetry,  possibly vagal without evidence of high-grade AV block or significant pauses.  Otherwise, graphic trends have been stable.    There is no urgent indication for pacemaker at this time, recommend that she continue to be optimized from her other medical issues.    She is maintained on timolol eyedrops, recommend discontinuing this.  Can consider ophthalmology consultation if an alternative medication is needed.    Continue to monitor telemetry, EP will continue to follow.      Subjective/Objective   Chief Complaint: No acute complaints, patient currently undergoing lower extremity ultrasound    Subjective: TVP was discontinued yesterday, was extubated on 2/5 and still maintained on oxygen.   "Currently has NG tube being evaluated by speech.      Objective:     Vitals: /51   Pulse 72   Temp 99.5 °F (37.5 °C) (Axillary)   Resp (!) 24   Ht 4' 11\" (1.499 m)   Wt 65.5 kg (144 lb 6.4 oz)   SpO2 91%   BMI 29.17 kg/m²   Vitals:    01/26/25 0404 01/28/25 1330   Weight: 65.6 kg (144 lb 10 oz) 65.5 kg (144 lb 6.4 oz)     Orthostatic Blood Pressures      Flowsheet Row Most Recent Value   Blood Pressure 125/51 filed at 02/07/2025 0900   Patient Position - Orthostatic VS Lying filed at 02/07/2025 0000              Intake/Output Summary (Last 24 hours) at 2/7/2025 1047  Last data filed at 2/7/2025 0800  Gross per 24 hour   Intake 2360 ml   Output 2300 ml   Net 60 ml       Invasive Devices       Central Venous Catheter Line  Duration             CVC Central Lines 01/26/25 Triple 16cm 11 days              Drain  Duration             Urethral Catheter 16 Fr. 6 days    NG/OG/Enteral Tube Small Bore Feeding Tube 12 Fr Right nare 2 days                                Scheduled Meds:  Current Facility-Administered Medications   Medication Dose Route Frequency Provider Last Rate    acetaminophen  650 mg Oral Q4H PRN Forrest Blake MD      chlorhexidine  15 mL Mouth/Throat Q12H JOSTIN Forrest Blake MD      enoxaparin  40 mg Subcutaneous Daily JOSE MANUEL Lawrence      folic acid 1 mg in sodium chloride 0.9 % 50 mL IVPB  1 mg Intravenous Daily Forrest Blake MD 1 mg (02/07/25 0846)    furosemide  40 mg Intravenous Daily JOSE MANUEL Lawrence      ipratropium  0.5 mg Nebulization TID Forrest Blake MD      levalbuterol  1.25 mg Nebulization TID Forrest Blake MD      melatonin  3 mg Oral HS JOSE MANUEL Lawrence      nystatin   Topical BID Forrest Blake MD      omeprazole (PRILOSEC) suspension 2 mg/mL  10 mg Per NG Tube Daily JOSE MANUEL Lawrence      polyethylene glycol  17 g Per NG Tube Daily Forrest Blake MD      QUEtiapine  12.5 mg Oral HS JOSE MANUEL Lawrence      senna-docusate sodium  1 " tablet Per NG Tube BID Forrest Blake MD      sulfamethoxazole-trimethoprim  5 mg/kg of trimethoprim (Adjusted) Intravenous Q12H Eyal Wright MD Stopped (02/07/25 0800)    thiamine  100 mg Per NG Tube Daily Forrest Blake MD      timolol  1 drop Both Eyes BID Forrest Blake MD       Continuous Infusions:   PRN Meds:.  acetaminophen    Review of Systems: Cardiovascular ROS: negative for - chest pain, irregular heartbeat, or palpitations    Physical Exam:   GEN: NAD, alert and oriented, well appearing  SKIN: dry without significant lesions or rashes  HEENT: NCAT, PERRL, EOMs intact  NECK: No JVD or carotid bruits appreciated  CARDIOVASCULAR: RRR, normal S1, S2 without murmurs, rubs, or gallops appreciated  LUNGS: Clear to auscultation bilaterally without wheezes, rhonchi, or rales  ABDOMEN: Soft, nontender, nondistended  EXTREMITIES/VASCULAR: perfused without clubbing, cyanosis, or edema b/l  PSYCH: Normal mood and affect  NEURO: CN ll-Xll grossly intact                Lab Results: I have personally reviewed pertinent lab results.    Results from last 7 days   Lab Units 02/06/25  0557 02/05/25  0537 02/04/25  2158   WBC Thousand/uL 14.60* 11.80* 11.80*   HEMOGLOBIN g/dL 9.6* 9.0* 8.4*   HEMATOCRIT % 30.5* 28.2* 26.7*   PLATELETS Thousands/uL 267 326 385     Results from last 7 days   Lab Units 02/06/25  1407 02/06/25  0557 02/05/25  1447 02/04/25  2158 02/04/25  2104   POTASSIUM mmol/L 3.7 4.1 3.1*   < >  --    CHLORIDE mmol/L 105 108 107   < >  --    CO2 mmol/L 33* 30 29   < >  --    CO2, I-STAT mmol/L  --   --   --   --  27   BUN mg/dL 18 19 22   < >  --    CREATININE mg/dL 0.88 0.90 0.87   < >  --    GLUCOSE, ISTAT mg/dl  --   --   --   --  113   CALCIUM mg/dL 8.0* 7.8* 8.1*   < >  --     < > = values in this interval not displayed.         Results from last 7 days   Lab Units 02/06/25  1407 02/06/25  0557 02/05/25  1447   MAGNESIUM mg/dL 2.0 2.3 1.9         Imaging: Results Review Statement: No pertinent imaging  studies reviewed.    ECHO: Results for orders placed during the hospital encounter of 19    Echo complete with contrast if indicated    [place] [address]  [phone]    Transthoracic Echocardiogram  2D, M-mode, Doppler, and Color Doppler    Study date:  2019    Patient: CHANTELLE MCCALL  MR number: [id number]  Account number: [id number]  : 1945  Age: 73 years  Gender: Female  Status: Outpatient  Location: Bedside  Height: 59 in  Weight: 141 lb  BP: 114/ 63 mmHg    Indications: Murmur.    Diagnoses: R01.1 - Cardiac murmur, unspecified    Sonographer:  AGAPITO Lee  Primary Physician:  Tiffanie Kamara DO  Referring Physician:  Chico Barron MD  Group:  [facility]  Interpreting Physician:  Will Shearer MD    SUMMARY    LEFT VENTRICLE:  Systolic function was normal. Ejection fraction was estimated to be 65 %.  There were no regional wall motion abnormalities.  There was mild concentric hypertrophy.    TRICUSPID VALVE:  There was mild regurgitation.  Pulmonary artery systolic pressure was mildly increased.    PULMONIC VALVE:  There was mild to moderate regurgitation.    HISTORY: PRIOR HISTORY: Risk factors: hypertension.    PROCEDURE: The procedure was performed at the bedside. This was a routine study. The transthoracic approach was used. The study included complete 2D imaging, M-mode, complete spectral Doppler, and color Doppler. The heart rate was 73 bpm,  at the start of the study. Image quality was adequate.    LEFT VENTRICLE: Size was normal. Systolic function was normal. Ejection fraction was estimated to be 65 %. There were no regional wall motion abnormalities. Wall thickness was mildly increased. There was mild concentric hypertrophy.  DOPPLER: Left ventricular diastolic function parameters were normal.    RIGHT VENTRICLE: The size was normal. Systolic function was normal. Wall  thickness was normal.    LEFT ATRIUM: Size was at the upper limits of normal.    RIGHT ATRIUM: Size was normal.    MITRAL VALVE: Valve structure was normal. There was normal leaflet separation. DOPPLER: The transmitral velocity was within the normal range. There was no evidence for stenosis. There was no significant regurgitation.    AORTIC VALVE: The valve was trileaflet. Leaflets exhibited normal thickness, normal cuspal separation, and sclerosis. DOPPLER: Transaortic velocity was within the normal range. There was no evidence for stenosis. There was no significant  regurgitation.    TRICUSPID VALVE: The valve structure was normal. There was normal leaflet separation. DOPPLER: The transtricuspid velocity was within the normal range. There was no evidence for stenosis. There was mild regurgitation. Pulmonary artery  systolic pressure was mildly increased.    PULMONIC VALVE: Not well visualized. DOPPLER: The transpulmonic velocity was within the normal range. There was mild to moderate regurgitation.    PERICARDIUM: There was no pericardial effusion. A pericardial fat pad was present. The pericardium was normal in appearance.    AORTA: The root exhibited normal size.    SYSTEMIC VEINS: IVC: The inferior vena cava was normal in size.    PULMONARY VEINS: DOPPLER: Doppler signals were not recordable in the pulmonary vein(s).    SYSTEM MEASUREMENT TABLES    2D  %FS: 42.32 %  Ao Diam: 3.02 cm  EDV(Teich): 64.9 ml  EF(Teich): 74.02 %  ESV(Teich): 16.86 ml  IVSd: 1.03 cm  LA Area: 14.58 cm2  LA Diam: 2 cm  LVEDV MOD A4C: 46.73 ml  LVEF MOD A4C: 70.43 %  LVESV MOD A4C: 13.81 ml  LVIDd: 3.87 cm  LVIDs: 2.23 cm  LVLd A4C: 7.08 cm  LVLs A4C: 5.68 cm  LVPWd: 1 cm  RA Area: 12.08 cm2  RVIDd: 2.88 cm  SV MOD A4C: 32.91 ml  SV(Teich): 48.04 ml    CW  TR Vmax: 2.89 m/s  TR maxP.48 mmHg    MM  TAPSE: 1.78 cm    PW  E': 0.08 m/s  E/E': 12.64  MV A Harvey: 0.86 m/s  MV Dec [place]: 4.53 m/s2  MV DecT: 218.43 ms  MV E Harvey: 0.99 m/s  MV  E/A Ratio: 1.15  MV PHT: 63.35 ms  MVA By PHT: 3.47 cm2    [facility]    Prepared and electronically signed by    Will Shearer MD  Signed 13-Feb-2019 17:07:53      Results for orders placed during the hospital encounter of 01/25/25    Echo complete w/ contrast if indicated    Interpretation Summary    Left Ventricle: Left ventricular cavity size is normal. Wall thickness is mildly increased. There is moderate asymmetric hypertrophy of the basal septal wall. The left ventricular ejection fraction is 60%. Systolic function is normal. Wall motion is normal. Diastolic function is moderately abnormal, consistent with grade II (pseudonormal) relaxation.    Right Ventricle: Systolic function is normal.    Left Atrium: The atrium is mildly dilated.    Mitral Valve: There is mild to moderate regurgitation.    Tricuspid Valve: There is mild regurgitation. The estimated right ventricular systolic pressure is 44.00 mmHg.        EKG/TELEMETRY:             VTE Pharmacologic Prophylaxis: VTE covered by:  enoxaparin, Subcutaneous, 40 mg at 02/07/25 0870

## 2025-02-07 NOTE — PLAN OF CARE
Problem: PAIN - ADULT  Goal: Verbalizes/displays adequate comfort level or baseline comfort level  Description: Interventions:  - Encourage patient to monitor pain and request assistance  - Assess pain using appropriate pain scale  - Administer analgesics based on type and severity of pain and evaluate response  - Implement non-pharmacological measures as appropriate and evaluate response  - Consider cultural and social influences on pain and pain management  - Notify physician/advanced practitioner if interventions unsuccessful or patient reports new pain  Outcome: Progressing     Problem: INFECTION - ADULT  Goal: Absence or prevention of progression during hospitalization  Description: INTERVENTIONS:  - Assess and monitor for signs and symptoms of infection  - Monitor lab/diagnostic results  - Monitor all insertion sites, i.e. indwelling lines, tubes, and drains  - Monitor endotracheal if appropriate and nasal secretions for changes in amount and color  - [place] appropriate cooling/warming therapies per order  - Administer medications as ordered  - Instruct and encourage patient and family to use good hand hygiene technique  - Identify and instruct in appropriate isolation precautions for identified infection/condition  Outcome: Progressing     Problem: INFECTION - ADULT  Goal: Absence or prevention of progression during hospitalization  Description: INTERVENTIONS:  - Assess and monitor for signs and symptoms of infection  - Monitor lab/diagnostic results  - Monitor all insertion sites, i.e. indwelling lines, tubes, and drains  - Monitor endotracheal if appropriate and nasal secretions for changes in amount and color  - [place] appropriate cooling/warming therapies per order  - Administer medications as ordered  - Instruct and encourage patient and family to use good hand hygiene technique  - Identify and instruct in appropriate isolation precautions for identified infection/condition  Outcome:  Progressing  Goal: Absence of fever/infection during neutropenic period  Description: INTERVENTIONS:  - Monitor WBC    Outcome: Progressing     Problem: SAFETY ADULT  Goal: Patient will remain free of falls  Description: INTERVENTIONS:  - Educate patient/family on patient safety including physical limitations  - Instruct patient to call for assistance with activity   - Consult OT/PT to assist with strengthening/mobility   - Keep Call bell within reach  - Keep bed low and locked with side rails adjusted as appropriate  - Keep care items and personal belongings within reach  - Initiate and maintain comfort rounds  - Make Fall Risk Sign visible to staff  - Offer Toileting every  Hours, in advance of need  - Initiate/Maintain alarm  - Obtain necessary fall risk management equipment:   - Apply yellow socks and bracelet for high fall risk patients  - Consider moving patient to room near nurses station  Outcome: Progressing  Goal: Maintain or return to baseline ADL function  Description: INTERVENTIONS:  -  Assess patient's ability to carry out ADLs; assess patient's baseline for ADL function and identify physical deficits which impact ability to perform ADLs (bathing, care of mouth/teeth, toileting, grooming, dressing, etc.)  - Assess/evaluate cause of self-care deficits   - Assess range of motion  - Assess patient's mobility; develop plan if impaired  - Assess patient's need for assistive devices and provide as appropriate  - Encourage maximum independence but intervene and supervise when necessary  - Involve family in performance of ADLs  - Assess for home care needs following discharge   - Consider OT consult to assist with ADL evaluation and planning for discharge  - Provide patient education as appropriate  Outcome: Progressing  Goal: Maintains/Returns to pre admission functional level  Description: INTERVENTIONS:  - Perform AM-PAC 6 Click Basic Mobility/ Daily Activity assessment daily.  - Set and communicate daily  mobility goal to care team and patient/family/caregiver.   - Collaborate with rehabilitation services on mobility goals if consulted  - Perform Range of Motion  times a day.  - Reposition patient every  hours.  - Dangle patient  times a day  - Stand patient  times a day  - Ambulate patient  times a day  - Out of bed to chair  times a day   - Out of bed for meals  times a day  - Out of bed for toileting  - Record patient progress and toleration of activity level   Outcome: Progressing     Problem: DISCHARGE PLANNING  Goal: Discharge to home or other facility with appropriate resources  Description: INTERVENTIONS:  - Identify barriers to discharge w/patient and caregiver  - Arrange for needed discharge resources and transportation as appropriate  - Identify discharge learning needs (meds, wound care, etc.)  - Arrange for interpretive services to assist at discharge as needed  - Refer to Case Management Department for coordinating discharge planning if the patient needs post-hospital services based on physician/advanced practitioner order or complex needs related to functional status, cognitive ability, or social support system  Outcome: Progressing

## 2025-02-07 NOTE — PLAN OF CARE
Problem: PAIN - ADULT  Goal: Verbalizes/displays adequate comfort level or baseline comfort level  Description: Interventions:  - Encourage patient to monitor pain and request assistance  - Assess pain using appropriate pain scale  - Administer analgesics based on type and severity of pain and evaluate response  - Implement non-pharmacological measures as appropriate and evaluate response  - Consider cultural and social influences on pain and pain management  - Notify physician/advanced practitioner if interventions unsuccessful or patient reports new pain  Outcome: Progressing     Problem: INFECTION - ADULT  Goal: Absence or prevention of progression during hospitalization  Description: INTERVENTIONS:  - Assess and monitor for signs and symptoms of infection  - Monitor lab/diagnostic results  - Monitor all insertion sites, i.e. indwelling lines, tubes, and drains  - Monitor endotracheal if appropriate and nasal secretions for changes in amount and color  - [place] appropriate cooling/warming therapies per order  - Administer medications as ordered  - Instruct and encourage patient and family to use good hand hygiene technique  - Identify and instruct in appropriate isolation precautions for identified infection/condition  Outcome: Progressing  Goal: Absence of fever/infection during neutropenic period  Description: INTERVENTIONS:  - Monitor WBC    Outcome: Progressing     Problem: SAFETY ADULT  Goal: Patient will remain free of falls  Description: INTERVENTIONS:  - Educate patient/family on patient safety including physical limitations  - Instruct patient to call for assistance with activity   - Consult OT/PT to assist with strengthening/mobility   - Keep Call bell within reach  - Keep bed low and locked with side rails adjusted as appropriate  - Keep care items and personal belongings within reach  - Initiate and maintain comfort rounds  - Make Fall Risk Sign visible to staff  - Apply yellow socks and bracelet  for high fall risk patients  - Consider moving patient to room near nurses station  Outcome: Progressing  Goal: Maintain or return to baseline ADL function  Description: INTERVENTIONS:  -  Assess patient's ability to carry out ADLs; assess patient's baseline for ADL function and identify physical deficits which impact ability to perform ADLs (bathing, care of mouth/teeth, toileting, grooming, dressing, etc.)  - Assess/evaluate cause of self-care deficits   - Assess range of motion  - Assess patient's mobility; develop plan if impaired  - Assess patient's need for assistive devices and provide as appropriate  - Encourage maximum independence but intervene and supervise when necessary  - Involve family in performance of ADLs  - Assess for home care needs following discharge   - Consider OT consult to assist with ADL evaluation and planning for discharge  - Provide patient education as appropriate  Outcome: Progressing  Goal: Maintains/Returns to pre admission functional level  Description: INTERVENTIONS:  - Perform AM-PAC 6 Click Basic Mobility/ Daily Activity assessment daily.  - Set and communicate daily mobility goal to care team and patient/family/caregiver.   - Collaborate with rehabilitation services on mobility goals if consulted  - Out of bed for toileting  - Record patient progress and toleration of activity level   Outcome: Progressing     Problem: DISCHARGE PLANNING  Goal: Discharge to home or other facility with appropriate resources  Description: INTERVENTIONS:  - Identify barriers to discharge w/patient and caregiver  - Arrange for needed discharge resources and transportation as appropriate  - Identify discharge learning needs (meds, wound care, etc.)  - Arrange for interpretive services to assist at discharge as needed  - Refer to Case Management Department for coordinating discharge planning if the patient needs post-hospital services based on physician/advanced practitioner order or complex needs  related to functional status, cognitive ability, or social support system  Outcome: Progressing     Problem: Knowledge Deficit  Goal: Patient/family/caregiver demonstrates understanding of disease process, treatment plan, medications, and discharge instructions  Description: Complete learning assessment and assess knowledge base.  Interventions:  - Provide teaching at level of understanding  - Provide teaching via preferred learning methods  Outcome: Progressing     Problem: Prexisting or High Potential for Compromised Skin Integrity  Goal: Skin integrity is maintained or improved  Description: INTERVENTIONS:  - Identify patients at risk for skin breakdown  - Assess and monitor skin integrity  - Assess and monitor nutrition and hydration status  - Monitor labs   - Assess for incontinence   - Turn and reposition patient  - Assist with mobility/ambulation  - Relieve pressure over bony prominences  - Avoid friction and shearing  - Provide appropriate hygiene as needed including keeping skin clean and dry  - Evaluate need for skin moisturizer/barrier cream  - Collaborate with interdisciplinary team   - Patient/family teaching  - Consider wound care consult   Outcome: Progressing     Problem: Nutrition/Hydration-ADULT  Goal: Nutrient/Hydration intake appropriate for improving, restoring or maintaining nutritional needs  Description: Monitor and assess patient's nutrition/hydration status for malnutrition. Collaborate with interdisciplinary team and initiate plan and interventions as ordered.  Monitor patient's weight and dietary intake as ordered or per policy. Utilize nutrition screening tool and intervene as necessary. Determine patient's food preferences and provide high-protein, high-caloric foods as appropriate.     INTERVENTIONS:  - Monitor oral intake, urinary output, labs, and treatment plans  - Assess nutrition and hydration status and recommend course of action  - Evaluate amount of meals eaten  - Assist  patient with eating if necessary   - Allow adequate time for meals  - Recommend/ encourage appropriate diets, oral nutritional supplements, and vitamin/mineral supplements  - Order, calculate, and assess calorie counts as needed  - Recommend, monitor, and adjust tube feedings and TPN/PPN based on assessed needs  - Assess need for intravenous fluids  - Provide specific nutrition/hydration education as appropriate  - Include patient/family/caregiver in decisions related to nutrition  Outcome: Progressing     Problem: NEUROSENSORY - ADULT  Goal: Achieves stable or improved neurological status  Description: INTERVENTIONS  - Monitor and report changes in neurological status  - Monitor vital signs such as temperature, blood pressure, glucose, and any other labs ordered   - Initiate measures to prevent increased intracranial pressure  - Monitor for seizure activity and implement precautions if appropriate      Outcome: Progressing  Goal: Achieves maximal functionality and self care  Description: INTERVENTIONS  - Monitor swallowing and airway patency with patient fatigue and changes in neurological status  - Encourage and assist patient to increase activity and self care.   - Encourage visually impaired, hearing impaired and aphasic patients to use assistive/communication devices  Outcome: Progressing     Problem: CARDIOVASCULAR - ADULT  Goal: Maintains optimal cardiac output and hemodynamic stability  Description: INTERVENTIONS:  - Monitor I/O, vital signs and rhythm  - Monitor for S/S and trends of decreased cardiac output  - Administer and titrate ordered vasoactive medications to optimize hemodynamic stability  - Assess quality of pulses, skin color and temperature  - Assess for signs of decreased coronary artery perfusion  - Instruct patient to report change in severity of symptoms  Outcome: Progressing  Goal: Absence of cardiac dysrhythmias or at baseline rhythm  Description: INTERVENTIONS:  - Continuous cardiac  monitoring, vital signs, obtain 12 lead EKG if ordered  - Administer antiarrhythmic and heart rate control medications as ordered  - Monitor electrolytes and administer replacement therapy as ordered  Outcome: Progressing     Problem: RESPIRATORY - ADULT  Goal: Achieves optimal ventilation and oxygenation  Description: INTERVENTIONS:  - Assess for changes in respiratory status  - Assess for changes in mentation and behavior  - Position to facilitate oxygenation and minimize respiratory effort  - Oxygen administered by appropriate delivery if ordered  - Initiate smoking cessation education as indicated  - Encourage broncho-pulmonary hygiene including cough, deep breathe, Incentive Spirometry  - Assess the need for suctioning and aspirate as needed  - Assess and instruct to report SOB or any respiratory difficulty  - Respiratory Therapy support as indicated  Outcome: Progressing     Problem: GASTROINTESTINAL - ADULT  Goal: Maintains or returns to baseline bowel function  Description: INTERVENTIONS:  - Assess bowel function  - Encourage oral fluids to ensure adequate hydration  - Administer IV fluids if ordered to ensure adequate hydration  - Administer ordered medications as needed  - Encourage mobilization and activity  - Consider nutritional services referral to assist patient with adequate nutrition and appropriate food choices  Outcome: Progressing  Goal: Maintains adequate nutritional intake  Description: INTERVENTIONS:  - Monitor percentage of each meal consumed  - Identify factors contributing to decreased intake, treat as appropriate  - Assist with meals as needed  - Monitor I&O, weight, and lab values if indicated  - Obtain nutrition services referral as needed  Outcome: Progressing     Problem: GENITOURINARY - ADULT  Goal: Maintains or returns to baseline urinary function  Description: INTERVENTIONS:  - Assess urinary function  - Encourage oral fluids to ensure adequate hydration if ordered  - Administer IV  fluids as ordered to ensure adequate hydration  - Administer ordered medications as needed  - Offer frequent toileting  - Follow urinary retention protocol if ordered  Outcome: Progressing  Goal: Absence of urinary retention  Description: INTERVENTIONS:  - Assess patient’s ability to void and empty bladder  - Monitor I/O  - Bladder scan as needed  - Discuss with physician/AP medications to alleviate retention as needed  - Discuss catheterization for long term situations as appropriate  Outcome: Progressing  Goal: Urinary catheter remains patent  Description: INTERVENTIONS:  - Assess patency of urinary catheter  - If patient has a chronic sanchez, consider changing catheter if non-functioning  - Follow guidelines for intermittent irrigation of non-functioning urinary catheter  Outcome: Progressing     Problem: METABOLIC, FLUID AND ELECTROLYTES - ADULT  Goal: Electrolytes maintained within normal limits  Description: INTERVENTIONS:  - Monitor labs and assess patient for signs and symptoms of electrolyte imbalances  - Administer electrolyte replacement as ordered  - Monitor response to electrolyte replacements, including repeat lab results as appropriate  - Instruct patient on fluid and nutrition as appropriate  Outcome: Progressing  Goal: Fluid balance maintained  Description: INTERVENTIONS:  - Monitor labs   - Monitor I/O and WT  - Instruct patient on fluid and nutrition as appropriate  - Assess for signs & symptoms of volume excess or deficit  Outcome: Progressing     Problem: SKIN/TISSUE INTEGRITY - ADULT  Goal: Skin Integrity remains intact(Skin Breakdown Prevention)  Description: Assess:  -Inspect skin when repositioning, toileting, and assisting with ADLS  -Assess extremities for adequate circulation and sensation     Bed Management:  -Have minimal linens on bed & keep smooth, unwrinkled  -Change linens as needed when moist or perspiring     Toileting:  -Offer bedside commode    Activity:  -Encourage activity and  walks on unit  -Encourage or provide ROM exercises     Skin Care:  -Avoid use of baby powder, tape, friction and shearing, hot water or constrictive clothing  -Do not massage red bony areas    Outcome: Progressing  Goal: Incision(s), wounds(s) or drain site(s) healing without S/S of infection  Description: INTERVENTIONS  - Assess and document dressing, incision, wound bed, drain sites and surrounding tissue  Outcome: Progressing     Problem: HEMATOLOGIC - ADULT  Goal: Maintains hematologic stability  Description: INTERVENTIONS  - Assess for signs and symptoms of bleeding or hemorrhage  - Monitor labs  - Administer supportive blood products/factors as ordered and appropriate  Outcome: Progressing     Problem: MUSCULOSKELETAL - ADULT  Goal: Maintain or return mobility to safest level of function  Description: INTERVENTIONS:  - Assess patient's ability to carry out ADLs; assess patient's baseline for ADL function and identify physical deficits which impact ability to perform ADLs (bathing, care of mouth/teeth, toileting, grooming, dressing, etc.)  - Assess/evaluate cause of self-care deficits   - Assess range of motion  - Assess patient's mobility  - Assess patient's need for assistive devices and provide as appropriate  - Encourage maximum independence but intervene and supervise when necessary  - Involve family in performance of ADLs  - Assess for home care needs following discharge   - Consider OT consult to assist with ADL evaluation and planning for discharge  - Provide patient education as appropriate  Outcome: Progressing     Problem: COPING  Goal: Pt/Family able to verbalize concerns and demonstrate effective coping strategies  Description: INTERVENTIONS:  - Assist patient/family to identify coping skills, available support systems and cultural and spiritual values  - Provide emotional support, including active listening and acknowledgement of concerns of patient and caregivers  - Reduce environmental stimuli,  as able  - Provide patient education  - Assess for spiritual pain/suffering and initiate spiritual care, including notification of Pastoral Care or chan based community as needed  - Assess effectiveness of coping strategies  Outcome: Progressing  Goal: Will report anxiety at manageable levels  Description: INTERVENTIONS:  - Administer medication as ordered  - Teach and encourage coping skills  - Provide emotional support  - Assess patient/family for anxiety and ability to cope  Outcome: Progressing     Problem: Potential for Falls  Goal: Patient will remain free of falls  Description: INTERVENTIONS:  - Educate patient/family on patient safety including physical limitations  - Instruct patient to call for assistance with activity   - Consult OT/PT to assist with strengthening/mobility   - Keep Call bell within reach  - Keep bed low and locked with side rails adjusted as appropriate  - Keep care items and personal belongings within reach  - Initiate and maintain comfort rounds  - Make Fall Risk Sign visible to staff  - Apply yellow socks and bracelet for high fall risk patients  - Consider moving patient to room near nurses station  Outcome: Progressing     Problem: SAFETY,RESTRAINT: NV/NON-SELF DESTRUCTIVE BEHAVIOR  Goal: Remains free of harm/injury (restraint for non violent/non self-detsructive behavior)  Description: INTERVENTIONS:  - Instruct patient/family regarding restraint use   - Assess and monitor physiologic and psychological status   - Provide interventions and comfort measures to meet assessed patient needs   - Identify and implement measures to help patient regain control  - Assess readiness for release of restraint   Outcome: Progressing  Goal: Returns to optimal restraint-free functioning  Description: INTERVENTIONS:  - Assess the patient's behavior and symptoms that indicate continued need for restraint  - Identify and implement measures to help patient regain control  - Assess readiness for release  of restraint   Outcome: Progressing

## 2025-02-07 NOTE — PLAN OF CARE
Problem: PHYSICAL THERAPY ADULT  Goal: Performs mobility at highest level of function for planned discharge setting.  See evaluation for individualized goals.  Description: Treatment/Interventions: ADL retraining, Functional transfer training, Therapeutic exercise, Endurance training, Cognitive reorientation, LE strengthening/ROM, Patient/family training, Equipment eval/education, Bed mobility, Compensatory technique education, Continued evaluation, Spoke to nursing, Spoke to case management, Spoke to advanced practitioner, OT          See flowsheet documentation for full assessment, interventions and recommendations.  Note: Prognosis: Guarded  Problem List: Decreased strength, Decreased range of motion, Decreased endurance, Impaired balance, Decreased mobility, Decreased coordination, Decreased cognition, Impaired judgement, Decreased safety awareness, Pain, Decreased skin integrity  Assessment: Pt is 79 y.o. female seen for PT evaluation s/p admit to [facility] w/ Acute encephalopathy. PT consulted to assess pt's functional mobility and d/c needs. Order placed for PT eval and tx, w/ up w/ A order. Comorbidities affecting pt's physical performance at time of assessment include:  has a past medical history of Anemia, Cataracts, bilateral, Heart murmur, History of transfusion, Hypertension, Meningioma (HCC), Ovarian cyst, Shortness of breath, and Skin neoplasm. PTA, pt was ambulates community distances and elevations and lives in multi-level home. Personal factors affecting pt at time of IE include: inaccessible home environment, ambulating w/ assistive device, stairs to enter home, decreased cognition, positive fall history, decreased initiation and engagement, unable to perform physical activity, limited insight into impairments, inability to perform IADLs, and inability to perform ADLs. Please find objective findings from PT assessment regarding body systems outlined above with impairments  and limitations including weakness, impaired balance, decreased endurance, impaired coordination, gait deviations, pain, decreased activity tolerance, decreased functional mobility tolerance, decreased safety awareness, impaired judgement, fall risk, and decreased cognition. The following objective measures performed on IE also reveal limitations: The patient's AM-PAC Basic Mobility Inpatient Short Form Raw Score is 6.  A standardized score less than 42.9 suggests the patient may benefit from discharge to post-acute rehabilitation services. Please also refer to the recommendation of the Physical Therapist for safe discharge planning. Pt's clinical presentation is currently unstable/unpredictable seen in pt's presentation of critical care monitoring. Pt to benefit from continued PT tx to address deficits as defined above and maximize level of functional independent mobility and consistency. From PT/mobility standpoint, recommendation at time of d/c would be level II pending progress in order to facilitate return to PLOF.  Barriers to Discharge: Inaccessible home environment, Decreased caregiver support     Rehab Resource Intensity Level, PT: II (Moderate Resource Intensity)    See flowsheet documentation for full assessment.

## 2025-02-07 NOTE — PROGRESS NOTES
Progress Note - Critical Care/ICU   Name: Darlin Zamora 79 y.o. female I MRN: [id number]  Unit/Bed#: ICU 10 I Date of Admission: 1/25/2025   Date of Service: 2/7/2025 I Hospital Day: 12       Assessment & Plan   Neuro:   Diagnosis: Encephalopathy TME  1/29 MRI-stable meningiomas  1/27 LP panel neg  Plan:   Delirium precautions/sleep hygiene  Add melatonin/seroquel     CV:   Diagnosis: Bradycardia  Concern for vagal nerve syndrome with hernia now s/p repair  TSH 2.6  2/6 TVP discontinued  Plan:   Cardiology following     Diagnosis: Chronic DCHF/Hx HTN  1/29 ECHO EF 60% G2DD  Plan:   Lasix IV daily eventual transition back to oral however has significant edema  Consider restarting home lisinopril   Hold home HCTZ with IV lasix  Pulm:  Diagnosis: Acute hypoxic resp failure 2/2 pna and vol overload  Plan:   Cont diuresis goal net neg 1-1.5L  ID following for antibx through 2.7  vest therapy/nebs  Wean MFNC sats >92%     GI:   Diagnosis: Dysphagia  Plan:   Keofed Advance feeds      Diagnosis: POD#3 s/p paraesophageal hernia repair w mesh/gastroplexy  Plan:   Thoracic following   Pain mgmnt: prn tylenol     :   No active issues     F/E/N:   Diagnosis: Hypokalemia  Plan: replete and recheck     Heme/Onc:   Diagnosis: Anemia  Baseline 12-13  Plan:   No s/s bleeding  Cbc in am     Endo:   No issues     ID:   Diagnosis: Stenotrophomonas pna  1/26 BC NG  1/29 Sputum +  Plan:   ID following   Bactrim through 2/7  F/u am pct  Wean steroids vs d/c     MSK/Skin:   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 Yes  Need for changes to pain regimen? No   B: Both SAT/SAT  N/A   C: Choice of Sedation RASS Goal: 0 Alert and Calm  Need for changes to sedation or analgesia regimen? No   D: Delirium CAM-ICU: Positive   E: Early Mobility  Plan for early mobility? Yes   F: Family  Engagement Plan for family engagement today? Yes       Antibiotic Review: Infectious disease consulted    Review of Invasive Devices:    Vasquez Plan: Vasquez to be removed. Order has been placed  Central access plan: Will obtain peripheral access and discontinue prior to transfer vasc access consult for midline      Prophylaxis:  VTE VTE covered by:  enoxaparin, Subcutaneous, 40 mg at 25 0824       Stress Ulcer  covered byomeprazole (PRILOSEC) suspension 2 mg/mL [[id number]]         24 Hour Events : tvp discontinued  Subjective   Review of Systems: Review of Systems   Unable to perform ROS: Mental status change       Objective :                   Vitals I/O      Most Recent Min/Max in 24hrs   Temp 98.5 °F (36.9 °C) Temp  Min: 98.5 °F (36.9 °C)  Max: 99.6 °F (37.6 °C)   Pulse 80 Pulse  Min: 52  Max: 94   Resp (!) 23 Resp  Min: 16  Max: 28   BP (!) 178/104 BP  Min: 104/46  Max: 179/79   O2 Sat 90 % SpO2  Min: 90 %  Max: 99 %      Intake/Output Summary (Last 24 hours) at 2025 0503  Last data filed at 2025 0001  Gross per 24 hour   Intake 1810 ml   Output 3110 ml   Net -1300 ml       Diet Enteral/Parenteral; Tube Feeding No Oral Diet; Jevity 1.2 Jae; Continuous; 55; 50; Water; Every 4 hours    Invasive Monitoring           Physical Exam   Physical Exam  Eyes:      Pupils: Pupils are equal, round, and reactive to light.   Skin:     General: Skin is warm and dry.   HENT:      Head: Normocephalic and atraumatic.      Mouth/Throat:      Mouth: Mucous membranes are moist.   Cardiovascular:      Rate and Rhythm: Normal rate and regular rhythm.      Pulses: Normal pulses.      Heart sounds: Normal heart sounds.   Musculoskeletal:         General: Swelling present. Normal range of motion.      Right lower le+ Edema present.      Left lower le+ Edema present.      Comments: +2 UE   Abdominal: General: Bowel sounds are normal. There is no distension.      Palpations: Abdomen is soft.      Tenderness: There is no  abdominal tenderness.   Constitutional:       General: She is not in acute distress.  Pulmonary:      Effort: Pulmonary effort is normal. No respiratory distress.      Breath sounds: Rhonchi present.   Neurological:      Mental Status: She is disoriented to person, disoriented to place, disoriented to time and disoriented to situation.      GCS: GCS eye subscore is 4. GCS verbal subscore is 5. GCS motor subscore is 6.          Diagnostic Studies        Lab Results: I have reviewed the following results:     Medications:  Scheduled PRN   chlorhexidine, 15 mL, Q12H JOSTIN  enoxaparin, 40 mg, Daily  folic acid 1 mg in sodium chloride 0.9 % 50 mL IVPB, 1 mg, Daily  furosemide, 40 mg, Daily  hydrocortisone sodium succinate, 25 mg, Q12H JOSTIN  ipratropium, 0.5 mg, TID  levalbuterol, 1.25 mg, TID  melatonin, 3 mg, HS  nystatin, , BID  omeprazole (PRILOSEC) suspension 2 mg/mL, 10 mg, Daily  polyethylene glycol, 17 g, Daily  QUEtiapine, 12.5 mg, HS  senna-docusate sodium, 1 tablet, BID  sulfamethoxazole-trimethoprim, 5 mg/kg of trimethoprim (Adjusted), Q12H  thiamine, 100 mg, Daily  timolol, 1 drop, BID      acetaminophen, 650 mg, Q4H PRN       Continuous          Labs:   CBC    Recent Labs     02/05/25 0537 02/06/25  0557   WBC 11.80* 14.60*   HGB 9.0* 9.6*   HCT 28.2* 30.5*    267     BMP    Recent Labs     02/06/25  0557 02/06/25  1407   SODIUM 144 143   K 4.1 3.7    105   CO2 30 33*   AGAP 6 5   BUN 19 18   CREATININE 0.90 0.88   CALCIUM 7.8* 8.0*       Coags    No recent results     Additional Electrolytes  Recent Labs     02/05/25  0537 02/05/25  1447 02/06/25  0557 02/06/25  1407   MG 2.2   < > 2.3 2.0   PHOS 2.9   < > 2.6 2.9   CAIONIZED 1.08*  --  1.08*  --     < > = values in this interval not displayed.          Blood Gas    No recent results  No recent results LFTs  No recent results    Infectious  No recent results  Glucose  Recent Labs     02/05/25  0537 02/05/25  1447 02/06/25  0557 02/06/25  1407    GLUC 119 162* 163* 213*

## 2025-02-08 NOTE — PLAN OF CARE
Problem: PAIN - ADULT  Goal: Verbalizes/displays adequate comfort level or baseline comfort level  Description: Interventions:  - Encourage patient to monitor pain and request assistance  - Assess pain using appropriate pain scale  - Administer analgesics based on type and severity of pain and evaluate response  - Implement non-pharmacological measures as appropriate and evaluate response  - Consider cultural and social influences on pain and pain management  - Notify physician/advanced practitioner if interventions unsuccessful or patient reports new pain  Outcome: Progressing     Problem: INFECTION - ADULT  Goal: Absence or prevention of progression during hospitalization  Description: INTERVENTIONS:  - Assess and monitor for signs and symptoms of infection  - Monitor lab/diagnostic results  - Monitor all insertion sites, i.e. indwelling lines, tubes, and drains  - Monitor endotracheal if appropriate and nasal secretions for changes in amount and color  - [place] appropriate cooling/warming therapies per order  - Administer medications as ordered  - Instruct and encourage patient and family to use good hand hygiene technique  - Identify and instruct in appropriate isolation precautions for identified infection/condition  Outcome: Progressing  Goal: Absence of fever/infection during neutropenic period  Description: INTERVENTIONS:  - Monitor WBC    Outcome: Progressing     Problem: SAFETY ADULT  Goal: Patient will remain free of falls  Description: INTERVENTIONS:  - Educate patient/family on patient safety including physical limitations  - Instruct patient to call for assistance with activity   - Consult OT/PT to assist with strengthening/mobility   - Keep Call bell within reach  - Keep bed low and locked with side rails adjusted as appropriate  - Keep care items and personal belongings within reach  - Initiate and maintain comfort rounds  - Make Fall Risk Sign visible to staff  - Apply yellow socks and bracelet  for high fall risk patients  - Consider moving patient to room near nurses station  Outcome: Progressing  Goal: Maintain or return to baseline ADL function  Description: INTERVENTIONS:  -  Assess patient's ability to carry out ADLs; assess patient's baseline for ADL function and identify physical deficits which impact ability to perform ADLs (bathing, care of mouth/teeth, toileting, grooming, dressing, etc.)  - Assess/evaluate cause of self-care deficits   - Assess range of motion  - Assess patient's mobility; develop plan if impaired  - Assess patient's need for assistive devices and provide as appropriate  - Encourage maximum independence but intervene and supervise when necessary  - Involve family in performance of ADLs  - Assess for home care needs following discharge   - Consider OT consult to assist with ADL evaluation and planning for discharge  - Provide patient education as appropriate  Outcome: Progressing  Goal: Maintains/Returns to pre admission functional level  Description: INTERVENTIONS:  - Perform AM-PAC 6 Click Basic Mobility/ Daily Activity assessment daily.  - Set and communicate daily mobility goal to care team and patient/family/caregiver.   - Collaborate with rehabilitation services on mobility goals if consulted  - Out of bed for toileting  - Record patient progress and toleration of activity level   Outcome: Progressing     Problem: DISCHARGE PLANNING  Goal: Discharge to home or other facility with appropriate resources  Description: INTERVENTIONS:  - Identify barriers to discharge w/patient and caregiver  - Arrange for needed discharge resources and transportation as appropriate  - Identify discharge learning needs (meds, wound care, etc.)  - Arrange for interpretive services to assist at discharge as needed  - Refer to Case Management Department for coordinating discharge planning if the patient needs post-hospital services based on physician/advanced practitioner order or complex needs  related to functional status, cognitive ability, or social support system  Outcome: Progressing     Problem: Knowledge Deficit  Goal: Patient/family/caregiver demonstrates understanding of disease process, treatment plan, medications, and discharge instructions  Description: Complete learning assessment and assess knowledge base.  Interventions:  - Provide teaching at level of understanding  - Provide teaching via preferred learning methods  Outcome: Progressing     Problem: Prexisting or High Potential for Compromised Skin Integrity  Goal: Skin integrity is maintained or improved  Description: INTERVENTIONS:  - Identify patients at risk for skin breakdown  - Assess and monitor skin integrity  - Assess and monitor nutrition and hydration status  - Monitor labs   - Assess for incontinence   - Turn and reposition patient  - Assist with mobility/ambulation  - Relieve pressure over bony prominences  - Avoid friction and shearing  - Provide appropriate hygiene as needed including keeping skin clean and dry  - Evaluate need for skin moisturizer/barrier cream  - Collaborate with interdisciplinary team   - Patient/family teaching  - Consider wound care consult   Outcome: Progressing     Problem: Nutrition/Hydration-ADULT  Goal: Nutrient/Hydration intake appropriate for improving, restoring or maintaining nutritional needs  Description: Monitor and assess patient's nutrition/hydration status for malnutrition. Collaborate with interdisciplinary team and initiate plan and interventions as ordered.  Monitor patient's weight and dietary intake as ordered or per policy. Utilize nutrition screening tool and intervene as necessary. Determine patient's food preferences and provide high-protein, high-caloric foods as appropriate.     INTERVENTIONS:  - Monitor oral intake, urinary output, labs, and treatment plans  - Assess nutrition and hydration status and recommend course of action  - Evaluate amount of meals eaten  - Assist  patient with eating if necessary   - Allow adequate time for meals  - Recommend/ encourage appropriate diets, oral nutritional supplements, and vitamin/mineral supplements  - Order, calculate, and assess calorie counts as needed  - Recommend, monitor, and adjust tube feedings and TPN/PPN based on assessed needs  - Assess need for intravenous fluids  - Provide specific nutrition/hydration education as appropriate  - Include patient/family/caregiver in decisions related to nutrition  Outcome: Progressing     Problem: NEUROSENSORY - ADULT  Goal: Achieves stable or improved neurological status  Description: INTERVENTIONS  - Monitor and report changes in neurological status  - Monitor vital signs such as temperature, blood pressure, glucose, and any other labs ordered   - Initiate measures to prevent increased intracranial pressure  - Monitor for seizure activity and implement precautions if appropriate      Outcome: Progressing  Goal: Achieves maximal functionality and self care  Description: INTERVENTIONS  - Monitor swallowing and airway patency with patient fatigue and changes in neurological status  - Encourage and assist patient to increase activity and self care.   - Encourage visually impaired, hearing impaired and aphasic patients to use assistive/communication devices  Outcome: Progressing     Problem: CARDIOVASCULAR - ADULT  Goal: Maintains optimal cardiac output and hemodynamic stability  Description: INTERVENTIONS:  - Monitor I/O, vital signs and rhythm  - Monitor for S/S and trends of decreased cardiac output  - Administer and titrate ordered vasoactive medications to optimize hemodynamic stability  - Assess quality of pulses, skin color and temperature  - Assess for signs of decreased coronary artery perfusion  - Instruct patient to report change in severity of symptoms  Outcome: Progressing  Goal: Absence of cardiac dysrhythmias or at baseline rhythm  Description: INTERVENTIONS:  - Continuous cardiac  monitoring, vital signs, obtain 12 lead EKG if ordered  - Administer antiarrhythmic and heart rate control medications as ordered  - Monitor electrolytes and administer replacement therapy as ordered  Outcome: Progressing     Problem: RESPIRATORY - ADULT  Goal: Achieves optimal ventilation and oxygenation  Description: INTERVENTIONS:  - Assess for changes in respiratory status  - Assess for changes in mentation and behavior  - Position to facilitate oxygenation and minimize respiratory effort  - Oxygen administered by appropriate delivery if ordered  - Initiate smoking cessation education as indicated  - Encourage broncho-pulmonary hygiene including cough, deep breathe, Incentive Spirometry  - Assess the need for suctioning and aspirate as needed  - Assess and instruct to report SOB or any respiratory difficulty  - Respiratory Therapy support as indicated  Outcome: Progressing     Problem: GASTROINTESTINAL - ADULT  Goal: Maintains or returns to baseline bowel function  Description: INTERVENTIONS:  - Assess bowel function  - Encourage oral fluids to ensure adequate hydration  - Administer IV fluids if ordered to ensure adequate hydration  - Administer ordered medications as needed  - Encourage mobilization and activity  - Consider nutritional services referral to assist patient with adequate nutrition and appropriate food choices  Outcome: Progressing  Goal: Maintains adequate nutritional intake  Description: INTERVENTIONS:  - Monitor percentage of each meal consumed  - Identify factors contributing to decreased intake, treat as appropriate  - Assist with meals as needed  - Monitor I&O, weight, and lab values if indicated  - Obtain nutrition services referral as needed  Outcome: Progressing     Problem: GENITOURINARY - ADULT  Goal: Maintains or returns to baseline urinary function  Description: INTERVENTIONS:  - Assess urinary function  - Encourage oral fluids to ensure adequate hydration if ordered  - Administer IV  fluids as ordered to ensure adequate hydration  - Administer ordered medications as needed  - Offer frequent toileting  - Follow urinary retention protocol if ordered  Outcome: Progressing  Goal: Absence of urinary retention  Description: INTERVENTIONS:  - Assess patient’s ability to void and empty bladder  - Monitor I/O  - Bladder scan as needed  - Discuss with physician/AP medications to alleviate retention as needed  - Discuss catheterization for long term situations as appropriate  Outcome: Progressing  Goal: Urinary catheter remains patent  Description: INTERVENTIONS:  - Assess patency of urinary catheter  - If patient has a chronic sanchez, consider changing catheter if non-functioning  - Follow guidelines for intermittent irrigation of non-functioning urinary catheter  Outcome: Progressing     Problem: METABOLIC, FLUID AND ELECTROLYTES - ADULT  Goal: Electrolytes maintained within normal limits  Description: INTERVENTIONS:  - Monitor labs and assess patient for signs and symptoms of electrolyte imbalances  - Administer electrolyte replacement as ordered  - Monitor response to electrolyte replacements, including repeat lab results as appropriate  - Instruct patient on fluid and nutrition as appropriate  Outcome: Progressing  Goal: Fluid balance maintained  Description: INTERVENTIONS:  - Monitor labs   - Monitor I/O and WT  - Instruct patient on fluid and nutrition as appropriate  - Assess for signs & symptoms of volume excess or deficit  Outcome: Progressing     Problem: SKIN/TISSUE INTEGRITY - ADULT  Goal: Skin Integrity remains intact(Skin Breakdown Prevention)  Description: Assess:   assisting with ADLS  -Assess extremities for adequate circulation and sensation     Bed Management:  -Have minimal linens on bed & keep smooth, unwrinkled  -Change linens as needed when moist or perspiring    Toileting:  -Offer bedside commode    Activity:  -Encourage activity and walks on unit  -Encourage or provide ROM  exercises   -Use appropriate equipment to lift or move patient in bed    Skin Care:  -Avoid use of baby powder, tape, friction and shearing, hot water or constrictive clothing  -Do not massage red bony areas    Outcome: Progressing  Goal: Incision(s), wounds(s) or drain site(s) healing without S/S of infection  Description: INTERVENTIONS  - Assess and document dressing, incision, wound bed, drain sites and surrounding tissue  - Provide patient and family educatio  Outcome: Progressing     Problem: HEMATOLOGIC - ADULT  Goal: Maintains hematologic stability  Description: INTERVENTIONS  - Assess for signs and symptoms of bleeding or hemorrhage  - Monitor labs  - Administer supportive blood products/factors as ordered and appropriate  Outcome: Progressing     Problem: MUSCULOSKELETAL - ADULT  Goal: Maintain or return mobility to safest level of function  Description: INTERVENTIONS:  - Assess patient's ability to carry out ADLs; assess patient's baseline for ADL function and identify physical deficits which impact ability to perform ADLs (bathing, care of mouth/teeth, toileting, grooming, dressing, etc.)  - Assess/evaluate cause of self-care deficits   - Assess range of motion  - Assess patient's mobility  - Assess patient's need for assistive devices and provide as appropriate  - Encourage maximum independence but intervene and supervise when necessary  - Involve family in performance of ADLs  - Assess for home care needs following discharge   - Consider OT consult to assist with ADL evaluation and planning for discharge  - Provide patient education as appropriate  Outcome: Progressing     Problem: COPING  Goal: Pt/Family able to verbalize concerns and demonstrate effective coping strategies  Description: INTERVENTIONS:  - Assist patient/family to identify coping skills, available support systems and cultural and spiritual values  - Provide emotional support, including active listening and acknowledgement of concerns of  patient and caregivers  - Reduce environmental stimuli, as able  - Provide patient education  - Assess for spiritual pain/suffering and initiate spiritual care, including notification of Pastoral Care or chan based community as needed  - Assess effectiveness of coping strategies  Outcome: Progressing  Goal: Will report anxiety at manageable levels  Description: INTERVENTIONS:  - Administer medication as ordered  - Teach and encourage coping skills  - Provide emotional support  - Assess patient/family for anxiety and ability to cope  Outcome: Progressing     Problem: Potential for Falls  Goal: Patient will remain free of falls  Description: INTERVENTIONS:  - Educate patient/family on patient safety including physical limitations  - Instruct patient to call for assistance with activity   - Consult OT/PT to assist with strengthening/mobility   - Keep Call bell within reach  - Keep bed low and locked with side rails adjusted as appropriate  - Keep care items and personal belongings within reach  - Initiate and maintain comfort rounds  - Make Fall Risk Sign visible to staff  - Apply yellow socks and bracelet for high fall risk patients  - Consider moving patient to room near nurses station  Outcome: Progressing     Problem: SAFETY,RESTRAINT: NV/NON-SELF DESTRUCTIVE BEHAVIOR  Goal: Remains free of harm/injury (restraint for non violent/non self-detsructive behavior)  Description: INTERVENTIONS:  - Instruct patient/family regarding restraint use   - Assess and monitor physiologic and psychological status   - Provide interventions and comfort measures to meet assessed patient needs   - Identify and implement measures to help patient regain control  - Assess readiness for release of restraint   Outcome: Progressing  Goal: Returns to optimal restraint-free functioning  Description: INTERVENTIONS:  - Assess the patient's behavior and symptoms that indicate continued need for restraint  - Identify and implement measures to  help patient regain control  - Assess readiness for release of restraint   Outcome: Progressing

## 2025-02-08 NOTE — QUICK NOTE
Patient with noted bradycardia this morning with what appears to be some junctional escape beats. This is in conjunction with a fib with RVR. Will likely need pacer for tachybrady syndrome. Will plan to re-evaulate Monday on timing of procedure. Discussed with ICU.

## 2025-02-08 NOTE — PROGRESS NOTES
Pastoral Care Progress Note          Chaplaincy Interventions Utilized:      02/08/25 1300   Clinical Encounter Type   Visited With Patient and family together   Crisis Visit Critical Care  (will be placed on comfort care)     Provided spiritual and emotional support to the patient and her son. Provided prayer and blessing. According to the nurse, patient will be placed on Comfort Care.

## 2025-02-08 NOTE — ASSESSMENT & PLAN NOTE
-w large hiatal hernia containing the entirety of the stomach, thought to be causing bradycardia and impaired respiratory function, thoracic surgery team asked to evaluate with consideration for surgical repair.  -Now s/p robotic paraesophageal hernia repair on 2/5  -extubated to HFNC 2/5 then transitioned to MFNC  -remains disoriented  -feeds thru keofed tube has been nutrition given PO status  -evaluated by speech multiple times this week most recently 2/7, remains high risk for aspiration and not appropriate for PO trials   -continue ICU care

## 2025-02-08 NOTE — PROGRESS NOTES
Progress Note - Thoracic    Name: Darlin Zamora 79 y.o. female I MRN: [id number]  Unit/Bed#: ICU 10 I Date of Admission: 1/25/2025   Date of Service: 2/8/2025 I Hospital Day: 13    Assessment & Plan  Hiatal hernia  -w large hiatal hernia containing the entirety of the stomach, thought to be causing bradycardia and impaired respiratory function, thoracic surgery team asked to evaluate with consideration for surgical repair.  -Now s/p robotic paraesophageal hernia repair on 2/5  -extubated to HFNC 2/5 then transitioned to MFNC  -remains disoriented  -feeds thru keofed tube has been nutrition given PO status  -evaluated by speech multiple times this week most recently 2/7, remains high risk for aspiration and not appropriate for PO trials   -continue ICU care  Altered mental status/Seizure like activity  -Management per ICU team  Septic shock (HCC)  -Requiring ICU level of care  -Initially requiring levo and vasopressin for hemodynamic support, now off pressors  Acute hypoxic respiratory failure (HCC)  -now extubated first to HFNC now on MFNC   Goals of care, counseling/discussion  -appreciate palliative recs        24 Hour Events : no acute events  Subjective : Resting in bed, remains disoriented, voiding w sanchez.    Objective :  Temp:  [98.4 °F (36.9 °C)-99.7 °F (37.6 °C)] 99 °F (37.2 °C)  HR:  [46-78] 78  BP: (104-174)/(50-76) 174/76  Resp:  [16-33] 33  SpO2:  [90 %-97 %] 94 %  O2 Device: Mid flow nasal cannula  Nasal Cannula O2 Flow Rate (L/min):  [6 L/min] 6 L/min    I/O         02/06 0701  02/07 0700 02/07 0701  02/08 0700    NG/ 200    IV Piggyback 1100 1050    Feedings 1160 990    Total Intake(mL/kg) 2470 (37.7) 2240 (34.2)    Urine (mL/kg/hr) 3375 (2.1) 2740 (1.7)    Stool 0     Total Output 3375 2740    Net -905 -500          Unmeasured Stool Occurrence 1 x           Lines/Drains/Airways       Active Status       Name Placement date Placement time Site Days    CVC Central Lines 01/26/25 Triple 16cm  01/26/25  1930  --  12    NG/OG/Enteral Tube Small Bore Feeding Tube 12 Fr Right nare 02/04/25 2200  Right nare  3    Urethral Catheter 16 Fr. 01/31/25  1557  --  7                  Physical Exam    General: NAD  Skin: Warm, dry, anicteric  HEENT: Normocephalic, atraumatic  CV: RRR, no m/r/g  Pulm: CTA b/l, no inc WOB, MFNC   Abd: Soft, ND/NT  MSK: Symmetric, no edema, no tenderness, no deformity  Neuro: disoriented, not following commands      Lab Results: I have reviewed the following results:  Recent Labs     02/06/25  0557 02/06/25  1407 02/08/25  0529   WBC 14.60*  --  14.70*   HGB 9.6*  --  9.5*   HCT 30.5*  --  30.4*     --  326   SODIUM 144 143 143   K 4.1 3.7 3.8    105 99   CO2 30 33* 36*   BUN 19 18 21   CREATININE 0.90 0.88 0.81   GLUC 163* 213* 184*   CAIONIZED 1.08*  --   --    MG 2.3 2.0  --    PHOS 2.6 2.9  --              VTE Pharmacologic Prophylaxis: VTE covered by:  enoxaparin, Subcutaneous, 40 mg at 02/07/25 0842     VTE Mechanical Prophylaxis: sequential compression device

## 2025-02-08 NOTE — RESPIRATORY THERAPY NOTE
02/08/25 1641   Respiratory Assessment   Resp Comments Pt is placed on comfort care and placed pt on 3L NC at this time for comfort. HFNC pulled.   Oxygen Therapy/Pulse Ox   O2 Device Nasal cannula   O2 Therapy Oxygen   Nasal Cannula O2 Flow Rate (L/min) 3 L/min   Calculated FIO2 (%) - Nasal Cannula 32   SpO2 Activity At Rest

## 2025-02-08 NOTE — NURSING NOTE
Pt sat 64% on 10L midflow. Midflow increased to 15L, sat's increased to 80-85%. Dr. Lischner at bedside. NT suctioning performed. O2 90% on 15L midlfow. Plan for HFNC, CXR. NCC team spoke with pts son, son to come to hospital shortly.

## 2025-02-08 NOTE — PROGRESS NOTES
Progress Note - Critical Care/ICU   Name: Darlin Zamora 79 y.o. female I MRN: [id number]  Unit/Bed#: ICU 10 I Date of Admission: 1/25/2025   Date of Service: 2/8/2025 I Hospital Day: 13      Assessment & Plan   Neuro/Psych/ENT diagnosis: P/A/D. Toxic metabolic encephalopathy. Meningioma.   Plan:  Pain controlled with:  PRN: tylenol   Regulate sleep/wake cycle as able  Seroquel qHS  Delirium monitoring and precautions  CAM-ICU daily  Trend neuro exam  1/29 MRI brain: Stable meningiomas centered within the left parafalcine space, lateral to the posterior left temporal lobe, and along the right inferior cerebellopontine angle.   Cardiac/Vascular diagnosis: Bradycardia with concern for vagal nerve syndrome. Chronic dCHF. HTN.   Plan:  MAP goal > 65 with SBP <140  Consider resuming lisinopril today  Follow rhythm on telemetry  Lasix 40 IV daily. Goal net negative.   Appreciate EP consult- TVP removed  Pulmonary/Respiratory/Thoracic diagnosis: Acute hypoxic respiratory failure. PNA. Pulm edema.   Plan:  Wean O2 as able  SpO2 goal >92%  Pulmonary toileting   Encourage IS  Frequent NT suctioning  Xopenex and atrovent IH TID  GI/Hepatic diagnosis: Dysphagia. POD 4 s/p paraesophageal hernia repair with mesh and gastroplexy.   Plan:  Trend abd exam and bowel function  Bowel regimen: Sennakot and Miralax  Appiate thoracic surgery consult   Speech and swallow evaluations ongoing.   Will need VBS per speech   Nephro/ diagnosis: None.   Plan:  Trend UOP and BUN/creat  Strict I and O  F/E/N: Hypokalemia.   Plan:  Fluid/Diuretic plan: Lasix   Goal 24 hour fluid balance: 1-2L  Nutrition/diet plan: TF to goal via keofed  Replenish electrolytes with goals: K >4.0, Mag >2.0, and Phos >3.0  Heme/onc diagnosis: ABLA.   Plan:  Trend hgb and plts  Transfuse as needed for goal hgb >7.0  Endo diagnosis: None.   Plan:  Glycemic control plan: n/a  Goal blood glucose 140-180  ID diagnosis: Aspiration PNA.   Plan:  Abx ordered: Bactrim  Day  # 7 of 7 day course  Trend temps and WBC count  Maintain normothermia  Appreciate ID consult   MSK/Skin diagnosis: Deconditioning.   Plan:  Mobility goal: OOB as able  PT and OT when appropriate  Frequent turning and pressure off-loading  Local wound care as needed    Disposition: Stepdown Level 1    ICU Core Measures     A: Assess, Prevent, and Manage Pain Has pain been assessed? Yes  Need for changes to pain regimen? No   B: Both SAT/SAT  N/A   C: Choice of Sedation RASS Goal: N/A patient not on sedation  Need for changes to sedation or analgesia regimen? NA   D: Delirium CAM-ICU: Unable to perform secondary to Acute cognitive dysfunction   E: Early Mobility  Plan for early mobility? Yes   F: Family Engagement Plan for family engagement today? Yes       Antibiotic Review: Antibiotics to be discontinued today    Review of Invasive Devices:    Vasquez Plan: Vasquez to be removed. Order has been placed  Central access plan: Will obtain peripheral access and discontinue prior to transfer    Prophylaxis:  VTE VTE covered by:  enoxaparin, Subcutaneous, 40 mg at 02/07/25 0842       Stress Ulcer  covered byomeprazole (PRILOSEC) suspension 2 mg/mL [[id number]]         24 Hour Events : Given additional dose of lasix for goal net negative. Few episodes of bradycardia- asymptomatic.     Subjective   Review of Systems: Review of Systems   Respiratory:          Weak cough       Objective :                   Vitals I/O      Most Recent Min/Max in 24hrs   Temp 99.7 °F (37.6 °C) Temp  Min: 98.4 °F (36.9 °C)  Max: 99.7 °F (37.6 °C)   Pulse 64 Pulse  Min: 46  Max: 94   Resp 16 Resp  Min: 16  Max: 25   /65 BP  Min: 104/50  Max: 178/104   O2 Sat 95 % SpO2  Min: 90 %  Max: 98 %      Intake/Output Summary (Last 24 hours) at 2/8/2025 0003  Last data filed at 2/7/2025 1801  Gross per 24 hour   Intake 1800 ml   Output 1565 ml   Net 235 ml       Diet Enteral/Parenteral; Tube Feeding No Oral Diet; Jevity 1.2 Jae; Continuous; 55; 50; Water;  Every 4 hours    Invasive Monitoring           Physical Exam   Physical Exam  Vitals reviewed.   Skin:     General: Skin is warm and dry.   Cardiovascular:      Rate and Rhythm: Normal rate and regular rhythm.      Heart sounds: No murmur heard.     No friction rub.   Constitutional:       General: She is not in acute distress.     Appearance: She is ill-appearing.      Interventions: Nasal cannula in place.   Pulmonary:      Effort: No respiratory distress.      Comments: Weak respiratory effort and cough  Neurological:      Comments: Encephalopathy present. Gives one word responses. Disoriented.    Genitourinary/Anorectal:  Vasquez present.        Diagnostic Studies        Lab Results: I have reviewed the following results:     Medications:  Scheduled PRN   chlorhexidine, 15 mL, Q12H JOSTIN  enoxaparin, 40 mg, Daily  folic acid 1 mg in sodium chloride 0.9 % 50 mL IVPB, 1 mg, Daily  furosemide, 40 mg, Daily  ipratropium, 0.5 mg, TID  levalbuterol, 1.25 mg, TID  melatonin, 3 mg, HS  nystatin, , BID  omeprazole (PRILOSEC) suspension 2 mg/mL, 10 mg, Daily  polyethylene glycol, 17 g, Daily  QUEtiapine, 12.5 mg, HS  senna-docusate sodium, 1 tablet, BID  sulfamethoxazole-trimethoprim, 5 mg/kg of trimethoprim (Adjusted), Q12H  thiamine, 100 mg, Daily      acetaminophen, 650 mg, Q4H PRN       Continuous          Labs:   CBC    Recent Labs     02/06/25  0557   WBC 14.60*   HGB 9.6*   HCT 30.5*        BMP    Recent Labs     02/06/25  0557 02/06/25  1407   SODIUM 144 143   K 4.1 3.7    105   CO2 30 33*   AGAP 6 5   BUN 19 18   CREATININE 0.90 0.88   CALCIUM 7.8* 8.0*       Coags    No recent results     Additional Electrolytes  Recent Labs     02/06/25  0557 02/06/25  1407   MG 2.3 2.0   PHOS 2.6 2.9   CAIONIZED 1.08*  --           Blood Gas    No recent results  No recent results LFTs  No recent results    Infectious  No recent results  Glucose  Recent Labs     02/06/25  0557 02/06/25  1407   GLUC 163* 213*

## 2025-02-09 NOTE — PROGRESS NOTES
Progress Note - Critical Care/ICU   Name: Darlin Zamora 79 y.o. female I MRN: [id number]  Unit/Bed#: ICU 10 I Date of Admission: 1/25/2025   Date of Service: 2/8/2025 I Hospital Day: 13      Critical Care Interval Transfer Note:    Brief Hospital Summary: Hospital Course: 9 y.o. with a history of hypertension and meningioma who was brought in by EMS after being found on the floor of her house during a wellness check.  Patient's home has no heat and was reportedly cluttered with filthy living conditions.  The patient was reportedly alert and following commands, but confused.  The patient is a core temperature in the ED was 85.1 °F.  Active rewarming was initiated with shara hugger.  After initiation of rewarming the patient began to become hypotensive.  Patient received 2 L of IV fluids in the emergency department without improvement of her blood pressure.  The patient was started on Levophed and transferred to the MICU. While in the MICU, she was thought to have seizure-like activity. She was transferred to the neuro ICU and placed on EEG. She has worsening respiratory failure and was ultimately intubated, thought to be secondary to aspiration. She had a large hiatal hernia that was having episodes of bradycardia that were thought to be secondary to vagal nerve stimulation. She was treated with antibiotics in coordination with ID. After many discussions, she underwent paraesophageal hernia repair. She was able to be extubated 2/5. Unfortunately, she had progressive respiratory decline and plan was to reintubate. The son was contacted and asked the medical team to transition patient to comfort measures. All invasive treatments were aborted and she was placed on comfort measures only.    Barriers to discharge:   Hospice care     Consults: IP CONSULT TO CASE MANAGEMENT  IP CONSULT TO PHARMACY  IP CONSULT TO NEUROLOGY  IP CONSULT TO PODIATRY  IP CONSULT TO PHARMACY  IP CONSULT TO INFECTIOUS DISEASES  IP CONSULT TO  THORACIC SURGERY  IP CONSULT TO PALLIATIVE CARE  IP CONSULT TO CARDIOLOGY  IP CONSULT TO ELECTROPHYSIOLOGY    Discharge Plan: Anticipate discharge in 24-48 hrs to hospice  Central access plan:  End of life    Patient seen and evaluated by Critical Care today and deemed to be appropriate for transfer to Hospice. Spoke to Dr. Fritz from [facility] to accept transfer. Critical care can be contacted via SecureChat with any questions or concerns. Please use the Critical Care AP Role in Secure Chat for any provider inquires until the patient is transferred out of the ICU or until tomorrow at 0600.

## 2025-02-09 NOTE — DEATH NOTE
PRONOUNCEMENT OF DEATH     DOA: 2025    DOD: 2025    TOD: 4:52 AM    CODE STATUS AT TOD: Level 4 DNR comfort measures only    PATIENT ON HOSPICE?:  Yes    FAMILY NOTIFIED?:  Yes    AUTOPSY DESIRED?:  No    SUSPECTED COD: Acute respiratory failure     HOSPITAL PROBLEM LIST:   Patient Active Problem List   Diagnosis    Syncope    Hypertension    Brain mass    Alopecia    Anemia    Basal cell carcinoma    Iron deficiency anemia    Abdominal pain    S/P total hysterectomy and bilateral salpingo-oophorectomy    Dysfunctional voiding of urine    Mixed hypercholesterolemia and hypertriglyceridemia    Meningioma (HCC)    Localized edema    Altered mental status/Seizure like activity    Septic shock (HCC)    Acute hypoxic respiratory failure (HCC)    Acute encephalopathy    Hiatal hernia    Palliative care by specialist    Goals of care, counseling/discussion    Bradycardia       PRONOUNCEMENT OF DEATH    I was contacted by nursing staff to evaluate the patient found without vital signs. Patient was identified visually and identification was confirmed by hospital ID bracelet. Patient was found laying in bed. Personally examined the patient without heart sounds auscultated on exam nor were pulses detected x 2. No breath sounds were appreciated after 2 minutes of auscultation. Pupils were fixed and non-reactive to light. Patient was pronounced dead at this date and time.      home is TBD in TBD. Phone number is TBD.     Please kindly review hospital chart for all details of this hospitalization and circumstances leading to death.     Death certificate will be signed my attending physician at a later time.

## 2025-02-09 NOTE — DISCHARGE SUMMARY
Discharge Summary - Hospitalist   Name: Darlin Zamora 79 y.o. female I MRN: [id number]  [facility] Date of Admission: 1/25/2025   Date of Service: 2/9/2025 I Hospital Day: 14     Assessment & Plan  Acute encephalopathy  See hospital course below  Meningioma (HCC)    Altered mental status/Seizure like activity    Septic shock (HCC)    Acute hypoxic respiratory failure (HCC)    Goals of care, counseling/discussion    Bradycardia       Medical Problems       Resolved Problems  Date Reviewed: 9/19/2024   None       Discharging Physician / Practitioner: JOSE MANUEL Glover  PCP: Tiffanie Kamara DO  Admission Date:   Admission Orders (From admission, onward)       Ordered        01/26/25 0212  INPATIENT ADMISSION  Once                          Discharge Date: 02/09/25    Consultations During Hospital Stay:  Critical care  EP  Thoracic surgery    Procedures Performed:   Paraesophageal hernia repair    Reason for Admission: Found down at home during wellness check    Hospital Course per ICU team:    Darlin Zamora is a 79 y.o. female patient who originally presented to the hospital on 1/25/2025  by EMS after being found on the floor of her house during a wellness check.  Patient's home has no heat and was reportedly cluttered with filthy living conditions.  The patient was reportedly alert and following commands, but confused.  The patient is a core temperature in the ED was 85.1 °F.  Active rewarming was initiated with shara hugger.  After initiation of rewarming the patient began to become hypotensive.  Patient received 2 L of IV fluids in the emergency department without improvement of her blood pressure.  The patient was started on Levophed and transferred to the MICU. While in the MICU, she was thought to have seizure-like activity. She was transferred to the neuro ICU and placed on EEG. She had worsening respiratory failure and was ultimately intubated, thought to be secondary to aspiration.  She had a large hiatal hernia that was having episodes of bradycardia that were thought to be secondary to vagal nerve stimulation. She was treated with antibiotics in coordination with ID. After many discussions, she underwent paraesophageal hernia repair. She was able to be extubated 2/5. Unfortunately, she had progressive respiratory decline and plan was to reintubate. The son was contacted and asked the medical team to transition patient to comfort measures. All invasive treatments were aborted and she was placed on comfort measures only.     Please see above list of diagnoses, critical care progress notes, and related plan for additional information.     Condition at Discharge:      Discharge Day Visit / Exam:   I was contacted by nursing staff to evaluate the patient found without vital signs. Patient was identified visually and identification was confirmed by hospital ID bracelet. Patient was found laying in bed. Personally examined the patient without heart sounds auscultated on exam nor were pulses detected x 2. No breath sounds were appreciated after 2 minutes of auscultation. Pupils were fixed and non-reactive to light. Patient was pronounced dead at this date and time.     Discussion with Family: Updated  (son) via phone.    Discharge instructions/Information to patient and family:   See after visit summary for information provided to patient and family.      Provisions for Follow-Up Care:  See after visit summary for information related to follow-up care and any pertinent home health orders.        Discharge Medications:  See after visit summary for reconciled discharge medications provided to patient and/or family.      Administrative Statements   Discharge Statement:  I have spent a total time of 30 minutes in caring for this patient on the day of the visit/encounter. .    **Please Note: This note may have been constructed using a voice recognition system**

## (undated) DEVICE — GLOVE PI ULTRA TOUCH SZ.7.5

## (undated) DEVICE — ENDOPATH XCEL UNIVERSAL TROCAR STABLILITY SLEEVES: Brand: ENDOPATH XCEL

## (undated) DEVICE — NEEDLE 25G X 1 1/2

## (undated) DEVICE — MEGA SUTURECUT ND: Brand: ENDOWRIST

## (undated) DEVICE — CLAMP TOWEL TUBING DISPOSABLE

## (undated) DEVICE — GLOVE INDICATOR PI UNDERGLOVE SZ 7 BLUE

## (undated) DEVICE — ADHESIVE SKN CLSR HISTOACRYL FLEX 0.5ML LF

## (undated) DEVICE — AIR AND WATER TUBING/CAP SET FOR OLYMPUS® SCOPES: Brand: ERBE

## (undated) DEVICE — PAD GROUNDING DUAL ADULT

## (undated) DEVICE — KIT, BETHLEHEM THORACIC ROBOT: Brand: CARDINAL HEALTH

## (undated) DEVICE — COLUMN DRAPE

## (undated) DEVICE — SUT MONOCRYL 4-0 PS-2 27 IN Y426H

## (undated) DEVICE — EXOFIN PRECISION PEN HIGH VISCOSITY TOPICAL SKIN ADHESIVE: Brand: EXOFIN PRECISION PEN, 1G

## (undated) DEVICE — COTTON TIP APPLICTOR 2 PK

## (undated) DEVICE — INTENDED FOR TISSUE SEPARATION, AND OTHER PROCEDURES THAT REQUIRE A SHARP SURGICAL BLADE TO PUNCTURE OR CUT.: Brand: BARD-PARKER SAFETY BLADES SIZE 15, STERILE

## (undated) DEVICE — ANTIBACTERIAL UNDYED BRAIDED (POLYGLACTIN 910), SYNTHETIC ABSORBABLE SUTURE: Brand: COATED VICRYL

## (undated) DEVICE — SUT MONOCRYL 4-0 PS-2 18 IN Y496G

## (undated) DEVICE — CHLORAPREP HI-LITE 26ML ORANGE

## (undated) DEVICE — ENDOPATH XCEL BLADELESS TROCARS WITH STABILITY SLEEVES: Brand: ENDOPATH XCEL

## (undated) DEVICE — ENDOPATH PNEUMONEEDLE INSUFFLATION NEEDLES WITH LUER LOCK CONNECTORS 120MM: Brand: ENDOPATH

## (undated) DEVICE — SUT STRATAFIX SPIRAL 2-0 CT-1 20 CM SXPD1B400

## (undated) DEVICE — SUT ETHIBOND 2-0 SH/SH 36 IN X523H

## (undated) DEVICE — GLOVE INDICATOR PI UNDERGLOVE SZ 8 BLUE

## (undated) DEVICE — TIP COVER ACCESSORY

## (undated) DEVICE — CANNULA SEAL

## (undated) DEVICE — KIT ENDO BUTTON

## (undated) DEVICE — GLOVE INDICATOR PI UNDERGLOVE SZ 7.5 BLUE

## (undated) DEVICE — KIT, BETHLEHEM ROBOTIC PROST: Brand: CARDINAL HEALTH

## (undated) DEVICE — PREMIUM DRY TRAY LF: Brand: MEDLINE INDUSTRIES, INC.

## (undated) DEVICE — CHLORHEXIDINE 4PCT 4 OZ

## (undated) DEVICE — ARM DRAPE

## (undated) DEVICE — VISUALIZATION SYSTEM: Brand: CLEARIFY

## (undated) DEVICE — FIRST STEP BEDSIDE KIT - STAND-UP POUCH, ENDOSCOPIC CLEANING PAD - 1 POUCH: Brand: FIRST STEP BEDSIDE KIT - STAND-UP POUCH, ENDOSCOPIC CLEANING PAD

## (undated) DEVICE — STRL PENROSE DRAIN 18" X 3/4": Brand: CARDINAL HEALTH

## (undated) DEVICE — Device: Brand: OMNICLOSE TROCAR SITE CLOSURE DEVICE

## (undated) DEVICE — LUBRICANT INST ELECTROLUBE ANTISTK WO PAD

## (undated) DEVICE — MONOPOLAR CURVED SCISSORS: Brand: ENDOWRIST

## (undated) DEVICE — 3000CC GUARDIAN II: Brand: GUARDIAN

## (undated) DEVICE — SEAL

## (undated) DEVICE — CADIERE FORCEPS: Brand: ENDOWRIST

## (undated) DEVICE — VESSEL SEALER EXTEND: Brand: ENDOWRIST

## (undated) DEVICE — GAUZE ROLL KITTNER

## (undated) DEVICE — 40595 XL TRENDELENBURG POSITIONING KIT: Brand: 40595 XL TRENDELENBURG POSITIONING KIT

## (undated) DEVICE — VESSEL SEALER: Brand: ENDOWRIST

## (undated) DEVICE — TRAY FOLEY 16FR URIMETER SILICONE SURESTEP

## (undated) DEVICE — SUT VICRYL PLUS 0 54IN VCP287G

## (undated) DEVICE — BLADELESS OBTURATOR: Brand: WECK VISTA

## (undated) DEVICE — SUCTION CATH 18 FR

## (undated) DEVICE — TIP-UP FENESTRATED GRASPER: Brand: ENDOWRIST

## (undated) DEVICE — NEEDLE HYPO 23G X 1-1/2 IN

## (undated) DEVICE — STERILE CYSTO PACK: Brand: CARDINAL HEALTH

## (undated) DEVICE — MAYO STAND COVER: Brand: CONVERTORS

## (undated) DEVICE — ENDOCATCH BAG 15MM

## (undated) DEVICE — INSUFFLATION NEEDLE TO ESTABLISH PNEUMOPERITONEUM.: Brand: INSUFFLATION NEEDLE

## (undated) DEVICE — PROGRASP FORCEPS: Brand: ENDOWRIST

## (undated) DEVICE — SUT ETHIBOND 0 SH 30 IN X834H

## (undated) DEVICE — TROCAR: Brand: KII FIOS FIRST ENTRY

## (undated) DEVICE — INTENDED FOR TISSUE SEPARATION, AND OTHER PROCEDURES THAT REQUIRE A SHARP SURGICAL BLADE TO PUNCTURE OR CUT.: Brand: BARD-PARKER SAFETY BLADES SIZE 11, STERILE

## (undated) DEVICE — GLOVE SRG BIOGEL ECLIPSE 7.5

## (undated) DEVICE — STRL COTTON TIP APPLCTR 6IN PK: Brand: CARDINAL HEALTH